# Patient Record
Sex: FEMALE | Race: WHITE | Employment: OTHER | URBAN - METROPOLITAN AREA
[De-identification: names, ages, dates, MRNs, and addresses within clinical notes are randomized per-mention and may not be internally consistent; named-entity substitution may affect disease eponyms.]

---

## 2017-01-29 ENCOUNTER — HOSPITAL ENCOUNTER (EMERGENCY)
Age: 67
Discharge: HOME | End: 2017-01-29
Attending: EMERGENCY MEDICINE
Payer: MEDICARE

## 2017-01-29 ENCOUNTER — APPOINTMENT (OUTPATIENT)
Dept: GENERAL RADIOLOGY | Age: 67
End: 2017-01-29
Attending: EMERGENCY MEDICINE
Payer: MEDICARE

## 2017-01-29 VITALS
HEIGHT: 64 IN | BODY MASS INDEX: 27.83 KG/M2 | HEART RATE: 77 BPM | OXYGEN SATURATION: 96 % | TEMPERATURE: 101.7 F | SYSTOLIC BLOOD PRESSURE: 147 MMHG | DIASTOLIC BLOOD PRESSURE: 66 MMHG | WEIGHT: 163 LBS | RESPIRATION RATE: 18 BRPM

## 2017-01-29 DIAGNOSIS — J02.9 SORE THROAT: Primary | ICD-10-CM

## 2017-01-29 DIAGNOSIS — R50.9 FEVER, UNSPECIFIED FEVER CAUSE: ICD-10-CM

## 2017-01-29 LAB
ALBUMIN SERPL BCP-MCNC: 3.8 G/DL (ref 3.2–4.6)
ALBUMIN/GLOB SERPL: 0.9 {RATIO} (ref 1.2–3.5)
ALP SERPL-CCNC: 112 U/L (ref 50–136)
ALT SERPL-CCNC: 31 U/L (ref 12–65)
ANION GAP BLD CALC-SCNC: 9 MMOL/L (ref 7–16)
AST SERPL W P-5'-P-CCNC: 23 U/L (ref 15–37)
BASOPHILS # BLD AUTO: 0 K/UL (ref 0–0.2)
BASOPHILS # BLD: 0 % (ref 0–2)
BILIRUB SERPL-MCNC: 0.4 MG/DL (ref 0.2–1.1)
BUN SERPL-MCNC: 14 MG/DL (ref 8–23)
CALCIUM SERPL-MCNC: 8.9 MG/DL (ref 8.3–10.4)
CHLORIDE SERPL-SCNC: 103 MMOL/L (ref 98–107)
CO2 SERPL-SCNC: 26 MMOL/L (ref 21–32)
CREAT SERPL-MCNC: 1.22 MG/DL (ref 0.6–1)
DEPRECATED S PYO AG THROAT QL EIA: NEGATIVE
DIFFERENTIAL METHOD BLD: ABNORMAL
EOSINOPHIL # BLD: 0.2 K/UL (ref 0–0.8)
EOSINOPHIL NFR BLD: 1 % (ref 0.5–7.8)
ERYTHROCYTE [DISTWIDTH] IN BLOOD BY AUTOMATED COUNT: 11.8 % (ref 11.9–14.6)
FLUAV AG NPH QL IA: NEGATIVE
FLUBV AG NPH QL IA: NEGATIVE
GLOBULIN SER CALC-MCNC: 4.3 G/DL (ref 2.3–3.5)
GLUCOSE SERPL-MCNC: 181 MG/DL (ref 65–100)
HCT VFR BLD AUTO: 36.1 % (ref 35.8–46.3)
HGB BLD-MCNC: 12.5 G/DL (ref 11.7–15.4)
IMM GRANULOCYTES # BLD: 0 K/UL (ref 0–0.5)
IMM GRANULOCYTES NFR BLD AUTO: 0.2 % (ref 0–5)
LACTATE BLD-SCNC: 1.8 MMOL/L (ref 0.5–1.9)
LYMPHOCYTES # BLD AUTO: 9 % (ref 13–44)
LYMPHOCYTES # BLD: 1.2 K/UL (ref 0.5–4.6)
MCH RBC QN AUTO: 31.3 PG (ref 26.1–32.9)
MCHC RBC AUTO-ENTMCNC: 34.6 G/DL (ref 31.4–35)
MCV RBC AUTO: 90.5 FL (ref 79.6–97.8)
MONOCYTES # BLD: 0.9 K/UL (ref 0.1–1.3)
MONOCYTES NFR BLD AUTO: 7 % (ref 4–12)
NEUTS SEG # BLD: 10.4 K/UL (ref 1.7–8.2)
NEUTS SEG NFR BLD AUTO: 83 % (ref 43–78)
PLATELET # BLD AUTO: 334 K/UL (ref 150–450)
PMV BLD AUTO: 9.4 FL (ref 10.8–14.1)
POTASSIUM SERPL-SCNC: 4.2 MMOL/L (ref 3.5–5.1)
PROT SERPL-MCNC: 8.1 G/DL (ref 6.3–8.2)
RBC # BLD AUTO: 3.99 M/UL (ref 4.05–5.25)
SODIUM SERPL-SCNC: 138 MMOL/L (ref 136–145)
WBC # BLD AUTO: 12.7 K/UL (ref 4.3–11.1)

## 2017-01-29 PROCEDURE — 87880 STREP A ASSAY W/OPTIC: CPT | Performed by: EMERGENCY MEDICINE

## 2017-01-29 PROCEDURE — 80053 COMPREHEN METABOLIC PANEL: CPT | Performed by: EMERGENCY MEDICINE

## 2017-01-29 PROCEDURE — 74011250637 HC RX REV CODE- 250/637: Performed by: EMERGENCY MEDICINE

## 2017-01-29 PROCEDURE — 99284 EMERGENCY DEPT VISIT MOD MDM: CPT | Performed by: EMERGENCY MEDICINE

## 2017-01-29 PROCEDURE — 96360 HYDRATION IV INFUSION INIT: CPT | Performed by: EMERGENCY MEDICINE

## 2017-01-29 PROCEDURE — 87804 INFLUENZA ASSAY W/OPTIC: CPT | Performed by: EMERGENCY MEDICINE

## 2017-01-29 PROCEDURE — 85025 COMPLETE CBC W/AUTO DIFF WBC: CPT | Performed by: EMERGENCY MEDICINE

## 2017-01-29 PROCEDURE — 74011250636 HC RX REV CODE- 250/636: Performed by: EMERGENCY MEDICINE

## 2017-01-29 PROCEDURE — 71020 XR CHEST PA LAT: CPT

## 2017-01-29 PROCEDURE — 83605 ASSAY OF LACTIC ACID: CPT

## 2017-01-29 PROCEDURE — 87081 CULTURE SCREEN ONLY: CPT | Performed by: EMERGENCY MEDICINE

## 2017-01-29 RX ORDER — CLINDAMYCIN HYDROCHLORIDE 300 MG/1
300 CAPSULE ORAL 3 TIMES DAILY
Qty: 25 CAP | Refills: 0 | Status: SHIPPED | OUTPATIENT
Start: 2017-01-29 | End: 2017-02-05

## 2017-01-29 RX ORDER — CLINDAMYCIN HYDROCHLORIDE 150 MG/1
300 CAPSULE ORAL
Status: COMPLETED | OUTPATIENT
Start: 2017-01-29 | End: 2017-01-29

## 2017-01-29 RX ORDER — ACETAMINOPHEN 325 MG/1
650 TABLET ORAL
Status: COMPLETED | OUTPATIENT
Start: 2017-01-29 | End: 2017-01-29

## 2017-01-29 RX ADMIN — ACETAMINOPHEN 650 MG: 325 TABLET, FILM COATED ORAL at 15:55

## 2017-01-29 RX ADMIN — CLINDAMYCIN HYDROCHLORIDE 300 MG: 150 CAPSULE ORAL at 15:55

## 2017-01-29 RX ADMIN — SODIUM CHLORIDE 1000 ML: 900 INJECTION, SOLUTION INTRAVENOUS at 14:22

## 2017-01-29 NOTE — ED PROVIDER NOTES
HPI Comments: Some malaise x 2 weeks. / more recent sore throat and cough with rare scant green sputum. No GI or  changes    Patient is a 74 y.o. female presenting with fever, sore throat, and shortness of breath. The history is provided by the patient. Fever    This is a new problem. The current episode started 12 to 24 hours ago. The problem occurs constantly. The maximum temperature noted was 101 - 101.9 F. Associated symptoms include sore throat and shortness of breath. Pertinent negatives include no diarrhea, no cough, no neck pain and no rash. She has tried nothing for the symptoms. Sore Throat    This is a new problem. The current episode started 12 to 24 hours ago. Associated symptoms include shortness of breath. Pertinent negatives include no diarrhea, no drooling, no swollen glands, no trouble swallowing, no stiff neck and no cough. She has had no exposure to strep or mono. Shortness of Breath   The current episode started more than 1 week ago. Associated symptoms include a fever and sore throat. Pertinent negatives include no swollen glands, no neck pain, no cough, no sputum production, no hemoptysis, no wheezing, no rash, no leg pain and no leg swelling. She has tried nothing for the symptoms. Past Medical History:   Diagnosis Date    CAD (coronary artery disease) 3/1/2012     MI, stents    Chest pain     Coronary artery disease involving native coronary artery without angina pectoris 5/21/2015    Depression 3/1/2012    Diabetes mellitus ([facility] [address] 3/1/2012     oral agents.  bs: 102-110. hypo- 80's    DM2 (diabetes mellitus, type 2) ([facility] [address] 5/21/2015    Elevated troponin 3/2/2012    Essential hypertension 5/21/2015    External hemorrhoids without mention of complication 4117    GERD (gastroesophageal reflux disease)     History of MI (myocardial infarction) 11/12     x2    Hypercholesterolemia     Hypertension 3/1/2012    Insomnia     Personal history of colonic polyps 2013 hyperplastic    Platelet inhibition due to Plavix      holding for colonoscopy per GI Dr. Janis Huddleston Rectocele 2013    Tobacco abuse 3/1/2012     quit for 1 year    Tobacco use disorder     Unstable angina ([address] 3/1/2012     ntg as needed. Past Surgical History:   Procedure Laterality Date    Hx supriya and bso      Pr left heart cath,percutaneous  last stented 11/12 x 2     stent x3 , mi x 2    Hx hemorrhoidectomy       x2    Hx orthopaedic       left elbow    Hx cervical fusion       neck w/plate    Hx colonoscopy  12/17/13     Anna Marie--single transverse hyperplastic polyp--7-10 year recall    Hx wisdom teeth extraction           Family History:   Problem Relation Age of Onset    Heart Disease Mother     Osteoporosis Mother     Heart Attack Mother 67     mi    Thyroid Disease Mother      Multinodular Goiter    Heart Disease Father     Cancer Father      pancreatic    Heart Attack Father 67     MI    Cancer Sister      Pre-Cancerous dysplasia    Thyroid Cancer Sister     Ulcerative Colitis Brother     Thyroid Cancer Brother     Breast Cancer Neg Hx        Social History     Social History    Marital status:      Spouse name: N/A    Number of children: N/A    Years of education: N/A     Occupational History    Not on file. Social History Main Topics    Smoking status: Former Smoker     Packs/day: 1.00     Years: 40.00     Quit date: 11/9/2012    Smokeless tobacco: Never Used      Comment: quit 2012 . has stopped prior also    Alcohol use Yes      Comment: socially     Drug use: No    Sexual activity: Not on file     Other Topics Concern    Not on file     Social History Narrative         ALLERGIES: Cephalosporins and Lisinopril    Review of Systems   Constitutional: Positive for fever. Negative for chills. HENT: Positive for sore throat. Negative for drooling and trouble swallowing. Respiratory: Positive for shortness of breath.  Negative for cough, hemoptysis, sputum production and wheezing. Cardiovascular: Negative for leg swelling. Gastrointestinal: Negative for diarrhea. Musculoskeletal: Negative for neck pain. Skin: Negative for rash. All other systems reviewed and are negative. Vitals:    01/29/17 1214   BP: 160/64   Pulse: 72   Resp: 16   Temp: (!) 101.9 °F (38.8 °C)   SpO2: 97%   Weight: 73.9 kg (163 lb)   Height: 5' 4\" (1.626 m)            Physical Exam   Constitutional: She appears well-developed and well-nourished. No distress. HENT:   Head: Atraumatic. Right Ear: External ear normal.   Left Ear: External ear normal.   Nose: Nose normal.   Mouth/Throat: No oropharyngeal exudate. No evidence of abscess. Some odor. Mild redness but no exudate   Eyes: No scleral icterus. Neck: Neck supple. Cardiovascular: Normal rate. Pulmonary/Chest: Effort normal. No respiratory distress. Abdominal: Soft. Musculoskeletal: She exhibits no edema or tenderness. Neurological: She is alert. Skin: Skin is warm and dry. Psychiatric: Thought content normal.   Nursing note and vitals reviewed.        MDM  Number of Diagnoses or Management Options  Fever, unspecified fever cause:   Sore throat:      Amount and/or Complexity of Data Reviewed  Clinical lab tests: ordered and reviewed  Tests in the radiology section of CPT®: ordered and reviewed  Independent visualization of images, tracings, or specimens: yes    Risk of Complications, Morbidity, and/or Mortality  Presenting problems: moderate  Diagnostic procedures: low  Management options: moderate    Patient Progress  Patient progress: stable    ED Course       Procedures

## 2017-01-29 NOTE — ED TRIAGE NOTES
Patient complains of sore throat and fever since last night. States she has been having some shortness of breath for a couple of weeks. Reports non-productive cough.

## 2017-01-29 NOTE — Clinical Note
Antibiotic : clindamycin 300 mg 3 times daily Tylenol or ibuprofen for discomfort Return with any worsening or concerns Throat culture done

## 2017-01-29 NOTE — DISCHARGE INSTRUCTIONS
Sore Throat: Care Instructions  Your Care Instructions    Infection by bacteria or a virus causes most sore throats. Cigarette smoke, dry air, air pollution, allergies, and yelling can also cause a sore throat. Sore throats can be painful and annoying. Fortunately, most sore throats go away on their own. If you have a bacterial infection, your doctor may prescribe antibiotics. Follow-up care is a key part of your treatment and safety. Be sure to make and go to all appointments, and call your doctor if you are having problems. It's also a good idea to know your test results and keep a list of the medicines you take. How can you care for yourself at home? · If your doctor prescribed antibiotics, take them as directed. Do not stop taking them just because you feel better. You need to take the full course of antibiotics. · Gargle with warm salt water once an hour to help reduce swelling and relieve discomfort. Use 1 teaspoon of salt mixed in 1 cup of warm water. · Take an over-the-counter pain medicine, such as acetaminophen (Tylenol), ibuprofen (Advil, Motrin), or naproxen (Aleve). Read and follow all instructions on the label. · Be careful when taking over-the-counter cold or flu medicines and Tylenol at the same time. Many of these medicines have acetaminophen, which is Tylenol. Read the labels to make sure that you are not taking more than the recommended dose. Too much acetaminophen (Tylenol) can be harmful. · Drink plenty of fluids. Fluids may help soothe an irritated throat. Hot fluids, such as tea or soup, may help decrease throat pain. · Use over-the-counter throat lozenges to soothe pain. Regular cough drops or hard candy may also help. These should not be given to young children because of the risk of choking. · Do not smoke or allow others to smoke around you. If you need help quitting, talk to your doctor about stop-smoking programs and medicines.  These can increase your chances of quitting for good. · Use a vaporizer or humidifier to add moisture to your bedroom. Follow the directions for cleaning the machine. When should you call for help? Call your doctor now or seek immediate medical care if:  · You have new or worse trouble swallowing. · Your sore throat gets much worse on one side. Watch closely for changes in your health, and be sure to contact your doctor if you do not get better as expected. Where can you learn more? Go to http://angeles-kehinde.info/. Enter [id number] in the search box to learn more about \"Sore Throat: Care Instructions. \"  Current as of: July 29, 2016  Content Version: 11.1  © 7149-9400 [employer]. Care instructions adapted under license by [employer] (which disclaims liability or warranty for this information). If you have questions about a medical condition or this instruction, always ask your healthcare professional. [address] any warranty or liability for your use of this information.

## 2017-01-29 NOTE — ED NOTES
I have reviewed discharge instructions with the patient. The patient verbalized understanding. Prescription and AVS given and explained to pt. Pt denies any further needs, questions, or concerns at this time. No adverse reactions to any medications, treatments, or procedures noted. Pt febrile at time of triage but medicated with tylenol upon discharge. Pt aided to POV via wheelchair.

## 2017-01-30 ENCOUNTER — PATIENT OUTREACH (OUTPATIENT)
Dept: CASE MANAGEMENT | Age: 67
End: 2017-01-30

## 2017-01-31 LAB
BACTERIA SPEC CULT: NORMAL
SERVICE CMNT-IMP: NORMAL

## 2017-01-31 NOTE — PROGRESS NOTES
Care coordinator attempted to outreach patient in regards to recent [facility] discharge. Will attempt to outreach patient again. LVM #1   This note will not be viewable in MyChart.

## 2017-02-05 ENCOUNTER — APPOINTMENT (OUTPATIENT)
Dept: GENERAL RADIOLOGY | Age: 67
End: 2017-02-05
Attending: EMERGENCY MEDICINE
Payer: MEDICARE

## 2017-02-05 ENCOUNTER — HOSPITAL ENCOUNTER (EMERGENCY)
Age: 67
Discharge: HOME | End: 2017-02-05
Attending: EMERGENCY MEDICINE
Payer: MEDICARE

## 2017-02-05 VITALS
RESPIRATION RATE: 18 BRPM | SYSTOLIC BLOOD PRESSURE: 184 MMHG | OXYGEN SATURATION: 99 % | HEIGHT: 64 IN | TEMPERATURE: 99 F | BODY MASS INDEX: 27.83 KG/M2 | DIASTOLIC BLOOD PRESSURE: 80 MMHG | HEART RATE: 70 BPM | WEIGHT: 163 LBS

## 2017-02-05 DIAGNOSIS — R07.89 ATYPICAL CHEST PAIN: Primary | ICD-10-CM

## 2017-02-05 LAB
ALBUMIN SERPL BCP-MCNC: 3.1 G/DL (ref 3.2–4.6)
ALBUMIN/GLOB SERPL: 0.8 {RATIO} (ref 1.2–3.5)
ALP SERPL-CCNC: 109 U/L (ref 50–136)
ALT SERPL-CCNC: 28 U/L (ref 12–65)
ANION GAP BLD CALC-SCNC: 12 MMOL/L (ref 7–16)
AST SERPL W P-5'-P-CCNC: 22 U/L (ref 15–37)
BASOPHILS # BLD AUTO: 0 K/UL (ref 0–0.2)
BASOPHILS # BLD: 0 % (ref 0–2)
BILIRUB SERPL-MCNC: 0.4 MG/DL (ref 0.2–1.1)
BUN SERPL-MCNC: 15 MG/DL (ref 8–23)
CALCIUM SERPL-MCNC: 7.7 MG/DL (ref 8.3–10.4)
CHLORIDE SERPL-SCNC: 110 MMOL/L (ref 98–107)
CO2 SERPL-SCNC: 21 MMOL/L (ref 21–32)
CREAT SERPL-MCNC: 1.24 MG/DL (ref 0.6–1)
DIFFERENTIAL METHOD BLD: ABNORMAL
EOSINOPHIL # BLD: 0.3 K/UL (ref 0–0.8)
EOSINOPHIL NFR BLD: 3 % (ref 0.5–7.8)
ERYTHROCYTE [DISTWIDTH] IN BLOOD BY AUTOMATED COUNT: 11.7 % (ref 11.9–14.6)
FLUAV AG NPH QL IA: NEGATIVE
FLUBV AG NPH QL IA: NEGATIVE
GLOBULIN SER CALC-MCNC: 3.9 G/DL (ref 2.3–3.5)
GLUCOSE BLD STRIP.AUTO-MCNC: 111 MG/DL (ref 65–100)
GLUCOSE SERPL-MCNC: 84 MG/DL (ref 65–100)
HCT VFR BLD AUTO: 32.8 % (ref 35.8–46.3)
HGB BLD-MCNC: 11.4 G/DL (ref 11.7–15.4)
IMM GRANULOCYTES # BLD: 0 K/UL (ref 0–0.5)
IMM GRANULOCYTES NFR BLD AUTO: 0.4 % (ref 0–5)
LYMPHOCYTES # BLD AUTO: 26 % (ref 13–44)
LYMPHOCYTES # BLD: 2.5 K/UL (ref 0.5–4.6)
MCH RBC QN AUTO: 30.9 PG (ref 26.1–32.9)
MCHC RBC AUTO-ENTMCNC: 34.8 G/DL (ref 31.4–35)
MCV RBC AUTO: 88.9 FL (ref 79.6–97.8)
MONOCYTES # BLD: 0.8 K/UL (ref 0.1–1.3)
MONOCYTES NFR BLD AUTO: 8 % (ref 4–12)
NEUTS SEG # BLD: 6 K/UL (ref 1.7–8.2)
NEUTS SEG NFR BLD AUTO: 63 % (ref 43–78)
PLATELET # BLD AUTO: 329 K/UL (ref 150–450)
PMV BLD AUTO: 9.5 FL (ref 10.8–14.1)
POTASSIUM SERPL-SCNC: 3 MMOL/L (ref 3.5–5.1)
PROT SERPL-MCNC: 7 G/DL (ref 6.3–8.2)
RBC # BLD AUTO: 3.69 M/UL (ref 4.05–5.25)
SODIUM SERPL-SCNC: 143 MMOL/L (ref 136–145)
TROPONIN I SERPL-MCNC: 0.02 NG/ML (ref 0.02–0.05)
TROPONIN I SERPL-MCNC: <0.02 NG/ML (ref 0.02–0.05)
WBC # BLD AUTO: 9.6 K/UL (ref 4.3–11.1)

## 2017-02-05 PROCEDURE — 84484 ASSAY OF TROPONIN QUANT: CPT | Performed by: EMERGENCY MEDICINE

## 2017-02-05 PROCEDURE — 87804 INFLUENZA ASSAY W/OPTIC: CPT | Performed by: EMERGENCY MEDICINE

## 2017-02-05 PROCEDURE — 71020 XR CHEST PA LAT: CPT

## 2017-02-05 PROCEDURE — 93005 ELECTROCARDIOGRAM TRACING: CPT | Performed by: EMERGENCY MEDICINE

## 2017-02-05 PROCEDURE — 82962 GLUCOSE BLOOD TEST: CPT

## 2017-02-05 PROCEDURE — 80053 COMPREHEN METABOLIC PANEL: CPT | Performed by: EMERGENCY MEDICINE

## 2017-02-05 PROCEDURE — 85025 COMPLETE CBC W/AUTO DIFF WBC: CPT | Performed by: EMERGENCY MEDICINE

## 2017-02-05 PROCEDURE — 99285 EMERGENCY DEPT VISIT HI MDM: CPT | Performed by: EMERGENCY MEDICINE

## 2017-02-05 NOTE — ED TRIAGE NOTES
Pt. Complains of a cough x 3 days and complains of chest pain and shortness of breath starting this AM.

## 2017-02-06 ENCOUNTER — PATIENT OUTREACH (OUTPATIENT)
Dept: CASE MANAGEMENT | Age: 67
End: 2017-02-06

## 2017-02-06 LAB
ATRIAL RATE: 72 BPM
CALCULATED P AXIS, ECG09: 34 DEGREES
CALCULATED R AXIS, ECG10: 71 DEGREES
CALCULATED T AXIS, ECG11: 52 DEGREES
DIAGNOSIS, 93000: NORMAL
DIASTOLIC BP, ECG02: NORMAL MMHG
P-R INTERVAL, ECG05: 130 MS
Q-T INTERVAL, ECG07: 440 MS
QRS DURATION, ECG06: 90 MS
QTC CALCULATION (BEZET), ECG08: 481 MS
SYSTOLIC BP, ECG01: NORMAL MMHG
VENTRICULAR RATE, ECG03: 72 BPM

## 2017-02-06 NOTE — DISCHARGE INSTRUCTIONS
Chest Pain: Care Instructions  Your Care Instructions  There are many things that can cause chest pain. Some are not serious and will get better on their own in a few days. But some kinds of chest pain need more testing and treatment. Your doctor may have recommended a follow-up visit in the next 8 to 12 hours. If you are not getting better, you may need more tests or treatment. Even though your doctor has released you, you still need to watch for any problems. The doctor carefully checked you, but sometimes problems can develop later. If you have new symptoms or if your symptoms do not get better, get medical care right away. If you have worse or different chest pain or pressure that lasts more than 5 minutes or you passed out (lost consciousness), call 911 or seek other emergency help right away. A medical visit is only one step in your treatment. Even if you feel better, you still need to do what your doctor recommends, such as going to all suggested follow-up appointments and taking medicines exactly as directed. This will help you recover and help prevent future problems. How can you care for yourself at home? · Rest until you feel better. · Take your medicine exactly as prescribed. Call your doctor if you think you are having a problem with your medicine. · Do not drive after taking a prescription pain medicine. When should you call for help? Call 911 if:  · You passed out (lost consciousness). · You have severe difficulty breathing. · You have symptoms of a heart attack. These may include:  ¨ Chest pain or pressure, or a strange feeling in your chest.  ¨ Sweating. ¨ Shortness of breath. ¨ Nausea or vomiting. ¨ Pain, pressure, or a strange feeling in your back, neck, jaw, or upper belly or in one or both shoulders or arms. ¨ Lightheadedness or sudden weakness. ¨ A fast or irregular heartbeat.   After you call 911, the  may tell you to chew 1 adult-strength or 2 to 4 low-dose aspirin. Wait for an ambulance. Do not try to drive yourself. Call your doctor today if:  · You have any trouble breathing. · Your chest pain gets worse. · You are dizzy or lightheaded, or you feel like you may faint. · You are not getting better as expected. · You are having new or different chest pain. Where can you learn more? Go to http://angeles-kehinde.info/. Enter A120 in the search box to learn more about \"Chest Pain: Care Instructions. \"  Current as of: May 27, 2016  Content Version: 11.1  © 4925-4184 [employer]. Care instructions adapted under license by [employer] (which disclaims liability or warranty for this information). If you have questions about a medical condition or this instruction, always ask your healthcare professional. [address] any warranty or liability for your use of this information.

## 2017-02-06 NOTE — PROGRESS NOTES
This note will not be viewable in [address] Date/Time of Call:   2/6/2017 9:59am   What was the patient seen in the ED for? Patient was seen in ED for DX of:  Atypical Chest Pain   Does the patient understand his/her diagnosis and/or treatment and what happened during the ED visit? Spoke with patient who verbalized understanding of diagnosis and treatment plan. Patient reports she is feeling better. Denies any further problems. Did the patient receive discharge instructions from the ED? Yes   Review any discharge instructions (see notes in [facility]). Ask patient if they understand these. Do they have any questions? Care Coordinator reviewed discharge instructions. Patient verbalized understanding of instructions. Were home services ordered (nursing, PT, OT, ST, etc.)? No   If so, has the first visit occurred? If not, why? (Assist with coordination of services if necessary.) N/A   Was any DME ordered? No   If so, has it been received? If not, why?  (Assist with coordination of arranging DME orders if necessary.) N/A   Complete a review of all medications (new, continued and discontinued meds per the D/C instructions and medication tab in [facility]). Reviewed medications with patient. Were all new prescriptions filled? If not, why?  (Assist with obtainment of medications if necessary.) N/A   Does the patient understand the purpose and dosing instructions for all medications? (If patient has questions, provide explanation and education.) Patient reports understanding of purpose and instructions for all medications. Does the patient have any problems in performing ADLs? (If patient is unable to perform ADLs  what is the limiting factor(s)? Do they have a support system that can assist? If no support system is present, discuss possible assistance that they may be able to obtain.) Patient is independent and does not require assistance.    Does the patient have all follow-up appointments scheduled? Has transportation been arranged? [facility] follow-up should be within 7 days of discharge; all others should have PCP follow-up within 7 days of discharge; follow-ups with other specialists as appropriate or ordered.) Patient advised to schedule follow up appointment with PCP. Patient reports no problems with transportation to visits. Any other questions or concerns expressed by the patient? Patient verbalized no further needs or concerns, and was appreciative of call.        MARQUIS Call Completed By: Genoveva Landry LPN   Care Coordinator  [facility]

## 2017-02-06 NOTE — ED PROVIDER NOTES
HPI Comments: Patient is a 33-year-old former smoker with a past medical history of coronary artery disease status post 2 stents. Today she is presenting with chest pain which woke her up at approximately 11 AM.  She describes the pain as sharp and substernal radiating to her back. States is similar to her previous myocardial infarction. Mild shortness of breath. Some pain with deep inspiration. Patient currently on aspirin as well as Plavix and had a normal nuclear stress test on 4/7/16. Patient followed by Dr. Jerel Bridges of cardiology. At the time of my evaluation she is significantly improved after nitroglycerin as well as aspirin administration by EMS. Patient is a 74 y.o. female presenting with cough and chest pain. The history is provided by the patient. No  was used. Cough   Associated symptoms include chest pain. Pertinent negatives include no headaches, no sore throat and no shortness of breath. Chest Pain (Angina)    Associated symptoms include cough. Pertinent negatives include no abdominal pain, no back pain, no fever, no headaches and no shortness of breath. Past Medical History:   Diagnosis Date    CAD (coronary artery disease) 3/1/2012     MI, stents    Chest pain     Coronary artery disease involving native coronary artery without angina pectoris 5/21/2015    Depression 3/1/2012    Diabetes mellitus ([facility] [address] 3/1/2012     oral agents.  bs: 102-110. hypo- 80's    DM2 (diabetes mellitus, type 2) ([facility] [address] 5/21/2015    Elevated troponin 3/2/2012    Essential hypertension 5/21/2015    External hemorrhoids without mention of complication 2421    GERD (gastroesophageal reflux disease)     History of MI (myocardial infarction) 11/12     x2    Hypercholesterolemia     Hypertension 3/1/2012    Insomnia     Personal history of colonic polyps 2013     hyperplastic    Platelet inhibition due to Plavix      holding for colonoscopy per GI Dr. Rosalinda Galvan 2013    Tobacco abuse 3/1/2012     quit for 1 year    Tobacco use disorder     Unstable angina ([facility] [address] 3/1/2012     ntg as needed. Past Surgical History:   Procedure Laterality Date    Hx supriya and bso      Pr left heart cath,percutaneous  last stented 11/12 x 2     stent x3 , mi x 2    Hx hemorrhoidectomy       x2    Hx orthopaedic       left elbow    Hx cervical fusion       neck w/plate    Hx colonoscopy  12/17/13     Anna Marie--single transverse hyperplastic polyp--7-10 year recall    Hx wisdom teeth extraction           Family History:   Problem Relation Age of Onset    Heart Disease Mother     Osteoporosis Mother     Heart Attack Mother 67     mi    Thyroid Disease Mother      Multinodular Goiter    Heart Disease Father     Cancer Father      pancreatic    Heart Attack Father 67     MI    Cancer Sister      Pre-Cancerous dysplasia    Thyroid Cancer Sister     Ulcerative Colitis Brother     Thyroid Cancer Brother     Breast Cancer Neg Hx        Social History     Social History    Marital status:      Spouse name: N/A    Number of children: N/A    Years of education: N/A     Occupational History    Not on file. Social History Main Topics    Smoking status: Former Smoker     Packs/day: 1.00     Years: 40.00     Quit date: 11/9/2012    Smokeless tobacco: Never Used      Comment: quit 2012 . has stopped prior also    Alcohol use Yes      Comment: socially     Drug use: No    Sexual activity: Not on file     Other Topics Concern    Not on file     Social History Narrative         ALLERGIES: Cephalosporins and Lisinopril    Review of Systems   Constitutional: Negative for fatigue and fever. HENT: Negative for sore throat. Eyes: Negative for pain. Respiratory: Positive for cough. Negative for chest tightness and shortness of breath. Cardiovascular: Positive for chest pain. Negative for leg swelling. Gastrointestinal: Negative for abdominal pain.    Genitourinary: Negative for dysuria. Musculoskeletal: Negative for back pain. Neurological: Negative for syncope and headaches. Vitals:    02/05/17 1735   BP: 146/69   Pulse: 74   Resp: 16   Temp: 99 °F (37.2 °C)   SpO2: 98%   Weight: 73.9 kg (163 lb)   Height: 5' 4\" (1.626 m)            Physical Exam   Constitutional: She is oriented to person, place, and time. She appears well-developed and well-nourished. No distress. HENT:   Head: Normocephalic and atraumatic. Eyes: Conjunctivae and EOM are normal. Pupils are equal, round, and reactive to light. Neck: Normal range of motion. Neck supple. Cardiovascular: Normal rate, regular rhythm and normal heart sounds. Pulmonary/Chest: Effort normal and breath sounds normal. No respiratory distress. She has no wheezes. She has no rales. Abdominal: Soft. She exhibits no distension. There is no tenderness. There is no rebound. Musculoskeletal: Normal range of motion. She exhibits no edema or tenderness. Neurological: She is alert and oriented to person, place, and time. Skin: Skin is warm and dry. No rash noted. She is not diaphoretic. Psychiatric: Her behavior is normal.   Patient appears quite anxious   Vitals reviewed. MDM  Number of Diagnoses or Management Options  Atypical chest pain: new and does not require workup  Diagnosis management comments: Patient has had multiple workups in the past for atypical chest pain. She is followed by cardiology. EKG reassuring. If 2 trops negative likely discharge back to home. She has no significant chest pain times my evaluation. Troponin ×2 negative. Patient without significant chest pain. Will discharge to home. I have instructed the patient to call her cardiologist Dr. Amira Weldon near future to schedule a follow-up appointment. Patient and family expressed understanding. Strict return precautions discussed.        Amount and/or Complexity of Data Reviewed  Clinical lab tests: ordered and reviewed (Results for orders placed or performed during the hospital encounter of 02/05/17  -INFLUENZA A & B AG (RAPID TEST)       Result                                            Value                         Ref Range                       Influenza A Ag                                    NEGATIVE                      NEG                             Influenza B Ag                                    NEGATIVE                      NEG                        -CBC WITH AUTOMATED DIFF       Result                                            Value                         Ref Range                       WBC                                               9.6                           4.3 - 11.1 K/uL                 RBC                                               3.69 (L)                      4.05 - 5.25 M/uL                HGB                                               11.4 (L)                      11.7 - 15.4 g/dL                HCT                                               32.8 (L)                      35.8 - 46.3 %                   MCV                                               88.9                          79.6 - 97.8 FL                  MCH                                               30.9                          26.1 - 32.9 PG                  MCHC                                              34.8                          31.4 - 35.0 g/dL                RDW                                               11.7 (L)                      11.9 - 14.6 %                   PLATELET                                          329                           150 - 450 K/uL                  MPV                                               9.5 (L)                       10.8 - 14.1 FL                  DF                                                AUTOMATED                                                     NEUTROPHILS                                       63                            43 - 78 % LYMPHOCYTES                                       26                            13 - 44 %                       MONOCYTES                                         8                             4.0 - 12.0 %                    EOSINOPHILS                                       3                             0.5 - 7.8 %                     BASOPHILS                                         0                             0.0 - 2.0 %                     IMMATURE GRANULOCYTES                             0.4                           0.0 - 5.0 %                     ABS. NEUTROPHILS                                  6.0                           1.7 - 8.2 K/UL                  ABS. LYMPHOCYTES                                  2.5                           0.5 - 4.6 K/UL                  ABS. MONOCYTES                                    0.8                           0.1 - 1.3 K/UL                  ABS. EOSINOPHILS                                  0.3                           0.0 - 0.8 K/UL                  ABS. BASOPHILS                                    0.0                           0.0 - 0.2 K/UL                  ABS. IMM.  GRANS.                                  0.0                           0.0 - 0.5 K/UL             -METABOLIC PANEL, COMPREHENSIVE       Result                                            Value                         Ref Range                       Sodium                                            143                           136 - 145 mmol/L                Potassium                                         3.0 (L)                       3.5 - 5.1 mmol/L                Chloride                                          110 (H)                       98 - 107 mmol/L                 CO2                                               21                            21 - 32 mmol/L                  Anion gap                                         12                            7 - 16 mmol/L                   Glucose 84                            65 - 100 mg/dL                  BUN                                               15                            8 - 23 MG/DL                    Creatinine                                        1.24 (H)                      0.6 - 1.0 MG/DL                 GFR est AA                                        55 (L)                        >60 ml/min/1.73m2               GFR est non-AA                                    46 (L)                        >60 ml/min/1.73m2               Calcium                                           7.7 (L)                       8.3 - 10.4 MG/DL                Bilirubin, total                                  0.4                           0.2 - 1.1 MG/DL                 ALT (SGPT)                                        28                            12 - 65 U/L                     AST (SGOT)                                        22                            15 - 37 U/L                     Alk. phosphatase                                  109                           50 - 136 U/L                    Protein, total                                    7.0                           6.3 - 8.2 g/dL                  Albumin                                           3.1 (L)                       3.2 - 4.6 g/dL                  Globulin                                          3.9 (H)                       2.3 - 3.5 g/dL                  A-G Ratio                                         0.8 (L)                       1.2 - 3.5                  -TROPONIN I       Result                                            Value                         Ref Range                       Troponin-I, Qt.                                   0.02                          0.02 - 0.05 NG/ML          -TROPONIN I       Result                                            Value                         Ref Range                       Troponin-I, Qt. <0. 02 (L)                     0.02 - 0.05 NG/ML          -GLUCOSE, POC       Result                                            Value                         Ref Range                       Glucose (POC)                                     111 (H)                       65 - 100 mg/dL             )  Tests in the radiology section of CPT®: ordered and reviewed (Xr Chest Pa Lat    Result Date: 2/5/2017  Chest X-ray  2/5/2017 6:12 PM Clinical indication:  Cough for 3 days with chest pain and shortness of breath, onset this morning. Comparison: 1/29/2017. Findings: Upright AP and lateral chest. ACDF plate and screws partially visualized. Coronary artery stent. Apical lordotic technique. Lungs are well-aerated and clear. No focal airspace opacities nor edema. No effusions. Stable cardiomegaly. Possible mitral annular calcifications. IMPRESSION: 1.  Little significant change: No acute abnormality.    )  Review and summarize past medical records: yes  Independent visualization of images, tracings, or specimens: yes    Risk of Complications, Morbidity, and/or Mortality  Presenting problems: high  Diagnostic procedures: high  Management options: high    Patient Progress  Patient progress: improved    ED Course       Procedures

## 2017-02-06 NOTE — ED NOTES
Patient reports that her \"blood sugar is getting low. \" Patients blood sugar checked POC. . Patient states that her \"normal blood sugar is 120-135. \" Dr Giovany Mcmahon aware. Patient given meal tray.

## 2017-02-16 ENCOUNTER — HOSPITAL ENCOUNTER (OUTPATIENT)
Dept: LAB | Age: 67
Discharge: HOME | End: 2017-02-16
Attending: INTERNAL MEDICINE
Payer: MEDICARE

## 2017-02-16 DIAGNOSIS — Z98.61 S/P PTCA (PERCUTANEOUS TRANSLUMINAL CORONARY ANGIOPLASTY): ICD-10-CM

## 2017-02-16 PROBLEM — I10 ESSENTIAL HYPERTENSION WITH GOAL BLOOD PRESSURE LESS THAN 130/85: Status: ACTIVE | Noted: 2017-02-16

## 2017-02-16 PROBLEM — K21.9 GASTROESOPHAGEAL REFLUX DISEASE: Status: ACTIVE | Noted: 2017-02-16

## 2017-02-16 PROBLEM — E11.9 TYPE 2 DIABETES MELLITUS WITHOUT COMPLICATION, WITHOUT LONG-TERM CURRENT USE OF INSULIN (HCC): Status: ACTIVE | Noted: 2017-02-16

## 2017-02-16 PROBLEM — E78.5 HYPERLIPIDEMIA: Status: ACTIVE | Noted: 2017-02-16

## 2017-02-16 LAB
ANION GAP BLD CALC-SCNC: 11 MMOL/L
BASOPHILS # BLD AUTO: 0.1 K/UL (ref 0–0.2)
BASOPHILS # BLD: 0 % (ref 0–2)
BUN SERPL-MCNC: 20 MG/DL (ref 8–23)
CALCIUM SERPL-MCNC: 9.3 MG/DL (ref 8.3–10.4)
CHLORIDE SERPL-SCNC: 104 MMOL/L (ref 98–107)
CO2 SERPL-SCNC: 25 MMOL/L (ref 23–32)
CREAT SERPL-MCNC: 1.3 MG/DL (ref 0.6–1)
DIFFERENTIAL METHOD BLD: ABNORMAL
EOSINOPHIL # BLD: 0.4 K/UL (ref 0–0.8)
EOSINOPHIL NFR BLD: 4 % (ref 0.5–7.8)
ERYTHROCYTE [DISTWIDTH] IN BLOOD BY AUTOMATED COUNT: 11.7 % (ref 11.9–14.6)
GLUCOSE SERPL-MCNC: 82 MG/DL (ref 65–100)
HCT VFR BLD AUTO: 35.6 % (ref 35.8–46.3)
HGB BLD-MCNC: 12.3 G/DL (ref 11.7–15.4)
LYMPHOCYTES # BLD AUTO: 31 % (ref 13–44)
LYMPHOCYTES # BLD: 3.5 K/UL (ref 0.5–4.6)
MCH RBC QN AUTO: 31.5 PG (ref 26.1–32.9)
MCHC RBC AUTO-ENTMCNC: 34.6 G/DL (ref 31.4–35)
MCV RBC AUTO: 91.3 FL (ref 79.6–97.8)
MONOCYTES # BLD: 1.1 K/UL (ref 0.1–1.3)
MONOCYTES NFR BLD AUTO: 10 % (ref 4–12)
NEUTS SEG # BLD: 6.1 K/UL (ref 1.7–8.2)
NEUTS SEG NFR BLD AUTO: 55 % (ref 43–78)
PLATELET # BLD AUTO: 440 K/UL (ref 150–450)
PMV BLD AUTO: 8.9 FL (ref 10.8–14.1)
POTASSIUM SERPL-SCNC: 3.5 MMOL/L (ref 3.5–5.1)
RBC # BLD AUTO: 3.9 M/UL (ref 4.05–5.25)
SODIUM SERPL-SCNC: 140 MMOL/L (ref 136–145)
WBC # BLD AUTO: 11.2 K/UL (ref 4.3–11.1)

## 2017-02-16 PROCEDURE — 80048 BASIC METABOLIC PNL TOTAL CA: CPT | Performed by: INTERNAL MEDICINE

## 2017-02-16 PROCEDURE — 85025 COMPLETE CBC W/AUTO DIFF WBC: CPT | Performed by: INTERNAL MEDICINE

## 2017-02-16 PROCEDURE — 36415 COLL VENOUS BLD VENIPUNCTURE: CPT | Performed by: INTERNAL MEDICINE

## 2017-02-20 ENCOUNTER — HOSPITAL ENCOUNTER (OUTPATIENT)
Dept: CARDIAC CATH/INVASIVE PROCEDURES | Age: 67
Discharge: HOME | End: 2017-02-20
Attending: INTERNAL MEDICINE | Admitting: INTERNAL MEDICINE
Payer: MEDICARE

## 2017-02-20 VITALS
TEMPERATURE: 98.2 F | SYSTOLIC BLOOD PRESSURE: 145 MMHG | WEIGHT: 163 LBS | RESPIRATION RATE: 18 BRPM | OXYGEN SATURATION: 94 % | BODY MASS INDEX: 27.83 KG/M2 | HEART RATE: 56 BPM | DIASTOLIC BLOOD PRESSURE: 65 MMHG | HEIGHT: 64 IN

## 2017-02-20 LAB
ANION GAP BLD CALC-SCNC: 11 MMOL/L (ref 7–16)
BUN SERPL-MCNC: 16 MG/DL (ref 8–23)
CALCIUM SERPL-MCNC: 9.1 MG/DL (ref 8.3–10.4)
CHLORIDE SERPL-SCNC: 104 MMOL/L (ref 98–107)
CO2 SERPL-SCNC: 25 MMOL/L (ref 21–32)
CREAT SERPL-MCNC: 1.2 MG/DL (ref 0.6–1)
GLUCOSE SERPL-MCNC: 161 MG/DL (ref 65–100)
POTASSIUM SERPL-SCNC: 3.7 MMOL/L (ref 3.5–5.1)
SODIUM SERPL-SCNC: 140 MMOL/L (ref 136–145)

## 2017-02-20 PROCEDURE — 99152 MOD SED SAME PHYS/QHP 5/>YRS: CPT

## 2017-02-20 PROCEDURE — 74011250636 HC RX REV CODE- 250/636

## 2017-02-20 PROCEDURE — C1894 INTRO/SHEATH, NON-LASER: HCPCS

## 2017-02-20 PROCEDURE — 80048 BASIC METABOLIC PNL TOTAL CA: CPT | Performed by: INTERNAL MEDICINE

## 2017-02-20 PROCEDURE — 74011000250 HC RX REV CODE- 250: Performed by: INTERNAL MEDICINE

## 2017-02-20 PROCEDURE — C1760 CLOSURE DEV, VASC: HCPCS

## 2017-02-20 PROCEDURE — 74011636320 HC RX REV CODE- 636/320: Performed by: INTERNAL MEDICINE

## 2017-02-20 PROCEDURE — 74011250636 HC RX REV CODE- 250/636: Performed by: INTERNAL MEDICINE

## 2017-02-20 PROCEDURE — 77030004534 HC CATH ANGI DX INFN CARD -A

## 2017-02-20 PROCEDURE — 74011000258 HC RX REV CODE- 258: Performed by: INTERNAL MEDICINE

## 2017-02-20 PROCEDURE — 77030004559 HC CATH ANGI DX SUPT CARD -B

## 2017-02-20 PROCEDURE — 93458 L HRT ARTERY/VENTRICLE ANGIO: CPT

## 2017-02-20 RX ORDER — SODIUM CHLORIDE 0.9 % (FLUSH) 0.9 %
5-10 SYRINGE (ML) INJECTION AS NEEDED
Status: DISCONTINUED | OUTPATIENT
Start: 2017-02-20 | End: 2017-02-20 | Stop reason: HOSPADM

## 2017-02-20 RX ORDER — FENTANYL CITRATE 50 UG/ML
25-100 INJECTION, SOLUTION INTRAMUSCULAR; INTRAVENOUS
Status: DISCONTINUED | OUTPATIENT
Start: 2017-02-20 | End: 2017-02-20 | Stop reason: HOSPADM

## 2017-02-20 RX ORDER — DIAZEPAM 5 MG/1
5 TABLET ORAL ONCE
Status: DISCONTINUED | OUTPATIENT
Start: 2017-02-20 | End: 2017-02-20 | Stop reason: HOSPADM

## 2017-02-20 RX ORDER — SODIUM CHLORIDE 0.9 % (FLUSH) 0.9 %
5-10 SYRINGE (ML) INJECTION EVERY 8 HOURS
Status: DISCONTINUED | OUTPATIENT
Start: 2017-02-20 | End: 2017-02-20 | Stop reason: HOSPADM

## 2017-02-20 RX ORDER — HEPARIN SODIUM 200 [USP'U]/100ML
3 INJECTION, SOLUTION INTRAVENOUS CONTINUOUS
Status: DISCONTINUED | OUTPATIENT
Start: 2017-02-20 | End: 2017-02-20 | Stop reason: HOSPADM

## 2017-02-20 RX ORDER — LIDOCAINE HYDROCHLORIDE 20 MG/ML
1-20 INJECTION, SOLUTION INFILTRATION; PERINEURAL
Status: DISCONTINUED | OUTPATIENT
Start: 2017-02-20 | End: 2017-02-20 | Stop reason: HOSPADM

## 2017-02-20 RX ORDER — MIDAZOLAM HYDROCHLORIDE 1 MG/ML
.5-5 INJECTION, SOLUTION INTRAMUSCULAR; INTRAVENOUS
Status: DISCONTINUED | OUTPATIENT
Start: 2017-02-20 | End: 2017-02-20 | Stop reason: HOSPADM

## 2017-02-20 RX ORDER — GUAIFENESIN 100 MG/5ML
324 LIQUID (ML) ORAL ONCE
Status: DISCONTINUED | OUTPATIENT
Start: 2017-02-20 | End: 2017-02-20 | Stop reason: HOSPADM

## 2017-02-20 RX ORDER — SODIUM CHLORIDE 9 MG/ML
1 INJECTION, SOLUTION INTRAVENOUS AS NEEDED
Status: DISCONTINUED | OUTPATIENT
Start: 2017-02-20 | End: 2017-02-20 | Stop reason: HOSPADM

## 2017-02-20 RX ORDER — SODIUM CHLORIDE 9 MG/ML
75 INJECTION, SOLUTION INTRAVENOUS CONTINUOUS
Status: DISCONTINUED | OUTPATIENT
Start: 2017-02-20 | End: 2017-02-20 | Stop reason: HOSPADM

## 2017-02-20 RX ORDER — SODIUM CHLORIDE 9 MG/ML
3 INJECTION, SOLUTION INTRAVENOUS ONCE
Status: COMPLETED | OUTPATIENT
Start: 2017-02-20 | End: 2017-02-20

## 2017-02-20 RX ADMIN — SODIUM CHLORIDE 3 ML/KG/HR: 900 INJECTION, SOLUTION INTRAVENOUS at 11:00

## 2017-02-20 RX ADMIN — IOVERSOL 70 ML: 741 INJECTION INTRA-ARTERIAL; INTRAVENOUS at 14:35

## 2017-02-20 RX ADMIN — MIDAZOLAM HYDROCHLORIDE 1 MG: 1 INJECTION, SOLUTION INTRAMUSCULAR; INTRAVENOUS at 14:13

## 2017-02-20 RX ADMIN — FENTANYL CITRATE 25 MCG: 50 INJECTION, SOLUTION INTRAMUSCULAR; INTRAVENOUS at 14:06

## 2017-02-20 RX ADMIN — LIDOCAINE HYDROCHLORIDE 70 MG: 20 INJECTION, SOLUTION INFILTRATION; PERINEURAL at 14:10

## 2017-02-20 RX ADMIN — HEPARIN SODIUM 3 ML/HR: 200 INJECTION, SOLUTION INTRAVENOUS at 14:23

## 2017-02-20 RX ADMIN — MIDAZOLAM HYDROCHLORIDE 1 MG: 1 INJECTION, SOLUTION INTRAMUSCULAR; INTRAVENOUS at 14:10

## 2017-02-20 RX ADMIN — FENTANYL CITRATE 25 MCG: 50 INJECTION, SOLUTION INTRAMUSCULAR; INTRAVENOUS at 14:09

## 2017-02-20 RX ADMIN — MIDAZOLAM HYDROCHLORIDE 1 MG: 1 INJECTION, SOLUTION INTRAMUSCULAR; INTRAVENOUS at 14:06

## 2017-02-20 RX ADMIN — LIDOCAINE HYDROCHLORIDE 150 MG: 20 INJECTION, SOLUTION INFILTRATION; PERINEURAL at 14:17

## 2017-02-20 NOTE — PROGRESS NOTES
Report received from  Cath Lab RN. Procedural findings communicated. Intra procedural  medication administration reviewed. Progression of care discussed.      Patient received into [address] [facility] 4 post sheath removal.     Access site without bleeding or swelling yes    Dressing dry and intact yes    Patient instructed to limit movement to right  lower extremity    Routine post procedural vital signs and site assessment initiated yes

## 2017-02-20 NOTE — PROGRESS NOTES
TRANSFER - OUT REPORT:    Verbal report given to rn(name) on Mamadou Null  being transferred to [facility](unit) for routine progression of care       Report consisted of patients Situation, Background, Assessment and   Recommendations(SBAR). Information from the following report(s) SBAR was reviewed with the receiving nurse. Lines:   Peripheral IV 02/20/17 Left Antecubital (Active)       Peripheral IV 02/20/17 Right Antecubital (Active)        Opportunity for questions and clarification was provided.       Patient transported with:   Registered Nurse   [facility] with dr Nagi Dsouza  No intervention  Right groin mynxed  3mg versed  50mcg fentanyl  Vitals stable, no bleeding or hematoma in right groin

## 2017-02-20 NOTE — IP AVS SNAPSHOT
Yesi Amato 
 
 
 [address] [facility] [address] [facility] 
[phone] Patient: Bella Castillo MRN: [id number] FUA:[birth year] Discharge Summary 2/20/2017 Bella Castillo MRN[de-identified]  [id number] Admission Information Provider Pager Service Admission Date Expected D/C Date Judit Arellano MD  CARDIAC CATH LAB 2/20/2017 Actual LOS Patient Class 0 days OUTPATIENT Follow-up Information None Current Discharge Medication List  
  
ASK your doctor about these medications Dose & Instructions Dispensing Information Comments Morning Noon Evening Bedtime  
 aspirin 81 mg chewable tablet Dose:  81 mg Take 81 mg by mouth every morning. Indications: continue per anesthesia guidelines Refills:  0  
     
   
   
   
  
 atorvastatin 40 mg tablet Commonly known as:  LIPITOR Dose:  40 mg Take 1 Tab by mouth nightly. Quantity:  90 Tab Refills:  3  
     
   
   
   
  
 citalopram 20 mg tablet Commonly known as:  [place] Take 1 tablet by mouth  every morning Quantity:  90 Tab Refills:  3  
     
   
   
   
  
 clopidogrel 75 mg Tab Commonly known as:  PLAVIX Take 1 tablet by mouth  every day Quantity:  90 Tab Refills:  3 GERITOL PO Take  by mouth. Refills:  0  
     
   
   
   
  
 magnesium oxide 400 mg tablet Commonly known as:  MAG-OX  
   
 TAKE 1 TABLET BY MOUTH TWICE DAILY Quantity:  180 Tab Refills:  3  
     
   
   
   
  
 metFORMIN 1,000 mg tablet Commonly known as:  GLUCOPHAGE Other:  Restart Wednesday afternoon Dose:  1000 mg Take 1 Tab by mouth two (2) times daily (with meals). Quantity:  180 Tab Refills:  3 D/C metformin 500 mg  
    
   
   
   
  
 metoprolol succinate 50 mg XL tablet Commonly known as:  TOPROL-XL Take 1 tablet by mouth  every day Quantity:  90 Tab Refills:  3 NIFEdipine ER 90 mg ER tablet Commonly known as:  PROCARDIA XL Dose:  90 mg Take 1 Tab by mouth daily. Quantity:  90 Tab Refills:  3  
     
   
   
   
  
 nitroglycerin 0.4 mg SL tablet Commonly known as:  NITROSTAT Dose:  0.4 mg  
1 Tab by SubLINGual route every five (5) minutes as needed for Chest Pain. Quantity:  3 Bottle Refills:  3 POTASSIUM GLUCONATE PO Dose:  595 mg PE Take 595 mg PE by mouth daily. Refills:  0  
     
   
   
   
  
 traZODone 50 mg tablet Commonly known as:  Deepthi Cast Dose:  50 mg Take 1 Tab by mouth nightly. Quantity:  30 Tab Refills:  0  
     
   
   
   
  
 VITAMIN B-12 1,000 mcg tablet Generic drug:  cyanocobalamin Dose:  1000 mcg Take 1,000 mcg by mouth daily. Refills:  0  
     
   
   
   
  
 VITAMIN D3 2,000 unit Tab Generic drug:  cholecalciferol (vitamin D3) Take  by mouth daily. Refills:  0 General Information Please provide this summary of care documentation to your next provider. Allergies High:  Cephalosporins Unspecified:  Lisinopril Current Immunizations  Reviewed on 12/15/2016 Name Date Influenza Vaccine 8/24/2015, 11/18/2014, 9/4/2013 Influenza Vaccine (Quad) PF 12/15/2016 Pneumococcal Conjugate (PCV-13) 8/24/2015 Pneumococcal Polysaccharide (PPSV-23) 9/4/2013 Discharge Instructions Discharge Instructions Please call tomorrow for follow-up in one month with Dr. Joellen Watson at [facility] Cardiology [phone]. DO NOT TAKE METFORMIN (GLUCOPHAGE) UNTIL Wednesday AFTERNOON. HEART CATHETERIZATION/ANGIOGRAPHY DISCHARGE INSTRUCTIONS 1. Check puncture site frequently for swelling or bleeding.  If there is any bleeding, lie down and apply pressure over the area with a clean towel or washcloth. Notify your doctor for any redness, swelling, drainage, or oozing from the puncture site. Notify your doctor for any fever or chills. 2. If the extremity becomes cold, numb, or painful call Dr. Onelia Akbar at [phone]. 
3. Activity should be limited for the next 48 hours. Climb stairs as little as possible and avoid any stooping, bending, or strenuous activity for 48 hours. No heavy lifting (anything over 10 pounds) for 3 days. 4. You may resume your usual diet. Drink more fluids than usual. 
5. Have a responsible person drive you home and stay with you for at least 24 hours after your heart catheterization/angiography. 6. You may remove bandage from your Right  Groin in 24 hours. You may shower in 24 hours. No tub baths, hot tubs, or swimming for 1 week. Do not place any lotions, creams, powders, or ointments over puncture site for 1 week. You may place a clean band-aid over the puncture site each day for 5 days. Change daily. I have read the above instructions and have had the opportunity to ask questions. Patient: ________________________   Date: 2/20/2017 Witness: _______________________   Date: 2/20/2017 Discharge Orders None  
  
` Patient Signature:  ____________________________________________________________ Date:  ____________________________________________________________  
  
 Justa Harreller Provider Signature:  ____________________________________________________________ Date:  ____________________________________________________________

## 2017-02-20 NOTE — PROCEDURES
Brief Cardiac Procedure Note    Patient: Kierra Moise MRN: [id number]  SSN: [id number]    YOB: 1950  Age: 74 y.o. Sex: female      Date of Procedure: 2/20/2017     Pre-procedure Diagnosis: Chest pain CCS Class III    Post-procedure Diagnosis: Non-cardiac Chest Pain    Procedure: Left Heart Catheterization    Brief Description of Procedure: lhc via right fem, mynx, unable to cannulate radial    Performed By: Camron Bermudez MD     Assistants: none    Anesthesia: Moderate Sedation    Estimated Blood Loss: Less than 10 mL      Specimens: None    Implants: None    Findings: mild cad- no change- nl lvef    Complications: None    Recommendations: Continue medical therapy.     Signed By: Camron Bermudez MD     February 20, 2017

## 2017-02-20 NOTE — DISCHARGE INSTRUCTIONS
Please call tomorrow for follow-up in one month with Dr. Laura Enriquez at [facility] Cardiology 26 [phone]. DO NOT TAKE METFORMIN (GLUCOPHAGE) UNTIL Wednesday AFTERNOON. HEART CATHETERIZATION/ANGIOGRAPHY DISCHARGE INSTRUCTIONS    1. Check puncture site frequently for swelling or bleeding. If there is any bleeding, lie down and apply pressure over the area with a clean towel or washcloth. Notify your doctor for any redness, swelling, drainage, or oozing from the puncture site. Notify your doctor for any fever or chills. 2. If the extremity becomes cold, numb, or painful call Dr. Odilia Fink at [phone].  3. Activity should be limited for the next 48 hours. Climb stairs as little as possible and avoid any stooping, bending, or strenuous activity for 48 hours. No heavy lifting (anything over 10 pounds) for 3 days. 4. You may resume your usual diet. Drink more fluids than usual.  5. Have a responsible person drive you home and stay with you for at least 24 hours after your heart catheterization/angiography. 6. You may remove bandage from your Right  Groin in 24 hours. You may shower in 24 hours. No tub baths, hot tubs, or swimming for 1 week. Do not place any lotions, creams, powders, or ointments over puncture site for 1 week. You may place a clean band-aid over the puncture site each day for 5 days. Change daily. I have read the above instructions and have had the opportunity to ask questions.       Patient: ________________________   Date: 2/20/2017    Witness: _______________________   Date: 2/20/2017

## 2017-02-20 NOTE — PROGRESS NOTES
Patient up to bedside, vital signs and site stable. Patient ambulated to bathroom without difficulty. Patient voided without difficulty. Vascular site stable. 1815 Discharge instructions and home medications reviewed with patient. Time allowed for questions and answers. 1830 Patient ambulated second time without difficulty. Site stable after ambulation. Peripheral IV sites dc'd without difficulty with tips intact. [address] Patient discharged to home with family.

## 2017-02-20 NOTE — PROGRESS NOTES
Patient received to [address] [place] [address] room # 2  Ambulatory from [facility]. Patient scheduled for [facility] today with Dr Alisha Pelletier. Procedure reviewed & questions answered, voiced good understanding consent obtained & placed on chart. All medications and medical history reviewed. Will prep patient per orders. Patient & family updated on plan of care. Patient states she took  mg and Plavix 75 mg at 0945 am today.

## 2017-02-20 NOTE — PROCEDURES
Crhis Carrillo 42       Name:  [facility]   MR#:  [id number]   :  1950   Account #:  [de-identified]   Date of Adm:  2017       DATE OF PROCEDURE: 2017    CLINICAL INFORMATION: Patient with prolonged chest pain   consistent with [place] class IV angina, referred for   catheterization. PROCEDURE IN DETAIL: Attempts were made at a sheath placement   with a weak right radial pulse. That was unable to be   cannulated. A 6-Chinese sheath was placed in the right femoral   artery, and 6-Chinese JL4 Aden right angle pigtail were used. Common femoral artery sheath placement was confirmed with a   sheathogram, and a Mynx closure device was placed with good   hemostasis. Fifteen minutes of conscious sedation was given and supervised   by myself with 3 mg Versed and 50 mcg of fentanyl. Vital signs   and saturations were stable throughout. The right coronary artery is a dominant vessel in normal   anatomic position. There is 20% ostial and proximal narrowing. The rest of the vessel and a small PDA and posterolateral obtuse   marginal branches are free of significant disease. The left main coronary artery is in normal anatomic position,   free of significant disease. It bifurcates into LAD and   circumflex system. The LAD has stents in the proximal segment in the proximal   diagonal. The stents are all widely patent. Just distal to the   diagonal stent, there is a 30% exit stenosis. This is mild   and does not appear to be flow limiting. The remainder of the   LAD is free of any high-grade narrowings. The circumflex has 20% mid vessel narrowing and has a distal   bifurcating obtuse marginal branch free of any high-grade   narrowing. Left ventriculogram reveals normal systolic function. Ejection   fraction is 55%. Left ventricular end-diastolic pressure was 16   mmHg.  Opening aortic pressure 163/64 with no gradient across the   aortic valve.    Mynx closure device was placed with good hemostasis. CONCLUSION   1. Mild diffuse coronary atherosclerotic heart disease with   widely patent stents in the left anterior descending and   diagonal.   2. Normal left ventricular systolic function.         Nino Young MD      [facility] / [facility]ita C[facility]denas   D:  02/20/2017   14:48   T:  02/20/2017   15:33   Job #:  [id number]

## 2017-03-21 ENCOUNTER — HOSPITAL ENCOUNTER (OUTPATIENT)
Dept: CT IMAGING | Age: 67
Discharge: HOME | End: 2017-03-21
Attending: INTERNAL MEDICINE
Payer: MEDICARE

## 2017-03-21 VITALS — WEIGHT: 162 LBS | HEIGHT: 64 IN | BODY MASS INDEX: 27.66 KG/M2

## 2017-03-21 DIAGNOSIS — R07.89 CHEST WALL PAIN: ICD-10-CM

## 2017-03-21 PROCEDURE — 71260 CT THORAX DX C+: CPT

## 2017-03-21 PROCEDURE — 74011000258 HC RX REV CODE- 258

## 2017-03-21 PROCEDURE — 74011636320 HC RX REV CODE- 636/320

## 2017-03-21 RX ORDER — SODIUM CHLORIDE 0.9 % (FLUSH) 0.9 %
10 SYRINGE (ML) INJECTION
Status: COMPLETED | OUTPATIENT
Start: 2017-03-21 | End: 2017-03-21

## 2017-03-21 RX ADMIN — SODIUM CHLORIDE 100 ML: 900 INJECTION, SOLUTION INTRAVENOUS at 15:01

## 2017-03-21 RX ADMIN — Medication 10 ML: at 15:01

## 2017-03-21 RX ADMIN — IOPAMIDOL 80 ML: 755 INJECTION, SOLUTION INTRAVENOUS at 15:01

## 2018-10-11 PROBLEM — E11.21 TYPE 2 DIABETES WITH NEPHROPATHY (HCC): Status: ACTIVE | Noted: 2018-10-11

## 2018-10-29 ENCOUNTER — HOSPITAL ENCOUNTER (OUTPATIENT)
Dept: LAB | Age: 68
Discharge: HOME | End: 2018-10-29
Payer: MEDICARE

## 2018-10-29 DIAGNOSIS — R10.11 RUQ PAIN: ICD-10-CM

## 2018-10-29 DIAGNOSIS — G43.A0 CYCLICAL VOMITING WITH NAUSEA, INTRACTABILITY OF VOMITING NOT SPECIFIED: ICD-10-CM

## 2018-10-29 PROBLEM — R11.2 N&V (NAUSEA AND VOMITING): Status: ACTIVE | Noted: 2018-10-29

## 2018-10-29 LAB
ALBUMIN SERPL-MCNC: 4 G/DL (ref 3.2–4.6)
ALBUMIN/GLOB SERPL: 0.9 {RATIO} (ref 1.2–3.5)
ALP SERPL-CCNC: 114 U/L (ref 50–136)
ALT SERPL-CCNC: 50 U/L (ref 12–65)
ANION GAP SERPL CALC-SCNC: 9 MMOL/L (ref 7–16)
AST SERPL-CCNC: 36 U/L (ref 15–37)
BILIRUB SERPL-MCNC: 0.4 MG/DL (ref 0.2–1.1)
BUN SERPL-MCNC: 21 MG/DL (ref 8–23)
CALCIUM SERPL-MCNC: 9.5 MG/DL (ref 8.3–10.4)
CHLORIDE SERPL-SCNC: 105 MMOL/L (ref 98–107)
CO2 SERPL-SCNC: 26 MMOL/L (ref 21–32)
CREAT SERPL-MCNC: 1.33 MG/DL (ref 0.6–1)
ERYTHROCYTE [DISTWIDTH] IN BLOOD BY AUTOMATED COUNT: 12.2 %
GLOBULIN SER CALC-MCNC: 4.3 G/DL (ref 2.3–3.5)
GLUCOSE SERPL-MCNC: 200 MG/DL (ref 65–100)
HCT VFR BLD AUTO: 37.6 % (ref 35.8–46.3)
HGB BLD-MCNC: 12.4 G/DL (ref 11.7–15.4)
LIPASE SERPL-CCNC: 192 U/L (ref 73–393)
MCH RBC QN AUTO: 30.6 PG (ref 26.1–32.9)
MCHC RBC AUTO-ENTMCNC: 33 G/DL (ref 31.4–35)
MCV RBC AUTO: 92.8 FL (ref 79.6–97.8)
NRBC # BLD: 0 K/UL (ref 0–0.2)
PLATELET # BLD AUTO: 399 K/UL (ref 150–450)
PMV BLD AUTO: 9.5 FL (ref 9.4–12.3)
POTASSIUM SERPL-SCNC: 4.3 MMOL/L (ref 3.5–5.1)
PROT SERPL-MCNC: 8.3 G/DL (ref 6.3–8.2)
RBC # BLD AUTO: 4.05 M/UL (ref 4.05–5.2)
SODIUM SERPL-SCNC: 140 MMOL/L (ref 136–145)
WBC # BLD AUTO: 9.3 K/UL (ref 4.3–11.1)

## 2018-10-29 PROCEDURE — 83690 ASSAY OF LIPASE: CPT

## 2018-10-29 PROCEDURE — 85027 COMPLETE CBC AUTOMATED: CPT

## 2018-10-29 PROCEDURE — 80053 COMPREHEN METABOLIC PANEL: CPT

## 2018-10-29 PROCEDURE — 36415 COLL VENOUS BLD VENIPUNCTURE: CPT

## 2018-11-05 ENCOUNTER — HOSPITAL ENCOUNTER (OUTPATIENT)
Dept: ULTRASOUND IMAGING | Age: 68
Discharge: HOME | End: 2018-11-05
Attending: SURGERY
Payer: MEDICARE

## 2018-11-05 DIAGNOSIS — R10.11 RUQ PAIN: ICD-10-CM

## 2018-11-05 DIAGNOSIS — G43.A0 CYCLICAL VOMITING WITH NAUSEA, INTRACTABILITY OF VOMITING NOT SPECIFIED: ICD-10-CM

## 2018-11-05 PROCEDURE — 76700 US EXAM ABDOM COMPLETE: CPT

## 2018-11-06 PROBLEM — E11.9 TYPE 2 DIABETES MELLITUS WITHOUT COMPLICATION (HCC): Status: ACTIVE | Noted: 2018-11-06

## 2018-11-06 PROBLEM — R09.89 RIGHT CAROTID BRUIT: Status: ACTIVE | Noted: 2018-11-06

## 2018-11-21 ENCOUNTER — HOSPITAL ENCOUNTER (OUTPATIENT)
Dept: CT IMAGING | Age: 68
Discharge: HOME | End: 2018-11-21
Attending: SURGERY
Payer: MEDICARE

## 2018-11-21 VITALS — BODY MASS INDEX: 27.82 KG/M2 | WEIGHT: 157 LBS | HEIGHT: 63 IN

## 2018-11-21 DIAGNOSIS — I65.23 BILATERAL CAROTID ARTERY STENOSIS: ICD-10-CM

## 2018-11-21 LAB — CREAT BLD-MCNC: 1.2 MG/DL (ref 0.8–1.5)

## 2018-11-21 PROCEDURE — 74011000258 HC RX REV CODE- 258: Performed by: SURGERY

## 2018-11-21 PROCEDURE — 82565 ASSAY OF CREATININE: CPT

## 2018-11-21 PROCEDURE — 74011636320 HC RX REV CODE- 636/320: Performed by: SURGERY

## 2018-11-21 PROCEDURE — 70498 CT ANGIOGRAPHY NECK: CPT

## 2018-11-21 RX ORDER — SODIUM CHLORIDE 0.9 % (FLUSH) 0.9 %
10 SYRINGE (ML) INJECTION
Status: COMPLETED | OUTPATIENT
Start: 2018-11-21 | End: 2018-11-21

## 2018-11-21 RX ADMIN — IOPAMIDOL 70 ML: 755 INJECTION, SOLUTION INTRAVENOUS at 11:26

## 2018-11-21 RX ADMIN — Medication 10 ML: at 11:27

## 2018-11-21 RX ADMIN — SODIUM CHLORIDE 100 ML: 900 INJECTION, SOLUTION INTRAVENOUS at 11:27

## 2018-11-26 PROBLEM — Z91.199 NO-SHOW FOR APPOINTMENT: Status: ACTIVE | Noted: 2018-11-26

## 2018-12-05 ENCOUNTER — HOSPITAL ENCOUNTER (OUTPATIENT)
Dept: SURGERY | Age: 68
Discharge: HOME | End: 2018-12-05
Payer: MEDICARE

## 2018-12-05 ENCOUNTER — ANESTHESIA EVENT (OUTPATIENT)
Dept: SURGERY | Age: 68
DRG: 039 | End: 2018-12-05
Payer: MEDICARE

## 2018-12-05 VITALS
RESPIRATION RATE: 16 BRPM | SYSTOLIC BLOOD PRESSURE: 141 MMHG | HEIGHT: 63 IN | HEART RATE: 65 BPM | WEIGHT: 162.5 LBS | TEMPERATURE: 98.1 F | OXYGEN SATURATION: 100 % | BODY MASS INDEX: 28.79 KG/M2 | DIASTOLIC BLOOD PRESSURE: 61 MMHG

## 2018-12-05 LAB
ANION GAP SERPL CALC-SCNC: 6 MMOL/L (ref 7–16)
BUN SERPL-MCNC: 18 MG/DL (ref 8–23)
CALCIUM SERPL-MCNC: 9.2 MG/DL (ref 8.3–10.4)
CHLORIDE SERPL-SCNC: 104 MMOL/L (ref 98–107)
CO2 SERPL-SCNC: 28 MMOL/L (ref 21–32)
CREAT SERPL-MCNC: 1.22 MG/DL (ref 0.6–1)
ERYTHROCYTE [DISTWIDTH] IN BLOOD BY AUTOMATED COUNT: 12.3 % (ref 11.9–14.6)
GLUCOSE BLD STRIP.AUTO-MCNC: 198 MG/DL (ref 65–100)
GLUCOSE SERPL-MCNC: 200 MG/DL (ref 65–100)
HCT VFR BLD AUTO: 35 % (ref 35.8–46.3)
HGB BLD-MCNC: 11.5 G/DL (ref 11.7–15.4)
MCH RBC QN AUTO: 30 PG (ref 26.1–32.9)
MCHC RBC AUTO-ENTMCNC: 32.9 G/DL (ref 31.4–35)
MCV RBC AUTO: 91.4 FL (ref 79.6–97.8)
NRBC # BLD: 0 K/UL (ref 0–0.2)
PLATELET # BLD AUTO: 353 K/UL (ref 150–450)
PMV BLD AUTO: 9.5 FL (ref 9.4–12.3)
POTASSIUM SERPL-SCNC: 3.7 MMOL/L (ref 3.5–5.1)
RBC # BLD AUTO: 3.83 M/UL (ref 4.05–5.2)
SODIUM SERPL-SCNC: 138 MMOL/L (ref 136–145)
WBC # BLD AUTO: 7.8 K/UL (ref 4.3–11.1)

## 2018-12-05 PROCEDURE — 80048 BASIC METABOLIC PNL TOTAL CA: CPT

## 2018-12-05 PROCEDURE — 85027 COMPLETE CBC AUTOMATED: CPT

## 2018-12-05 PROCEDURE — 82962 GLUCOSE BLOOD TEST: CPT

## 2018-12-05 NOTE — PERIOP NOTES
Patient verified name and . Patient provided medical/health information and PTA medications to the best of their ability. TYPE  CASE:lll Order for consent  found in EHR and matches case posting. Labs per surgeon:CBC, BMP. Labs per anesthesia protocol: Poc Glucose. Results 198 EKG  :  2018, SB, Has 3 total cardiac stents. Patient seen by Dr. Love Bryant. Poc Glucose : 198 Patient provided with and instructed on education handouts including Guide to Surgery, blood transfusions, pain management, and hand hygiene for the family and community, and [facility] brochure. Antibacterial Soap and Hibiclens instructions given per hospital policy. Instructed patient to continue previous medications as prescribed prior to surgery unless otherwise directed and to take the following medications the day of surgery according to anesthesia guidelines : ASA 81 mg, Celexa, Metoprolol . Instructed patient to hold  the following medications: Plavix x7 days, Vitamins x7 days. Original medication prescription bottles were visualized during patient appointment. Patient teach back successful and patient demonstrates knowledge of instruction.

## 2018-12-05 NOTE — PERIOP NOTES
Labs reviewed and routed to surgeon. Recent Results (from the past 12 hour(s)) CBC W/O DIFF Collection Time: 12/05/18 10:33 AM  
Result Value Ref Range WBC 7.8 4.3 - 11.1 K/uL  
 RBC 3.83 (L) 4.05 - 5.2 M/uL  
 HGB 11.5 (L) 11.7 - 15.4 g/dL HCT 35.0 (L) 35.8 - 46.3 % MCV 91.4 79.6 - 97.8 FL  
 MCH 30.0 26.1 - 32.9 PG  
 MCHC 32.9 31.4 - 35.0 g/dL  
 RDW 12.3 11.9 - 14.6 % PLATELET 309 219 - 232 K/uL MPV 9.5 9.4 - 12.3 FL ABSOLUTE NRBC 0.00 0.0 - 0.2 K/uL METABOLIC PANEL, BASIC Collection Time: 12/05/18 10:33 AM  
Result Value Ref Range Sodium 138 136 - 145 mmol/L Potassium 3.7 3.5 - 5.1 mmol/L Chloride 104 98 - 107 mmol/L  
 CO2 28 21 - 32 mmol/L Anion gap 6 (L) 7 - 16 mmol/L Glucose 200 (H) 65 - 100 mg/dL BUN 18 8 - 23 MG/DL Creatinine 1.22 (H) 0.6 - 1.0 MG/DL  
 GFR est AA 56 (L) >60 ml/min/1.73m2 GFR est non-AA 46 (L) >60 ml/min/1.73m2 Calcium 9.2 8.3 - 10.4 MG/DL  
GLUCOSE, POC Collection Time: 12/05/18 10:33 AM  
Result Value Ref Range Glucose (POC) 198 (H) 65 - 100 mg/dL

## 2018-12-05 NOTE — ANESTHESIA PREPROCEDURE EVALUATION
Anesthetic History PONV Review of Systems / Medical History Patient summary reviewed and pertinent labs reviewed Pulmonary Smoker (Quit 2012) Neuro/Psych Psychiatric history (Depression) Cardiovascular Hypertension: well controlled Angina (Stable): with exertion Past MI, CAD, PAD, cardiac stents and hyperlipidemia Exercise tolerance: >4 METS Comments: 2/2017: [facility] with patent stents No recent changes in functional status GI/Hepatic/Renal 
  
GERD: well controlled Endo/Other Diabetes (A1C 6.7): well controlled, type 2 Other Findings Physical Exam 
 
Airway Mallampati: II 
TM Distance: 4 - 6 cm Neck ROM: normal range of motion Mouth opening: Normal 
 
 Cardiovascular Rhythm: regular Rate: normal 
 
 
 
 Dental 
 
Dentition: Karina Sayer Pulmonary Breath sounds clear to auscultation Abdominal 
GI exam deferred Other Findings Anesthetic Plan ASA: 3 Anesthesia type: general 
 
Monitoring Plan: Arterial line Induction: Intravenous Anesthetic plan and risks discussed with: Patient

## 2018-12-12 ENCOUNTER — ANESTHESIA (OUTPATIENT)
Dept: SURGERY | Age: 68
DRG: 039 | End: 2018-12-12
Payer: MEDICARE

## 2018-12-12 ENCOUNTER — HOSPITAL ENCOUNTER (INPATIENT)
Age: 68
LOS: 1 days | Discharge: HOME | DRG: 039 | End: 2018-12-13
Attending: SURGERY | Admitting: SURGERY
Payer: MEDICARE

## 2018-12-12 DIAGNOSIS — I25.10 CORONARY ARTERY DISEASE INVOLVING NATIVE CORONARY ARTERY OF NATIVE HEART WITHOUT ANGINA PECTORIS: Primary | ICD-10-CM

## 2018-12-12 PROBLEM — I65.29 CAROTID ARTERY STENOSIS: Status: ACTIVE | Noted: 2018-12-12

## 2018-12-12 LAB
ABO + RH BLD: NORMAL
ACT BLD: 125 SECS (ref 70–128)
BLOOD GROUP ANTIBODIES SERPL: NORMAL
GLUCOSE BLD STRIP.AUTO-MCNC: 153 MG/DL (ref 65–100)
SPECIMEN EXP DATE BLD: NORMAL

## 2018-12-12 PROCEDURE — 77030002933 HC SUT MCRYL J&J -A: Performed by: SURGERY

## 2018-12-12 PROCEDURE — 03UN0KZ SUPPLEMENT LEFT EXTERNAL CAROTID ARTERY WITH NONAUTOLOGOUS TISSUE SUBSTITUTE, OPEN APPROACH: ICD-10-PCS | Performed by: SURGERY

## 2018-12-12 PROCEDURE — 77030019908 HC STETH ESOPH SIMS -A: Performed by: NURSE ANESTHETIST, CERTIFIED REGISTERED

## 2018-12-12 PROCEDURE — C1768 GRAFT, VASCULAR: HCPCS | Performed by: SURGERY

## 2018-12-12 PROCEDURE — 86901 BLOOD TYPING SEROLOGIC RH(D): CPT

## 2018-12-12 PROCEDURE — 77030037088 HC TUBE ENDOTRACH ORAL NSL COVD-A: Performed by: NURSE ANESTHETIST, CERTIFIED REGISTERED

## 2018-12-12 PROCEDURE — 74011250636 HC RX REV CODE- 250/636: Performed by: SURGERY

## 2018-12-12 PROCEDURE — 74011000250 HC RX REV CODE- 250: Performed by: SURGERY

## 2018-12-12 PROCEDURE — 77030037400 HC ADH TISS HI VISC EXOFIN CHMP -B: Performed by: SURGERY

## 2018-12-12 PROCEDURE — 77030002996 HC SUT SLK J&J -A: Performed by: SURGERY

## 2018-12-12 PROCEDURE — 74011250637 HC RX REV CODE- 250/637: Performed by: SURGERY

## 2018-12-12 PROCEDURE — C1755 CATHETER, INTRASPINAL: HCPCS | Performed by: SURGERY

## 2018-12-12 PROCEDURE — 77030020782 HC GWN BAIR PAWS FLX 3M -B: Performed by: NURSE ANESTHETIST, CERTIFIED REGISTERED

## 2018-12-12 PROCEDURE — 77030013794 HC KT TRNSDUC BLD EDWD -B: Performed by: NURSE ANESTHETIST, CERTIFIED REGISTERED

## 2018-12-12 PROCEDURE — 77030039425 HC BLD LARYNG TRULITE DISP TELE -A: Performed by: NURSE ANESTHETIST, CERTIFIED REGISTERED

## 2018-12-12 PROCEDURE — 77030020407 HC IV BLD WRMR ST 3M -A: Performed by: NURSE ANESTHETIST, CERTIFIED REGISTERED

## 2018-12-12 PROCEDURE — 03UJ0KZ SUPPLEMENT LEFT COMMON CAROTID ARTERY WITH NONAUTOLOGOUS TISSUE SUBSTITUTE, OPEN APPROACH: ICD-10-PCS | Performed by: SURGERY

## 2018-12-12 PROCEDURE — 74011250636 HC RX REV CODE- 250/636: Performed by: ANESTHESIOLOGY

## 2018-12-12 PROCEDURE — 77030020263 HC SOL INJ SOD CL0.9% LFCR 1000ML

## 2018-12-12 PROCEDURE — 82962 GLUCOSE BLOOD TEST: CPT

## 2018-12-12 PROCEDURE — 85347 COAGULATION TIME ACTIVATED: CPT

## 2018-12-12 PROCEDURE — 77030018673: Performed by: SURGERY

## 2018-12-12 PROCEDURE — 03CJ0ZZ EXTIRPATION OF MATTER FROM LEFT COMMON CAROTID ARTERY, OPEN APPROACH: ICD-10-PCS | Performed by: SURGERY

## 2018-12-12 PROCEDURE — 74011000250 HC RX REV CODE- 250

## 2018-12-12 PROCEDURE — 65610000006 HC RM INTENSIVE CARE

## 2018-12-12 PROCEDURE — 77030005401 HC CATH RAD ARRO -A: Performed by: NURSE ANESTHETIST, CERTIFIED REGISTERED

## 2018-12-12 PROCEDURE — 77030018824 HC SHNT CAR ARGY COVD -B: Performed by: SURGERY

## 2018-12-12 PROCEDURE — 77030002986 HC SUT PROL J&J -A: Performed by: SURGERY

## 2018-12-12 PROCEDURE — 77030014008 HC SPNG HEMSTAT J&J -C: Performed by: SURGERY

## 2018-12-12 PROCEDURE — 76010000173 HC OR TIME 3 TO 3.5 HR INTENSV-TIER 1: Performed by: SURGERY

## 2018-12-12 PROCEDURE — 76060000037 HC ANESTHESIA 3 TO 3.5 HR: Performed by: SURGERY

## 2018-12-12 PROCEDURE — 77030032490 HC SLV COMPR SCD KNE COVD -B: Performed by: SURGERY

## 2018-12-12 PROCEDURE — 77030034888 HC SUT PROL 2 J&J -B: Performed by: SURGERY

## 2018-12-12 PROCEDURE — 03CN0ZZ EXTIRPATION OF MATTER FROM LEFT EXTERNAL CAROTID ARTERY, OPEN APPROACH: ICD-10-PCS | Performed by: SURGERY

## 2018-12-12 PROCEDURE — 77030031139 HC SUT VCRL2 J&J -A: Performed by: SURGERY

## 2018-12-12 PROCEDURE — 74011250636 HC RX REV CODE- 250/636

## 2018-12-12 PROCEDURE — 74011000258 HC RX REV CODE- 258: Performed by: SURGERY

## 2018-12-12 PROCEDURE — 77030039266 HC ADH SKN EXOFIN S2SG -A: Performed by: SURGERY

## 2018-12-12 PROCEDURE — 77030018836 HC SOL IRR NACL ICUM -A: Performed by: SURGERY

## 2018-12-12 PROCEDURE — 77030013292 HC BOWL MX PRSM J&J -A: Performed by: NURSE ANESTHETIST, CERTIFIED REGISTERED

## 2018-12-12 PROCEDURE — 76210000016 HC OR PH I REC 1 TO 1.5 HR: Performed by: SURGERY

## 2018-12-12 PROCEDURE — 77030010512 HC APPL CLP LIG J&J -C: Performed by: SURGERY

## 2018-12-12 DEVICE — XENOSURE BIOLOGIC PATCH, 0.8CM X 8CM, EIFU
Type: IMPLANTABLE DEVICE | Site: CAROTID | Status: FUNCTIONAL
Brand: XENOSURE BIOLOGIC PATCH

## 2018-12-12 RX ORDER — NIFEDIPINE 90 MG/1
90 TABLET, EXTENDED RELEASE ORAL DAILY
Status: DISCONTINUED | OUTPATIENT
Start: 2018-12-13 | End: 2018-12-12

## 2018-12-12 RX ORDER — SODIUM CHLORIDE 0.9 % (FLUSH) 0.9 %
5-10 SYRINGE (ML) INJECTION EVERY 8 HOURS
Status: DISCONTINUED | OUTPATIENT
Start: 2018-12-12 | End: 2018-12-12

## 2018-12-12 RX ORDER — LIDOCAINE HYDROCHLORIDE 10 MG/ML
0.1 INJECTION INFILTRATION; PERINEURAL AS NEEDED
Status: DISCONTINUED | OUTPATIENT
Start: 2018-12-12 | End: 2018-12-12 | Stop reason: HOSPADM

## 2018-12-12 RX ORDER — ONDANSETRON 2 MG/ML
INJECTION INTRAMUSCULAR; INTRAVENOUS AS NEEDED
Status: DISCONTINUED | OUTPATIENT
Start: 2018-12-12 | End: 2018-12-12 | Stop reason: HOSPADM

## 2018-12-12 RX ORDER — SODIUM CHLORIDE 0.9 % (FLUSH) 0.9 %
5-10 SYRINGE (ML) INJECTION AS NEEDED
Status: DISCONTINUED | OUTPATIENT
Start: 2018-12-12 | End: 2018-12-13 | Stop reason: HOSPADM

## 2018-12-12 RX ORDER — HEPARIN SODIUM 5000 [USP'U]/ML
INJECTION, SOLUTION INTRAVENOUS; SUBCUTANEOUS AS NEEDED
Status: DISCONTINUED | OUTPATIENT
Start: 2018-12-12 | End: 2018-12-12 | Stop reason: HOSPADM

## 2018-12-12 RX ORDER — GUAIFENESIN 100 MG/5ML
81 LIQUID (ML) ORAL
Status: DISCONTINUED | OUTPATIENT
Start: 2018-12-13 | End: 2018-12-13 | Stop reason: HOSPADM

## 2018-12-12 RX ORDER — ONDANSETRON 2 MG/ML
4 INJECTION INTRAMUSCULAR; INTRAVENOUS
Status: DISCONTINUED | OUTPATIENT
Start: 2018-12-12 | End: 2018-12-13 | Stop reason: HOSPADM

## 2018-12-12 RX ORDER — ONDANSETRON 2 MG/ML
4 INJECTION INTRAMUSCULAR; INTRAVENOUS ONCE
Status: DISCONTINUED | OUTPATIENT
Start: 2018-12-12 | End: 2018-12-12

## 2018-12-12 RX ORDER — LIDOCAINE HYDROCHLORIDE 20 MG/ML
INJECTION, SOLUTION EPIDURAL; INFILTRATION; INTRACAUDAL; PERINEURAL AS NEEDED
Status: DISCONTINUED | OUTPATIENT
Start: 2018-12-12 | End: 2018-12-12 | Stop reason: HOSPADM

## 2018-12-12 RX ORDER — FENTANYL CITRATE 50 UG/ML
100 INJECTION, SOLUTION INTRAMUSCULAR; INTRAVENOUS AS NEEDED
Status: DISCONTINUED | OUTPATIENT
Start: 2018-12-12 | End: 2018-12-12 | Stop reason: HOSPADM

## 2018-12-12 RX ORDER — GENTAMICIN SULFATE 80 MG/100ML
80 INJECTION, SOLUTION INTRAVENOUS ONCE
Status: DISCONTINUED | OUTPATIENT
Start: 2018-12-12 | End: 2018-12-12 | Stop reason: DRUGHIGH

## 2018-12-12 RX ORDER — SODIUM CHLORIDE 0.9 % (FLUSH) 0.9 %
5-10 SYRINGE (ML) INJECTION EVERY 8 HOURS
Status: DISCONTINUED | OUTPATIENT
Start: 2018-12-12 | End: 2018-12-13 | Stop reason: HOSPADM

## 2018-12-12 RX ORDER — NIFEDIPINE 90 MG/1
90 TABLET, EXTENDED RELEASE ORAL DAILY
Status: DISCONTINUED | OUTPATIENT
Start: 2018-12-12 | End: 2018-12-13 | Stop reason: HOSPADM

## 2018-12-12 RX ORDER — PROTAMINE SULFATE 10 MG/ML
INJECTION, SOLUTION INTRAVENOUS AS NEEDED
Status: DISCONTINUED | OUTPATIENT
Start: 2018-12-12 | End: 2018-12-12 | Stop reason: HOSPADM

## 2018-12-12 RX ORDER — HYDROMORPHONE HYDROCHLORIDE 2 MG/ML
0.5 INJECTION, SOLUTION INTRAMUSCULAR; INTRAVENOUS; SUBCUTANEOUS
Status: DISCONTINUED | OUTPATIENT
Start: 2018-12-12 | End: 2018-12-12

## 2018-12-12 RX ORDER — MIDAZOLAM HYDROCHLORIDE 1 MG/ML
2 INJECTION, SOLUTION INTRAMUSCULAR; INTRAVENOUS
Status: DISCONTINUED | OUTPATIENT
Start: 2018-12-12 | End: 2018-12-12 | Stop reason: HOSPADM

## 2018-12-12 RX ORDER — DEXAMETHASONE SODIUM PHOSPHATE 4 MG/ML
INJECTION, SOLUTION INTRA-ARTICULAR; INTRALESIONAL; INTRAMUSCULAR; INTRAVENOUS; SOFT TISSUE AS NEEDED
Status: DISCONTINUED | OUTPATIENT
Start: 2018-12-12 | End: 2018-12-12 | Stop reason: HOSPADM

## 2018-12-12 RX ORDER — SODIUM CHLORIDE 0.9 % (FLUSH) 0.9 %
5-10 SYRINGE (ML) INJECTION AS NEEDED
Status: DISCONTINUED | OUTPATIENT
Start: 2018-12-12 | End: 2018-12-12 | Stop reason: HOSPADM

## 2018-12-12 RX ORDER — SODIUM CHLORIDE, SODIUM LACTATE, POTASSIUM CHLORIDE, CALCIUM CHLORIDE 600; 310; 30; 20 MG/100ML; MG/100ML; MG/100ML; MG/100ML
1000 INJECTION, SOLUTION INTRAVENOUS CONTINUOUS
Status: DISCONTINUED | OUTPATIENT
Start: 2018-12-12 | End: 2018-12-12 | Stop reason: HOSPADM

## 2018-12-12 RX ORDER — GLYCOPYRROLATE 0.2 MG/ML
INJECTION INTRAMUSCULAR; INTRAVENOUS AS NEEDED
Status: DISCONTINUED | OUTPATIENT
Start: 2018-12-12 | End: 2018-12-12 | Stop reason: HOSPADM

## 2018-12-12 RX ORDER — MORPHINE SULFATE 2 MG/ML
2 INJECTION, SOLUTION INTRAMUSCULAR; INTRAVENOUS
Status: DISCONTINUED | OUTPATIENT
Start: 2018-12-12 | End: 2018-12-13 | Stop reason: HOSPADM

## 2018-12-12 RX ORDER — ACETAMINOPHEN 500 MG
500 TABLET ORAL ONCE
Status: DISCONTINUED | OUTPATIENT
Start: 2018-12-12 | End: 2018-12-12

## 2018-12-12 RX ORDER — SODIUM CHLORIDE, SODIUM LACTATE, POTASSIUM CHLORIDE, CALCIUM CHLORIDE 600; 310; 30; 20 MG/100ML; MG/100ML; MG/100ML; MG/100ML
75 INJECTION, SOLUTION INTRAVENOUS CONTINUOUS
Status: DISCONTINUED | OUTPATIENT
Start: 2018-12-12 | End: 2018-12-12

## 2018-12-12 RX ORDER — HEPARIN SODIUM 1000 [USP'U]/ML
INJECTION, SOLUTION INTRAVENOUS; SUBCUTANEOUS AS NEEDED
Status: DISCONTINUED | OUTPATIENT
Start: 2018-12-12 | End: 2018-12-12 | Stop reason: HOSPADM

## 2018-12-12 RX ORDER — PROPOFOL 10 MG/ML
INJECTION, EMULSION INTRAVENOUS AS NEEDED
Status: DISCONTINUED | OUTPATIENT
Start: 2018-12-12 | End: 2018-12-12 | Stop reason: HOSPADM

## 2018-12-12 RX ORDER — FENTANYL CITRATE 50 UG/ML
INJECTION, SOLUTION INTRAMUSCULAR; INTRAVENOUS AS NEEDED
Status: DISCONTINUED | OUTPATIENT
Start: 2018-12-12 | End: 2018-12-12 | Stop reason: HOSPADM

## 2018-12-12 RX ORDER — CEFAZOLIN SODIUM/WATER 2 G/20 ML
2 SYRINGE (ML) INTRAVENOUS ONCE
Status: DISCONTINUED | OUTPATIENT
Start: 2018-12-12 | End: 2018-12-12 | Stop reason: DRUGHIGH

## 2018-12-12 RX ORDER — SODIUM CHLORIDE 0.9 % (FLUSH) 0.9 %
5-10 SYRINGE (ML) INJECTION AS NEEDED
Status: DISCONTINUED | OUTPATIENT
Start: 2018-12-12 | End: 2018-12-12 | Stop reason: SDUPTHER

## 2018-12-12 RX ORDER — HYDRALAZINE HYDROCHLORIDE 20 MG/ML
10 INJECTION INTRAMUSCULAR; INTRAVENOUS
Status: DISCONTINUED | OUTPATIENT
Start: 2018-12-12 | End: 2018-12-13 | Stop reason: HOSPADM

## 2018-12-12 RX ORDER — NITROGLYCERIN 0.4 MG/1
0.4 TABLET SUBLINGUAL
Status: DISCONTINUED | OUTPATIENT
Start: 2018-12-12 | End: 2018-12-13 | Stop reason: HOSPADM

## 2018-12-12 RX ORDER — NALOXONE HYDROCHLORIDE 0.4 MG/ML
0.1 INJECTION, SOLUTION INTRAMUSCULAR; INTRAVENOUS; SUBCUTANEOUS AS NEEDED
Status: DISCONTINUED | OUTPATIENT
Start: 2018-12-12 | End: 2018-12-12

## 2018-12-12 RX ORDER — OXYCODONE HYDROCHLORIDE 5 MG/1
5 TABLET ORAL
Status: DISCONTINUED | OUTPATIENT
Start: 2018-12-12 | End: 2018-12-12

## 2018-12-12 RX ORDER — HYDROCODONE BITARTRATE AND ACETAMINOPHEN 5; 325 MG/1; MG/1
1 TABLET ORAL
Status: DISCONTINUED | OUTPATIENT
Start: 2018-12-12 | End: 2018-12-13 | Stop reason: HOSPADM

## 2018-12-12 RX ORDER — OXYCODONE HYDROCHLORIDE 5 MG/1
10 TABLET ORAL
Status: DISCONTINUED | OUTPATIENT
Start: 2018-12-12 | End: 2018-12-12

## 2018-12-12 RX ORDER — DIPHENHYDRAMINE HYDROCHLORIDE 50 MG/ML
12.5 INJECTION, SOLUTION INTRAMUSCULAR; INTRAVENOUS ONCE
Status: DISCONTINUED | OUTPATIENT
Start: 2018-12-12 | End: 2018-12-12

## 2018-12-12 RX ORDER — GENTAMICIN SULFATE 80 MG/100ML
80 INJECTION, SOLUTION INTRAVENOUS EVERY 12 HOURS
Status: DISCONTINUED | OUTPATIENT
Start: 2018-12-12 | End: 2018-12-12 | Stop reason: SDUPTHER

## 2018-12-12 RX ORDER — SODIUM CHLORIDE 9 MG/ML
INJECTION, SOLUTION INTRAVENOUS
Status: DISCONTINUED | OUTPATIENT
Start: 2018-12-12 | End: 2018-12-12 | Stop reason: HOSPADM

## 2018-12-12 RX ORDER — NEOSTIGMINE METHYLSULFATE 1 MG/ML
INJECTION INTRAVENOUS AS NEEDED
Status: DISCONTINUED | OUTPATIENT
Start: 2018-12-12 | End: 2018-12-12 | Stop reason: HOSPADM

## 2018-12-12 RX ORDER — BUPIVACAINE HYDROCHLORIDE 2.5 MG/ML
INJECTION, SOLUTION EPIDURAL; INFILTRATION; INTRACAUDAL AS NEEDED
Status: DISCONTINUED | OUTPATIENT
Start: 2018-12-12 | End: 2018-12-12 | Stop reason: HOSPADM

## 2018-12-12 RX ORDER — SODIUM CHLORIDE 9 MG/ML
75 INJECTION, SOLUTION INTRAVENOUS CONTINUOUS
Status: DISCONTINUED | OUTPATIENT
Start: 2018-12-12 | End: 2018-12-13 | Stop reason: HOSPADM

## 2018-12-12 RX ORDER — EPHEDRINE SULFATE 50 MG/ML
INJECTION, SOLUTION INTRAVENOUS AS NEEDED
Status: DISCONTINUED | OUTPATIENT
Start: 2018-12-12 | End: 2018-12-12 | Stop reason: HOSPADM

## 2018-12-12 RX ORDER — METOPROLOL SUCCINATE 50 MG/1
50 TABLET, EXTENDED RELEASE ORAL DAILY
Status: DISCONTINUED | OUTPATIENT
Start: 2018-12-13 | End: 2018-12-13 | Stop reason: HOSPADM

## 2018-12-12 RX ORDER — ROCURONIUM BROMIDE 10 MG/ML
INJECTION, SOLUTION INTRAVENOUS AS NEEDED
Status: DISCONTINUED | OUTPATIENT
Start: 2018-12-12 | End: 2018-12-12 | Stop reason: HOSPADM

## 2018-12-12 RX ADMIN — SODIUM CHLORIDE 900 MG: 9 INJECTION, SOLUTION INTRAVENOUS at 08:10

## 2018-12-12 RX ADMIN — PROTAMINE SULFATE 10 MG: 10 INJECTION, SOLUTION INTRAVENOUS at 09:50

## 2018-12-12 RX ADMIN — GLYCOPYRROLATE 0.1 MG: 0.2 INJECTION INTRAMUSCULAR; INTRAVENOUS at 07:58

## 2018-12-12 RX ADMIN — ROCURONIUM BROMIDE 40 MG: 10 INJECTION, SOLUTION INTRAVENOUS at 07:17

## 2018-12-12 RX ADMIN — FENTANYL CITRATE 100 MCG: 50 INJECTION, SOLUTION INTRAMUSCULAR; INTRAVENOUS at 07:16

## 2018-12-12 RX ADMIN — HYDROCODONE BITARTRATE AND ACETAMINOPHEN 1 TABLET: 5; 325 TABLET ORAL at 22:41

## 2018-12-12 RX ADMIN — LIDOCAINE HYDROCHLORIDE 60 MG: 20 INJECTION, SOLUTION EPIDURAL; INFILTRATION; INTRACAUDAL; PERINEURAL at 10:09

## 2018-12-12 RX ADMIN — HYDROMORPHONE HYDROCHLORIDE 0.5 MG: 2 INJECTION, SOLUTION INTRAMUSCULAR; INTRAVENOUS; SUBCUTANEOUS at 12:54

## 2018-12-12 RX ADMIN — LIDOCAINE HYDROCHLORIDE 40 MG: 20 INJECTION, SOLUTION EPIDURAL; INFILTRATION; INTRACAUDAL; PERINEURAL at 07:16

## 2018-12-12 RX ADMIN — HYDROCODONE BITARTRATE AND ACETAMINOPHEN 1 TABLET: 5; 325 TABLET ORAL at 17:52

## 2018-12-12 RX ADMIN — EPHEDRINE SULFATE 2.5 MG: 50 INJECTION, SOLUTION INTRAVENOUS at 09:48

## 2018-12-12 RX ADMIN — GLYCOPYRROLATE 0.4 MG: 0.2 INJECTION INTRAMUSCULAR; INTRAVENOUS at 10:00

## 2018-12-12 RX ADMIN — DEXAMETHASONE SODIUM PHOSPHATE 4 MG: 4 INJECTION, SOLUTION INTRA-ARTICULAR; INTRALESIONAL; INTRAMUSCULAR; INTRAVENOUS; SOFT TISSUE at 07:50

## 2018-12-12 RX ADMIN — FENTANYL CITRATE 100 MCG: 50 INJECTION, SOLUTION INTRAMUSCULAR; INTRAVENOUS at 07:53

## 2018-12-12 RX ADMIN — HYDROMORPHONE HYDROCHLORIDE 0.5 MG: 2 INJECTION, SOLUTION INTRAMUSCULAR; INTRAVENOUS; SUBCUTANEOUS at 15:20

## 2018-12-12 RX ADMIN — SODIUM CHLORIDE: 9 INJECTION, SOLUTION INTRAVENOUS at 07:25

## 2018-12-12 RX ADMIN — HEPARIN SODIUM 7000 UNITS: 1000 INJECTION, SOLUTION INTRAVENOUS; SUBCUTANEOUS at 08:23

## 2018-12-12 RX ADMIN — PROPOFOL 150 MG: 10 INJECTION, EMULSION INTRAVENOUS at 07:16

## 2018-12-12 RX ADMIN — EPHEDRINE SULFATE 5 MG: 50 INJECTION, SOLUTION INTRAVENOUS at 07:43

## 2018-12-12 RX ADMIN — NEOSTIGMINE METHYLSULFATE 3 MG: 1 INJECTION INTRAVENOUS at 10:00

## 2018-12-12 RX ADMIN — ONDANSETRON 4 MG: 2 INJECTION INTRAMUSCULAR; INTRAVENOUS at 09:43

## 2018-12-12 RX ADMIN — PROTAMINE SULFATE 5 MG: 10 INJECTION, SOLUTION INTRAVENOUS at 09:46

## 2018-12-12 RX ADMIN — SODIUM CHLORIDE 75 ML/HR: 900 INJECTION, SOLUTION INTRAVENOUS at 16:45

## 2018-12-12 RX ADMIN — GENTAMICIN SULFATE 300 MG: 40 INJECTION, SOLUTION INTRAMUSCULAR; INTRAVENOUS at 07:53

## 2018-12-12 RX ADMIN — PROTAMINE SULFATE 10 MG: 10 INJECTION, SOLUTION INTRAVENOUS at 09:47

## 2018-12-12 RX ADMIN — HEPARIN SODIUM 3500 UNITS: 1000 INJECTION, SOLUTION INTRAVENOUS; SUBCUTANEOUS at 09:10

## 2018-12-12 RX ADMIN — NIFEDIPINE 90 MG: 90 TABLET, FILM COATED, EXTENDED RELEASE ORAL at 20:28

## 2018-12-12 RX ADMIN — PROTAMINE SULFATE 15 MG: 10 INJECTION, SOLUTION INTRAVENOUS at 09:49

## 2018-12-12 RX ADMIN — ROCURONIUM BROMIDE 10 MG: 10 INJECTION, SOLUTION INTRAVENOUS at 08:00

## 2018-12-12 RX ADMIN — PROTAMINE SULFATE 10 MG: 10 INJECTION, SOLUTION INTRAVENOUS at 09:48

## 2018-12-12 RX ADMIN — HYDROMORPHONE HYDROCHLORIDE 0.5 MG: 2 INJECTION, SOLUTION INTRAMUSCULAR; INTRAVENOUS; SUBCUTANEOUS at 11:30

## 2018-12-12 RX ADMIN — SODIUM CHLORIDE, SODIUM LACTATE, POTASSIUM CHLORIDE, AND CALCIUM CHLORIDE 1000 ML: 600; 310; 30; 20 INJECTION, SOLUTION INTRAVENOUS at 06:03

## 2018-12-12 NOTE — PROGRESS NOTES
TRANSFER - IN REPORT:    Verbal report received from Moreno  RN(name) on [employer]  being received from [employer]) for routine post - op      Report consisted of patients Situation, Background, Assessment and   Recommendations(SBAR). Information from the following report(s) SBAR, Kardex, OR Summary, Intake/Output, MAR, Recent Results and Cardiac Rhythm NSR was reviewed with the receiving nurse. Opportunity for questions and clarification was provided. Assessment completed upon patients arrival to unit and care assumed.

## 2018-12-12 NOTE — BRIEF OP NOTE
[address] [facility]. [address]  [phone] FAX: [phone]    Brief Op Note Template Note    Pre-Op Diagnosis: Carotid stenosis, left [I65.22]    Post-Op Diagnosis:  Carotid stenosis, left [I65.22]    Procedures: Procedure(s):  LEFT CAROTID ENDARTERECTOMY WITH PATCH GRAFT ANGIOPLASTY    Surgeon: Lydia Mcnulty MD    Assistants: Surgeon(s): Ramin De Los Santos MD      Anesthesia:  General     Findings: Heavily diseased left ICA plaque    Tourniquet Time:  * No tourniquets in log *    Estimated Blood Loss:               Specimens: Left ICA plaque           Implants:    Implant Name Type Inv. Item Serial No.  Lot No. LRB No. Used Action   PATCH VASC PERIPATCH 0.8X8CM -- Betha Riser -   PATCH VASC [address] 0.8X8CM -- XENOSURE 0000 [facility] [employer] [id number] Left 1 Implanted       Complications: None               Signed: Lydia Mcnulty MD      Elements of this note have been dictated using speech recognition software. As a result, errors of speech recognition may have occurred.

## 2018-12-12 NOTE — OP NOTES
[facility] REPORT    Name:Moises CAZARES  MR#: [id number]  : 1950  ACCOUNT #: [de-identified]   DATE OF SERVICE: 2018    CLINICAL SERVICE:  Vascular surgery. PREOPERATIVE DIAGNOSIS:  Left carotid disease greater than 80%. POSTOPERATIVE DIAGNOSIS:  Left carotid disease greater than 80%. PROCEDURE PERFORMED:  Left carotid endarterectomy with bovine pericardial patch closure. SURGEON:  Klever Sy MD    ANESTHESIA:  General.    COMPLICATIONS:  None. SPECIMENS REMOVED:  Left carotid plaque. ESTIMATED BLOOD LOSS:  15 mL. COMPLICATIONS:  None. ASSISTANT:  None. INDICATION FOR PROCEDURE:  This is a 40-year-old female with history of carotid disease who on ultrasound saw she had a greater than 80% stenosis. This was confirmed with a CTA. I had a long discussion with the patient in detail about left carotid endarterectomy. We elected to proceed. PROCEDURE IN DETAIL:  After giving informed consent, the patient was brought to the operating room. General anesthesia was then induced. Preop antibiotics given before skin incision. The patient's left neck was then prepped and draped in normal sterile fashion. Incision was made over the anterior border of the sternocleidomastoid. The patient's lesion was a little bit low, so incision was made a little bit lower than normal.  We dissected down with electrocautery through the subcutaneous tissue where the sternocleidomastoid was then retracted laterally exposing the jugular vein. The facial vein was then ligated with 3-0 silk ties. Dissection was made a little bit more proximal until we get control of the common carotid artery, which was controlled with Rumel tourniquet. The external carotid artery was controlled with Rumel tourniquet, disappeared with vessel loops. We did identify the vagus and it was kept out of harm's way.   We then identified the hypoglossal doing our dissection. We dissected up into what appeared normal appearing internal carotid artery which was soft. We got control with the vessel loops. We then heparinized the patient with 7000 of heparin. I then redosed every 45 minutes. We got proximal control with vessel loops in a row tourniquet. With the 11 blade the arteriotomy on the distal common carotid artery. We extended with Champion scissors through heavily diseased ICA, which was approximately 3 cm from the bulb up to a normal appearing intima. We then brought to the field a #10 Burdine shunt was placed into the internal carotid artery and the common carotid artery. This was checked for Doppler signals. It was patent. We then started our endarterectomy plane in the distal common where we transected it proximally. We did eversion technique on the external.  We continued our endarterectomy plane up into the normal appearing intima on the internal carotid artery. All remaining fine debris was then removed with fine forceps. We did require 2 tacking stitches on the distal endpoint posteriorly. We then brought to fit a bovine pericardial patch. We did a patch angioplasty using the 6-0 Prolene proximally running on both sides. Then, we cut it to appropriate size and ran it distally as well. Prior to tying down, we removed the shunt. We backbled the internal and forward bled the common and external.  We then finished our anastomosis. We restored flow first into the external carotid artery. I then removed the common and turned to the internal.  We then checked with Doppler signals with good Doppler signal in the internal, external and common. We then reversed the patient with 50 mg of protamine. The ACT was 124. We did reapproximate the sternocleidomastoid with 2-0 Vicryls, 3-0 for the platysma, 4-0 for the skin. The patient was extubated and taken to the PACU in stable condition.       MD ANN Hartman / JUWAN  D: 12/12/2018 10:36     T: 12/12/2018 11:44  JOB #: [id number]

## 2018-12-12 NOTE — ROUTINE PROCESS
TRANSFER - OUT REPORT:    Verbal report given to [facility] RN on [employer]  being transferred to [facility] for routine post - op       Report consisted of patients Situation, Background, Assessment and   Recommendations(SBAR). Information from the following report(s) Procedure Summary and Cardiac Rhythm NSR was reviewed with the receiving nurse. Lines:   Peripheral IV 12/12/18 Right Wrist (Active)   Site Assessment Clean, dry, & intact 12/12/2018 11:40 AM   Phlebitis Assessment 0 12/12/2018 11:40 AM   Infiltration Assessment 0 12/12/2018 11:40 AM   Dressing Status Clean, dry, & intact 12/12/2018 11:40 AM   Dressing Type Transparent;Tape 12/12/2018 11:40 AM   Hub Color/Line Status Pink;Patent 12/12/2018 11:40 AM   Action Taken Dressing reinforced 12/12/2018  5:46 AM   Alcohol Cap Used No 12/12/2018  5:46 AM       Peripheral IV 12/12/18 Right Hand (Active)   Site Assessment Clean, dry, & intact 12/12/2018 11:40 AM   Phlebitis Assessment 0 12/12/2018 11:40 AM   Infiltration Assessment 0 12/12/2018 11:40 AM   Dressing Status Clean, dry, & intact 12/12/2018 11:40 AM   Dressing Type Transparent;Tape 12/12/2018 11:40 AM   Hub Color/Line Status Green; Infusing 12/12/2018 11:40 AM       Arterial Line 12/12/18 Left Radial artery (Active)   Site Assessment Clean, dry, & intact 12/12/2018 11:40 AM   Dressing Status Clean, dry, & intact 12/12/2018 11:40 AM   Dressing Type Transparent;Tape 12/12/2018 11:40 AM   Line Status Intact and in place 12/12/2018 11:40 AM   Treatment Zeroed or re-zeroed 12/12/2018 10:28 AM   Affected Extremity/Extremities Color distal to insertion site pink (or appropriate for race); Pulses palpable;Range of motion performed 12/12/2018 11:40 AM        Opportunity for questions and clarification was provided. Patient transported with:   O2 @ 2 liters  Registered Nurse    VTE prophylaxis orders have been written for [facility].     Patient and family given floor number and nurses name.  Family updated re: pt status after security code verified.

## 2018-12-12 NOTE — ANESTHESIA PROCEDURE NOTES
Arterial Line Placement    Start time: 12/12/2018 7:12 AM  End time: 12/12/2018 7:15 AM  Performed by: Maranda Hernandez CRNA  Authorized by: Homar Garcia MD     Pre-Procedure  Indications:  Arterial pressure monitoring, blood sampling and other (comment)  Preanesthetic Checklist: patient identified, risks and benefits discussed, anesthesia consent, site marked, patient being monitored, timeout performed and patient being monitored    Timeout Time: 07:12        Procedure:   Prep:  ChloraPrep and alcohol  Seldinger Technique?: No    Orientation:  Left  Location:  Radial artery  Catheter size:  20 G  Number of attempts:  1  Cont Cardiac Output Sensor: No      Assessment:   Post-procedure:  Line secured and sterile dressing applied  Patient Tolerance:  Patient tolerated the procedure well with no immediate complications

## 2018-12-12 NOTE — ANESTHESIA POSTPROCEDURE EVALUATION
Procedure(s):  LEFT CAROTID ENDARTERECTOMY WITH PATCH GRAFT ANGIOPLASTY.     Anesthesia Post Evaluation      Multimodal analgesia: multimodal analgesia used between 6 hours prior to anesthesia start to PACU discharge  Patient location during evaluation: bedside  Patient participation: complete - patient participated  Level of consciousness: responsive to verbal stimuli  Pain management: adequate  Airway patency: patent  Anesthetic complications: no  Cardiovascular status: hemodynamically stable  Respiratory status: spontaneous ventilation  Hydration status: stable        Visit Vitals  /57   Pulse (!) 55   Temp 36.8 °C (98.2 °F)   Resp 17   Wt 73.2 kg (161 lb 4.8 oz)   SpO2 95%   BMI 28.57 kg/m²

## 2018-12-12 NOTE — PROGRESS NOTES
Pharmacy Note: Gentamicin dosing    Gentamicin 300 mg (5 mg/kg IBW) IV x 1 given pre-op today at 0753. Based on patient's renal function, another dose will not be due for 48 hours. Gentamicin post-op orders for 80 mg IV q12h x 4 doses have been discontinued. Please consult pharmacy if coverage beyond 48 hours is needed.      Thank you,  Kevin Marin, PharmD  Clinical Pharmacist  [phone]

## 2018-12-13 VITALS
HEART RATE: 66 BPM | SYSTOLIC BLOOD PRESSURE: 157 MMHG | WEIGHT: 161.3 LBS | RESPIRATION RATE: 34 BRPM | BODY MASS INDEX: 28.57 KG/M2 | TEMPERATURE: 97.8 F | OXYGEN SATURATION: 98 % | DIASTOLIC BLOOD PRESSURE: 70 MMHG

## 2018-12-13 PROCEDURE — 74011250637 HC RX REV CODE- 250/637: Performed by: SURGERY

## 2018-12-13 RX ORDER — HYDROCODONE BITARTRATE AND ACETAMINOPHEN 5; 325 MG/1; MG/1
1 TABLET ORAL
Qty: 25 TAB | Refills: 0 | Status: SHIPPED | OUTPATIENT
Start: 2018-12-13 | End: 2019-01-15

## 2018-12-13 RX ADMIN — ASPIRIN 81 MG 81 MG: 81 TABLET ORAL at 08:36

## 2018-12-13 RX ADMIN — HYDROCODONE BITARTRATE AND ACETAMINOPHEN 1 TABLET: 5; 325 TABLET ORAL at 04:31

## 2018-12-13 RX ADMIN — METOPROLOL SUCCINATE 50 MG: 50 TABLET, EXTENDED RELEASE ORAL at 08:36

## 2018-12-13 NOTE — PROGRESS NOTES
Pt discharge home this day. No need voiced by pt, staff, or MD this day. Care Management Interventions  PCP Verified by CM:  Yes  Transition of Care Consult (CM Consult): Discharge Planning  Confirm Follow Up Transport: Family  Freedom of Choice Offered: Yes  Discharge Location  Discharge Placement: Home

## 2018-12-13 NOTE — DISCHARGE INSTRUCTIONS
MD Instructions:  Wound care:  - Starting Sunday, wash neck wound daily with liquid Dial soap (or other liquid antibacterial soap); use fingers or soft washcloth gently then pat area dry. - Keep incision clean and dry, may cover with gauze. - Do not apply lotions, creams or ointments to incisions. - Tissue adhesive (\"skin glue\") used over incision will flake or peel off on its own. Diet:  - As tolerated before surgery. Activity:  - Don't overdo your activity throughout the day for the next 10-14 days - no prolonged standing, no lifting more than 10lb. - No driving while taking narcotics. - Do not drink alcohol while taking narcotics. - May shower - no tub baths, soaking or swimming. Medications:  - Use prescription pain medications if needed; if you do not wish to use narcotic pain medication or require less pain control, you may take acetaminophen (Tylenol, for example) or naproxen (Aleve, for example) following instructions on the label.  - Resume other home medications.     Follow up in the office with Dr. Noa Martinez on 12/28/2018 at 8:15 AM.    If problems or questions arise, please call our office at [phone]. Do not lift anything heavier than 5 pounds (a gallon of milk); no bending, straining, strenuous activity or driving until seen by Dr Best Wu at follow-up. You may ride in a car. It is important that you have the ability to turn your head quickly without discomfort while operating a vehicle. Walk as often as you wish. Walk short distances at first and increase slowly. Avoid exercising in extreme temperatures. You may have some slight dizziness, a mild headache or tiredness for a few days. You may go up and down stairs. Hold onto the rail for the first few days after surgery because you may experience dizziness. If you smoke, please quit. Smoking increases you chances of developing heart disease, carotid artery disease, lung cancer, and peripheral vascular disease.  It can also delay wound healing. You may shower Sunday. Use soap and water to gently clean the incision. Do not scrub the incision. Pat dry with a clean towel. Do not soak in a tub, whirlpool, or hot tub, or go swimming until the incision is completely healed. This is generally 3-4 weeks for most people. There may be an area of numbness along the incision in the neck and toward the chin. The earlobe may also be affected. This generally goes away and may last for 6-12 months. Your neck incision is about is about 3-4 inches in length. The sutures under the skin will dissolve on their own. Take a close look at the incision in the mirror so that you will know what it looks like before leaving the hospital and will notice changes if they occur at home. You may apply an ice pack for the first 48 hours after surgery to the neck incision. It should be applied for 15 minutes, then off for 15 minutes. This may help with swelling and discomfort. Men may find it more difficult to shave with a blade and may switch to an electric razor. Do not shave over the incision; go around it until the incision is healed. Your incision will take several months to heal completely. It may feel raised and thickened for a while and will decrease over time. It takes 2-3 weeks for the swelling to go away. No ointments, lotions, creams or bandages should be applied to the incision. Call Your Surgeon for Any of These Symptoms (#[phone]):  - Increasing pain or swelling in the neck causing difficulty breathing or swallowing.  - Sudden or severe headache. A headache that will not go away. - Changes in your eyesight, problem speaking, or problem swallowing.  - Increasing pain, not relieved by pain medication.  - Fever above 101 degrees. - Redness, an increase in swelling or bruising around your incision. There may be some slight drainage the first couple of days from the incision, but this should stop and the incision should be  dry.  If there is continued drainage or pus the surgeon should be called. If you cannot reach your doctor quickly, go to the nearest emergency room for immediate assessment. [facility]ATTY:[facility] DISCHARGE SUMMARY NOTE    DISCHARGE SUMMARY:     Patient ID:  Name: Marky Beal  Discharge Date: 12/13/2018  Time: 8:26 AM    The following personal items collected during your admission are returned to you:   Dental Appliance: Dental Appliances: None  Vision: Visual Aid: None  Hearing Aid:    Jewelry: Jewelry: Ring(wedding band)  Clothing: Clothing: Footwear, Jacket/Coat, Pants, Shirt, Socks, Undergarments, With patient  Other Valuables: Other Valuables: None  Valuables sent to safe: Personal Items Sent to Safe: none      PATIENT INSTRUCTIONS:    What to do at Home:    Stroke risk factors: overweight, smoking, sedentary lifestyle    Call 911 immediately if sudden numbness or weakness of an extremity is noted. Warning signs of a stroke include:    · Sudden numbness or weakness of the face (is smile equal on both sides?)  · Sudden numbness or weakness of the arm or leg, especially on one side of the body  · Sudden confusion, trouble speaking or understanding. I have reviewed discharge instructions with the patient. The patient verbalized understanding.

## 2018-12-13 NOTE — PROGRESS NOTES
[address]   [address] FAX: [phone]         VASCULAR SURGERY FLOOR PROGRESS NOTE    Admit Date: 2018  POD: 1 Day Post-Op    Procedure:  Procedure(s):  LEFT CAROTID ENDARTERECTOMY WITH PATCH GRAFT ANGIOPLASTY    Subjective:     Patient has no new complaints. Objective:     Vitals:  Blood pressure 153/69, pulse 65, temperature 98.1 °F (36.7 °C), resp. rate 20, weight 161 lb 4.8 oz (73.2 kg), SpO2 98 %, not currently breastfeeding. Temp (24hrs), Av.9 °F (36.6 °C), Min:97.4 °F (36.3 °C), Max:98.3 °F (36.8 °C)      Intake / Output:    Intake/Output Summary (Last 24 hours) at 2018 0707  Last data filed at 2018 2230  Gross per 24 hour   Intake 900 ml   Output 1150 ml   Net -250 ml       Physical Exam:    Constitutional: she appears well-developed. No distress. HENT:   Head: Atraumatic. Eyes: Pupils are equal, round, and reactive to light. Neck: Normal range of motion. Cardiovascular: Regular rhythm. Pulmonary/Chest: Effort normal and breath sounds normal. No respiratory distress. Abdominal: Soft. Bowel sounds are normal. she exhibits no distension. There is no tenderness. There is no guarding. No hernia. Musculoskeletal: Normal range of motion. Neurological: She is alert. CN II- XII grossly intact  Vascular: neuro intact    Labs: No results for input(s): HGB, WBC, K, GLU, HGBEXT in the last 72 hours.     No lab exists for component:  CREA    Data Review     Assessment:     Patient Active Problem List    Diagnosis Date Noted    Carotid artery stenosis 2018    No-show for appointment 2018    Right carotid bruit 2018    Type 2 diabetes mellitus without complication ([address] 2945    RUQ pain 10/29/2018    N&V (nausea and vomiting) 10/29/2018    Type 2 diabetes with nephropathy ([address] 10/11/2018    Gastroesophageal reflux disease 2017    Type 2 diabetes mellitus without complication, without long-term current use of insulin ([facility] [address] 02/16/2017    Hyperlipidemia 02/16/2017    Essential hypertension with goal blood pressure less than 130/85 02/16/2017    Coronary artery disease involving native coronary artery of native heart without angina pectoris 09/15/2016    Essential hypertension 09/15/2016    Mixed hyperlipidemia 09/15/2016    Chronic kidney disease, stage III (moderate) (HCC) 09/15/2016    Osteopenia 09/15/2016    Vitamin D deficiency 09/15/2016    Hypomagnesemia 09/15/2016    Depression 09/15/2016    Primary insomnia 09/15/2016    S/P PTCA (percutaneous transluminal coronary angioplasty) 09/15/2016       Plan/Recommendations/Medical Decision Making:     neruo stable discharge home    Elements of this note have been dictated using speech recognition software. As a result, errors of speech recognition may have occurred.

## 2018-12-13 NOTE — PROGRESS NOTES
Discharge instructions given and reviewed. Pt verbalized understanding. Pt discharged to home via wheelchair, accompanied by spouse.

## 2018-12-14 ENCOUNTER — PATIENT OUTREACH (OUTPATIENT)
Dept: CASE MANAGEMENT | Age: 68
End: 2018-12-14

## 2018-12-14 NOTE — PROGRESS NOTES
This note will not be viewable in [address] 1st Attempt to contact patient for MARQUIS call, no answer, left  for returned call.  Will attempt to contact patient again within 24 hours

## 2018-12-17 ENCOUNTER — PATIENT OUTREACH (OUTPATIENT)
Dept: CASE MANAGEMENT | Age: 68
End: 2018-12-17

## 2018-12-17 NOTE — PROGRESS NOTES
This note will not be viewable in [address] Date/Time of Call:   12/17/18 302pmam   What was the patient hospitalized for? CAD   Consent for TEOFILO ROMERO Call       Does the patient understand his/her diagnosis and/or treatment and what happened during the hospitalization? Called and spoke with patient who agrees to call. Yes   Did the patient receive discharge instructions? Yes   CM Assessed Risk for Readmission:         Patient stated Risk for Readmission:    Patient is a moderate risk for readmission per Care Coordinator assessment due to uncontrolled blood sugars at this time    Patient is very concerned about her blood sugar levels at this time. Review any discharge instructions (see discharge instructions/AVS in [facility]). Ask patient if they understand these. Do they have any questions? DC instructions reviewed     Were home services ordered (nursing, PT, OT, ST, etc.)? No   If so, has the first visit occurred? If not, why? (Assist with coordination of services if necessary. )   NA   Was any DME ordered? No   If so, has it been received? If not, why?  (Assist patient in obtaining DME orders &/or equipment if necessary. ) NA   Complete a review of all medications (new, continued and discontinued meds per the D/C instructions and medication tab in [facility]). Medications reviewed   Were all new prescriptions filled? If not, why?  (Assist patient in obtaining medications if necessary  escalate for CCM &/or SW if ongoing issues are verbalized by pt or anticipated)   Yes   Does the patient understand the purpose and dosing instructions for all medications? (If patient has questions, provide explanation and education.)   Patient states that she understands all of her medications   Does the patient have any problems in performing ADLs? (If patient is unable to perform ADLs  what is the limiting factor(s)?   Do they have a support system that can assist? If no support system is present, discuss possible assistance that they may be able to obtain. Escalate for CCM/SW if ongoing issues are verbalized by pt or anticipated)   Patient state that she is independent with her ADLs and has family to assist if needed. Does the patient have all follow-up appointments scheduled? 7 day f/up with PCP?   (f/up with PCP may be w/in 14 days if patient has a f/up with their specialist w/in 7 days)    7-14 day f/up with specialist?   (or per discharge instructions)    If f/up has not been made  what actions has the care coordinator made to accomplish this? Has transportation been arranged? Yes       Patient is scheduled to see PCP on 01/15/19. Vascular 12/28/18       Reviewed appointment information with patient      Patients family will transport her to appointments    Any other questions or concerns expressed by the patient? Patient is very concerned about her high blood sugar readings. She states that she started back on her Metformin 1000mg on Saturday night (12/15/18). She states that she has called Dr. Juvenal Ragsdale office but has not heard back. Care Coordinator contacted Dr. Juvenal Ragsdale office after seeing that he suggested that the patient start back on the Metformin. Spoke with Ash Ramirez and she states that Dr. Prosper Masterson is aware that the patient has already been back on the Metformin and that it is going to take some time. Informed her that the patient states that her blood sugar is 389. She verbalizes understanding and states that she will be calling the patient back to inform her of Dr. Juvenal Ragsdale response. Care Coordinator provided contact information to the patient should any further questions or concerns arise. Schedule next appointment with TEOFILO Molina or refer to RN Case Manager/ per the workflow guidelines. When is care coordinators next follow-up call scheduled? If referred for CCM  what RN care manager was the referral assigned?    Within 30 days            Within 30 days         NA   [place]QU[place] Call Completed By: Yuridia Gold, [address] [facility] Coordinator

## 2018-12-21 ENCOUNTER — PATIENT OUTREACH (OUTPATIENT)
Dept: CASE MANAGEMENT | Age: 68
End: 2018-12-21

## 2018-12-21 NOTE — PROGRESS NOTES
This note will not be viewable in [address] Care Coordinator attempted to call patient to FU on Blood sugar levels and to verify a response from Dr. Clem Kennedy office.  No answer, left  for returned call

## 2018-12-31 ENCOUNTER — PATIENT OUTREACH (OUTPATIENT)
Dept: CASE MANAGEMENT | Age: 68
End: 2018-12-31

## 2018-12-31 NOTE — PROGRESS NOTES
This note will not be viewable in [address] Called and spoke with patient for 30 day FU. She states that she is doing great. She states that her BS has come back down to normal, and that she has all of her medications. She denies any further questions or concerns at this time. Episode closed as no further needs are identified at this time.

## 2019-05-03 ENCOUNTER — HOSPITAL ENCOUNTER (OUTPATIENT)
Dept: MAMMOGRAPHY | Age: 69
Discharge: HOME | End: 2019-05-03
Attending: INTERNAL MEDICINE
Payer: MEDICARE

## 2019-05-03 DIAGNOSIS — Z12.39 BREAST CANCER SCREENING: ICD-10-CM

## 2019-05-03 DIAGNOSIS — M85.89 OSTEOPENIA OF MULTIPLE SITES: ICD-10-CM

## 2019-05-03 PROCEDURE — 77067 SCR MAMMO BI INCL CAD: CPT

## 2019-05-03 PROCEDURE — 77080 DXA BONE DENSITY AXIAL: CPT

## 2019-05-08 ENCOUNTER — HOSPITAL ENCOUNTER (OUTPATIENT)
Dept: MAMMOGRAPHY | Age: 69
Discharge: HOME | End: 2019-05-08
Attending: INTERNAL MEDICINE
Payer: MEDICARE

## 2019-05-08 DIAGNOSIS — R92.8 ABNORMAL SCREENING MAMMOGRAM: ICD-10-CM

## 2019-05-08 PROCEDURE — 76642 ULTRASOUND BREAST LIMITED: CPT

## 2019-06-03 PROBLEM — R11.2 N&V (NAUSEA AND VOMITING): Status: RESOLVED | Noted: 2018-10-29 | Resolved: 2019-06-03

## 2019-06-03 PROBLEM — I10 ESSENTIAL HYPERTENSION WITH GOAL BLOOD PRESSURE LESS THAN 130/85: Status: RESOLVED | Noted: 2017-02-16 | Resolved: 2019-06-03

## 2019-06-03 PROBLEM — E78.5 HYPERLIPIDEMIA: Status: RESOLVED | Noted: 2017-02-16 | Resolved: 2019-06-03

## 2019-06-04 ENCOUNTER — TELEPHONE (OUTPATIENT)
Dept: INFUSION THERAPY | Age: 69
End: 2019-06-04

## 2019-06-04 NOTE — TELEPHONE ENCOUNTER
Pt. Was contacted on 6/03/19 to schedule Prolia, and she said she would call back to set up when her  is discharged from hospital.

## 2019-09-03 PROBLEM — Z91.199 NO-SHOW FOR APPOINTMENT: Status: RESOLVED | Noted: 2018-11-26 | Resolved: 2019-09-03

## 2019-09-03 PROBLEM — R10.11 RUQ PAIN: Status: RESOLVED | Noted: 2018-10-29 | Resolved: 2019-09-03

## 2019-10-17 ENCOUNTER — PATIENT OUTREACH (OUTPATIENT)
Dept: CASE MANAGEMENT | Age: 69
End: 2019-10-17

## 2019-10-17 NOTE — PROGRESS NOTES
Medication Adherence Outreach    Date/Time of Call:  10/17/2019     Name of medication and dosage:   Atorvastatin 40mg    Metformin 1000mg   Does the patient know the purpose and dosage of medication? Yes   Are you getting a #30 day or #90 day supply of your medication? #90   Are you still taking this medication? If not, why? Yes (pt.claims that she is taking as prescribed)   Transportation issues or any problems paying for the medication or other reason? No   What pharmacy are you using to fill your medication and do you know the last time that you got your medication filled? [facility]    Last fill 5/6/2019-Metformin               8/1/2019-Atorvastatin   Are you having any side effects from taking your medication? No      Any other questions or concerns expressed by the patient. No   Comments:        Discussed Medication Adherence with patient-she stated that there are no barriers preventing her from obtaining or taking her medication. She is getting a 90 day refill from her PCP-No further outreach at this time.      Call Completed By:     David Guerrero, [address] [facility] [address]  [phone]  [email or web address]

## 2020-02-05 ENCOUNTER — HOSPITAL ENCOUNTER (OUTPATIENT)
Dept: INFUSION THERAPY | Age: 70
Discharge: HOME | End: 2020-02-05
Payer: MEDICARE

## 2020-02-05 VITALS
RESPIRATION RATE: 18 BRPM | HEART RATE: 52 BPM | SYSTOLIC BLOOD PRESSURE: 117 MMHG | DIASTOLIC BLOOD PRESSURE: 58 MMHG | OXYGEN SATURATION: 100 % | TEMPERATURE: 98.4 F

## 2020-02-05 LAB
ALBUMIN SERPL-MCNC: 3.8 G/DL (ref 3.2–4.6)
CALCIUM SERPL-MCNC: 9.4 MG/DL (ref 8.3–10.4)

## 2020-02-05 PROCEDURE — 96372 THER/PROPH/DIAG INJ SC/IM: CPT

## 2020-02-05 PROCEDURE — 82310 ASSAY OF CALCIUM: CPT

## 2020-02-05 PROCEDURE — 36415 COLL VENOUS BLD VENIPUNCTURE: CPT

## 2020-02-05 PROCEDURE — 82040 ASSAY OF SERUM ALBUMIN: CPT

## 2020-02-05 PROCEDURE — 74011250636 HC RX REV CODE- 250/636: Performed by: INTERNAL MEDICINE

## 2020-02-05 RX ADMIN — DENOSUMAB 60 MG: 60 INJECTION SUBCUTANEOUS at 15:30

## 2020-02-05 NOTE — PROGRESS NOTES
Arrived to the [facility]. Labs reviewed and Prolia completed. Patient tolerated well. 1st dose - observed patient x 30 minutes, no reactions. Any issues or concerns during appointment: None. Patient aware of next infusion appointment on 8/5 (date) at 1500 (time). Discharged ambulatory in stable condition.

## 2020-03-12 PROBLEM — Z98.890 HISTORY OF LEFT-SIDED CAROTID ENDARTERECTOMY: Status: ACTIVE | Noted: 2020-03-12

## 2020-03-12 PROBLEM — E78.5 HYPERLIPIDEMIA: Status: ACTIVE | Noted: 2020-03-12

## 2020-03-12 PROBLEM — I65.21 CAROTID STENOSIS, ASYMPTOMATIC, RIGHT: Status: ACTIVE | Noted: 2020-03-12

## 2020-07-25 ENCOUNTER — HOSPITAL ENCOUNTER (OUTPATIENT)
Dept: ULTRASOUND IMAGING | Age: 70
Discharge: HOME | End: 2020-07-25
Attending: INTERNAL MEDICINE
Payer: MEDICARE

## 2020-07-25 DIAGNOSIS — R79.89 ELEVATED SERUM CREATININE: ICD-10-CM

## 2020-07-25 DIAGNOSIS — R79.89 ELEVATED LFTS: ICD-10-CM

## 2020-07-25 PROCEDURE — 76700 US EXAM ABDOM COMPLETE: CPT

## 2020-07-27 ENCOUNTER — APPOINTMENT (OUTPATIENT)
Dept: GENERAL RADIOLOGY | Age: 70
End: 2020-07-27
Attending: EMERGENCY MEDICINE
Payer: MEDICARE

## 2020-07-27 ENCOUNTER — HOSPITAL ENCOUNTER (EMERGENCY)
Age: 70
Discharge: HOME | End: 2020-07-27
Attending: EMERGENCY MEDICINE
Payer: MEDICARE

## 2020-07-27 VITALS
SYSTOLIC BLOOD PRESSURE: 190 MMHG | HEART RATE: 74 BPM | OXYGEN SATURATION: 100 % | DIASTOLIC BLOOD PRESSURE: 82 MMHG | HEIGHT: 64 IN | TEMPERATURE: 98.8 F | RESPIRATION RATE: 16 BRPM | BODY MASS INDEX: 24.75 KG/M2 | WEIGHT: 145 LBS

## 2020-07-27 DIAGNOSIS — R94.5 LIVER FUNCTION ABNORMALITY: ICD-10-CM

## 2020-07-27 DIAGNOSIS — D64.9 ANEMIA, UNSPECIFIED TYPE: ICD-10-CM

## 2020-07-27 DIAGNOSIS — B34.9 VIRAL SYNDROME: Primary | ICD-10-CM

## 2020-07-27 LAB
ALBUMIN SERPL-MCNC: 3.2 G/DL (ref 3.2–4.6)
ALBUMIN/GLOB SERPL: 0.7 {RATIO} (ref 1.2–3.5)
ALP SERPL-CCNC: 162 U/L (ref 50–136)
ALT SERPL-CCNC: 91 U/L (ref 12–65)
ANION GAP SERPL CALC-SCNC: 9 MMOL/L (ref 7–16)
AST SERPL-CCNC: 61 U/L (ref 15–37)
BASOPHILS # BLD: 0 K/UL (ref 0–0.2)
BASOPHILS NFR BLD: 1 % (ref 0–2)
BILIRUB SERPL-MCNC: 0.7 MG/DL (ref 0.2–1.1)
BUN SERPL-MCNC: 16 MG/DL (ref 8–23)
CALCIUM SERPL-MCNC: 8.8 MG/DL (ref 8.3–10.4)
CHLORIDE SERPL-SCNC: 105 MMOL/L (ref 98–107)
CO2 SERPL-SCNC: 24 MMOL/L (ref 21–32)
CREAT SERPL-MCNC: 1.54 MG/DL (ref 0.6–1)
DIFFERENTIAL METHOD BLD: ABNORMAL
EOSINOPHIL # BLD: 0.3 K/UL (ref 0–0.8)
EOSINOPHIL NFR BLD: 4 % (ref 0.5–7.8)
ERYTHROCYTE [DISTWIDTH] IN BLOOD BY AUTOMATED COUNT: 13.5 % (ref 11.9–14.6)
GLOBULIN SER CALC-MCNC: 4.4 G/DL (ref 2.3–3.5)
GLUCOSE SERPL-MCNC: 168 MG/DL (ref 65–100)
HCT VFR BLD AUTO: 27.6 % (ref 35.8–46.3)
HGB BLD-MCNC: 9 G/DL (ref 11.7–15.4)
IMM GRANULOCYTES # BLD AUTO: 0 K/UL (ref 0–0.5)
IMM GRANULOCYTES NFR BLD AUTO: 0 % (ref 0–5)
INR PPP: 1.2
LYMPHOCYTES # BLD: 1.2 K/UL (ref 0.5–4.6)
LYMPHOCYTES NFR BLD: 15 % (ref 13–44)
MCH RBC QN AUTO: 31.1 PG (ref 26.1–32.9)
MCHC RBC AUTO-ENTMCNC: 32.6 G/DL (ref 31.4–35)
MCV RBC AUTO: 95.5 FL (ref 79.6–97.8)
MONOCYTES # BLD: 0.6 K/UL (ref 0.1–1.3)
MONOCYTES NFR BLD: 8 % (ref 4–12)
NEUTS SEG # BLD: 5.6 K/UL (ref 1.7–8.2)
NEUTS SEG NFR BLD: 72 % (ref 43–78)
NRBC # BLD: 0 K/UL (ref 0–0.2)
PLATELET # BLD AUTO: 294 K/UL (ref 150–450)
PMV BLD AUTO: 9.9 FL (ref 9.4–12.3)
POTASSIUM SERPL-SCNC: 4.2 MMOL/L (ref 3.5–5.1)
PROT SERPL-MCNC: 7.6 G/DL (ref 6.3–8.2)
PROTHROMBIN TIME: 15.2 SEC (ref 12–14.7)
RBC # BLD AUTO: 2.89 M/UL (ref 4.05–5.2)
SODIUM SERPL-SCNC: 138 MMOL/L (ref 136–145)
TROPONIN-HIGH SENSITIVITY: 39.8 PG/ML (ref 0–14)
WBC # BLD AUTO: 7.8 K/UL (ref 4.3–11.1)

## 2020-07-27 PROCEDURE — 85025 COMPLETE CBC W/AUTO DIFF WBC: CPT

## 2020-07-27 PROCEDURE — 71045 X-RAY EXAM CHEST 1 VIEW: CPT

## 2020-07-27 PROCEDURE — 74011000250 HC RX REV CODE- 250: Performed by: EMERGENCY MEDICINE

## 2020-07-27 PROCEDURE — 85610 PROTHROMBIN TIME: CPT

## 2020-07-27 PROCEDURE — 87635 SARS-COV-2 COVID-19 AMP PRB: CPT

## 2020-07-27 PROCEDURE — 96374 THER/PROPH/DIAG INJ IV PUSH: CPT

## 2020-07-27 PROCEDURE — 93005 ELECTROCARDIOGRAM TRACING: CPT | Performed by: EMERGENCY MEDICINE

## 2020-07-27 PROCEDURE — 74011250636 HC RX REV CODE- 250/636: Performed by: EMERGENCY MEDICINE

## 2020-07-27 PROCEDURE — 80074 ACUTE HEPATITIS PANEL: CPT

## 2020-07-27 PROCEDURE — 84484 ASSAY OF TROPONIN QUANT: CPT

## 2020-07-27 PROCEDURE — 96375 TX/PRO/DX INJ NEW DRUG ADDON: CPT

## 2020-07-27 PROCEDURE — 80053 COMPREHEN METABOLIC PANEL: CPT

## 2020-07-27 PROCEDURE — 99284 EMERGENCY DEPT VISIT MOD MDM: CPT

## 2020-07-27 RX ORDER — LABETALOL HYDROCHLORIDE 5 MG/ML
20 INJECTION, SOLUTION INTRAVENOUS
Status: COMPLETED | OUTPATIENT
Start: 2020-07-27 | End: 2020-07-27

## 2020-07-27 RX ORDER — ONDANSETRON 2 MG/ML
4 INJECTION INTRAMUSCULAR; INTRAVENOUS
Status: COMPLETED | OUTPATIENT
Start: 2020-07-27 | End: 2020-07-27

## 2020-07-27 RX ORDER — ONDANSETRON 8 MG/1
8 TABLET, ORALLY DISINTEGRATING ORAL
Qty: 12 TAB | Refills: 1 | Status: SHIPPED | OUTPATIENT
Start: 2020-07-27 | End: 2020-07-27

## 2020-07-27 RX ORDER — KETOROLAC TROMETHAMINE 30 MG/ML
30 INJECTION, SOLUTION INTRAMUSCULAR; INTRAVENOUS
Status: COMPLETED | OUTPATIENT
Start: 2020-07-27 | End: 2020-07-27

## 2020-07-27 RX ORDER — METOPROLOL TARTRATE 5 MG/5ML
5 INJECTION INTRAVENOUS ONCE
Status: COMPLETED | OUTPATIENT
Start: 2020-07-27 | End: 2020-07-27

## 2020-07-27 RX ORDER — DIPHENOXYLATE HYDROCHLORIDE AND ATROPINE SULFATE 2.5; .025 MG/1; MG/1
2 TABLET ORAL
Qty: 20 TAB | Refills: 0 | Status: SHIPPED | OUTPATIENT
Start: 2020-07-27 | End: 2020-07-27

## 2020-07-27 RX ORDER — ONDANSETRON 8 MG/1
8 TABLET, ORALLY DISINTEGRATING ORAL
Qty: 12 TAB | Refills: 1 | Status: SHIPPED | OUTPATIENT
Start: 2020-07-27 | End: 2020-10-21 | Stop reason: ALTCHOICE

## 2020-07-27 RX ORDER — DIPHENOXYLATE HYDROCHLORIDE AND ATROPINE SULFATE 2.5; .025 MG/1; MG/1
2 TABLET ORAL
Qty: 20 TAB | Refills: 0 | Status: SHIPPED | OUTPATIENT
Start: 2020-07-27 | End: 2020-09-01

## 2020-07-27 RX ADMIN — METOPROLOL TARTRATE 5 MG: 5 INJECTION INTRAVENOUS at 17:42

## 2020-07-27 RX ADMIN — KETOROLAC TROMETHAMINE 30 MG: 30 INJECTION, SOLUTION INTRAMUSCULAR at 17:42

## 2020-07-27 RX ADMIN — ONDANSETRON 4 MG: 2 INJECTION INTRAMUSCULAR; INTRAVENOUS at 17:42

## 2020-07-27 RX ADMIN — LABETALOL HYDROCHLORIDE 20 MG: 5 INJECTION INTRAVENOUS at 19:10

## 2020-07-27 RX ADMIN — SODIUM CHLORIDE 1000 ML: 9 INJECTION, SOLUTION INTRAVENOUS at 17:42

## 2020-07-27 NOTE — ED TRIAGE NOTES
Patient to triage via wheelchair with mask in place. Reports chest pain, SOB, cough, fever, nausea, and diarrhea since last week. States she had COVID swab done on Thursday but has not gotten results back yet.

## 2020-07-27 NOTE — DISCHARGE INSTRUCTIONS
Alternate 4 ibuprofen every 8 hours with 2 extra-strength tylenol every 6 hours  Encourage fluids  Look up your COVID test results using the \"my chart\" feature which can be found in the back part of your discharge instructions  Practice social distancing, mask use, until your results return     return if worse, particularly for significant shortness of breath,  If feeling more short of breath, consider trying to get a portable finger pulse ox probe from a local pharmacy, or from the Internet. Resume your Procardia. Follow-up with Dr. Ilda Amador, or first available at [facility], regarding the very mild liver function abnormalities. Should you begin to appear jaundiced, or your urine appeared dark like tea or Coke, return to the ER immediately      Patient Education        Anemia: Care Instructions  Your Care Instructions     Anemia is a low level of red blood cells, which carry oxygen throughout your body. Many things can cause anemia. Lack of iron is one of the most common causes. Your body needs iron to make hemoglobin, a substance in red blood cells that carries oxygen from the lungs to your body's cells. Without enough iron, the body produces fewer and smaller red blood cells. As a result, your body's cells do not get enough oxygen, and you feel tired and weak. And you may have trouble concentrating. Bleeding is the most common cause of a lack of iron. You may have heavy menstrual bleeding or bleeding caused by conditions such as ulcers, hemorrhoids, or cancer. Regular use of aspirin or other anti-inflammatory medicines (such as ibuprofen) also can cause bleeding in some people. A lack of iron in your diet also can cause anemia, especially at times when the body needs more iron, such as during pregnancy, infancy, and the teen years. Your doctor may have prescribed iron pills.  It may take several months of treatment for your iron levels to return to normal. Your doctor also may suggest that you eat foods that are rich in iron, such as meat and beans. There are many other causes of anemia. It is not always due to a lack of iron. Finding the specific cause of your anemia will help your doctor find the right treatment for you. Follow-up care is a key part of your treatment and safety. Be sure to make and go to all appointments, and call your doctor if you are having problems. It's also a good idea to know your test results and keep a list of the medicines you take. How can you care for yourself at home? · Take your medicines exactly as prescribed. Call your doctor if you think you are having a problem with your medicine. · If your doctor recommends iron pills, take them as directed:  ? Try to take the pills on an empty stomach about 1 hour before or 2 hours after meals. But you may need to take iron with food to avoid an upset stomach. ? Do not take antacids or drink milk or caffeine drinks (such as coffee, tea, or cola) at the same time or within 2 hours of the time that you take your iron. They can make it hard for your body to absorb the iron. ? Vitamin C (from food or supplements) helps your body absorb iron. Try taking iron pills with a glass of orange juice or some other food that is high in vitamin C, such as citrus fruits. ? Iron pills may cause stomach problems, such as heartburn, nausea, diarrhea, constipation, and cramps. Be sure to drink plenty of fluids, and include fruits, vegetables, and fiber in your diet each day. Iron pills often make your bowel movements dark or green. ? If you forget to take an iron pill, do not take a double dose of iron the next time you take a pill. ? Keep iron pills out of the reach of small children. An overdose of iron can be very dangerous. · Follow your doctor's advice about eating iron-rich foods. These include red meat, shellfish, poultry, eggs, beans, raisins, whole-grain bread, and leafy green vegetables.   · Steam vegetables to help them keep their iron content. When should you call for help? [id number] anytime you think you may need emergency care. For example, call if:  · You have symptoms of a heart attack. These may include:  ? Chest pain or pressure, or a strange feeling in the chest.  ? Sweating. ? Shortness of breath. ? Nausea or vomiting. ? Pain, pressure, or a strange feeling in the back, neck, jaw, or upper belly or in one or both shoulders or arms. ? Lightheadedness or sudden weakness. ? A fast or irregular heartbeat. After you call 911, the  may tell you to chew 1 adult-strength or 2 to 4 low-dose aspirin. Wait for an ambulance. Do not try to drive yourself. · You passed out (lost consciousness). Call your doctor now or seek immediate medical care if:  · You have new or increased shortness of breath. · You are dizzy or lightheaded, or you feel like you may faint. · Your fatigue and weakness continue or get worse. · You have any abnormal bleeding, such as:  ? Nosebleeds. ? Vaginal bleeding that is different (heavier, more frequent, at a different time of the month) than what you are used to.  ? Bloody or black stools, or rectal bleeding. ? Bloody or pink urine. Watch closely for changes in your health, and be sure to contact your doctor if:  · You do not get better as expected. Where can you learn more? Go to http://angeles-kehinde.info/  Enter R301 in the search box to learn more about \"Anemia: Care Instructions. \"  Current as of: November 8, 2019               Content Version: 12.5  © 6302-7100 Healthwise[employer]. Care instructions adapted under license by [employer] (which disclaims liability or warranty for this information). If you have questions about a medical condition or this instruction, always ask your healthcare professional. [place] [address] any warranty or liability for your use of this information.          Patient Education        Liver Function Tests: About These Tests  What is it? Liver function tests check to see how well your liver is working. Some tests measure the amount of certain enzymes in your blood. This can show if the liver is damaged or inflamed. Other tests measure how well the liver can make certain proteins. Or they show how well the liver removes waste products from the body. Your doctor will use the test results to help look for conditions such as hepatitis, cirrhosis, or gallbladder problems. Test results that are not normal don't always mean there is a problem with your liver. Your doctor can find out if there is a problem based on your results. The tests may include:  Alkaline phosphatase (ALP). This test measures the amount of the enzyme ALP in your blood. Total protein and albumin. A total serum protein test measures the amounts of two major groups of proteins in your blood (albumin and globulin). It also shows the total amount of protein. An albumin test measures albumin only. Bilirubin. This test measures the amount of bilirubin in your blood. When levels are high, the skin and whites of the eyes may look yellow (jaundice). This may be caused by liver disease. Aspartate aminotransferase (AST) and alanine aminotransferase (ALT). These tests measure the amount of the enzymes AST and ALT in your blood. Why is this test done? Liver function tests check how well your liver is working. Some tests help show if your liver is damaged or inflamed. Your doctor may order liver function tests if you have symptoms of liver disease. These tests also may be done to see how well a treatment for liver disease is working. How do you prepare for the test?  In general, there's nothing you have to do before this test, unless your doctor tells you to. How is the test done? A health professional uses a needle to take a blood sample, usually from the arm.   What happens after the test?  · You will probably be able to go home right away.  · You can go back to your usual activities right away. Follow-up care is a key part of your treatment and safety. Be sure to make and go to all appointments, and call your doctor if you are having problems. It's also a good idea to keep a list of the medicines you take. Ask your doctor when you can expect to have your test results. Where can you learn more? Go to http://www.gray.com/  Enter M077 in the search box to learn more about \"Liver Function Tests: About These Tests. \"  Current as of: December 9, 2019               Content Version: 12.5  © 5463-3184 [employer]. Care instructions adapted under license by [employer] (which disclaims liability or warranty for this information). If you have questions about a medical condition or this instruction, always ask your healthcare professional. [address] any warranty or liability for your use of this information. Patient Education        Viral Infections: Care Instructions  Your Care Instructions     You don't feel well, but it's not clear what's causing it. You may have a viral infection. Viruses cause many illnesses, such as the common cold, influenza, fever, rashes, and the diarrhea, nausea, and vomiting that are often called \"stomach flu. \" You may wonder if antibiotic medicines could make you feel better. But antibiotics only treat infections caused by bacteria. They don't work on viruses. The good news is that viral infections usually aren't serious. Most will go away in a few days without medical treatment. In the meantime, there are a few things you can do to make yourself more comfortable. Follow-up care is a key part of your treatment and safety. Be sure to make and go to all appointments, and call your doctor if you are having problems. It's also a good idea to know your test results and keep a list of the medicines you take.   How can you care for yourself at home?  · Get plenty of rest if you feel tired. · Take an over-the-counter pain medicine if needed, such as acetaminophen (Tylenol), ibuprofen (Advil, Motrin), or naproxen (Aleve). Read and follow all instructions on the label. · Be careful when taking over-the-counter cold or flu medicines and Tylenol at the same time. Many of these medicines have acetaminophen, which is Tylenol. Read the labels to make sure that you are not taking more than the recommended dose. Too much acetaminophen (Tylenol) can be harmful. · Drink plenty of fluids, enough so that your urine is light yellow or clear like water. If you have kidney, heart, or liver disease and have to limit fluids, talk with your doctor before you increase the amount of fluids you drink. · Stay home from work, school, and other public places while you have a fever. When should you call for help? [id number] anytime you think you may need emergency care. For example, call if:  · You have severe trouble breathing. · You passed out (lost consciousness). Call your doctor now or seek immediate medical care if:  · You seem to be getting much sicker. · You have a new or higher fever. · You have blood in your stools. · You have new belly pain, or your pain gets worse. · You have a new rash. Watch closely for changes in your health, and be sure to contact your doctor if:  · You start to get better and then get worse. · You do not get better as expected. Where can you learn more? Go to http://angeles-kehinde.info/  Enter L906 in the search box to learn more about \"Viral Infections: Care Instructions. \"  Current as of: February 11, 2020               Content Version: 12.5  © 3915-8890 Healthwise, [employer]. Care instructions adapted under license by [employer] (which disclaims liability or warranty for this information).  If you have questions about a medical condition or this instruction, always ask your healthcare professional. [employer], [facility] disclaims any warranty or liability for your use of this information. Advance Care Planning  People with COVID-19 may have no symptoms, mild symptoms, such as fever, cough, and shortness of breath or they may have more severe illness, developing severe and fatal pneumonia. As a result, Advance Care Planning with attention to naming a health care decision maker (someone you trust to make healthcare decisions for you if you could not speak for yourself) and sharing other health care preferences is important BEFORE a possible health crisis. Please contact your Primary Care Provider to discuss Advance Care Planning. Preventing the Spread of Coronavirus Disease 2019 in Homes and Residential Communities  For the most recent information go to [employer].    Prevention steps for People with confirmed or suspected COVID-19 (including persons under investigation) who do not need to be hospitalized  and   People with confirmed COVID-19 who were hospitalized and determined to be medically stable to go home    Your healthcare provider and public health staff will evaluate whether you can be cared for at home. If it is determined that you do not need to be hospitalized and can be isolated at home, you will be monitored by staff from your local or state health department. You should follow the prevention steps below until a healthcare provider or local or state health department says you can return to your normal activities. Stay home except to get medical care  People who are mildly ill with COVID-19 are able to isolate at home during their illness. You should restrict activities outside your home, except for getting medical care. Do not go to work, school, or public areas. Avoid using public transportation, ride-sharing, or taxis.   Separate yourself from other people and animals in your home  People: As much as possible, you should stay in a specific room and away from other people in your home. Also, you should use a separate bathroom, if available. Animals: You should restrict contact with pets and other animals while you are sick with COVID-19, just like you would around other people. Although there have not been reports of pets or other animals becoming sick with COVID-19, it is still recommended that people sick with COVID-19 limit contact with animals until more information is known about the virus. When possible, have another member of your household care for your animals while you are sick. If you are sick with COVID-19, avoid contact with your pet, including petting, snuggling, being kissed or licked, and sharing food. If you must care for your pet or be around animals while you are sick, wash your hands before and after you interact with pets and wear a facemask. Call ahead before visiting your doctor  If you have a medical appointment, call the healthcare provider and tell them that you have or may have COVID-19. This will help the healthcare providers office take steps to keep other people from getting infected or exposed. Wear a facemask  You should wear a facemask when you are around other people (e.g., sharing a room or vehicle) or pets and before you enter a healthcare providers office. If you are not able to wear a facemask (for example, because it causes trouble breathing), then people who live with you should not stay in the same room with you, or they should wear a facemask if they enter your room. Cover your coughs and sneezes  Cover your mouth and nose with a tissue when you cough or sneeze. Throw used tissues in a lined trash can. Immediately wash your hands with soap and water for at least 20 seconds or, if soap and water are not available, clean your hands with an alcohol-based hand  that contains at least 60% alcohol.   Clean your hands often  Wash your hands often with soap and water for at least 20 seconds, especially after blowing your nose, coughing, or sneezing; going to the bathroom; and before eating or preparing food. If soap and water are not readily available, use an alcohol-based hand  with at least 60% alcohol, covering all surfaces of your hands and rubbing them together until they feel dry. Soap and water are the best option if hands are visibly dirty. Avoid touching your eyes, nose, and mouth with unwashed hands. Avoid sharing personal household items  You should not share dishes, drinking glasses, cups, eating utensils, towels, or bedding with other people or pets in your home. After using these items, they should be washed thoroughly with soap and water. Clean all high-touch surfaces everyday  High touch surfaces include counters, tabletops, doorknobs, bathroom fixtures, toilets, phones, keyboards, tablets, and bedside tables. Also, clean any surfaces that may have blood, stool, or body fluids on them. Use a household cleaning spray or wipe, according to the label instructions. Labels contain instructions for safe and effective use of the cleaning product including precautions you should take when applying the product, such as wearing gloves and making sure you have good ventilation during use of the product. Monitor your symptoms  Seek prompt medical attention if your illness is worsening (e.g., difficulty breathing). Before seeking care, call your healthcare provider and tell them that you have, or are being evaluated for, COVID-19. Put on a facemask before you enter the facility. These steps will help the healthcare providers office to keep other people in the office or waiting room from getting infected or exposed. Ask your healthcare provider to call the local or [facility] health department.  Persons who are placed under active monitoring or facilitated self-monitoring should follow instructions provided by their local health department or occupational health professionals, as appropriate. When working with your local health department check their available hours. If you have a medical emergency and need to call 911, notify the dispatch personnel that you have, or are being evaluated for COVID-19. If possible, put on a facemask before emergency medical services arrive. Discontinuing home isolation  Patients with confirmed COVID-19 should remain under home isolation precautions until the risk of secondary transmission to others is thought to be low. The decision to discontinue home isolation precautions should be made on a case-by-case basis, in consultation with healthcare providers and state and local health departments.

## 2020-07-27 NOTE — ED PROVIDER NOTES
53-year-old female presents to the ER regarding laboratory abnormalities and flulike symptoms. Patient started feeling sick around July 14. She saw her family doctor on the 16th and has some lab work drawn,   Usual symptoms were mainly fatigue with some aches. She has had intermittent fevers to 99, night sweats, chills. She has had a nonproductive cough, and feels short of breath at times. Hemoglobin was 10.1 with a creatinine of 1.68 and GFR of 30. Patient continued feeling poorly throughout the week, no over-the-counter remedies were attempted. She is followed back up with her doctor on the 23rd hemoglobin had dropped to 8.3, creatinine had actually improved a little to 1.66, with a GFR of 31. Patient went to an [facility] urgent care Thursday, July 23 and had a code swab done, but she has not heard any results yet. She was told it usually takes about 5 days. She just started with some diarrhea yesterday having had 3-4 loose bowel movements. But again no vomiting. She denies any UTI symptoms. Fatigue and malaise continue to be the mainstay of her symptoms with ongoing low-grade temps to 99, chills, and body aches. Denies any alteration in sense of taste or smell. She was called by her doctor today and told that her hemoglobin was dangerously low and that she needed to go to the ER to get a pint of blood. She was also told that her liver functions were \"all over the place\". Traditionally throughout most of 2017 thru 2019, hemoglobins have gradually trended down from 12-11. Past Medical History:   Diagnosis Date    CAD (coronary artery disease) 3/1/2012    MI, 3 stents    Chest pain     Coronary artery disease involving native coronary artery without angina pectoris 5/21/2015    Depression 3/1/2012    Diabetes mellitus ([address] 3/1/2012    oral agents. . hypo- 80's, Last A1c 6.9 in 6/2018    DM2 (diabetes mellitus, type 2) ([facility] [address] 5/21/2015    Elevated troponin 3/2/2012    Essential hypertension 5/21/2015    External hemorrhoids without mention of complication 2858    GERD (gastroesophageal reflux disease)     History of MI (myocardial infarction) 11/12    x2    Hypercholesterolemia     Hypertension 3/1/2012    Insomnia     Personal history of colonic polyps 2013    hyperplastic    Platelet inhibition due to Plavix     holding for colonoscopy per GI Dr. Sherwood Rectocele 2013    Tobacco abuse 3/1/2012    quit for 1 year    Tobacco use disorder     Unstable angina ([address] 3/1/2012    ntg as needed.         Past Surgical History:   Procedure Laterality Date    HX CAROTID ENDARTERECTOMY Left 12/12/2018    HX CATARACT REMOVAL Left 09/19/2016    Dr Norris Szymanski, [address] [place] [address] Right 11/07/2016    Dr Norris Szymanski, [facility]    HX CERVICAL FUSION      neck w/plate    HX COLONOSCOPY  12/17/13    Anna Marie--single transverse hyperplastic polyp--7-10 year recall    HX HEMORRHOIDECTOMY      x2    HX ORTHOPAEDIC      left elbow    HX CAL AND BSO      HX WISDOM TEETH EXTRACTION      MS LEFT HEART CATH,PERCUTANEOUS  last stented 11/12 x 2    stent x3 , mi x 2         Family History:   Problem Relation Age of Onset    Heart Disease Mother     Osteoporosis Mother     Heart Attack Mother 67        mi    Thyroid Disease Mother         Multinodular Goiter    Heart Disease Father     Cancer Father         pancreatic    Heart Attack Father 67        MI    Cancer Sister         Pre-Cancerous dysplasia    Thyroid Cancer Sister     Ulcerative Colitis Brother     Thyroid Cancer Brother     Breast Cancer Neg Hx        Social History     Socioeconomic History    Marital status:      Spouse name: Not on file    Number of children: Not on file    Years of education: Not on file    Highest education level: Not on file   Occupational History    Not on file   Social Needs    Financial resource strain: Not on file    Food insecurity     Worry: Not on file Inability: Not on file    Transportation needs     Medical: Not on file     Non-medical: Not on file   Tobacco Use    Smoking status: Former Smoker     Packs/day: 1.00     Years: 40.00     Pack years: 40.00     Last attempt to quit: 2012     Years since quittin.7    Smokeless tobacco: Never Used    Tobacco comment: quit  . has stopped prior also   Substance and Sexual Activity    Alcohol use: No     Frequency: Never    Drug use: No    Sexual activity: Not on file   Lifestyle    Physical activity     Days per week: Not on file     Minutes per session: Not on file    Stress: Not on file   Relationships    Social connections     Talks on phone: Not on file     Gets together: Not on file     Attends Synagogue service: Not on file     Active member of club or organization: Not on file     Attends meetings of clubs or organizations: Not on file     Relationship status: Not on file    Intimate partner violence     Fear of current or ex partner: Not on file     Emotionally abused: Not on file     Physically abused: Not on file     Forced sexual activity: Not on file   Other Topics Concern    Not on file   Social History Narrative    Not on file         ALLERGIES: Cephalosporins; Sulfamethoxazole-trimethoprim; Codeine; and Lisinopril    Review of Systems   Constitutional: Positive for activity change, chills, diaphoresis, fatigue and fever. HENT: Negative for rhinorrhea and sore throat. Eyes: Negative for discharge and redness. Respiratory: Positive for cough and shortness of breath. Cardiovascular: Negative for chest pain and palpitations. Gastrointestinal: Positive for diarrhea and nausea. Negative for abdominal pain and vomiting. Genitourinary: Positive for decreased urine volume. Negative for dysuria and flank pain. Musculoskeletal: Positive for arthralgias, back pain and myalgias. Skin: Negative for rash. Neurological: Negative for dizziness and headaches.    All other systems reviewed and are negative. Vitals:    07/27/20 1457 07/27/20 1651 07/27/20 1701   BP: 152/62 198/83 196/79   Pulse: 60 63 61   Resp: 20     Temp: 99.5 °F (37.5 °C)     SpO2: 99% 97% 99%   Weight: 65.8 kg (145 lb)     Height: 5' 4\" (1.626 m)              Physical Exam  Vitals signs and nursing note reviewed. Constitutional:       General: She is not in acute distress. Appearance: Normal appearance. She is well-developed. She is not ill-appearing, toxic-appearing or diaphoretic. HENT:      Head: Normocephalic and atraumatic. Mouth/Throat:      Mouth: Mucous membranes are moist.      Pharynx: Oropharynx is clear. No oropharyngeal exudate or posterior oropharyngeal erythema. Eyes:      General: No scleral icterus. Right eye: No discharge. Left eye: No discharge. Conjunctiva/sclera: Conjunctivae normal.      Pupils: Pupils are equal, round, and reactive to light. Neck:      Musculoskeletal: Normal range of motion and neck supple. Cardiovascular:      Rate and Rhythm: Normal rate and regular rhythm. Heart sounds: Normal heart sounds. No murmur. No gallop. Pulmonary:      Effort: Pulmonary effort is normal. No respiratory distress. Breath sounds: Normal breath sounds. No wheezing or rales. Abdominal:      General: Bowel sounds are normal.      Palpations: Abdomen is soft. Tenderness: There is no abdominal tenderness. There is no guarding. Musculoskeletal: Normal range of motion. Skin:     General: Skin is warm and dry. Neurological:      General: No focal deficit present. Mental Status: She is alert and oriented to person, place, and time. Mental status is at baseline. Motor: No abnormal muscle tone.       Comments: cni 2-12 grossly   Psychiatric:         Mood and Affect: Mood normal.         Behavior: Behavior normal.          MDM  Number of Diagnoses or Management Options  Diagnosis management comments: Medical decision making note:  Flulike illness, possibly COVID. Hemoglobin spontaneously up from 8.3-9.0, since the 23rd. We will perform a guaiac check to rule out occult GI bleed. Hemoglobin is up to 9, transfusion not indicated. Creatinine and GFR continue to improve also today compared to July 23. COVID test seems not to be back yet. Will offer her retested further 36 to 48-hour test if she likes. We will give some fluids Toradol and reevaluate following her cxr    this concludes the \"medical decision making note\" part of this emergency department visit note.            Procedures

## 2020-07-27 NOTE — ED NOTES
I have reviewed discharge instructions with the patient. The patient verbalized understanding. Patient left ED via Discharge Method: ambulatory to Home with family. Opportunity for questions and clarification provided. Patient given 2 scripts. To continue your aftercare when you leave the hospital, you may receive an automated call from our care team to check in on how you are doing. This is a free service and part of our promise to provide the best care and service to meet your aftercare needs.  If you have questions, or wish to unsubscribe from this service please call [phone]. Thank you for Choosing our [facility] [employer] [facility] Emergency Department.

## 2020-07-28 ENCOUNTER — PATIENT OUTREACH (OUTPATIENT)
Dept: CASE MANAGEMENT | Age: 70
End: 2020-07-28

## 2020-07-28 LAB
ATRIAL RATE: 57 BPM
CALCULATED P AXIS, ECG09: 55 DEGREES
CALCULATED R AXIS, ECG10: 78 DEGREES
CALCULATED T AXIS, ECG11: 63 DEGREES
DIAGNOSIS, 93000: NORMAL
P-R INTERVAL, ECG05: 130 MS
Q-T INTERVAL, ECG07: 486 MS
QRS DURATION, ECG06: 94 MS
QTC CALCULATION (BEZET), ECG08: 473 MS
SARS COV-2, XPGCVT: NEGATIVE
SOURCE, COVRS: NORMAL
VENTRICULAR RATE, ECG03: 57 BPM

## 2020-07-28 NOTE — PROGRESS NOTES
Date/Time:  7/28/2020 11:24 AM  Attempted to reach patient by telephone. Left HIPPA compliant message requesting a return call. Will attempt to reach patient again.

## 2020-07-29 ENCOUNTER — PATIENT OUTREACH (OUTPATIENT)
Dept: CASE MANAGEMENT | Age: 70
End: 2020-07-29

## 2020-07-29 LAB
HAV IGM SERPL QL IA: NEGATIVE
HBV CORE IGM SERPL QL IA: NEGATIVE
HBV SURFACE AG SERPL QL IA: NEGATIVE
HCV AB S/CO SERPL IA: <0.1 S/CO RATIO (ref 0–0.9)

## 2020-07-29 NOTE — PROGRESS NOTES
Date/Time:  7/29/2020 8:17 AM  Attempted to reach patient by telephone. Left HIPPA compliant message requesting a return call. CCM will close case d/t unable to reach x2.

## 2020-07-29 NOTE — PROGRESS NOTES
Patient contacted regarding COVID-19  risk. Discussed COVID-19 related testing which was available at this time. Test results were negative. Patient informed of results, if available? yes     Care Transition Nurse/ Ambulatory Care Manager contacted the patient by telephone to perform post discharge assessment. Verified name and  with patient as identifiers. Provided introduction to self, and explanation of the CTN/ACM role, and reason for call due to risk factors for infection and/or exposure to COVID-19. Symptoms reviewed with patient who verbalized the following symptoms: no new symptoms. Due to no new or worsening symptoms encounter was not routed to provider for escalation. Discussed follow-up appointments. If no appointment was previously scheduled, appointment scheduling offered: pt already spoke with pcp  [facility] follow up appointment(s):   Future Appointments   Date Time Provider Breanna Carbajal   2020  2:30 PM LAB CK [address]   2020  3:00 PM SS [address]   10/21/2020  1:40 PM Shelly Pryor MD [facility]     Non-[facility] follow up appointment(s): n/a      Advance Care Planning:   Does patient have an Advance Directive: patient declined education     Patient has following risk factors of: diabetes and cad. CTN/ACM reviewed discharge instructions, medical action plan and red flags such as increased shortness of breath, increasing fever and signs of decompensation with patient who verbalized understanding. Discussed exposure protocols and quarantine with CDC Guidelines What to do if you are sick with coronavirus disease .  Patient was given an opportunity for questions and concerns. The patient agrees to contact the [facility] exposure line [phone], [facility] department [address] [place] [address]: ([phone]) and PCP office for questions related to their healthcare. CTN/ACM provided contact information for future needs.     Reviewed and educated patient on any new and changed medications related to discharge diagnosis. Patient/family/caregiver given information for [employer] and agrees to enroll no  Patient's preferred e-mail:  n/a  Patient's preferred phone number: n/a  Based on Loop alert triggers, patient will be contacted by nurse care manager for worsening symptoms. Followup in 7-14 days based on severity of symptoms and risk factors.

## 2020-08-05 ENCOUNTER — HOSPITAL ENCOUNTER (OUTPATIENT)
Dept: LAB | Age: 70
Discharge: HOME | End: 2020-08-05

## 2020-08-05 ENCOUNTER — HOSPITAL ENCOUNTER (OUTPATIENT)
Dept: INFUSION THERAPY | Age: 70
Discharge: HOME | End: 2020-08-05

## 2020-08-05 PROCEDURE — 88312 SPECIAL STAINS GROUP 1: CPT

## 2020-08-05 PROCEDURE — 88305 TISSUE EXAM BY PATHOLOGIST: CPT

## 2020-08-10 ENCOUNTER — APPOINTMENT (OUTPATIENT)
Dept: GENERAL RADIOLOGY | Age: 70
DRG: 189 | End: 2020-08-10
Attending: EMERGENCY MEDICINE
Payer: MEDICARE

## 2020-08-10 ENCOUNTER — APPOINTMENT (OUTPATIENT)
Dept: CT IMAGING | Age: 70
DRG: 189 | End: 2020-08-10
Attending: STUDENT IN AN ORGANIZED HEALTH CARE EDUCATION/TRAINING PROGRAM
Payer: MEDICARE

## 2020-08-10 ENCOUNTER — HOSPITAL ENCOUNTER (INPATIENT)
Age: 70
LOS: 3 days | Discharge: HOME | DRG: 189 | End: 2020-08-14
Attending: STUDENT IN AN ORGANIZED HEALTH CARE EDUCATION/TRAINING PROGRAM | Admitting: INTERNAL MEDICINE
Payer: MEDICARE

## 2020-08-10 DIAGNOSIS — K74.69 OTHER CIRRHOSIS OF LIVER (HCC): ICD-10-CM

## 2020-08-10 DIAGNOSIS — J96.01 ACUTE RESPIRATORY FAILURE WITH HYPOXIA (HCC): ICD-10-CM

## 2020-08-10 DIAGNOSIS — R07.9 CHEST PAIN, UNSPECIFIED TYPE: ICD-10-CM

## 2020-08-10 DIAGNOSIS — I50.31 ACUTE DIASTOLIC CONGESTIVE HEART FAILURE (HCC): ICD-10-CM

## 2020-08-10 DIAGNOSIS — I25.10 CORONARY ARTERY DISEASE INVOLVING NATIVE CORONARY ARTERY OF NATIVE HEART WITHOUT ANGINA PECTORIS: Chronic | ICD-10-CM

## 2020-08-10 DIAGNOSIS — R09.02 HYPOXIA: ICD-10-CM

## 2020-08-10 DIAGNOSIS — J90 PLEURAL EFFUSION, BILATERAL: ICD-10-CM

## 2020-08-10 DIAGNOSIS — I50.9 ACUTE ON CHRONIC CONGESTIVE HEART FAILURE, UNSPECIFIED HEART FAILURE TYPE (HCC): Primary | ICD-10-CM

## 2020-08-10 DIAGNOSIS — N18.30 CHRONIC KIDNEY DISEASE, STAGE III (MODERATE) (HCC): ICD-10-CM

## 2020-08-10 LAB
ALBUMIN SERPL-MCNC: 3.6 G/DL (ref 3.2–4.6)
ALBUMIN/GLOB SERPL: 0.8 {RATIO} (ref 1.2–3.5)
ALP SERPL-CCNC: 136 U/L (ref 50–136)
ALT SERPL-CCNC: 36 U/L (ref 12–65)
ANION GAP SERPL CALC-SCNC: 11 MMOL/L (ref 7–16)
AST SERPL-CCNC: 38 U/L (ref 15–37)
BASOPHILS # BLD: 0 K/UL (ref 0–0.2)
BASOPHILS NFR BLD: 0 % (ref 0–2)
BILIRUB SERPL-MCNC: 0.7 MG/DL (ref 0.2–1.1)
BNP SERPL-MCNC: 5768 PG/ML (ref 5–125)
BUN SERPL-MCNC: 19 MG/DL (ref 8–23)
CALCIUM SERPL-MCNC: 9 MG/DL (ref 8.3–10.4)
CHLORIDE SERPL-SCNC: 107 MMOL/L (ref 98–107)
CO2 SERPL-SCNC: 20 MMOL/L (ref 21–32)
CREAT SERPL-MCNC: 1.71 MG/DL (ref 0.6–1)
DIFFERENTIAL METHOD BLD: ABNORMAL
EOSINOPHIL # BLD: 0.3 K/UL (ref 0–0.8)
EOSINOPHIL NFR BLD: 4 % (ref 0.5–7.8)
ERYTHROCYTE [DISTWIDTH] IN BLOOD BY AUTOMATED COUNT: 13.8 % (ref 11.9–14.6)
GLOBULIN SER CALC-MCNC: 4.4 G/DL (ref 2.3–3.5)
GLUCOSE SERPL-MCNC: 133 MG/DL (ref 65–100)
HCT VFR BLD AUTO: 29.3 % (ref 35.8–46.3)
HGB BLD-MCNC: 9.2 G/DL (ref 11.7–15.4)
IMM GRANULOCYTES # BLD AUTO: 0 K/UL (ref 0–0.5)
IMM GRANULOCYTES NFR BLD AUTO: 0 % (ref 0–5)
LIPASE SERPL-CCNC: 131 U/L (ref 73–393)
LYMPHOCYTES # BLD: 1.3 K/UL (ref 0.5–4.6)
LYMPHOCYTES NFR BLD: 14 % (ref 13–44)
MAGNESIUM SERPL-MCNC: 2.2 MG/DL (ref 1.8–2.4)
MCH RBC QN AUTO: 30.3 PG (ref 26.1–32.9)
MCHC RBC AUTO-ENTMCNC: 31.4 G/DL (ref 31.4–35)
MCV RBC AUTO: 96.4 FL (ref 79.6–97.8)
MONOCYTES # BLD: 0.8 K/UL (ref 0.1–1.3)
MONOCYTES NFR BLD: 9 % (ref 4–12)
NEUTS SEG # BLD: 6.6 K/UL (ref 1.7–8.2)
NEUTS SEG NFR BLD: 73 % (ref 43–78)
NRBC # BLD: 0 K/UL (ref 0–0.2)
PLATELET # BLD AUTO: 334 K/UL (ref 150–450)
PMV BLD AUTO: 9.3 FL (ref 9.4–12.3)
POTASSIUM SERPL-SCNC: 4.7 MMOL/L (ref 3.5–5.1)
PROT SERPL-MCNC: 8 G/DL (ref 6.3–8.2)
RBC # BLD AUTO: 3.04 M/UL (ref 4.05–5.2)
SODIUM SERPL-SCNC: 138 MMOL/L (ref 136–145)
TROPONIN-HIGH SENSITIVITY: 25 PG/ML (ref 0–14)
WBC # BLD AUTO: 9.1 K/UL (ref 4.3–11.1)

## 2020-08-10 PROCEDURE — 83690 ASSAY OF LIPASE: CPT

## 2020-08-10 PROCEDURE — 74011250636 HC RX REV CODE- 250/636: Performed by: STUDENT IN AN ORGANIZED HEALTH CARE EDUCATION/TRAINING PROGRAM

## 2020-08-10 PROCEDURE — 74011000258 HC RX REV CODE- 258: Performed by: STUDENT IN AN ORGANIZED HEALTH CARE EDUCATION/TRAINING PROGRAM

## 2020-08-10 PROCEDURE — 74011250637 HC RX REV CODE- 250/637: Performed by: STUDENT IN AN ORGANIZED HEALTH CARE EDUCATION/TRAINING PROGRAM

## 2020-08-10 PROCEDURE — 84484 ASSAY OF TROPONIN QUANT: CPT

## 2020-08-10 PROCEDURE — 80053 COMPREHEN METABOLIC PANEL: CPT

## 2020-08-10 PROCEDURE — 96375 TX/PRO/DX INJ NEW DRUG ADDON: CPT

## 2020-08-10 PROCEDURE — 71046 X-RAY EXAM CHEST 2 VIEWS: CPT

## 2020-08-10 PROCEDURE — 85025 COMPLETE CBC W/AUTO DIFF WBC: CPT

## 2020-08-10 PROCEDURE — 93005 ELECTROCARDIOGRAM TRACING: CPT | Performed by: STUDENT IN AN ORGANIZED HEALTH CARE EDUCATION/TRAINING PROGRAM

## 2020-08-10 PROCEDURE — 74011636320 HC RX REV CODE- 636/320: Performed by: STUDENT IN AN ORGANIZED HEALTH CARE EDUCATION/TRAINING PROGRAM

## 2020-08-10 PROCEDURE — 74011000250 HC RX REV CODE- 250

## 2020-08-10 PROCEDURE — 74011250637 HC RX REV CODE- 250/637

## 2020-08-10 PROCEDURE — 83880 ASSAY OF NATRIURETIC PEPTIDE: CPT

## 2020-08-10 PROCEDURE — 83735 ASSAY OF MAGNESIUM: CPT

## 2020-08-10 PROCEDURE — 96374 THER/PROPH/DIAG INJ IV PUSH: CPT

## 2020-08-10 PROCEDURE — 71260 CT THORAX DX C+: CPT

## 2020-08-10 PROCEDURE — 99285 EMERGENCY DEPT VISIT HI MDM: CPT

## 2020-08-10 RX ORDER — SODIUM CHLORIDE 0.9 % (FLUSH) 0.9 %
10 SYRINGE (ML) INJECTION
Status: COMPLETED | OUTPATIENT
Start: 2020-08-10 | End: 2020-08-10

## 2020-08-10 RX ORDER — NITROGLYCERIN 20 MG/100ML
5 INJECTION INTRAVENOUS
Status: COMPLETED | OUTPATIENT
Start: 2020-08-10 | End: 2020-08-10

## 2020-08-10 RX ORDER — NITROGLYCERIN 20 MG/100ML
INJECTION INTRAVENOUS
Status: COMPLETED
Start: 2020-08-10 | End: 2020-08-10

## 2020-08-10 RX ORDER — MORPHINE SULFATE 4 MG/ML
4 INJECTION INTRAVENOUS
Status: DISPENSED | OUTPATIENT
Start: 2020-08-10 | End: 2020-08-11

## 2020-08-10 RX ORDER — ONDANSETRON 2 MG/ML
4 INJECTION INTRAMUSCULAR; INTRAVENOUS
Status: DISPENSED | OUTPATIENT
Start: 2020-08-10 | End: 2020-08-11

## 2020-08-10 RX ORDER — FUROSEMIDE 10 MG/ML
40 INJECTION INTRAMUSCULAR; INTRAVENOUS
Status: COMPLETED | OUTPATIENT
Start: 2020-08-10 | End: 2020-08-10

## 2020-08-10 RX ORDER — NITROGLYCERIN 0.4 MG/1
TABLET SUBLINGUAL
Status: COMPLETED
Start: 2020-08-10 | End: 2020-08-10

## 2020-08-10 RX ADMIN — SODIUM CHLORIDE 500 ML: 900 INJECTION, SOLUTION INTRAVENOUS at 23:30

## 2020-08-10 RX ADMIN — SODIUM CHLORIDE 100 ML: 900 INJECTION, SOLUTION INTRAVENOUS at 23:22

## 2020-08-10 RX ADMIN — NITROGLYCERIN 5 MCG/MIN: 20 INJECTION INTRAVENOUS at 23:53

## 2020-08-10 RX ADMIN — Medication 10 ML: at 23:22

## 2020-08-10 RX ADMIN — IOPAMIDOL 75 ML: 755 INJECTION, SOLUTION INTRAVENOUS at 23:22

## 2020-08-10 RX ADMIN — NITROGLYCERIN: 0.4 TABLET SUBLINGUAL at 23:50

## 2020-08-10 RX ADMIN — NITROGLYCERIN 0.4 MG: 0.4 TABLET SUBLINGUAL at 23:50

## 2020-08-10 RX ADMIN — FUROSEMIDE 40 MG: 10 INJECTION, SOLUTION INTRAMUSCULAR; INTRAVENOUS at 23:59

## 2020-08-11 PROBLEM — J90 PLEURAL EFFUSION, BILATERAL: Status: ACTIVE | Noted: 2020-08-11

## 2020-08-11 PROBLEM — E78.5 HYPERLIPIDEMIA: Chronic | Status: ACTIVE | Noted: 2020-03-12

## 2020-08-11 PROBLEM — J96.01 ACUTE RESPIRATORY FAILURE WITH HYPOXIA (HCC): Status: ACTIVE | Noted: 2020-08-11

## 2020-08-11 PROBLEM — K74.69 OTHER CIRRHOSIS OF LIVER (HCC): Status: ACTIVE | Noted: 2020-08-11

## 2020-08-11 LAB
ANION GAP SERPL CALC-SCNC: 9 MMOL/L (ref 7–16)
APPEARANCE UR: CLEAR
ARTERIAL PATENCY WRIST A: YES
ATRIAL RATE: 66 BPM
ATRIAL RATE: 79 BPM
BASE DEFICIT BLD-SCNC: 5 MMOL/L
BDY SITE: ABNORMAL
BILIRUB UR QL: NEGATIVE
BUN SERPL-MCNC: 19 MG/DL (ref 8–23)
CALCIUM SERPL-MCNC: 8.3 MG/DL (ref 8.3–10.4)
CALCULATED P AXIS, ECG09: 53 DEGREES
CALCULATED P AXIS, ECG09: 57 DEGREES
CALCULATED R AXIS, ECG10: 80 DEGREES
CALCULATED R AXIS, ECG10: 80 DEGREES
CALCULATED T AXIS, ECG11: 67 DEGREES
CALCULATED T AXIS, ECG11: 68 DEGREES
CHLORIDE SERPL-SCNC: 109 MMOL/L (ref 98–107)
CO2 BLD-SCNC: 19 MMOL/L
CO2 SERPL-SCNC: 22 MMOL/L (ref 21–32)
COLLECT TIME,HTIME: 2358
COLOR UR: YELLOW
CREAT SERPL-MCNC: 1.65 MG/DL (ref 0.6–1)
DIAGNOSIS, 93000: NORMAL
DIAGNOSIS, 93000: NORMAL
ERYTHROCYTE [DISTWIDTH] IN BLOOD BY AUTOMATED COUNT: 13.8 % (ref 11.9–14.6)
FLOW RATE ISTAT,IFRATE: 15 L/MIN
GAS FLOW.O2 O2 DELIVERY SYS: ABNORMAL L/MIN
GLUCOSE BLD STRIP.AUTO-MCNC: 134 MG/DL (ref 65–100)
GLUCOSE BLD STRIP.AUTO-MCNC: 144 MG/DL (ref 65–100)
GLUCOSE BLD STRIP.AUTO-MCNC: 158 MG/DL (ref 65–100)
GLUCOSE BLD STRIP.AUTO-MCNC: 182 MG/DL (ref 65–100)
GLUCOSE SERPL-MCNC: 139 MG/DL (ref 65–100)
GLUCOSE UR STRIP.AUTO-MCNC: NEGATIVE MG/DL
HCO3 BLD-SCNC: 18.6 MMOL/L (ref 22–26)
HCT VFR BLD AUTO: 24.4 % (ref 35.8–46.3)
HGB BLD-MCNC: 8.1 G/DL (ref 11.7–15.4)
HGB UR QL STRIP: NEGATIVE
KETONES UR QL STRIP.AUTO: NEGATIVE MG/DL
LEUKOCYTE ESTERASE UR QL STRIP.AUTO: NEGATIVE
MCH RBC QN AUTO: 31.2 PG (ref 26.1–32.9)
MCHC RBC AUTO-ENTMCNC: 33.2 G/DL (ref 31.4–35)
MCV RBC AUTO: 93.8 FL (ref 79.6–97.8)
NITRITE UR QL STRIP.AUTO: NEGATIVE
NRBC # BLD: 0 K/UL (ref 0–0.2)
P-R INTERVAL, ECG05: 124 MS
P-R INTERVAL, ECG05: 130 MS
PCO2 BLD: 27.8 MMHG (ref 35–45)
PH BLD: 7.44 [PH] (ref 7.35–7.45)
PH UR STRIP: 7.5 [PH] (ref 5–9)
PLATELET # BLD AUTO: 291 K/UL (ref 150–450)
PMV BLD AUTO: 9.7 FL (ref 9.4–12.3)
PO2 BLD: 142 MMHG (ref 75–100)
POTASSIUM SERPL-SCNC: 4.1 MMOL/L (ref 3.5–5.1)
PROT UR STRIP-MCNC: NEGATIVE MG/DL
Q-T INTERVAL, ECG07: 422 MS
Q-T INTERVAL, ECG07: 448 MS
QRS DURATION, ECG06: 94 MS
QRS DURATION, ECG06: 96 MS
QTC CALCULATION (BEZET), ECG08: 469 MS
QTC CALCULATION (BEZET), ECG08: 483 MS
RBC # BLD AUTO: 2.6 M/UL (ref 4.05–5.2)
SAO2 % BLD: 99 % (ref 95–98)
SERVICE CMNT-IMP: ABNORMAL
SERVICE CMNT-IMP: ABNORMAL
SODIUM SERPL-SCNC: 140 MMOL/L (ref 136–145)
SP GR UR REFRACTOMETRY: 1.01 (ref 1–1.02)
SPECIMEN TYPE: ABNORMAL
TROPONIN-HIGH SENSITIVITY: 24.2 PG/ML (ref 0–14)
TROPONIN-HIGH SENSITIVITY: 28.5 PG/ML (ref 0–14)
TROPONIN-HIGH SENSITIVITY: 32.8 PG/ML (ref 0–14)
UROBILINOGEN UR QL STRIP.AUTO: 0.2 EU/DL (ref 0.2–1)
VENTRICULAR RATE, ECG03: 66 BPM
VENTRICULAR RATE, ECG03: 79 BPM
WBC # BLD AUTO: 8.3 K/UL (ref 4.3–11.1)

## 2020-08-11 PROCEDURE — 93306 TTE W/DOPPLER COMPLETE: CPT

## 2020-08-11 PROCEDURE — 94660 CPAP INITIATION&MGMT: CPT

## 2020-08-11 PROCEDURE — 81003 URINALYSIS AUTO W/O SCOPE: CPT

## 2020-08-11 PROCEDURE — 94760 N-INVAS EAR/PLS OXIMETRY 1: CPT

## 2020-08-11 PROCEDURE — 77010033711 HC HIGH FLOW OXYGEN

## 2020-08-11 PROCEDURE — 99223 1ST HOSP IP/OBS HIGH 75: CPT | Performed by: INTERNAL MEDICINE

## 2020-08-11 PROCEDURE — 82962 GLUCOSE BLOOD TEST: CPT

## 2020-08-11 PROCEDURE — 65660000000 HC RM CCU STEPDOWN

## 2020-08-11 PROCEDURE — 84484 ASSAY OF TROPONIN QUANT: CPT

## 2020-08-11 PROCEDURE — 5A09357 ASSISTANCE WITH RESPIRATORY VENTILATION, LESS THAN 24 CONSECUTIVE HOURS, CONTINUOUS POSITIVE AIRWAY PRESSURE: ICD-10-PCS | Performed by: STUDENT IN AN ORGANIZED HEALTH CARE EDUCATION/TRAINING PROGRAM

## 2020-08-11 PROCEDURE — 80048 BASIC METABOLIC PNL TOTAL CA: CPT

## 2020-08-11 PROCEDURE — 85027 COMPLETE CBC AUTOMATED: CPT

## 2020-08-11 PROCEDURE — 74011250636 HC RX REV CODE- 250/636: Performed by: NURSE PRACTITIONER

## 2020-08-11 PROCEDURE — 74011636637 HC RX REV CODE- 636/637: Performed by: NURSE PRACTITIONER

## 2020-08-11 PROCEDURE — 77030038269 HC DRN EXT URIN PURWCK BARD -A

## 2020-08-11 PROCEDURE — 82803 BLOOD GASES ANY COMBINATION: CPT

## 2020-08-11 PROCEDURE — 74011250637 HC RX REV CODE- 250/637: Performed by: NURSE PRACTITIONER

## 2020-08-11 PROCEDURE — 36600 WITHDRAWAL OF ARTERIAL BLOOD: CPT

## 2020-08-11 PROCEDURE — 93005 ELECTROCARDIOGRAM TRACING: CPT | Performed by: STUDENT IN AN ORGANIZED HEALTH CARE EDUCATION/TRAINING PROGRAM

## 2020-08-11 RX ORDER — METOPROLOL SUCCINATE 25 MG/1
25 TABLET, EXTENDED RELEASE ORAL DAILY
Status: DISCONTINUED | OUTPATIENT
Start: 2020-08-11 | End: 2020-08-13

## 2020-08-11 RX ORDER — NIFEDIPINE 30 MG/1
60 TABLET, EXTENDED RELEASE ORAL DAILY
Status: DISCONTINUED | OUTPATIENT
Start: 2020-08-11 | End: 2020-08-14 | Stop reason: HOSPADM

## 2020-08-11 RX ORDER — FUROSEMIDE 10 MG/ML
40 INJECTION INTRAMUSCULAR; INTRAVENOUS EVERY 12 HOURS
Status: DISCONTINUED | OUTPATIENT
Start: 2020-08-11 | End: 2020-08-11

## 2020-08-11 RX ORDER — NITROGLYCERIN 0.4 MG/1
0.4 TABLET SUBLINGUAL
Status: DISCONTINUED | OUTPATIENT
Start: 2020-08-11 | End: 2020-08-11

## 2020-08-11 RX ORDER — INSULIN LISPRO 100 [IU]/ML
INJECTION, SOLUTION INTRAVENOUS; SUBCUTANEOUS
Status: DISCONTINUED | OUTPATIENT
Start: 2020-08-11 | End: 2020-08-14 | Stop reason: HOSPADM

## 2020-08-11 RX ORDER — ACETAMINOPHEN 325 MG/1
650 TABLET ORAL
Status: DISCONTINUED | OUTPATIENT
Start: 2020-08-11 | End: 2020-08-14 | Stop reason: HOSPADM

## 2020-08-11 RX ORDER — CLOPIDOGREL BISULFATE 75 MG/1
75 TABLET ORAL DAILY
Status: DISCONTINUED | OUTPATIENT
Start: 2020-08-11 | End: 2020-08-14 | Stop reason: HOSPADM

## 2020-08-11 RX ORDER — NITROGLYCERIN 0.4 MG/1
0.4 TABLET SUBLINGUAL
Status: DISCONTINUED | OUTPATIENT
Start: 2020-08-11 | End: 2020-08-14 | Stop reason: HOSPADM

## 2020-08-11 RX ORDER — HEPARIN SODIUM 5000 [USP'U]/ML
5000 INJECTION, SOLUTION INTRAVENOUS; SUBCUTANEOUS EVERY 8 HOURS
Status: DISCONTINUED | OUTPATIENT
Start: 2020-08-11 | End: 2020-08-14 | Stop reason: HOSPADM

## 2020-08-11 RX ORDER — FUROSEMIDE 10 MG/ML
60 INJECTION INTRAMUSCULAR; INTRAVENOUS EVERY 12 HOURS
Status: COMPLETED | OUTPATIENT
Start: 2020-08-11 | End: 2020-08-13

## 2020-08-11 RX ORDER — SODIUM CHLORIDE 0.9 % (FLUSH) 0.9 %
5-40 SYRINGE (ML) INJECTION AS NEEDED
Status: DISCONTINUED | OUTPATIENT
Start: 2020-08-11 | End: 2020-08-14 | Stop reason: HOSPADM

## 2020-08-11 RX ORDER — ONDANSETRON 2 MG/ML
4 INJECTION INTRAMUSCULAR; INTRAVENOUS
Status: DISCONTINUED | OUTPATIENT
Start: 2020-08-11 | End: 2020-08-14 | Stop reason: HOSPADM

## 2020-08-11 RX ORDER — GUAIFENESIN 100 MG/5ML
81 LIQUID (ML) ORAL
Status: DISCONTINUED | OUTPATIENT
Start: 2020-08-11 | End: 2020-08-14 | Stop reason: HOSPADM

## 2020-08-11 RX ORDER — CITALOPRAM 10 MG/1
20 TABLET ORAL
Status: DISCONTINUED | OUTPATIENT
Start: 2020-08-11 | End: 2020-08-14 | Stop reason: HOSPADM

## 2020-08-11 RX ORDER — TRAZODONE HYDROCHLORIDE 50 MG/1
50 TABLET ORAL
Status: DISCONTINUED | OUTPATIENT
Start: 2020-08-11 | End: 2020-08-14 | Stop reason: HOSPADM

## 2020-08-11 RX ORDER — NITROGLYCERIN 20 MG/100ML
0-100 INJECTION INTRAVENOUS
Status: DISCONTINUED | OUTPATIENT
Start: 2020-08-11 | End: 2020-08-14 | Stop reason: HOSPADM

## 2020-08-11 RX ORDER — LANOLIN ALCOHOL/MO/W.PET/CERES
400 CREAM (GRAM) TOPICAL 2 TIMES DAILY
Status: DISCONTINUED | OUTPATIENT
Start: 2020-08-11 | End: 2020-08-14 | Stop reason: HOSPADM

## 2020-08-11 RX ORDER — NITROGLYCERIN 0.4 MG/1
0.4 TABLET SUBLINGUAL ONCE
Status: COMPLETED | OUTPATIENT
Start: 2020-08-11 | End: 2020-08-10

## 2020-08-11 RX ORDER — ROSUVASTATIN CALCIUM 20 MG/1
40 TABLET, COATED ORAL
Status: DISCONTINUED | OUTPATIENT
Start: 2020-08-11 | End: 2020-08-14 | Stop reason: HOSPADM

## 2020-08-11 RX ORDER — SODIUM CHLORIDE 0.9 % (FLUSH) 0.9 %
5-40 SYRINGE (ML) INJECTION EVERY 8 HOURS
Status: DISCONTINUED | OUTPATIENT
Start: 2020-08-11 | End: 2020-08-14 | Stop reason: HOSPADM

## 2020-08-11 RX ADMIN — TRAZODONE HYDROCHLORIDE 50 MG: 50 TABLET ORAL at 22:45

## 2020-08-11 RX ADMIN — NIFEDIPINE 60 MG: 30 TABLET, FILM COATED, EXTENDED RELEASE ORAL at 08:08

## 2020-08-11 RX ADMIN — MAGNESIUM GLUCONATE 500 MG ORAL TABLET 400 MG: 500 TABLET ORAL at 17:46

## 2020-08-11 RX ADMIN — HEPARIN SODIUM 5000 UNITS: 5000 INJECTION INTRAVENOUS; SUBCUTANEOUS at 05:50

## 2020-08-11 RX ADMIN — CITALOPRAM HYDROBROMIDE 20 MG: 20 TABLET ORAL at 07:53

## 2020-08-11 RX ADMIN — MAGNESIUM GLUCONATE 500 MG ORAL TABLET 400 MG: 500 TABLET ORAL at 08:07

## 2020-08-11 RX ADMIN — Medication 10 ML: at 05:48

## 2020-08-11 RX ADMIN — ACETAMINOPHEN 650 MG: 325 TABLET, FILM COATED ORAL at 02:38

## 2020-08-11 RX ADMIN — ASPIRIN 81 MG: 81 TABLET, CHEWABLE ORAL at 07:53

## 2020-08-11 RX ADMIN — ROSUVASTATIN 40 MG: 20 TABLET, FILM COATED ORAL at 22:45

## 2020-08-11 RX ADMIN — HEPARIN SODIUM 5000 UNITS: 5000 INJECTION INTRAVENOUS; SUBCUTANEOUS at 22:46

## 2020-08-11 RX ADMIN — INSULIN LISPRO 2 UNITS: 100 INJECTION, SOLUTION INTRAVENOUS; SUBCUTANEOUS at 15:30

## 2020-08-11 RX ADMIN — Medication 10 ML: at 22:47

## 2020-08-11 RX ADMIN — FUROSEMIDE 60 MG: 10 INJECTION INTRAMUSCULAR; INTRAVENOUS at 22:50

## 2020-08-11 RX ADMIN — METOPROLOL SUCCINATE 25 MG: 25 TABLET, EXTENDED RELEASE ORAL at 08:06

## 2020-08-11 RX ADMIN — HEPARIN SODIUM 5000 UNITS: 5000 INJECTION INTRAVENOUS; SUBCUTANEOUS at 13:56

## 2020-08-11 RX ADMIN — Medication 10 ML: at 13:56

## 2020-08-11 RX ADMIN — INSULIN LISPRO 2 UNITS: 100 INJECTION, SOLUTION INTRAVENOUS; SUBCUTANEOUS at 21:47

## 2020-08-11 RX ADMIN — CLOPIDOGREL BISULFATE 75 MG: 75 TABLET ORAL at 08:07

## 2020-08-11 RX ADMIN — FUROSEMIDE 60 MG: 10 INJECTION INTRAMUSCULAR; INTRAVENOUS at 08:07

## 2020-08-11 NOTE — PROGRESS NOTES
TRANSFER - IN REPORT:    Verbal report received from [id number] (name) on Alondra Adjutant  being received from ED (unit) for routine progression of care      Report consisted of patients Situation, Background, Assessment and   Recommendations(SBAR). Information from the following report(s) SBAR, Kardex, ED Summary, Intake/Output, MAR, Recent Results and Alarm Parameters  was reviewed with the receiving nurse. Opportunity for questions and clarification was provided. Assessment completed upon patients arrival to unit and care assumed.

## 2020-08-11 NOTE — H&P
[facility] History & Physical      Date of  Admission: 8/10/2020 10:21 PM     Primary Care Physician: Dr. Gregorio Moise  Primary Cardiologist: Dr. Vi Briceño Physician: Dr. Lasha Allan    CC: chest pain, shortness of breath    HPI:  Nicolette Fernandes is a 65 y.o. female with past medical history of CAD with previous stents in 2012, depression, DM2, HTN, anemia, and HLD presented to the ER via EMS with complaints of chest pain and worsening dyspnea with exertion. Patient reports that her symptoms started several weeks ago and have continued to progress. She has been tested for COVID-19 twice with negative results x2. Associated symptoms include lower extremity swelling, subjective low grade fevers/night sweats and difficulty with urination. She describers her chest pain as substernal chest pain that radiates to her right shoulder into her right neck. Deneis ever having this pain until now. Upon arrival to the ER, patient was afebrile with normal O2 sats on RA. While in the ER, she decompensated with O2 sats in the 70s and is now on BiPAP. Chest CT was done that showed no evidence of PE, however, did show bilateral pleural effusions R>L. She was started on IV nitro after SL nitro x2 and given 40mg IV lasix. She is currently chest pain free and reports that breathing has improved with BiPAP. She denies voiding since receiving IV lasix. Past Medical History:   Diagnosis Date    CAD (coronary artery disease) 3/1/2012    MI, 3 stents    Chest pain     Coronary artery disease involving native coronary artery without angina pectoris 5/21/2015    Depression 3/1/2012    Diabetes mellitus ([address] 3/1/2012    oral agents. . hypo- 80's, Last A1c 6.9 in 6/2018    DM2 (diabetes mellitus, type 2) ([address] 5/21/2015    Elevated troponin 3/2/2012    Essential hypertension 5/21/2015    External hemorrhoids without mention of complication 7031    GERD (gastroesophageal reflux disease)     History of MI (myocardial infarction) 11/12    x2    Hypercholesterolemia     Hypertension 3/1/2012    Insomnia     Personal history of colonic polyps     hyperplastic    Platelet inhibition due to Plavix     holding for colonoscopy per GI Dr.    [facility] Rectocele 2013    Tobacco abuse 3/1/2012    quit for 1 year    Tobacco use disorder     Unstable angina ([address] 3/1/2012    ntg as needed. Past Surgical History:   Procedure Laterality Date    HX CAROTID ENDARTERECTOMY Left 2018    HX CATARACT REMOVAL Left 2016    Dr Cecilia Ferguson, [address] [place] [address] Right 2016    Dr Cecilia Ferguson, [facility]    HX CERVICAL FUSION      neck w/plate    HX COLONOSCOPY  13    Anna Marie--single transverse hyperplastic polyp--7-10 year recall    HX HEMORRHOIDECTOMY      x2    HX ORTHOPAEDIC      left elbow    HX CAL AND BSO      HX WISDOM TEETH EXTRACTION      MS LEFT HEART CATH,PERCUTANEOUS  last stented 11/12 x 2    stent x3 , mi x 2       Allergies   Allergen Reactions    Cephalosporins Hives and Swelling    Sulfamethoxazole-Trimethoprim Other (comments)     Diarrhea     Codeine Other (comments)    Lisinopril Other (comments)     Passing out spells.  // pt sts not allergic to med       Social History     Socioeconomic History    Marital status:      Spouse name: Not on file    Number of children: Not on file    Years of education: Not on file    Highest education level: Not on file   Occupational History    Not on file   Social Needs    Financial resource strain: Not on file    Food insecurity     Worry: Not on file     Inability: Not on file    Transportation needs     Medical: Not on file     Non-medical: Not on file   Tobacco Use    Smoking status: Former Smoker     Packs/day: 1.00     Years: 40.00     Pack years: 40.00     Last attempt to quit: 2012     Years since quittin.7    Smokeless tobacco: Never Used    Tobacco comment: quit  . has stopped prior also Substance and Sexual Activity    Alcohol use: No     Frequency: Never    Drug use: No    Sexual activity: Not on file   Lifestyle    Physical activity     Days per week: Not on file     Minutes per session: Not on file    Stress: Not on file   Relationships    Social connections     Talks on phone: Not on file     Gets together: Not on file     Attends [employer] service: Not on file     Active member of club or organization: Not on file     Attends meetings of clubs or organizations: Not on file     Relationship status: Not on file    Intimate partner violence     Fear of current or ex partner: Not on file     Emotionally abused: Not on file     Physically abused: Not on file     Forced sexual activity: Not on file   Other Topics Concern    Not on file   Social History Narrative    Not on file     Family History   Problem Relation Age of Onset    Heart Disease Mother     Osteoporosis Mother     Heart Attack Mother 67        mi    Thyroid Disease Mother         Multinodular Goiter    Heart Disease Father     Cancer Father         pancreatic    Heart Attack Father 67        MI    Cancer Sister         Pre-Cancerous dysplasia    Thyroid Cancer Sister     Ulcerative Colitis Brother     Thyroid Cancer Brother     Breast Cancer Neg Hx         Current Facility-Administered Medications   Medication Dose Route Frequency    morphine injection 4 mg  4 mg IntraVENous NOW    ondansetron (ZOFRAN) injection 4 mg  4 mg IntraVENous NOW     Current Outpatient Medications   Medication Sig    ondansetron (ZOFRAN ODT) 8 mg disintegrating tablet Take 1 Tab by mouth every eight (8) hours as needed for Nausea.  diphenoxylate-atropine (LomotiL) 2.5-0.025 mg per tablet Take 2 Tabs by mouth four (4) times daily as needed for Diarrhea (1 tab after each stool for max 8 per day). Max Daily Amount: 8 Tabs.  Take after each stool for a maximum of 8 tablets daily    NIFEdipine ER (PROCARDIA XL) 60 mg ER tablet TAKE 1 TABLET BY MOUTH  DAILY    citalopram (CELEXA) 20 mg tablet TAKE 1 TABLET BY MOUTH  EVERY MORNING    traZODone (DESYREL) 50 mg tablet TAKE 1 TABLET BY MOUTH  NIGHTLY    rosuvastatin (CRESTOR) 40 mg tablet Take 1 Tab by mouth nightly.  clopidogreL (PLAVIX) 75 mg tab TAKE 1 TABLET BY MOUTH  DAILY    metoprolol succinate (TOPROL-XL) 25 mg XL tablet Take 1 Tab by mouth daily.  co-enzyme Q-10 (CO Q-10) 100 mg capsule Take 100 mg by mouth daily.  nitroglycerin (NITROSTAT) 0.4 mg SL tablet 1 Tab by SubLINGual route every five (5) minutes as needed for Chest Pain.  magnesium oxide (MAG-OX) 400 mg tablet TAKE 1 TABLET BY MOUTH TWICE DAILY (Patient taking differently: 400 mg. TAKE 1 TABLET BY MOUTH TWICE DAILY)    cholecalciferol, vitamin D3, (VITAMIN D3) 2,000 unit tab Take 2,000 Units by mouth daily.  POTASSIUM GLUCONATE PO Take 99 mg PE by mouth daily.  cyanocobalamin (VITAMIN B-12) 1,000 mcg tablet Take 2,500 mcg by mouth daily.  aspirin 81 mg chewable tablet Take 81 mg by mouth every morning. Indications: continue per anesthesia guidelines       Review of Systems    Review of Systems   Constitutional: Positive for chills and fever. HENT: Negative. Eyes: Negative. Respiratory: Positive for cough and shortness of breath. Cardiovascular: Positive for chest pain and leg swelling. Gastrointestinal: Negative. Genitourinary:        + difficulty with urination   Musculoskeletal: Positive for neck pain.        + right shoulder pain   Skin: Negative. Neurological: Negative. Endo/Heme/Allergies: Negative. Psychiatric/Behavioral: Positive for depression. Subjective:     Visit Vitals  /67   Pulse 73   Temp 98.8 °F (37.1 °C)   Resp 28   Ht 5' 4\" (1.626 m)   Wt 65.8 kg (145 lb)   SpO2 100%   BMI 24.89 kg/m²     Physical Exam  HENT:      Mouth/Throat:      Mouth: Mucous membranes are moist.   Eyes:      Pupils: Pupils are equal, round, and reactive to light. Cardiovascular:      Rate and Rhythm: Normal rate and regular rhythm. Heart sounds: Normal heart sounds. Pulmonary:      Comments: Diminished breath sounds  Abdominal:      General: Bowel sounds are normal.   Musculoskeletal:         General: Swelling present. Comments: 1+ edema in BLE   Skin:     General: Skin is warm. Neurological:      Mental Status: She is alert and oriented to person, place, and time. Psychiatric:         Mood and Affect: Mood normal.         Cardiographics  Telemetry: normal sinus rhythm  ECG: normal sinus rhythm  Echocardiogram: ordered/pending    Labs:   Recent Results (from the past 24 hour(s))   NT-PRO BNP    Collection Time: 08/10/20 10:00 PM   Result Value Ref Range    NT pro-BNP 5,768 (H) 5 - 125 PG/ML   LIPASE    Collection Time: 08/10/20 10:00 PM   Result Value Ref Range    Lipase 131 73 - 393 U/L   CBC WITH AUTOMATED DIFF    Collection Time: 08/10/20 10:01 PM   Result Value Ref Range    WBC 9.1 4.3 - 11.1 K/uL    RBC 3.04 (L) 4.05 - 5.2 M/uL    HGB 9.2 (L) 11.7 - 15.4 g/dL    HCT 29.3 (L) 35.8 - 46.3 %    MCV 96.4 79.6 - 97.8 FL    MCH 30.3 26.1 - 32.9 PG    MCHC 31.4 31.4 - 35.0 g/dL    RDW 13.8 11.9 - 14.6 %    PLATELET 171 840 - 280 K/uL    MPV 9.3 (L) 9.4 - 12.3 FL    ABSOLUTE NRBC 0.00 0.0 - 0.2 K/uL    DF AUTOMATED      NEUTROPHILS 73 43 - 78 %    LYMPHOCYTES 14 13 - 44 %    MONOCYTES 9 4.0 - 12.0 %    EOSINOPHILS 4 0.5 - 7.8 %    BASOPHILS 0 0.0 - 2.0 %    IMMATURE GRANULOCYTES 0 0.0 - 5.0 %    ABS. NEUTROPHILS 6.6 1.7 - 8.2 K/UL    ABS. LYMPHOCYTES 1.3 0.5 - 4.6 K/UL    ABS. MONOCYTES 0.8 0.1 - 1.3 K/UL    ABS. EOSINOPHILS 0.3 0.0 - 0.8 K/UL    ABS. BASOPHILS 0.0 0.0 - 0.2 K/UL    ABS. IMM.  GRANS. 0.0 0.0 - 0.5 K/UL   METABOLIC PANEL, COMPREHENSIVE    Collection Time: 08/10/20 10:01 PM   Result Value Ref Range    Sodium 138 136 - 145 mmol/L    Potassium 4.7 3.5 - 5.1 mmol/L    Chloride 107 98 - 107 mmol/L    CO2 20 (L) 21 - 32 mmol/L    Anion gap 11 7 - 16 mmol/L    Glucose 133 (H) 65 - 100 mg/dL    BUN 19 8 - 23 MG/DL    Creatinine 1.71 (H) 0.6 - 1.0 MG/DL    GFR est AA 38 (L) >60 ml/min/1.73m2    GFR est non-AA 31 (L) >60 ml/min/1.73m2    Calcium 9.0 8.3 - 10.4 MG/DL    Bilirubin, total 0.7 0.2 - 1.1 MG/DL    ALT (SGPT) 36 12 - 65 U/L    AST (SGOT) 38 (H) 15 - 37 U/L    Alk. phosphatase 136 50 - 136 U/L    Protein, total 8.0 6.3 - 8.2 g/dL    Albumin 3.6 3.2 - 4.6 g/dL    Globulin 4.4 (H) 2.3 - 3.5 g/dL    A-G Ratio 0.8 (L) 1.2 - 3.5     TROPONIN-HIGH SENSITIVITY    Collection Time: 08/10/20 10:01 PM   Result Value Ref Range    Troponin-High Sensitivity 25.0 (H) 0 - 14 pg/mL   MAGNESIUM    Collection Time: 08/10/20 10:01 PM   Result Value Ref Range    Magnesium 2.2 1.8 - 2.4 mg/dL   POC G3    Collection Time: 08/11/20 12:03 AM   Result Value Ref Range    Device: Non rebreather      pH (POC) 7.44 7.35 - 7.45      pCO2 (POC) 27.8 (L) 35 - 45 MMHG    pO2 (POC) 142 (H) 75 - 100 MMHG    HCO3 (POC) 18.6 (L) 22 - 26 MMOL/L    sO2 (POC) 99 (H) 95 - 98 %    Base deficit (POC) 5 mmol/L    Allens test (POC) YES      Site LEFT RADIAL      Specimen type (POC) ARTERIAL      Performed by [employer]     CO2, POC 19 MMOL/L    Flow rate (POC) 15.000 L/min    Respiratory comment: PhysicianNotified     COLLECT TIME 2,358         Patient has been seen and examined by Dr. Coleen Weinstein and he agrees with the following assessment and plan:     Assessment/Plan:        Acute respiratory failure with hypoxia -- currently on BiPAP. Pulmonary consulted for management. CAD (coronary artery disease) -- Last [facility] was 2/20/17 that showed mild diffuse CAD with widely patent stents in LAD and diagonal. Continue ASA, plavix, BB and statin. May need repeat LHC is WMA and/or LV function has changed on echocardiogram.       Chest pain -- initial HS-troponin is slightly elevated. Follow serial enzymes. See above. Continue IV nitro, titrate as needed.        Essential hypertension -- became  Somewhat hypertensive in the ER. Continue home medications and IV nitro for control. Monitor closely. Titrate medications as needed. Chronic kidney disease, stage III (moderate) -- stable. Monitor closely with IV Lasix. Check BMP daily. Type 2 diabetes with nephropathy -- oral medications not listed on PTA list. Add SSI ACHS for glucose monitoring and control. Currenlty NPO, but may be changed when BiPAP is removed. Hyperlipidemia -- continue statin therapy. Check lipid panel in the AM.       Pleural effusion, bilateral -- continue IV Lasix Q12. Consult pulmonary for possible thoracentesis.          Rosanne Barboza NP  8/11/2020 1:17 AM

## 2020-08-11 NOTE — PROGRESS NOTES
Chart reviewed after admission to [facility] ICU for acute on chronic respiratory failure with hypoxia and hypercapnia for BIPAP. Currently on Airvo per MD notes. Attempted to call spouse this am, but no answer. Pt has PCP and insurance. CM will follow for any d/c needs per MD. None identified at present. Care Management Interventions  PCP Verified by CM: Laquita Burkett)  Mode of Transport at Discharge:  Other (see comment)  Transition of Care Consult (CM Consult): Discharge Planning  Current Support Network: Lives with Camila Yghn -[phone])  Confirm Follow Up Transport: Self  Freedom of Choice List was Provided with Basic Dialogue that Supports the Patient's Individualized Plan of Care/Goals, Treatment Preferences and Shares the Quality Data Associated with the Providers?: Yes  [place] Resource Information Provided?: [facility])  Discharge Location  Discharge Placement: Unable to determine at this time

## 2020-08-11 NOTE — ED PROVIDER NOTES
68-year-old female patient presents to the emergency department with reports of chest pain, exertional dyspnea and arm/neck discomfort. Patient states she has been short of breath when she ambulates for several weeks. She is in the midst of a work-up for anemia. She states she was supposed to receive a transfusion approximate 1 week ago however did not undergo this transfusion as her hemoglobin did not meet criteria for infusion. She has since been evaluated by GI via upper endoscopy and colonoscopy. There is no evidence of active bleeding. Patient does take Plavix but denies other blood thinner therapy. She states her shortness of breath is constant but much worse when she ambulates. It limits her ability to walk more than a few steps at which time she becomes very dyspneic and lightheaded reporting episodes of near syncope. Past Medical History:   Diagnosis Date    CAD (coronary artery disease) 3/1/2012    MI, 3 stents    Chest pain     Coronary artery disease involving native coronary artery without angina pectoris 5/21/2015    Depression 3/1/2012    Diabetes mellitus ([address] 3/1/2012    oral agents. . hypo- 80's, Last A1c 6.9 in 6/2018    DM2 (diabetes mellitus, type 2) ([address] 5/21/2015    Elevated troponin 3/2/2012    Essential hypertension 5/21/2015    External hemorrhoids without mention of complication 4418    GERD (gastroesophageal reflux disease)     History of MI (myocardial infarction) 11/12    x2    Hypercholesterolemia     Hypertension 3/1/2012    Insomnia     Personal history of colonic polyps 2013    hyperplastic    Platelet inhibition due to Plavix     holding for colonoscopy per GI Dr. Catalina Freeman Rectocele 2013    Tobacco abuse 3/1/2012    quit for 1 year    Tobacco use disorder     Unstable angina ([address] 3/1/2012    ntg as needed.         Past Surgical History:   Procedure Laterality Date    HX CAROTID ENDARTERECTOMY Left 12/12/2018    HX CATARACT REMOVAL Left 09/19/2016 Dr Fidel Gaines, [facility]    HX CATARACT REMOVAL Right 2016    Dr Fidel Gaines, [facility]    HX CERVICAL FUSION      neck w/plate    HX COLONOSCOPY  13    Anna Marie--single transverse hyperplastic polyp--7-10 year recall    HX HEMORRHOIDECTOMY      x2    HX ORTHOPAEDIC      left elbow    HX CAL AND BSO      HX WISDOM TEETH EXTRACTION      [place] LEFT HEART CATH,PERCUTANEOUS  last stented 11/12 x 2    stent x3 , mi x 2         Family History:   Problem Relation Age of Onset    Heart Disease Mother     Osteoporosis Mother     Heart Attack Mother 67        mi    Thyroid Disease Mother         Multinodular Goiter    Heart Disease Father     Cancer Father         pancreatic    Heart Attack Father 67        MI    Cancer Sister         Pre-Cancerous dysplasia    Thyroid Cancer Sister     Ulcerative Colitis Brother     Thyroid Cancer Brother     Breast Cancer Neg Hx        Social History     Socioeconomic History    Marital status:      Spouse name: Not on file    Number of children: Not on file    Years of education: Not on file    Highest education level: Not on file   Occupational History    Not on file   Social Needs    Financial resource strain: Not on file    Food insecurity     Worry: Not on file     Inability: Not on file    Transportation needs     Medical: Not on file     Non-medical: Not on file   Tobacco Use    Smoking status: Former Smoker     Packs/day: 1.00     Years: 40.00     Pack years: 40.00     Last attempt to quit: 2012     Years since quittin.7    Smokeless tobacco: Never Used    Tobacco comment: quit  . has stopped prior also   Substance and Sexual Activity    Alcohol use: No     Frequency: Never    Drug use: No    Sexual activity: Not on file   Lifestyle    Physical activity     Days per week: Not on file     Minutes per session: Not on file    Stress: Not on file   Relationships    Social connections     Talks on phone: Not on file Gets together: Not on file     Attends Taoist service: Not on file     Active member of club or organization: Not on file     Attends meetings of clubs or organizations: Not on file     Relationship status: Not on file    Intimate partner violence     Fear of current or ex partner: Not on file     Emotionally abused: Not on file     Physically abused: Not on file     Forced sexual activity: Not on file   Other Topics Concern    Not on file   Social History Narrative    Not on file         ALLERGIES: Cephalosporins; Sulfamethoxazole-trimethoprim; Codeine; and Lisinopril    Review of Systems   Constitutional: Negative for chills, diaphoresis and fever. HENT: Negative for congestion, sneezing and sore throat. Eyes: Negative for visual disturbance. Respiratory: Positive for shortness of breath. Negative for cough, chest tightness and wheezing. Cardiovascular: Positive for chest pain. Negative for leg swelling. Gastrointestinal: Negative for abdominal pain, blood in stool, diarrhea, nausea and vomiting. Endocrine: Negative for polyuria. Genitourinary: Negative for difficulty urinating, dysuria, flank pain, hematuria and urgency. Musculoskeletal: Negative for back pain, myalgias, neck pain and neck stiffness. Skin: Negative for color change and rash. Neurological: Negative for dizziness, syncope, speech difficulty, weakness, light-headedness, numbness and headaches. Psychiatric/Behavioral: Negative for behavioral problems. All other systems reviewed and are negative. Vitals:    08/10/20 2229 08/10/20 2231 08/10/20 2234 08/10/20 2241   BP: 156/65 135/62     Pulse: 65 65 64 66   Resp: 23 20 20 20   Temp:       SpO2: 93% 96% 97% 98%   Weight:       Height:                Physical Exam  Vitals signs and nursing note reviewed. Constitutional:       General: She is not in acute distress. Appearance: She is well-developed. She is not diaphoretic.       Comments: Alert and oriented to person place and time. No acute distress, speaks in clear, fluid sentences. HENT:      Head: Normocephalic and atraumatic. Right Ear: External ear normal.      Left Ear: External ear normal.      Nose: Nose normal.   Eyes:      Pupils: Pupils are equal, round, and reactive to light. Neck:      Musculoskeletal: Normal range of motion. Cardiovascular:      Rate and Rhythm: Normal rate and regular rhythm. Heart sounds: Normal heart sounds. No murmur. No friction rub. No gallop. Pulmonary:      Effort: Pulmonary effort is normal. No respiratory distress. Breath sounds: Normal breath sounds. No stridor. No decreased breath sounds, wheezing, rhonchi or rales. Chest:      Chest wall: No tenderness. Abdominal:      General: There is no distension. Palpations: Abdomen is soft. There is no mass. Tenderness: There is no abdominal tenderness. There is no guarding or rebound. Hernia: No hernia is present. Musculoskeletal: Normal range of motion. General: No tenderness or deformity. Skin:     General: Skin is warm and dry. Neurological:      Mental Status: She is alert and oriented to person, place, and time. Cranial Nerves: No cranial nerve deficit. MDM  Number of Diagnoses or Management Options  Acute on chronic congestive heart failure, unspecified heart failure type [facility]): established and worsening  Hypoxia: new and requires workup  Diagnosis management comments: Most recent Cath    Chris Carrillo 42       Name:  Hiral Bee   MR#:  [id number]   :  1950   Account #:  [de-identified]   Date of Adm:  2017         DATE OF PROCEDURE: 2017     CLINICAL INFORMATION: Patient with prolonged chest pain   consistent with [place] class IV angina, referred for   catheterization.     PROCEDURE IN DETAIL: Attempts were made at a sheath placement   with a weak right radial pulse.  That was unable to be cannulated. A 6-Sinhala sheath was placed in the right femoral   artery, and 6-Sinhala JL4 Aden right angle pigtail were used. Common femoral artery sheath placement was confirmed with a   sheathogram, and a Mynx closure device was placed with good   hemostasis.     Fifteen minutes of conscious sedation was given and supervised   by myself with 3 mg Versed and 50 mcg of fentanyl. Vital signs   and saturations were stable throughout.     The right coronary artery is a dominant vessel in normal   anatomic position. There is 20% ostial and proximal narrowing. The rest of the vessel and a small PDA and posterolateral obtuse   marginal branches are free of significant disease.     The left main coronary artery is in normal anatomic position,   free of significant disease. It bifurcates into LAD and   circumflex system.     The LAD has stents in the proximal segment in the proximal   diagonal. The stents are all widely patent. Just distal to the   diagonal stent, there is a 30% exit stenosis. This is mild   and does not appear to be flow limiting. The remainder of the   LAD is free of any high-grade narrowings.     The circumflex has 20% mid vessel narrowing and has a distal   bifurcating obtuse marginal branch free of any high-grade   narrowing.     Left ventriculogram reveals normal systolic function. Ejection   fraction is 55%. Left ventricular end-diastolic pressure was 16   mmHg. Opening aortic pressure 163/64 with no gradient across the   aortic valve.     Mynx closure device was placed with good hemostasis.     CONCLUSION   1. Mild diffuse coronary atherosclerotic heart disease with   widely patent stents in the left anterior descending and   diagonal.   2. Normal left ventricular systolic function.           Tram Hyatt MD     Patient's initial troponin is slightly elevated out of the normal range. We will trend this value.   EKG obtained on arrival shows normal sinus rhythm with a rate of 66 beats a minute. There is no evidence of acute ischemia appreciated on this tracing. Sudden change in patient's status, noted hypoxia, ongoing right arm and neck pain. This was initially improved with nitroglycerin given by EMS. Orders placed for sublingual nitro and nitro drip given patient CT results showing large effusions, cardiomegaly and suspected heart failure exacerbation. Amount and/or Complexity of Data Reviewed  Clinical lab tests: ordered and reviewed  Tests in the radiology section of CPT®: ordered and reviewed  Tests in the medicine section of CPT®: ordered and reviewed  Decide to obtain previous medical records or to obtain history from someone other than the patient: yes  Independent visualization of images, tracings, or specimens: yes    Risk of Complications, Morbidity, and/or Mortality  Presenting problems: high  Diagnostic procedures: high  Management options: high    Patient Progress  Patient progress: stable    ED Course as of Aug 11 0013   Mon Aug 10, 2020   2350 Called to patient's room secondary to a drop in her oxygen saturation. RN noted sudden drop in patient's oxygen level down to 69% while on 2 L nasal cannula. This nasal cannula oxygen was titrated upwards with some change in patient's sat however she remained between 75 and 80%. She is reporting significant pain in her left shoulder and neck. She is undergone CT imaging which shows large pleural effusions, small pericardial effusion, concerning for CHF exacerbation. She denies history of CHF. Orders placed for sublingual nitro, BiPAP and nitro drip. [BR]   Tue Aug 11, 2020   0003 Repeat EKG shows no significant change from initial tracing.   Remains in sinus rhythm with a rate of 79 beats a minute.    [BR]      ED Course User Index  [BR] Margaret Aceves, DO       Procedures

## 2020-08-11 NOTE — CONSULTS
CONSULT NOTE    Duke Smith    8/11/2020    Date of Admission:  8/10/2020    The patient's chart is reviewed and the patient is discussed with the staff. Subjective:     Patient is a 65 y.o.  female seen and evaluated at the request of cardiology for respiratory management as she is currently on BIPAP and with pleural effusions. Patient arrived via EMS last evening with substernal chest pain radiating to right shoulder for 3 weeks, LE edema, CONTRERAS, productive cough with green sputum, fever/chills, nausea, diarrhea x1, light headedness, dizziness and palpitations. Has been having recent work up for anemia and HGB on arrival was 9.2. Was seen by GI with upper endoscopy and colonoscopy--no evidence of active bleeding. Outpatient COVID testing negative x2. Was admitted by cardiology with known hx CAD and PCI/stents in 2012, DM2, HTN, anemia, and HLD. In the ER she decompensated with hypoxia, O2 sats in the 70s and placed on BIPAP. Chest CT negative for PE but with pleural effusions and was given Lasix. Was seen in the overflow ICU and has had brisk diuresis with over 4L urine output. Has been weaned off BIPAP to Air-Vo 40L, 40% and states is much less short of breath now.       Review of Systems  A comprehensive review of systems was negative except for: Constitutional: positive for fevers, fatigue and malaise  Respiratory: positive for cough, sputum or dyspnea on exertion  Cardiovascular: positive for dyspnea, fatigue, orthopnea, exertional chest pressure/discomfort, lower extremity edema, dyspnea on exertion, dizziness  Gastrointestinal: positive for diarrhea  Hematologic/lymphatic: positive for recent work up for anemia  Behvioral/Psych: positive for depression    Patient Active Problem List   Diagnosis Code    CAD (coronary artery disease) I25.10    Chest pain R07.9    Essential hypertension I10    Chronic kidney disease, stage III (moderate) ([facility] N18.3    Age-related osteoporosis without current pathological fracture M81.0    Major depressive disorder with single episode, in full remission [facility] F32.5    Primary insomnia F51.01    Gastroesophageal reflux disease K21.9    Type 2 diabetes with nephropathy (HCC) E11.21    Right carotid bruit R09.89    Carotid artery stenosis I65.29    Hyperlipidemia E78.5    History of left-sided carotid endarterectomy Z98.890    Carotid stenosis, asymptomatic, right I65.21    Acute respiratory failure with hypoxia (HCC) J96.01    Pleural effusion, bilateral [address] [employer] none. Prior to Admission Medications   Prescriptions Last Dose Informant Patient Reported? Taking? NIFEdipine ER (PROCARDIA XL) 60 mg ER tablet   No No   Sig: TAKE 1 TABLET BY MOUTH  DAILY   POTASSIUM GLUCONATE PO   Yes No   Sig: Take 99 mg PE by mouth daily. aspirin 81 mg chewable tablet   Yes No   Sig: Take 81 mg by mouth every morning. Indications: continue per anesthesia guidelines   cholecalciferol, vitamin D3, (VITAMIN D3) 2,000 unit tab   Yes No   Sig: Take 2,000 Units by mouth daily. citalopram (CELEXA) 20 mg tablet   No No   Sig: TAKE 1 TABLET BY MOUTH  EVERY MORNING   clopidogreL (PLAVIX) 75 mg tab   No No   Sig: TAKE 1 TABLET BY MOUTH  DAILY   co-enzyme Q-10 (CO Q-10) 100 mg capsule   Yes No   Sig: Take 100 mg by mouth daily. cyanocobalamin (VITAMIN B-12) 1,000 mcg tablet   Yes No   Sig: Take 2,500 mcg by mouth daily. diphenoxylate-atropine (LomotiL) 2.5-0.025 mg per tablet   No No   Sig: Take 2 Tabs by mouth four (4) times daily as needed for Diarrhea (1 tab after each stool for max 8 per day). Max Daily Amount: 8 Tabs. Take after each stool for a maximum of 8 tablets daily   magnesium oxide (MAG-OX) 400 mg tablet   No No   Sig: TAKE 1 TABLET BY MOUTH TWICE DAILY   Patient taking differently: 400 mg.  TAKE 1 TABLET BY MOUTH TWICE DAILY   metoprolol succinate (TOPROL-XL) 25 mg XL tablet   No No   Sig: Take 1 Tab by mouth daily.   nitroglycerin (NITROSTAT) 0.4 mg SL tablet   No No   Si Tab by SubLINGual route every five (5) minutes as needed for Chest Pain. ondansetron (ZOFRAN ODT) 8 mg disintegrating tablet   No No   Sig: Take 1 Tab by mouth every eight (8) hours as needed for Nausea. rosuvastatin (CRESTOR) 40 mg tablet   No No   Sig: Take 1 Tab by mouth nightly. traZODone (DESYREL) 50 mg tablet   No No   Sig: TAKE 1 TABLET BY MOUTH  NIGHTLY      Facility-Administered Medications: None       Past Medical History:   Diagnosis Date    CAD (coronary artery disease) 3/1/2012    MI, 3 stents    Chest pain     Coronary artery disease involving native coronary artery without angina pectoris 2015    Depression 3/1/2012    Diabetes mellitus ([facility] 3/1/2012    oral agents. . hypo- 80's, Last A1c 6.9 in 2018    DM2 (diabetes mellitus, type 2) ([facility] 2015    Elevated troponin 3/2/2012    Essential hypertension 2015    External hemorrhoids without mention of complication 6187    GERD (gastroesophageal reflux disease)     History of MI (myocardial infarction) 11/12    x2    Hypercholesterolemia     Hypertension 3/1/2012    Insomnia     Personal history of colonic polyps 2013    hyperplastic    Platelet inhibition due to Plavix     holding for colonoscopy per GI Dr. Bettye Cunningham Rectocele 2013    Tobacco abuse 3/1/2012    quit for 1 year    Tobacco use disorder     Unstable angina ([facility] 3/1/2012    ntg as needed.       Past Surgical History:   Procedure Laterality Date    HX CAROTID ENDARTERECTOMY Left 2018    HX CATARACT REMOVAL Left 2016    Dr Jd Quispe, [address] [place] [address] Right 2016    Dr Jd Quispe, [facility]      neck w/plate    HX COLONOSCOPY  13    Anna Marie--single transverse hyperplastic polyp--7-10 year recall    HX HEMORRHOIDECTOMY      x2    HX ORTHOPAEDIC      left elbow    HX CAL AND BSO      HX WISDOM TEETH EXTRACTION      ID LEFT HEART CATH,PERCUTANEOUS  last stented 11/12 x 2    stent x3 , mi x 2     Social History     Socioeconomic History    Marital status:      Spouse name: Not on file    Number of children: Not on file    Years of education: Not on file    Highest education level: Not on file   Occupational History    Not on file   Social Needs    Financial resource strain: Not on file    Food insecurity     Worry: Not on file     Inability: Not on file    Transportation needs     Medical: Not on file     Non-medical: Not on file   Tobacco Use    Smoking status: Former Smoker     Packs/day: 1.00     Years: 40.00     Pack years: 40.00     Last attempt to quit: 2012     Years since quittin.7    Smokeless tobacco: Never Used    Tobacco comment: quit  . has stopped prior also   Substance and Sexual Activity    Alcohol use: No     Frequency: Never    Drug use: No    Sexual activity: Not on file   Lifestyle    Physical activity     Days per week: Not on file     Minutes per session: Not on file    Stress: Not on file   Relationships    Social connections     Talks on phone: Not on file     Gets together: Not on file     Attends Evangelical service: Not on file     Active member of club or organization: Not on file     Attends meetings of clubs or organizations: Not on file     Relationship status: Not on file    Intimate partner violence     Fear of current or ex partner: Not on file     Emotionally abused: Not on file     Physically abused: Not on file     Forced sexual activity: Not on file   Other Topics Concern    Not on file   Social History Narrative    Not on file     Family History   Problem Relation Age of Onset    Heart Disease Mother     Osteoporosis Mother     Heart Attack Mother 67        mi    Thyroid Disease Mother         Multinodular Goiter    Heart Disease Father     Cancer Father         pancreatic    Heart Attack Father 67        MI    Cancer Sister         Pre-Cancerous dysplasia    Thyroid Cancer Sister     Ulcerative Colitis Brother     Thyroid Cancer Brother     Breast Cancer Neg Hx      Allergies   Allergen Reactions    Cephalosporins Hives and Swelling    Sulfamethoxazole-Trimethoprim Other (comments)     Diarrhea     Codeine Other (comments)    Lisinopril Other (comments)     Passing out spells.  // pt sts not allergic to med        Current Facility-Administered Medications   Medication Dose Route Frequency    aspirin chewable tablet 81 mg  81 mg Oral 7am    citalopram (CELEXA) tablet 20 mg  20 mg Oral 7am    clopidogreL (PLAVIX) tablet 75 mg  75 mg Oral DAILY    magnesium oxide (MAG-OX) tablet 400 mg  400 mg Oral BID    metoprolol succinate (TOPROL-XL) XL tablet 25 mg  25 mg Oral DAILY    NIFEdipine ER (PROCARDIA XL) tablet 60 mg  60 mg Oral DAILY    rosuvastatin (CRESTOR) tablet 40 mg  40 mg Oral QHS    traZODone (DESYREL) tablet 50 mg  50 mg Oral QHS    sodium chloride (NS) flush 5-40 mL  5-40 mL IntraVENous Q8H    sodium chloride (NS) flush 5-40 mL  5-40 mL IntraVENous PRN    nitroglycerin (NITROSTAT) tablet 0.4 mg  0.4 mg SubLINGual Q5MIN PRN    nitroglycerin (Tridil) 200 mcg/ml infusion  0-100 mcg/min IntraVENous TITRATE    acetaminophen (TYLENOL) tablet 650 mg  650 mg Oral Q4H PRN    ondansetron (ZOFRAN) injection 4 mg  4 mg IntraVENous Q4H PRN    heparin (porcine) injection 5,000 Units  5,000 Units SubCUTAneous Q8H    insulin lispro (HUMALOG) injection   SubCUTAneous AC&HS    furosemide (LASIX) injection 60 mg  60 mg IntraVENous Q12H    morphine injection 4 mg  4 mg IntraVENous NOW    ondansetron (ZOFRAN) injection 4 mg  4 mg IntraVENous NOW         Objective:     Vitals:    08/11/20 0821 08/11/20 0850 08/11/20 0921 08/11/20 0951   BP: 151/67 162/71 131/63 179/79   Pulse: 69 70 64 70   Resp:       Temp:       SpO2: 95% 98% 98% 95%   Weight:       Height:           PHYSICAL EXAM     Constitutional:  the patient is well developed and in no acute distress, Air-Vo 40l, 40%, sat 98%  EENMT:  Sclera clear, pupils equal, oral mucosa moist  Respiratory: few scattered crackles, diminished in both bases, no wheezing, occasional dry cough  Cardiovascular:  RRR without M,G,R  Gastrointestinal: soft and non-tender; with positive bowel sounds. Musculoskeletal: warm without cyanosis. There is trace bilateral lower extremity edema. Skin:  no jaundice or rashes, few scabbed lesions on both arms-\"from my new puppy\"  Neurologic: no gross neuro deficits     Psychiatric:  alert and oriented x 3    Chest CT 8/10/20:    1. Negative for pulmonary embolism. 2. Moderate right and small left pleural effusions, pulmonary edema and cardiomegaly. Findings suggest CHF. 3. Small pericardial effusion, without mass effect. Prominent coronary artery calcification. 4. Cirrhotic liver. CXR:    8/10/20:  Pleural effusions, pulmonary edema and cardiomegaly. Findings suggest CHF. Recent Labs     08/11/20  0320 08/10/20  2201   WBC 8.3 9.1   HGB 8.1* 9.2*   HCT 24.4* 29.3*    334     Recent Labs     08/11/20  0320 08/10/20  2201    138   K 4.1 4.7   * 107   * 133*   CO2 22 20*   BUN 19 19   CREA 1.65* 1.71*   MG  --  2.2   CA 8.3 9.0   ALB  --  3.6   TBILI  --  0.7   ALT  --  36     Recent Labs     08/11/20  0003   PHI 7.44   PCO2I 27.8*   PO2I 142*   HCO3I 18.6*     No results for input(s): LCAD, LAC in the last 72 hours.     Assessment:  (Medical Decision Making)     Hospital Problems  Date Reviewed: 8/11/2020          Codes Class Noted POA    * (Principal) Acute respiratory failure with hypoxia (HCC) ICD-10-CM: J96.01  ICD-9-CM: 518.81  8/11/2020 Yes        Pleural effusion, bilateral ICD-10-CM: J90  ICD-9-CM: 511.9  8/11/2020 Yes        Hyperlipidemia (Chronic) ICD-10-CM: E78.5  ICD-9-CM: 272.4  3/12/2020 Yes        Type 2 diabetes with nephropathy ([address] ICD-10-CM: E11.21  ICD-9-CM: 250.40, 583.81  10/11/2018 Yes        Essential hypertension (Chronic) ICD-10-CM: I10  ICD-9-CM: 401.9  9/15/2016 Yes        Chronic kidney disease, stage III (moderate) (HCC) ICD-10-CM: N18.3  ICD-9-CM: 585.3  9/15/2016 Yes        Chest pain ICD-10-CM: R07.9  ICD-9-CM: 786.50  3/21/2016 Yes        CAD (coronary artery disease) (Chronic) ICD-10-CM: I25.10  ICD-9-CM: 414.00  9/4/2013 Yes              Plan:  (Medical Decision Making)     --Lasix 60 mg q12h--urine output since admission ~ 4L, creat 1.65  --Hgb down to 8.1  --High sens troponin up to 28.5--per cardiology  --ECHO pending    More than 50% of the time documented was spent in face-to-face contact with the patient and in the care of the patient on the floor/unit where the patient is located. Thank you very much for this referral.  We appreciate the opportunity to participate in this patient's care. Will follow along with above stated plan. Canda Eisenmenger, NP   Lungs:  Clear with decreased breath sounds R base  Heart:  RRR with no Murmur/Rubs/Gallops    Additional Comments:  Ct scan moderate R and small left effusion,cirrhotic liver   -agree with diuresis for now,  On plavix so hold off on thoracentesis unless effusions do not improve-cirrhosis noted on ct- a new problem  cxr tomorrow          I have spoken with and examined the patient. I agree with the above assessment and plan as documented.     Sherren Shone, MD

## 2020-08-11 NOTE — PROGRESS NOTES
Pt transferred to ICU bed. Placed on monitor. CHG bath given. No skin breakdown noted. Allevyn placed.

## 2020-08-11 NOTE — PROGRESS NOTES
Initial visit was made, prayer, emotional support and a spiritual presence were provided. She was alert and oriented and said that she would appreciate a prayer. [facility].       Collin Meza MDIV

## 2020-08-11 NOTE — ED TRIAGE NOTES
Arrives without face mask in place. Arrives from home via [facility] with reports substernal chest pain radiating in right shoulder and right side neck. States \"my chest has been hurting for weeks\". Reports CONTRERAS, productive cough with green sputum, fever/chills, nausea, diarrhea. Denies abdominal pain, loss of smell/taste. Reports 2 negative COVID swabs. Has been seen in past for same, reports anemia. States PMD wanted to transfuse blood however once seen here in ED states ED did not r/t hgb 9. Pale on arrival. Reports lightheadedness/dizziness and palpitations with position changes.

## 2020-08-12 ENCOUNTER — PATIENT OUTREACH (OUTPATIENT)
Dept: CASE MANAGEMENT | Age: 70
End: 2020-08-12

## 2020-08-12 ENCOUNTER — APPOINTMENT (OUTPATIENT)
Dept: GENERAL RADIOLOGY | Age: 70
DRG: 189 | End: 2020-08-12
Attending: INTERNAL MEDICINE
Payer: MEDICARE

## 2020-08-12 PROBLEM — R09.02 HYPOXEMIA: Status: ACTIVE | Noted: 2020-08-12

## 2020-08-12 LAB
ANION GAP SERPL CALC-SCNC: 8 MMOL/L (ref 7–16)
BUN SERPL-MCNC: 19 MG/DL (ref 8–23)
CALCIUM SERPL-MCNC: 8.8 MG/DL (ref 8.3–10.4)
CHLORIDE SERPL-SCNC: 104 MMOL/L (ref 98–107)
CHOLEST SERPL-MCNC: 95 MG/DL
CO2 SERPL-SCNC: 29 MMOL/L (ref 21–32)
CREAT SERPL-MCNC: 1.71 MG/DL (ref 0.6–1)
ERYTHROCYTE [DISTWIDTH] IN BLOOD BY AUTOMATED COUNT: 13.5 % (ref 11.9–14.6)
GLUCOSE BLD STRIP.AUTO-MCNC: 169 MG/DL (ref 65–100)
GLUCOSE BLD STRIP.AUTO-MCNC: 184 MG/DL (ref 65–100)
GLUCOSE BLD STRIP.AUTO-MCNC: 232 MG/DL (ref 65–100)
GLUCOSE BLD STRIP.AUTO-MCNC: 274 MG/DL (ref 65–100)
GLUCOSE SERPL-MCNC: 129 MG/DL (ref 65–100)
HCT VFR BLD AUTO: 30.1 % (ref 35.8–46.3)
HDLC SERPL-MCNC: 34 MG/DL (ref 40–60)
HDLC SERPL: 2.8 {RATIO}
HGB BLD-MCNC: 9.5 G/DL (ref 11.7–15.4)
LDLC SERPL CALC-MCNC: 45.4 MG/DL
LIPID PROFILE,FLP: ABNORMAL
MCH RBC QN AUTO: 30.1 PG (ref 26.1–32.9)
MCHC RBC AUTO-ENTMCNC: 31.6 G/DL (ref 31.4–35)
MCV RBC AUTO: 95.3 FL (ref 79.6–97.8)
NRBC # BLD: 0 K/UL (ref 0–0.2)
PLATELET # BLD AUTO: 335 K/UL (ref 150–450)
PMV BLD AUTO: 9.6 FL (ref 9.4–12.3)
POTASSIUM SERPL-SCNC: 3.4 MMOL/L (ref 3.5–5.1)
RBC # BLD AUTO: 3.16 M/UL (ref 4.05–5.2)
SODIUM SERPL-SCNC: 141 MMOL/L (ref 136–145)
TRIGL SERPL-MCNC: 78 MG/DL (ref 35–150)
VLDLC SERPL CALC-MCNC: 15.6 MG/DL (ref 6–23)
WBC # BLD AUTO: 6.9 K/UL (ref 4.3–11.1)

## 2020-08-12 PROCEDURE — 74011250636 HC RX REV CODE- 250/636: Performed by: NURSE PRACTITIONER

## 2020-08-12 PROCEDURE — 71045 X-RAY EXAM CHEST 1 VIEW: CPT

## 2020-08-12 PROCEDURE — 94760 N-INVAS EAR/PLS OXIMETRY 1: CPT

## 2020-08-12 PROCEDURE — 65660000000 HC RM CCU STEPDOWN

## 2020-08-12 PROCEDURE — 74011250637 HC RX REV CODE- 250/637: Performed by: NURSE PRACTITIONER

## 2020-08-12 PROCEDURE — 99232 SBSQ HOSP IP/OBS MODERATE 35: CPT | Performed by: INTERNAL MEDICINE

## 2020-08-12 PROCEDURE — 85027 COMPLETE CBC AUTOMATED: CPT

## 2020-08-12 PROCEDURE — 80061 LIPID PANEL: CPT

## 2020-08-12 PROCEDURE — 82962 GLUCOSE BLOOD TEST: CPT

## 2020-08-12 PROCEDURE — 80048 BASIC METABOLIC PNL TOTAL CA: CPT

## 2020-08-12 PROCEDURE — 77010033711 HC HIGH FLOW OXYGEN

## 2020-08-12 PROCEDURE — 36415 COLL VENOUS BLD VENIPUNCTURE: CPT

## 2020-08-12 PROCEDURE — 74011636637 HC RX REV CODE- 636/637: Performed by: NURSE PRACTITIONER

## 2020-08-12 RX ADMIN — Medication 5 ML: at 06:06

## 2020-08-12 RX ADMIN — ASPIRIN 81 MG: 81 TABLET, CHEWABLE ORAL at 06:04

## 2020-08-12 RX ADMIN — MAGNESIUM GLUCONATE 500 MG ORAL TABLET 400 MG: 500 TABLET ORAL at 17:39

## 2020-08-12 RX ADMIN — HEPARIN SODIUM 5000 UNITS: 5000 INJECTION INTRAVENOUS; SUBCUTANEOUS at 14:00

## 2020-08-12 RX ADMIN — FUROSEMIDE 60 MG: 10 INJECTION INTRAMUSCULAR; INTRAVENOUS at 08:01

## 2020-08-12 RX ADMIN — METOPROLOL SUCCINATE 25 MG: 25 TABLET, EXTENDED RELEASE ORAL at 08:01

## 2020-08-12 RX ADMIN — NIFEDIPINE 60 MG: 30 TABLET, FILM COATED, EXTENDED RELEASE ORAL at 08:01

## 2020-08-12 RX ADMIN — Medication 5 ML: at 12:38

## 2020-08-12 RX ADMIN — INSULIN LISPRO 2 UNITS: 100 INJECTION, SOLUTION INTRAVENOUS; SUBCUTANEOUS at 07:55

## 2020-08-12 RX ADMIN — CLOPIDOGREL BISULFATE 75 MG: 75 TABLET ORAL at 08:01

## 2020-08-12 RX ADMIN — Medication 5 ML: at 21:43

## 2020-08-12 RX ADMIN — INSULIN LISPRO 6 UNITS: 100 INJECTION, SOLUTION INTRAVENOUS; SUBCUTANEOUS at 21:43

## 2020-08-12 RX ADMIN — TRAZODONE HYDROCHLORIDE 50 MG: 50 TABLET ORAL at 21:29

## 2020-08-12 RX ADMIN — ROSUVASTATIN 40 MG: 20 TABLET, FILM COATED ORAL at 21:29

## 2020-08-12 RX ADMIN — INSULIN LISPRO 4 UNITS: 100 INJECTION, SOLUTION INTRAVENOUS; SUBCUTANEOUS at 17:39

## 2020-08-12 RX ADMIN — HEPARIN SODIUM 5000 UNITS: 5000 INJECTION INTRAVENOUS; SUBCUTANEOUS at 06:05

## 2020-08-12 RX ADMIN — HEPARIN SODIUM 5000 UNITS: 5000 INJECTION INTRAVENOUS; SUBCUTANEOUS at 21:29

## 2020-08-12 RX ADMIN — FUROSEMIDE 60 MG: 10 INJECTION INTRAMUSCULAR; INTRAVENOUS at 21:29

## 2020-08-12 RX ADMIN — CITALOPRAM HYDROBROMIDE 20 MG: 20 TABLET ORAL at 06:04

## 2020-08-12 RX ADMIN — INSULIN LISPRO 2 UNITS: 100 INJECTION, SOLUTION INTRAVENOUS; SUBCUTANEOUS at 11:55

## 2020-08-12 RX ADMIN — MAGNESIUM GLUCONATE 500 MG ORAL TABLET 400 MG: 500 TABLET ORAL at 08:01

## 2020-08-12 NOTE — PROGRESS NOTES
[facility] CARDIOLOGY PROGRESS NOTE           8/12/2020 12:50 PM    Admit Date: 8/10/2020         Subjective: Diuresed well with IV lasix. Off bipap on HFNC. Breathing better. CT chest was unremarkable except for possible cirrotic liver and pleural effusions. ROS:  Cardiovascular:  As noted above    Objective:      Vitals:    08/12/20 0800 08/12/20 0929 08/12/20 1124 08/12/20 1200   BP:   124/52    Pulse: (!) 57  62 61   Resp:   18    Temp:   98.3 °F (36.8 °C)    SpO2:  100% 100%    Weight:       Height:           On telemetry:      Physical Exam:  General: Well Developed, Well Nourished, No Acute Distress, Alert & Oriented x 3, Appropriate mood  Neck: supple, no JVD  Heart: S1S2 with RRR without murmurs or gallops  Lungs: Clear throughout auscultation bilaterally without adventitious sounds  Abd: soft, nontender, nondistended, with good bowel sounds  Ext: no edema bilaterally  Skin: warm and dry      Data Review:   Recent Labs     08/12/20  0524 08/11/20  0320 08/10/20  2201    140 138   K 3.4* 4.1 4.7   MG  --   --  2.2   BUN 19 19 19   CREA 1.71* 1.65* 1.71*   * 139* 133*   WBC 6.9 8.3 9.1   HGB 9.5* 8.1* 9.2*   HCT 30.1* 24.4* 29.3*    291 334   CHOL 95  --   --    LDLC 45.4  --   --    HDL 34*  --   --        No results for input(s): TNIPOC, TROIQ in the last 72 hours.       Assessment/Plan:     Principal Problem:    Acute respiratory failure with hypoxia ([facility] (8/11/2020)    Active Problems:    CAD (coronary artery disease) (9/4/2013)      Chest pain (3/21/2016)      Essential hypertension (9/15/2016)      Chronic kidney disease, stage III (moderate) ([facility] (9/15/2016)      Type 2 diabetes with nephropathy ([facility] (10/11/2018)      Hyperlipidemia (3/12/2020)      Pleural effusion, bilateral (8/11/2020)      Other cirrhosis of liver ([facility] (8/11/2020)      Hypoxemia (8/12/2020)    A/P  1) Hypoxic resp failure - contiue diuresis daily BMP  2) Pleural effusions - okay to stop plavix now for possible thora  3) CKD - stable  4) Elevated HS troponin - not indicative of acs  5) ?  Cirrhosis - CMP is largely WNL, platelets are normal, albumin normal.      Rosa Martínez MD  8/12/2020 12:50 PM

## 2020-08-12 NOTE — PROGRESS NOTES
SBAR received from Iain Zuniga RN. Patient stable, lying in bed, in no apparent distress. Receiving oxygen via Airvo 40 L at 42%. Breathing even and unlabored. Call light within reach.

## 2020-08-12 NOTE — PROGRESS NOTES
Problem: Diabetes Self-Management  Goal: *Disease process and treatment process  Description: Define diabetes and identify own type of diabetes; list 3 options for treating diabetes. Outcome: Progressing Towards Goal  Goal: *Developing strategies to promote health/change behavior  Description: Define the ABC's of diabetes; identify appropriate screenings, schedule and personal plan for screenings. Outcome: Progressing Towards Goal  Goal: *Using medications safely  Description: State effect of diabetes medications on diabetes; name diabetes medication taking, action and side effects. Outcome: Progressing Towards Goal  Goal: *Monitoring blood glucose, interpreting and using results  Description: Identify recommended blood glucose targets  and personal targets. Outcome: Progressing Towards Goal  Goal: *Prevention, detection, treatment of acute complications  Description: List symptoms of hyper- and hypoglycemia; describe how to treat low blood sugar and actions for lowering  high blood glucose level. Outcome: Progressing Towards Goal     Problem: Patient Education: Go to Patient Education Activity  Goal: Patient/Family Education  Outcome: Progressing Towards Goal     Problem: Falls - Risk of  Goal: *Absence of Falls  Description: Document Mary Camara Fall Risk and appropriate interventions in the flowsheet.   Outcome: Progressing Towards Goal  Note: Fall Risk Interventions:  Mobility Interventions: Patient to call before getting OOB         Medication Interventions: Patient to call before getting OOB, Evaluate medications/consider consulting pharmacy    Elimination Interventions: Bed/chair exit alarm              Problem: Patient Education: Go to Patient Education Activity  Goal: Patient/Family Education  Outcome: Progressing Towards Goal

## 2020-08-12 NOTE — PROGRESS NOTES
SBAR report given to oncoming nurse. Respirations are even and unlabored. Patient is stable. Bed is low, locked and call light within reach.

## 2020-08-12 NOTE — PROGRESS NOTES
TRANSFER - OUT REPORT:    Verbal report given to Kyra RN(name) on Smartsheet  being transferred to 8th floor (unit) for routine progression of care       Report consisted of patients Situation, Background, Assessment and   Recommendations(SBAR). Information from the following report(s) SBAR, ED Summary, Procedure Summary, Intake/Output, MAR and Cardiac Rhythm NSR/PACED was reviewed with the receiving nurse. Lines:   Peripheral IV 08/10/20 Left Antecubital (Active)   Site Assessment Clean, dry, & intact 08/11/20 1900   Phlebitis Assessment 0 08/11/20 1900   Infiltration Assessment 0 08/11/20 1900   Dressing Status Clean, dry, & intact 08/11/20 1900   Dressing Type Transparent;Tape 08/11/20 1900   Hub Color/Line Status Patent 08/11/20 1900   Alcohol Cap Used No 08/11/20 1900        Opportunity for questions and clarification was provided.       Patient transported with:   Monitor  Registered Nurse

## 2020-08-12 NOTE — PROGRESS NOTES
TRANSFER - IN REPORT:    Verbal report received from Sunil(name) on [employer]  being received from ED(unit) for routine progression of care      Report consisted of patients Situation, Background, Assessment and   Recommendations(SBAR). Information from the following report(s) SBAR, Kardex and MAR was reviewed with the receiving nurse. Opportunity for questions and clarification was provided. Assessment completed upon patients arrival to unit and care assumed.

## 2020-08-12 NOTE — PROGRESS NOTES
Pt is resting in bed. Respirations present on Airvo 40L at 40%. No signs of distress. No needs expressed. Bed low and locked. Call light within reach.  Report received from [address] [place] [address]

## 2020-08-12 NOTE — PROGRESS NOTES
Giovanny Smith  Admission Date: 8/10/2020             Daily Progress Note: 8/12/2020    The patient's chart is reviewed and the patient is discussed with the staff. 65 y.o.  female seen and evaluated at the request of cardiology for respiratory management as she is currently on BIPAP and with pleural effusions.     Patient arrived via EMS last evening with substernal chest pain radiating to right shoulder for 3 weeks, LE edema, CONTRERAS, productive cough with green sputum, fever/chills, nausea, diarrhea x1, light headedness, dizziness and palpitations. Has been having recent work up for anemia and HGB on arrival was 9.2. Was seen by GI with upper endoscopy and colonoscopy--no evidence of active bleeding. Outpatient COVID testing negative x2. Was admitted by cardiology with known hx CAD and PCI/stents in 2012, DM2, HTN, anemia, and HLD. In the ER she decompensated with hypoxia, O2 sats in the 70s and placed on BIPAP. Chest CT negative for PE but with pleural effusions and was given Lasix. Was seen in the overflow ICU and has had brisk diuresis with over 4L urine output.  Has been weaned off BIPAP to Air-Vo 40L, 40% and states is much less short of breath now  Subjective:   Moved to floor and now on 40% airvo 4250 ml out    Current Facility-Administered Medications   Medication Dose Route Frequency    aspirin chewable tablet 81 mg  81 mg Oral 7am    citalopram (CELEXA) tablet 20 mg  20 mg Oral 7am    clopidogreL (PLAVIX) tablet 75 mg  75 mg Oral DAILY    magnesium oxide (MAG-OX) tablet 400 mg  400 mg Oral BID    metoprolol succinate (TOPROL-XL) XL tablet 25 mg  25 mg Oral DAILY    NIFEdipine ER (PROCARDIA XL) tablet 60 mg  60 mg Oral DAILY    rosuvastatin (CRESTOR) tablet 40 mg  40 mg Oral QHS    traZODone (DESYREL) tablet 50 mg  50 mg Oral QHS    sodium chloride (NS) flush 5-40 mL  5-40 mL IntraVENous Q8H    sodium chloride (NS) flush 5-40 mL  5-40 mL IntraVENous PRN    nitroglycerin (NITROSTAT) tablet 0.4 mg  0.4 mg SubLINGual Q5MIN PRN    nitroglycerin (Tridil) 200 mcg/ml infusion  0-100 mcg/min IntraVENous TITRATE    acetaminophen (TYLENOL) tablet 650 mg  650 mg Oral Q4H PRN    ondansetron (ZOFRAN) injection 4 mg  4 mg IntraVENous Q4H PRN    heparin (porcine) injection 5,000 Units  5,000 Units SubCUTAneous Q8H    insulin lispro (HUMALOG) injection   SubCUTAneous AC&HS    furosemide (LASIX) injection 60 mg  60 mg IntraVENous Q12H       Review of Systems    Constitutional: negative for fever, chills, sweats  Cardiovascular: negative for chest pain, palpitations, syncope, edema  Gastrointestinal:  negative for dysphagia, reflux, vomiting, diarrhea, abdominal pain, or melena  Neurologic:  negative for focal weakness, numbness, headache    Objective:     Vitals:    08/12/20 0347 08/12/20 0734 08/12/20 0800 08/12/20 0929   BP: 132/55 145/66     Pulse: 65 68 (!) 57    Resp: 20 16     Temp: 98.2 °F (36.8 °C) 98.2 °F (36.8 °C)     SpO2: 100% 99%  100%   Weight:       Height:             Intake/Output Summary (Last 24 hours) at 8/12/2020 1116  Last data filed at 8/12/2020 1013  Gross per 24 hour   Intake    Output 3025 ml   Net -3025 ml       Physical Exam:   Constitution:  the patient is well developed and in no acute distress  EENMT:  Sclera clear, pupils equal, oral mucosa moist  Respiratory:  clear  Cardiovascular:  RRR without M,G,R  Gastrointestinal: soft and non-tender; with positive bowel sounds. Musculoskeletal: warm without cyanosis. There is no lower extremity edema.   Skin:  no jaundice or rashes, no wounds   Neurologic: no gross neuro deficits     Psychiatric:  alert and oriented x 3    CXR:       LAB  Recent Labs     08/12/20  0738 08/11/20  2145 08/11/20  1530 08/11/20  1106 08/11/20  0724   GLUCPOC 169* 158* 182* 144* 134*      Recent Labs     08/12/20  0524 08/11/20  0320 08/10/20  2201   WBC 6.9 8.3 9.1   HGB 9.5* 8.1* 9.2*   HCT 30.1* 24.4* 29.3*   PLT 335 291 334     Recent Labs     08/12/20  0524 08/11/20  0320 08/10/20  2201    140 138   K 3.4* 4.1 4.7    109* 107   CO2 29 22 20*   * 139* 133*   BUN 19 19 19   CREA 1.71* 1.65* 1.71*   MG  --   --  2.2   CA 8.8 8.3 9.0   ALB  --   --  3.6   TBILI  --   --  0.7   ALT  --   --  36     Recent Labs     08/11/20  0003   PHI 7.44   PCO2I 27.8*   PO2I 142*   HCO3I 18.6*     No results for input(s): LCAD, LAC in the last 72 hours. Assessment:  (Medical Decision Making)     Hospital Problems  Date Reviewed: 8/11/2020          Codes Class Noted POA    * (Principal) Acute respiratory failure with hypoxia (HCC) ICD-10-CM: J96.01  ICD-9-CM: 518.81  8/11/2020 Yes    40% airvo    Pleural effusion, bilateral ICD-10-CM: J90  ICD-9-CM: 511.9  8/11/2020 Yes    R.>L    Other cirrhosis of liver ([facility] ICD-10-CM: K74.69  ICD-9-CM: 571.5  8/11/2020 Unknown    new    Hyperlipidemia (Chronic) ICD-10-CM: E78.5  ICD-9-CM: 272.4  3/12/2020 Yes        Type 2 diabetes with nephropathy ([facility] ICD-10-CM: E11.21  ICD-9-CM: 250.40, 583.81  10/11/2018 Yes        Essential hypertension (Chronic) ICD-10-CM: I10  ICD-9-CM: 401.9  9/15/2016 Yes        Chronic kidney disease, stage III (moderate) (HCC) ICD-10-CM: N18.3  ICD-9-CM: 585.3  9/15/2016 Yes        Chest pain ICD-10-CM: R07.9  ICD-9-CM: 786.50  3/21/2016 Yes        CAD (coronary artery disease) (Chronic) ICD-10-CM: I25.10  ICD-9-CM: 414.00  9/4/2013 Yes              Plan:  (Medical Decision Making)   1    Lasix 60 q 12  2    Hold  plavix if ok with cardiology for possible thora  --    More than 50% of the time documented was spent in face-to-face contact with the patient and in the care of the patient on the floor/unit where the patient is located.     Edgar Hanley MD

## 2020-08-12 NOTE — PROGRESS NOTES
Patient AxO x4. Patient refuses for this RN to call patient's family with an update. Patient states her  has a very hard time on the phone because he is [facility]. This RN told patient to let me know if she changes her mind. Will respect wishes at this time.

## 2020-08-12 NOTE — PROGRESS NOTES
Patient resolved from Transition of Care episode on 8/12/20  Discussed COVID-19 related testing which was available at this time. Test results were negative. Patient informed of results, if available? yes     Patient/family has been provided the following resources and education related to COVID-19:                         Signs, symptoms and red flags related to COVID-19            CDC exposure and quarantine guidelines            [employer] exposure contact - [phone]            Contact for their local Department of Health                 Patient currently reports that the following symptoms have improved:  no new symptoms and no worsening symptoms. No further outreach scheduled with this CTN/ACM/LPN/HC/ MA. Episode of Care resolved. Patient has this CTN/ACM/LPN/HC/MA contact information if future needs arise. Pt states she is currently hospitalized for heart failure.

## 2020-08-12 NOTE — PROGRESS NOTES
Patient arrived room 834 via stretcher accompanied by transport. Patient is oriented to room. Respirations are even and unlabored. Patient on airvo 40L/52%. Dual skin assessment completed with jaydon RN. Abrasion noted bilateral arms. No skin break down. Bed is low, locked and call light within reach.

## 2020-08-12 NOTE — PROGRESS NOTES
End of Shift    Patient resting in bed, watching television. Remains on Airvo 40 L/min at 40%. Respirations even and unlabored. Bed in locked and low position. Call light within reach. Preparing to give report to oncoming nurse.

## 2020-08-13 PROBLEM — I50.31 ACUTE DIASTOLIC CONGESTIVE HEART FAILURE (HCC): Status: ACTIVE | Noted: 2020-08-13

## 2020-08-13 PROBLEM — I50.9 ACUTE CONGESTIVE HEART FAILURE (HCC): Status: ACTIVE | Noted: 2020-08-13

## 2020-08-13 LAB
ANION GAP SERPL CALC-SCNC: 10 MMOL/L (ref 7–16)
BUN SERPL-MCNC: 23 MG/DL (ref 8–23)
CALCIUM SERPL-MCNC: 9.8 MG/DL (ref 8.3–10.4)
CHLORIDE SERPL-SCNC: 101 MMOL/L (ref 98–107)
CO2 SERPL-SCNC: 28 MMOL/L (ref 21–32)
CREAT SERPL-MCNC: 1.94 MG/DL (ref 0.6–1)
ERYTHROCYTE [DISTWIDTH] IN BLOOD BY AUTOMATED COUNT: 13.2 % (ref 11.9–14.6)
GLUCOSE BLD STRIP.AUTO-MCNC: 149 MG/DL (ref 65–100)
GLUCOSE BLD STRIP.AUTO-MCNC: 228 MG/DL (ref 65–100)
GLUCOSE BLD STRIP.AUTO-MCNC: 231 MG/DL (ref 65–100)
GLUCOSE BLD STRIP.AUTO-MCNC: 357 MG/DL (ref 65–100)
GLUCOSE SERPL-MCNC: 138 MG/DL (ref 65–100)
HCT VFR BLD AUTO: 34.3 % (ref 35.8–46.3)
HGB BLD-MCNC: 11.2 G/DL (ref 11.7–15.4)
MCH RBC QN AUTO: 30.4 PG (ref 26.1–32.9)
MCHC RBC AUTO-ENTMCNC: 32.7 G/DL (ref 31.4–35)
MCV RBC AUTO: 93 FL (ref 79.6–97.8)
NRBC # BLD: 0 K/UL (ref 0–0.2)
PLATELET # BLD AUTO: 422 K/UL (ref 150–450)
PMV BLD AUTO: 9.8 FL (ref 9.4–12.3)
POTASSIUM SERPL-SCNC: 2.7 MMOL/L (ref 3.5–5.1)
RBC # BLD AUTO: 3.69 M/UL (ref 4.05–5.2)
SODIUM SERPL-SCNC: 139 MMOL/L (ref 136–145)
WBC # BLD AUTO: 8.8 K/UL (ref 4.3–11.1)

## 2020-08-13 PROCEDURE — 36415 COLL VENOUS BLD VENIPUNCTURE: CPT

## 2020-08-13 PROCEDURE — 80048 BASIC METABOLIC PNL TOTAL CA: CPT

## 2020-08-13 PROCEDURE — 74011250636 HC RX REV CODE- 250/636: Performed by: INTERNAL MEDICINE

## 2020-08-13 PROCEDURE — 99232 SBSQ HOSP IP/OBS MODERATE 35: CPT | Performed by: INTERNAL MEDICINE

## 2020-08-13 PROCEDURE — 74011250636 HC RX REV CODE- 250/636: Performed by: NURSE PRACTITIONER

## 2020-08-13 PROCEDURE — 74011250637 HC RX REV CODE- 250/637: Performed by: NURSE PRACTITIONER

## 2020-08-13 PROCEDURE — 82962 GLUCOSE BLOOD TEST: CPT

## 2020-08-13 PROCEDURE — 65660000000 HC RM CCU STEPDOWN

## 2020-08-13 PROCEDURE — 74011250637 HC RX REV CODE- 250/637: Performed by: INTERNAL MEDICINE

## 2020-08-13 PROCEDURE — 85027 COMPLETE CBC AUTOMATED: CPT

## 2020-08-13 PROCEDURE — 74011636637 HC RX REV CODE- 636/637: Performed by: NURSE PRACTITIONER

## 2020-08-13 RX ORDER — POTASSIUM CHLORIDE 14.9 MG/ML
20 INJECTION INTRAVENOUS ONCE
Status: COMPLETED | OUTPATIENT
Start: 2020-08-13 | End: 2020-08-13

## 2020-08-13 RX ORDER — METOPROLOL SUCCINATE 50 MG/1
50 TABLET, EXTENDED RELEASE ORAL DAILY
Status: DISCONTINUED | OUTPATIENT
Start: 2020-08-13 | End: 2020-08-14 | Stop reason: HOSPADM

## 2020-08-13 RX ORDER — POTASSIUM CHLORIDE 20 MEQ/1
40 TABLET, EXTENDED RELEASE ORAL 2 TIMES DAILY
Status: COMPLETED | OUTPATIENT
Start: 2020-08-13 | End: 2020-08-13

## 2020-08-13 RX ORDER — FUROSEMIDE 40 MG/1
40 TABLET ORAL DAILY
Status: DISCONTINUED | OUTPATIENT
Start: 2020-08-14 | End: 2020-08-14 | Stop reason: HOSPADM

## 2020-08-13 RX ADMIN — HEPARIN SODIUM 5000 UNITS: 5000 INJECTION INTRAVENOUS; SUBCUTANEOUS at 22:11

## 2020-08-13 RX ADMIN — POTASSIUM CHLORIDE 20 MEQ: 200 INJECTION, SOLUTION INTRAVENOUS at 09:24

## 2020-08-13 RX ADMIN — MAGNESIUM GLUCONATE 500 MG ORAL TABLET 400 MG: 500 TABLET ORAL at 08:22

## 2020-08-13 RX ADMIN — ROSUVASTATIN 40 MG: 20 TABLET, FILM COATED ORAL at 22:11

## 2020-08-13 RX ADMIN — NIFEDIPINE 60 MG: 30 TABLET, FILM COATED, EXTENDED RELEASE ORAL at 08:22

## 2020-08-13 RX ADMIN — FUROSEMIDE 60 MG: 10 INJECTION INTRAMUSCULAR; INTRAVENOUS at 08:21

## 2020-08-13 RX ADMIN — METOPROLOL SUCCINATE 50 MG: 50 TABLET, EXTENDED RELEASE ORAL at 09:24

## 2020-08-13 RX ADMIN — HEPARIN SODIUM 5000 UNITS: 5000 INJECTION INTRAVENOUS; SUBCUTANEOUS at 06:07

## 2020-08-13 RX ADMIN — ASPIRIN 81 MG: 81 TABLET, CHEWABLE ORAL at 06:06

## 2020-08-13 RX ADMIN — MAGNESIUM GLUCONATE 500 MG ORAL TABLET 400 MG: 500 TABLET ORAL at 17:31

## 2020-08-13 RX ADMIN — Medication 10 ML: at 14:31

## 2020-08-13 RX ADMIN — HEPARIN SODIUM 5000 UNITS: 5000 INJECTION INTRAVENOUS; SUBCUTANEOUS at 14:29

## 2020-08-13 RX ADMIN — INSULIN LISPRO 4 UNITS: 100 INJECTION, SOLUTION INTRAVENOUS; SUBCUTANEOUS at 17:30

## 2020-08-13 RX ADMIN — POTASSIUM CHLORIDE 40 MEQ: 20 TABLET, EXTENDED RELEASE ORAL at 17:31

## 2020-08-13 RX ADMIN — POTASSIUM CHLORIDE 40 MEQ: 20 TABLET, EXTENDED RELEASE ORAL at 09:24

## 2020-08-13 RX ADMIN — Medication 5 ML: at 06:07

## 2020-08-13 RX ADMIN — INSULIN LISPRO 4 UNITS: 100 INJECTION, SOLUTION INTRAVENOUS; SUBCUTANEOUS at 22:12

## 2020-08-13 RX ADMIN — Medication 10 ML: at 22:12

## 2020-08-13 RX ADMIN — TRAZODONE HYDROCHLORIDE 50 MG: 50 TABLET ORAL at 22:11

## 2020-08-13 RX ADMIN — CITALOPRAM HYDROBROMIDE 20 MG: 20 TABLET ORAL at 06:06

## 2020-08-13 NOTE — PROGRESS NOTES
[facility] CARDIOLOGY PROGRESS NOTE           8/13/2020 8:02 AM    Admit Date: 8/10/2020      Subjective:   Patient notes dyspnea much improved. Echo with normal LV function. BP controlled. On 2 liters via nasal cannula. ROS:  Cardiovascular:  As noted above    Objective:      Vitals:    08/13/20 0000 08/13/20 0240 08/13/20 0336 08/13/20 0400   BP:   131/68    Pulse: 66  74 80   Resp:   18    Temp:   98.6 °F (37 °C)    SpO2:  100% 100%    Weight:       Height:           Physical Exam:  General-No Acute Distress  Neck- supple, no JVD  CV- regular rate and rhythm no MRG  Lung- clear bilaterally  Abd- soft, nontender, nondistended  Ext- no edema bilaterally. Skin- warm and dry      Data Review:   Recent Labs     08/13/20  0626 08/12/20  0524  08/10/20  2201    141   < > 138   K 2.7* 3.4*   < > 4.7   MG  --   --   --  2.2   BUN 23 19   < > 19   CREA 1.94* 1.71*   < > 1.71*   * 129*   < > 133*   WBC 8.8 6.9   < > 9.1   HGB 11.2* 9.5*   < > 9.2*   HCT 34.3* 30.1*   < > 29.3*    335   < > 334   TRIGL  --  78  --   --    HDL  --  34*  --   --     < > = values in this interval not displayed. No results found for: MICHELLE Montiel     Echo (8/11/20): -  Left ventricle: Systolic function was normal. Ejection fraction was estimated in the range of 60 % to 65 %. There were no regional wall motion  abnormalities.   -  Left atrium: The atrium was moderately to markedly dilated.   -  Pericardium: A trace to small pericardial effusion was identified  circumferential to the heart. There was no evidence of hemodynamic   Compromise. There was a left pleural effusion. Assessment/Plan:     Principal Problem:    Acute respiratory failure with hypoxia ([address] (8/11/2020)    Improved with diuresis. Active Problems:    CAD (coronary artery disease) (9/4/2013)    HAd chest pain with acute CHF. Will need stress testing as outpatient.        Essential hypertension (9/15/2016) BP controlled. Increase Toprol. Chronic kidney disease, stage III (moderate) (HCC) (9/15/2016)    DAily BMP. Type 2 diabetes with nephropathy ([address] (10/11/2018)    Continue medications. Hyperlipidemia (3/12/2020)    Continue Crestor. Pleural effusion, bilateral (8/11/2020)    Pulmonary following. Hopefully improved with diuresis. Other cirrhosis of liver ([address] (8/11/2020)    Will need outpatient workup. Hypoxemia (8/12/2020)    Wean oxygen. Acute diastolic congestive heart failure ([address] (8/13/2020)    Give Lasix IV today and then change to PO. Hypokalemia  Replete. Daily BMP.                    Janett Voss MD  8/13/2020 8:02 AM

## 2020-08-13 NOTE — PROGRESS NOTES
Astrid Smith  Admission Date: 8/10/2020             Daily Progress Note: 8/13/2020    The patient's chart is reviewed and the patient is discussed with the staff. 65 y.o.  female seen and evaluated at the request of cardiology for respiratory management as she is currently on BIPAP and with pleural effusions.     Patient arrived via EMS last evening with substernal chest pain radiating to right shoulder for 3 weeks, LE edema, CONTRERAS, productive cough with green sputum, fever/chills, nausea, diarrhea x1, light headedness, dizziness and palpitations.  Has been having recent work up for anemia and HGB on arrival was 9.2. Was seen by GI with upper endoscopy and colonoscopy--no evidence of active bleeding.  Outpatient COVID testing negative x2. Was admitted by cardiology with known hx CAD and PCI/stents in 2012, DM2, HTN, anemia, and HLD. In the ER she decompensated with hypoxia, O2 sats in the 70s and placed on BIPAP.  Chest CT negative for PE but with pleural effusions and was given Lasix. Was seen in the overflow ICU and has had brisk diuresis with over 4L urine output.  Has been weaned off BIPAP to Air-Vo 40L, 40% and states is much less short of breath now  Subjective:   Doing better, down to 2 L nc  _[id number] since admit    Current Facility-Administered Medications   Medication Dose Route Frequency    [START ON 8/14/2020] furosemide (LASIX) tablet 40 mg  40 mg Oral DAILY    potassium chloride (K-DUR, KLOR-CON) SR tablet 40 mEq  40 mEq Oral BID    potassium chloride 20 mEq in 100 ml IVPB  20 mEq IntraVENous ONCE    metoprolol succinate (TOPROL-XL) XL tablet 50 mg  50 mg Oral DAILY    aspirin chewable tablet 81 mg  81 mg Oral 7am    citalopram (CELEXA) tablet 20 mg  20 mg Oral 7am    [Held by provider] clopidogreL (PLAVIX) tablet 75 mg  75 mg Oral DAILY    magnesium oxide (MAG-OX) tablet 400 mg  400 mg Oral BID    NIFEdipine ER (PROCARDIA XL) tablet 60 mg  60 mg Oral DAILY    rosuvastatin (CRESTOR) tablet 40 mg  40 mg Oral QHS    traZODone (DESYREL) tablet 50 mg  50 mg Oral QHS    sodium chloride (NS) flush 5-40 mL  5-40 mL IntraVENous Q8H    sodium chloride (NS) flush 5-40 mL  5-40 mL IntraVENous PRN    nitroglycerin (NITROSTAT) tablet 0.4 mg  0.4 mg SubLINGual Q5MIN PRN    nitroglycerin (Tridil) 200 mcg/ml infusion  0-100 mcg/min IntraVENous TITRATE    acetaminophen (TYLENOL) tablet 650 mg  650 mg Oral Q4H PRN    ondansetron (ZOFRAN) injection 4 mg  4 mg IntraVENous Q4H PRN    heparin (porcine) injection 5,000 Units  5,000 Units SubCUTAneous Q8H    insulin lispro (HUMALOG) injection   SubCUTAneous AC&HS       Review of Systems    Constitutional: negative for fever, chills, sweats  Cardiovascular: negative for chest pain, palpitations, syncope, edema  Gastrointestinal:  negative for dysphagia, reflux, vomiting, diarrhea, abdominal pain, or melena  Neurologic:  negative for focal weakness, numbness, headache    Objective:     Vitals:    08/13/20 0000 08/13/20 0240 08/13/20 0336 08/13/20 0400   BP:   131/68    Pulse: 66  74 80   Resp:   18    Temp:   98.6 °F (37 °C)    SpO2:  100% 100%    Weight:       Height:             Intake/Output Summary (Last 24 hours) at 8/13/2020 1049  Last data filed at 8/13/2020 4211  Gross per 24 hour   Intake    Output 1200 ml   Net -1200 ml       Physical Exam:   Constitution:  the patient is well developed and in no acute distress  EENMT:  Sclera clear, pupils equal, oral mucosa moist  Respiratory: clear  Cardiovascular:  RRR without M,G,R  Gastrointestinal: soft and non-tender; with positive bowel sounds. Musculoskeletal: warm without cyanosis. There is no lower extremity edema.   Skin:  no jaundice or rashes, no wounds   Neurologic: no gross neuro deficits     Psychiatric:  alert and oriented x 3    CXR:       LAB  Recent Labs     08/13/20  0701 08/12/20  2108 08/12/20  1631 08/12/20  1125 08/12/20  0738   GLUCPOC 149* 274* 232* 184* 169*      Recent Labs     08/13/20  0626 08/12/20  0524 08/11/20  0320 08/10/20  2201   WBC 8.8 6.9 8.3 9.1   HGB 11.2* 9.5* 8.1* 9.2*   HCT 34.3* 30.1* 24.4* 29.3*    335 291 334     Recent Labs     08/13/20  0626 08/12/20  0524 08/11/20  0320 08/10/20  2201    141 140 138   K 2.7* 3.4* 4.1 4.7    104 109* 107   CO2 28 29 22 20*   * 129* 139* 133*   BUN 23 19 19 19   CREA 1.94* 1.71* 1.65* 1.71*   MG  --   --   --  2.2   CA 9.8 8.8 8.3 9.0   ALB  --   --   --  3.6   TBILI  --   --   --  0.7   ALT  --   --   --  36     Recent Labs     08/11/20  0003   PHI 7.44   PCO2I 27.8*   PO2I 142*   HCO3I 18.6*     No results for input(s): LCAD, LAC in the last 72 hours.       Assessment:  (Medical Decision Making)     Hospital Problems  Date Reviewed: 8/11/2020          Codes Class Noted POA    Acute diastolic congestive heart failure (HCC) ICD-10-CM: I50.31  ICD-9-CM: 428.31, 428.0  8/13/2020         Hypoxemia ICD-10-CM: R09.02  ICD-9-CM: 799.02  8/12/2020 Unknown        * (Principal) Acute respiratory failure with hypoxia (HCC) ICD-10-CM: J96.01  ICD-9-CM: 518.81  8/11/2020 Yes        Pleural effusion, bilateral ICD-10-CM: J90  ICD-9-CM: 511.9  8/11/2020 Yes    Probably      Other cirrhosis of liver ([facility] ICD-10-CM: K74.69  ICD-9-CM: 571.5  8/11/2020 Unknown        Hyperlipidemia (Chronic) ICD-10-CM: E78.5  ICD-9-CM: 272.4  3/12/2020 Yes        Type 2 diabetes with nephropathy ([address] ICD-10-CM: E11.21  ICD-9-CM: 250.40, 583.81  10/11/2018 Yes        Essential hypertension (Chronic) ICD-10-CM: I10  ICD-9-CM: 401.9  9/15/2016 Yes        Chronic kidney disease, stage III (moderate) (HCC) ICD-10-CM: N18.3  ICD-9-CM: 585.3  9/15/2016 Yes        CAD (coronary artery disease) (Chronic) ICD-10-CM: I25.10  ICD-9-CM: 414.00  9/4/2013 Yes              Plan:  (Medical Decision Making)   1     Wean o2  2     Ultrasound chest  tomorrow- possible thoracentesis- plavix stopped  8/12  3   Lasix -good response  --    More than 50% of the time documented was spent in face-to-face contact with the patient and in the care of the patient on the floor/unit where the patient is located.     Faisal Boucher MD

## 2020-08-13 NOTE — PROGRESS NOTES
Pt is resting in bed. Respirations present on 2L high flow NC. No signs of distress. No needs expressed. Bed low and locked. Call light within reach.  Report given to Yane Horton, [address] [facility]

## 2020-08-13 NOTE — PROGRESS NOTES
Shift assessment completed. Pt alert and oriented x4. Lung sounds diminished with even and unlabored respirations, on 2L hi-flow nasal cannula. Heart sounds regular; on remote telemetry. Active bowel sounds with soft and non-tender abdomen. Skin warm and dry. Purewick catheter site c/d and emptied. IV c/d, flushed and capped. Pt reports no pain, nausea, and no signs of acute distress noted. Pt resting quietly in bed locked in lowest position with call light in reach and bed alarm set.

## 2020-08-13 NOTE — PROGRESS NOTES
Problem: Diabetes Self-Management  Goal: *Disease process and treatment process  Description: Define diabetes and identify own type of diabetes; list 3 options for treating diabetes. Outcome: Progressing Towards Goal  Goal: *Incorporating nutritional management into lifestyle  Description: Describe effect of type, amount and timing of food on blood glucose; list 3 methods for planning meals. Outcome: Progressing Towards Goal  Goal: *Incorporating physical activity into lifestyle  Description: State effect of exercise on blood glucose levels. Outcome: Progressing Towards Goal  Goal: *Developing strategies to promote health/change behavior  Description: Define the ABC's of diabetes; identify appropriate screenings, schedule and personal plan for screenings. Outcome: Progressing Towards Goal  Goal: *Using medications safely  Description: State effect of diabetes medications on diabetes; name diabetes medication taking, action and side effects. Outcome: Progressing Towards Goal  Goal: *Monitoring blood glucose, interpreting and using results  Description: Identify recommended blood glucose targets  and personal targets. Outcome: Progressing Towards Goal  Goal: *Prevention, detection, treatment of acute complications  Description: List symptoms of hyper- and hypoglycemia; describe how to treat low blood sugar and actions for lowering  high blood glucose level. Outcome: Progressing Towards Goal  Goal: *Prevention, detection and treatment of chronic complications  Description: Define the natural course of diabetes and describe the relationship of blood glucose levels to long term complications of diabetes.   Outcome: Progressing Towards Goal  Goal: *Developing strategies to address psychosocial issues  Description: Describe feelings about living with diabetes; identify support needed and support network  Outcome: Progressing Towards Goal  Goal: *Insulin pump training  Outcome: Progressing Towards Goal  Goal: *Sick day guidelines  Outcome: Progressing Towards Goal  Goal: *Patient Specific Goal (EDIT GOAL, INSERT TEXT)  Outcome: Progressing Towards Goal     Problem: Falls - Risk of  Goal: *Absence of Falls  Description: Document [place] Fall Risk and appropriate interventions in the flowsheet.   Outcome: Progressing Towards Goal  Note: Fall Risk Interventions:  Mobility Interventions: Bed/chair exit alarm, Patient to call before getting OOB, OT consult for ADLs, PT Consult for mobility concerns, PT Consult for assist device competence, Strengthening exercises (ROM-active/passive), Utilize walker, cane, or other assistive device         Medication Interventions: Bed/chair exit alarm, Patient to call before getting OOB, Teach patient to arise slowly    Elimination Interventions: Bed/chair exit alarm, Call light in reach, Patient to call for help with toileting needs, Stay With Me (per policy)

## 2020-08-13 NOTE — PROGRESS NOTES
Notified MD Mayer of patient's BS of 357 per MAR orders. RN to administer 10U of Humalog per [facility] and no further orders received.

## 2020-08-14 VITALS
DIASTOLIC BLOOD PRESSURE: 53 MMHG | TEMPERATURE: 98.5 F | HEIGHT: 64 IN | HEART RATE: 64 BPM | SYSTOLIC BLOOD PRESSURE: 119 MMHG | RESPIRATION RATE: 16 BRPM | BODY MASS INDEX: 23.71 KG/M2 | OXYGEN SATURATION: 91 % | WEIGHT: 138.89 LBS

## 2020-08-14 LAB
ANION GAP SERPL CALC-SCNC: 8 MMOL/L (ref 7–16)
BASOPHILS # BLD: 0 K/UL (ref 0–0.2)
BASOPHILS NFR BLD: 0 % (ref 0–2)
BUN SERPL-MCNC: 20 MG/DL (ref 8–23)
CALCIUM SERPL-MCNC: 9.1 MG/DL (ref 8.3–10.4)
CHLORIDE SERPL-SCNC: 102 MMOL/L (ref 98–107)
CO2 SERPL-SCNC: 27 MMOL/L (ref 21–32)
CREAT SERPL-MCNC: 1.71 MG/DL (ref 0.6–1)
DIFFERENTIAL METHOD BLD: ABNORMAL
EOSINOPHIL # BLD: 0.6 K/UL (ref 0–0.8)
EOSINOPHIL NFR BLD: 6 % (ref 0.5–7.8)
ERYTHROCYTE [DISTWIDTH] IN BLOOD BY AUTOMATED COUNT: 13.4 % (ref 11.9–14.6)
GLUCOSE BLD STRIP.AUTO-MCNC: 209 MG/DL (ref 65–100)
GLUCOSE BLD STRIP.AUTO-MCNC: 312 MG/DL (ref 65–100)
GLUCOSE SERPL-MCNC: 187 MG/DL (ref 65–100)
HCT VFR BLD AUTO: 31.5 % (ref 35.8–46.3)
HGB BLD-MCNC: 9.8 G/DL (ref 11.7–15.4)
IMM GRANULOCYTES # BLD AUTO: 0 K/UL (ref 0–0.5)
IMM GRANULOCYTES NFR BLD AUTO: 0 % (ref 0–5)
LYMPHOCYTES # BLD: 1.6 K/UL (ref 0.5–4.6)
LYMPHOCYTES NFR BLD: 17 % (ref 13–44)
MCH RBC QN AUTO: 29.9 PG (ref 26.1–32.9)
MCHC RBC AUTO-ENTMCNC: 31.1 G/DL (ref 31.4–35)
MCV RBC AUTO: 96 FL (ref 79.6–97.8)
MONOCYTES # BLD: 1.3 K/UL (ref 0.1–1.3)
MONOCYTES NFR BLD: 14 % (ref 4–12)
NEUTS SEG # BLD: 6.1 K/UL (ref 1.7–8.2)
NEUTS SEG NFR BLD: 63 % (ref 43–78)
NRBC # BLD: 0 K/UL (ref 0–0.2)
PLATELET # BLD AUTO: 374 K/UL (ref 150–450)
PMV BLD AUTO: 9.9 FL (ref 9.4–12.3)
POTASSIUM SERPL-SCNC: 3.9 MMOL/L (ref 3.5–5.1)
RBC # BLD AUTO: 3.28 M/UL (ref 4.05–5.2)
SODIUM SERPL-SCNC: 137 MMOL/L (ref 136–145)
WBC # BLD AUTO: 9.7 K/UL (ref 4.3–11.1)

## 2020-08-14 PROCEDURE — 74011250636 HC RX REV CODE- 250/636: Performed by: NURSE PRACTITIONER

## 2020-08-14 PROCEDURE — 82962 GLUCOSE BLOOD TEST: CPT

## 2020-08-14 PROCEDURE — 99238 HOSP IP/OBS DSCHRG MGMT 30/<: CPT | Performed by: INTERNAL MEDICINE

## 2020-08-14 PROCEDURE — 74011250637 HC RX REV CODE- 250/637: Performed by: NURSE PRACTITIONER

## 2020-08-14 PROCEDURE — 85025 COMPLETE CBC W/AUTO DIFF WBC: CPT

## 2020-08-14 PROCEDURE — 36415 COLL VENOUS BLD VENIPUNCTURE: CPT

## 2020-08-14 PROCEDURE — 80048 BASIC METABOLIC PNL TOTAL CA: CPT

## 2020-08-14 PROCEDURE — 99232 SBSQ HOSP IP/OBS MODERATE 35: CPT | Performed by: INTERNAL MEDICINE

## 2020-08-14 PROCEDURE — 74011250637 HC RX REV CODE- 250/637: Performed by: INTERNAL MEDICINE

## 2020-08-14 PROCEDURE — 74011636637 HC RX REV CODE- 636/637: Performed by: NURSE PRACTITIONER

## 2020-08-14 RX ORDER — METOPROLOL SUCCINATE 50 MG/1
50 TABLET, EXTENDED RELEASE ORAL DAILY
Qty: 30 TAB | Refills: 5 | Status: SHIPPED | OUTPATIENT
Start: 2020-08-14 | End: 2021-02-24

## 2020-08-14 RX ORDER — POTASSIUM CHLORIDE 20 MEQ/1
20 TABLET, EXTENDED RELEASE ORAL DAILY
Qty: 30 TAB | Refills: 11 | Status: SHIPPED | OUTPATIENT
Start: 2020-08-14 | End: 2021-01-19

## 2020-08-14 RX ORDER — FUROSEMIDE 40 MG/1
40 TABLET ORAL DAILY
Qty: 30 TAB | Refills: 11 | Status: SHIPPED | OUTPATIENT
Start: 2020-08-14 | End: 2020-08-24

## 2020-08-14 RX ADMIN — CITALOPRAM HYDROBROMIDE 20 MG: 20 TABLET ORAL at 08:21

## 2020-08-14 RX ADMIN — FUROSEMIDE 40 MG: 40 TABLET ORAL at 08:21

## 2020-08-14 RX ADMIN — ASPIRIN 81 MG: 81 TABLET, CHEWABLE ORAL at 08:21

## 2020-08-14 RX ADMIN — INSULIN LISPRO 4 UNITS: 100 INJECTION, SOLUTION INTRAVENOUS; SUBCUTANEOUS at 08:21

## 2020-08-14 RX ADMIN — HEPARIN SODIUM 5000 UNITS: 5000 INJECTION INTRAVENOUS; SUBCUTANEOUS at 05:20

## 2020-08-14 RX ADMIN — NIFEDIPINE 60 MG: 30 TABLET, FILM COATED, EXTENDED RELEASE ORAL at 08:21

## 2020-08-14 RX ADMIN — Medication 10 ML: at 05:20

## 2020-08-14 RX ADMIN — Medication 5 ML: at 12:01

## 2020-08-14 RX ADMIN — MAGNESIUM GLUCONATE 500 MG ORAL TABLET 400 MG: 500 TABLET ORAL at 08:21

## 2020-08-14 RX ADMIN — INSULIN LISPRO 8 UNITS: 100 INJECTION, SOLUTION INTRAVENOUS; SUBCUTANEOUS at 12:01

## 2020-08-14 RX ADMIN — METOPROLOL SUCCINATE 50 MG: 50 TABLET, EXTENDED RELEASE ORAL at 08:21

## 2020-08-14 NOTE — DISCHARGE INSTRUCTIONS
DISCHARGE SUMMARY from Nurse    PATIENT INSTRUCTIONS:    After general anesthesia or intravenous sedation, for 24 hours or while taking prescription Narcotics:  · Limit your activities  · Do not drive and operate hazardous machinery  · Do not make important personal or business decisions  · Do  not drink alcoholic beverages  · If you have not urinated within 8 hours after discharge, please contact your surgeon on call. Report the following to your surgeon:  · Excessive pain, swelling, redness or odor of or around the surgical area  · Temperature over 100.5  · Nausea and vomiting lasting longer than 4 hours or if unable to take medications  · Any signs of decreased circulation or nerve impairment to extremity: change in color, persistent  numbness, tingling, coldness or increase pain  · Any questions    What to do at Home:  Recommended activity: Activity as tolerated    If you experience any of the following symptoms shortness of breath not relieved by rest, pain not relieved by medications, chest pain or pressure increase in weakness fatigue or swelling, temperature greater than 101,chest pain or pressure or nausea and vomiting please follow up with MD.    *  Please give a list of your current medications to your Primary Care Provider. *  Please update this list whenever your medications are discontinued, doses are      changed, or new medications (including over-the-counter products) are added. *  Please carry medication information at all times in case of emergency situations. These are general instructions for a healthy lifestyle:    No smoking/ No tobacco products/ Avoid exposure to second hand smoke  Surgeon General's Warning:  Quitting smoking now greatly reduces serious risk to your health.     Obesity, smoking, and sedentary lifestyle greatly increases your risk for illness    A healthy diet, regular physical exercise & weight monitoring are important for maintaining a healthy lifestyle    You may be retaining fluid if you have a history of heart failure or if you experience any of the following symptoms:  Weight gain of 3 pounds or more overnight or 5 pounds in a week, increased swelling in our hands or feet or shortness of breath while lying flat in bed. Please call your doctor as soon as you notice any of these symptoms; do not wait until your next office visit. The discharge information has been reviewed with the patient. The patient verbalized understanding. Discharge medications reviewed with the patient and appropriate educational materials and side effects teaching were provided.   ___________________________________________________________________________________________________________________________________

## 2020-08-14 NOTE — PROGRESS NOTES
Fidelina Smith  Admission Date: 8/10/2020             Daily Progress Note: 8/14/2020    The patient's chart is reviewed and the patient is discussed with the staff. 65 y.o.  female seen and evaluated at the request of cardiology for respiratory management as she is currently on BIPAP and with pleural effusions.     Patient arrived via EMS last evening with substernal chest pain radiating to right shoulder for 3 weeks, LE edema, CONTRERAS, productive cough with green sputum, fever/chills, nausea, diarrhea x1, light headedness, dizziness and palpitations.  Has been having recent work up for anemia and HGB on arrival was 9.2. Was seen by GI with upper endoscopy and colonoscopy--no evidence of active bleeding.  Outpatient COVID testing negative x2. Was admitted by cardiology with known hx CAD and PCI/stents in 2012, DM2, HTN, anemia, and HLD. In the ER she decompensated with hypoxia, O2 sats in the 70s and placed on BIPAP.  Chest CT negative for PE but with pleural effusions and was given Lasix. Was seen in the overflow ICU and has had brisk diuresis with over 4L urine output. Has been weaned off BIPAP to Air-Vo 40L, 40% and states is much less short of breath now. Subjective: Weaned to RA with sat 93-95%. Net output 8.1L since admit. Plavix has been on hold for possible tap.      Current Facility-Administered Medications   Medication Dose Route Frequency    furosemide (LASIX) tablet 40 mg  40 mg Oral DAILY    metoprolol succinate (TOPROL-XL) XL tablet 50 mg  50 mg Oral DAILY    aspirin chewable tablet 81 mg  81 mg Oral 7am    citalopram (CELEXA) tablet 20 mg  20 mg Oral 7am    [Held by provider] clopidogreL (PLAVIX) tablet 75 mg  75 mg Oral DAILY    magnesium oxide (MAG-OX) tablet 400 mg  400 mg Oral BID    NIFEdipine ER (PROCARDIA XL) tablet 60 mg  60 mg Oral DAILY    rosuvastatin (CRESTOR) tablet 40 mg  40 mg Oral QHS    traZODone (DESYREL) tablet 50 mg  50 mg Oral QHS    sodium chloride (NS) flush 5-40 mL  5-40 mL IntraVENous Q8H    sodium chloride (NS) flush 5-40 mL  5-40 mL IntraVENous PRN    nitroglycerin (NITROSTAT) tablet 0.4 mg  0.4 mg SubLINGual Q5MIN PRN    nitroglycerin (Tridil) 200 mcg/ml infusion  0-100 mcg/min IntraVENous TITRATE    acetaminophen (TYLENOL) tablet 650 mg  650 mg Oral Q4H PRN    ondansetron (ZOFRAN) injection 4 mg  4 mg IntraVENous Q4H PRN    heparin (porcine) injection 5,000 Units  5,000 Units SubCUTAneous Q8H    insulin lispro (HUMALOG) injection   SubCUTAneous AC&HS       Review of Systems    Constitutional: negative for fever, chills, sweats  Cardiovascular: negative for chest pain, palpitations, syncope, edema  Gastrointestinal:  negative for dysphagia, reflux, vomiting, diarrhea, abdominal pain, or melena  Neurologic:  negative for focal weakness, numbness, headache    Objective:     Vitals:    08/13/20 2040 08/13/20 2307 08/14/20 0347 08/14/20 0802   BP:  118/46 125/55 138/65   Pulse:  62 75 78   Resp:  20 18 14   Temp:  99.2 °F (37.3 °C) 99.5 °F (37.5 °C) 98.4 °F (36.9 °C)   SpO2: 100% 98% 95% 93%   Weight:       Height:             Intake/Output Summary (Last 24 hours) at 8/14/2020 1058  Last data filed at 8/13/2020 1844  Gross per 24 hour   Intake 240 ml   Output 850 ml   Net -610 ml       Physical Exam:   Constitution:  the patient is well developed and in no acute distress  EENMT:  Sclera clear, pupils equal, oral mucosa moist  Respiratory: clear bilaterally. No evidence for any R effusion today. Cardiovascular:  RRR without M,G,R  Gastrointestinal: soft and non-tender; with positive bowel sounds. Musculoskeletal: warm without cyanosis. There is no lower extremity edema.   Skin:  no jaundice or rashes, no wounds   Neurologic: no gross neuro deficits     Psychiatric:  alert and oriented x 3    CXR:     8/12:          LAB  Recent Labs     08/14/20  0805 08/13/20  2141 08/13/20  1552 08/13/20  1110 08/13/20  0701   GLUCPOC 209* 231* 228* 357* 149*      Recent Labs     08/14/20  0549 08/13/20  0626 08/12/20  0524   WBC 9.7 8.8 6.9   HGB 9.8* 11.2* 9.5*   HCT 31.5* 34.3* 30.1*    422 335     Recent Labs     08/14/20  0549 08/13/20  0626 08/12/20  0524    139 141   K 3.9 2.7* 3.4*    101 104   CO2 27 28 29   * 138* 129*   BUN 20 23 19   CREA 1.71* 1.94* 1.71*   CA 9.1 9.8 8.8     No results for input(s): PH, PCO2, PO2, HCO3, PHI, PCO2I, PO2I, HCO3I in the last 72 hours. No results for input(s): LCAD, LAC in the last 72 hours. Assessment:  (Medical Decision Making)     Hospital Problems  Date Reviewed: 8/11/2020          Codes Class Noted POA    Acute diastolic congestive heart failure ([facility] ICD-10-CM: I50.31  ICD-9-CM: 428.31, 428.0  8/13/2020     Better. Echo normal per Dr. Ant Wong. Hypoxemia ICD-10-CM: R09.02  ICD-9-CM: 799.02  8/12/2020 Unknown    Resolved    * (Principal) Acute respiratory failure with hypoxia (HCC) ICD-10-CM: J96.01  ICD-9-CM: 518.81  8/11/2020 Yes        Pleural effusion, bilateral ICD-10-CM: J90  ICD-9-CM: 511.9  8/11/2020 Yes    Diuresed 8L since admit. Exam today suggest resolution of effusions. Other cirrhosis of liver ([facility] ICD-10-CM: K74.69  ICD-9-CM: 571.5  8/11/2020 Unknown        Hyperlipidemia (Chronic) ICD-10-CM: E78.5  ICD-9-CM: 272.4  3/12/2020 Yes        Type 2 diabetes with nephropathy ([facility] ICD-10-CM: E11.21  ICD-9-CM: 250.40, 583.81  10/11/2018 Yes        Essential hypertension (Chronic) ICD-10-CM: I10  ICD-9-CM: 401.9  9/15/2016 Yes          Chronic kidney disease, stage III (moderate) (HCC) ICD-10-CM: N18.3  ICD-9-CM: 585.3  9/15/2016 Yes    Cr. 1.71     CAD (coronary artery disease) (Chronic) ICD-10-CM: I25.10  ICD-9-CM: 414.00  9/4/2013 Yes              Plan:  (Medical Decision Making)     No need for US or tap. Resume plavix. Will sign off. Call if needed.      --    More than 50% of the time documented was spent in face-to-face contact with the patient and in the care of the patient on the floor/unit where the patient is located.     Yara Hidalgo MD

## 2020-08-14 NOTE — ROUTINE PROCESS
CHF teaching held , sleeping soundly. Planners @ BS and will follow. Cardiac diet/ FR Palliaitve care: ACP on file

## 2020-08-14 NOTE — PROGRESS NOTES
DC instructions given to pt. Pt being DC home to self care with followup appts. Pt medications, appt, and instructions given and understood. Prescriptions sent home with pt. Pt taken to front of hospital via [facility] for DC, mask in place.

## 2020-08-14 NOTE — DISCHARGE SUMMARY
[address] Cardiology Discharge Summary     Patient ID:  Adithya Vaca  [id number]  64 y.o.  1950    Admit date: 8/10/2020    Discharge date:  08/14/2020    Admitting Physician: Jean Carlos Alba MD     Discharge Physician: Jeffrey Dominguez NP/Dr. Mayer    Admission Diagnoses: Acute respiratory failure with hypoxia [facility]) [J96.01]    Discharge Diagnoses:    Diagnosis    Acute diastolic congestive heart failure ([facility]    Hypoxemia    Acute respiratory failure with hypoxia ([facility]    Pleural effusion, bilateral    Other cirrhosis of liver ([facility]    Hyperlipidemia    History of left-sided carotid endarterectomy    Carotid stenosis, asymptomatic, right    Type 2 diabetes with nephropathy (HCC)    Gastroesophageal reflux disease    Essential hypertension    Chronic kidney disease, stage III (moderate) ([facility]    CAD (coronary artery disease)       Cardiology Procedures this admission:  EchoCardiogram  Consults: Pulmonary/Intensive care    Hospital Course: Patient presented to the emergency department of [facility] with complaints of chest pain and worsening CONTRERAS. She also noted increased LE edema, fever, and difficulty with urination. In the ED she decompensated with sat in 70s and was placed on BIPAP. CT showed bilateral pleural effusions with R>L. She was treated with IVP lasix and SL nitro in the ER. Patient was evaluated and subsequently admitted for further cardiac evaluation and treatment. Pulmonary was consulted for management of BIPAP and pleural effusions. Patient was treated with IVP lasix. She had good UOP and was -8.1L. No thoracentesis was needed. Patient is being discharged on lasix 40 mg daily and K 20 meq daily. Patient will have BMP and Mag prior to follow up. Echocardiogram showed:     Left ventricle: Systolic function was normal. Ejection fraction was  estimated in the range of 60 % to 65 %.  There were no regional wall motion abnormalities.     -  Left atrium: The atrium was moderately to markedly dilated.     -  Pericardium: A trace to small pericardial effusion was identified  circumferential to the heart. There was no evidence of hemodynamic compromise. At time of discharge, patient was up feeling well without any complaints shortness of breath. LE edema was much improved. Patient's labs were stable with creatinine of 1.71. Patient was seen and examined by Dr. Archana Martínez and determined stable and ready for discharge. Patient was instructed on the importance of medication compliance, low sodium diet, 2 liter per day fluid restriction and daily weights. For maximized medical therapy of congestive heart failure, patient will continue use of  BB. The patient has been scheduled for  follow up with [facility] -- Dr. Evonne Trammell. DISPOSITION: The patient is being discharged home in stable condition on a low saturated fat, low cholesterol and low salt diet. The patient is instructed to advance activities as tolerated to the limit of fatigue or shortness of breath. The patient is informed to monitor daily weights and maintain a 2 liter per day fluid restriction. The patient is instructed to call the office for any shortness of breath, weight gain, or increased peripheral edema.         Discharge Exam:   Visit Vitals  /53 (BP 1 Location: Right arm, BP Patient Position: At rest)   Pulse 64   Temp 98.5 °F (36.9 °C)   Resp 16   Ht 5' 4\" (1.626 m)   Wt 63 kg (138 lb 14.2 oz)   SpO2 91%   BMI 23.84 kg/m²       Recent Results (from the past 24 hour(s))   GLUCOSE, POC    Collection Time: 08/13/20  3:52 PM   Result Value Ref Range    Glucose (POC) 228 (H) 65 - 100 mg/dL   GLUCOSE, POC    Collection Time: 08/13/20  9:41 PM   Result Value Ref Range    Glucose (POC) 231 (H) 65 - 188 mg/dL   METABOLIC PANEL, BASIC    Collection Time: 08/14/20  5:49 AM   Result Value Ref Range    Sodium 137 136 - 145 mmol/L    Potassium 3.9 3.5 - 5.1 mmol/L    Chloride 102 98 - 107 mmol/L    CO2 27 21 - 32 mmol/L    Anion gap 8 7 - 16 mmol/L    Glucose 187 (H) 65 - 100 mg/dL    BUN 20 8 - 23 MG/DL    Creatinine 1.71 (H) 0.6 - 1.0 MG/DL    GFR est AA 38 (L) >60 ml/min/1.73m2    GFR est non-AA 31 (L) >60 ml/min/1.73m2    Calcium 9.1 8.3 - 10.4 MG/DL   CBC WITH AUTOMATED DIFF    Collection Time: 08/14/20  5:49 AM   Result Value Ref Range    WBC 9.7 4.3 - 11.1 K/uL    RBC 3.28 (L) 4.05 - 5.2 M/uL    HGB 9.8 (L) 11.7 - 15.4 g/dL    HCT 31.5 (L) 35.8 - 46.3 %    MCV 96.0 79.6 - 97.8 FL    MCH 29.9 26.1 - 32.9 PG    MCHC 31.1 (L) 31.4 - 35.0 g/dL    RDW 13.4 11.9 - 14.6 %    PLATELET 728 474 - 955 K/uL    MPV 9.9 9.4 - 12.3 FL    ABSOLUTE NRBC 0.00 0.0 - 0.2 K/uL    DF AUTOMATED      NEUTROPHILS 63 43 - 78 %    LYMPHOCYTES 17 13 - 44 %    MONOCYTES 14 (H) 4.0 - 12.0 %    EOSINOPHILS 6 0.5 - 7.8 %    BASOPHILS 0 0.0 - 2.0 %    IMMATURE GRANULOCYTES 0 0.0 - 5.0 %    ABS. NEUTROPHILS 6.1 1.7 - 8.2 K/UL    ABS. LYMPHOCYTES 1.6 0.5 - 4.6 K/UL    ABS. MONOCYTES 1.3 0.1 - 1.3 K/UL    ABS. EOSINOPHILS 0.6 0.0 - 0.8 K/UL    ABS. BASOPHILS 0.0 0.0 - 0.2 K/UL    ABS. IMM. GRANS. 0.0 0.0 - 0.5 K/UL   GLUCOSE, POC    Collection Time: 08/14/20  8:05 AM   Result Value Ref Range    Glucose (POC) 209 (H) 65 - 100 mg/dL   GLUCOSE, POC    Collection Time: 08/14/20 11:15 AM   Result Value Ref Range    Glucose (POC) 312 (H) 65 - 100 mg/dL         Patient Instructions:        Current Discharge Medication List      START taking these medications    Details   furosemide (LASIX) 40 mg tablet Take 1 Tab by mouth daily. Qty: 30 Tab, Refills: 11      potassium chloride SA (K-DUR, KLOR-CON) 20 mEq tablet Take 1 Tab by mouth daily. Qty: 30 Tab, Refills: 11         CONTINUE these medications which have CHANGED    Details   metoprolol succinate (TOPROL-XL) 50 mg XL tablet Take 1 Tab by mouth daily.   Qty: 30 Tab, Refills: 5         CONTINUE these medications which have NOT CHANGED    Details   ondansetron (ZOFRAN ODT) 8 mg disintegrating tablet Take 1 Tab by mouth every eight (8) hours as needed for Nausea. Qty: 12 Tab, Refills: 1      diphenoxylate-atropine (LomotiL) 2.5-0.025 mg per tablet Take 2 Tabs by mouth four (4) times daily as needed for Diarrhea (1 tab after each stool for max 8 per day). Max Daily Amount: 8 Tabs. Take after each stool for a maximum of 8 tablets daily  Qty: 20 Tab, Refills: 0    Associated Diagnoses: Viral syndrome      NIFEdipine ER (PROCARDIA XL) 60 mg ER tablet TAKE 1 TABLET BY MOUTH  DAILY  Qty: 90 Tab, Refills: 1    Associated Diagnoses: Essential hypertension      citalopram (CELEXA) 20 mg tablet TAKE 1 TABLET BY MOUTH  EVERY MORNING  Qty: 90 Tab, Refills: 1    Associated Diagnoses: Major depressive disorder with single episode, in full remission (HCC)      traZODone (DESYREL) 50 mg tablet TAKE 1 TABLET BY MOUTH  NIGHTLY  Qty: 90 Tab, Refills: 1    Associated Diagnoses: Primary insomnia      rosuvastatin (CRESTOR) 40 mg tablet Take 1 Tab by mouth nightly. Qty: 80 Tab, Refills: 1    Associated Diagnoses: Coronary artery disease involving native coronary artery of native heart without angina pectoris; Mixed hyperlipidemia      clopidogreL (PLAVIX) 75 mg tab TAKE 1 TABLET BY MOUTH  DAILY  Qty: 90 Tab, Refills: 2      co-enzyme Q-10 (CO Q-10) 100 mg capsule Take 100 mg by mouth daily. nitroglycerin (NITROSTAT) 0.4 mg SL tablet 1 Tab by SubLINGual route every five (5) minutes as needed for Chest Pain. Qty: 3 Bottle, Refills: 3      magnesium oxide (MAG-OX) 400 mg tablet TAKE 1 TABLET BY MOUTH TWICE DAILY  Qty: 180 Tab, Refills: 3    Associated Diagnoses: Hypomagnesemia      cholecalciferol, vitamin D3, (VITAMIN D3) 2,000 unit tab Take 2,000 Units by mouth daily. cyanocobalamin (VITAMIN B-12) 1,000 mcg tablet Take 2,500 mcg by mouth daily. aspirin 81 mg chewable tablet Take 81 mg by mouth every morning.  Indications: continue per anesthesia guidelines         STOP taking these medications POTASSIUM GLUCONATE PO Comments:   Reason for Stopping:                 Signed:  Rakan Cook NP  8/14/2020 12:30 PM

## 2020-08-14 NOTE — PROGRESS NOTES
Pt resting in bed. Pt alert oriented times 3 at this time. Pt complaints of back pain 3/10 from laying in bed. Pt encouraged to get up out of bed. Pt on RA. No distress noted at this time. Pt encouraged to call for assistance if needed call light in reach, door open will monitor.

## 2020-08-17 NOTE — ROUTINE PROCESS
CHF teaching started post introduction to pt/family; aware of diagnosis. Planner/scale @ BS and will follow. Smoking/ ETOH/Illicit drug use cessation covered. Pt/family aware that I cannot prescribe nor adjust medications: 15mins Palliative Care score:  ACP on file Start 2 Liter Fluid Restriction/ Cardiac diet CHF teaching continues to pt/family. Emphasis on taking prescription meds as ordered, to keep F/U appointments and  maintain healthy weight , to call MD STAT : 
 
CHF teaching completed via , verbalize emphasis on monitoring self and report to MD: 
? If you gain 2 lbs in one day or 5 lbs in a week, and short of breath. ? If you cannot lay flat without developing short of breath or rapid breathing at night; or if it wakes you up. Develop a cough or wheezing. ? If you notice swollen hands/feet/ankles or stomach with a bloated/ full feeling. ? If you become confused or mentally fuzzy or dizzy. ? If you notice a rapid or change in your heart rate. ? If you become more exhausted all the time and unable to do the same level of activity without stopping to catch your breath. Drink no more than 8 cups a day in 8 oz. cups. Limit Cola Drinks. Your Heart can not handle any more. Stay away from salt (limit anything with salt or sodium in it). Limit to 250mg per serving. Exercise needs to be started with your Doctors approval. 
Reduce stress, call your Doctor or myself if concerned Pass posttest via teach back; will make self-available post DC, if an questions arise. Diabetic teaching completed.  Planner/scale @ BS:  60 mins total 
 
[facility]

## 2020-09-01 PROBLEM — E11.9 TYPE 2 DIABETES MELLITUS WITHOUT COMPLICATION (HCC): Status: ACTIVE | Noted: 2020-09-01

## 2020-09-01 PROBLEM — Z98.61 S/P PTCA (PERCUTANEOUS TRANSLUMINAL CORONARY ANGIOPLASTY): Status: ACTIVE | Noted: 2020-09-01

## 2020-10-21 PROBLEM — I50.31 ACUTE DIASTOLIC CONGESTIVE HEART FAILURE (HCC): Status: RESOLVED | Noted: 2020-08-13 | Resolved: 2020-10-21

## 2020-10-21 PROBLEM — J96.01 ACUTE RESPIRATORY FAILURE WITH HYPOXIA (HCC): Status: RESOLVED | Noted: 2020-08-11 | Resolved: 2020-10-21

## 2020-10-21 PROBLEM — E11.9 TYPE 2 DIABETES MELLITUS WITHOUT COMPLICATION (HCC): Status: RESOLVED | Noted: 2020-09-01 | Resolved: 2020-10-21

## 2020-10-21 PROBLEM — E78.2 MIXED HYPERLIPIDEMIA: Status: ACTIVE | Noted: 2020-10-21

## 2021-01-12 ENCOUNTER — HOSPITAL ENCOUNTER (INPATIENT)
Age: 71
LOS: 7 days | Discharge: HOME HEALTH | DRG: 871 | End: 2021-01-19
Attending: EMERGENCY MEDICINE | Admitting: FAMILY MEDICINE
Payer: MEDICARE

## 2021-01-12 ENCOUNTER — APPOINTMENT (OUTPATIENT)
Dept: GENERAL RADIOLOGY | Age: 71
DRG: 871 | End: 2021-01-12
Attending: EMERGENCY MEDICINE
Payer: MEDICARE

## 2021-01-12 DIAGNOSIS — R73.9 HYPERGLYCEMIA: ICD-10-CM

## 2021-01-12 DIAGNOSIS — U07.1 COVID-19 VIRUS INFECTION: Primary | ICD-10-CM

## 2021-01-12 DIAGNOSIS — N17.9 ACUTE KIDNEY INJURY (HCC): ICD-10-CM

## 2021-01-12 DIAGNOSIS — R94.31 T WAVE INVERSION IN EKG: ICD-10-CM

## 2021-01-12 DIAGNOSIS — R52 PAIN: ICD-10-CM

## 2021-01-12 DIAGNOSIS — E87.20 LACTIC ACIDOSIS: ICD-10-CM

## 2021-01-12 PROBLEM — R79.89 ELEVATED LFTS: Status: ACTIVE | Noted: 2021-01-12

## 2021-01-12 PROBLEM — A41.9 SEPSIS (HCC): Status: ACTIVE | Noted: 2021-01-12

## 2021-01-12 PROBLEM — N18.32 ACUTE RENAL FAILURE SUPERIMPOSED ON STAGE 3B CHRONIC KIDNEY DISEASE (HCC): Status: ACTIVE | Noted: 2021-01-12

## 2021-01-12 PROBLEM — D64.9 NORMOCYTIC ANEMIA: Status: ACTIVE | Noted: 2021-01-12

## 2021-01-12 PROBLEM — E11.65 TYPE 2 DIABETES MELLITUS WITH HYPERGLYCEMIA, WITHOUT LONG-TERM CURRENT USE OF INSULIN (HCC): Status: ACTIVE | Noted: 2018-10-11

## 2021-01-12 PROBLEM — E87.1 HYPONATREMIA: Status: ACTIVE | Noted: 2021-01-12

## 2021-01-12 LAB
ALBUMIN SERPL-MCNC: 3.3 G/DL (ref 3.2–4.6)
ALBUMIN/GLOB SERPL: 0.8 {RATIO} (ref 1.2–3.5)
ALP SERPL-CCNC: 128 U/L (ref 50–136)
ALT SERPL-CCNC: 68 U/L (ref 12–65)
ANION GAP SERPL CALC-SCNC: 7 MMOL/L (ref 7–16)
APPEARANCE UR: ABNORMAL
AST SERPL-CCNC: 87 U/L (ref 15–37)
ATRIAL RATE: 70 BPM
BACTERIA URNS QL MICRO: 0 /HPF
BASOPHILS # BLD: 0 K/UL (ref 0–0.2)
BASOPHILS NFR BLD: 0 % (ref 0–2)
BILIRUB SERPL-MCNC: 0.5 MG/DL (ref 0.2–1.1)
BILIRUB UR QL: NEGATIVE
BNP SERPL-MCNC: ABNORMAL PG/ML (ref 5–125)
BUN SERPL-MCNC: 27 MG/DL (ref 8–23)
CALCIUM SERPL-MCNC: 8.4 MG/DL (ref 8.3–10.4)
CALCULATED P AXIS, ECG09: 76 DEGREES
CALCULATED R AXIS, ECG10: 102 DEGREES
CALCULATED T AXIS, ECG11: -82 DEGREES
CASTS URNS QL MICRO: ABNORMAL /LPF
CHLORIDE SERPL-SCNC: 97 MMOL/L (ref 98–107)
CO2 SERPL-SCNC: 23 MMOL/L (ref 21–32)
COLOR UR: YELLOW
CREAT SERPL-MCNC: 2.47 MG/DL (ref 0.6–1)
DIAGNOSIS, 93000: NORMAL
DIFFERENTIAL METHOD BLD: ABNORMAL
EOSINOPHIL # BLD: 0 K/UL (ref 0–0.8)
EOSINOPHIL NFR BLD: 0 % (ref 0.5–7.8)
EPI CELLS #/AREA URNS HPF: ABNORMAL /HPF
ERYTHROCYTE [DISTWIDTH] IN BLOOD BY AUTOMATED COUNT: 15 % (ref 11.9–14.6)
GLOBULIN SER CALC-MCNC: 4.4 G/DL (ref 2.3–3.5)
GLUCOSE BLD STRIP.AUTO-MCNC: 363 MG/DL (ref 65–100)
GLUCOSE SERPL-MCNC: 590 MG/DL (ref 65–100)
GLUCOSE UR STRIP.AUTO-MCNC: >1000 MG/DL
HCT VFR BLD AUTO: 35.1 % (ref 35.8–46.3)
HGB BLD-MCNC: 10.8 G/DL (ref 11.7–15.4)
HGB UR QL STRIP: ABNORMAL
IMM GRANULOCYTES # BLD AUTO: 0 K/UL (ref 0–0.5)
IMM GRANULOCYTES NFR BLD AUTO: 0 % (ref 0–5)
KETONES UR QL STRIP.AUTO: NEGATIVE MG/DL
LACTATE SERPL-SCNC: 2.5 MMOL/L (ref 0.4–2)
LEUKOCYTE ESTERASE UR QL STRIP.AUTO: NEGATIVE
LYMPHOCYTES # BLD: 0.7 K/UL (ref 0.5–4.6)
LYMPHOCYTES NFR BLD: 11 % (ref 13–44)
MCH RBC QN AUTO: 28.6 PG (ref 26.1–32.9)
MCHC RBC AUTO-ENTMCNC: 30.8 G/DL (ref 31.4–35)
MCV RBC AUTO: 92.9 FL (ref 79.6–97.8)
MONOCYTES # BLD: 0.4 K/UL (ref 0.1–1.3)
MONOCYTES NFR BLD: 7 % (ref 4–12)
NEUTS SEG # BLD: 5.2 K/UL (ref 1.7–8.2)
NEUTS SEG NFR BLD: 81 % (ref 43–78)
NITRITE UR QL STRIP.AUTO: NEGATIVE
NRBC # BLD: 0 K/UL (ref 0–0.2)
OTHER OBSERVATIONS,UCOM: ABNORMAL
P-R INTERVAL, ECG05: 132 MS
PH UR STRIP: 5.5 [PH] (ref 5–9)
PLATELET # BLD AUTO: 239 K/UL (ref 150–450)
PMV BLD AUTO: 10.9 FL (ref 9.4–12.3)
POTASSIUM SERPL-SCNC: 4.1 MMOL/L (ref 3.5–5.1)
PROT SERPL-MCNC: 7.7 G/DL (ref 6.3–8.2)
PROT UR STRIP-MCNC: 100 MG/DL
Q-T INTERVAL, ECG07: 572 MS
QRS DURATION, ECG06: 102 MS
QTC CALCULATION (BEZET), ECG08: 617 MS
RBC # BLD AUTO: 3.78 M/UL (ref 4.05–5.2)
RBC #/AREA URNS HPF: ABNORMAL /HPF
SARS-COV-2, NAA: DETECTED
SODIUM SERPL-SCNC: 127 MMOL/L (ref 136–145)
SP GR UR REFRACTOMETRY: 1.03 (ref 1–1.02)
TROPONIN-HIGH SENSITIVITY: 78 PG/ML (ref 0–14)
UROBILINOGEN UR QL STRIP.AUTO: 0.2 EU/DL (ref 0.2–1)
VENTRICULAR RATE, ECG03: 70 BPM
WBC # BLD AUTO: 6.3 K/UL (ref 4.3–11.1)
WBC URNS QL MICRO: ABNORMAL /HPF

## 2021-01-12 PROCEDURE — 81001 URINALYSIS AUTO W/SCOPE: CPT

## 2021-01-12 PROCEDURE — 82962 GLUCOSE BLOOD TEST: CPT

## 2021-01-12 PROCEDURE — 74011250636 HC RX REV CODE- 250/636: Performed by: EMERGENCY MEDICINE

## 2021-01-12 PROCEDURE — 83880 ASSAY OF NATRIURETIC PEPTIDE: CPT

## 2021-01-12 PROCEDURE — 99285 EMERGENCY DEPT VISIT HI MDM: CPT

## 2021-01-12 PROCEDURE — 71045 X-RAY EXAM CHEST 1 VIEW: CPT

## 2021-01-12 PROCEDURE — 96360 HYDRATION IV INFUSION INIT: CPT

## 2021-01-12 PROCEDURE — 96375 TX/PRO/DX INJ NEW DRUG ADDON: CPT

## 2021-01-12 PROCEDURE — 74011250637 HC RX REV CODE- 250/637: Performed by: EMERGENCY MEDICINE

## 2021-01-12 PROCEDURE — 74011250637 HC RX REV CODE- 250/637: Performed by: FAMILY MEDICINE

## 2021-01-12 PROCEDURE — 87040 BLOOD CULTURE FOR BACTERIA: CPT

## 2021-01-12 PROCEDURE — 84484 ASSAY OF TROPONIN QUANT: CPT

## 2021-01-12 PROCEDURE — 65660000000 HC RM CCU STEPDOWN

## 2021-01-12 PROCEDURE — 85025 COMPLETE CBC W/AUTO DIFF WBC: CPT

## 2021-01-12 PROCEDURE — 74011636637 HC RX REV CODE- 636/637: Performed by: FAMILY MEDICINE

## 2021-01-12 PROCEDURE — 74011636637 HC RX REV CODE- 636/637: Performed by: EMERGENCY MEDICINE

## 2021-01-12 PROCEDURE — 93005 ELECTROCARDIOGRAM TRACING: CPT | Performed by: EMERGENCY MEDICINE

## 2021-01-12 PROCEDURE — 83605 ASSAY OF LACTIC ACID: CPT

## 2021-01-12 PROCEDURE — 80053 COMPREHEN METABOLIC PANEL: CPT

## 2021-01-12 PROCEDURE — 74011250636 HC RX REV CODE- 250/636: Performed by: FAMILY MEDICINE

## 2021-01-12 PROCEDURE — 96374 THER/PROPH/DIAG INJ IV PUSH: CPT

## 2021-01-12 RX ORDER — ACETAMINOPHEN 325 MG/1
650 TABLET ORAL
Status: DISCONTINUED | OUTPATIENT
Start: 2021-01-12 | End: 2021-01-19 | Stop reason: HOSPADM

## 2021-01-12 RX ORDER — MELATONIN
2000 DAILY
Status: DISCONTINUED | OUTPATIENT
Start: 2021-01-13 | End: 2021-01-19 | Stop reason: HOSPADM

## 2021-01-12 RX ORDER — SODIUM CHLORIDE 0.9 % (FLUSH) 0.9 %
5-40 SYRINGE (ML) INJECTION EVERY 8 HOURS
Status: DISCONTINUED | OUTPATIENT
Start: 2021-01-12 | End: 2021-01-19 | Stop reason: HOSPADM

## 2021-01-12 RX ORDER — UREA 10 %
220 LOTION (ML) TOPICAL DAILY
Status: DISCONTINUED | OUTPATIENT
Start: 2021-01-13 | End: 2021-01-17

## 2021-01-12 RX ORDER — INSULIN LISPRO 100 [IU]/ML
INJECTION, SOLUTION INTRAVENOUS; SUBCUTANEOUS
Status: DISCONTINUED | OUTPATIENT
Start: 2021-01-12 | End: 2021-01-19 | Stop reason: HOSPADM

## 2021-01-12 RX ORDER — METOPROLOL SUCCINATE 50 MG/1
50 TABLET, EXTENDED RELEASE ORAL DAILY
Status: DISCONTINUED | OUTPATIENT
Start: 2021-01-13 | End: 2021-01-16

## 2021-01-12 RX ORDER — HEPARIN SODIUM 5000 [USP'U]/ML
5000 INJECTION, SOLUTION INTRAVENOUS; SUBCUTANEOUS EVERY 8 HOURS
Status: DISCONTINUED | OUTPATIENT
Start: 2021-01-12 | End: 2021-01-19 | Stop reason: HOSPADM

## 2021-01-12 RX ORDER — DEXTROSE 40 %
15 GEL (GRAM) ORAL AS NEEDED
Status: DISCONTINUED | OUTPATIENT
Start: 2021-01-12 | End: 2021-01-19 | Stop reason: HOSPADM

## 2021-01-12 RX ORDER — PROMETHAZINE HYDROCHLORIDE 25 MG/1
12.5 TABLET ORAL
Status: DISCONTINUED | OUTPATIENT
Start: 2021-01-12 | End: 2021-01-13

## 2021-01-12 RX ORDER — ACETAMINOPHEN 650 MG/1
650 SUPPOSITORY RECTAL
Status: DISCONTINUED | OUTPATIENT
Start: 2021-01-12 | End: 2021-01-19 | Stop reason: HOSPADM

## 2021-01-12 RX ORDER — GUAIFENESIN 100 MG/5ML
81 LIQUID (ML) ORAL
Status: DISCONTINUED | OUTPATIENT
Start: 2021-01-13 | End: 2021-01-19 | Stop reason: HOSPADM

## 2021-01-12 RX ORDER — ONDANSETRON 2 MG/ML
4 INJECTION INTRAMUSCULAR; INTRAVENOUS
Status: COMPLETED | OUTPATIENT
Start: 2021-01-12 | End: 2021-01-12

## 2021-01-12 RX ORDER — ACETAMINOPHEN 500 MG
1000 TABLET ORAL
Status: COMPLETED | OUTPATIENT
Start: 2021-01-12 | End: 2021-01-12

## 2021-01-12 RX ORDER — SODIUM CHLORIDE 0.9 % (FLUSH) 0.9 %
5-40 SYRINGE (ML) INJECTION AS NEEDED
Status: DISCONTINUED | OUTPATIENT
Start: 2021-01-12 | End: 2021-01-19 | Stop reason: HOSPADM

## 2021-01-12 RX ORDER — POLYETHYLENE GLYCOL 3350 17 G/17G
17 POWDER, FOR SOLUTION ORAL DAILY PRN
Status: DISCONTINUED | OUTPATIENT
Start: 2021-01-12 | End: 2021-01-19 | Stop reason: HOSPADM

## 2021-01-12 RX ORDER — GUAIFENESIN/DEXTROMETHORPHAN 100-10MG/5
5 SYRUP ORAL
Status: DISCONTINUED | OUTPATIENT
Start: 2021-01-12 | End: 2021-01-19 | Stop reason: HOSPADM

## 2021-01-12 RX ORDER — ROSUVASTATIN CALCIUM 20 MG/1
40 TABLET, COATED ORAL
Status: DISCONTINUED | OUTPATIENT
Start: 2021-01-12 | End: 2021-01-19 | Stop reason: HOSPADM

## 2021-01-12 RX ORDER — NITROGLYCERIN 0.4 MG/1
0.4 TABLET SUBLINGUAL
Status: DISCONTINUED | OUTPATIENT
Start: 2021-01-12 | End: 2021-01-19 | Stop reason: HOSPADM

## 2021-01-12 RX ORDER — CLOPIDOGREL BISULFATE 75 MG/1
75 TABLET ORAL DAILY
Status: DISCONTINUED | OUTPATIENT
Start: 2021-01-13 | End: 2021-01-19 | Stop reason: HOSPADM

## 2021-01-12 RX ORDER — LANOLIN ALCOHOL/MO/W.PET/CERES
2500 CREAM (GRAM) TOPICAL DAILY
Status: DISCONTINUED | OUTPATIENT
Start: 2021-01-13 | End: 2021-01-19 | Stop reason: HOSPADM

## 2021-01-12 RX ORDER — ASCORBIC ACID 500 MG
1000 TABLET ORAL DAILY
Status: DISCONTINUED | OUTPATIENT
Start: 2021-01-12 | End: 2021-01-17

## 2021-01-12 RX ORDER — DEXTROSE 50 % IN WATER (D50W) INTRAVENOUS SYRINGE
25-50 AS NEEDED
Status: DISCONTINUED | OUTPATIENT
Start: 2021-01-12 | End: 2021-01-19 | Stop reason: HOSPADM

## 2021-01-12 RX ORDER — ONDANSETRON 2 MG/ML
4 INJECTION INTRAMUSCULAR; INTRAVENOUS
Status: DISCONTINUED | OUTPATIENT
Start: 2021-01-12 | End: 2021-01-19 | Stop reason: HOSPADM

## 2021-01-12 RX ADMIN — Medication 10 ML: at 21:31

## 2021-01-12 RX ADMIN — OXYCODONE HYDROCHLORIDE AND ACETAMINOPHEN 1000 MG: 500 TABLET ORAL at 21:31

## 2021-01-12 RX ADMIN — ROSUVASTATIN CALCIUM 40 MG: 20 TABLET, COATED ORAL at 21:31

## 2021-01-12 RX ADMIN — HEPARIN SODIUM 5000 UNITS: 5000 INJECTION INTRAVENOUS; SUBCUTANEOUS at 21:28

## 2021-01-12 RX ADMIN — INSULIN HUMAN 7 UNITS: 100 INJECTION, SOLUTION PARENTERAL at 19:01

## 2021-01-12 RX ADMIN — ONDANSETRON 4 MG: 2 INJECTION INTRAMUSCULAR; INTRAVENOUS at 17:14

## 2021-01-12 RX ADMIN — ACETAMINOPHEN 1000 MG: 500 TABLET ORAL at 16:39

## 2021-01-12 RX ADMIN — INSULIN LISPRO 10 UNITS: 100 INJECTION, SOLUTION INTRAVENOUS; SUBCUTANEOUS at 21:30

## 2021-01-12 RX ADMIN — SODIUM CHLORIDE 1000 ML: 900 INJECTION, SOLUTION INTRAVENOUS at 16:39

## 2021-01-12 NOTE — ED TRIAGE NOTES
Pt arrives to triage in wheelchair with mask in place. Reports she has cough with green sputum, headache, nausea. Reports she tested positive for covid today. Pt was seen at the [facility] urgent care.

## 2021-01-12 NOTE — ED PROVIDER NOTES
Patient presents to the ER via personal vehicle for fevers, cough and weakness. Patient states she tested positive for COVID-19 at urgent care recently. Has had worsening cough and fatigue. Reports symptoms have been present over a week. Denies any diaphoresis. Does report shortness of breath. Reports some occasional chest pain as well    The history is provided by the patient. Fever   This is a new problem. The current episode started yesterday. The problem occurs constantly. The maximum temperature noted was 101 - 101.9 F. Associated symptoms include chest pain, headaches, muscle aches, cough and shortness of breath. Pertinent negatives include no congestion, no mental status change, no rash and no urinary symptoms. She has tried nothing for the symptoms. Past Medical History:   Diagnosis Date    CAD (coronary artery disease) 3/1/2012    MI, 3 stents    Chest pain     Coronary artery disease involving native coronary artery without angina pectoris 5/21/2015    Depression 3/1/2012    Diabetes mellitus ([facility] [address] 3/1/2012    oral agents. . hypo- 80's, Last A1c 6.9 in 6/2018    DM2 (diabetes mellitus, type 2) ([address] 5/21/2015    Elevated troponin 3/2/2012    Essential hypertension 5/21/2015    External hemorrhoids without mention of complication 5707    GERD (gastroesophageal reflux disease)     History of MI (myocardial infarction) 11/12    x2    Hypercholesterolemia     Hypertension 3/1/2012    Insomnia     Personal history of colonic polyps 2013    hyperplastic    Platelet inhibition due to Plavix     holding for colonoscopy per GI Dr.    [facility] Rectocele 2013    Tobacco abuse 3/1/2012    quit for 1 year    Tobacco use disorder     Unstable angina ([address] 3/1/2012    ntg as needed.         Past Surgical History:   Procedure Laterality Date    HX CAROTID ENDARTERECTOMY Left 12/12/2018    HX CATARACT REMOVAL Left 09/19/2016    Dr Meenakshi Gerber, [id number] [address] Right 11/07/2016 Dr Dana Kelly, [facility]    HX CERVICAL FUSION      neck w/plate    HX COLONOSCOPY  13    Anna Marie--single transverse hyperplastic polyp--7-10 year recall    HX HEMORRHOIDECTOMY      x2    HX ORTHOPAEDIC      left elbow    HX CAL AND BSO      HX WISDOM TEETH EXTRACTION      IN LEFT HEART CATH,PERCUTANEOUS  last stented 11/12 x 2    stent x3 , mi x 2         Family History:   Problem Relation Age of Onset    Heart Disease Mother     Osteoporosis Mother     Heart Attack Mother 67        mi    Thyroid Disease Mother         Multinodular Goiter    Heart Disease Father     Cancer Father         pancreatic    Heart Attack Father 67        MI    Cancer Sister         Pre-Cancerous dysplasia    Thyroid Cancer Sister     Ulcerative Colitis Brother     Thyroid Cancer Brother     Breast Cancer Neg Hx        Social History     Socioeconomic History    Marital status:      Spouse name: Not on file    Number of children: Not on file    Years of education: Not on file    Highest education level: Not on file   Occupational History    Not on file   Social Needs    Financial resource strain: Not on file    Food insecurity     Worry: Not on file     Inability: Not on file    Transportation needs     Medical: Not on file     Non-medical: Not on file   Tobacco Use    Smoking status: Former Smoker     Packs/day: 1.00     Years: 40.00     Pack years: 40.00     Quit date: 2012     Years since quittin.1    Smokeless tobacco: Never Used    Tobacco comment: quit  . has stopped prior also   Substance and Sexual Activity    Alcohol use: No     Frequency: Never    Drug use: No    Sexual activity: Not on file   Lifestyle    Physical activity     Days per week: Not on file     Minutes per session: Not on file    Stress: Not on file   Relationships    Social connections     Talks on phone: Not on file     Gets together: Not on file     Attends Holiness service: Not on file     Active member of club or organization: Not on file     Attends meetings of clubs or organizations: Not on file     Relationship status: Not on file    Intimate partner violence     Fear of current or ex partner: Not on file     Emotionally abused: Not on file     Physically abused: Not on file     Forced sexual activity: Not on file   Other Topics Concern    Not on file   Social History Narrative    Not on file         ALLERGIES: Cephalosporins, Sulfamethoxazole-trimethoprim, Codeine, and Lisinopril    Review of Systems   Constitutional: Positive for fatigue and fever. HENT: Negative for congestion, dental problem, trouble swallowing and voice change. Eyes: Negative for photophobia and redness. Respiratory: Positive for cough and shortness of breath. Negative for chest tightness. Cardiovascular: Positive for chest pain. Negative for leg swelling. Gastrointestinal: Negative for abdominal pain and anal bleeding. Endocrine: Negative for polydipsia, polyphagia and polyuria. Genitourinary: Negative for dysuria, vaginal discharge and vaginal pain. Musculoskeletal: Negative for back pain and joint swelling. Skin: Negative for rash. Neurological: Positive for headaches. Negative for tremors, speech difficulty and weakness. Hematological: Negative for adenopathy. Psychiatric/Behavioral: Negative for behavioral problems and confusion. All other systems reviewed and are negative. Vitals:    01/12/21 1624   BP: (!) 109/59   Pulse: 71   Resp: 18   Temp: (!) 101 °F (38.3 °C)   SpO2: 98%   Weight: 65.8 kg (145 lb)   Height: 5' 4\" (1.626 m)            Physical Exam  Vitals signs and nursing note reviewed. Constitutional:       General: She is not in acute distress. Appearance: Normal appearance. She is not ill-appearing. HENT:      Head: Normocephalic and atraumatic. Right Ear: Tympanic membrane normal.      Left Ear: Tympanic membrane normal.      Nose: No congestion or rhinorrhea. Mouth/Throat:      Mouth: Mucous membranes are moist.      Pharynx: No oropharyngeal exudate or posterior oropharyngeal erythema. Eyes:      General:         Left eye: No discharge. Extraocular Movements: Extraocular movements intact. Conjunctiva/sclera: Conjunctivae normal.      Pupils: Pupils are equal, round, and reactive to light. Neck:      Musculoskeletal: Normal range of motion and neck supple. No neck rigidity or muscular tenderness. Cardiovascular:      Rate and Rhythm: Normal rate. Pulses: Normal pulses. Heart sounds: No murmur. No friction rub. No gallop. Pulmonary:      Effort: Pulmonary effort is normal. No respiratory distress. Breath sounds: Rhonchi present. Abdominal:      General: Abdomen is flat. Bowel sounds are normal. There is no distension. Palpations: Abdomen is soft. There is no mass. Tenderness: There is no abdominal tenderness. Hernia: No hernia is present. Musculoskeletal: Normal range of motion. General: No swelling, tenderness, deformity or signs of injury. Skin:     General: Skin is warm. Capillary Refill: Capillary refill takes less than 2 seconds. Coloration: Skin is not jaundiced or pale. Findings: No bruising. Neurological:      General: No focal deficit present. Mental Status: She is alert and oriented to person, place, and time. Mental status is at baseline. Cranial Nerves: No cranial nerve deficit. Sensory: No sensory deficit. Motor: No weakness. Psychiatric:         Mood and Affect: Mood normal.          MDM  Number of Diagnoses or Management Options  Acute kidney injury ([facility] [address]  COVID-19 virus infection  Hyperglycemia  Lactic acidosis  Diagnosis management comments: Will obtain chest x-ray basic labs, cardiac enzymes as well given her some reported chest pain    6:43 PM  Chest x-ray shows no gross infiltrates. Troponin is 78, elevated proBNP.   Did discuss case with cardiology, Dr. Kwadwo Mcnulty who at this time feels no acute interventions needed. May trend cardiac enzymes. Patient's creatinine is mildly increased from 2.1-2.4. Mildly elevated lactic acid at 2.5. Given her overall generalized weakness plan to admit to the hospitalist for further medical management. Amount and/or Complexity of Data Reviewed  Clinical lab tests: ordered and reviewed  Tests in the radiology section of CPT®: ordered and reviewed  Review and summarize past medical records: yes  Discuss the patient with other providers: yes (Discussed case with cardiology as well as hospitalist)  Independent visualization of images, tracings, or specimens: yes (EKG interpretation: Sinus rhythm, rate of 70, right axis deviation, T wave inversion noted in inferior lateral leads. This is change in comparison to EKG performed and September 2020)    Risk of Complications, Morbidity, and/or Mortality  Presenting problems: high  Diagnostic procedures: moderate  Management options: moderate  General comments: Critical Care Time 60 Minutes: Excluding all billable procedures, time spent at the bedside directing patients resucsitation, updating family, and coordinating care with consultants             Procedures               Results Include:    Recent Results (from the past 24 hour(s))   CBC WITH AUTOMATED DIFF    Collection Time: 01/12/21  4:29 PM   Result Value Ref Range    WBC 6.3 4.3 - 11.1 K/uL    RBC 3.78 (L) 4.05 - 5.2 M/uL    HGB 10.8 (L) 11.7 - 15.4 g/dL    HCT 35.1 (L) 35.8 - 46.3 %    MCV 92.9 79.6 - 97.8 FL    MCH 28.6 26.1 - 32.9 PG    MCHC 30.8 (L) 31.4 - 35.0 g/dL    RDW 15.0 (H) 11.9 - 14.6 %    PLATELET 602 283 - 841 K/uL    MPV 10.9 9.4 - 12.3 FL    ABSOLUTE NRBC 0.00 0.0 - 0.2 K/uL    DF AUTOMATED      NEUTROPHILS 81 (H) 43 - 78 %    LYMPHOCYTES 11 (L) 13 - 44 %    MONOCYTES 7 4.0 - 12.0 %    EOSINOPHILS 0 (L) 0.5 - 7.8 %    BASOPHILS 0 0.0 - 2.0 %    IMMATURE GRANULOCYTES 0 0.0 - 5.0 %    ABS.  NEUTROPHILS 5.2 1.7 - 8.2 K/UL    ABS. LYMPHOCYTES 0.7 0.5 - 4.6 K/UL    ABS. MONOCYTES 0.4 0.1 - 1.3 K/UL    ABS. EOSINOPHILS 0.0 0.0 - 0.8 K/UL    ABS. BASOPHILS 0.0 0.0 - 0.2 K/UL    ABS. IMM. GRANS. 0.0 0.0 - 0.5 K/UL   METABOLIC PANEL, COMPREHENSIVE    Collection Time: 01/12/21  4:29 PM   Result Value Ref Range    Sodium 127 (L) 136 - 145 mmol/L    Potassium 4.1 3.5 - 5.1 mmol/L    Chloride 97 (L) 98 - 107 mmol/L    CO2 23 21 - 32 mmol/L    Anion gap 7 7 - 16 mmol/L    Glucose 590 (HH) 65 - 100 mg/dL    BUN 27 (H) 8 - 23 MG/DL    Creatinine 2.47 (H) 0.6 - 1.0 MG/DL    GFR est AA 25 (L) >60 ml/min/1.73m2    GFR est non-AA 20 (L) >60 ml/min/1.73m2    Calcium 8.4 8.3 - 10.4 MG/DL    Bilirubin, total 0.5 0.2 - 1.1 MG/DL    ALT (SGPT) 68 (H) 12 - 65 U/L    AST (SGOT) 87 (H) 15 - 37 U/L    Alk.  phosphatase 128 50 - 136 U/L    Protein, total 7.7 6.3 - 8.2 g/dL    Albumin 3.3 3.2 - 4.6 g/dL    Globulin 4.4 (H) 2.3 - 3.5 g/dL    A-G Ratio 0.8 (L) 1.2 - 3.5     LACTIC ACID    Collection Time: 01/12/21  4:29 PM   Result Value Ref Range    Lactic acid 2.5 (H) 0.4 - 2.0 MMOL/L   NT-PRO BNP    Collection Time: 01/12/21  4:29 PM   Result Value Ref Range    NT pro-BNP 18,662 (H) 5 - 125 PG/ML   TROPONIN-HIGH SENSITIVITY    Collection Time: 01/12/21  4:29 PM   Result Value Ref Range    Troponin-High Sensitivity 78.0 (H) 0 - 14 pg/mL   EKG, 12 LEAD, INITIAL    Collection Time: 01/12/21  4:32 PM   Result Value Ref Range    Ventricular Rate 70 BPM    Atrial Rate 70 BPM    P-R Interval 132 ms    QRS Duration 102 ms    Q-T Interval 572 ms    QTC Calculation (Bezet) 617 ms    Calculated P Axis 76 degrees    Calculated R Axis 102 degrees    Calculated T Axis -82 degrees    Diagnosis       Normal sinus rhythm  Rightward axis  Septal infarct (cited on or before 27-JUL-2020)  T wave abnormality, consider inferior ischemia  T wave abnormality, consider anterolateral ischemia  Prolonged QT  Abnormal ECG  When compared with ECG of 10-AUG-2020 23:53,  T wave inversion now evident in Inferior leads  T wave inversion now evident in Anterolateral leads  QT has lengthened       Voice dictation software was used during the making of this note. This software is not perfect and grammatical and other typographical errors may be present. This note has been proofread, but may still contain errors. Vijay Watts MD; 1/12/2021 @6:44 PM   ===================================================================    I wore appropriate PPE throughout this patient's ED visit.  Vijay Watts MD, 6:44 PM

## 2021-01-13 LAB
ALBUMIN SERPL-MCNC: 2.9 G/DL (ref 3.2–4.6)
ALBUMIN/GLOB SERPL: 0.7 {RATIO} (ref 1.2–3.5)
ALP SERPL-CCNC: 128 U/L (ref 50–136)
ALT SERPL-CCNC: 73 U/L (ref 12–65)
ANION GAP SERPL CALC-SCNC: 10 MMOL/L (ref 7–16)
ANION GAP SERPL CALC-SCNC: 9 MMOL/L (ref 7–16)
ARTERIAL PATENCY WRIST A: ABNORMAL
AST SERPL-CCNC: 119 U/L (ref 15–37)
BASE DEFICIT BLD-SCNC: 16 MMOL/L
BDY SITE: ABNORMAL
BILIRUB SERPL-MCNC: 0.5 MG/DL (ref 0.2–1.1)
BUN SERPL-MCNC: 24 MG/DL (ref 8–23)
BUN SERPL-MCNC: 32 MG/DL (ref 8–23)
CALCIUM SERPL-MCNC: 8.3 MG/DL (ref 8.3–10.4)
CALCIUM SERPL-MCNC: 8.4 MG/DL (ref 8.3–10.4)
CHLORIDE SERPL-SCNC: 101 MMOL/L (ref 98–107)
CHLORIDE SERPL-SCNC: 107 MMOL/L (ref 98–107)
CO2 BLD-SCNC: 11 MMOL/L
CO2 SERPL-SCNC: 19 MMOL/L (ref 21–32)
CO2 SERPL-SCNC: 20 MMOL/L (ref 21–32)
COLLECT TIME,HTIME: 618
CREAT SERPL-MCNC: 2.09 MG/DL (ref 0.6–1)
CREAT SERPL-MCNC: 2.53 MG/DL (ref 0.6–1)
D DIMER PPP FEU-MCNC: 0.77 UG/ML(FEU)
D DIMER PPP FEU-MCNC: 0.89 UG/ML(FEU)
ERYTHROCYTE [DISTWIDTH] IN BLOOD BY AUTOMATED COUNT: 14.9 % (ref 11.9–14.6)
EST. AVERAGE GLUCOSE BLD GHB EST-MCNC: 246 MG/DL
FLOW RATE ISTAT,IFRATE: 15 L/MIN
GAS FLOW.O2 O2 DELIVERY SYS: ABNORMAL L/MIN
GLOBULIN SER CALC-MCNC: 4 G/DL (ref 2.3–3.5)
GLUCOSE BLD STRIP.AUTO-MCNC: 341 MG/DL (ref 65–100)
GLUCOSE BLD STRIP.AUTO-MCNC: 410 MG/DL (ref 65–100)
GLUCOSE BLD STRIP.AUTO-MCNC: 453 MG/DL (ref 65–100)
GLUCOSE BLD STRIP.AUTO-MCNC: 471 MG/DL (ref 65–100)
GLUCOSE BLD STRIP.AUTO-MCNC: 533 MG/DL (ref 65–100)
GLUCOSE SERPL-MCNC: 194 MG/DL (ref 65–100)
GLUCOSE SERPL-MCNC: 492 MG/DL (ref 65–100)
HBA1C MFR BLD: 10.2 % (ref 4.2–6.3)
HCO3 BLD-SCNC: 10.6 MMOL/L (ref 22–26)
HCT VFR BLD AUTO: 34.9 % (ref 35.8–46.3)
HGB BLD-MCNC: 10.3 G/DL (ref 11.7–15.4)
INR PPP: 1.1
LACTATE SERPL-SCNC: 1.9 MMOL/L (ref 0.4–2)
MAGNESIUM SERPL-MCNC: 2 MG/DL (ref 1.8–2.4)
MCH RBC QN AUTO: 28.5 PG (ref 26.1–32.9)
MCHC RBC AUTO-ENTMCNC: 29.5 G/DL (ref 31.4–35)
MCV RBC AUTO: 96.7 FL (ref 79.6–97.8)
NRBC # BLD: 0.1 K/UL (ref 0–0.2)
O2/TOTAL GAS SETTING VFR VENT: 100 %
PCO2 BLD: 28.5 MMHG (ref 35–45)
PH BLD: 7.18 [PH] (ref 7.35–7.45)
PLATELET # BLD AUTO: 130 K/UL (ref 150–450)
PMV BLD AUTO: 10.6 FL (ref 9.4–12.3)
PO2 BLD: 157 MMHG (ref 75–100)
POTASSIUM SERPL-SCNC: 3.6 MMOL/L (ref 3.5–5.1)
POTASSIUM SERPL-SCNC: 4.2 MMOL/L (ref 3.5–5.1)
PROCALCITONIN SERPL-MCNC: 0.09 NG/ML
PROT SERPL-MCNC: 6.9 G/DL (ref 6.3–8.2)
PROTHROMBIN TIME: 14 SEC (ref 12.5–14.7)
RBC # BLD AUTO: 3.61 M/UL (ref 4.05–5.2)
SAO2 % BLD: 99 % (ref 95–98)
SERVICE CMNT-IMP: ABNORMAL
SERVICE CMNT-IMP: ABNORMAL
SODIUM SERPL-SCNC: 130 MMOL/L (ref 136–145)
SODIUM SERPL-SCNC: 136 MMOL/L (ref 136–145)
SPECIMEN TYPE: ABNORMAL
TROPONIN-HIGH SENSITIVITY: 70.6 PG/ML (ref 0–14)
TROPONIN-HIGH SENSITIVITY: 75.4 PG/ML (ref 0–14)
WBC # BLD AUTO: 9.4 K/UL (ref 4.3–11.1)

## 2021-01-13 PROCEDURE — 85610 PROTHROMBIN TIME: CPT

## 2021-01-13 PROCEDURE — 65660000000 HC RM CCU STEPDOWN

## 2021-01-13 PROCEDURE — 74011250637 HC RX REV CODE- 250/637: Performed by: FAMILY MEDICINE

## 2021-01-13 PROCEDURE — 83036 HEMOGLOBIN GLYCOSYLATED A1C: CPT

## 2021-01-13 PROCEDURE — 82962 GLUCOSE BLOOD TEST: CPT

## 2021-01-13 PROCEDURE — 94760 N-INVAS EAR/PLS OXIMETRY 1: CPT

## 2021-01-13 PROCEDURE — 87040 BLOOD CULTURE FOR BACTERIA: CPT

## 2021-01-13 PROCEDURE — 82803 BLOOD GASES ANY COMBINATION: CPT

## 2021-01-13 PROCEDURE — 84145 PROCALCITONIN (PCT): CPT

## 2021-01-13 PROCEDURE — 85379 FIBRIN DEGRADATION QUANT: CPT

## 2021-01-13 PROCEDURE — 36415 COLL VENOUS BLD VENIPUNCTURE: CPT

## 2021-01-13 PROCEDURE — 74011636637 HC RX REV CODE- 636/637: Performed by: HOSPITALIST

## 2021-01-13 PROCEDURE — 85027 COMPLETE CBC AUTOMATED: CPT

## 2021-01-13 PROCEDURE — 99223 1ST HOSP IP/OBS HIGH 75: CPT | Performed by: INTERNAL MEDICINE

## 2021-01-13 PROCEDURE — 83735 ASSAY OF MAGNESIUM: CPT

## 2021-01-13 PROCEDURE — 77010033711 HC HIGH FLOW OXYGEN

## 2021-01-13 PROCEDURE — 74011636637 HC RX REV CODE- 636/637: Performed by: FAMILY MEDICINE

## 2021-01-13 PROCEDURE — 84484 ASSAY OF TROPONIN QUANT: CPT

## 2021-01-13 PROCEDURE — 74011250636 HC RX REV CODE- 250/636: Performed by: FAMILY MEDICINE

## 2021-01-13 PROCEDURE — 74011250636 HC RX REV CODE- 250/636: Performed by: INTERNAL MEDICINE

## 2021-01-13 PROCEDURE — 80053 COMPREHEN METABOLIC PANEL: CPT

## 2021-01-13 PROCEDURE — 83605 ASSAY OF LACTIC ACID: CPT

## 2021-01-13 PROCEDURE — 36600 WITHDRAWAL OF ARTERIAL BLOOD: CPT

## 2021-01-13 RX ORDER — ATROPINE SULFATE 0.1 MG/ML
INJECTION INTRAVENOUS
Status: COMPLETED | OUTPATIENT
Start: 2021-01-13 | End: 2021-01-13

## 2021-01-13 RX ORDER — MORPHINE SULFATE 2 MG/ML
1 INJECTION, SOLUTION INTRAMUSCULAR; INTRAVENOUS
Status: DISCONTINUED | OUTPATIENT
Start: 2021-01-13 | End: 2021-01-19 | Stop reason: HOSPADM

## 2021-01-13 RX ORDER — INSULIN LISPRO 100 [IU]/ML
20 INJECTION, SOLUTION INTRAVENOUS; SUBCUTANEOUS ONCE
Status: COMPLETED | OUTPATIENT
Start: 2021-01-13 | End: 2021-01-13

## 2021-01-13 RX ORDER — INSULIN GLARGINE 100 [IU]/ML
13 INJECTION, SOLUTION SUBCUTANEOUS DAILY
Status: DISCONTINUED | OUTPATIENT
Start: 2021-01-13 | End: 2021-01-14

## 2021-01-13 RX ORDER — EPINEPHRINE 0.1 MG/ML
INJECTION INTRACARDIAC; INTRAVENOUS
Status: COMPLETED | OUTPATIENT
Start: 2021-01-13 | End: 2021-01-13

## 2021-01-13 RX ORDER — INSULIN LISPRO 100 [IU]/ML
15 INJECTION, SOLUTION INTRAVENOUS; SUBCUTANEOUS ONCE
Status: COMPLETED | OUTPATIENT
Start: 2021-01-14 | End: 2021-01-13

## 2021-01-13 RX ORDER — MAGNESIUM SULFATE HEPTAHYDRATE 40 MG/ML
4 INJECTION, SOLUTION INTRAVENOUS ONCE
Status: COMPLETED | OUTPATIENT
Start: 2021-01-13 | End: 2021-01-13

## 2021-01-13 RX ORDER — DEXAMETHASONE SODIUM PHOSPHATE 100 MG/10ML
6 INJECTION INTRAMUSCULAR; INTRAVENOUS EVERY 24 HOURS
Status: DISCONTINUED | OUTPATIENT
Start: 2021-01-13 | End: 2021-01-19 | Stop reason: HOSPADM

## 2021-01-13 RX ADMIN — ACETAMINOPHEN 650 MG: 325 TABLET, FILM COATED ORAL at 12:19

## 2021-01-13 RX ADMIN — GUAIFENESIN AND DEXTROMETHORPHAN 5 ML: 100; 10 SYRUP ORAL at 04:24

## 2021-01-13 RX ADMIN — CLOPIDOGREL BISULFATE 75 MG: 75 TABLET ORAL at 09:54

## 2021-01-13 RX ADMIN — INSULIN GLARGINE 13 UNITS: 100 INJECTION, SOLUTION SUBCUTANEOUS at 23:57

## 2021-01-13 RX ADMIN — INSULIN LISPRO 20 UNITS: 100 INJECTION, SOLUTION INTRAVENOUS; SUBCUTANEOUS at 22:25

## 2021-01-13 RX ADMIN — VITAMIN D, TAB 1000IU (100/BT) 2 TABLET: 25 TAB at 09:54

## 2021-01-13 RX ADMIN — HEPARIN SODIUM 5000 UNITS: 5000 INJECTION INTRAVENOUS; SUBCUTANEOUS at 21:30

## 2021-01-13 RX ADMIN — HEPARIN SODIUM 5000 UNITS: 5000 INJECTION INTRAVENOUS; SUBCUTANEOUS at 14:04

## 2021-01-13 RX ADMIN — OXYCODONE HYDROCHLORIDE AND ACETAMINOPHEN 1000 MG: 500 TABLET ORAL at 09:55

## 2021-01-13 RX ADMIN — EPINEPHRINE 0.1 MG: 0.1 INJECTION, SOLUTION ENDOTRACHEAL; INTRACARDIAC; INTRAVENOUS at 06:09

## 2021-01-13 RX ADMIN — ATROPINE SULFATE 0.1 MG: 0.1 INJECTION, SOLUTION INTRAVENOUS at 06:08

## 2021-01-13 RX ADMIN — Medication 10 ML: at 21:31

## 2021-01-13 RX ADMIN — Medication 10 ML: at 04:22

## 2021-01-13 RX ADMIN — ROSUVASTATIN CALCIUM 40 MG: 20 TABLET, COATED ORAL at 21:31

## 2021-01-13 RX ADMIN — MAGNESIUM SULFATE HEPTAHYDRATE 4 G: 40 INJECTION, SOLUTION INTRAVENOUS at 09:56

## 2021-01-13 RX ADMIN — CYANOCOBALAMIN TAB 1000 MCG 2500 MCG: 1000 TAB at 09:54

## 2021-01-13 RX ADMIN — INSULIN GLARGINE 13 UNITS: 100 INJECTION, SOLUTION SUBCUTANEOUS at 10:35

## 2021-01-13 RX ADMIN — MORPHINE SULFATE 1 MG: 2 INJECTION, SOLUTION INTRAMUSCULAR; INTRAVENOUS at 22:35

## 2021-01-13 RX ADMIN — INSULIN LISPRO 15 UNITS: 100 INJECTION, SOLUTION INTRAVENOUS; SUBCUTANEOUS at 23:56

## 2021-01-13 RX ADMIN — ASPIRIN 81 MG: 81 TABLET, CHEWABLE ORAL at 09:55

## 2021-01-13 RX ADMIN — ACETAMINOPHEN 650 MG: 325 TABLET, FILM COATED ORAL at 04:22

## 2021-01-13 RX ADMIN — DEXAMETHASONE SODIUM PHOSPHATE 6 MG: 10 INJECTION INTRAMUSCULAR; INTRAVENOUS at 12:19

## 2021-01-13 RX ADMIN — INSULIN LISPRO 10 UNITS: 100 INJECTION, SOLUTION INTRAVENOUS; SUBCUTANEOUS at 10:36

## 2021-01-13 RX ADMIN — Medication 220 MG: at 09:54

## 2021-01-13 RX ADMIN — HEPARIN SODIUM 5000 UNITS: 5000 INJECTION INTRAVENOUS; SUBCUTANEOUS at 04:22

## 2021-01-13 RX ADMIN — INSULIN LISPRO 16 UNITS: 100 INJECTION, SOLUTION INTRAVENOUS; SUBCUTANEOUS at 16:07

## 2021-01-13 RX ADMIN — EPINEPHRINE 1 MG: 0.1 INJECTION, SOLUTION ENDOTRACHEAL; INTRACARDIAC; INTRAVENOUS at 06:11

## 2021-01-13 RX ADMIN — Medication 10 ML: at 14:04

## 2021-01-13 NOTE — PROGRESS NOTES
OT orders received and chart reviewed. Per interdisciplinary collaboration with PT and RN, pt had Code Blue called this AM. Will hold initial OT evaluation today and follow up and attempt to see tomorrow and as pt is medically appropriate.      Thank you,     Kassandra Mcmahon OTR/L

## 2021-01-13 NOTE — DIABETES MGMT
Patient admitted with sepsis, positive for COVID-19. Admitting blood glucose 590. Paitnet received Regular 7 units and Humalog 10 units. Lab blood glucose this morning was 194. Most recent FSBS 341. Noted code blue was called on patient this AM. Diabetes management consult acknowledged for hospital blood glucose management. Reviewed patient current regimen: Lantus 13 units daily, Humalog SSI, and Decadron 6mg IV q24h at 1200. Patient may benefit from prandial insulin when steroid therapy is started to help offset hyperglycemia during the day related to caloric intake and steroid therapy. Creatinine 2.09. GFR 25.

## 2021-01-13 NOTE — PROGRESS NOTES
A code blue was called on Ms. Smith this morning around 600. She is still on the floor. Will hold today to make sure she remains stable and check on her for evaluation tomorrow.   Mohinder Espinoza, PT

## 2021-01-13 NOTE — PROGRESS NOTES
Monitor room called and stated pt was running 6 beats of V-tach. Assessed pt at the time and pt was A&O and stated she was feeling cold. Monitor room called 15 minutes later and stated pt was running 10 beats of V-tach. This nurse and a 2nd nurse went to assess pt and pt was stating she wanted more blankets placed and was A&O. Stepped out of the room to notify Doctor regarding Monitor room statements regarding pt and Code Blue was called from [facility] [place].

## 2021-01-13 NOTE — H&P
HOSPITALIST H&P/CONSULT  NAME:  Donavon Miner   Age:  65 y.o.  :   1950   MRN:   [id number]  PCP: Ken Haro MD  Consulting MD:  Treatment Team: Attending Provider: Alton Silveira DO; Primary Nurse: Joslyn Roman, RN; Primary Nurse: Pete Holman RN  HPI:   Patient is a 65year old w/ a PMhx of CAD s/p stents x3, DMII, CKD stage 3 presented w/ cough, SOB and weakness in the past week. She also has been having fever at home and has been trying to cope with the symptoms. She has been having trouble laying flat to sleep since onset of symptoms. Also reported chest pain with cough. She went to get tested for COVID today  and was positive. Denies any alleviating or exacerbating factors. In ED, Tmax 101 but VSS otherwise. CBC at baseline. CMP with Na 127, . Cr 2.47, ALT 68, AST 87. pBNP [id number], HS-trop 78. CXR w/o acute process. EKG shows prolonged QT and also diffuse T-wave inversions. ED attending discussed case w/ Cardiology Dr. Keysha Lobo and was recommended no intervention. Pt was given Tylenol, regular insulin 7U, Zofran 1L NS bolus. Complete ROS done and is as stated in HPI or otherwise negative  Past Medical History:   Diagnosis Date    CAD (coronary artery disease) 3/1/2012    MI, 3 stents    Chest pain     Coronary artery disease involving native coronary artery without angina pectoris 2015    Depression 3/1/2012    Diabetes mellitus ([facility] [address] 3/1/2012    oral agents. . hypo- 80's, Last A1c 6.9 in 2018    DM2 (diabetes mellitus, type 2) ([facility] [address] 2015    Elevated troponin 3/2/2012    Essential hypertension 2015    External hemorrhoids without mention of complication 4424    GERD (gastroesophageal reflux disease)     History of MI (myocardial infarction) 11/12    x2    Hypercholesterolemia     Hypertension 3/1/2012    Insomnia     Personal history of colonic polyps     hyperplastic    Platelet inhibition due to Plavix     holding for colonoscopy per GI Dr. Venancio Tenorio Rectocele 2013    Tobacco abuse 3/1/2012    quit for 1 year    Tobacco use disorder     Unstable angina ([address] 3/1/2012    ntg as needed. Past Surgical History:   Procedure Laterality Date    HX CAROTID ENDARTERECTOMY Left 12/12/2018    HX CATARACT REMOVAL Left 09/19/2016    Dr Elsa Bauman, [id number] [address] Right 11/07/2016    Dr Elsa Bauman, [address]      neck w/plate    HX COLONOSCOPY  12/17/13    Anna Marie--single transverse hyperplastic polyp--7-10 year recall    HX HEMORRHOIDECTOMY      x2    HX ORTHOPAEDIC      left elbow    HX CAL AND BSO      HX WISDOM TEETH EXTRACTION      ME LEFT HEART CATH,PERCUTANEOUS  last stented 11/12 x 2    stent x3 , mi x 2      Prior to Admission Medications   Prescriptions Last Dose Informant Patient Reported? Taking? NIFEdipine ER (PROCARDIA XL) 30 mg ER tablet   No No   Sig: TAKE 1 TABLET BY MOUTH DAILY   NIFEdipine ER (PROCARDIA XL) 60 mg ER tablet   No No   Sig: TAKE 1 TABLET BY MOUTH  DAILY   aspirin 81 mg chewable tablet   Yes No   Sig: Take 81 mg by mouth every morning. Indications: continue per anesthesia guidelines   cholecalciferol, vitamin D3, (VITAMIN D3) 2,000 unit tab   Yes No   Sig: Take 2,000 Units by mouth daily. citalopram (CELEXA) 20 mg tablet   No No   Sig: TAKE 1 TABLET BY MOUTH  EVERY MORNING   clopidogreL (PLAVIX) 75 mg tab   No No   Sig: TAKE 1 TABLET BY MOUTH  DAILY   cyanocobalamin (VITAMIN B-12) 1,000 mcg tablet   Yes No   Sig: Take 2,500 mcg by mouth daily. furosemide (LASIX) 40 mg tablet   No No   Sig: Take 1 Tab by mouth every other day. Patient taking differently: Take 40 mg by mouth daily. isosorbide mononitrate ER (IMDUR) 30 mg tablet   No No   Sig: Take 1 Tab by mouth every morning.   magnesium oxide (MAG-OX) 400 mg tablet   No No   Sig: TAKE 1 TABLET BY MOUTH TWICE DAILY   Patient taking differently: Take 400 mg by mouth daily.    metFORMIN (GLUCOPHAGE) 1,000 mg tablet   No No   Sig: TAKE 1 TABLET BY MOUTH  TWICE DAILY WITH MEALS   metoprolol succinate (TOPROL-XL) 50 mg XL tablet   No No   Sig: Take 1 Tab by mouth daily. nitroglycerin (NITROSTAT) 0.4 mg SL tablet   No No   Si Tab by SubLINGual route every five (5) minutes as needed for Chest Pain.   potassium chloride SA (K-DUR, KLOR-CON) 20 mEq tablet   No No   Sig: Take 1 Tab by mouth daily. rosuvastatin (CRESTOR) 40 mg tablet   No No   Sig: Take 1 Tab by mouth nightly. traZODone (DESYREL) 50 mg tablet   No No   Sig: TAKE 1 TABLET BY MOUTH  NIGHTLY      Facility-Administered Medications: None     Allergies   Allergen Reactions    Cephalosporins Hives and Swelling    Sulfamethoxazole-Trimethoprim Other (comments)     Diarrhea     Codeine Other (comments)    Lisinopril Other (comments)     Passing out spells.  // pt sts not allergic to med       Social History     Tobacco Use    Smoking status: Former Smoker     Packs/day: 1.00     Years: 40.00     Pack years: 40.00     Quit date: 2012     Years since quittin.1    Smokeless tobacco: Never Used    Tobacco comment: quit  . has stopped prior also   Substance Use Topics    Alcohol use: No     Frequency: Never      Family History   Problem Relation Age of Onset    Heart Disease Mother     Osteoporosis Mother     Heart Attack Mother 67        mi    Thyroid Disease Mother         Multinodular Goiter    Heart Disease Father     Cancer Father         pancreatic    Heart Attack Father 67        MI    Cancer Sister         Pre-Cancerous dysplasia    Thyroid Cancer Sister     Ulcerative Colitis Brother     Thyroid Cancer Brother     Breast Cancer Neg Hx       Objective:     Visit Vitals  /64   Pulse 66   Temp 99 °F (37.2 °C)   Resp 20   Ht 5' 4\" (1.626 m)   Wt 65.8 kg (145 lb)   SpO2 98%   BMI 24.89 kg/m²      Temp (24hrs), Av °F (37.8 °C), Min:99 °F (37.2 °C), Max:101 °F (38.3 °C)    Oxygen Therapy  O2 Sat (%): 98 % (01/12/21 1810)  O2 Device: Room air (01/12/21 2011)  Physical Exam:  General:    Alert, cooperative, no distress, appears stated age. Head:   Normocephalic, without obvious abnormality, atraumatic. Nose:  Nares normal. No drainage or sinus tenderness. Lungs:   Clear to auscultation bilaterally. No Wheezing or Rhonchi. No rales. Heart:   Regular rate and rhythm,  no murmur, rub or gallop. Abdomen:   Soft, non-tender. Not distended. Bowel sounds normal.   Extremities: No cyanosis. No edema. No clubbing  Skin:     Texture, turgor normal. No rashes or lesions. Not Jaundiced  Neurologic: Alert and oriented x 3, no focal deficits   Data Review:   Recent Results (from the past 24 hour(s))   CBC WITH AUTOMATED DIFF    Collection Time: 01/12/21  4:29 PM   Result Value Ref Range    WBC 6.3 4.3 - 11.1 K/uL    RBC 3.78 (L) 4.05 - 5.2 M/uL    HGB 10.8 (L) 11.7 - 15.4 g/dL    HCT 35.1 (L) 35.8 - 46.3 %    MCV 92.9 79.6 - 97.8 FL    MCH 28.6 26.1 - 32.9 PG    MCHC 30.8 (L) 31.4 - 35.0 g/dL    RDW 15.0 (H) 11.9 - 14.6 %    PLATELET 759 393 - 462 K/uL    MPV 10.9 9.4 - 12.3 FL    ABSOLUTE NRBC 0.00 0.0 - 0.2 K/uL    DF AUTOMATED      NEUTROPHILS 81 (H) 43 - 78 %    LYMPHOCYTES 11 (L) 13 - 44 %    MONOCYTES 7 4.0 - 12.0 %    EOSINOPHILS 0 (L) 0.5 - 7.8 %    BASOPHILS 0 0.0 - 2.0 %    IMMATURE GRANULOCYTES 0 0.0 - 5.0 %    ABS. NEUTROPHILS 5.2 1.7 - 8.2 K/UL    ABS. LYMPHOCYTES 0.7 0.5 - 4.6 K/UL    ABS. MONOCYTES 0.4 0.1 - 1.3 K/UL    ABS. EOSINOPHILS 0.0 0.0 - 0.8 K/UL    ABS. BASOPHILS 0.0 0.0 - 0.2 K/UL    ABS. IMM.  GRANS. 0.0 0.0 - 0.5 K/UL   METABOLIC PANEL, COMPREHENSIVE    Collection Time: 01/12/21  4:29 PM   Result Value Ref Range    Sodium 127 (L) 136 - 145 mmol/L    Potassium 4.1 3.5 - 5.1 mmol/L    Chloride 97 (L) 98 - 107 mmol/L    CO2 23 21 - 32 mmol/L    Anion gap 7 7 - 16 mmol/L    Glucose 590 (HH) 65 - 100 mg/dL    BUN 27 (H) 8 - 23 MG/DL    Creatinine 2.47 (H) 0.6 - 1.0 MG/DL    GFR est AA 25 (L) >60 ml/min/1.73m2    GFR est non-AA 20 (L) >60 ml/min/1.73m2    Calcium 8.4 8.3 - 10.4 MG/DL    Bilirubin, total 0.5 0.2 - 1.1 MG/DL    ALT (SGPT) 68 (H) 12 - 65 U/L    AST (SGOT) 87 (H) 15 - 37 U/L    Alk. phosphatase 128 50 - 136 U/L    Protein, total 7.7 6.3 - 8.2 g/dL    Albumin 3.3 3.2 - 4.6 g/dL    Globulin 4.4 (H) 2.3 - 3.5 g/dL    A-G Ratio 0.8 (L) 1.2 - 3.5     LACTIC ACID    Collection Time: 01/12/21  4:29 PM   Result Value Ref Range    Lactic acid 2.5 (H) 0.4 - 2.0 MMOL/L   NT-PRO BNP    Collection Time: 01/12/21  4:29 PM   Result Value Ref Range    NT pro-BNP 18,662 (H) 5 - 125 PG/ML   TROPONIN-HIGH SENSITIVITY    Collection Time: 01/12/21  4:29 PM   Result Value Ref Range    Troponin-High Sensitivity 78.0 (H) 0 - 14 pg/mL   EKG, 12 LEAD, INITIAL    Collection Time: 01/12/21  4:32 PM   Result Value Ref Range    Ventricular Rate 70 BPM    Atrial Rate 70 BPM    P-R Interval 132 ms    QRS Duration 102 ms    Q-T Interval 572 ms    QTC Calculation (Bezet) 617 ms    Calculated P Axis 76 degrees    Calculated R Axis 102 degrees    Calculated T Axis -82 degrees    Diagnosis       Normal sinus rhythm  Rightward axis  Septal infarct (cited on or before 27-JUL-2020)  T wave abnormality, consider inferior ischemia  T wave abnormality, consider anterolateral ischemia  Prolonged QT  Abnormal ECG  When compared with ECG of 10-AUG-2020 23:53,  T wave inversion now evident in Inferior leads  T wave inversion now evident in Anterolateral leads  QT has lengthened       Imaging /Procedures /Studies   XR CHEST PORT   Final Result   Impression:        1. No acute cardiopulmonary process. CPT code(s) 16010                      Assessment and Plan:      Active Hospital Problems    Diagnosis Date Noted    Sepsis ([facility] [address] 01/12/2021    Hyponatremia 01/12/2021    Acute renal failure superimposed on stage 3b chronic kidney disease ([facility] [address] 01/12/2021    COVID-19 virus infection 01/12/2021    Mixed hyperlipidemia 10/21/2020    Type 2 diabetes mellitus with hyperglycemia, without long-term current use of insulin ([facility] [address] 10/11/2018    Gastroesophageal reflux disease 02/16/2017    Essential hypertension 09/15/2016    Chronic kidney disease, stage III (moderate) ([facility] [address] 09/15/2016    Major depressive disorder with single episode, in full remission ([facility] 09/15/2016    Primary insomnia 09/15/2016    Coronary artery disease involving native coronary artery of native heart without angina pectoris 09/04/2013       PLAN:    Sepsis most likely 2/2 COVID-19 infection  Meets criteria with Tmax 101 and RR20. COVID positive 1/12/21 (in media). Onset 1/5/21. CXR w/o acute pulmonary process. No hypoxia. Will also obtain UA to r/o urosepsis. Procal pending. Hold off on abx for now  Tylenol prn. Vitamin C&Zinc  Antitussives and antiemetics  Hold off on Remesivir, convalescent plasma and Decadron as no hypoxia  Will obtain BCx, Ucx, respiratory Cx. F/u procal and D-Dimer    Lactic acidosis  Most likely 2/2 infection per above. Received 1LNS bolus in ED  F/u LA    Elevated pro-BNP  Elevated HS-trop  TTE in 8/2020 with EF60-65%. EKG with T wave inversion. No peripheral edema or si/sx of fluid overload on exam. Intermittent chest pain but associated with cough. Trend troponin  Will obtain repeat TTE  Cardiology consulted. No intervention recommended when discussed with ED attending. Hyponatremia  Corrected to normal with hyperglycemia  CTM    Long QT interval  QTc 617. Avoid meds that could provoke this  Holding home Celexa  Check Mag    MIRANDA on CKD stage 3  Cr 2.47 on admission. However Cr as high as 3.09 in Oct 2020. In Aug 2020 and prior was <2. Received 1LNS bolus in ED. Avoid nephrotoxic meds  Accurate I&O's  Holding off on mIVF for now d/t elevated pBNP and covid infection  Holding home Lasix for now  CTM for now    Elevated LFT's  Mild. Could be reactive 2/2 covid infection  CTM    Normocytic anemia: most likely anemia of chronic disease.  Stable H&H at baseline. DMII with hyperglycemia: Holding home oral meds. Start SSI here  CAD s/p stents: Cont home ASA and Plavix, BB, and statin  HTN: Holding home antihypertensives (procardia, Imdur, lasix) with soft BP on admission. Cont home metoprolol  HLD: Cont home statin  Depression/insomnia: holding off on Celexa and Trazodone d/t prolonged QTc    DVT PPx: Heparin SQ  Code Status: DNR    Anticipated discharge: >2 midnights. PT/OT ordered.      Signed By: Kianna Douglas DO     January 12, 2021

## 2021-01-13 NOTE — PROGRESS NOTES
01/12/21 2040   Dual Skin Pressure Injury Assessment   Dual Skin Pressure Injury Assessment WDL   Second Care Provider (Based on Facility Policy) KEYANNA Peña   Skin Integumentary   Skin Integumentary (WDL) WDL    Pressure  Injury Documentation No Pressure Injury Noted-Pressure Ulcer Prevention Initiated   Wound Prevention and Protection Methods   Orientation of Wound Prevention Posterior   Location of Wound Prevention Sacrum/Coccyx   Dressing Present  No   Wound Offloading (Prevention Methods) Bed, pressure reduction mattress

## 2021-01-13 NOTE — PROGRESS NOTES
Progress Note    Patient: Hattie York MRN: [id number]  SSN: [id number]    YOB: 1950  Age: 65 y.o. Sex: female      Admit Date: 1/12/2021    LOS: 1 day     Subjective:   F/U COVID    \"69 year old w/ a PMhx of CAD s/p stents x3, DMII, CKD stage 3 presented w/ cough, SOB and weakness in the past week. She also has been having fever at home and has been trying to cope with the symptoms. She has been having trouble laying flat to sleep since onset of symptoms. Also reported chest pain with cough. She went to get tested for COVID today 1/12 and was positive. Denies any alleviating or exacerbating factors. In ED, Tmax 101 but VSS otherwise. CBC at baseline. CMP with Na 127, . Cr 2.47, ALT 68, AST 87. pBNP [id number], HS-trop 78. CXR w/o acute process. EKG shows prolonged QT and also diffuse T-wave inversions. \" On 1/13, had episode of Vtach and then asystolic for about 30 seconds. Code BLUE called. Chest compressions were initiated and epinephrine was given x 1 dose. Pt went into pulseless VTach and chest compressions were continued. Synchronized cardioversion was performed and patient converted into pulseless sinus bradycardia. Chest compressions were continued, and patient was given IV atropine, IV Calcium, IV Magnesium, along with a second dose of IV epinephrine. Pt converted to sinus tachycardia and ROSC was achieved at around 06:12. Cardiology following who did not think had STEMI. Recommended ECHO since may have underlying COVID myocarditis. Chest pain when she breathes in. No SOB but is hard to take a deep breathe since CODE blue. No other concerns.    Current Facility-Administered Medications   Medication Dose Route Frequency    magnesium sulfate 4 g/100 mL IVPB  4 g IntraVENous ONCE    promethazine (PHENERGAN) with saline injection 12.5 mg  12.5 mg IntraVENous ONCE    insulin glargine (LANTUS) injection 13 Units  13 Units SubCUTAneous DAILY    aspirin chewable tablet 81 mg 81 mg Oral 7am    cholecalciferol (VITAMIN D3) (1000 Units /25 mcg) tablet 2 Tab  2,000 Units Oral DAILY    clopidogreL (PLAVIX) tablet 75 mg  75 mg Oral DAILY    cyanocobalamin tablet 2,500 mcg  2,500 mcg Oral DAILY    [Held by provider] metoprolol succinate (TOPROL-XL) XL tablet 50 mg  50 mg Oral DAILY    nitroglycerin (NITROSTAT) tablet 0.4 mg  0.4 mg SubLINGual Q5MIN PRN    rosuvastatin (CRESTOR) tablet 40 mg  40 mg Oral QHS    sodium chloride (NS) flush 5-40 mL  5-40 mL IntraVENous Q8H    sodium chloride (NS) flush 5-40 mL  5-40 mL IntraVENous PRN    acetaminophen (TYLENOL) tablet 650 mg  650 mg Oral Q6H PRN    Or    acetaminophen (TYLENOL) suppository 650 mg  650 mg Rectal Q6H PRN    polyethylene glycol (MIRALAX) packet 17 g  17 g Oral DAILY PRN    promethazine (PHENERGAN) tablet 12.5 mg  12.5 mg Oral Q6H PRN    Or    ondansetron (ZOFRAN) injection 4 mg  4 mg IntraVENous Q6H PRN    heparin (porcine) injection 5,000 Units  5,000 Units SubCUTAneous Q8H    dextrose 40% (GLUTOSE) oral gel 1 Tube  15 g Oral PRN    dextrose (D50W) injection syrg 12.5-25 g  25-50 mL IntraVENous PRN    insulin lispro (HUMALOG) injection   SubCUTAneous AC&HS    ascorbic acid (vitamin C) (VITAMIN C) tablet 1,000 mg  1,000 mg Oral DAILY    zinc sulfate tablet 220 mg  220 mg Oral DAILY    guaiFENesin-dextromethorphan (ROBITUSSIN DM) 100-10 mg/5 mL syrup 5 mL  5 mL Oral Q6H PRN    glucagon (GLUCAGEN) injection 1 mg  1 mg IntraMUSCular PRN       Objective:     Vitals:    01/13/21 0634 01/13/21 0719 01/13/21 0800 01/13/21 1119   BP:  (!) 120/99  (!) 90/42   Pulse:  (!) 104 (!) 104 86   Resp:  (!) 32  28   Temp:  98.3 °F (36.8 °C)  98.4 °F (36.9 °C)   SpO2: 94% 99%  99%   Weight:       Height:             Intake and Output:  Current Shift: No intake/output data recorded. Last three shifts: No intake/output data recorded. Physical Exam:   General:  Alert, cooperative   Eyes:  Conjunctivae/corneas clear.    Ears:  Normal TMs and external ear canals both ears. Nose: Nares normal. Septum midline. Mouth/Throat: Lips, mucosa, and tongue normal.   Neck:  no JVD. Back:   deferred   Lungs:   Clear to auscultation bilaterally but limited breathe sounds. Heart:  Regular rate and rhythm, S1, S2 normal   Abdomen:   Soft, non-tender. Bowel sounds normal. No masses,  No organomegaly. Extremities: Extremities normal, atraumatic, no cyanosis or edema. Pulses: 2+ and symmetric all extremities. Skin: Skin color, texture, turgor normal. No rashes or lesions   Lymph nodes: Cervical, supraclavicular, and axillary nodes normal.   Neurologic: CNII-XII intact. Mild confusion on exam       Lab/Data Review:    Recent Results (from the past 24 hour(s))   CBC WITH AUTOMATED DIFF    Collection Time: 01/12/21  4:29 PM   Result Value Ref Range    WBC 6.3 4.3 - 11.1 K/uL    RBC 3.78 (L) 4.05 - 5.2 M/uL    HGB 10.8 (L) 11.7 - 15.4 g/dL    HCT 35.1 (L) 35.8 - 46.3 %    MCV 92.9 79.6 - 97.8 FL    MCH 28.6 26.1 - 32.9 PG    MCHC 30.8 (L) 31.4 - 35.0 g/dL    RDW 15.0 (H) 11.9 - 14.6 %    PLATELET 359 004 - 191 K/uL    MPV 10.9 9.4 - 12.3 FL    ABSOLUTE NRBC 0.00 0.0 - 0.2 K/uL    DF AUTOMATED      NEUTROPHILS 81 (H) 43 - 78 %    LYMPHOCYTES 11 (L) 13 - 44 %    MONOCYTES 7 4.0 - 12.0 %    EOSINOPHILS 0 (L) 0.5 - 7.8 %    BASOPHILS 0 0.0 - 2.0 %    IMMATURE GRANULOCYTES 0 0.0 - 5.0 %    ABS. NEUTROPHILS 5.2 1.7 - 8.2 K/UL    ABS. LYMPHOCYTES 0.7 0.5 - 4.6 K/UL    ABS. MONOCYTES 0.4 0.1 - 1.3 K/UL    ABS. EOSINOPHILS 0.0 0.0 - 0.8 K/UL    ABS. BASOPHILS 0.0 0.0 - 0.2 K/UL    ABS. IMM.  GRANS. 0.0 0.0 - 0.5 K/UL   METABOLIC PANEL, COMPREHENSIVE    Collection Time: 01/12/21  4:29 PM   Result Value Ref Range    Sodium 127 (L) 136 - 145 mmol/L    Potassium 4.1 3.5 - 5.1 mmol/L    Chloride 97 (L) 98 - 107 mmol/L    CO2 23 21 - 32 mmol/L    Anion gap 7 7 - 16 mmol/L    Glucose 590 (HH) 65 - 100 mg/dL    BUN 27 (H) 8 - 23 MG/DL    Creatinine 2.47 (H) 0.6 - 1.0 MG/DL    GFR est AA 25 (L) >60 ml/min/1.73m2    GFR est non-AA 20 (L) >60 ml/min/1.73m2    Calcium 8.4 8.3 - 10.4 MG/DL    Bilirubin, total 0.5 0.2 - 1.1 MG/DL    ALT (SGPT) 68 (H) 12 - 65 U/L    AST (SGOT) 87 (H) 15 - 37 U/L    Alk. phosphatase 128 50 - 136 U/L    Protein, total 7.7 6.3 - 8.2 g/dL    Albumin 3.3 3.2 - 4.6 g/dL    Globulin 4.4 (H) 2.3 - 3.5 g/dL    A-G Ratio 0.8 (L) 1.2 - 3.5     LACTIC ACID    Collection Time: 01/12/21  4:29 PM   Result Value Ref Range    Lactic acid 2.5 (H) 0.4 - 2.0 MMOL/L   NT-PRO BNP    Collection Time: 01/12/21  4:29 PM   Result Value Ref Range    NT pro-BNP 18,662 (H) 5 - 125 PG/ML   TROPONIN-HIGH SENSITIVITY    Collection Time: 01/12/21  4:29 PM   Result Value Ref Range    Troponin-High Sensitivity 78.0 (H) 0 - 14 pg/mL   CULTURE, BLOOD    Collection Time: 01/12/21  4:29 PM    Specimen: Blood   Result Value Ref Range    Special Requests: LEFT  Antecubital        Culture result: NO GROWTH AFTER 13 HOURS     EKG, 12 LEAD, INITIAL    Collection Time: 01/12/21  4:32 PM   Result Value Ref Range    Ventricular Rate 70 BPM    Atrial Rate 70 BPM    P-R Interval 132 ms    QRS Duration 102 ms    Q-T Interval 572 ms    QTC Calculation (Bezet) 617 ms    Calculated P Axis 76 degrees    Calculated R Axis 102 degrees    Calculated T Axis -82 degrees    Diagnosis       Normal sinus rhythm  Rightward axis  Septal infarct (cited on or before 27-JUL-2020)  T wave abnormality, consider inferior ischemia  T wave abnormality, consider anterolateral ischemia  Prolonged QT  Abnormal ECG  When compared with ECG of 10-AUG-2020 23:53,  Significant changes have occurred  Confirmed by LARRY SHEPARD (), JOSE REYNA ([id number]) on 1/12/2021 8:12:18 PM     URINALYSIS W/ RFLX MICROSCOPIC    Collection Time: 01/12/21  8:00 PM   Result Value Ref Range    Color YELLOW      Appearance TURBID      Specific gravity 1.026 (H) 1.001 - 1.023      pH (UA) 5.5 5.0 - 9.0      Protein 100 (A) NEG mg/dL    Glucose >1,000  mg/dL    Ketone Negative NEG mg/dL    Bilirubin Negative NEG      Blood MODERATE (A) NEG      Urobilinogen 0.2 0.2 - 1.0 EU/dL    Nitrites Negative NEG      Leukocyte Esterase Negative NEG      WBC 0-3 0 /hpf    RBC 0-3 0 /hpf    Epithelial cells 0-3 0 /hpf    Bacteria 0 0 /hpf    Casts 0-3 0 /lpf    Other observations RESULTS VERIFIED MANUALLY     GLUCOSE, POC    Collection Time: 01/12/21  8:54 PM   Result Value Ref Range    Glucose (POC) 363 (H) 65 - 100 mg/dL   LACTIC ACID    Collection Time: 01/13/21 12:43 AM   Result Value Ref Range    Lactic acid 1.9 0.4 - 2.0 MMOL/L   HEMOGLOBIN A1C WITH EAG    Collection Time: 01/13/21 12:43 AM   Result Value Ref Range    Hemoglobin A1c 10.2 (H) 4.20 - 6.30 %    Est. average glucose 246 mg/dL   PROCALCITONIN    Collection Time: 01/13/21 12:43 AM   Result Value Ref Range    Procalcitonin 0.09 ng/mL   TROPONIN-HIGH SENSITIVITY    Collection Time: 01/13/21 12:43 AM   Result Value Ref Range    Troponin-High Sensitivity 75.4 (H) 0 - 14 pg/mL   D DIMER    Collection Time: 01/13/21 12:43 AM   Result Value Ref Range    D DIMER 0.77 (H) <0.56 ug/ml(FEU)   MAGNESIUM    Collection Time: 01/13/21 12:43 AM   Result Value Ref Range    Magnesium 2.0 1.8 - 2.4 mg/dL   TROPONIN-HIGH SENSITIVITY    Collection Time: 01/13/21 12:43 AM   Result Value Ref Range    Troponin-High Sensitivity 70.6 (H) 0 - 14 pg/mL   D DIMER    Collection Time: 01/13/21  6:21 AM   Result Value Ref Range    D DIMER 0.89 (H) <0.56 ug/ml(FEU)   CBC W/O DIFF    Collection Time: 01/13/21  6:21 AM   Result Value Ref Range    WBC 9.4 4.3 - 11.1 K/uL    RBC 3.61 (L) 4.05 - 5.2 M/uL    HGB 10.3 (L) 11.7 - 15.4 g/dL    HCT 34.9 (L) 35.8 - 46.3 %    MCV 96.7 79.6 - 97.8 FL    MCH 28.5 26.1 - 32.9 PG    MCHC 29.5 (L) 31.4 - 35.0 g/dL    RDW 14.9 (H) 11.9 - 14.6 %    PLATELET 464 (L) 366 - 450 K/uL    MPV 10.6 9.4 - 12.3 FL    ABSOLUTE NRBC 0.10 0.0 - 0.2 K/uL   METABOLIC PANEL, COMPREHENSIVE    Collection Time: 01/13/21  6:21 AM Result Value Ref Range    Sodium 136 136 - 145 mmol/L    Potassium 3.6 3.5 - 5.1 mmol/L    Chloride 107 98 - 107 mmol/L    CO2 19 (L) 21 - 32 mmol/L    Anion gap 10 7 - 16 mmol/L    Glucose 194 (H) 65 - 100 mg/dL    BUN 24 (H) 8 - 23 MG/DL    Creatinine 2.09 (H) 0.6 - 1.0 MG/DL    GFR est AA 30 (L) >60 ml/min/1.73m2    GFR est non-AA 25 (L) >60 ml/min/1.73m2    Calcium 8.4 8.3 - 10.4 MG/DL    Bilirubin, total 0.5 0.2 - 1.1 MG/DL    ALT (SGPT) 73 (H) 12 - 65 U/L    AST (SGOT) 119 (H) 15 - 37 U/L    Alk.  phosphatase 128 50 - 136 U/L    Protein, total 6.9 6.3 - 8.2 g/dL    Albumin 2.9 (L) 3.2 - 4.6 g/dL    Globulin 4.0 (H) 2.3 - 3.5 g/dL    A-G Ratio 0.7 (L) 1.2 - 3.5     PROTHROMBIN TIME + INR    Collection Time: 01/13/21  6:21 AM   Result Value Ref Range    Prothrombin time 14.0 12.5 - 14.7 sec    INR 1.1     POC G3    Collection Time: 01/13/21  6:22 AM   Result Value Ref Range    Device: Non rebreather      FIO2 (POC) 100 %    pH (POC) 7.18 (LL) 7.35 - 7.45      pCO2 (POC) 28.5 (L) 35 - 45 MMHG    pO2 (POC) 157 (H) 75 - 100 MMHG    HCO3 (POC) 10.6 (L) 22 - 26 MMOL/L    sO2 (POC) 99 (H) 95 - 98 %    Base deficit (POC) 16 mmol/L    Allens test (POC) NOT APPLICABLE      Site RIGHT BRACHIAL      Specimen type (POC) ARTERIAL      Performed by [email or web address]     CO2, POC 11 MMOL/L    Flow rate (POC) 15.000 L/min    Respiratory comment: NurseNotified     COLLECT TIME 618     GLUCOSE, POC    Collection Time: 01/13/21 10:08 AM   Result Value Ref Range    Glucose (POC) 410 (H) 65 - 100 mg/dL   GLUCOSE, POC    Collection Time: 01/13/21 11:19 AM   Result Value Ref Range    Glucose (POC) 341 (H) 65 - 100 mg/dL       Assessment/ Plan:     Principal Problem:    Sepsis ([facility] (1/12/2021)    Active Problems:    Coronary artery disease involving native coronary artery of native heart without angina pectoris (9/4/2013)      Essential hypertension (9/15/2016)      Chronic kidney disease, stage III (moderate) (HCC) (9/15/2016)      Major depressive disorder with single episode, in full remission ([facility] (9/15/2016)      Primary insomnia (9/15/2016)      Gastroesophageal reflux disease (2/16/2017)      Type 2 diabetes mellitus with hyperglycemia, without long-term current use of insulin ([facility] (10/11/2018)      Mixed hyperlipidemia (10/21/2020)      Hyponatremia (1/12/2021)      Acute renal failure superimposed on stage 3b chronic kidney disease ([facility] (1/12/2021)      COVID-19 virus infection (1/12/2021)      Long QT interval (1/12/2021)      Lactic acidosis (1/12/2021)      Elevated LFTs (1/12/2021)      Normocytic anemia (1/12/2021)    Cardiac arrest with V tach - s/p ROSC on 1/13/ Vitamin C and Zinc. Holding BB since bradycardia this AM. Start decadron. Do not think a candidate for convalescent plasma or remdesivir due to time frame of about 14 days of symptoms. ECHO pending. Cardiology following    COVID - as above. Decadron. dCHF EF 60% - compensated. DM type II - uncontrolled with A1C >10. Start lantus 13, SS    ASA/plavix    I have updated the    I have updated the daughter     Possible dc in two days.      DVT prophylaxis - heparin  Signed By: Landon Arriola DO     January 13, 2021

## 2021-01-13 NOTE — PROGRESS NOTES
Care Management Interventions  Mode of Transport at Discharge: Self  Transition of Care Consult (CM Consult): Discharge Planning  Discharge Durable Medical Equipment: No  Physical Therapy Consult: Yes  Occupational Therapy Consult: Yes  Speech Therapy Consult: No   Resource Information Provided?: No  Discharge Location  Discharge Placement: Unable to determine at this time    CM called patient  and left message requesting a return call. Code blue was called for patient this morning at approximately 0600. Patient in room at this time resting. PT/OT has been consulted for patient and will follow up with her later making sure patient remains stable at this time. CM will continue to follow patient closely for discharge planning and needs. Please consult or notify CM of new needs.

## 2021-01-13 NOTE — ED NOTES
TRANSFER - OUT REPORT:    Verbal report given to Iftikhar Marin on [facility]  being transferred to [facility] for routine progression of care       Report consisted of patients Situation, Background, Assessment and   Recommendations(SBAR). Information from the following report(s) SBAR, ED Summary and MAR was reviewed with the receiving nurse. Lines:   Peripheral IV 01/12/21 Left Forearm (Active)   Site Assessment Clean, dry, & intact 01/12/21 1708   Phlebitis Assessment 0 01/12/21 1708   Infiltration Assessment 0 01/12/21 1708   Dressing Status Clean, dry, & intact 01/12/21 1708        Opportunity for questions and clarification was provided.       Patient transported with:   [employer]

## 2021-01-13 NOTE — CONSULTS
am  1/13/2021 6:26 AM    Admit Date: 1/12/2021    Ad[facility]: [facility] [A41.9]      Subjective:    Patient Patient is a 65year old w/ a PMhx of CAD s/p stents x3, DMII, CKD stage 3 presented w/ cough, SOB and weakness in the past week. She also has been having fever at home and has been trying to cope with the symptoms. She has been having trouble laying flat to sleep since onset of symptoms. Also reported chest pain with cough. She went to get tested for COVID today 1/12 and was positive. Denies any alleviating or exacerbating factors. In ED, Tmax 101 but VSS otherwise. CBC at baseline. CMP with Na 127, . Cr 2.47, ALT 68, AST 87. pBNP [id number], HS-trop 78. CXR w/o acute process. EKG shows prolonged QT and also diffuse T-wave inversions. ED attending discussed case w/ Cardiology Dr. Miley Blake and was recommended no intervention. Pt was given Tylenol, regular insulin 7U, Zofran 1L NS bolus. Prior cardiac history of stents. In 2017 cath mild disease.  Ef normal. 9/20 echo and nuke that were normal. Now nsr PVCs bigeminy and then VT pea and successful ROSC        Objective:      Visit Vitals  /79 (BP 1 Location: Right arm, BP Patient Position: At rest)   Pulse 80   Temp (!) 102.9 °F (39.4 °C)   Resp 20   Ht 5' 4\" (1.626 m)   Wt 144 lb 1.6 oz (65.4 kg)   SpO2 97%   BMI 24.73 kg/m²       ROS:  intubated    Physical Exam:    Physical Examination:   General appearance -responsive  Mental status - sedated  Chest/CV - clear to auscultation, no wheezes, rales or rhonchi, symmetric air entry  Heart - normal rate, regular rhythm, normal S1, S2, no murmurs, rubs, clicks or gallops  Abdomen/GI - soft, nontender, nondistended, no masses or organomegaly  Musculoskeletal - no joint tenderness, deformity or swelling  Extremities - peripheral pulses normal, no pedal edema, no clubbing or cyanosis  Skin - normal coloration and turgor, no rashes, no suspicious skin lesions noted    Current Facility-Administered Medications   Medication Dose Route Frequency    aspirin chewable tablet 81 mg  81 mg Oral 7am    cholecalciferol (VITAMIN D3) (1000 Units /25 mcg) tablet 2 Tab  2,000 Units Oral DAILY    clopidogreL (PLAVIX) tablet 75 mg  75 mg Oral DAILY    cyanocobalamin tablet 2,500 mcg  2,500 mcg Oral DAILY    metoprolol succinate (TOPROL-XL) XL tablet 50 mg  50 mg Oral DAILY    nitroglycerin (NITROSTAT) tablet 0.4 mg  0.4 mg SubLINGual Q5MIN PRN    rosuvastatin (CRESTOR) tablet 40 mg  40 mg Oral QHS    sodium chloride (NS) flush 5-40 mL  5-40 mL IntraVENous Q8H    sodium chloride (NS) flush 5-40 mL  5-40 mL IntraVENous PRN    acetaminophen (TYLENOL) tablet 650 mg  650 mg Oral Q6H PRN    Or    acetaminophen (TYLENOL) suppository 650 mg  650 mg Rectal Q6H PRN    polyethylene glycol (MIRALAX) packet 17 g  17 g Oral DAILY PRN    promethazine (PHENERGAN) tablet 12.5 mg  12.5 mg Oral Q6H PRN    Or    ondansetron (ZOFRAN) injection 4 mg  4 mg IntraVENous Q6H PRN    heparin (porcine) injection 5,000 Units  5,000 Units SubCUTAneous Q8H    dextrose 40% (GLUTOSE) oral gel 1 Tube  15 g Oral PRN    dextrose (D50W) injection syrg 12.5-25 g  25-50 mL IntraVENous PRN    insulin lispro (HUMALOG) injection   SubCUTAneous AC&HS    ascorbic acid (vitamin C) (VITAMIN C) tablet 1,000 mg  1,000 mg Oral DAILY    zinc sulfate tablet 220 mg  220 mg Oral DAILY    guaiFENesin-dextromethorphan (ROBITUSSIN DM) 100-10 mg/5 mL syrup 5 mL  5 mL Oral Q6H PRN    glucagon (GLUCAGEN) injection 1 mg  1 mg IntraMUSCular PRN       Data Review:   @LABRCNT(Na,K,BUN,CREA,WBC,HGB,HCT,PLT,INR,TRP,TCHOL*,Triglyceride*,LDL*,LDLCPOC HDL*,HDL])@    TELEMETRY: ST    Assessment/Plan:     Principal Problem:    Sepsis ([address] (1/12/2021)    Active Problems:    Coronary artery disease involving native coronary artery of native heart without angina pectoris (9/4/2013)      Essential hypertension (9/15/2016)      Chronic kidney disease, stage III (moderate) ([facility] (9/15/2016)      Major depressive disorder with single episode, in full remission ([address] (9/15/2016)      Primary insomnia (9/15/2016)      Gastroesophageal reflux disease (2/16/2017)      Type 2 diabetes mellitus with hyperglycemia, without long-term current use of insulin ([address] (10/11/2018)      Mixed hyperlipidemia (10/21/2020)      Hyponatremia (1/12/2021)      Acute renal failure superimposed on stage 3b chronic kidney disease ([address] (1/12/2021)      COVID-19 virus infection (1/12/2021)      Long QT interval (1/12/2021)      Lactic acidosis (1/12/2021)      Elevated LFTs (1/12/2021)      Normocytic anemia (1/12/2021)      I think she had PVC induced R on T VT and will give Mag at this time. As long as she remains nsr or sinus tach at a rate sufficient to suppress PVCs unlikely to have recurrance. ekg does not appear STEMI.     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

## 2021-01-13 NOTE — PROGRESS NOTES
Hourly rounds performed. All needs met. Bed is in low position and call light is within reach. Pt complaining of headache and spiked a fever of 102.9 this AM. Administered PRN Tylenol and awaiting to reassess in an hour. Pt states she was restless during shift and keeps coughing. Pt also received PRN Robitussin. Will continue to monitor pt.

## 2021-01-13 NOTE — PROGRESS NOTES
TRANSFER - IN REPORT:    Verbal report received from [employer], RN(name) on [place]  being received from ED(unit) for routine progression of care      Report consisted of patients Situation, Background, Assessment and   Recommendations(SBAR). Information from the following report(s) SBAR was reviewed with the receiving nurse. Opportunity for questions and clarification was provided. Assessment completed upon patients arrival to unit and care assumed.

## 2021-01-13 NOTE — PROGRESS NOTES
HOSPITALIST CODE BLUE NOTE    NAME:  oDmi Kitchen   Age:  65 y.o.  :   1950   MRN:   [id number]  PCP: Uvaldo Majano MD  Consulting MD:  Treatment Team: Attending Provider: Pancho Taylor DO; Consulting Provider: Kim Kamara NP    REASON FOR CODE BLUE: pulseless    INTERVAL HISTORY:   Domi Kitchen is a 65y.o. year-old female admitted for COVID and sepsis. Nursing staff reports that telemetry notified them of a 10-beat run of Vtach at 05:56. Nursing checked on her at that time and she was alert and awake. At 06:05, the patient went into sustained VTach and then went asystolic after about 30 seconds and a CODE BLUE was called. Chest compressions were initiated and epinephrine was given x 1 dose. Pt went into pulseless VTach and chest compressions were continued. Synchronized cardioversion was performed and patient converted into pulseless sinus bradycardia. Chest compressions were continued, and patient was given IV atropine, IV Calcium, IV Magnesium, along with a second dose of IV epinephrine. Pt converted to sinus tachycardia and ROSC was achieved at around 06:12. Patient is currently awake and talking, complaining of chest pain and nausea. REVIEW OF SYSTEMS: Unable to obtain given pt's obtunded status. Prior to Admission Medications   Prescriptions Last Dose Informant Patient Reported? Taking? NIFEdipine ER (PROCARDIA XL) 30 mg ER tablet   No No   Sig: TAKE 1 TABLET BY MOUTH DAILY   NIFEdipine ER (PROCARDIA XL) 60 mg ER tablet   No No   Sig: TAKE 1 TABLET BY MOUTH  DAILY   aspirin 81 mg chewable tablet   Yes No   Sig: Take 81 mg by mouth every morning. Indications: continue per anesthesia guidelines   cholecalciferol, vitamin D3, (VITAMIN D3) 2,000 unit tab   Yes No   Sig: Take 2,000 Units by mouth daily.    citalopram (CELEXA) 20 mg tablet   No No   Sig: TAKE 1 TABLET BY MOUTH  EVERY MORNING   clopidogreL (PLAVIX) 75 mg tab   No No   Sig: TAKE 1 TABLET BY MOUTH DAILY   cyanocobalamin (VITAMIN B-12) 1,000 mcg tablet   Yes No   Sig: Take 2,500 mcg by mouth daily. furosemide (LASIX) 40 mg tablet   No No   Sig: Take 1 Tab by mouth every other day. Patient taking differently: Take 40 mg by mouth daily. isosorbide mononitrate ER (IMDUR) 30 mg tablet   No No   Sig: Take 1 Tab by mouth every morning.   magnesium oxide (MAG-OX) 400 mg tablet   No No   Sig: TAKE 1 TABLET BY MOUTH TWICE DAILY   Patient taking differently: Take 400 mg by mouth daily. metFORMIN (GLUCOPHAGE) 1,000 mg tablet   No No   Sig: TAKE 1 TABLET BY MOUTH  TWICE DAILY WITH MEALS   metoprolol succinate (TOPROL-XL) 50 mg XL tablet   No No   Sig: Take 1 Tab by mouth daily. nitroglycerin (NITROSTAT) 0.4 mg SL tablet   No No   Si Tab by SubLINGual route every five (5) minutes as needed for Chest Pain.   potassium chloride SA (K-DUR, KLOR-CON) 20 mEq tablet   No No   Sig: Take 1 Tab by mouth daily. rosuvastatin (CRESTOR) 40 mg tablet   No No   Sig: Take 1 Tab by mouth nightly. traZODone (DESYREL) 50 mg tablet   No No   Sig: TAKE 1 TABLET BY MOUTH  NIGHTLY      Facility-Administered Medications: None       Objective:     Visit Vitals  /79 (BP 1 Location: Right arm, BP Patient Position: At rest)   Pulse 80   Temp (!) 102.9 °F (39.4 °C)   Resp 20   Ht 5' 4\" (1.626 m)   Wt 65.4 kg (144 lb 1.6 oz)   SpO2 97%   BMI 24.73 kg/m²      Temp (24hrs), Av.2 °F (37.9 °C), Min:98.9 °F (37.2 °C), Max:102.9 °F (39.4 °C)    Oxygen Therapy  O2 Sat (%): 97 % (21 0422)  O2 Device: Room air (21 1624)  Physical Exam: AFTER CODE BLUE  General:    The patient is moaning, awake, alert  Head:   Normocephalic/atraumatic. Eyes:  No palpebral pallor or scleral icterus. ENT:  External auricular and nasal exam within normal limits. Mucous membranes are moist.  Neck:  Supple, non-tender, no JVD. Lungs:   Clear to auscultation bilaterally without wheezes or crackles.     No respiratory distress or accessory muscle use. Heart:   Tachycardic, brisk pulse. Abdomen:   Soft, non-distended with normoactive bowel sounds. Extremities: Without clubbing, cyanosis, or edema. Skin:     Normal color, texture, and turgor. No rashes, lesions, or jaundice. Pulses:          Radial and dorsalis pedis pulses present 2+ bilaterally. Capillary refill < 2 seconds. Neurologic: GCS 15  Psychiatric: Awake, alert     Data Review:   Recent Results (from the past 24 hour(s))   CBC WITH AUTOMATED DIFF    Collection Time: 01/12/21  4:29 PM   Result Value Ref Range    WBC 6.3 4.3 - 11.1 K/uL    RBC 3.78 (L) 4.05 - 5.2 M/uL    HGB 10.8 (L) 11.7 - 15.4 g/dL    HCT 35.1 (L) 35.8 - 46.3 %    MCV 92.9 79.6 - 97.8 FL    MCH 28.6 26.1 - 32.9 PG    MCHC 30.8 (L) 31.4 - 35.0 g/dL    RDW 15.0 (H) 11.9 - 14.6 %    PLATELET 975 394 - 706 K/uL    MPV 10.9 9.4 - 12.3 FL    ABSOLUTE NRBC 0.00 0.0 - 0.2 K/uL    DF AUTOMATED      NEUTROPHILS 81 (H) 43 - 78 %    LYMPHOCYTES 11 (L) 13 - 44 %    MONOCYTES 7 4.0 - 12.0 %    EOSINOPHILS 0 (L) 0.5 - 7.8 %    BASOPHILS 0 0.0 - 2.0 %    IMMATURE GRANULOCYTES 0 0.0 - 5.0 %    ABS. NEUTROPHILS 5.2 1.7 - 8.2 K/UL    ABS. LYMPHOCYTES 0.7 0.5 - 4.6 K/UL    ABS. MONOCYTES 0.4 0.1 - 1.3 K/UL    ABS. EOSINOPHILS 0.0 0.0 - 0.8 K/UL    ABS. BASOPHILS 0.0 0.0 - 0.2 K/UL    ABS. IMM. GRANS. 0.0 0.0 - 0.5 K/UL   METABOLIC PANEL, COMPREHENSIVE    Collection Time: 01/12/21  4:29 PM   Result Value Ref Range    Sodium 127 (L) 136 - 145 mmol/L    Potassium 4.1 3.5 - 5.1 mmol/L    Chloride 97 (L) 98 - 107 mmol/L    CO2 23 21 - 32 mmol/L    Anion gap 7 7 - 16 mmol/L    Glucose 590 (HH) 65 - 100 mg/dL    BUN 27 (H) 8 - 23 MG/DL    Creatinine 2.47 (H) 0.6 - 1.0 MG/DL    GFR est AA 25 (L) >60 ml/min/1.73m2    GFR est non-AA 20 (L) >60 ml/min/1.73m2    Calcium 8.4 8.3 - 10.4 MG/DL    Bilirubin, total 0.5 0.2 - 1.1 MG/DL    ALT (SGPT) 68 (H) 12 - 65 U/L    AST (SGOT) 87 (H) 15 - 37 U/L    Alk.  phosphatase 128 50 - 136 U/L Protein, total 7.7 6.3 - 8.2 g/dL    Albumin 3.3 3.2 - 4.6 g/dL    Globulin 4.4 (H) 2.3 - 3.5 g/dL    A-G Ratio 0.8 (L) 1.2 - 3.5     LACTIC ACID    Collection Time: 01/12/21  4:29 PM   Result Value Ref Range    Lactic acid 2.5 (H) 0.4 - 2.0 MMOL/L   NT-PRO BNP    Collection Time: 01/12/21  4:29 PM   Result Value Ref Range    NT pro-BNP 18,662 (H) 5 - 125 PG/ML   TROPONIN-HIGH SENSITIVITY    Collection Time: 01/12/21  4:29 PM   Result Value Ref Range    Troponin-High Sensitivity 78.0 (H) 0 - 14 pg/mL   EKG, 12 LEAD, INITIAL    Collection Time: 01/12/21  4:32 PM   Result Value Ref Range    Ventricular Rate 70 BPM    Atrial Rate 70 BPM    P-R Interval 132 ms    QRS Duration 102 ms    Q-T Interval 572 ms    QTC Calculation (Bezet) 617 ms    Calculated P Axis 76 degrees    Calculated R Axis 102 degrees    Calculated T Axis -82 degrees    Diagnosis       Normal sinus rhythm  Rightward axis  Septal infarct (cited on or before 27-JUL-2020)  T wave abnormality, consider inferior ischemia  T wave abnormality, consider anterolateral ischemia  Prolonged QT  Abnormal ECG  When compared with ECG of 10-AUG-2020 23:53,  Significant changes have occurred  Confirmed by ST LEROY BARNES MD (), JOSE REYNA ([id number]) on 1/12/2021 8:12:18 PM     URINALYSIS W/ RFLX MICROSCOPIC    Collection Time: 01/12/21  8:00 PM   Result Value Ref Range    Color YELLOW      Appearance TURBID      Specific gravity 1.026 (H) 1.001 - 1.023      pH (UA) 5.5 5.0 - 9.0      Protein 100 (A) NEG mg/dL    Glucose >1,000 mg/dL    Ketone Negative NEG mg/dL    Bilirubin Negative NEG      Blood MODERATE (A) NEG      Urobilinogen 0.2 0.2 - 1.0 EU/dL    Nitrites Negative NEG      Leukocyte Esterase Negative NEG      WBC 0-3 0 /hpf    RBC 0-3 0 /hpf    Epithelial cells 0-3 0 /hpf    Bacteria 0 0 /hpf    Casts 0-3 0 /lpf    Other observations RESULTS VERIFIED MANUALLY     GLUCOSE, POC    Collection Time: 01/12/21  8:54 PM   Result Value Ref Range    Glucose (POC) 363 (H) 65 - 100 mg/dL   LACTIC ACID    Collection Time: 01/13/21 12:43 AM   Result Value Ref Range    Lactic acid 1.9 0.4 - 2.0 MMOL/L   HEMOGLOBIN A1C WITH EAG    Collection Time: 01/13/21 12:43 AM   Result Value Ref Range    Hemoglobin A1c 10.2 (H) 4.20 - 6.30 %    Est. average glucose 246 mg/dL   PROCALCITONIN    Collection Time: 01/13/21 12:43 AM   Result Value Ref Range    Procalcitonin 0.09 ng/mL   TROPONIN-HIGH SENSITIVITY    Collection Time: 01/13/21 12:43 AM   Result Value Ref Range    Troponin-High Sensitivity 75.4 (H) 0 - 14 pg/mL   D DIMER    Collection Time: 01/13/21 12:43 AM   Result Value Ref Range    D DIMER 0.77 (H) <0.56 ug/ml(FEU)   MAGNESIUM    Collection Time: 01/13/21 12:43 AM   Result Value Ref Range    Magnesium 2.0 1.8 - 2.4 mg/dL   TROPONIN-HIGH SENSITIVITY    Collection Time: 01/13/21 12:43 AM   Result Value Ref Range    Troponin-High Sensitivity 70.6 (H) 0 - 14 pg/mL       Imaging Aida Weber /Studies:  Xr Chest Port    Result Date: 1/12/2021  Impression:  1. No acute cardiopulmonary process. CPT code(s) 58888          Assessment and Plan:     - Ventricular arrhythmia. ROSC achieved with ACLS, chest compressions, 2 doses of epinephrine, 1 dose of Mag sulfate, 1 dose of calcium chloride, and 1 dose atropine. Appears to be related to ventricular arrhythmia. Pt currently in sinus tachycardia, awaiting 12 lead EKG. Labs pending. Dr. Alonso Santana of Cardiology consulted and already arrived at bedside. Will follow cardiology's recommendations. Greatly appreciate Dr. Lexii Dodd prompt consultation.    - Disposition: Transfer to telemetry/ICU per cardiology recommendations. - Critical care time: 40 minutes  More than 50% of the time documented was spent in face-to-face contact with the patient and in the care of the patient on the floor/unit where the patient is located.     Signed By: Pedro Gagnon MD     January 13, 2021

## 2021-01-14 ENCOUNTER — APPOINTMENT (OUTPATIENT)
Dept: GENERAL RADIOLOGY | Age: 71
DRG: 871 | End: 2021-01-14
Attending: FAMILY MEDICINE
Payer: MEDICARE

## 2021-01-14 LAB
ALBUMIN SERPL-MCNC: 3.1 G/DL (ref 3.2–4.6)
ALBUMIN/GLOB SERPL: 0.8 {RATIO} (ref 1.2–3.5)
ALP SERPL-CCNC: 109 U/L (ref 50–136)
ALT SERPL-CCNC: 73 U/L (ref 12–65)
ANION GAP SERPL CALC-SCNC: 6 MMOL/L (ref 7–16)
AST SERPL-CCNC: 90 U/L (ref 15–37)
BILIRUB SERPL-MCNC: 0.5 MG/DL (ref 0.2–1.1)
BUN SERPL-MCNC: 33 MG/DL (ref 8–23)
CALCIUM SERPL-MCNC: 8.6 MG/DL (ref 8.3–10.4)
CHLORIDE SERPL-SCNC: 105 MMOL/L (ref 98–107)
CO2 SERPL-SCNC: 24 MMOL/L (ref 21–32)
CREAT SERPL-MCNC: 2.33 MG/DL (ref 0.6–1)
GLOBULIN SER CALC-MCNC: 3.7 G/DL (ref 2.3–3.5)
GLUCOSE BLD STRIP.AUTO-MCNC: 220 MG/DL (ref 65–100)
GLUCOSE BLD STRIP.AUTO-MCNC: 232 MG/DL (ref 65–100)
GLUCOSE BLD STRIP.AUTO-MCNC: 258 MG/DL (ref 65–100)
GLUCOSE BLD STRIP.AUTO-MCNC: 424 MG/DL (ref 65–100)
GLUCOSE BLD STRIP.AUTO-MCNC: 498 MG/DL (ref 65–100)
GLUCOSE BLD STRIP.AUTO-MCNC: 529 MG/DL (ref 65–100)
GLUCOSE SERPL-MCNC: 224 MG/DL (ref 65–100)
MAGNESIUM SERPL-MCNC: 3.2 MG/DL (ref 1.8–2.4)
PHOSPHATE SERPL-MCNC: 2.4 MG/DL (ref 2.3–3.7)
POTASSIUM SERPL-SCNC: 3.5 MMOL/L (ref 3.5–5.1)
PROT SERPL-MCNC: 6.8 G/DL (ref 6.3–8.2)
SODIUM SERPL-SCNC: 135 MMOL/L (ref 136–145)

## 2021-01-14 PROCEDURE — 84100 ASSAY OF PHOSPHORUS: CPT

## 2021-01-14 PROCEDURE — 74011636637 HC RX REV CODE- 636/637: Performed by: FAMILY MEDICINE

## 2021-01-14 PROCEDURE — 83735 ASSAY OF MAGNESIUM: CPT

## 2021-01-14 PROCEDURE — 97165 OT EVAL LOW COMPLEX 30 MIN: CPT

## 2021-01-14 PROCEDURE — 74011250637 HC RX REV CODE- 250/637: Performed by: FAMILY MEDICINE

## 2021-01-14 PROCEDURE — 97161 PT EVAL LOW COMPLEX 20 MIN: CPT | Performed by: PHYSICAL THERAPIST

## 2021-01-14 PROCEDURE — 74011250636 HC RX REV CODE- 250/636: Performed by: FAMILY MEDICINE

## 2021-01-14 PROCEDURE — 80053 COMPREHEN METABOLIC PANEL: CPT

## 2021-01-14 PROCEDURE — 71045 X-RAY EXAM CHEST 1 VIEW: CPT

## 2021-01-14 PROCEDURE — 2709999900 HC NON-CHARGEABLE SUPPLY

## 2021-01-14 PROCEDURE — 97116 GAIT TRAINING THERAPY: CPT | Performed by: PHYSICAL THERAPIST

## 2021-01-14 PROCEDURE — 97535 SELF CARE MNGMENT TRAINING: CPT

## 2021-01-14 PROCEDURE — 82962 GLUCOSE BLOOD TEST: CPT

## 2021-01-14 PROCEDURE — 65660000000 HC RM CCU STEPDOWN

## 2021-01-14 RX ORDER — INSULIN GLARGINE 100 [IU]/ML
18 INJECTION, SOLUTION SUBCUTANEOUS EVERY 12 HOURS
Status: DISCONTINUED | OUTPATIENT
Start: 2021-01-14 | End: 2021-01-15

## 2021-01-14 RX ORDER — HYDRALAZINE HYDROCHLORIDE 10 MG/1
10 TABLET, FILM COATED ORAL
Status: DISCONTINUED | OUTPATIENT
Start: 2021-01-14 | End: 2021-01-17

## 2021-01-14 RX ORDER — INSULIN LISPRO 100 [IU]/ML
10 INJECTION, SOLUTION INTRAVENOUS; SUBCUTANEOUS
Status: DISCONTINUED | OUTPATIENT
Start: 2021-01-14 | End: 2021-01-19 | Stop reason: HOSPADM

## 2021-01-14 RX ORDER — OXYCODONE HYDROCHLORIDE 5 MG/1
5 TABLET ORAL
Status: DISCONTINUED | OUTPATIENT
Start: 2021-01-14 | End: 2021-01-19 | Stop reason: HOSPADM

## 2021-01-14 RX ORDER — GLIPIZIDE 5 MG/1
TABLET ORAL DAILY
COMMUNITY
End: 2021-01-19

## 2021-01-14 RX ADMIN — VITAMIN D, TAB 1000IU (100/BT) 2 TABLET: 25 TAB at 08:53

## 2021-01-14 RX ADMIN — HEPARIN SODIUM 5000 UNITS: 5000 INJECTION INTRAVENOUS; SUBCUTANEOUS at 06:10

## 2021-01-14 RX ADMIN — INSULIN GLARGINE 13 UNITS: 100 INJECTION, SOLUTION SUBCUTANEOUS at 09:11

## 2021-01-14 RX ADMIN — OXYCODONE HYDROCHLORIDE 5 MG: 5 TABLET ORAL at 20:28

## 2021-01-14 RX ADMIN — MORPHINE SULFATE 1 MG: 2 INJECTION, SOLUTION INTRAMUSCULAR; INTRAVENOUS at 21:06

## 2021-01-14 RX ADMIN — INSULIN LISPRO 6 UNITS: 100 INJECTION, SOLUTION INTRAVENOUS; SUBCUTANEOUS at 08:54

## 2021-01-14 RX ADMIN — CYANOCOBALAMIN TAB 1000 MCG 2500 MCG: 1000 TAB at 08:53

## 2021-01-14 RX ADMIN — Medication 10 ML: at 16:43

## 2021-01-14 RX ADMIN — INSULIN LISPRO 10 UNITS: 100 INJECTION, SOLUTION INTRAVENOUS; SUBCUTANEOUS at 12:22

## 2021-01-14 RX ADMIN — INSULIN GLARGINE 18 UNITS: 100 INJECTION, SOLUTION SUBCUTANEOUS at 22:41

## 2021-01-14 RX ADMIN — INSULIN LISPRO 15 UNITS: 100 INJECTION, SOLUTION INTRAVENOUS; SUBCUTANEOUS at 12:21

## 2021-01-14 RX ADMIN — ACETAMINOPHEN 650 MG: 325 TABLET, FILM COATED ORAL at 13:53

## 2021-01-14 RX ADMIN — DEXAMETHASONE SODIUM PHOSPHATE 6 MG: 10 INJECTION INTRAMUSCULAR; INTRAVENOUS at 12:00

## 2021-01-14 RX ADMIN — HEPARIN SODIUM 5000 UNITS: 5000 INJECTION INTRAVENOUS; SUBCUTANEOUS at 21:07

## 2021-01-14 RX ADMIN — HEPARIN SODIUM 5000 UNITS: 5000 INJECTION INTRAVENOUS; SUBCUTANEOUS at 13:53

## 2021-01-14 RX ADMIN — Medication 10 ML: at 21:07

## 2021-01-14 RX ADMIN — ASPIRIN 81 MG: 81 TABLET, CHEWABLE ORAL at 06:10

## 2021-01-14 RX ADMIN — INSULIN LISPRO 15 UNITS: 100 INJECTION, SOLUTION INTRAVENOUS; SUBCUTANEOUS at 22:48

## 2021-01-14 RX ADMIN — ROSUVASTATIN CALCIUM 40 MG: 20 TABLET, COATED ORAL at 21:08

## 2021-01-14 RX ADMIN — OXYCODONE HYDROCHLORIDE AND ACETAMINOPHEN 1000 MG: 500 TABLET ORAL at 08:53

## 2021-01-14 RX ADMIN — CLOPIDOGREL BISULFATE 75 MG: 75 TABLET ORAL at 08:53

## 2021-01-14 RX ADMIN — Medication 220 MG: at 08:53

## 2021-01-14 RX ADMIN — INSULIN LISPRO 9 UNITS: 100 INJECTION, SOLUTION INTRAVENOUS; SUBCUTANEOUS at 16:55

## 2021-01-14 RX ADMIN — Medication 10 ML: at 06:10

## 2021-01-14 RX ADMIN — INSULIN LISPRO 10 UNITS: 100 INJECTION, SOLUTION INTRAVENOUS; SUBCUTANEOUS at 16:55

## 2021-01-14 NOTE — PROGRESS NOTES
Progress Note    Patient: Lloyd Knight MRN: [id number]  SSN: [id number]    YOB: 1950  Age: 65 y.o. Sex: female      Admit Date: 1/12/2021    LOS: 2 days     Subjective:   F/U COVID    \"69 year old w/ a PMhx of CAD s/p stents x3, DMII, CKD stage 3 presented w/ cough, SOB and weakness in the past week. She also has been having fever at home and has been trying to cope with the symptoms. She has been having trouble laying flat to sleep since onset of symptoms. Also reported chest pain with cough. She went to get tested for COVID 1/12 and was positive. Denies any alleviating or exacerbating factors. In ED, Tmax 101 but VSS otherwise. CBC at baseline. CMP with Na 127, . Cr 2.47, ALT 68, AST 87. pBNP [id number], HS-trop 78. CXR w/o acute process. EKG shows prolonged QT and also diffuse T-wave inversions. \" On 1/13, had episode of Vtach and then asystolic for about 30 seconds. Code BLUE called. Chest compressions were initiated and epinephrine was given x 1 dose. Pt went into pulseless VTach and chest compressions were continued. Synchronized cardioversion was performed and patient converted into pulseless sinus bradycardia. Chest compressions were continued, and patient was given IV atropine, IV Calcium, IV Magnesium, along with a second dose of IV epinephrine. Pt converted to sinus tachycardia and ROSC was achieved at around 06:12. Cardiology following who did not think had STEMI. Recommended ECHO since may have underlying COVID myocarditis. Chest pain when she breathes in. Small amount of hemoptysis. No SOB but is hard to take a deep breathe since CODE blue. No other concerns. Satting well on RA.  Glucose uncontrolled  Current Facility-Administered Medications   Medication Dose Route Frequency    insulin glargine (LANTUS) injection 18 Units  18 Units SubCUTAneous Q12H    insulin lispro (HUMALOG) injection 10 Units  10 Units SubCUTAneous TIDAC    dexamethasone (DECADRON) 10 mg/mL injection 6 mg  6 mg IntraVENous Q24H    morphine injection 1 mg  1 mg IntraVENous Q4H PRN    aspirin chewable tablet 81 mg  81 mg Oral 7am    cholecalciferol (VITAMIN D3) (1000 Units /25 mcg) tablet 2 Tab  2,000 Units Oral DAILY    clopidogreL (PLAVIX) tablet 75 mg  75 mg Oral DAILY    cyanocobalamin tablet 2,500 mcg  2,500 mcg Oral DAILY    [Held by provider] metoprolol succinate (TOPROL-XL) XL tablet 50 mg  50 mg Oral DAILY    nitroglycerin (NITROSTAT) tablet 0.4 mg  0.4 mg SubLINGual Q5MIN PRN    rosuvastatin (CRESTOR) tablet 40 mg  40 mg Oral QHS    sodium chloride (NS) flush 5-40 mL  5-40 mL IntraVENous Q8H    sodium chloride (NS) flush 5-40 mL  5-40 mL IntraVENous PRN    acetaminophen (TYLENOL) tablet 650 mg  650 mg Oral Q6H PRN    Or    acetaminophen (TYLENOL) suppository 650 mg  650 mg Rectal Q6H PRN    polyethylene glycol (MIRALAX) packet 17 g  17 g Oral DAILY PRN    ondansetron (ZOFRAN) injection 4 mg  4 mg IntraVENous Q6H PRN    heparin (porcine) injection 5,000 Units  5,000 Units SubCUTAneous Q8H    dextrose 40% (GLUTOSE) oral gel 1 Tube  15 g Oral PRN    dextrose (D50W) injection syrg 12.5-25 g  25-50 mL IntraVENous PRN    insulin lispro (HUMALOG) injection   SubCUTAneous AC&HS    ascorbic acid (vitamin C) (VITAMIN C) tablet 1,000 mg  1,000 mg Oral DAILY    zinc sulfate tablet 220 mg  220 mg Oral DAILY    guaiFENesin-dextromethorphan (ROBITUSSIN DM) 100-10 mg/5 mL syrup 5 mL  5 mL Oral Q6H PRN    glucagon (GLUCAGEN) injection 1 mg  1 mg IntraMUSCular PRN       Objective:     Vitals:    01/14/21 0031 01/14/21 0509 01/14/21 0854 01/14/21 1105   BP: (!) 140/88 (!) 166/72 (!) 142/69 127/76   Pulse: 76 69 82 91   Resp: 22 20 20 20   Temp: 98.8 °F (37.1 °C) 97.9 °F (36.6 °C) 98.8 °F (37.1 °C) 99.2 °F (37.3 °C)   SpO2: 100% 100% 94% 96%   Weight:       Height:             Intake and Output:  Current Shift: No intake/output data recorded.   Last three shifts: 01/12 1901 - 01/14 0700  In: 480 [P.O.:480]  Out: -     Physical Exam:   General:  Alert, cooperative, talkative with no respiratory distress   Eyes:  Conjunctivae/corneas clear. Ears:  Normal TMs and external ear canals both ears. Nose: Nares normal. Septum midline. Mouth/Throat: Lips, mucosa, and tongue normal.   Neck:  no JVD. Back:   deferred   Lungs:   Clear to auscultation bilaterally but limited breathe sounds. Coughing during deep inspiration   Heart:  Regular rate and rhythm, S1, S2 normal   Abdomen:   Soft, non-tender. Bowel sounds normal. No masses,  No organomegaly. Extremities: Extremities normal, atraumatic, no cyanosis or edema. Pulses: 2+ and symmetric all extremities. Skin: Skin color, texture, turgor normal. No rashes or lesions   Lymph nodes: Cervical, supraclavicular, and axillary nodes normal.   Neurologic: CNII-XII intact.  Mild confusion on exam       Lab/Data Review:    Recent Results (from the past 24 hour(s))   GLUCOSE, POC    Collection Time: 01/13/21  3:31 PM   Result Value Ref Range    Glucose (POC) 453 (HH) 65 - 100 mg/dL   GLUCOSE, POC    Collection Time: 01/13/21  9:41 PM   Result Value Ref Range    Glucose (POC) 533 (HH) 65 - 172 mg/dL   METABOLIC PANEL, BASIC    Collection Time: 01/13/21 10:16 PM   Result Value Ref Range    Sodium 130 (L) 136 - 145 mmol/L    Potassium 4.2 3.5 - 5.1 mmol/L    Chloride 101 98 - 107 mmol/L    CO2 20 (L) 21 - 32 mmol/L    Anion gap 9 7 - 16 mmol/L    Glucose 492 (HH) 65 - 100 mg/dL    BUN 32 (H) 8 - 23 MG/DL    Creatinine 2.53 (H) 0.6 - 1.0 MG/DL    GFR est AA 24 (L) >60 ml/min/1.73m2    GFR est non-AA 20 (L) >60 ml/min/1.73m2    Calcium 8.3 8.3 - 10.4 MG/DL   GLUCOSE, POC    Collection Time: 01/13/21 11:28 PM   Result Value Ref Range    Glucose (POC) 471 (HH) 65 - 100 mg/dL   GLUCOSE, POC    Collection Time: 01/14/21  2:39 AM   Result Value Ref Range    Glucose (POC) 220 (H) 65 - 554 mg/dL   METABOLIC PANEL, COMPREHENSIVE    Collection Time: 01/14/21  5:17 AM   Result Value Ref Range    Sodium 135 (L) 136 - 145 mmol/L    Potassium 3.5 3.5 - 5.1 mmol/L    Chloride 105 98 - 107 mmol/L    CO2 24 21 - 32 mmol/L    Anion gap 6 (L) 7 - 16 mmol/L    Glucose 224 (H) 65 - 100 mg/dL    BUN 33 (H) 8 - 23 MG/DL    Creatinine 2.33 (H) 0.6 - 1.0 MG/DL    GFR est AA 26 (L) >60 ml/min/1.73m2    GFR est non-AA 22 (L) >60 ml/min/1.73m2    Calcium 8.6 8.3 - 10.4 MG/DL    Bilirubin, total 0.5 0.2 - 1.1 MG/DL    ALT (SGPT) 73 (H) 12 - 65 U/L    AST (SGOT) 90 (H) 15 - 37 U/L    Alk. phosphatase 109 50 - 136 U/L    Protein, total 6.8 6.3 - 8.2 g/dL    Albumin 3.1 (L) 3.2 - 4.6 g/dL    Globulin 3.7 (H) 2.3 - 3.5 g/dL    A-G Ratio 0.8 (L) 1.2 - 3.5     MAGNESIUM    Collection Time: 01/14/21  5:17 AM   Result Value Ref Range    Magnesium 3.2 (H) 1.8 - 2.4 mg/dL   PHOSPHORUS    Collection Time: 01/14/21  5:17 AM   Result Value Ref Range    Phosphorus 2.4 2.3 - 3.7 MG/DL   GLUCOSE, POC    Collection Time: 01/14/21  8:53 AM   Result Value Ref Range    Glucose (POC) 232 (H) 65 - 100 mg/dL   GLUCOSE, POC    Collection Time: 01/14/21 11:10 AM   Result Value Ref Range    Glucose (POC) 424 (H) 65 - 100 mg/dL       Assessment/ Plan:     Principal Problem:    Sepsis ([facility]) (1/12/2021)    Active Problems:    Coronary artery disease involving native coronary artery of native heart without angina pectoris (9/4/2013)      Essential hypertension (9/15/2016)      Chronic kidney disease, stage III (moderate) ([facility]) (9/15/2016)      Major depressive disorder with single episode, in full remission ([facility]) (9/15/2016)      Primary insomnia (9/15/2016)      Gastroesophageal reflux disease (2/16/2017)      Type 2 diabetes mellitus with hyperglycemia, without long-term current use of insulin (HCC) (10/11/2018)      Mixed hyperlipidemia (10/21/2020)      Hyponatremia (1/12/2021)      Acute renal failure superimposed on stage 3b chronic kidney disease (HCC) (1/12/2021)      COVID-19 virus infection (1/12/2021)      Long QT  interval (1/12/2021)      Lactic acidosis (1/12/2021)      Elevated LFTs (1/12/2021)      Normocytic anemia (1/12/2021)    Cardiac arrest with V tach - s/p ROSC on 1/13/ Vitamin C and Zinc. Holding BB since bradycardia this AM. Hold decadron since on room air. Do not think a candidate for convalescent plasma or remdesivir due to time frame of about 14 days of symptoms. ECHO pending. Cardiology following    Hemoptysis - Small amount on exam in [facility]. Observe. COVID - as above. Dc decadron. dCHF EF 60% - compensated. DM type II - uncontrolled with A1C >10. Start lantus 18 units BID, humalog 10 units before meals. SS. Stopped decadron. ASA/plavix    Due to significant chest wall pain will order for chest x ray to see if any rib fractures. Add prn oxycodone. I have updated the daughter     Possible dc tomorrow if glucose controlled.  Plan to dc with     DVT prophylaxis - heparin  Signed By: Brea Hancock DO     January 14, 2021

## 2021-01-14 NOTE — DIABETES MGMT
Patient admitted with sepsis positive for COVID-19 virus. HbA1c 10.2 (). Blood glucose ranged 194-492 yesterday with patient receiving Lantus 26 units, Humalog 61 units, and Decadron 6mg. Blood glucose this morning was 232. Most recent FSBS 424. Reviewed patient current regimen: Lantus 13 units daily, Humalog SSI, and Decadron 6mg q24h at 1200. Updated provider via [employer] regarding patient glycemic control. Patient would likely benefit from a increase in basal insulin as fasting blood glucose is not at goal. Patient would also likely benefit from prandial insulin with normal SSI to help offset hyperglycemia related to caloric intake and steroid therapy. Provider plans to adjust regimen. Attempted to contact patient from remotely within hospital system. No answer received. Floor staff made aware will re-attempt to speak with patient later today as time allows. Update at 1257:  Patient spoke to remotely from within hospital system for assessment regarding diabetes management. Patient alert and correctly verbalized patient identifiers. Patient has a past medical history of CAD, HTN, stage 3 CKD, GERD, type 2 diabetes, mixed hyperlipidemia, cirrhosis of liver. Patient states they were diagnosed at age 25 with diabetes and voices no known family history of diabetes. Patient states they do have a working glucometer with supplies at home. Per patient they rarely check their blood glucose levels and it has been awhile since she checked her glucose. Patient states they are currently taking Glipizide at home for management of diabetes. Patient has not attended formal diabetes education in the past. Discussed significance of HbA1c and possible need for insulin at discharge. Patient states she is willing to take insulin if needed but will need education as she has never used insulin before. Plan to set up telehealth education for later today. Patient receiving steroid therapy.  Explained relationship between steroids and hyperglycemia to patient and educated patient that as steroids are tapered their glycemic control should improve and insulin needs should decrease as well. Educated regarding relationship between infection and hyperglycemia. Educated regarding difference between insulin pens and vial/syringe method. Encouraged patient to practice insulin self injection under RN supervision at due times to gain confidence prior to discharge. Patient verbalized understanding and voices no further questions at this time.

## 2021-01-14 NOTE — PROGRESS NOTES
ACUTE PHYSICAL THERAPY GOALS:  (Developed with and agreed upon by patient and/or caregiver. )    LTG:  (1.)Ms. Helio Harrison will move from supine to sit and sit to supine , scoot up and down and roll side to side in bed with INDEPENDENT within 7 treatment day(s). (2.)Ms. Helio Harrison will transfer from bed to chair and chair to bed with INDEPENDENT using the least restrictive device within 7 treatment day(s). (3.)Ms. Helio Harrison will ambulate with INDEPENDENT for 150 feet with the least restrictive device within 7 treatment day(s). (4.)Ms. Helio Harrison will tolerate at least 23 min of dynamic standing activity to assist standing ADLs with the least restrictive device within 7 treatment days.       ________________________________________________________________________________________________        PHYSICAL THERAPY ASSESSMENT: Initial Assessment, Daily Note and AM PT Treatment Day # 1      Susan Jose is a 65 y.o. female   PRIMARY DIAGNOSIS: Sepsis ([facility] [address]  Sepsis ([facility] [address] [A41.9]       Reason for Referral:    ICD-10: Treatment Diagnosis: Generalized Muscle Weakness (M62.81)  Other lack of cordination (R27.8)  Difficulty in walking, Not elsewhere classified (R26.2)  Other abnormalities of gait and mobility (R26.89)  INPATIENT: [place] [employer] [place] / [place] [employer] [place] / Product Type: Managed Care Medicare /     ASSESSMENT:     REHAB RECOMMENDATIONS:   Recommendation to date pending progress:  [address] [place] [address]  Equipment:    To Be Determined     PRIOR LEVEL OF FUNCTION:  (Prior to Hospitalization) INITIAL/CURRENT LEVEL OF FUNCTION:  (Most Recently Demonstrated)   Bed Mobility:   Independent  Sit to Stand:   Independent  Transfers:   Independent  Gait/Mobility:   Independent Bed Mobility:   Standby Assistance  Sit to Stand:  [facility] [employer] Assistance  Transfers:   Minimal Assistance x 2  Gait/Mobility:   Minimal Assistance x 2     ASSESSMENT:  Mrs. Cadet Covert present in supine and moves well initially. She moved to EOB and standing w/ SBA, but balance rapidly declines in standing (sitting BP is 127/76 while standing BP is 122/56). She requires HHAx2 from both therapists to ambulate to sink and then to chair. At end of session, she is up in bedside chair with all needs within reach, alarm on, RN notified.          SOCIAL HISTORY/LIVING ENVIRONMENT: Independent at baseline, no DME or REJI     OBJECTIVE:     PAIN: VITAL SIGNS: LINES/DRAINS:   Pre Treatment: Pain Screen  Pain Scale 1: Numeric (0 - 10)  Pain Intensity 1: 0  Post Treatment: 0   IV  O2 Device: Room air(pt removed and rn asked to keep close if sob noted.)     GROSS EVALUATION:   Within Functional Limits Abnormal/ Functional Abnormal/ Non-Functional (see comments) Not Tested Comments:   AROM [x] [] [] []    PROM [x] [] [] []    Strength [] [x] [] []    Balance [] [] [x] [] Unsteady on feet   Posture [] [x] [] []    Sensation [x] [] [] []    Coordination [] [] [x] [] Unsteady on feet   Tone [] [x] [] []    Edema [] [x] [] []    Activity Tolerance [] [] [x] [] Fatigued with standing    [] [] [] []      COGNITION/  PERCEPTION: Intact Impaired   (see comments) Comments:   Orientation [x] []    Vision [x] []    Hearing [x] []    Command Following [x] []    Safety Awareness [] [x] Overestimates gait ability    [] []      MOBILITY: I Mod I S SBA CGA Min Mod Max Total  NT x2 Comments:   Bed Mobility    Rolling [] [] [] [x] [] [] [] [] [] [] []    Supine to Sit [] [] [] [x] [] [] [] [] [] [] []    Scooting [] [] [] [x] [] [] [] [] [] [] []    Sit to Supine [] [] [] [] [] [] [] [] [] [x] []    Transfers    Sit to Stand [] [] [] [x] [] [] [] [] [] [] []    Bed to Chair [] [] [] [] [] [x] [] [] [] [x] []    Stand to Sit [] [] [] [] [] [x] [] [] [] [x] []    I=Independent, Mod I=Modified Independent, S=Supervision, SBA=Standby Assistance, CGA=Contact Guard Assistance,   Min=Minimal Assistance, Mod=Moderate Assistance, Max=Maximal Assistance, Total=Total Assistance, NT=Not Tested  GAIT: I Mod I S SBA CGA Min Mod Max Total  NT x2 Comments:   Level of Assistance [] [] [] [] [] [x] [] [] [] [] [x]    Distance 2 x 8 ft    DME HHA x2    Gait Quality Uneven step length, trunk sway increased    Weightbearing Status N/A     I=Independent, Mod I=Modified Independent, S=Supervision, SBA=Standby Assistance, CGA=Contact Guard Assistance,   Min=Minimal Assistance, Mod=Moderate Assistance, Max=Maximal Assistance, Total=Total Assistance, NT=Not Tested    [place] Form       How much difficulty does the patient currently have. .. Unable A Lot A Little None   1. Turning over in bed (including adjusting bedclothes, sheets and blankets)? [] 1   [] 2   [] 3   [x] 4   2. Sitting down on and standing up from a chair with arms ( e.g., wheelchair, bedside commode, etc.)   [] 1   [] 2   [x] 3   [] 4   3. Moving from lying on back to sitting on the side of the bed? [] 1   [] 2   [x] 3   [] 4   How much help from another person does the patient currently need. .. Total A Lot A Little None   4. Moving to and from a bed to a chair (including a wheelchair)? [] 1   [] 2   [x] 3   [] 4   5. Need to walk in hospital room? [] 1   [x] 2   [] 3   [] 4   6. Climbing 3-5 steps with a railing? [] 1   [x] 2   [] 3   [] 4   © 2007, Trustees of [address] [place] [address] [address] [address], under license to [employer]. All rights reserved     Score:  Initial: 14 Most Recent: X (Date: -- )    Interpretation of Tool:  Represents activities that are increasingly more difficult (i.e. Bed mobility, Transfers, Gait). PLAN:   FREQUENCY/DURATION: PT Plan of Care: 3 times/week for duration of hospital stay or until stated goals are met, whichever comes first.    PROBLEM LIST:   (Skilled intervention is medically necessary to address:)  1. Decreased ADL/Functional Activities  2. Decreased Activity Tolerance  3.  Decreased AROM/PROM  4. Decreased Balance  5. Decreased Coordination  6. Decreased Gait Ability  7. Decreased Strength  8. Decreased Transfer Abilities   INTERVENTIONS PLANNED:   (Benefits and precautions of physical therapy have been discussed with the patient.)  1. Therapeutic Activity  2. Therapeutic Exercise/HEP  3. Neuromuscular Re-education  4. Gait Training  5. Manual Therapy  6. Education     TREATMENT:     EVALUATION: Low Complexity : (Untimed Charge)    TREATMENT:   ($$ Gait Trainin-22 mins    )  Co-Treatment PT/OT necessary due to patient's decreased overall endurance/tolerance levels, as well as need for high level skilled assistance to complete functional transfers/mobility and functional tasks  Gait Training (18 Minutes): Gait training for 2 x 8 feet utilizing HHAx2. Patient required Manual, Tactile, Verbal and Visual cueing to improve Activity Pacing, Dynamic Standing Balance and Gait Mechanics.      AFTER TREATMENT POSITION/PRECAUTIONS:  Alarm Activated, Chair, Needs within reach and RN notified    INTERDISCIPLINARY COLLABORATION:  RN/PCT, PT/PTA and OT/PISANO    TOTAL TREATMENT DURATION:  PT Patient Time In/Time Out  Time In: 1034  Time Out: Keith

## 2021-01-14 NOTE — PROGRESS NOTES
Received a call from RN that the pt's Blood glucose is 533. She just had cardiac arrest last night. Suspect she is in DKA. Ordered BMP stat. Humalog 20Units sc ordered while awaiting result. If in DKA, pt will need to be transferred to ICU and started on insulin drip. Dr Azucena Allen notified as well.

## 2021-01-14 NOTE — DIABETES MGMT
Patient seen via telehealth for diabetes education. Patient was given educational material, \"Diabetes Self-Management: A Patient Teaching Guide\", which was reviewed with pt. Explained basic physiology of diabetes, as well as causes, s/s, and treatments for hypoglycemia and hyperglycemia. Described the effects of poor glycemic control on the development of long-term complications such as renal, eye, nerve, and cardiovascular disease. Described proper diabetic foot care and the importance of checking feet qd. Patient voices numbness and tingling in feet. Per patient they typically drink water and unsweetened tea with stevia. Reviewed the effects of sweetened beverages on glycemic control and alternative beverages to help improve glycemic control. Per patient they typically \"grazes\" throughout the day stating she likes fruit. Educated re: effects of carbohydrates on blood glucose, the \"plate method\" of healthy meal planning, basics of healthy meal plan, Consistent Carbohydrate Diet, discussed the basics of carb counting and how to read a nutrition label. Educated patient regarding the benefits of physical activity (as directed by provider) on glycemic control. Also explained the relationship between hyperglycemia and infection and delayed healing. Discussed target goals for blood glucose and A1C. Educated patient regarding diabetic medications including mechanism of action, timing, and possible side effects. Patient verbalizes understanding of teaching. Explained the importance of blood glucose monitoring. Recommended frequency of TID before meals blood glucose checks and to record in log book to take to PCP appointment. Discussed how these numbers will help PCP titrate patient regimen. Patient instructed regarding preparation and injection of insulin dose via vial and syringe method. Patient returned demonstrated proper insulin injection technique using injection model via vial and syringe method.  Nursing will continue to have patient practice insulin self-injection when insulin dose is due and document progress or refusals in progress notes. Patient verbalizes understanding of site rotation, storage of insulin, and proper sharps disposal. Patient was also instructed in proper use of insulin pens. Patient was able to return demonstrate proper use of insulin pen using injection model. Patient prefers insulin pens. Patient will need prescription for insulin pens and pen neeldes at discharge. Patient voices that the insulin pen does \"not seem that bad. \" Patient states her doctor had discussed insulin in the past with her but she was always nervous about starting it, patient now states she feels comfortable starting insulin and hopes it will decrease the risk of further kidney damage. Educated patient regarding current basal/bolus regimen of Lantus 18 units BID and Humalog 10 units with meals and SSI including type of insulin, timing with meals, onset, duration of effect, and peak of insulin dose. Educated patient on the differences between prandial insulin and sliding scale insulin. Patient successful with sliding scale insulin practice calculations. Instructed patient to always seek guidance from their primary care provider about adjusting their insulin doses and not to adjust them on their own as this could negatively impact their glycemic control or result in hypoglycemia. Patient verbalizes understanding. Encouraged compliance with discharge regimen. Updated patient that referral was placed for outpatient education as requested. Educated patient regarding CGM therapy as she states this is something she would be interested in. Patient states she has a follow up with her PCP next week and plans to ask him about getting a CGM. Encouraged patient to continue to work on lifestyle modifications and to follow up with primary care provider for further titration of regimen.  Patient verbalized understanding and voices no further questions regarding diabetes management at this time.

## 2021-01-14 NOTE — PROGRESS NOTES
ACUTE OT GOALS:  (Developed with and agreed upon by patient and/or caregiver.)  1. Patient will complete lower body bathing and dressing in standing with Mod I and adaptive equipment as needed.   2. Patient will complete toileting with Mod I and adaptive equipment as needed.   3. Patient will tolerate 23 minutes of OT treatment with 1-2 rest breaks to increase activity tolerance for ADLs.   4. Patient will complete functional transfers with Mod I and adaptive equipment as needed.   5. Patient will complete standing grooming ADL with Mod I and adaptive equipment as needed.    Timeframe: 7 visits     OCCUPATIONAL THERAPY ASSESSMENT: Initial Assessment, Daily Note and AM OT Treatment Day # 1    Bibi Smith is a 70 y.o. female   PRIMARY DIAGNOSIS: Sepsis ([facility])  Sepsis ([facility]) [A41.9]    Reason for Referral:  Generalized Weakness  ICD-10: Treatment Diagnosis: Generalized Muscle Weakness (M62.81)  Other lack of cordination (R27.8)  Difficulty in walking, Not elsewhere classified (R26.2)  INPATIENT: [place] [employer] [place] / [place] [employer] [place] / Product Type: Managed Care Medicare /   ASSESSMENT:     REHAB RECOMMENDATIONS:   Recommendation to date pending progress:  Setting:  • Home Health Therapy pending improvement in standing balance.  Equipment:   • Rolling Walker if pt continues to present unsteady on feet.      PRIOR LEVEL OF FUNCTION:  (Prior to Hospitalization)  INITIAL/CURRENT LEVEL OF FUNCTION:  (Based on today's evaluation)   Bathing:  • Independent  Dressing:  • Independent  Feeding/Grooming:  • Independent  Toileting:  • Independent  Functional Mobility:  • Independent Bathing:  • Minimal Assistance for balance in standing.  Dressing:  • Minimal Assistance for balance in standing.  Feeding/Grooming:  • Minimal Assistance for balance in standing.  Toileting:  • Minimal Assistance for balance in standing.  Functional Mobility:  • Minimal Assistance x 2 with use of HHA.  ASSESSMENT:  Ms. Jessica Mtz was admitted for Sepsis and is Covid 19+. Pt had Code Blue called yesterday (2021) secondary to cardiac arrest; presents much better today. Pt presents with decreased strength, balance, and activity tolerance for performance of ADLs and functional mobility, resulting in pt requiring Min A x 2 to complete functional mobility and Min A x 1 to maintain standing balance when performing grooming ADLs at sink. Pt has 12L O2 running but nasal cannula doffed upon arrival. Pt able to maintain O2 sats at 93-96% on room air throughout entire evaluation/treatment session. Pt would benefit from skilled OT to increase independence and safety for performance of ADLs and functional mobility. SUBJECTIVE:   Ms. Jessica Mtz states, \"I don't remember what happened yesterday. \"    SOCIAL HISTORY/LIVING ENVIRONMENT: Pt lives with spouse who pt is primary caregiver for in one story home with ramps to access. Pt is typically independent for ADLs, IADLs, and driving. Pt is independent for functional mobility. Pt is not on home O2. OBJECTIVE:     PAIN: VITAL SIGNS: LINES/DRAINS:   Pre Treatment: Pain Screen  Pain Scale 1: Numeric (0 - 10)  Pain Intensity 1: 0  Post Treatment: Unchanged. Pt does reports some chest pain with mobility. Vital Signs  O2 Sat (%): 96 %  O2 Device: Room air   Bp in seated: 127/53  Bp in standin/76 O2 Device: Room air     GROSS EVALUATION:  BUE Within Functional Limits Abnormal/ Functional Abnormal/ Non-Functional (see comments) Not Tested Comments:   AROM [x] [] [] []    PROM [x] [] [] []    Strength [] [x] [] []    Balance [] [x] [] []    Posture [] [x] [] []    Sensation [x] [] [] []    Coordination [x] [] [] []    Tone [] [] [] [x]    Edema [] [] [] [x]    Activity Tolerance [] [x] [] []     [] [] [] []      COGNITION/  PERCEPTION: Intact Impaired   (see comments) Comments:   Orientation [x] []    Vision [] [x] Reading glasses.    Hearing [x] []    Judgment/ Insight [x] []    Attention [x] []    Memory [] [x] Short term recall impaired. Command Following [x] []    Emotional Regulation [x] []     [] []      ACTIVITIES OF DAILY LIVING: I Mod I S SBA CGA Min Mod Max Total NT Comments   BASIC ADLs:              Bathing/ Showering [] [] [] [] [] [] [] [] [] [x]    Toileting [] [] [] [] [] [] [] [] [] [x]    Dressing [] [] [] [x] [] [] [] [] [] [] Set up to don shoes in seated. Feeding [] [] [] [] [] [] [] [] [] [x]    Grooming [] [] [] [] [] [x] [] [] [] [] For balance to brush teeth in standing. Personal Device Care [] [] [] [] [] [] [] [] [] [x]    Functional Mobility [] [] [] [] [] [x] [] [] [] [] Assist x 2 with use of HHA. I=Independent, Mod I=Modified Independent, S=Supervision, SBA=Standby Assistance, CGA=Contact Guard Assistance,   Min=Minimal Assistance, Mod=Moderate Assistance, Max=Maximal Assistance, Total=Total Assistance, NT=Not Tested    MOBILITY: I Mod I S SBA CGA Min Mod Max Total  NT x2 Comments:   Supine to sit [] [x] [] [] [] [] [] [] [] [] []    Sit to supine [] [] [] [] [] [] [] [] [] [x] []    Sit to stand [] [] [] [] [] [x] [] [] [] [] [x]    Bed to chair [] [] [] [] [] [x] [] [] [] [] [x] With use of HHA. I=Independent, Mod I=Modified Independent, S=Supervision, SBA=Standby Assistance, CGA=Contact Guard Assistance,   Min=Minimal Assistance, Mod=Moderate Assistance, Max=Maximal Assistance, Total=Total Assistance, NT=Not [place] [address]   Daily Activity Inpatient Short Form        How much help from another person does the patient currently need. .. Total A Lot A Little None   1. Putting on and taking off regular lower body clothing? [] 1   [] 2   [x] 3   [] 4   2. Bathing (including washing, rinsing, drying)? [] 1   [] 2   [x] 3   [] 4   3. Toileting, which includes using toilet, bedpan or urinal?   [] 1   [] 2   [x] 3   [] 4   4. Putting on and taking off regular upper body clothing?    [] 1   [] 2   [x] 3   [] 4 5. Taking care of personal grooming such as brushing teeth? [] 1   [] 2   [x] 3   [] 4   6. Eating meals? [] 1   [] 2   [] 3   [x] 4   © 2007, Trustees of [address] [place] [address] [address] [address], under license to [employer]. All rights reserved     Score:  Initial: 19, completed, 1/14/2021 Most Recent: X (Date: -- )   Interpretation of Tool:  Represents activities that are increasingly more difficult (i.e. Bed mobility, Transfers, Gait). PLAN:   FREQUENCY/DURATION: OT Plan of Care: 3 times/week for duration of hospital stay or until stated goals are met, whichever comes first.    PROBLEM LIST:   (Skilled intervention is medically necessary to address:)  1. Decreased ADL/Functional Activities  2. Decreased Activity Tolerance  3. Decreased Balance  4. Decreased Cognition  5. Decreased Gait Ability  6. Decreased Strength  7. Decreased Transfer Abilities  8. Increased Pain with mobility   INTERVENTIONS PLANNED:   (Benefits and precautions of occupational therapy have been discussed with the patient.)  1. Self Care Training  2. Therapeutic Activity  3. Therapeutic Exercise/HEP  4. Neuromuscular Re-education  5. Education     TREATMENT:     EVALUATION: Low Complexity : (Untimed Charge)    TREATMENT:   Self Care (8 Minutes): Self care including Lower Body Dressing and Grooming to increase independence and decrease level of assistance required. Pt requires SBA to don shoes seated edge of bed. Pt requires Min A for standing balance teeth in standing. Pt requires no physical assist to don toothpaste or manipulate toothbrush. Today's treatment session addressed Decreased Strength, Decreased ADL/Functional Activities, Decreased Balance, Decreased Activity Tolerance and Increased Fatigue to progress towards achieving goal(s) listed above. During this session, Physical Therapy addressed  Gait to progress towards their discipline specific goal(s).   Co-treatment was necessary to improve patient's ability to increase activity demands and ability to return to normal functional activity. AFTER TREATMENT POSITION/PRECAUTIONS:  Alarm Activated, Chair, Needs within reach, RN notified and table tray anterior to pt.     INTERDISCIPLINARY COLLABORATION:  RN/PCT, PT/PTA and OT/PISANO    TOTAL TREATMENT DURATION:  OT Patient Time In/Time Out  Time In: 1034  Time Out: 1102    BREANNE Pearson/TERRI

## 2021-01-14 NOTE — PROGRESS NOTES
Patient had CBG of 533 around 2140. MD called orders received. 1 Hour post Insulin, CBG of 471. MD notified and new orders received. Will continue to monitor.

## 2021-01-15 LAB
GLUCOSE BLD STRIP.AUTO-MCNC: 112 MG/DL (ref 65–100)
GLUCOSE BLD STRIP.AUTO-MCNC: 265 MG/DL (ref 65–100)
GLUCOSE BLD STRIP.AUTO-MCNC: 285 MG/DL (ref 65–100)
GLUCOSE BLD STRIP.AUTO-MCNC: 324 MG/DL (ref 65–100)

## 2021-01-15 PROCEDURE — 74011250636 HC RX REV CODE- 250/636: Performed by: FAMILY MEDICINE

## 2021-01-15 PROCEDURE — 74011250637 HC RX REV CODE- 250/637: Performed by: FAMILY MEDICINE

## 2021-01-15 PROCEDURE — 94761 N-INVAS EAR/PLS OXIMETRY MLT: CPT

## 2021-01-15 PROCEDURE — 74011636637 HC RX REV CODE- 636/637: Performed by: FAMILY MEDICINE

## 2021-01-15 PROCEDURE — 97116 GAIT TRAINING THERAPY: CPT | Performed by: PHYSICAL THERAPIST

## 2021-01-15 PROCEDURE — 65270000029 HC RM PRIVATE

## 2021-01-15 PROCEDURE — 82962 GLUCOSE BLOOD TEST: CPT

## 2021-01-15 RX ORDER — INSULIN GLARGINE 100 [IU]/ML
25 INJECTION, SOLUTION SUBCUTANEOUS EVERY 12 HOURS
Status: DISCONTINUED | OUTPATIENT
Start: 2021-01-15 | End: 2021-01-15

## 2021-01-15 RX ORDER — INSULIN GLARGINE 100 [IU]/ML
20 INJECTION, SOLUTION SUBCUTANEOUS EVERY 12 HOURS
Status: DISCONTINUED | OUTPATIENT
Start: 2021-01-15 | End: 2021-01-16

## 2021-01-15 RX ORDER — ISOSORBIDE MONONITRATE 30 MG/1
30 TABLET, EXTENDED RELEASE ORAL DAILY
Status: DISCONTINUED | OUTPATIENT
Start: 2021-01-15 | End: 2021-01-19 | Stop reason: HOSPADM

## 2021-01-15 RX ORDER — INSULIN GLARGINE 100 [IU]/ML
7 INJECTION, SOLUTION SUBCUTANEOUS
Status: COMPLETED | OUTPATIENT
Start: 2021-01-15 | End: 2021-01-15

## 2021-01-15 RX ADMIN — HEPARIN SODIUM 5000 UNITS: 5000 INJECTION INTRAVENOUS; SUBCUTANEOUS at 13:47

## 2021-01-15 RX ADMIN — ISOSORBIDE MONONITRATE 30 MG: 30 TABLET, EXTENDED RELEASE ORAL at 11:03

## 2021-01-15 RX ADMIN — INSULIN LISPRO 9 UNITS: 100 INJECTION, SOLUTION INTRAVENOUS; SUBCUTANEOUS at 21:49

## 2021-01-15 RX ADMIN — OXYCODONE HYDROCHLORIDE AND ACETAMINOPHEN 1000 MG: 500 TABLET ORAL at 08:11

## 2021-01-15 RX ADMIN — VITAMIN D, TAB 1000IU (100/BT) 2 TABLET: 25 TAB at 08:11

## 2021-01-15 RX ADMIN — INSULIN LISPRO 10 UNITS: 100 INJECTION, SOLUTION INTRAVENOUS; SUBCUTANEOUS at 12:15

## 2021-01-15 RX ADMIN — OXYCODONE HYDROCHLORIDE 5 MG: 5 TABLET ORAL at 08:11

## 2021-01-15 RX ADMIN — ACETAMINOPHEN 650 MG: 325 TABLET, FILM COATED ORAL at 17:13

## 2021-01-15 RX ADMIN — INSULIN LISPRO 10 UNITS: 100 INJECTION, SOLUTION INTRAVENOUS; SUBCUTANEOUS at 08:13

## 2021-01-15 RX ADMIN — ACETAMINOPHEN 650 MG: 325 TABLET, FILM COATED ORAL at 22:14

## 2021-01-15 RX ADMIN — Medication 10 ML: at 05:39

## 2021-01-15 RX ADMIN — INSULIN GLARGINE 18 UNITS: 100 INJECTION, SOLUTION SUBCUTANEOUS at 08:12

## 2021-01-15 RX ADMIN — ASPIRIN 81 MG: 81 TABLET, CHEWABLE ORAL at 05:39

## 2021-01-15 RX ADMIN — INSULIN LISPRO 12 UNITS: 100 INJECTION, SOLUTION INTRAVENOUS; SUBCUTANEOUS at 08:13

## 2021-01-15 RX ADMIN — ROSUVASTATIN CALCIUM 40 MG: 20 TABLET, COATED ORAL at 21:50

## 2021-01-15 RX ADMIN — INSULIN GLARGINE 7 UNITS: 100 INJECTION, SOLUTION SUBCUTANEOUS at 11:03

## 2021-01-15 RX ADMIN — CYANOCOBALAMIN TAB 1000 MCG 2500 MCG: 1000 TAB at 08:11

## 2021-01-15 RX ADMIN — HEPARIN SODIUM 5000 UNITS: 5000 INJECTION INTRAVENOUS; SUBCUTANEOUS at 21:49

## 2021-01-15 RX ADMIN — Medication 10 ML: at 15:12

## 2021-01-15 RX ADMIN — CLOPIDOGREL BISULFATE 75 MG: 75 TABLET ORAL at 08:11

## 2021-01-15 RX ADMIN — INSULIN GLARGINE 20 UNITS: 100 INJECTION, SOLUTION SUBCUTANEOUS at 21:48

## 2021-01-15 RX ADMIN — INSULIN LISPRO 9 UNITS: 100 INJECTION, SOLUTION INTRAVENOUS; SUBCUTANEOUS at 12:15

## 2021-01-15 RX ADMIN — HEPARIN SODIUM 5000 UNITS: 5000 INJECTION INTRAVENOUS; SUBCUTANEOUS at 05:39

## 2021-01-15 RX ADMIN — Medication 10 ML: at 21:49

## 2021-01-15 RX ADMIN — Medication 220 MG: at 08:11

## 2021-01-15 NOTE — PROGRESS NOTES
ACUTE PHYSICAL THERAPY GOALS:  (Developed with and agreed upon by patient and/or caregiver.)  (1.)Ms. Heriberto Wasserman will move from supine to sit and sit to supine , scoot up and down and roll side to side in bed with INDEPENDENT within 7 treatment day(s). (2.)Ms. Heriberto Wasserman will transfer from bed to chair and chair to bed with INDEPENDENT using the least restrictive device within 7 treatment day(s). (3.)Ms. Heriberto Wasserman will ambulate with INDEPENDENT for 150 feet with the least restrictive device within 7 treatment day(s). (4.)Ms. Heriberto Wasserman will tolerate at least 23 min of dynamic standing activity to assist standing ADLs with the least restrictive device within 7 treatment days    PHYSICAL THERAPY: Daily Note and PM Treatment Day # 2    Eden Vaughn is a 65 y.o. female   PRIMARY DIAGNOSIS: Sepsis ([facility] [address]  Sepsis ([facility] [address] [A41.9]         ASSESSMENT:     REHAB RECOMMENDATIONS: CURRENT LEVEL OF FUNCTION:  (Most Recently Demonstrated)   Recommendation to date pending progress:  [address] [place] [address] Therapy  Equipment:    Rolling Walker Bed Mobility:   Standby Assistance  Sit to Stand:   Contact Guard Assistance  Transfers:   Contact Guard Assistance  Gait/Mobility:   Contact Guard Assistance     ASSESSMENT:  Ms. Heriberto Wasserman presents in supine and reports anxiety as well as rib/ sternal pain with coughing. She ambulates 2 x 45 ft w/ RW to toilet and back w/ CGA (with verbal cues for even step length), but becomes very anxious in standing. In between bouts of gait, BP is taken at 121/80, and ambulating O2% remains >95%. At end of session, she is supine in bed with all needs within reach, RN notified. SUBJECTIVE:   Ms. Heriberto Wasserman states, \"It hurts to cough. \"    SOCIAL HISTORY/ LIVING ENVIRONMENT:      OBJECTIVE:     PAIN: VITAL SIGNS: LINES/DRAINS:   Pre Treatment: Pain Screen  Pain Scale 1: Numeric (0 - 10)  Pain Intensity 1: 5  Pain Location 1: Rib cage  Post Treatment: 5   none  O2 Device: Room air MOBILITY: I Mod I S SBA CGA Min Mod Max Total  NT x2 Comments:   Bed Mobility    Rolling [] [] [] [] [] [] [] [] [] [] []    Supine to Sit [] [] [] [] [] [] [] [] [] [] []    Scooting [] [] [] [] [] [] [] [] [] [] []    Sit to Supine [] [] [] [] [] [] [] [] [] [] []    Transfers    Sit to Stand [] [] [] [] [] [] [] [] [] [] []    Bed to Chair [] [] [] [] [] [] [] [] [] [] []    Stand to Sit [] [] [] [] [] [] [] [] [] [] []    I=Independent, Mod I=Modified Independent, S=Supervision, SBA=Standby Assistance, CGA=Contact Guard Assistance,   Min=Minimal Assistance, Mod=Moderate Assistance, Max=Maximal Assistance, Total=Total Assistance, NT=Not Tested    GAIT: I Mod I S SBA CGA Min Mod Max Total  NT x2 Comments:   Level of Assistance [] [] [] [] [x] [] [] [] [] [] []    Distance 2 x 45    DME Rolling Walker and None    Gait Quality Shuffled, uneven step length    Weightbearing  Status N/A     I=Independent, Mod I=Modified Independent, S=Supervision, SBA=Standby Assistance, CGA=Contact Guard Assistance,   Min=Minimal Assistance, Mod=Moderate Assistance, Max=Maximal Assistance, Total=Total Assistance, NT=Not Tested    PLAN:   FREQUENCY/DURATION: PT Plan of Care: 3 times/week for duration of hospital stay or until stated goals are met, whichever comes first.  TREATMENT:     TREATMENT:   ($$ Gait Trainin-37 mins    )  Gait Training (32 Minutes): Gait training for 2 x 45 feet utilizing Rolling Walker and [employer]. Patient required Verbal and Visual cueing to improve Activity Pacing, Assistive Device Utilization, Dynamic Standing Balance and Gait Mechanics.      AFTER TREATMENT POSITION/PRECAUTIONS:  Bed, Needs within reach and RN notified    INTERDISCIPLINARY COLLABORATION:  RN/PCT, PT/PTA and RT    TOTAL TREATMENT DURATION:  PT Patient Time In/Time Out  Time In: 5251  Time Out: [address]

## 2021-01-15 NOTE — PROGRESS NOTES
Problem: Falls - Risk of  Goal: *Absence of Falls  Description: Document Enrico Rodriguez Fall Risk and appropriate interventions in the flowsheet. Outcome: Progressing Towards Goal  Note: Fall Risk Interventions:  Mobility Interventions: Communicate number of staff needed for ambulation/transfer         Medication Interventions: Evaluate medications/consider consulting pharmacy, Patient to call before getting OOB, Teach patient to arise slowly    Elimination Interventions: Call light in reach    History of Falls Interventions: Consult care management for discharge planning         Problem: Patient Education: Go to Patient Education Activity  Goal: Patient/Family Education  Outcome: Progressing Towards Goal     Problem: Diabetes Self-Management  Goal: *Disease process and treatment process  Description: Define diabetes and identify own type of diabetes; list 3 options for treating diabetes. Outcome: Progressing Towards Goal  Goal: *Incorporating nutritional management into lifestyle  Description: Describe effect of type, amount and timing of food on blood glucose; list 3 methods for planning meals. Outcome: Progressing Towards Goal  Goal: *Incorporating physical activity into lifestyle  Description: State effect of exercise on blood glucose levels. Outcome: Progressing Towards Goal  Goal: *Developing strategies to promote health/change behavior  Description: Define the ABC's of diabetes; identify appropriate screenings, schedule and personal plan for screenings. Outcome: Progressing Towards Goal  Goal: *Using medications safely  Description: State effect of diabetes medications on diabetes; name diabetes medication taking, action and side effects. Outcome: Progressing Towards Goal  Goal: *Monitoring blood glucose, interpreting and using results  Description: Identify recommended blood glucose targets  and personal targets.   Outcome: Progressing Towards Goal  Goal: *Prevention, detection, treatment of acute complications  Description: List symptoms of hyper- and hypoglycemia; describe how to treat low blood sugar and actions for lowering  high blood glucose level. Outcome: Progressing Towards Goal  Goal: *Prevention, detection and treatment of chronic complications  Description: Define the natural course of diabetes and describe the relationship of blood glucose levels to long term complications of diabetes.   Outcome: Progressing Towards Goal  Goal: *Developing strategies to address psychosocial issues  Description: Describe feelings about living with diabetes; identify support needed and support network  Outcome: Progressing Towards Goal  Goal: *Insulin pump training  Outcome: Progressing Towards Goal  Goal: *Sick day guidelines  Outcome: Progressing Towards Goal  Goal: *Patient Specific Goal (EDIT GOAL, INSERT TEXT)  Outcome: Progressing Towards Goal     Problem: Patient Education: Go to Patient Education Activity  Goal: Patient/Family Education  Outcome: Progressing Towards Goal     Problem: Patient Education: Go to Patient Education Activity  Goal: Patient/Family Education  Outcome: Progressing Towards Goal     Problem: Patient Education: Go to Patient Education Activity  Goal: Patient/Family Education  Outcome: Progressing Towards Goal

## 2021-01-15 NOTE — PROGRESS NOTES
Oxygen Qualifier       Room air: SpO2 with O2 and liter flow   Resting SpO2  94%    Ambulating SpO2  96%      Patient ambulated to bathroom with physical therapy. Patient's O2 saturation remained 95-96%. However patient appeared short of breath following ambulation.    Completed by:    Sydney Neil RT

## 2021-01-15 NOTE — PROGRESS NOTES
Pt current blood sugar 498 is asymptomatic. No signs of distress. MD notified. No new orders received. Will give insulin as scheduled per [facility]. Will continue to monitor.

## 2021-01-15 NOTE — PROGRESS NOTES
Progress Note    Patient: Alma Delia Oseguera MRN: [id number]  SSN: [id number]    YOB: 1950  Age: 65 y.o. Sex: female      Admit Date: 1/12/2021    LOS: 3 days     Subjective:   F/U COVID    \"69 year old w/ a PMhx of CAD s/p stents x3, DMII, CKD stage 3 presented w/ cough, SOB and weakness in the past week. She also has been having fever at home and has been trying to cope with the symptoms. She has been having trouble laying flat to sleep since onset of symptoms. Also reported chest pain with cough. She went to get tested for COVID 1/12 and was positive. Denies any alleviating or exacerbating factors. In ED, Tmax 101 but VSS otherwise. CBC at baseline. CMP with Na 127, . Cr 2.47, ALT 68, AST 87. pBNP [id number], HS-trop 78. CXR w/o acute process. EKG shows prolonged QT and also diffuse T-wave inversions. \" On 1/13, had episode of Vtach and then asystolic for about 30 seconds. Code BLUE called. Chest compressions were initiated and epinephrine was given x 1 dose. Pt went into pulseless VTach and chest compressions were continued. Synchronized cardioversion was performed and patient converted into pulseless sinus bradycardia. Chest compressions were continued, and patient was given IV atropine, IV Calcium, IV Magnesium, along with a second dose of IV epinephrine. Pt converted to sinus tachycardia and ROSC was achieved at around 06:12. Cardiology following who did not think had STEMI. Recommended ECHO since may have underlying COVID myocarditis but will have to be done as outpatient with cardiology since currently with COVID. Do not think a candidate for convalescent plasma or remdesivir due to time frame of about 14 days of symptoms. Chest pain when she breathes in. Chest x ray with no obvious fractures. No hemoptysis. No SOB but hard to take a deep breathe since CODE blue. No other concerns. Satting well on RA. Glucose uncontrolled but this is also her first day without steroids. Current Facility-Administered Medications   Medication Dose Route Frequency    insulin glargine (LANTUS) injection 25 Units  25 Units SubCUTAneous Q12H    isosorbide mononitrate ER (IMDUR) tablet 30 mg  30 mg Oral DAILY    insulin lispro (HUMALOG) injection 10 Units  10 Units SubCUTAneous TIDAC    oxyCODONE IR (ROXICODONE) tablet 5 mg  5 mg Oral Q4H PRN    hydrALAZINE (APRESOLINE) tablet 10 mg  10 mg Oral TID PRN    [Held by provider] dexamethasone (DECADRON) 10 mg/mL injection 6 mg  6 mg IntraVENous Q24H    morphine injection 1 mg  1 mg IntraVENous Q4H PRN    aspirin chewable tablet 81 mg  81 mg Oral 7am    cholecalciferol (VITAMIN D3) (1000 Units /25 mcg) tablet 2 Tab  2,000 Units Oral DAILY    clopidogreL (PLAVIX) tablet 75 mg  75 mg Oral DAILY    cyanocobalamin tablet 2,500 mcg  2,500 mcg Oral DAILY    [Held by provider] metoprolol succinate (TOPROL-XL) XL tablet 50 mg  50 mg Oral DAILY    nitroglycerin (NITROSTAT) tablet 0.4 mg  0.4 mg SubLINGual Q5MIN PRN    rosuvastatin (CRESTOR) tablet 40 mg  40 mg Oral QHS    sodium chloride (NS) flush 5-40 mL  5-40 mL IntraVENous Q8H    sodium chloride (NS) flush 5-40 mL  5-40 mL IntraVENous PRN    acetaminophen (TYLENOL) tablet 650 mg  650 mg Oral Q6H PRN    Or    acetaminophen (TYLENOL) suppository 650 mg  650 mg Rectal Q6H PRN    polyethylene glycol (MIRALAX) packet 17 g  17 g Oral DAILY PRN    ondansetron (ZOFRAN) injection 4 mg  4 mg IntraVENous Q6H PRN    heparin (porcine) injection 5,000 Units  5,000 Units SubCUTAneous Q8H    dextrose 40% (GLUTOSE) oral gel 1 Tube  15 g Oral PRN    dextrose (D50W) injection syrg 12.5-25 g  25-50 mL IntraVENous PRN    insulin lispro (HUMALOG) injection   SubCUTAneous AC&HS    ascorbic acid (vitamin C) (VITAMIN C) tablet 1,000 mg  1,000 mg Oral DAILY    zinc sulfate tablet 220 mg  220 mg Oral DAILY    guaiFENesin-dextromethorphan (ROBITUSSIN DM) 100-10 mg/5 mL syrup 5 mL  5 mL Oral Q6H PRN    glucagon [facility]) injection 1 mg  1 mg IntraMUSCular PRN       Objective:     Vitals:    01/14/21 2106 01/14/21 2245 01/15/21 0353 01/15/21 0734   BP: (!) 163/91 (!) 173/88 (!) 155/77 (!) 175/87   Pulse: 90 91 88 91   Resp:  22 20 8   Temp:  98.6 °F (37 °C) 98.5 °F (36.9 °C) 98.5 °F (36.9 °C)   SpO2: 96% 96% 98% 91%   Weight:       Height:             Intake and Output:  Current Shift: No intake/output data recorded. Last three shifts: 01/13 1901 - 01/15 0700  In: 720 [P.O.:720]  Out: -     Physical Exam:   General:  Alert, cooperative, talkative with no respiratory distress   Eyes:  Conjunctivae/corneas clear. Ears:  Normal TMs and external ear canals both ears. Nose: Nares normal. Septum midline. Mouth/Throat: Lips, mucosa, and tongue normal.   Neck:  no JVD. Back:   deferred   Lungs:   Clear to auscultation bilaterally but limited breathe sounds. Coughing during deep inspiration   Heart:  Regular rate and rhythm, S1, S2 normal   Abdomen:   Soft, non-tender. Bowel sounds normal. No masses,  No organomegaly. Extremities: Extremities normal, atraumatic, no cyanosis or edema. Pulses: 2+ and symmetric all extremities. Skin: Skin color, texture, turgor normal. No rashes or lesions   Lymph nodes: Cervical, supraclavicular, and axillary nodes normal.   Neurologic: CNII-XII intact.        Lab/Data Review:    Recent Results (from the past 24 hour(s))   GLUCOSE, POC    Collection Time: 01/14/21  3:40 PM   Result Value Ref Range    Glucose (POC) 258 (H) 65 - 100 mg/dL   GLUCOSE, POC    Collection Time: 01/14/21  8:42 PM   Result Value Ref Range    Glucose (POC) 529 (HH) 65 - 100 mg/dL   GLUCOSE, POC    Collection Time: 01/14/21  9:51 PM   Result Value Ref Range    Glucose (POC) 498 (HH) 65 - 100 mg/dL   GLUCOSE, POC    Collection Time: 01/15/21  7:39 AM   Result Value Ref Range    Glucose (POC) 324 (H) 65 - 100 mg/dL       Assessment/ Plan:     Principal Problem:    Sepsis ([address] (1/12/2021)    Active Problems: Coronary artery disease involving native coronary artery of native heart without angina pectoris (9/4/2013)      Essential hypertension (9/15/2016)      Chronic kidney disease, stage III (moderate) (HCC) (9/15/2016)      Major depressive disorder with single episode, in full remission ([facility] [address] (9/15/2016)      Primary insomnia (9/15/2016)      Gastroesophageal reflux disease (2/16/2017)      Type 2 diabetes mellitus with hyperglycemia, without long-term current use of insulin ([facility] [address] (10/11/2018)      Mixed hyperlipidemia (10/21/2020)      Hyponatremia (1/12/2021)      Acute renal failure superimposed on stage 3b chronic kidney disease ([facility] [address] (1/12/2021)      COVID-19 virus infection (1/12/2021)      Long QT interval (1/12/2021)      Lactic acidosis (1/12/2021)      Elevated LFTs (1/12/2021)      Normocytic anemia (1/12/2021)    Cardiac arrest with V tach - s/p ROSC on 1/13. Vitamin C and Zinc. Holding BB since bradycardia this AM. Do not think a candidate for convalescent plasma or remdesivir due to time frame of about 14 days of symptoms. ECHO will need to be done as outpatient and cardiology would like to see as outpatient. Order 6 minute walk. HTN- Add back imdur. Holding BB. Hemoptysis - no further episodes. COVID - as above. Dc decadron. dCHF EF 60% - compensated. DM type II - uncontrolled with A1C >10. Start lantus 18 units BID that I will increase to 25 units BID, humalog 10 units before meals. SS. Stopped decadron. ASA/plavix    prn oxycodone. DC cardiac monitor since has been sinus rhythm for 48 hours    Possible dc tomorrow if glucose controlled.  Plan to dc with     DVT prophylaxis - heparin  Signed By: Michelle Carrasco DO     January 15, 2021

## 2021-01-15 NOTE — PROGRESS NOTES
01/15/21 0606   Oxygen Therapy   O2 Device Room air   Hourly rounds completed this shift. PRN medication given for pain. PT sating 98%. All needs met at this time. Bed low/locked. Call light within reach. Will continue to monitor and give bedside report to oncoming nurse.

## 2021-01-15 NOTE — PROGRESS NOTES
CM spoke with patient regarding discharge planning. PT has recommended [facility] for patient. CM contacted patient who is in agreement with [facility] and would like to use [facility]. CM placed order and will send referral. CM is unsure of discharge date at this time.

## 2021-01-15 NOTE — PROGRESS NOTES
Pt /88 HR 91, no c/o from pt at this time, no PRN medication on MAR. MD notified via "[employer]. MD put in an order for PRN hydralazine. Will continue to monitor.

## 2021-01-15 NOTE — PROGRESS NOTES
Pt a/o x 4, anxious when talking about d/c planning and need for self injection bg. Pt given po medication for chest wall pain unrelated to chest pain (compressions only), contact guard assist to toilet with good tolerance of activity. pt returned to bed. Pt did perform self insulin injection with verbal cues to site selection and correct administration and disposal. bg trending down from previous shift. Blood pressure noted. rn to monitor perameters and give prn if needed. Call bell in reach, eating am meal,dietary notified of pt food preferences.

## 2021-01-15 NOTE — DIABETES MGMT
Patient admitted with sepsis positive for COVID-19. Blood glucose ranged 220-529 yesterday with patient receiving Lantus 31 units, Humalog 65 units, and Decadron 6mg. Blood glucose this morning was 324. Reviewed patient current regimen: Lantus 18 units BID, Humalog 10 units with meals, Humalog SSI, and Decadron 6mg daily. Updated provider via [employer] regarding patient glycemic control. Patient would likely benefit from an increase in prandial and basal insulin. Per provider patient steroid now on hold. Re-updated provider that patient glucose levels may begin to decrease and patient may need a decrease in insulin to reduce risk of hypoglycemia. Provider plans to adjust regimen as she deems fit.

## 2021-01-16 LAB
GLUCOSE BLD STRIP.AUTO-MCNC: 103 MG/DL (ref 65–100)
GLUCOSE BLD STRIP.AUTO-MCNC: 109 MG/DL (ref 65–100)
GLUCOSE BLD STRIP.AUTO-MCNC: 202 MG/DL (ref 65–100)
GLUCOSE BLD STRIP.AUTO-MCNC: 64 MG/DL (ref 65–100)
GLUCOSE BLD STRIP.AUTO-MCNC: 81 MG/DL (ref 65–100)

## 2021-01-16 PROCEDURE — 74011250636 HC RX REV CODE- 250/636: Performed by: FAMILY MEDICINE

## 2021-01-16 PROCEDURE — 74011636637 HC RX REV CODE- 636/637: Performed by: FAMILY MEDICINE

## 2021-01-16 PROCEDURE — 74011250637 HC RX REV CODE- 250/637: Performed by: FAMILY MEDICINE

## 2021-01-16 PROCEDURE — 82962 GLUCOSE BLOOD TEST: CPT

## 2021-01-16 PROCEDURE — 65270000029 HC RM PRIVATE

## 2021-01-16 RX ORDER — INSULIN GLARGINE 100 [IU]/ML
13 INJECTION, SOLUTION SUBCUTANEOUS
Status: DISCONTINUED | OUTPATIENT
Start: 2021-01-16 | End: 2021-01-19 | Stop reason: HOSPADM

## 2021-01-16 RX ORDER — HYDROCODONE BITARTRATE AND HOMATROPINE METHYLBROMIDE 1.5; 5 MG/5ML; MG/5ML
5 SYRUP ORAL
Status: DISCONTINUED | OUTPATIENT
Start: 2021-01-16 | End: 2021-01-19 | Stop reason: HOSPADM

## 2021-01-16 RX ORDER — METOPROLOL SUCCINATE 25 MG/1
25 TABLET, EXTENDED RELEASE ORAL DAILY
Status: DISCONTINUED | OUTPATIENT
Start: 2021-01-16 | End: 2021-01-17

## 2021-01-16 RX ORDER — TRAMADOL HYDROCHLORIDE 50 MG/1
100 TABLET ORAL
Status: DISCONTINUED | OUTPATIENT
Start: 2021-01-16 | End: 2021-01-19 | Stop reason: HOSPADM

## 2021-01-16 RX ORDER — INSULIN GLARGINE 100 [IU]/ML
13 INJECTION, SOLUTION SUBCUTANEOUS EVERY 12 HOURS
Status: DISCONTINUED | OUTPATIENT
Start: 2021-01-16 | End: 2021-01-16

## 2021-01-16 RX ADMIN — HEPARIN SODIUM 5000 UNITS: 5000 INJECTION INTRAVENOUS; SUBCUTANEOUS at 05:10

## 2021-01-16 RX ADMIN — INSULIN LISPRO 6 UNITS: 100 INJECTION, SOLUTION INTRAVENOUS; SUBCUTANEOUS at 21:42

## 2021-01-16 RX ADMIN — HEPARIN SODIUM 5000 UNITS: 5000 INJECTION INTRAVENOUS; SUBCUTANEOUS at 21:42

## 2021-01-16 RX ADMIN — Medication 10 ML: at 21:43

## 2021-01-16 RX ADMIN — CYANOCOBALAMIN TAB 1000 MCG 2500 MCG: 1000 TAB at 08:42

## 2021-01-16 RX ADMIN — ACETAMINOPHEN 650 MG: 325 TABLET, FILM COATED ORAL at 04:23

## 2021-01-16 RX ADMIN — TRAMADOL HYDROCHLORIDE 100 MG: 50 TABLET, COATED ORAL at 11:45

## 2021-01-16 RX ADMIN — Medication 10 ML: at 05:10

## 2021-01-16 RX ADMIN — ACETAMINOPHEN 650 MG: 325 TABLET, FILM COATED ORAL at 17:55

## 2021-01-16 RX ADMIN — ROSUVASTATIN CALCIUM 40 MG: 20 TABLET, COATED ORAL at 21:42

## 2021-01-16 RX ADMIN — ASPIRIN 81 MG: 81 TABLET, CHEWABLE ORAL at 05:11

## 2021-01-16 RX ADMIN — VITAMIN D, TAB 1000IU (100/BT) 2 TABLET: 25 TAB at 08:43

## 2021-01-16 RX ADMIN — Medication 10 ML: at 14:58

## 2021-01-16 RX ADMIN — HEPARIN SODIUM 5000 UNITS: 5000 INJECTION INTRAVENOUS; SUBCUTANEOUS at 14:58

## 2021-01-16 RX ADMIN — CLOPIDOGREL BISULFATE 75 MG: 75 TABLET ORAL at 08:43

## 2021-01-16 RX ADMIN — ISOSORBIDE MONONITRATE 30 MG: 30 TABLET, EXTENDED RELEASE ORAL at 08:43

## 2021-01-16 RX ADMIN — Medication 220 MG: at 08:43

## 2021-01-16 RX ADMIN — INSULIN GLARGINE 13 UNITS: 100 INJECTION, SOLUTION SUBCUTANEOUS at 21:41

## 2021-01-16 RX ADMIN — METOPROLOL SUCCINATE 25 MG: 25 TABLET, EXTENDED RELEASE ORAL at 16:54

## 2021-01-16 RX ADMIN — OXYCODONE HYDROCHLORIDE AND ACETAMINOPHEN 1000 MG: 500 TABLET ORAL at 08:42

## 2021-01-16 NOTE — PROGRESS NOTES
Progress Note    Patient: Thu Harmon MRN: [id number]  SSN: [id number]    YOB: 1950  Age: 65 y.o. Sex: female      Admit Date: 1/12/2021    LOS: 4 days     Subjective:   F/U COVID    \"69 year old w/ a PMhx of CAD s/p stents x3, DMII, CKD stage 3 presented w/ cough, SOB and weakness in the past week. She also has been having fever at home and has been trying to cope with the symptoms. She has been having trouble laying flat to sleep since onset of symptoms. Also reported chest pain with cough. She went to get tested for COVID 1/12 and was positive. Denies any alleviating or exacerbating factors. In ED, Tmax 101 but VSS otherwise. CBC at baseline. CMP with Na 127, . Cr 2.47, ALT 68, AST 87. pBNP [id number], HS-trop 78. CXR w/o acute process. EKG shows prolonged QT and also diffuse T-wave inversions. \" On 1/13, had episode of Vtach and then asystolic for about 30 seconds. Code BLUE called. Chest compressions were initiated and epinephrine was given x 1 dose. Pt went into pulseless VTach and chest compressions were continued. Synchronized cardioversion was performed and patient converted into pulseless sinus bradycardia. Chest compressions were continued, and patient was given IV atropine, IV Calcium, IV Magnesium, along with a second dose of IV epinephrine. Pt converted to sinus tachycardia and ROSC was achieved at around 06:12. Cardiology following who did not think had STEMI. Recommended ECHO since may have underlying COVID myocarditis but will have to be done as outpatient with cardiology since currently with COVID. Do not think a candidate for convalescent plasma or remdesivir due to time frame of about 14 days of symptoms. Chest pain when she breathes in. Chest x ray with no obvious fractures. No hemoptysis. No SOB but hard to take a deep breathe since CODE blue. No other concerns. Satting well on RA. Glucose 64 this AM. States she is feeling bad all over.  Does not like taking Oxycodone stating it makes her \"feel funny\"  Current Facility-Administered Medications   Medication Dose Route Frequency    insulin glargine (LANTUS) injection 13 Units  13 Units SubCUTAneous QHS    isosorbide mononitrate ER (IMDUR) tablet 30 mg  30 mg Oral DAILY    [Held by provider] insulin lispro (HUMALOG) injection 10 Units  10 Units SubCUTAneous TIDAC    oxyCODONE IR (ROXICODONE) tablet 5 mg  5 mg Oral Q4H PRN    hydrALAZINE (APRESOLINE) tablet 10 mg  10 mg Oral TID PRN    [Held by provider] dexamethasone (DECADRON) 10 mg/mL injection 6 mg  6 mg IntraVENous Q24H    morphine injection 1 mg  1 mg IntraVENous Q4H PRN    aspirin chewable tablet 81 mg  81 mg Oral 7am    cholecalciferol (VITAMIN D3) (1000 Units /25 mcg) tablet 2 Tab  2,000 Units Oral DAILY    clopidogreL (PLAVIX) tablet 75 mg  75 mg Oral DAILY    cyanocobalamin tablet 2,500 mcg  2,500 mcg Oral DAILY    [Held by provider] metoprolol succinate (TOPROL-XL) XL tablet 50 mg  50 mg Oral DAILY    nitroglycerin (NITROSTAT) tablet 0.4 mg  0.4 mg SubLINGual Q5MIN PRN    rosuvastatin (CRESTOR) tablet 40 mg  40 mg Oral QHS    sodium chloride (NS) flush 5-40 mL  5-40 mL IntraVENous Q8H    sodium chloride (NS) flush 5-40 mL  5-40 mL IntraVENous PRN    acetaminophen (TYLENOL) tablet 650 mg  650 mg Oral Q6H PRN    Or    acetaminophen (TYLENOL) suppository 650 mg  650 mg Rectal Q6H PRN    polyethylene glycol (MIRALAX) packet 17 g  17 g Oral DAILY PRN    ondansetron (ZOFRAN) injection 4 mg  4 mg IntraVENous Q6H PRN    heparin (porcine) injection 5,000 Units  5,000 Units SubCUTAneous Q8H    dextrose 40% (GLUTOSE) oral gel 1 Tube  15 g Oral PRN    dextrose (D50W) injection syrg 12.5-25 g  25-50 mL IntraVENous PRN    insulin lispro (HUMALOG) injection   SubCUTAneous AC&HS    ascorbic acid (vitamin C) (VITAMIN C) tablet 1,000 mg  1,000 mg Oral DAILY    zinc sulfate tablet 220 mg  220 mg Oral DAILY    guaiFENesin-dextromethorphan (ROBITUSSIN DM) 100-10 mg/5 mL syrup 5 mL  5 mL Oral Q6H PRN    glucagon (GLUCAGEN) injection 1 mg  1 mg IntraMUSCular PRN       Objective:     Vitals:    01/15/21 2306 01/16/21 0408 01/16/21 0516 01/16/21 0839   BP: (!) 161/69 (!) 166/81  (!) 155/78   Pulse: 84 (!) 115  94   Resp: 24 24  20   Temp: 98.2 °F (36.8 °C) (!) 100.6 °F (38.1 °C) 99.8 °F (37.7 °C) 98.6 °F (37 °C)   SpO2: 97% 97%  97%   Weight:       Height:             Intake and Output:  Current Shift: No intake/output data recorded. Last three shifts: 01/14 1901 - 01/16 0700  In: 360 [P.O.:360]  Out: -     Physical Exam:   General:  Alert, cooperative, tired appearing. Eyes:  Conjunctivae/corneas clear. Ears:  Normal TMs and external ear canals both ears. Nose: Nares normal. Septum midline. Mouth/Throat: Lips, mucosa, and tongue normal.   Neck:  no JVD. Back:   deferred   Lungs:   Clear to auscultation bilaterally but limited breathe sounds. Coughing during deep inspiration   Heart:  Regular rate and rhythm, S1, S2 normal   Abdomen:   Soft, non-tender. Bowel sounds normal.    Extremities: Extremities normal, atraumatic, no cyanosis or edema. Pulses: 2+ and symmetric all extremities. Skin: Skin color, texture, turgor normal. No rashes or lesions   Lymph nodes: Cervical, supraclavicular, and axillary nodes normal.   Neurologic: CNII-XII intact.        Lab/Data Review:    Recent Results (from the past 24 hour(s))   GLUCOSE, POC    Collection Time: 01/15/21 11:30 AM   Result Value Ref Range    Glucose (POC) 265 (H) 65 - 100 mg/dL   GLUCOSE, POC    Collection Time: 01/15/21  4:12 PM   Result Value Ref Range    Glucose (POC) 112 (H) 65 - 100 mg/dL   GLUCOSE, POC    Collection Time: 01/15/21  8:25 PM   Result Value Ref Range    Glucose (POC) 285 (H) 65 - 100 mg/dL   GLUCOSE, POC    Collection Time: 01/16/21  7:26 AM   Result Value Ref Range    Glucose (POC) 64 (L) 65 - 100 mg/dL   GLUCOSE, POC    Collection Time: 01/16/21  8:47 AM   Result Value Ref Range    Glucose (POC) 81 65 - 100 mg/dL       Assessment/ Plan:     Principal Problem:    Sepsis ([facility] [address] (1/12/2021)    Active Problems:    Coronary artery disease involving native coronary artery of native heart without angina pectoris (9/4/2013)      Essential hypertension (9/15/2016)      Chronic kidney disease, stage III (moderate) ([facility]) (9/15/2016)      Major depressive disorder with single episode, in full remission ([facility] [address] (9/15/2016)      Primary insomnia (9/15/2016)      Gastroesophageal reflux disease (2/16/2017)      Type 2 diabetes mellitus with hyperglycemia, without long-term current use of insulin ([facility] [address] (10/11/2018)      Mixed hyperlipidemia (10/21/2020)      Hyponatremia (1/12/2021)      Acute renal failure superimposed on stage 3b chronic kidney disease ([facility] [address] (1/12/2021)      COVID-19 virus infection (1/12/2021)      Long QT interval (1/12/2021)      Lactic acidosis (1/12/2021)      Elevated LFTs (1/12/2021)      Normocytic anemia (1/12/2021)    Cardiac arrest with V tach - s/p ROSC on 1/13. Vitamin C and Zinc. Do not think a candidate for convalescent plasma or remdesivir due to time frame of about 14 days of symptoms. ECHO will need to be done as outpatient and cardiology would like to see as outpatient. HTN- Add back imdur. Add back BB but reduce dose to 25mg daily. Hemoptysis - no further episodes. COVID - as above. dCHF EF 60% - compensated. DM type II - uncontrolled with A1C >10. Lantus I will decrease to 13 units at night, SS. Stopped decadron and now glucose levels have significantly dropped. ASA/plavix    prn oxycodone. Add PRN Tramadol. Possible dc tomorrow if glucose controlled. Plan to dc with HH. I have spoken to the daughter  who states patient wants rehab. Will reach out to case management to further investigate.      DVT prophylaxis - heparin  Signed By: Ardyce Kussmaul,      January 16, 2021

## 2021-01-16 NOTE — PROGRESS NOTES
01/16/21 0616   Oxygen Therapy   O2 Device Room air   Hourly rounds completed this shift. All needs met at this time. Bed low/locked. Call light within reach. Will continue to monitor and give bedside report to oncoming nurse. Pt sating 97% throughout the night. Febrile x1 which resolved with tylenol. Tylenol also given for chest wall pain.

## 2021-01-17 LAB
ANION GAP SERPL CALC-SCNC: 9 MMOL/L (ref 7–16)
BACTERIA SPEC CULT: NORMAL
BASOPHILS # BLD: 0 K/UL (ref 0–0.2)
BASOPHILS NFR BLD: 0 % (ref 0–2)
BUN SERPL-MCNC: 26 MG/DL (ref 8–23)
CALCIUM SERPL-MCNC: 8.8 MG/DL (ref 8.3–10.4)
CHLORIDE SERPL-SCNC: 103 MMOL/L (ref 98–107)
CO2 SERPL-SCNC: 24 MMOL/L (ref 21–32)
CREAT SERPL-MCNC: 2.05 MG/DL (ref 0.6–1)
DIFFERENTIAL METHOD BLD: ABNORMAL
EOSINOPHIL # BLD: 0 K/UL (ref 0–0.8)
EOSINOPHIL NFR BLD: 0 % (ref 0.5–7.8)
ERYTHROCYTE [DISTWIDTH] IN BLOOD BY AUTOMATED COUNT: 15.7 % (ref 11.9–14.6)
GLUCOSE BLD STRIP.AUTO-MCNC: 136 MG/DL (ref 65–100)
GLUCOSE BLD STRIP.AUTO-MCNC: 174 MG/DL (ref 65–100)
GLUCOSE BLD STRIP.AUTO-MCNC: 191 MG/DL (ref 65–100)
GLUCOSE BLD STRIP.AUTO-MCNC: 213 MG/DL (ref 65–100)
GLUCOSE SERPL-MCNC: 137 MG/DL (ref 65–100)
HCT VFR BLD AUTO: 35.1 % (ref 35.8–46.3)
HGB BLD-MCNC: 11.5 G/DL (ref 11.7–15.4)
IMM GRANULOCYTES # BLD AUTO: 0.1 K/UL (ref 0–0.5)
IMM GRANULOCYTES NFR BLD AUTO: 1 % (ref 0–5)
LYMPHOCYTES # BLD: 0.6 K/UL (ref 0.5–4.6)
LYMPHOCYTES NFR BLD: 9 % (ref 13–44)
MCH RBC QN AUTO: 28.9 PG (ref 26.1–32.9)
MCHC RBC AUTO-ENTMCNC: 32.8 G/DL (ref 31.4–35)
MCV RBC AUTO: 88.2 FL (ref 79.6–97.8)
MONOCYTES # BLD: 0.7 K/UL (ref 0.1–1.3)
MONOCYTES NFR BLD: 9 % (ref 4–12)
NEUTS SEG # BLD: 5.7 K/UL (ref 1.7–8.2)
NEUTS SEG NFR BLD: 81 % (ref 43–78)
NRBC # BLD: 0 K/UL (ref 0–0.2)
PLATELET # BLD AUTO: 293 K/UL (ref 150–450)
PMV BLD AUTO: 10.6 FL (ref 9.4–12.3)
POTASSIUM SERPL-SCNC: 3.8 MMOL/L (ref 3.5–5.1)
RBC # BLD AUTO: 3.98 M/UL (ref 4.05–5.2)
SERVICE CMNT-IMP: NORMAL
SODIUM SERPL-SCNC: 136 MMOL/L (ref 136–145)
WBC # BLD AUTO: 7.1 K/UL (ref 4.3–11.1)

## 2021-01-17 PROCEDURE — 74011636637 HC RX REV CODE- 636/637: Performed by: FAMILY MEDICINE

## 2021-01-17 PROCEDURE — 65270000029 HC RM PRIVATE

## 2021-01-17 PROCEDURE — 2709999900 HC NON-CHARGEABLE SUPPLY

## 2021-01-17 PROCEDURE — 74011250637 HC RX REV CODE- 250/637: Performed by: FAMILY MEDICINE

## 2021-01-17 PROCEDURE — 74011250637 HC RX REV CODE- 250/637: Performed by: INTERNAL MEDICINE

## 2021-01-17 PROCEDURE — 74011250637 HC RX REV CODE- 250/637: Performed by: HOSPITALIST

## 2021-01-17 PROCEDURE — 85025 COMPLETE CBC W/AUTO DIFF WBC: CPT

## 2021-01-17 PROCEDURE — 80048 BASIC METABOLIC PNL TOTAL CA: CPT

## 2021-01-17 PROCEDURE — 82962 GLUCOSE BLOOD TEST: CPT

## 2021-01-17 PROCEDURE — 74011250636 HC RX REV CODE- 250/636: Performed by: FAMILY MEDICINE

## 2021-01-17 PROCEDURE — 74011000250 HC RX REV CODE- 250: Performed by: HOSPITALIST

## 2021-01-17 RX ORDER — HYDRALAZINE HYDROCHLORIDE 25 MG/1
25 TABLET, FILM COATED ORAL 4 TIMES DAILY
Status: DISCONTINUED | OUTPATIENT
Start: 2021-01-17 | End: 2021-01-19 | Stop reason: HOSPADM

## 2021-01-17 RX ORDER — LABETALOL HYDROCHLORIDE 5 MG/ML
5 INJECTION, SOLUTION INTRAVENOUS
Status: DISCONTINUED | OUTPATIENT
Start: 2021-01-17 | End: 2021-01-19 | Stop reason: HOSPADM

## 2021-01-17 RX ORDER — METOPROLOL SUCCINATE 50 MG/1
50 TABLET, EXTENDED RELEASE ORAL DAILY
Status: DISCONTINUED | OUTPATIENT
Start: 2021-01-18 | End: 2021-01-19 | Stop reason: HOSPADM

## 2021-01-17 RX ORDER — METOPROLOL SUCCINATE 25 MG/1
25 TABLET, EXTENDED RELEASE ORAL
Status: COMPLETED | OUTPATIENT
Start: 2021-01-17 | End: 2021-01-17

## 2021-01-17 RX ADMIN — CLOPIDOGREL BISULFATE 75 MG: 75 TABLET ORAL at 08:01

## 2021-01-17 RX ADMIN — HYDRALAZINE HYDROCHLORIDE 25 MG: 25 TABLET, FILM COATED ORAL at 18:07

## 2021-01-17 RX ADMIN — ONDANSETRON 4 MG: 2 INJECTION INTRAMUSCULAR; INTRAVENOUS at 13:48

## 2021-01-17 RX ADMIN — INSULIN LISPRO 6 UNITS: 100 INJECTION, SOLUTION INTRAVENOUS; SUBCUTANEOUS at 16:30

## 2021-01-17 RX ADMIN — HYDRALAZINE HYDROCHLORIDE 10 MG: 10 TABLET, FILM COATED ORAL at 04:40

## 2021-01-17 RX ADMIN — LEVOFLOXACIN 750 MG: 500 TABLET, FILM COATED ORAL at 11:37

## 2021-01-17 RX ADMIN — HEPARIN SODIUM 5000 UNITS: 5000 INJECTION INTRAVENOUS; SUBCUTANEOUS at 21:48

## 2021-01-17 RX ADMIN — VITAMIN D, TAB 1000IU (100/BT) 2 TABLET: 25 TAB at 08:01

## 2021-01-17 RX ADMIN — CYANOCOBALAMIN TAB 1000 MCG 2500 MCG: 1000 TAB at 08:00

## 2021-01-17 RX ADMIN — METOPROLOL SUCCINATE 25 MG: 25 TABLET, EXTENDED RELEASE ORAL at 11:38

## 2021-01-17 RX ADMIN — LABETALOL HYDROCHLORIDE 5 MG: 5 INJECTION INTRAVENOUS at 20:09

## 2021-01-17 RX ADMIN — HEPARIN SODIUM 5000 UNITS: 5000 INJECTION INTRAVENOUS; SUBCUTANEOUS at 13:42

## 2021-01-17 RX ADMIN — HYDRALAZINE HYDROCHLORIDE 10 MG: 10 TABLET, FILM COATED ORAL at 15:12

## 2021-01-17 RX ADMIN — INSULIN LISPRO 3 UNITS: 100 INJECTION, SOLUTION INTRAVENOUS; SUBCUTANEOUS at 11:38

## 2021-01-17 RX ADMIN — TRAMADOL HYDROCHLORIDE 100 MG: 50 TABLET, COATED ORAL at 05:07

## 2021-01-17 RX ADMIN — HYDRALAZINE HYDROCHLORIDE 10 MG: 10 TABLET, FILM COATED ORAL at 16:34

## 2021-01-17 RX ADMIN — OXYCODONE HYDROCHLORIDE AND ACETAMINOPHEN 1000 MG: 500 TABLET ORAL at 08:00

## 2021-01-17 RX ADMIN — INSULIN LISPRO 3 UNITS: 100 INJECTION, SOLUTION INTRAVENOUS; SUBCUTANEOUS at 07:46

## 2021-01-17 RX ADMIN — INSULIN GLARGINE 13 UNITS: 100 INJECTION, SOLUTION SUBCUTANEOUS at 21:47

## 2021-01-17 RX ADMIN — Medication 10 ML: at 13:01

## 2021-01-17 RX ADMIN — ASPIRIN 81 MG: 81 TABLET, CHEWABLE ORAL at 05:07

## 2021-01-17 RX ADMIN — ISOSORBIDE MONONITRATE 30 MG: 30 TABLET, EXTENDED RELEASE ORAL at 08:01

## 2021-01-17 RX ADMIN — HEPARIN SODIUM 5000 UNITS: 5000 INJECTION INTRAVENOUS; SUBCUTANEOUS at 05:07

## 2021-01-17 RX ADMIN — Medication 10 ML: at 21:47

## 2021-01-17 RX ADMIN — HYDRALAZINE HYDROCHLORIDE 25 MG: 25 TABLET, FILM COATED ORAL at 21:48

## 2021-01-17 RX ADMIN — Medication 10 ML: at 05:07

## 2021-01-17 RX ADMIN — METOPROLOL SUCCINATE 25 MG: 25 TABLET, EXTENDED RELEASE ORAL at 08:00

## 2021-01-17 RX ADMIN — ROSUVASTATIN CALCIUM 40 MG: 20 TABLET, COATED ORAL at 21:48

## 2021-01-17 RX ADMIN — Medication 220 MG: at 08:00

## 2021-01-17 NOTE — PROGRESS NOTES
01/17/21 0550   Oxygen Therapy   O2 Device Room air   Hourly rounds completed this shift. All needs met at this time. Bed low/locked. Call light within reach. Will continue to monitor and give bedside report to oncoming nurse. Pt sating 97%. Tramadol given for chest wall pain. PRN BP given.

## 2021-01-17 NOTE — PROGRESS NOTES
Md on call was notified of patient blood pressure continuing to be high thru out the day despite giving hydralazine 10mg x2 doses. He ordered her back on cardiac monitor and changed hydralazine po dose and added prn iv meds also. She is currently in nsr with pacs with  Rate of 68. Will continue to monitor closely.

## 2021-01-17 NOTE — PROGRESS NOTES
Problem: Falls - Risk of  Goal: *Absence of Falls  Description: Document Julia Constantino Fall Risk and appropriate interventions in the flowsheet. Outcome: Progressing Towards Goal  Note: Fall Risk Interventions:  Mobility Interventions: Communicate number of staff needed for ambulation/transfer, Bed/chair exit alarm, OT consult for ADLs, Patient to call before getting OOB, PT Consult for mobility concerns, PT Consult for assist device competence         Medication Interventions: Bed/chair exit alarm, Evaluate medications/consider consulting pharmacy, Patient to call before getting OOB, Teach patient to arise slowly    Elimination Interventions: Bed/chair exit alarm, Call light in reach, Patient to call for help with toileting needs, Stay With Me (per policy), Toilet paper/wipes in reach, Toileting schedule/hourly rounds    History of Falls Interventions: Bed/chair exit alarm, Evaluate medications/consider consulting pharmacy         Problem: Patient Education: Go to Patient Education Activity  Goal: Patient/Family Education  Outcome: Progressing Towards Goal     Problem: Diabetes Self-Management  Goal: *Disease process and treatment process  Description: Define diabetes and identify own type of diabetes; list 3 options for treating diabetes. Outcome: Progressing Towards Goal  Goal: *Incorporating nutritional management into lifestyle  Description: Describe effect of type, amount and timing of food on blood glucose; list 3 methods for planning meals. Outcome: Progressing Towards Goal  Goal: *Incorporating physical activity into lifestyle  Description: State effect of exercise on blood glucose levels. Outcome: Progressing Towards Goal  Goal: *Developing strategies to promote health/change behavior  Description: Define the ABC's of diabetes; identify appropriate screenings, schedule and personal plan for screenings.   Outcome: Progressing Towards Goal  Goal: *Using medications safely  Description: State effect of diabetes medications on diabetes; name diabetes medication taking, action and side effects. Outcome: Progressing Towards Goal  Goal: *Monitoring blood glucose, interpreting and using results  Description: Identify recommended blood glucose targets  and personal targets. Outcome: Progressing Towards Goal  Goal: *Prevention, detection, treatment of acute complications  Description: List symptoms of hyper- and hypoglycemia; describe how to treat low blood sugar and actions for lowering  high blood glucose level. Outcome: Progressing Towards Goal  Goal: *Prevention, detection and treatment of chronic complications  Description: Define the natural course of diabetes and describe the relationship of blood glucose levels to long term complications of diabetes.   Outcome: Progressing Towards Goal  Goal: *Developing strategies to address psychosocial issues  Description: Describe feelings about living with diabetes; identify support needed and support network  Outcome: Progressing Towards Goal  Goal: *Insulin pump training  Outcome: Progressing Towards Goal  Goal: *Sick day guidelines  Outcome: Progressing Towards Goal  Goal: *Patient Specific Goal (EDIT GOAL, INSERT TEXT)  Outcome: Progressing Towards Goal     Problem: Patient Education: Go to Patient Education Activity  Goal: Patient/Family Education  Outcome: Progressing Towards Goal     Problem: Patient Education: Go to Patient Education Activity  Goal: Patient/Family Education  Outcome: Progressing Towards Goal     Problem: Patient Education: Go to Patient Education Activity  Goal: Patient/Family Education  Outcome: Progressing Towards Goal

## 2021-01-17 NOTE — PROGRESS NOTES
Progress Note    Patient: Indra Rodriguez MRN: [id number]  SSN: [id number]    YOB: 1950  Age: 65 y.o. Sex: female      Admit Date: 1/12/2021    LOS: 5 days     Subjective:   F/U COVID    \"69 year old w/ a PMhx of CAD s/p stents x3, DMII, CKD stage 3 presented w/ cough, SOB and weakness in the past week. She also has been having fever at home and has been trying to cope with the symptoms. She has been having trouble laying flat to sleep since onset of symptoms. Also reported chest pain with cough. She went to get tested for COVID 1/12 and was positive. Denies any alleviating or exacerbating factors. In ED, Tmax 101 but VSS otherwise. CBC at baseline. CMP with Na 127, . Cr 2.47, ALT 68, AST 87. pBNP [id number], HS-trop 78. CXR w/o acute process. EKG shows prolonged QT and also diffuse T-wave inversions. \" On 1/13, had episode of Vtach and then asystolic for about 30 seconds. Code BLUE called. Chest compressions were initiated and epinephrine was given x 1 dose. Pt went into pulseless VTach and chest compressions were continued. Synchronized cardioversion was performed and patient converted into pulseless sinus bradycardia. Chest compressions were continued, and patient was given IV atropine, IV Calcium, IV Magnesium, along with a second dose of IV epinephrine. Pt converted to sinus tachycardia and ROSC was achieved at around 06:12. Cardiology following who did not think had STEMI. Recommended ECHO since may have underlying COVID myocarditis but will have to be done as outpatient with cardiology since currently with COVID. Do not think a candidate for convalescent plasma or remdesivir due to time frame of about 14 days of symptoms. Chest pain when she breathes in. Chest x ray with no obvious fractures. No hemoptysis. No SOB but hard to take a deep breathe since CODE blue. No other concerns. Satting well on RA. Tramadol helping with pain.  24 hr tmax 101.3  Current Facility-Administered Medications   Medication Dose Route Frequency    insulin glargine (LANTUS) injection 13 Units  13 Units SubCUTAneous QHS    traMADoL (ULTRAM) tablet 100 mg  100 mg Oral Q6H PRN    metoprolol succinate (TOPROL-XL) XL tablet 25 mg  25 mg Oral DAILY    HYDROcodone-homatropine (HYCODAN) 5-1.5 mg/5 mL (5 mL) oral solution 5 mL  5 mL Oral Q4H PRN    isosorbide mononitrate ER (IMDUR) tablet 30 mg  30 mg Oral DAILY    [Held by provider] insulin lispro (HUMALOG) injection 10 Units  10 Units SubCUTAneous TIDAC    oxyCODONE IR (ROXICODONE) tablet 5 mg  5 mg Oral Q4H PRN    hydrALAZINE (APRESOLINE) tablet 10 mg  10 mg Oral TID PRN    [Held by provider] dexamethasone (DECADRON) 10 mg/mL injection 6 mg  6 mg IntraVENous Q24H    morphine injection 1 mg  1 mg IntraVENous Q4H PRN    aspirin chewable tablet 81 mg  81 mg Oral 7am    cholecalciferol (VITAMIN D3) (1000 Units /25 mcg) tablet 2 Tab  2,000 Units Oral DAILY    clopidogreL (PLAVIX) tablet 75 mg  75 mg Oral DAILY    cyanocobalamin tablet 2,500 mcg  2,500 mcg Oral DAILY    nitroglycerin (NITROSTAT) tablet 0.4 mg  0.4 mg SubLINGual Q5MIN PRN    rosuvastatin (CRESTOR) tablet 40 mg  40 mg Oral QHS    sodium chloride (NS) flush 5-40 mL  5-40 mL IntraVENous Q8H    sodium chloride (NS) flush 5-40 mL  5-40 mL IntraVENous PRN    acetaminophen (TYLENOL) tablet 650 mg  650 mg Oral Q6H PRN    Or    acetaminophen (TYLENOL) suppository 650 mg  650 mg Rectal Q6H PRN    polyethylene glycol (MIRALAX) packet 17 g  17 g Oral DAILY PRN    ondansetron (ZOFRAN) injection 4 mg  4 mg IntraVENous Q6H PRN    heparin (porcine) injection 5,000 Units  5,000 Units SubCUTAneous Q8H    dextrose 40% (GLUTOSE) oral gel 1 Tube  15 g Oral PRN    dextrose (D50W) injection syrg 12.5-25 g  25-50 mL IntraVENous PRN    insulin lispro (HUMALOG) injection   SubCUTAneous AC&HS    ascorbic acid (vitamin C) (VITAMIN C) tablet 1,000 mg  1,000 mg Oral DAILY    zinc sulfate tablet 220 mg  220 mg Oral DAILY    guaiFENesin-dextromethorphan (ROBITUSSIN DM) 100-10 mg/5 mL syrup 5 mL  5 mL Oral Q6H PRN    glucagon (GLUCAGEN) injection 1 mg  1 mg IntraMUSCular PRN       Objective:     Vitals:    01/17/21 0015 01/17/21 0439 01/17/21 0550 01/17/21 0753   BP: (!) 172/87 (!) 198/87 (!) 182/78 (!) 170/88   Pulse: 89 80 75 84   Resp: 20 20  20   Temp: 99.1 °F (37.3 °C) 99.3 °F (37.4 °C)  98 °F (36.7 °C)   SpO2: 95% 96% 97% 96%   Weight:       Height:             Intake and Output:  Current Shift: 01/17 0701 - 01/17 1900  In: 240 [P.O.:240]  Out: -   Last three shifts: No intake/output data recorded. Physical Exam:   General:  Alert, cooperative, tired appearing. Moving more in bed. Eyes:  Conjunctivae/corneas clear. Ears:  Normal TMs and external ear canals both ears. Nose: Nares normal. Septum midline. Mouth/Throat: Lips, mucosa, and tongue normal.   Neck:  no JVD. Back:   deferred   Lungs: Inspiratory crackles at RLL. Coughing during deep inspiration   Heart:  Regular rate and rhythm, S1, S2 normal   Abdomen:   Soft, non-tender. Bowel sounds normal.    Extremities: Extremities normal, atraumatic, no cyanosis or edema. Pulses: 2+ and symmetric all extremities. Skin: Skin color, texture, turgor normal. No rashes or lesions   Lymph nodes: Cervical, supraclavicular, and axillary nodes normal.   Neurologic: CNII-XII intact.        Lab/Data Review:    Recent Results (from the past 24 hour(s))   GLUCOSE, POC    Collection Time: 01/16/21 11:18 AM   Result Value Ref Range    Glucose (POC) 103 (H) 65 - 100 mg/dL   GLUCOSE, POC    Collection Time: 01/16/21  4:52 PM   Result Value Ref Range    Glucose (POC) 109 (H) 65 - 100 mg/dL   GLUCOSE, POC    Collection Time: 01/16/21  9:06 PM   Result Value Ref Range    Glucose (POC) 202 (H) 65 - 767 mg/dL   METABOLIC PANEL, BASIC    Collection Time: 01/17/21  5:19 AM   Result Value Ref Range    Sodium 136 136 - 145 mmol/L    Potassium 3.8 3.5 - 5.1 mmol/L    Chloride 103 98 - 107 mmol/L    CO2 24 21 - 32 mmol/L    Anion gap 9 7 - 16 mmol/L    Glucose 137 (H) 65 - 100 mg/dL    BUN 26 (H) 8 - 23 MG/DL    Creatinine 2.05 (H) 0.6 - 1.0 MG/DL    GFR est AA 31 (L) >60 ml/min/1.73m2    GFR est non-AA 25 (L) >60 ml/min/1.73m2    Calcium 8.8 8.3 - 10.4 MG/DL   CBC WITH AUTOMATED DIFF    Collection Time: 01/17/21  5:19 AM   Result Value Ref Range    WBC 7.1 4.3 - 11.1 K/uL    RBC 3.98 (L) 4.05 - 5.2 M/uL    HGB 11.5 (L) 11.7 - 15.4 g/dL    HCT 35.1 (L) 35.8 - 46.3 %    MCV 88.2 79.6 - 97.8 FL    MCH 28.9 26.1 - 32.9 PG    MCHC 32.8 31.4 - 35.0 g/dL    RDW 15.7 (H) 11.9 - 14.6 %    PLATELET 886 379 - 779 K/uL    MPV 10.6 9.4 - 12.3 FL    ABSOLUTE NRBC 0.00 0.0 - 0.2 K/uL    DF AUTOMATED      NEUTROPHILS 81 (H) 43 - 78 %    LYMPHOCYTES 9 (L) 13 - 44 %    MONOCYTES 9 4.0 - 12.0 %    EOSINOPHILS 0 (L) 0.5 - 7.8 %    BASOPHILS 0 0.0 - 2.0 %    IMMATURE GRANULOCYTES 1 0.0 - 5.0 %    ABS. NEUTROPHILS 5.7 1.7 - 8.2 K/UL    ABS. LYMPHOCYTES 0.6 0.5 - 4.6 K/UL    ABS. MONOCYTES 0.7 0.1 - 1.3 K/UL    ABS. EOSINOPHILS 0.0 0.0 - 0.8 K/UL    ABS. BASOPHILS 0.0 0.0 - 0.2 K/UL    ABS. IMM.  GRANS. 0.1 0.0 - 0.5 K/UL   GLUCOSE, POC    Collection Time: 01/17/21  7:03 AM   Result Value Ref Range    Glucose (POC) 174 (H) 65 - 100 mg/dL       Assessment/ Plan:     Principal Problem:    Sepsis ([address] (1/12/2021)    Active Problems:    Coronary artery disease involving native coronary artery of native heart without angina pectoris (9/4/2013)      Essential hypertension (9/15/2016)      Chronic kidney disease, stage III (moderate) ([facility]) (9/15/2016)      Major depressive disorder with single episode, in full remission ([facility] (9/15/2016)      Primary insomnia (9/15/2016)      Gastroesophageal reflux disease (2/16/2017)      Type 2 diabetes mellitus with hyperglycemia, without long-term current use of insulin ([facility] (10/11/2018)      Mixed hyperlipidemia (10/21/2020)      Hyponatremia (1/12/2021)      Acute renal failure superimposed on stage 3b chronic kidney disease ([facility] (1/12/2021)      COVID-19 virus infection (1/12/2021)      Long QT interval (1/12/2021)      Lactic acidosis (1/12/2021)      Elevated LFTs (1/12/2021)      Normocytic anemia (1/12/2021)    Cardiac arrest with V tach - s/p ROSC on 1/13. Vitamin C and Zinc. Do not think a candidate for convalescent plasma or remdesivir due to time frame of about 14 days of symptoms. ECHO will need to be done as outpatient and cardiology would like to see as outpatient. Add Levofloxacin to cover for possible bacterial pneumonia since fever yesterday     HTN- Added back imdur. Increase BB back to 50mg daily     Hemoptysis - no further episodes. COVID - as above. dCHF EF 60% - compensated. DM type II -  A1C >10. Lantus 13 units at night, SS. Stopped decadron and now glucose levels have significantly dropped. ASA/plavix    prn oxycodone. Add PRN Tramadol. I have spoken to the daughter  on 1/16 who states patient wants rehab. Will reach out to case management to further investigate.      DVT prophylaxis - heparin  Signed By: Ardyce Kussmaul, DO     January 17, 2021

## 2021-01-18 PROBLEM — J18.9 HCAP (HEALTHCARE-ASSOCIATED PNEUMONIA): Status: ACTIVE | Noted: 2021-01-18

## 2021-01-18 LAB
APPEARANCE UR: ABNORMAL
BACTERIA SPEC CULT: NORMAL
BACTERIA URNS QL MICRO: ABNORMAL /HPF
BILIRUB UR QL: NEGATIVE
CASTS URNS QL MICRO: ABNORMAL /LPF
COLOR UR: YELLOW
EPI CELLS #/AREA URNS HPF: ABNORMAL /HPF
GLUCOSE BLD STRIP.AUTO-MCNC: 134 MG/DL (ref 65–100)
GLUCOSE BLD STRIP.AUTO-MCNC: 136 MG/DL (ref 65–100)
GLUCOSE BLD STRIP.AUTO-MCNC: 162 MG/DL (ref 65–100)
GLUCOSE BLD STRIP.AUTO-MCNC: 224 MG/DL (ref 65–100)
GLUCOSE UR STRIP.AUTO-MCNC: NEGATIVE MG/DL
HGB UR QL STRIP: ABNORMAL
KETONES UR QL STRIP.AUTO: NEGATIVE MG/DL
LEUKOCYTE ESTERASE UR QL STRIP.AUTO: NEGATIVE
NITRITE UR QL STRIP.AUTO: NEGATIVE
OTHER OBSERVATIONS,UCOM: ABNORMAL
PH UR STRIP: 5.5 [PH] (ref 5–9)
PROT UR STRIP-MCNC: 300 MG/DL
RBC #/AREA URNS HPF: ABNORMAL /HPF
SERVICE CMNT-IMP: NORMAL
SP GR UR REFRACTOMETRY: 1.02 (ref 1–1.02)
UROBILINOGEN UR QL STRIP.AUTO: 1 EU/DL (ref 0.2–1)
WBC URNS QL MICRO: ABNORMAL /HPF

## 2021-01-18 PROCEDURE — 74011636637 HC RX REV CODE- 636/637: Performed by: FAMILY MEDICINE

## 2021-01-18 PROCEDURE — 74011250637 HC RX REV CODE- 250/637: Performed by: FAMILY MEDICINE

## 2021-01-18 PROCEDURE — 82962 GLUCOSE BLOOD TEST: CPT

## 2021-01-18 PROCEDURE — 97530 THERAPEUTIC ACTIVITIES: CPT

## 2021-01-18 PROCEDURE — 74011250637 HC RX REV CODE- 250/637: Performed by: HOSPITALIST

## 2021-01-18 PROCEDURE — 81001 URINALYSIS AUTO W/SCOPE: CPT

## 2021-01-18 PROCEDURE — 65270000029 HC RM PRIVATE

## 2021-01-18 PROCEDURE — 2709999900 HC NON-CHARGEABLE SUPPLY

## 2021-01-18 PROCEDURE — 74011250636 HC RX REV CODE- 250/636: Performed by: FAMILY MEDICINE

## 2021-01-18 RX ADMIN — TRAMADOL HYDROCHLORIDE 100 MG: 50 TABLET, COATED ORAL at 02:00

## 2021-01-18 RX ADMIN — HYDRALAZINE HYDROCHLORIDE 25 MG: 25 TABLET, FILM COATED ORAL at 17:17

## 2021-01-18 RX ADMIN — METOPROLOL SUCCINATE 50 MG: 50 TABLET, EXTENDED RELEASE ORAL at 08:29

## 2021-01-18 RX ADMIN — TRAMADOL HYDROCHLORIDE 100 MG: 50 TABLET, COATED ORAL at 21:25

## 2021-01-18 RX ADMIN — ASPIRIN 81 MG: 81 TABLET, CHEWABLE ORAL at 04:52

## 2021-01-18 RX ADMIN — Medication 10 ML: at 05:05

## 2021-01-18 RX ADMIN — VITAMIN D, TAB 1000IU (100/BT) 2 TABLET: 25 TAB at 08:29

## 2021-01-18 RX ADMIN — HYDRALAZINE HYDROCHLORIDE 25 MG: 25 TABLET, FILM COATED ORAL at 13:18

## 2021-01-18 RX ADMIN — ONDANSETRON 4 MG: 2 INJECTION INTRAMUSCULAR; INTRAVENOUS at 05:45

## 2021-01-18 RX ADMIN — ROSUVASTATIN CALCIUM 40 MG: 20 TABLET, COATED ORAL at 21:19

## 2021-01-18 RX ADMIN — CLOPIDOGREL BISULFATE 75 MG: 75 TABLET ORAL at 08:29

## 2021-01-18 RX ADMIN — INSULIN GLARGINE 13 UNITS: 100 INJECTION, SOLUTION SUBCUTANEOUS at 21:24

## 2021-01-18 RX ADMIN — TRAMADOL HYDROCHLORIDE 100 MG: 50 TABLET, COATED ORAL at 08:29

## 2021-01-18 RX ADMIN — ONDANSETRON 4 MG: 2 INJECTION INTRAMUSCULAR; INTRAVENOUS at 21:25

## 2021-01-18 RX ADMIN — ISOSORBIDE MONONITRATE 30 MG: 30 TABLET, EXTENDED RELEASE ORAL at 08:29

## 2021-01-18 RX ADMIN — HYDRALAZINE HYDROCHLORIDE 25 MG: 25 TABLET, FILM COATED ORAL at 21:20

## 2021-01-18 RX ADMIN — Medication 10 ML: at 13:36

## 2021-01-18 RX ADMIN — INSULIN LISPRO 6 UNITS: 100 INJECTION, SOLUTION INTRAVENOUS; SUBCUTANEOUS at 11:30

## 2021-01-18 RX ADMIN — ONDANSETRON 4 MG: 2 INJECTION INTRAMUSCULAR; INTRAVENOUS at 11:14

## 2021-01-18 RX ADMIN — HEPARIN SODIUM 5000 UNITS: 5000 INJECTION INTRAVENOUS; SUBCUTANEOUS at 21:21

## 2021-01-18 RX ADMIN — HEPARIN SODIUM 5000 UNITS: 5000 INJECTION INTRAVENOUS; SUBCUTANEOUS at 04:52

## 2021-01-18 RX ADMIN — CYANOCOBALAMIN TAB 1000 MCG 2500 MCG: 1000 TAB at 08:29

## 2021-01-18 RX ADMIN — INSULIN LISPRO 2 UNITS: 100 INJECTION, SOLUTION INTRAVENOUS; SUBCUTANEOUS at 21:23

## 2021-01-18 RX ADMIN — HEPARIN SODIUM 5000 UNITS: 5000 INJECTION INTRAVENOUS; SUBCUTANEOUS at 13:24

## 2021-01-18 RX ADMIN — HYDRALAZINE HYDROCHLORIDE 25 MG: 25 TABLET, FILM COATED ORAL at 08:29

## 2021-01-18 RX ADMIN — Medication 10 ML: at 21:25

## 2021-01-18 NOTE — PROGRESS NOTES
ACUTE PHYSICAL THERAPY GOALS:  (Developed with and agreed upon by patient and/or caregiver.)  (1.)Ms. Butch Oseguera will move from supine to sit and sit to supine , scoot up and down and roll side to side in bed with INDEPENDENT within 7 treatment day(s). (2.)Ms. Butch Oseguera will transfer from bed to chair and chair to bed with INDEPENDENT using the least restrictive device within 7 treatment day(s). (3.)Ms. Butch Oseguera will ambulate with INDEPENDENT for 150 feet with the least restrictive device within 7 treatment day(s). (4.)Ms. Butch Oseguera will tolerate at least 23 min of dynamic standing activity to assist standing ADLs with the least restrictive device within 7 treatment days    PHYSICAL THERAPY: Daily Note and PM Treatment Day # 3    Willette Barthel is a 65 y.o. female   PRIMARY DIAGNOSIS: Sepsis ([facility]  Sepsis ([facility] [facility] [A41.9]       ASSESSMENT:     REHAB RECOMMENDATIONS: CURRENT LEVEL OF FUNCTION:  (Most Recently Demonstrated)   Recommendation to date pending progress:  [address] [place] [address] Therapy  Equipment:    Rolling Walker Bed Mobility:   Standby Assistance  Sit to Stand:   Contact Guard Assistance  Transfers:   Contact Guard Assistance  Gait/Mobility:   Contact Guard Assistance     ASSESSMENT:  Ms. Butch Oseguera presents supine, on RA. Pt states nauseous, RN states has had medication for nausea. Pt 95% at rest in supine, SBA for bed mobility and sit to stand, SBA/CGA ambulating in room without assistive device. Pt with short shuffled steps, appears anxious regarding mobility. Pt ~30' ambulation, states needs to sit down, O2 94% after mobility. Pt demonstrated good static sitting balance, independent in returning to supine. Pt refused further activity states nauseous. Per chart pt to go home possibly tomorrow with HHPT. PT to cont to follow for acute care needs. SUBJECTIVE:   Ms. Butch Oseguera states, \"I am so nauseous. \"    SOCIAL HISTORY/ LIVING ENVIRONMENT:      OBJECTIVE:     PAIN: VITAL SIGNS: LINES/DRAINS:   Pre Treatment: Pain Screen  Pain Scale 1: Numeric (0 - 10)  Pain Intensity 1: 0  Post Treatment: 0 Vital Signs  O2 Sat (%): 96 %  O2 Device: Room air none  O2 Device: Room air     MOBILITY: I Mod I S SBA CGA Min Mod Max Total  NT x2 Comments:   Bed Mobility    Rolling [x] [] [] [] [] [] [] [] [] [] []    Supine to Sit [x] [] [] [] [] [] [] [] [] [] []    Scooting [x] [] [] [] [] [] [] [] [] [] []    Sit to Supine [x] [] [] [] [] [] [] [] [] [] []    Transfers    Sit to Stand [] [] [] [x] [] [] [] [] [] [] []    Bed to Chair [] [] [] [] [] [] [] [] [] [x] []    Stand to Sit [] [] [] [x] [] [] [] [] [] [] []    I=Independent, Mod I=Modified Independent, S=Supervision, SBA=Standby Assistance, CGA=Contact Guard Assistance,   Min=Minimal Assistance, Mod=Moderate Assistance, Max=Maximal Assistance, Total=Total Assistance, NT=Not Tested    GAIT: I Mod I S SBA CGA Min Mod Max Total  NT x2 Comments:   Level of Assistance [] [] [] [] [x] [] [] [] [] [] [] RA, O2 stats 94%   Distance 30    DME None    Gait Quality Shuffled, uneven step length    Weightbearing  Status N/A     I=Independent, Mod I=Modified Independent, S=Supervision, SBA=Standby Assistance, CGA=Contact Guard Assistance,   Min=Minimal Assistance, Mod=Moderate Assistance, Max=Maximal Assistance, Total=Total Assistance, NT=Not Tested    PLAN:   FREQUENCY/DURATION: PT Plan of Care: 3 times/week for duration of hospital stay or until stated goals are met, whichever comes first.  TREATMENT:     TREATMENT:   ($$ Therapeutic Activity: 23-37 mins    )  Therapeutic Activity (23 Minutes): Therapeutic activity included Supine to Sit, Sit to Supine, Transfer Training, Ambulation on level ground, Sitting balance  and Standing balance to improve functional Mobility, Strength and Activity tolerance.     AFTER TREATMENT POSITION/PRECAUTIONS:  Bed, Needs within reach and RN notified    INTERDISCIPLINARY COLLABORATION:  RN/PCT and PT/PTA    TOTAL TREATMENT DURATION:  PT Patient Time In/Time Out  Time In: 1513  Time Out: [address], PT

## 2021-01-18 NOTE — PROGRESS NOTES
Progress Note    Patient: Elder Record MRN: [id number]  SSN: [id number]    YOB: 1950  Age: 65 y.o. Sex: female      Admit Date: 1/12/2021    LOS: 6 days     Subjective:   F/U COVID    \"69 year old w/ a PMhx of CAD s/p stents x3, DMII, CKD stage 3 presented w/ cough, SOB and weakness in the past week. She also has been having fever at home and has been trying to cope with the symptoms. She has been having trouble laying flat to sleep since onset of symptoms. Also reported chest pain with cough. She went to get tested for COVID 1/12 and was positive. Denies any alleviating or exacerbating factors. In ED, Tmax 101 but VSS otherwise. CBC at baseline. CMP with Na 127, . Cr 2.47, ALT 68, AST 87. pBNP [id number], HS-trop 78. CXR w/o acute process. EKG shows prolonged QT and also diffuse T-wave inversions. \" On 1/13, had episode of Vtach and then asystolic for about 30 seconds. Code BLUE called. Chest compressions were initiated and epinephrine was given x 1 dose. Pt went into pulseless VTach and chest compressions were continued. Synchronized cardioversion was performed and patient converted into pulseless sinus bradycardia. Chest compressions were continued, and patient was given IV atropine, IV Calcium, IV Magnesium, along with a second dose of IV epinephrine. Pt converted to sinus tachycardia and ROSC was achieved at around 06:12. Cardiology following who did not think had STEMI. Recommended ECHO since may have underlying COVID myocarditis but will have to be done as outpatient with cardiology since currently with COVID. Do not think a candidate for convalescent plasma or remdesivir due to time frame of about 14 days of symptoms. Chest pain when she breathes in. Chest x ray with no obvious fractures. No hemoptysis. No SOB but hard to take a deep breathe since CODE blue. No other concerns. Satting well on RA. Tramadol helping with pain. 24 hr afebrile.     BP elevated likely from pain. Hydralazine ordered. No SOB.  Willing to go home with home health  Current Facility-Administered Medications   Medication Dose Route Frequency    levoFLOXacin (LEVAQUIN) tablet 750 mg  750 mg Oral Q48H    metoprolol succinate (TOPROL-XL) XL tablet 50 mg  50 mg Oral DAILY    hydrALAZINE (APRESOLINE) tablet 25 mg  25 mg Oral QID    labetaloL (NORMODYNE;TRANDATE) injection 5 mg  5 mg IntraVENous Q6H PRN    insulin glargine (LANTUS) injection 13 Units  13 Units SubCUTAneous QHS    traMADoL (ULTRAM) tablet 100 mg  100 mg Oral Q6H PRN    HYDROcodone-homatropine (HYCODAN) 5-1.5 mg/5 mL (5 mL) oral solution 5 mL  5 mL Oral Q4H PRN    isosorbide mononitrate ER (IMDUR) tablet 30 mg  30 mg Oral DAILY    [Held by provider] insulin lispro (HUMALOG) injection 10 Units  10 Units SubCUTAneous TIDAC    oxyCODONE IR (ROXICODONE) tablet 5 mg  5 mg Oral Q4H PRN    [Held by provider] dexamethasone (DECADRON) 10 mg/mL injection 6 mg  6 mg IntraVENous Q24H    morphine injection 1 mg  1 mg IntraVENous Q4H PRN    aspirin chewable tablet 81 mg  81 mg Oral 7am    cholecalciferol (VITAMIN D3) (1000 Units /25 mcg) tablet 2 Tab  2,000 Units Oral DAILY    clopidogreL (PLAVIX) tablet 75 mg  75 mg Oral DAILY    cyanocobalamin tablet 2,500 mcg  2,500 mcg Oral DAILY    nitroglycerin (NITROSTAT) tablet 0.4 mg  0.4 mg SubLINGual Q5MIN PRN    rosuvastatin (CRESTOR) tablet 40 mg  40 mg Oral QHS    sodium chloride (NS) flush 5-40 mL  5-40 mL IntraVENous Q8H    sodium chloride (NS) flush 5-40 mL  5-40 mL IntraVENous PRN    acetaminophen (TYLENOL) tablet 650 mg  650 mg Oral Q6H PRN    Or    acetaminophen (TYLENOL) suppository 650 mg  650 mg Rectal Q6H PRN    polyethylene glycol (MIRALAX) packet 17 g  17 g Oral DAILY PRN    ondansetron (ZOFRAN) injection 4 mg  4 mg IntraVENous Q6H PRN    heparin (porcine) injection 5,000 Units  5,000 Units SubCUTAneous Q8H    dextrose 40% (GLUTOSE) oral gel 1 Tube  15 g Oral PRN    dextrose (D50W) injection syrg 12.5-25 g  25-50 mL IntraVENous PRN    insulin lispro (HUMALOG) injection   SubCUTAneous AC&HS    guaiFENesin-dextromethorphan (ROBITUSSIN DM) 100-10 mg/5 mL syrup 5 mL  5 mL Oral Q6H PRN    glucagon (GLUCAGEN) injection 1 mg  1 mg IntraMUSCular PRN       Objective:     Vitals:    01/18/21 0410 01/18/21 0747 01/18/21 0753 01/18/21 1154   BP: (!) 176/88 (!) 184/81 (!) 185/83 (!) 176/79   Pulse: 75 73  75   Resp: 18 18 18   Temp: 98.5 °F (36.9 °C) 97.5 °F (36.4 °C)  97.4 °F (36.3 °C)   SpO2: 99% 98%  98%   Weight:       Height:             Intake and Output:  Current Shift: No intake/output data recorded. Last three shifts: 01/16 1901 - 01/18 0700  In: 240 [P.O.:240]  Out: -     Physical Exam:   General:  Alert, cooperative, tired appearing. Moving more in bed. Eyes:  Conjunctivae/corneas clear. Ears:  Normal TMs and external ear canals both ears. Nose: Nares normal. Septum midline. Mouth/Throat: Lips, mucosa, and tongue normal.   Neck:  no JVD. Back:   deferred   Lungs:   CTA bilaterally but limited breathe sounds. Coughing during deep inspiration   Heart:  Regular rate and rhythm, S1, S2 normal   Abdomen:   Soft, non-tender. Bowel sounds normal.    Extremities: Extremities normal, atraumatic, no cyanosis or edema. Pulses: 2+ and symmetric all extremities. Skin: Skin color, texture, turgor normal. No rashes or lesions   Lymph nodes: Cervical, supraclavicular, and axillary nodes normal.   Neurologic: CNII-XII intact.        Lab/Data Review:    Recent Results (from the past 24 hour(s))   GLUCOSE, POC    Collection Time: 01/17/21  3:38 PM   Result Value Ref Range    Glucose (POC) 213 (H) 65 - 100 mg/dL   GLUCOSE, POC    Collection Time: 01/17/21  8:55 PM   Result Value Ref Range    Glucose (POC) 136 (H) 65 - 100 mg/dL   GLUCOSE, POC    Collection Time: 01/18/21  7:31 AM   Result Value Ref Range    Glucose (POC) 136 (H) 65 - 100 mg/dL   GLUCOSE, POC    Collection Time: 01/18/21 11:16 AM   Result Value Ref Range    Glucose (POC) 224 (H) 65 - 100 mg/dL       Assessment/ Plan:     Principal Problem:    Sepsis ([address] (1/12/2021)    Active Problems:    Coronary artery disease involving native coronary artery of native heart without angina pectoris (9/4/2013)      Essential hypertension (9/15/2016)      Chronic kidney disease, stage III (moderate) ([facility]) (9/15/2016)      Major depressive disorder with single episode, in full remission ([address] (9/15/2016)      Primary insomnia (9/15/2016)      Gastroesophageal reflux disease (2/16/2017)      Type 2 diabetes mellitus with hyperglycemia, without long-term current use of insulin ([address] (10/11/2018)      Mixed hyperlipidemia (10/21/2020)      Hyponatremia (1/12/2021)      Acute renal failure superimposed on stage 3b chronic kidney disease ([address] (1/12/2021)      COVID-19 virus infection (1/12/2021)      Long QT interval (1/12/2021)      Lactic acidosis (1/12/2021)      Elevated LFTs (1/12/2021)      Normocytic anemia (1/12/2021)    Cardiac arrest with V tach - s/p ROSC on 1/13. Vitamin C and Zinc. Do not think a candidate for convalescent plasma or remdesivir due to time frame of about 14 days of symptoms. ECHO will need to be done as outpatient and cardiology would like to see as outpatient. Added Levofloxacin 1/17 to cover for possible bacterial pneumonia     HTN- Added back imdur. BB. Hydralazine. Hemoptysis - no further episodes. COVID - as above. dCHF EF 60% - compensated. DM type II -  A1C >10. Lantus 13 units at night, SS. Stopped decadron and now glucose levels have significantly dropped. ASA/plavix    prn oxycodone. Added PRN Tramadol. I have spoken to the daughter  on 1/18. Hopeful dc tomorrow. Will need 6 minute walk.     DVT prophylaxis - heparin  Signed By: Cassie Piper DO     January 18, 2021

## 2021-01-18 NOTE — PROGRESS NOTES
Problem: Falls - Risk of  Goal: *Absence of Falls  Description: Document Clydene Bone Fall Risk and appropriate interventions in the flowsheet. Outcome: Progressing Towards Goal  Note: Fall Risk Interventions:  Mobility Interventions: Patient to call before getting OOB, OT consult for ADLs, PT Consult for mobility concerns, PT Consult for assist device competence         Medication Interventions: Evaluate medications/consider consulting pharmacy, Patient to call before getting OOB, Teach patient to arise slowly    Elimination Interventions: Call light in reach, Patient to call for help with toileting needs, Stay With Me (per policy), Toilet paper/wipes in reach, Toileting schedule/hourly rounds    History of Falls Interventions: Evaluate medications/consider consulting pharmacy, Consult care management for discharge planning         Problem: Patient Education: Go to Patient Education Activity  Goal: Patient/Family Education  Outcome: Progressing Towards Goal     Problem: Diabetes Self-Management  Goal: *Disease process and treatment process  Description: Define diabetes and identify own type of diabetes; list 3 options for treating diabetes. Outcome: Progressing Towards Goal  Goal: *Incorporating nutritional management into lifestyle  Description: Describe effect of type, amount and timing of food on blood glucose; list 3 methods for planning meals. Outcome: Progressing Towards Goal  Goal: *Incorporating physical activity into lifestyle  Description: State effect of exercise on blood glucose levels. Outcome: Progressing Towards Goal  Goal: *Developing strategies to promote health/change behavior  Description: Define the ABC's of diabetes; identify appropriate screenings, schedule and personal plan for screenings. Outcome: Progressing Towards Goal  Goal: *Using medications safely  Description: State effect of diabetes medications on diabetes; name diabetes medication taking, action and side effects.   Outcome: Progressing Towards Goal  Goal: *Monitoring blood glucose, interpreting and using results  Description: Identify recommended blood glucose targets  and personal targets. Outcome: Progressing Towards Goal  Goal: *Prevention, detection, treatment of acute complications  Description: List symptoms of hyper- and hypoglycemia; describe how to treat low blood sugar and actions for lowering  high blood glucose level. Outcome: Progressing Towards Goal  Goal: *Prevention, detection and treatment of chronic complications  Description: Define the natural course of diabetes and describe the relationship of blood glucose levels to long term complications of diabetes.   Outcome: Progressing Towards Goal  Goal: *Developing strategies to address psychosocial issues  Description: Describe feelings about living with diabetes; identify support needed and support network  Outcome: Progressing Towards Goal  Goal: *Insulin pump training  Outcome: Progressing Towards Goal  Goal: *Sick day guidelines  Outcome: Progressing Towards Goal  Goal: *Patient Specific Goal (EDIT GOAL, INSERT TEXT)  Outcome: Progressing Towards Goal     Problem: Patient Education: Go to Patient Education Activity  Goal: Patient/Family Education  Outcome: Progressing Towards Goal     Problem: Patient Education: Go to Patient Education Activity  Goal: Patient/Family Education  Outcome: Progressing Towards Goal     Problem: Patient Education: Go to Patient Education Activity  Goal: Patient/Family Education  Outcome: Progressing Towards Goal

## 2021-01-18 NOTE — PROGRESS NOTES
Oxygen Qualifier       Room air: SpO2 with O2 and liter flow   Resting SpO2  95%     Ambulating SpO2  94%        Completed by:Patient unable to ambulate for required 6 minutes    Davion Munson, RT

## 2021-01-18 NOTE — PROGRESS NOTES
Attempted to see pt for OT treatment this PM. Per interdisciplinary collaboration with PT, pt felt nauseous and participated minimally during PT treatment session this PM. Will hold OT treatment at this time and follow up and attempt to see as schedule permits and as pt is willing to work with therapy.      Thank you,    BREANNE Pearson/TERRI

## 2021-01-18 NOTE — DIABETES MGMT
FBS: 136. Blood glucose range yesterday 137-213 with pt receiving a total 25 units (Lantus 13 units and Humalog 12 units). Steroids are being held by provider. Attempted to speak with pt regarding diabetes management. Pt states that they are not feeling well and would prefer to talk tomorrow.

## 2021-01-18 NOTE — PROGRESS NOTES
01/17/21 1957   Vitals   Temp 98.5 °F (36.9 °C)   Temp Source Oral   Pulse (Heart Rate) 73   Heart Rate Source Monitor   Resp Rate 18   O2 Sat (%) 94 %   Level of Consciousness Alert   BP (!) 190/79   MAP (Calculated) 116   BP 1 Location Left arm   BP 1 Method Automatic   BP Patient Position At rest   PRN labetalol given. Will continue to monitor.

## 2021-01-18 NOTE — PROGRESS NOTES
Chart screened by  for discharge planning. Patient to discharge home with [facility]. Patient is not requiring supplemental oxygen at this time. CM will continue to follow patient during hospitalization for discharge planning and needs. Please consult or notify  if any new issues arise.

## 2021-01-18 NOTE — PROGRESS NOTES
01/18/21 0604   Oxygen Therapy   O2 Device Room air   Hourly rounds completed this shift. All needs met at this time. Bed low/locked. Call light within reach. Will continue to monitor and give bedside report to oncoming nurse. Pt sating at 99%. PRN medication given for nausea x1 and pain. Labetalol given x1 for HTN.

## 2021-01-19 VITALS
HEART RATE: 86 BPM | WEIGHT: 144.1 LBS | HEIGHT: 64 IN | DIASTOLIC BLOOD PRESSURE: 91 MMHG | RESPIRATION RATE: 18 BRPM | SYSTOLIC BLOOD PRESSURE: 172 MMHG | BODY MASS INDEX: 24.6 KG/M2 | OXYGEN SATURATION: 98 % | TEMPERATURE: 98.3 F

## 2021-01-19 LAB — GLUCOSE BLD STRIP.AUTO-MCNC: 80 MG/DL (ref 65–100)

## 2021-01-19 PROCEDURE — 74011250637 HC RX REV CODE- 250/637: Performed by: FAMILY MEDICINE

## 2021-01-19 PROCEDURE — 82962 GLUCOSE BLOOD TEST: CPT

## 2021-01-19 PROCEDURE — 74011250637 HC RX REV CODE- 250/637: Performed by: HOSPITALIST

## 2021-01-19 PROCEDURE — 74011250636 HC RX REV CODE- 250/636: Performed by: FAMILY MEDICINE

## 2021-01-19 RX ORDER — INSULIN LISPRO 100 [IU]/ML
INJECTION, SOLUTION INTRAVENOUS; SUBCUTANEOUS
Qty: 2 PEN | Refills: 0 | Status: SHIPPED | OUTPATIENT
Start: 2021-01-19 | End: 2021-04-12 | Stop reason: SDUPTHER

## 2021-01-19 RX ORDER — TRAMADOL HYDROCHLORIDE 50 MG/1
100 TABLET ORAL
Qty: 20 TAB | Refills: 0 | Status: SHIPPED | OUTPATIENT
Start: 2021-01-19 | End: 2021-01-22

## 2021-01-19 RX ORDER — INSULIN GLARGINE 100 [IU]/ML
INJECTION, SOLUTION SUBCUTANEOUS
Qty: 1 PEN | Refills: 0 | Status: SHIPPED | OUTPATIENT
Start: 2021-01-19 | End: 2021-04-19

## 2021-01-19 RX ORDER — ALBUTEROL SULFATE 90 UG/1
1 AEROSOL, METERED RESPIRATORY (INHALATION)
Qty: 1 INHALER | Refills: 0 | Status: SHIPPED | OUTPATIENT
Start: 2021-01-19 | End: 2021-03-01

## 2021-01-19 RX ORDER — DOXYCYCLINE HYCLATE 100 MG
100 TABLET ORAL 2 TIMES DAILY
Qty: 10 TAB | Refills: 0 | Status: SHIPPED | OUTPATIENT
Start: 2021-01-19 | End: 2021-02-03 | Stop reason: ALTCHOICE

## 2021-01-19 RX ADMIN — HYDRALAZINE HYDROCHLORIDE 25 MG: 25 TABLET, FILM COATED ORAL at 08:23

## 2021-01-19 RX ADMIN — CYANOCOBALAMIN TAB 1000 MCG 2500 MCG: 1000 TAB at 08:23

## 2021-01-19 RX ADMIN — HEPARIN SODIUM 5000 UNITS: 5000 INJECTION INTRAVENOUS; SUBCUTANEOUS at 05:34

## 2021-01-19 RX ADMIN — METOPROLOL SUCCINATE 50 MG: 50 TABLET, EXTENDED RELEASE ORAL at 08:23

## 2021-01-19 RX ADMIN — TRAMADOL HYDROCHLORIDE 100 MG: 50 TABLET, COATED ORAL at 05:29

## 2021-01-19 RX ADMIN — VITAMIN D, TAB 1000IU (100/BT) 2 TABLET: 25 TAB at 08:23

## 2021-01-19 RX ADMIN — ASPIRIN 81 MG: 81 TABLET, CHEWABLE ORAL at 05:29

## 2021-01-19 RX ADMIN — Medication 10 ML: at 05:31

## 2021-01-19 RX ADMIN — ISOSORBIDE MONONITRATE 30 MG: 30 TABLET, EXTENDED RELEASE ORAL at 08:23

## 2021-01-19 RX ADMIN — CLOPIDOGREL BISULFATE 75 MG: 75 TABLET ORAL at 08:23

## 2021-01-19 NOTE — PROGRESS NOTES
Patient resting in bed and vaginal pain partially relived with pain medication. This nurse did not receive any orders from MD for patient vaginal pain after sending message. Telemetry on and working with NSR. Will prepare report for oncoming nurse.

## 2021-01-19 NOTE — PROGRESS NOTES
Discharge instructions reviewed with patient. All questions answered. Wheeled down to lobby by self.

## 2021-01-19 NOTE — DIABETES MGMT
FBS 80. Blood glucose range yesterday 134-224 with pt receiving a total 21 units of insulin (Lantus 13 units and Humalog 8 units). After confirming name and date of birth, reviewed current discharge insulin regimen: lLantus 8 units and Humalog sliding scale coverage 4x/day ac and hs, including type of insulin, timing with meals, onset, peak of action and duration of effect. Explained the importance of blood glucose monitoring and recommended qid and to record in log book to bring to PCP appointment for assistance with medication titration. Reviewed signs, symptoms and treatments for hyper/hypoglycemia. Stressed the importance of follow up care for diabetes management with PCP. Pt states that she has an appointment on January 21. Pt also has home health ordered. Pt has no further questions regarding diabetes management at this time.

## 2021-01-19 NOTE — PROGRESS NOTES
Assessment completed and patient having some chest pain from the CPR with pain level of 7 and this nurse will provide medication for pain management. Telemetry on and in working order with NSR on box [id number]. Patient on room air without any breathing difficulty noted. Safety measures in place and call light in reach.  Will continue to assess

## 2021-01-19 NOTE — PROGRESS NOTES
Patient complaining of having burning when urinating and this nurse messaged hospitalist (Dr. Iraj Hammer) to get something for patient burning sensation. UA resulted that patient had some bacteria in urine. This nurse awaiting for MD to respond with resolution.   Call light in reach and will continue to assess patient

## 2021-01-19 NOTE — PROGRESS NOTES
Care Management Interventions  Mode of Transport at Discharge: Self  Transition of Care Consult (CM Consult): Discharge Planning  Discharge Durable Medical Equipment: No  Physical Therapy Consult: Yes  Occupational Therapy Consult: Yes  Speech Therapy Consult: No  The Patient and/or Patient Representative was Provided with a Choice of Provider and Agrees with the Discharge Plan?: Yes  Name of the Patient Representative Who was Provided with a Choice of Provider and Agrees with the Discharge Plan: self  Freedom of Choice List was Provided with Basic Dialogue that Supports the Patient's Individualized Plan of Care/Goals, Treatment Preferences and Shares the Quality Data Associated with the Providers?: Yes  [place] Resource Information Provided?: No  Discharge Location  Discharge Placement: Home with home health    Patient to discharge home today with [facility]. Patient to transport home with family. All milestones for discharge have been met.

## 2021-01-19 NOTE — PROGRESS NOTES
Tramadol 100 mg po given for chest pain and zofran 4 mg IV given for nausea. Pain level is 7. Will assess medication effectiveness.

## 2021-01-19 NOTE — PROGRESS NOTES
Tramadol 100 mg po given for burning pain in vaginal area with a pain level of 7 and will assess effectiveness. Call light in reach.

## 2021-01-19 NOTE — DISCHARGE INSTRUCTIONS
Prevention steps for People with confirmed or suspected COVID-19 (including persons under investigation) who do not need to be hospitalized  and   People with confirmed COVID-19 who were hospitalized and determined to be medically stable to go home     Your healthcare provider and public health staff will evaluate whether you can be cared for at home. If it is determined that you do not need to be hospitalized and can be isolated at home, you will be monitored by staff from your local or state health department. You should follow the prevention steps below until a healthcare provider or local or state health department says you can return to your normal activities. Stay home except to get medical care  People who are mildly ill with COVID-19 are able to isolate at home during their illness. You should restrict activities outside your home, except for getting medical care. Do not go to work, school, or public areas. Avoid using public transportation, ride-sharing, or taxis. Separate yourself from other people and animals in your home  People: As much as possible, you should stay in a specific room and away from other people in your home. Also, you should use a separate bathroom, if available. Animals: You should restrict contact with pets and other animals while you are sick with COVID-19, just like you would around other people. Although there have not been reports of pets or other animals becoming sick with COVID-19, it is still recommended that people sick with COVID-19 limit contact with animals until more information is known about the virus. When possible, have another member of your household care for your animals while you are sick. If you are sick with COVID-19, avoid contact with your pet, including petting, snuggling, being kissed or licked, and sharing food.  If you must care for your pet or be around animals while you are sick, wash your hands before and after you interact with pets and wear a facemask. Call ahead before visiting your doctor  If you have a medical appointment, call the healthcare provider and tell them that you have or may have COVID-19. This will help the healthcare providers office take steps to keep other people from getting infected or exposed. Wear a facemask  You should wear a facemask when you are around other people (e.g., sharing a room or vehicle) or pets and before you enter a healthcare providers office. If you are not able to wear a facemask (for example, because it causes trouble breathing), then people who live with you should not stay in the same room with you, or they should wear a facemask if they enter your room. Cover your coughs and sneezes  Cover your mouth and nose with a tissue when you cough or sneeze. Throw used tissues in a lined trash can. Immediately wash your hands with soap and water for at least 20 seconds or, if soap and water are not available, clean your hands with an alcohol-based hand  that contains at least 60% alcohol. Clean your hands often  Wash your hands often with soap and water for at least 20 seconds, especially after blowing your nose, coughing, or sneezing; going to the bathroom; and before eating or preparing food. If soap and water are not readily available, use an alcohol-based hand  with at least 60% alcohol, covering all surfaces of your hands and rubbing them together until they feel dry. Soap and water are the best option if hands are visibly dirty. Avoid touching your eyes, nose, and mouth with unwashed hands. Avoid sharing personal household items  You should not share dishes, drinking glasses, cups, eating utensils, towels, or bedding with other people or pets in your home. After using these items, they should be washed thoroughly with soap and water.   Clean all high-touch surfaces everyday  High touch surfaces include counters, tabletops, doorknobs, bathroom fixtures, toilets, phones, keyboards, tablets, and bedside tables. Also, clean any surfaces that may have blood, stool, or body fluids on them. Use a household cleaning spray or wipe, according to the label instructions. Labels contain instructions for safe and effective use of the cleaning product including precautions you should take when applying the product, such as wearing gloves and making sure you have good ventilation during use of the product.  Monitor your symptoms  Seek prompt medical attention if your illness is worsening (e.g., difficulty breathing). Before seeking care, call your healthcare provider and tell them that you have, or are being evaluated for, COVID-19. Put on a facemask before you enter the facility. These steps will help the healthcare provider’s office to keep other people in the office or waiting room from getting infected or exposed. Ask your healthcare provider to call the local or state health department. Persons who are placed under active monitoring or facilitated self-monitoring should follow instructions provided by their local health department or occupational health professionals, as appropriate. When working with your local health department check their available hours.  If you have a medical emergency and need to call 911, notify the dispatch personnel that you have, or are being evaluated for COVID-19. If possible, put on a facemask before emergency medical services arrive.  Discontinuing home isolation  Patients with confirmed COVID-19 should remain under home isolation precautions until the risk of secondary transmission to others is thought to be low. The decision to discontinue home isolation precautions should be made on a case-by-case basis, in consultation with healthcare providers and state and local health departments.     If fevers, AMS, chest pain, please come back to the ED

## 2021-01-19 NOTE — DISCHARGE SUMMARY
Hospitalist Discharge Summary     Patient ID:  Luis Savage  [id number]  71 y.o.  1950  Admit date: 1/12/2021  4:31 PM  Discharge date and time: 1/19/2021  Attending: Bal Edwards DO  PCP:  Ronan Inman MD  Treatment Team: Attending Provider: Bal Edwards DO; Consulting Provider: Jackson Morales NP; Utilization Review: Hans Lara; Care Manager: Vangie Hernandez RN; Primary Nurse: Grant Woods RN; Occupational Therapist: Mel Horn    Principal Diagnosis Sepsis [facility])   Principal Problem:    Sepsis ([facility] [address] (1/12/2021)    Active Problems:    Coronary artery disease involving native coronary artery of native heart without angina pectoris (9/4/2013)      Essential hypertension (9/15/2016)      Chronic kidney disease, stage III (moderate) ([address] (9/15/2016)      Major depressive disorder with single episode, in full remission ([address] (9/15/2016)      Primary insomnia (9/15/2016)      Gastroesophageal reflux disease (2/16/2017)      Type 2 diabetes mellitus with hyperglycemia, without long-term current use of insulin ([address] (10/11/2018)      Mixed hyperlipidemia (10/21/2020)      Hyponatremia (1/12/2021)      Acute renal failure superimposed on stage 3b chronic kidney disease ([address] (1/12/2021)      COVID-19 virus infection (1/12/2021)      Long QT interval (1/12/2021)      Lactic acidosis (1/12/2021)      Elevated LFTs (1/12/2021)      Normocytic anemia (1/12/2021)      HCAP (healthcare-associated pneumonia) (1/18/2021)     Hospital Course: \"69 year old w/ a PMhx of CAD s/p stents x3, DMII, CKD stage 3 presented w/ cough, SOB and weakness in the past week. She also has been having fever at home and has been trying to cope with the symptoms. She has been having trouble laying flat to sleep since onset of symptoms. Also reported chest pain with cough. She went to get tested for COVID 1/12 and was positive. Denies any alleviating or exacerbating factors.  In ED, Tmax 101 but VSS otherwise. CBC at baseline. CMP with Na 127, . Cr 2.47, ALT 68, AST 87. pBNP [id number], HS-trop 78. CXR w/o acute process. EKG shows prolonged QT and also diffuse T-wave inversions. \" On 1/13, had episode of Vtach and then asystolic for about 30 seconds. Code BLUE called. Chest compressions were initiated and epinephrine was given x 1 dose. Pt went into pulseless VTach and chest compressions were continued. Synchronized cardioversion was performed and patient converted into pulseless sinus bradycardia. Chest compressions were continued, and patient was given IV atropine, IV Calcium, IV Magnesium, along with a second dose of IV epinephrine. Pt converted to sinus tachycardia and ROSC was achieved at around 06:12. Cardiology following who did not think had STEMI. Recommended ECHO since may have underlying COVID myocarditis but will have to be done as outpatient with cardiology since currently with COVID. Follow up with cardiology in two weeks. No other episodes of arrhthymia during stay.      Do not think a candidate for convalescent plasma or remdesivir due to time frame of about 14 days of symptoms. Respiratory therapy has ambulated and no need for supplemental oxygen.      Chest pain when she breathes in. Chest x ray with no obvious fractures. No hemoptysis. No SOB but hard to take a deep breathe since CODE blue. No other concerns. Satting well on RA. Tramadol helping with pain. 24 hr afebrile. Have written for Levofloxacin but could not tolerate due to nausea so changed to doxycycline BID for the next 5 days in case of bacterial pneumonia from poor inspiratory effort after CPR. Stated to perform breathing exercises at home. Glucose also uncontrolled. A1C 10.2. Stop glipizide and start Lantus 8 units with SS. Check glucose before meals and at night. Keep a log for PCP. Also concerned of pain during urination. UA benign.  When evaluating genital area, she has a right sided fusion of her labia minora to her labia majora. I am thinking her pain during urination is due to this fusion and will need to discuss with PCP if would benefit from lubrication creams. Denies increased urinary frequency, no fevers, and no odor to urine. Holding lasix and K supplement until sees PCP. Has not been eating/drinking like she normally does so want to avoid too much volume depletion. Self quarantine until 1/23/21    Advance Care Planning  People with COVID-19 may have no symptoms, mild symptoms, such as fever, cough, and shortness of breath or they may have more severe illness, developing severe and fatal pneumonia. As a result, Advance Care Planning with attention to naming a health care decision maker (someone you trust to make healthcare decisions for you if you could not speak for yourself) and sharing other health care preferences is important BEFORE a possible health crisis. Please contact your Primary Care Provider to discuss Advance Care Planning. Preventing the Spread of Coronavirus Disease 2019 in Homes and Residential Communities  For the most recent information go to [email or web address]    Prevention steps for People with confirmed or suspected COVID-19 (including persons under investigation) who do not need to be hospitalized  and   People with confirmed COVID-19 who were hospitalized and determined to be medically stable to go home    Your healthcare provider and public health staff will evaluate whether you can be cared for at home. If it is determined that you do not need to be hospitalized and can be isolated at home, you will be monitored by staff from your local or state health department. You should follow the prevention steps below until a healthcare provider or local or state health department says you can return to your normal activities.   Stay home except to get medical care  People who are mildly ill with COVID-19 are able to isolate at home during their illness. You should restrict activities outside your home, except for getting medical care. Do not go to work, school, or public areas. Avoid using public transportation, ride-sharing, or taxis. Separate yourself from other people and animals in your home  People: As much as possible, you should stay in a specific room and away from other people in your home. Also, you should use a separate bathroom, if available. Animals: You should restrict contact with pets and other animals while you are sick with COVID-19, just like you would around other people. Although there have not been reports of pets or other animals becoming sick with COVID-19, it is still recommended that people sick with COVID-19 limit contact with animals until more information is known about the virus. When possible, have another member of your household care for your animals while you are sick. If you are sick with COVID-19, avoid contact with your pet, including petting, snuggling, being kissed or licked, and sharing food. If you must care for your pet or be around animals while you are sick, wash your hands before and after you interact with pets and wear a facemask. Call ahead before visiting your doctor  If you have a medical appointment, call the healthcare provider and tell them that you have or may have COVID-19. This will help the healthcare providers office take steps to keep other people from getting infected or exposed. Wear a facemask  You should wear a facemask when you are around other people (e.g., sharing a room or vehicle) or pets and before you enter a healthcare providers office. If you are not able to wear a facemask (for example, because it causes trouble breathing), then people who live with you should not stay in the same room with you, or they should wear a facemask if they enter your room. Cover your coughs and sneezes  Cover your mouth and nose with a tissue when you cough or sneeze.  Throw used tissues in a lined trash can. Immediately wash your hands with soap and water for at least 20 seconds or, if soap and water are not available, clean your hands with an alcohol-based hand  that contains at least 60% alcohol. Clean your hands often  Wash your hands often with soap and water for at least 20 seconds, especially after blowing your nose, coughing, or sneezing; going to the bathroom; and before eating or preparing food. If soap and water are not readily available, use an alcohol-based hand  with at least 60% alcohol, covering all surfaces of your hands and rubbing them together until they feel dry. Soap and water are the best option if hands are visibly dirty. Avoid touching your eyes, nose, and mouth with unwashed hands. Avoid sharing personal household items  You should not share dishes, drinking glasses, cups, eating utensils, towels, or bedding with other people or pets in your home. After using these items, they should be washed thoroughly with soap and water. Clean all high-touch surfaces everyday  High touch surfaces include counters, tabletops, doorknobs, bathroom fixtures, toilets, phones, keyboards, tablets, and bedside tables. Also, clean any surfaces that may have blood, stool, or body fluids on them. Use a household cleaning spray or wipe, according to the label instructions. Labels contain instructions for safe and effective use of the cleaning product including precautions you should take when applying the product, such as wearing gloves and making sure you have good ventilation during use of the product. Monitor your symptoms  Seek prompt medical attention if your illness is worsening (e.g., difficulty breathing). Before seeking care, call your healthcare provider and tell them that you have, or are being evaluated for, COVID-19. Put on a facemask before you enter the facility.  These steps will help the healthcare providers office to keep other people in the office or waiting room from getting infected or exposed. Ask your healthcare provider to call the local or state health department. Persons who are placed under active monitoring or facilitated self-monitoring should follow instructions provided by their local health department or occupational health professionals, as appropriate. When working with your local health department check their available hours. If you have a medical emergency and need to call 911, notify the dispatch personnel that you have, or are being evaluated for COVID-19. If possible, put on a facemask before emergency medical services arrive. Discontinuing home isolation  Patients with confirmed COVID-19 should remain under home isolation precautions until the risk of secondary transmission to others is thought to be low. The decision to discontinue home isolation precautions should be made on a case-by-case basis, in consultation with healthcare providers and state and local health departments. If fevers, AMS, chest pain, please come back to the ED    Please refer to the admission H&P for details of presentation. In summary, the patient is stable for discharge. Significant Diagnostic Studies:       Labs: Results:       Chemistry Recent Labs     01/17/21 0519   *      K 3.8      CO2 24   BUN 26*   CREA 2.05*   CA 8.8   AGAP 9      CBC w/Diff Recent Labs     01/17/21 0519   WBC 7.1   RBC 3.98*   HGB 11.5*   HCT 35.1*      GRANS 81*   LYMPH 9*   EOS 0*      Cardiac Enzymes No results for input(s): CPK, CKND1, BALDOMERO in the last 72 hours. No lab exists for component: CKRMB, TROIP   Coagulation No results for input(s): PTP, INR, APTT, INREXT, INREXT in the last 72 hours.     Lipid Panel Lab Results   Component Value Date/Time    Cholesterol, total 95 08/12/2020 05:24 AM    HDL Cholesterol 34 (L) 08/12/2020 05:24 AM    LDL, calculated 45.4 08/12/2020 05:24 AM    VLDL, calculated 15.6 08/12/2020 05:24 AM    Triglyceride 78 08/12/2020 05:24 AM    CHOL/HDL Ratio 2.8 08/12/2020 05:24 AM      BNP No results for input(s): BNPP in the last 72 hours. Liver Enzymes No results for input(s): TP, ALB, TBIL, AP in the last 72 hours. No lab exists for component: SGOT, GPT, DBIL   Thyroid Studies Lab Results   Component Value Date/Time    TSH 2.060 07/16/2020 01:50 PM            Discharge Exam:  Visit Vitals  BP (!) 172/91   Pulse 86   Temp 98.3 °F (36.8 °C)   Resp 18   Ht 5' 4\" (1.626 m)   Wt 65.4 kg (144 lb 1.6 oz)   SpO2 98%   BMI 24.73 kg/m²     General appearance: alert, cooperative, no distress  Lungs: clear to auscultation bilaterally, no cough today   Heart: regular rate and rhythm, S1, S2 normal, no murmur  Abdomen: soft, non-tender. Bowel sounds normal.   Extremities: no cyanosis or edema  Neurologic: Grossly normal   - fusion of right labia minora to right labia majora. No erythema or bleeding noted. Disposition:Home health   Discharge Condition: stable  Patient Instructions: As above   Current Discharge Medication List      START taking these medications    Details   insulin glargine (LANTUS,BASAGLAR) 100 unit/mL (3 mL) inpn 8 units at night  Qty: 1 Pen, Refills: 0      insulin lispro (HUMALOG) 100 unit/mL kwikpen Less than 150 =   0 units           150 -199 =   3 units  200 -249 =   6 units  250 -299 =   9 units  300 -349 =   12 units  Before meals and at night  Qty: 2 Pen, Refills: 0      albuterol (PROVENTIL HFA, VENTOLIN HFA, PROAIR HFA) 90 mcg/actuation inhaler Take 1 Puff by inhalation every six (6) hours as needed for Wheezing, Shortness of Breath or Cough. Qty: 1 Inhaler, Refills: 0      doxycycline (VIBRA-TABS) 100 mg tablet Take 1 Tab by mouth two (2) times a day. Qty: 10 Tab, Refills: 0      traMADoL (ULTRAM) 50 mg tablet Take 2 Tabs by mouth every six (6) hours as needed for Pain for up to 3 days. Max Daily Amount: 400 mg.   Qty: 20 Tab, Refills: 0    Associated Diagnoses: Pain         CONTINUE these medications which have NOT CHANGED    Details   citalopram (CELEXA) 20 mg tablet TAKE 1 TABLET BY MOUTH IN  THE MORNING  Qty: 90 Tab, Refills: 0    Comments: Requesting 1 year supply  Associated Diagnoses: Major depressive disorder with single episode, in full remission ([facility] [address]      traZODone (DESYREL) 50 mg tablet TAKE 1 TABLET BY MOUTH AT  NIGHT  Qty: 90 Tab, Refills: 0    Comments: Requesting 1 year supply  Associated Diagnoses: Primary insomnia      !! NIFEdipine ER (PROCARDIA XL) 30 mg ER tablet TAKE 1 TABLET BY MOUTH DAILY  Qty: 90 Tab, Refills: 0    Comments: **Patient requests 90 days supply**      isosorbide mononitrate ER (IMDUR) 30 mg tablet Take 1 Tab by mouth every morning. Qty: 30 Tab, Refills: 5      metoprolol succinate (TOPROL-XL) 50 mg XL tablet Take 1 Tab by mouth daily. Qty: 30 Tab, Refills: 5      !! NIFEdipine ER (PROCARDIA XL) 60 mg ER tablet TAKE 1 TABLET BY MOUTH  DAILY  Qty: 90 Tab, Refills: 1    Associated Diagnoses: Essential hypertension      rosuvastatin (CRESTOR) 40 mg tablet Take 1 Tab by mouth nightly. Qty: 80 Tab, Refills: 1    Associated Diagnoses: Coronary artery disease involving native coronary artery of native heart without angina pectoris; Mixed hyperlipidemia      clopidogreL (PLAVIX) 75 mg tab TAKE 1 TABLET BY MOUTH  DAILY  Qty: 90 Tab, Refills: 2      nitroglycerin (NITROSTAT) 0.4 mg SL tablet 1 Tab by SubLINGual route every five (5) minutes as needed for Chest Pain. Qty: 3 Bottle, Refills: 3      magnesium oxide (MAG-OX) 400 mg tablet TAKE 1 TABLET BY MOUTH TWICE DAILY  Qty: 180 Tab, Refills: 3    Associated Diagnoses: Hypomagnesemia      cholecalciferol, vitamin D3, (VITAMIN D3) 2,000 unit tab Take 2,000 Units by mouth daily. cyanocobalamin (VITAMIN B-12) 1,000 mcg tablet Take 2,500 mcg by mouth daily. aspirin 81 mg chewable tablet Take 81 mg by mouth every morning. Indications: continue per anesthesia guidelines       !! - Potential duplicate medications found.  Please discuss with provider. STOP taking these medications       glipiZIDE (GLUCOTROL) 5 mg tablet Comments:   Reason for Stopping:         furosemide (LASIX) 40 mg tablet Comments:   Reason for Stopping:         potassium chloride SA (K-DUR, KLOR-CON) 20 mEq tablet Comments:   Reason for Stopping:               Activity:Up and izabela   Diet:ADA  Wound Care:None     Follow-up PCP in ten days. Cardiology in two weeks.    ·     Time spent to discharge patient 35 minutes  Signed:  Shaggy Verma DO  1/19/2021  8:34 AM

## 2021-02-09 ENCOUNTER — HOSPITAL ENCOUNTER (EMERGENCY)
Age: 71
Discharge: HOME | End: 2021-02-09
Attending: EMERGENCY MEDICINE
Payer: MEDICARE

## 2021-02-09 ENCOUNTER — APPOINTMENT (OUTPATIENT)
Dept: GENERAL RADIOLOGY | Age: 71
End: 2021-02-09
Attending: EMERGENCY MEDICINE
Payer: MEDICARE

## 2021-02-09 VITALS
BODY MASS INDEX: 26.29 KG/M2 | HEART RATE: 87 BPM | DIASTOLIC BLOOD PRESSURE: 65 MMHG | HEIGHT: 64 IN | WEIGHT: 154 LBS | SYSTOLIC BLOOD PRESSURE: 147 MMHG | OXYGEN SATURATION: 92 % | TEMPERATURE: 98.1 F | RESPIRATION RATE: 16 BRPM

## 2021-02-09 DIAGNOSIS — I50.9 ACUTE ON CHRONIC CONGESTIVE HEART FAILURE, UNSPECIFIED HEART FAILURE TYPE (HCC): Primary | ICD-10-CM

## 2021-02-09 LAB
ALBUMIN SERPL-MCNC: 2.9 G/DL (ref 3.2–4.6)
ALBUMIN/GLOB SERPL: 0.5 {RATIO} (ref 1.2–3.5)
ALP SERPL-CCNC: 389 U/L (ref 50–136)
ALT SERPL-CCNC: 75 U/L (ref 12–65)
ANION GAP SERPL CALC-SCNC: 8 MMOL/L (ref 7–16)
AST SERPL-CCNC: 77 U/L (ref 15–37)
BASOPHILS # BLD: 0 K/UL (ref 0–0.2)
BASOPHILS NFR BLD: 1 % (ref 0–2)
BILIRUB SERPL-MCNC: 0.5 MG/DL (ref 0.2–1.1)
BNP SERPL-MCNC: 4709 PG/ML (ref 5–125)
BUN SERPL-MCNC: 17 MG/DL (ref 8–23)
CALCIUM SERPL-MCNC: 9.1 MG/DL (ref 8.3–10.4)
CHLORIDE SERPL-SCNC: 107 MMOL/L (ref 98–107)
CO2 SERPL-SCNC: 26 MMOL/L (ref 21–32)
CREAT SERPL-MCNC: 1.84 MG/DL (ref 0.6–1)
DIFFERENTIAL METHOD BLD: ABNORMAL
EOSINOPHIL # BLD: 0.2 K/UL (ref 0–0.8)
EOSINOPHIL NFR BLD: 3 % (ref 0.5–7.8)
ERYTHROCYTE [DISTWIDTH] IN BLOOD BY AUTOMATED COUNT: 16.8 % (ref 11.9–14.6)
GLOBULIN SER CALC-MCNC: 5.5 G/DL (ref 2.3–3.5)
GLUCOSE SERPL-MCNC: 91 MG/DL (ref 65–100)
HCT VFR BLD AUTO: 32 % (ref 35.8–46.3)
HGB BLD-MCNC: 9.9 G/DL (ref 11.7–15.4)
IMM GRANULOCYTES # BLD AUTO: 0 K/UL (ref 0–0.5)
IMM GRANULOCYTES NFR BLD AUTO: 0 % (ref 0–5)
LYMPHOCYTES # BLD: 1.1 K/UL (ref 0.5–4.6)
LYMPHOCYTES NFR BLD: 18 % (ref 13–44)
MCH RBC QN AUTO: 28.5 PG (ref 26.1–32.9)
MCHC RBC AUTO-ENTMCNC: 30.9 G/DL (ref 31.4–35)
MCV RBC AUTO: 92.2 FL (ref 79.6–97.8)
MONOCYTES # BLD: 0.8 K/UL (ref 0.1–1.3)
MONOCYTES NFR BLD: 13 % (ref 4–12)
NEUTS SEG # BLD: 4.2 K/UL (ref 1.7–8.2)
NEUTS SEG NFR BLD: 65 % (ref 43–78)
NRBC # BLD: 0 K/UL (ref 0–0.2)
PLATELET # BLD AUTO: 411 K/UL (ref 150–450)
PMV BLD AUTO: 9.3 FL (ref 9.4–12.3)
POTASSIUM SERPL-SCNC: 4 MMOL/L (ref 3.5–5.1)
PROT SERPL-MCNC: 8.4 G/DL (ref 6.3–8.2)
RBC # BLD AUTO: 3.47 M/UL (ref 4.05–5.2)
SODIUM SERPL-SCNC: 141 MMOL/L (ref 136–145)
TROPONIN-HIGH SENSITIVITY: 43.7 PG/ML (ref 0–14)
TROPONIN-HIGH SENSITIVITY: 46.6 PG/ML (ref 0–14)
WBC # BLD AUTO: 6.4 K/UL (ref 4.3–11.1)

## 2021-02-09 PROCEDURE — 93005 ELECTROCARDIOGRAM TRACING: CPT | Performed by: EMERGENCY MEDICINE

## 2021-02-09 PROCEDURE — 85025 COMPLETE CBC W/AUTO DIFF WBC: CPT

## 2021-02-09 PROCEDURE — 71046 X-RAY EXAM CHEST 2 VIEWS: CPT

## 2021-02-09 PROCEDURE — 84484 ASSAY OF TROPONIN QUANT: CPT

## 2021-02-09 PROCEDURE — 96374 THER/PROPH/DIAG INJ IV PUSH: CPT

## 2021-02-09 PROCEDURE — 80053 COMPREHEN METABOLIC PANEL: CPT

## 2021-02-09 PROCEDURE — 99283 EMERGENCY DEPT VISIT LOW MDM: CPT

## 2021-02-09 PROCEDURE — 74011250636 HC RX REV CODE- 250/636: Performed by: EMERGENCY MEDICINE

## 2021-02-09 PROCEDURE — 83880 ASSAY OF NATRIURETIC PEPTIDE: CPT

## 2021-02-09 RX ORDER — FUROSEMIDE 10 MG/ML
40 INJECTION INTRAMUSCULAR; INTRAVENOUS
Status: COMPLETED | OUTPATIENT
Start: 2021-02-09 | End: 2021-02-09

## 2021-02-09 RX ORDER — POTASSIUM CHLORIDE 1500 MG/1
20 TABLET, FILM COATED, EXTENDED RELEASE ORAL DAILY
Qty: 5 TAB | Refills: 0 | Status: ON HOLD | OUTPATIENT
Start: 2021-02-09 | End: 2021-02-24

## 2021-02-09 RX ADMIN — FUROSEMIDE 40 MG: 10 INJECTION, SOLUTION INTRAMUSCULAR; INTRAVENOUS at 22:38

## 2021-02-10 LAB
ATRIAL RATE: 81 BPM
CALCULATED P AXIS, ECG09: 74 DEGREES
CALCULATED R AXIS, ECG10: 97 DEGREES
CALCULATED T AXIS, ECG11: 48 DEGREES
DIAGNOSIS, 93000: NORMAL
P-R INTERVAL, ECG05: 124 MS
Q-T INTERVAL, ECG07: 416 MS
QRS DURATION, ECG06: 92 MS
QTC CALCULATION (BEZET), ECG08: 483 MS
VENTRICULAR RATE, ECG03: 81 BPM

## 2021-02-10 NOTE — ED NOTES
I have reviewed discharge instructions with the patient. The patient verbalized understanding. Patient left ED via Discharge Method: ambulatory to Home with (family). Opportunity for questions and clarification provided. Patient given 1 scripts. To continue your aftercare when you leave the hospital, you may receive an automated call from our care team to check in on how you are doing. This is a free service and part of our promise to provide the best care and service to meet your aftercare needs.  If you have questions, or wish to unsubscribe from this service please call [phone]. Thank you for Choosing our [facility] Emergency Department.

## 2021-02-10 NOTE — ED TRIAGE NOTES
Patient to triage in wheelchair with mask in place. Patient states that she has had shortness of breath x1 week. States that she has gained 10lbs in one week.  States that cardiologist prescribed lasix again which had been discontinued by hosptial. Patient also states that she has midsternal chest pain worsened with inspiration

## 2021-02-10 NOTE — ED PROVIDER NOTES
Patient states that she has had shortness of breath x1 week. States that she has gained 10lbs in one week. States that cardiologist prescribed lasix again which had been discontinued by hosptial. Patient also states that she has midsternal chest pain worsened with inspiration. Patient recently hospitalized for Covid infection. The history is provided by the patient and medical records. Shortness of Breath  This is a new problem. The average episode lasts 1 week. The problem occurs continuously. The current episode started more than 2 days ago. The problem has been gradually worsening. Associated symptoms include orthopnea. Pertinent negatives include no fever, no headaches, no coryza, no rhinorrhea, no sore throat, no cough, no sputum production, no hemoptysis, no wheezing, no PND, no chest pain, no syncope, no leg pain, no leg swelling and no claudication. It is unknown what precipitated the problem. Treatments tried: lasix 20mg qd. The treatment provided no relief. She has had prior hospitalizations. She has had no prior ED visits. She has had no prior ICU admissions. Associated medical issues include heart failure. Past Medical History:   Diagnosis Date    CAD (coronary artery disease) 3/1/2012    MI, 3 stents    Chest pain     Coronary artery disease involving native coronary artery without angina pectoris 5/21/2015    Depression 3/1/2012    Diabetes mellitus ([facility] [address] 3/1/2012    oral agents. . hypo- 80's, Last A1c 6.9 in 6/2018    DM2 (diabetes mellitus, type 2) ([facility] [address] 5/21/2015    Elevated troponin 3/2/2012    Essential hypertension 5/21/2015    External hemorrhoids without mention of complication 9641    GERD (gastroesophageal reflux disease)     History of MI (myocardial infarction) 11/12    x2    Hypercholesterolemia     Hypertension 3/1/2012    Insomnia     Personal history of colonic polyps 2013    hyperplastic    Platelet inhibition due to Plavix     holding for colonoscopy per GI Dr. Crissy Cardenas 2013    Tobacco abuse 3/1/2012    quit for 1 year    Tobacco use disorder     Unstable angina ([facility] [address] 3/1/2012    ntg as needed.         Past Surgical History:   Procedure Laterality Date    HX CAROTID ENDARTERECTOMY Left 2018    HX CATARACT REMOVAL Left 2016    Dr Mary Dalal, [address] [address] Right 2016    Dr Mary Dalal, [facility]    HX CERVICAL FUSION      neck w/plate    HX COLONOSCOPY  13    Anna Marie--single transverse hyperplastic polyp--7-10 year recall    HX HEMORRHOIDECTOMY      x2    HX ORTHOPAEDIC      left elbow    HX CAL AND BSO      HX WISDOM TEETH EXTRACTION      [place] LEFT HEART CATH,PERCUTANEOUS  last stented 11/12 x 2    stent x3 , mi x 2         Family History:   Problem Relation Age of Onset    Heart Disease Mother     Osteoporosis Mother     Heart Attack Mother 67        mi    Thyroid Disease Mother         Multinodular Goiter    Heart Disease Father     Cancer Father         pancreatic    Heart Attack Father 67        MI    Cancer Sister         Pre-Cancerous dysplasia    Thyroid Cancer Sister     Ulcerative Colitis Brother     Thyroid Cancer Brother     Breast Cancer Neg Hx        Social History     Socioeconomic History    Marital status:      Spouse name: Not on file    Number of children: Not on file    Years of education: Not on file    Highest education level: Not on file   Occupational History    Not on file   Social Needs    Financial resource strain: Not on file    Food insecurity     Worry: Not on file     Inability: Not on file    Transportation needs     Medical: Not on file     Non-medical: Not on file   Tobacco Use    Smoking status: Former Smoker     Packs/day: 1.00     Years: 40.00     Pack years: 40.00     Quit date: 2012     Years since quittin.2    Smokeless tobacco: Never Used    Tobacco comment: quit  . has stopped prior also   Substance and Sexual Activity    Alcohol use: No     Frequency: Never    Drug use: No    Sexual activity: Not on file   Lifestyle    Physical activity     Days per week: Not on file     Minutes per session: Not on file    Stress: Not on file   Relationships    Social connections     Talks on phone: Not on file     Gets together: Not on file     Attends Jewish service: Not on file     Active member of club or organization: Not on file     Attends meetings of clubs or organizations: Not on file     Relationship status: Not on file    Intimate partner violence     Fear of current or ex partner: Not on file     Emotionally abused: Not on file     Physically abused: Not on file     Forced sexual activity: Not on file   Other Topics Concern    Not on file   Social History Narrative    Not on file         ALLERGIES: Cephalosporins, Sulfamethoxazole-trimethoprim, Codeine, and Lisinopril    Review of Systems   Constitutional: Negative for fever. HENT: Negative for rhinorrhea and sore throat. Respiratory: Positive for shortness of breath. Negative for cough, hemoptysis, sputum production and wheezing. Cardiovascular: Positive for orthopnea. Negative for chest pain, claudication, leg swelling, syncope and PND. Neurological: Negative for headaches. All other systems reviewed and are negative. Vitals:    02/09/21 1930 02/09/21 2238 02/09/21 2349 02/09/21 2350   BP:   (!) 147/65    Pulse:  96  87   Resp:    16   Temp:       SpO2:   92%    Weight: 69.9 kg (154 lb)      Height: 5' 4\" (1.626 m)               Physical Exam  Vitals signs and nursing note reviewed. Constitutional:       General: She is not in acute distress. Appearance: Normal appearance. She is not ill-appearing, toxic-appearing or diaphoretic. HENT:      Head: Normocephalic and atraumatic. Mouth/Throat:      Mouth: Mucous membranes are moist.      Pharynx: No oropharyngeal exudate or posterior oropharyngeal erythema.    Eyes:      Extraocular Movements: Extraocular movements intact. Conjunctiva/sclera: Conjunctivae normal.      Pupils: Pupils are equal, round, and reactive to light. Neck:      Musculoskeletal: Normal range of motion and neck supple. Vascular: No carotid bruit. Comments: No JVD noted  Cardiovascular:      Rate and Rhythm: Normal rate and regular rhythm. Pulses: Normal pulses. Pulmonary:      Effort: Pulmonary effort is normal.      Breath sounds: Normal breath sounds. No wheezing or rhonchi. Chest:      Chest wall: No tenderness. Abdominal:      General: There is no distension. Tenderness: There is no abdominal tenderness. There is no guarding or rebound. Musculoskeletal:      Right lower leg: Edema present. Left lower leg: Edema present. Skin:     Capillary Refill: Capillary refill takes less than 2 seconds. Neurological:      General: No focal deficit present. Mental Status: She is alert and oriented to person, place, and time. Mental status is at baseline. Psychiatric:         Mood and Affect: Mood normal.         Behavior: Behavior normal.         Thought Content: Thought content normal.          MDM  Number of Diagnoses or Management Options  Acute on chronic congestive heart failure, unspecified heart failure type [facility])  Diagnosis management comments: Patient symptomatology consistent with exacerbation of CHF. Chest x-ray and blood work reinforced this diagnosis. Patient was maintaining adequate oxygen saturations on room air at rest and was ambulated at the bedside with no drop in oxygen saturation.        Amount and/or Complexity of Data Reviewed  Clinical lab tests: ordered and reviewed  Tests in the radiology section of CPT®: ordered and reviewed  Review and summarize past medical records: yes  Independent visualization of images, tracings, or specimens: yes    Risk of Complications, Morbidity, and/or Mortality  Presenting problems: moderate  Diagnostic procedures: moderate  Management options: moderate  General comments: Will administer a dose of Lasix in the ER and increase Lasix dosage at home.     Patient Progress  Patient progress: stable         Procedures

## 2021-02-15 ENCOUNTER — APPOINTMENT (OUTPATIENT)
Dept: GENERAL RADIOLOGY | Age: 71
DRG: 291 | End: 2021-02-15
Payer: MEDICARE

## 2021-02-15 ENCOUNTER — HOSPITAL ENCOUNTER (INPATIENT)
Age: 71
LOS: 9 days | Discharge: HOME | DRG: 291 | End: 2021-02-24
Admitting: INTERNAL MEDICINE
Payer: MEDICARE

## 2021-02-15 ENCOUNTER — APPOINTMENT (OUTPATIENT)
Dept: CT IMAGING | Age: 71
DRG: 291 | End: 2021-02-15
Payer: MEDICARE

## 2021-02-15 DIAGNOSIS — R09.02 HYPOXIA: ICD-10-CM

## 2021-02-15 DIAGNOSIS — E78.5 HYPERLIPIDEMIA, UNSPECIFIED HYPERLIPIDEMIA TYPE: ICD-10-CM

## 2021-02-15 DIAGNOSIS — J96.01 ACUTE RESPIRATORY FAILURE WITH HYPOXEMIA (HCC): ICD-10-CM

## 2021-02-15 DIAGNOSIS — I10 ESSENTIAL HYPERTENSION: Chronic | ICD-10-CM

## 2021-02-15 DIAGNOSIS — I50.33 ACUTE ON CHRONIC DIASTOLIC CONGESTIVE HEART FAILURE (HCC): ICD-10-CM

## 2021-02-15 DIAGNOSIS — K52.9 COLITIS: ICD-10-CM

## 2021-02-15 DIAGNOSIS — I25.10 CAD IN NATIVE ARTERY: ICD-10-CM

## 2021-02-15 DIAGNOSIS — I50.9 CONGESTIVE HEART FAILURE, UNSPECIFIED HF CHRONICITY, UNSPECIFIED HEART FAILURE TYPE (HCC): Primary | ICD-10-CM

## 2021-02-15 DIAGNOSIS — I25.10 CORONARY ARTERY DISEASE INVOLVING NATIVE CORONARY ARTERY OF NATIVE HEART WITHOUT ANGINA PECTORIS: ICD-10-CM

## 2021-02-15 LAB
ALBUMIN SERPL-MCNC: 3.3 G/DL (ref 3.2–4.6)
ALBUMIN/GLOB SERPL: 0.6 {RATIO} (ref 1.2–3.5)
ALP SERPL-CCNC: 320 U/L (ref 50–136)
ALT SERPL-CCNC: 50 U/L (ref 12–65)
ANION GAP SERPL CALC-SCNC: 8 MMOL/L (ref 7–16)
AST SERPL-CCNC: 46 U/L (ref 15–37)
ATRIAL RATE: 83 BPM
BASOPHILS # BLD: 0.1 K/UL (ref 0–0.2)
BASOPHILS NFR BLD: 1 % (ref 0–2)
BILIRUB SERPL-MCNC: 0.6 MG/DL (ref 0.2–1.1)
BUN SERPL-MCNC: 16 MG/DL (ref 8–23)
CALCIUM SERPL-MCNC: 9.4 MG/DL (ref 8.3–10.4)
CALCULATED P AXIS, ECG09: 100 DEGREES
CALCULATED R AXIS, ECG10: 93 DEGREES
CALCULATED T AXIS, ECG11: 42 DEGREES
CHLORIDE SERPL-SCNC: 103 MMOL/L (ref 98–107)
CO2 SERPL-SCNC: 25 MMOL/L (ref 21–32)
CREAT SERPL-MCNC: 1.57 MG/DL (ref 0.6–1)
D DIMER PPP FEU-MCNC: 2.96 UG/ML(FEU)
DIAGNOSIS, 93000: NORMAL
DIFFERENTIAL METHOD BLD: ABNORMAL
EOSINOPHIL # BLD: 0.5 K/UL (ref 0–0.8)
EOSINOPHIL NFR BLD: 5 % (ref 0.5–7.8)
ERYTHROCYTE [DISTWIDTH] IN BLOOD BY AUTOMATED COUNT: 16.4 % (ref 11.9–14.6)
GLOBULIN SER CALC-MCNC: 5.6 G/DL (ref 2.3–3.5)
GLUCOSE BLD STRIP.AUTO-MCNC: 270 MG/DL (ref 65–100)
GLUCOSE SERPL-MCNC: 205 MG/DL (ref 65–100)
HCT VFR BLD AUTO: 30.6 % (ref 35.8–46.3)
HGB BLD-MCNC: 9.2 G/DL (ref 11.7–15.4)
IMM GRANULOCYTES # BLD AUTO: 0.1 K/UL (ref 0–0.5)
IMM GRANULOCYTES NFR BLD AUTO: 1 % (ref 0–5)
LYMPHOCYTES # BLD: 1.3 K/UL (ref 0.5–4.6)
LYMPHOCYTES NFR BLD: 13 % (ref 13–44)
MCH RBC QN AUTO: 27.2 PG (ref 26.1–32.9)
MCHC RBC AUTO-ENTMCNC: 30.1 G/DL (ref 31.4–35)
MCV RBC AUTO: 90.5 FL (ref 79.6–97.8)
MONOCYTES # BLD: 1.2 K/UL (ref 0.1–1.3)
MONOCYTES NFR BLD: 13 % (ref 4–12)
NEUTS SEG # BLD: 6.5 K/UL (ref 1.7–8.2)
NEUTS SEG NFR BLD: 68 % (ref 43–78)
NRBC # BLD: 0 K/UL (ref 0–0.2)
P-R INTERVAL, ECG05: 126 MS
PLATELET # BLD AUTO: 551 K/UL (ref 150–450)
PMV BLD AUTO: 9.4 FL (ref 9.4–12.3)
POTASSIUM SERPL-SCNC: 3.4 MMOL/L (ref 3.5–5.1)
PROT SERPL-MCNC: 8.9 G/DL (ref 6.3–8.2)
Q-T INTERVAL, ECG07: 390 MS
QRS DURATION, ECG06: 90 MS
QTC CALCULATION (BEZET), ECG08: 458 MS
RBC # BLD AUTO: 3.38 M/UL (ref 4.05–5.2)
SODIUM SERPL-SCNC: 136 MMOL/L (ref 136–145)
TROPONIN-HIGH SENSITIVITY: 31.5 PG/ML (ref 0–14)
TROPONIN-HIGH SENSITIVITY: 34.2 PG/ML (ref 0–14)
VENTRICULAR RATE, ECG03: 83 BPM
WBC # BLD AUTO: 9.6 K/UL (ref 4.3–11.1)

## 2021-02-15 PROCEDURE — 96374 THER/PROPH/DIAG INJ IV PUSH: CPT

## 2021-02-15 PROCEDURE — 84484 ASSAY OF TROPONIN QUANT: CPT

## 2021-02-15 PROCEDURE — 99285 EMERGENCY DEPT VISIT HI MDM: CPT

## 2021-02-15 PROCEDURE — 74011000636 HC RX REV CODE- 636

## 2021-02-15 PROCEDURE — 80053 COMPREHEN METABOLIC PANEL: CPT

## 2021-02-15 PROCEDURE — 74011250636 HC RX REV CODE- 250/636

## 2021-02-15 PROCEDURE — 82962 GLUCOSE BLOOD TEST: CPT

## 2021-02-15 PROCEDURE — 65660000000 HC RM CCU STEPDOWN

## 2021-02-15 PROCEDURE — 99223 1ST HOSP IP/OBS HIGH 75: CPT | Performed by: INTERNAL MEDICINE

## 2021-02-15 PROCEDURE — 71045 X-RAY EXAM CHEST 1 VIEW: CPT

## 2021-02-15 PROCEDURE — 71260 CT THORAX DX C+: CPT

## 2021-02-15 PROCEDURE — 74011250637 HC RX REV CODE- 250/637: Performed by: INTERNAL MEDICINE

## 2021-02-15 PROCEDURE — 94761 N-INVAS EAR/PLS OXIMETRY MLT: CPT

## 2021-02-15 PROCEDURE — 74011636637 HC RX REV CODE- 636/637: Performed by: INTERNAL MEDICINE

## 2021-02-15 PROCEDURE — 93005 ELECTROCARDIOGRAM TRACING: CPT

## 2021-02-15 PROCEDURE — 74011250636 HC RX REV CODE- 250/636: Performed by: INTERNAL MEDICINE

## 2021-02-15 PROCEDURE — 85379 FIBRIN DEGRADATION QUANT: CPT

## 2021-02-15 PROCEDURE — 85025 COMPLETE CBC W/AUTO DIFF WBC: CPT

## 2021-02-15 PROCEDURE — 74011000258 HC RX REV CODE- 258

## 2021-02-15 RX ORDER — POLYETHYLENE GLYCOL 3350 17 G/17G
17 POWDER, FOR SOLUTION ORAL DAILY PRN
Status: DISCONTINUED | OUTPATIENT
Start: 2021-02-15 | End: 2021-02-24 | Stop reason: HOSPADM

## 2021-02-15 RX ORDER — FUROSEMIDE 10 MG/ML
40 INJECTION INTRAMUSCULAR; INTRAVENOUS EVERY 12 HOURS
Status: DISCONTINUED | OUTPATIENT
Start: 2021-02-15 | End: 2021-02-16

## 2021-02-15 RX ORDER — INSULIN GLARGINE 100 [IU]/ML
8 INJECTION, SOLUTION SUBCUTANEOUS
Status: DISCONTINUED | OUTPATIENT
Start: 2021-02-15 | End: 2021-02-24 | Stop reason: HOSPADM

## 2021-02-15 RX ORDER — PROMETHAZINE HYDROCHLORIDE 25 MG/1
12.5 TABLET ORAL
Status: DISCONTINUED | OUTPATIENT
Start: 2021-02-15 | End: 2021-02-24 | Stop reason: HOSPADM

## 2021-02-15 RX ORDER — GUAIFENESIN 100 MG/5ML
81 LIQUID (ML) ORAL
Status: DISCONTINUED | OUTPATIENT
Start: 2021-02-16 | End: 2021-02-24 | Stop reason: HOSPADM

## 2021-02-15 RX ORDER — METOPROLOL SUCCINATE 50 MG/1
50 TABLET, EXTENDED RELEASE ORAL 2 TIMES DAILY
Status: DISCONTINUED | OUTPATIENT
Start: 2021-02-16 | End: 2021-02-17

## 2021-02-15 RX ORDER — FUROSEMIDE 10 MG/ML
60 INJECTION INTRAMUSCULAR; INTRAVENOUS
Status: COMPLETED | OUTPATIENT
Start: 2021-02-15 | End: 2021-02-15

## 2021-02-15 RX ORDER — POTASSIUM CHLORIDE 20 MEQ/1
40 TABLET, EXTENDED RELEASE ORAL
Status: COMPLETED | OUTPATIENT
Start: 2021-02-15 | End: 2021-02-15

## 2021-02-15 RX ORDER — CLOPIDOGREL BISULFATE 75 MG/1
75 TABLET ORAL DAILY
Status: DISCONTINUED | OUTPATIENT
Start: 2021-02-16 | End: 2021-02-24 | Stop reason: HOSPADM

## 2021-02-15 RX ORDER — ONDANSETRON 2 MG/ML
4 INJECTION INTRAMUSCULAR; INTRAVENOUS
Status: DISCONTINUED | OUTPATIENT
Start: 2021-02-15 | End: 2021-02-24 | Stop reason: HOSPADM

## 2021-02-15 RX ORDER — NIFEDIPINE 90 MG/1
90 TABLET, EXTENDED RELEASE ORAL DAILY
Status: DISCONTINUED | OUTPATIENT
Start: 2021-02-16 | End: 2021-02-24 | Stop reason: HOSPADM

## 2021-02-15 RX ORDER — SODIUM CHLORIDE 9 MG/ML
10 INJECTION, SOLUTION INTRAVENOUS CONTINUOUS
Status: DISCONTINUED | OUTPATIENT
Start: 2021-02-15 | End: 2021-02-16

## 2021-02-15 RX ORDER — ACETAMINOPHEN 325 MG/1
650 TABLET ORAL
Status: DISCONTINUED | OUTPATIENT
Start: 2021-02-15 | End: 2021-02-24 | Stop reason: HOSPADM

## 2021-02-15 RX ORDER — SODIUM CHLORIDE 0.9 % (FLUSH) 0.9 %
10 SYRINGE (ML) INJECTION
Status: COMPLETED | OUTPATIENT
Start: 2021-02-15 | End: 2021-02-15

## 2021-02-15 RX ORDER — METOPROLOL SUCCINATE 25 MG/1
25 TABLET, EXTENDED RELEASE ORAL 2 TIMES DAILY
Status: DISCONTINUED | OUTPATIENT
Start: 2021-02-15 | End: 2021-02-15

## 2021-02-15 RX ORDER — ISOSORBIDE MONONITRATE 30 MG/1
30 TABLET, EXTENDED RELEASE ORAL
Status: DISCONTINUED | OUTPATIENT
Start: 2021-02-16 | End: 2021-02-16

## 2021-02-15 RX ORDER — SODIUM CHLORIDE 0.9 % (FLUSH) 0.9 %
5-40 SYRINGE (ML) INJECTION AS NEEDED
Status: DISCONTINUED | OUTPATIENT
Start: 2021-02-15 | End: 2021-02-24 | Stop reason: HOSPADM

## 2021-02-15 RX ORDER — ACETAMINOPHEN 650 MG/1
650 SUPPOSITORY RECTAL
Status: DISCONTINUED | OUTPATIENT
Start: 2021-02-15 | End: 2021-02-24 | Stop reason: HOSPADM

## 2021-02-15 RX ORDER — SODIUM CHLORIDE 0.9 % (FLUSH) 0.9 %
5-40 SYRINGE (ML) INJECTION EVERY 8 HOURS
Status: DISCONTINUED | OUTPATIENT
Start: 2021-02-15 | End: 2021-02-24 | Stop reason: HOSPADM

## 2021-02-15 RX ORDER — ROSUVASTATIN CALCIUM 20 MG/1
40 TABLET, COATED ORAL
Status: DISCONTINUED | OUTPATIENT
Start: 2021-02-15 | End: 2021-02-24 | Stop reason: HOSPADM

## 2021-02-15 RX ORDER — ENOXAPARIN SODIUM 100 MG/ML
40 INJECTION SUBCUTANEOUS DAILY
Status: DISCONTINUED | OUTPATIENT
Start: 2021-02-16 | End: 2021-02-24 | Stop reason: HOSPADM

## 2021-02-15 RX ADMIN — FUROSEMIDE 60 MG: 10 INJECTION, SOLUTION INTRAMUSCULAR; INTRAVENOUS at 14:55

## 2021-02-15 RX ADMIN — ROSUVASTATIN CALCIUM 40 MG: 20 TABLET, COATED ORAL at 21:38

## 2021-02-15 RX ADMIN — SODIUM CHLORIDE 10 ML: 9 INJECTION, SOLUTION INTRAMUSCULAR; INTRAVENOUS; SUBCUTANEOUS at 21:39

## 2021-02-15 RX ADMIN — Medication 10 ML: at 13:49

## 2021-02-15 RX ADMIN — POTASSIUM CHLORIDE 40 MEQ: 20 TABLET, EXTENDED RELEASE ORAL at 22:05

## 2021-02-15 RX ADMIN — IOPAMIDOL 75 ML: 755 INJECTION, SOLUTION INTRAVENOUS at 13:49

## 2021-02-15 RX ADMIN — FUROSEMIDE 40 MG: 10 INJECTION, SOLUTION INTRAMUSCULAR; INTRAVENOUS at 21:38

## 2021-02-15 RX ADMIN — INSULIN GLARGINE 8 UNITS: 100 INJECTION, SOLUTION SUBCUTANEOUS at 22:05

## 2021-02-15 RX ADMIN — ONDANSETRON 4 MG: 2 INJECTION INTRAMUSCULAR; INTRAVENOUS at 23:20

## 2021-02-15 RX ADMIN — SODIUM CHLORIDE 10 ML: 9 INJECTION, SOLUTION INTRAMUSCULAR; INTRAVENOUS; SUBCUTANEOUS at 18:14

## 2021-02-15 RX ADMIN — METOPROLOL SUCCINATE 25 MG: 25 TABLET, EXTENDED RELEASE ORAL at 18:42

## 2021-02-15 RX ADMIN — SODIUM CHLORIDE 100 ML: 900 INJECTION, SOLUTION INTRAVENOUS at 13:50

## 2021-02-15 RX ADMIN — SODIUM CHLORIDE 500 ML: 900 INJECTION, SOLUTION INTRAVENOUS at 12:06

## 2021-02-15 RX ADMIN — SODIUM CHLORIDE 100 ML/HR: 9 INJECTION, SOLUTION INTRAVENOUS at 13:16

## 2021-02-15 NOTE — ED PROVIDER NOTES
64-year-old female complaint of shortness of breath. She also has chest pain that radiates from her mid chest to the left chest and into her back. Patient has significant medical history most recently had COVID-19 was admitted to the hospital had cardiac arrest during that time. But recovered and was discharged. Patient has history of MI coronary artery disease diabetes hypercholesterolemia hypertension and tobacco abuse in the past.      Shortness of Breath  This is a recurrent problem. The problem occurs continuously. The current episode started 6 to 12 hours ago. The problem has not changed since onset. Associated symptoms include cough, orthopnea, chest pain and leg swelling. Pertinent negatives include no fever, no headaches, no rhinorrhea, no sore throat, no sputum production, no hemoptysis and no wheezing. It is unknown what precipitated the problem. She has tried nothing for the symptoms. Associated medical issues include COPD, pneumonia, CAD, heart failure and past MI. Past Medical History:   Diagnosis Date    CAD (coronary artery disease) 3/1/2012    MI, 3 stents    Chest pain     Coronary artery disease involving native coronary artery without angina pectoris 5/21/2015    Depression 3/1/2012    Diabetes mellitus ([address] 3/1/2012    oral agents. . hypo- 80's, Last A1c 6.9 in 6/2018    DM2 (diabetes mellitus, type 2) ([address] 5/21/2015    Elevated troponin 3/2/2012    Essential hypertension 5/21/2015    External hemorrhoids without mention of complication 5408    GERD (gastroesophageal reflux disease)     History of MI (myocardial infarction) 11/12    x2    Hypercholesterolemia     Hypertension 3/1/2012    Insomnia     Personal history of colonic polyps 2013    hyperplastic    Platelet inhibition due to Plavix     holding for colonoscopy per GI Dr. [facility] Rectocele 2013    Tobacco abuse 3/1/2012    quit for 1 year    Tobacco use disorder     Unstable angina ([address] 3/1/2012    ntg as needed.         Past Surgical History:   Procedure Laterality Date    HX CAROTID ENDARTERECTOMY Left 2018    HX CATARACT REMOVAL Left 2016    Dr Juana Balderas, [address] [place] [address] Right 2016    Dr Juana Balderas, [facility]    HX CERVICAL FUSION      neck w/plate    HX COLONOSCOPY  13    Anna Marie--single transverse hyperplastic polyp--7-10 year recall    HX HEMORRHOIDECTOMY      x2    HX ORTHOPAEDIC      left elbow    HX CAL AND BSO      HX WISDOM TEETH EXTRACTION      [place] LEFT HEART CATH,PERCUTANEOUS  last stented 11/12 x 2    stent x3 , mi x 2         Family History:   Problem Relation Age of Onset    Heart Disease Mother     Osteoporosis Mother     Heart Attack Mother 67        mi    Thyroid Disease Mother         Multinodular Goiter    Heart Disease Father     Cancer Father         pancreatic    Heart Attack Father 67        MI    Cancer Sister         Pre-Cancerous dysplasia    Thyroid Cancer Sister     Ulcerative Colitis Brother     Thyroid Cancer Brother     Breast Cancer Neg Hx        Social History     Socioeconomic History    Marital status:      Spouse name: Not on file    Number of children: Not on file    Years of education: Not on file    Highest education level: Not on file   Occupational History    Not on file   Social Needs    Financial resource strain: Not on file    Food insecurity     Worry: Not on file     Inability: Not on file    Transportation needs     Medical: Not on file     Non-medical: Not on file   Tobacco Use    Smoking status: Former Smoker     Packs/day: 1.00     Years: 40.00     Pack years: 40.00     Quit date: 2012     Years since quittin.2    Smokeless tobacco: Never Used    Tobacco comment: quit  . has stopped prior also   Substance and Sexual Activity    Alcohol use: No     Frequency: Never    Drug use: No    Sexual activity: Not on file   Lifestyle    Physical activity     Days per week: Not on file     Minutes per session: Not on file    Stress: Not on file   Relationships    Social connections     Talks on phone: Not on file     Gets together: Not on file     Attends Spiritism service: Not on file     Active member of club or organization: Not on file     Attends meetings of clubs or organizations: Not on file     Relationship status: Not on file    Intimate partner violence     Fear of current or ex partner: Not on file     Emotionally abused: Not on file     Physically abused: Not on file     Forced sexual activity: Not on file   Other Topics Concern    Not on file   Social History Narrative    Not on file         ALLERGIES: Cephalosporins, Sulfamethoxazole-trimethoprim, Codeine, and Lisinopril    Review of Systems   Constitutional: Negative. Negative for activity change and fever. HENT: Negative. Negative for rhinorrhea and sore throat. Eyes: Negative. Respiratory: Positive for cough and shortness of breath. Negative for hemoptysis, sputum production and wheezing. Cardiovascular: Positive for chest pain, orthopnea and leg swelling. Gastrointestinal: Negative. Genitourinary: Negative. Musculoskeletal: Negative. Skin: Negative. Neurological: Negative. Negative for headaches. Psychiatric/Behavioral: Negative. All other systems reviewed and are negative. Vitals:    02/15/21 0953 02/15/21 0957   BP: 128/68    Pulse: 84    Resp: 24    Temp:  98.4 °F (36.9 °C)   SpO2: 96%    Weight: 68 kg (150 lb)    Height: 5' 4\" (1.626 m)             Physical Exam  Vitals signs and nursing note reviewed. Constitutional:       General: She is not in acute distress. Appearance: Normal appearance. She is well-developed. She is not ill-appearing, toxic-appearing or diaphoretic. HENT:      Head: Normocephalic and atraumatic. No right periorbital erythema or left periorbital erythema. Jaw: There is normal jaw occlusion.       Salivary Glands: Right salivary gland is not diffusely enlarged. Left salivary gland is not diffusely enlarged. Right Ear: External ear normal.      Left Ear: External ear normal.      Nose: Nose normal. No congestion or rhinorrhea. Mouth/Throat:      Mouth: Mucous membranes are moist.      Pharynx: No oropharyngeal exudate or posterior oropharyngeal erythema. Eyes:      General: Lids are normal. No scleral icterus. Right eye: No discharge. Left eye: No discharge. Extraocular Movements: Extraocular movements intact. Conjunctiva/sclera: Conjunctivae normal.      Right eye: Right conjunctiva is not injected. Left eye: Left conjunctiva is not injected. Pupils: Pupils are equal, round, and reactive to light. Neck:      Musculoskeletal: Full passive range of motion without pain, normal range of motion and neck supple. Normal range of motion. No erythema, neck rigidity, injury, pain with movement or muscular tenderness. Thyroid: No thyroid mass. Vascular: No JVD. Trachea: Trachea and phonation normal.   Cardiovascular:      Rate and Rhythm: Normal rate and regular rhythm. Pulses: Normal pulses. Heart sounds: Normal heart sounds. Heart sounds not distant. No murmur. No friction rub. No gallop. Pulmonary:      Effort: Pulmonary effort is normal. Tachypnea present. No accessory muscle usage, respiratory distress or retractions. Breath sounds: No stridor. Examination of the left-middle field reveals rales. Examination of the right-lower field reveals decreased breath sounds. Examination of the left-lower field reveals decreased breath sounds. Decreased breath sounds and rales present. No wheezing or rhonchi. Chest:      Chest wall: No tenderness. Abdominal:      General: Abdomen is flat. Bowel sounds are normal. There is no distension. There are no signs of injury. Palpations: Abdomen is soft. There is no fluid wave, mass or pulsatile mass. Tenderness:  There is no abdominal tenderness. There is no guarding or rebound. Musculoskeletal: Normal range of motion. General: No swelling, tenderness, deformity or signs of injury. Right lower leg: No edema. Left lower leg: No edema. Lymphadenopathy:      Cervical: No cervical adenopathy. Skin:     General: Skin is warm and dry. Capillary Refill: Capillary refill takes less than 2 seconds. Coloration: Skin is not jaundiced or pale. Findings: No bruising, erythema or rash. Neurological:      General: No focal deficit present. Mental Status: She is alert and oriented to person, place, and time. Mental status is at baseline. GCS: GCS eye subscore is 4. GCS verbal subscore is 5. GCS motor subscore is 6. Cranial Nerves: No dysarthria or facial asymmetry. Sensory: No sensory deficit. Motor: No weakness or tremor. Coordination: Coordination normal.   Psychiatric:         Mood and Affect: Mood normal.         Behavior: Behavior normal. Behavior is cooperative. Thought Content: Thought content normal.         Judgment: Judgment normal.          MDM  Number of Diagnoses or Management Options  Congestive heart failure, unspecified HF chronicity, unspecified heart failure type ([facility] [address]): established and worsening  Hypoxia: established and worsening  Diagnosis management comments: Patient looks mildly in distress does not use oxygen at home requiring supplemental oxygen in the ER. Given Lasix 60 mg at time of admission no significant diuresis. Will ask hospitalist to admit due to her comorbidities and her james course in her hospital stay last time.        Amount and/or Complexity of Data Reviewed  Clinical lab tests: ordered and reviewed  Tests in the radiology section of CPT®: ordered and reviewed  Tests in the medicine section of CPT®: ordered and reviewed  Decide to obtain previous medical records or to obtain history from someone other than the patient: yes  Review and summarize past medical records: yes  Discuss the patient with other providers: yes  Independent visualization of images, tracings, or specimens: yes    Risk of Complications, Morbidity, and/or Mortality  Presenting problems: high  Diagnostic procedures: high  Management options: high           Procedures

## 2021-02-15 NOTE — PROGRESS NOTES
Pt needs to be IV hydrated before ct can be completed. Please call CT when iv fluids are started to coordinate time for CT.

## 2021-02-15 NOTE — H&P
[address] Cardiology Initial Cardiac Evaluation                Date of  Admission: 2/15/2021  9:46 AM     PCP: Ludy Cabral MD  Requested by: Dr Judd Magallon  Primary Cardiologist: Former Dr Conrado Burciaga now Dr Baljinder Daniel Attending: Dr Ger Doss    Reason for Evaluation: Recent Cardiac Arrest now suspected CHF      Jules Francisco is a 65 y.o. female with hx of CAD status post stents to the LAD in 2008 2012, heart failure preserved ejection fraction, hypertension, diabetes type 2, GERD, depression, tobacco use, hyperlipidemia, and hemorrhoids. Patient came to the ER for shortness of breath over the past few days after recently be admitted for COVID-19 infection. During that time she developed pulseless VT and CODE BLUE was called which had return to [facility] tried to discharge. Sinus thought to be respiratory related hypoxia cardiac arrest.  Patient was initially feeling better at home and was still very weak requiring the use of a Rollator but shortness of breath as he been increasing over the past few days. This x-ray showed pulmonary edema and cardiomegaly but the patient also having a elevated D-dimer. T scan of the chest showed no PE but noted pleural effusions, basilar atelectasis, and interstitial pulmonary edema. Associated findings also include cirrhosis and atherosclerotic coronary artery disease. Patient was admitted by hospitalist and placed on IV Lasix, trend of hypertension, and monitoring of kidney function with CKD stage III. Review of labs show hemoglobin 9.2, platelets 591, D-dimer 2.96, potassium 3.4, creatinine 1.57 down from 1.84 from last discharge, alkaline phosphate of 320, high sensitive troponin low level with a flat decline, and NT proBNP last admission on 2/9/2021 was 4709. Per the patient she has had lower limb edema and swelling in her arms with reduction since she was started on Lasix over the past 7 days.   Her shortness of breath is still increased and is not fevers, diarrhea, vomiting, increasing chest pain, syncope, or other recent changes. Patient has had continuous chest pain ever since CPR was completed in January. Recent Cardiac Synopsis  SPECT 9/23/20: normal perfusion, low risk scan, LVEF >70%  Echo: 8/11/2020 -  Left ventricle: Systolic function was normal. Ejection fraction was  estimated in the range of 60 % to 65 %. There were no regional wall motion  abnormalities.   -  Left atrium: The atrium was moderately to markedly dilated.   -  Pericardium: A trace to small pericardial effusion was identified  circumferential to the heart. There was no evidence of hemodynamic   compromise. There was a left pleural effusion. EKG: NST with none specific T wave Changes      Past Medical History:   Diagnosis Date    CAD (coronary artery disease) 3/1/2012    MI, 3 stents    Chest pain     Coronary artery disease involving native coronary artery without angina pectoris 5/21/2015    Depression 3/1/2012    Diabetes mellitus ([facility] [address] 3/1/2012    oral agents. . hypo- 80's, Last A1c 6.9 in 6/2018    DM2 (diabetes mellitus, type 2) ([facility] [address] 5/21/2015    Elevated troponin 3/2/2012    Essential hypertension 5/21/2015    External hemorrhoids without mention of complication 0944    GERD (gastroesophageal reflux disease)     History of MI (myocardial infarction) 11/12    x2    Hypercholesterolemia     Hypertension 3/1/2012    Insomnia     Personal history of colonic polyps 2013    hyperplastic    Platelet inhibition due to Plavix     holding for colonoscopy per GI     [address] [place] [address] Ramiro Rectocele 2013    Tobacco abuse 3/1/2012    quit for 1 year    Tobacco use disorder     Unstable angina ([facility] [address] 3/1/2012    ntg as needed.        Past Surgical History:   Procedure Laterality Date    HX CAROTID ENDARTERECTOMY Left 12/12/2018    HX CATARACT REMOVAL Left 09/19/2016    Dr Juana Balderas, [address] [address] Right 11/07/2016    Dr Juana Balderas, [address]      neck w/plate    HX COLONOSCOPY  13    Anna Marie--single transverse hyperplastic polyp--7-10 year recall    HX HEMORRHOIDECTOMY      x2    HX ORTHOPAEDIC      left elbow    HX CAL AND BSO      HX WISDOM TEETH EXTRACTION      [place] LEFT HEART CATH,PERCUTANEOUS  last stented 11/12 x 2    stent x3 , mi x 2     Allergies   Allergen Reactions    Cephalosporins Hives and Swelling    Sulfamethoxazole-Trimethoprim Other (comments)     Diarrhea     Codeine Other (comments)    Lisinopril Other (comments)     Passing out spells.  // pt sts not allergic to med       Family History   Problem Relation Age of Onset    Heart Disease Mother     Osteoporosis Mother     Heart Attack Mother 67        mi    Thyroid Disease Mother         Multinodular Goiter    Heart Disease Father     Cancer Father         pancreatic    Heart Attack Father 67        MI    Cancer Sister         Pre-Cancerous dysplasia    Thyroid Cancer Sister     Ulcerative Colitis Brother     Thyroid Cancer Brother     Breast Cancer Neg Hx       Social History     Socioeconomic History    Marital status:      Spouse name: Not on file    Number of children: Not on file    Years of education: Not on file    Highest education level: Not on file   Occupational History    Not on file   Social Needs    Financial resource strain: Not on file    Food insecurity     Worry: Not on file     Inability: Not on file    Transportation needs     Medical: Not on file     Non-medical: Not on file   Tobacco Use    Smoking status: Former Smoker     Packs/day: 1.00     Years: 40.00     Pack years: 40.00     Quit date: 2012     Years since quittin.2    Smokeless tobacco: Never Used    Tobacco comment: quit  . has stopped prior also   Substance and Sexual Activity    Alcohol use: No     Frequency: Never    Drug use: No    Sexual activity: Not on file   Lifestyle    Physical activity     Days per week: Not on file     Minutes per session: Not on file    Stress: Not on file   Relationships    Social connections     Talks on phone: Not on file     Gets together: Not on file     Attends [employer] service: Not on file     Active member of club or organization: Not on file     Attends meetings of clubs or organizations: Not on file     Relationship status: Not on file    Intimate partner violence     Fear of current or ex partner: Not on file     Emotionally abused: Not on file     Physically abused: Not on file     Forced sexual activity: Not on file   Other Topics Concern    Not on file   Social History Narrative    Not on file       Prior to Admission Medications   Prescriptions Last Dose Informant Patient Reported? Taking? NIFEdipine ER (PROCARDIA XL) 60 mg ER tablet   No No   Sig: TAKE 1 TABLET BY MOUTH  DAILY   Patient taking differently: Take 90 mg by mouth daily. TAKE 1 TABLET BY MOUTH  DAILY   albuterol (PROVENTIL HFA, VENTOLIN HFA, PROAIR HFA) 90 mcg/actuation inhaler   No No   Sig: Take 1 Puff by inhalation every six (6) hours as needed for Wheezing, Shortness of Breath or Cough. aspirin 81 mg chewable tablet   Yes No   Sig: Take 81 mg by mouth every morning. Indications: continue per anesthesia guidelines   cholecalciferol, vitamin D3, (VITAMIN D3) 2,000 unit tab   Yes No   Sig: Take 2,000 Units by mouth daily. citalopram (CELEXA) 20 mg tablet   No No   Sig: TAKE 1 TABLET BY MOUTH IN  THE MORNING   clopidogreL (PLAVIX) 75 mg tab   No No   Sig: TAKE 1 TABLET BY MOUTH  DAILY   cyanocobalamin (VITAMIN B-12) 1,000 mcg tablet   Yes No   Sig: Take 2,500 mcg by mouth daily.    insulin glargine (LANTUS,BASAGLAR) 100 unit/mL (3 mL) inpn   No No   Si units at night   insulin lispro (HUMALOG) 100 unit/mL kwikpen   No No   Sig: Less than 150 =   0 units           150 -199 =   3 units  200 -249 =   6 units  250 -299 =   9 units  300 -349 =   12 units  Before meals and at night   isosorbide mononitrate ER (IMDUR) 30 mg tablet   No No   Sig: Take 1 Tab by mouth every morning.   magnesium oxide (MAG-OX) 400 mg tablet   No No   Sig: TAKE 1 TABLET BY MOUTH TWICE DAILY   Patient taking differently: Take 400 mg by mouth daily. metoprolol succinate (TOPROL-XL) 50 mg XL tablet   No No   Sig: Take 1 Tab by mouth daily. Patient taking differently: Take 25 mg by mouth two (2) times a day. nitroglycerin (NITROSTAT) 0.4 mg SL tablet   No No   Si Tab by SubLINGual route every five (5) minutes as needed for Chest Pain. ondansetron (ZOFRAN ODT) 4 mg disintegrating tablet   No No   Sig: Take 1 Tab by mouth every eight (8) hours as needed for Nausea or Vomiting. potassium chloride SR (K-TAB) 20 mEq tablet   No No   Sig: Take 1 Tab by mouth daily for 5 days. rosuvastatin (CRESTOR) 40 mg tablet   No No   Sig: Take 1 Tab by mouth nightly. traMADoL (ULTRAM) 50 mg tablet   Yes No   Sig: Take 50 mg by mouth every six (6) hours as needed for Pain.    traZODone (DESYREL) 50 mg tablet   No No   Sig: TAKE 1 TABLET BY MOUTH AT  NIGHT      Facility-Administered Medications: None       Current Facility-Administered Medications   Medication Dose Route Frequency    0.9% sodium chloride infusion  10 mL/hr IntraVENous CONTINUOUS    sodium chloride (NS) flush 5-40 mL  5-40 mL IntraVENous Q8H    sodium chloride (NS) flush 5-40 mL  5-40 mL IntraVENous PRN    acetaminophen (TYLENOL) tablet 650 mg  650 mg Oral Q6H PRN    Or    acetaminophen (TYLENOL) suppository 650 mg  650 mg Rectal Q6H PRN    polyethylene glycol (MIRALAX) packet 17 g  17 g Oral DAILY PRN    promethazine (PHENERGAN) tablet 12.5 mg  12.5 mg Oral Q6H PRN    Or    ondansetron (ZOFRAN) injection 4 mg  4 mg IntraVENous Q6H PRN    [START ON 2021] enoxaparin (LOVENOX) injection 40 mg  40 mg SubCUTAneous DAILY    furosemide (LASIX) injection 40 mg  40 mg IntraVENous Q12H    [START ON 2021] aspirin chewable tablet 81 mg  81 mg Oral 7am    [START ON 2021] clopidogreL (PLAVIX) tablet 75 mg  75 mg Oral DAILY    insulin glargine (LANTUS) injection 8 Units  8 Units SubCUTAneous QHS    [START ON 2/16/2021] isosorbide mononitrate ER (IMDUR) tablet 30 mg  30 mg Oral 7am    metoprolol succinate (TOPROL-XL) XL tablet 25 mg  25 mg Oral BID    [START ON 2/16/2021] NIFEdipine ER (PROCARDIA XL) tablet 90 mg  90 mg Oral DAILY    rosuvastatin (CRESTOR) tablet 40 mg  40 mg Oral QHS       Review of Systems   Constitution: Negative for weight gain and weight loss. HENT: Negative. Eyes: Negative. Cardiovascular: Positive for dyspnea on exertion and leg swelling. Negative for chest pain (currently pain free), claudication, cyanosis, irregular heartbeat, near-syncope, orthopnea, palpitations, paroxysmal nocturnal dyspnea and syncope. Respiratory: Positive for shortness of breath. Negative for cough and wheezing. Endocrine: Negative. Skin: Negative. Musculoskeletal: Negative. Gastrointestinal: Negative for nausea and vomiting. Genitourinary: Negative. Neurological: Negative for dizziness. Psychiatric/Behavioral: Negative. Allergic/Immunologic: Negative.          Physical Exam  Vitals:    02/15/21 1455 02/15/21 1559 02/15/21 1629 02/15/21 1659   BP: (!) 149/66 (!) 147/65 (!) 148/70 (!) 167/69   Pulse: 81 84 90 87   Resp:  21 15    Temp:       SpO2:  95% 97% 94%   Weight:       Height:           Last 3 Recorded Weights in this Encounter    02/15/21 0953   Weight: 68 kg (150 lb)         Intake/Output Summary (Last 24 hours) at 2/15/2021 1848  Last data filed at 2/15/2021 1712  Gross per 24 hour   Intake    Output 800 ml   Net -800 ml       Net IO Since Admission: -800 mL [02/15/21 1848]      Physical Exam:  General: Well Developed, Well Nourished, No Acute Distress  HEENT: pupils equal and round, no abnormalities noted  Neck: supple, no JVD, no carotid bruits  Heart: S1S2 with irregular regular without murmurs or gallops  Lungs: Clear throughout auscultation bilaterally without adventitious sounds  Abd: soft, nontender, nondistended, with good bowel sounds  Ext: warm, no edema, calves supple/nontender, pulses 2+ bilaterally  Skin: warm and dry  Psychiatric: Normal mood and affect  Neurologic: Alert and oriented X 3      Recent Results (from the past 24 hour(s))   EKG, 12 LEAD, INITIAL    Collection Time: 02/15/21  9:54 AM   Result Value Ref Range    Ventricular Rate 83 BPM    Atrial Rate 83 BPM    P-R Interval 126 ms    QRS Duration 90 ms    Q-T Interval 390 ms    QTC Calculation (Bezet) 458 ms    Calculated P Axis 100 degrees    Calculated R Axis 93 degrees    Calculated T Axis 42 degrees    Diagnosis       !! AGE AND GENDER SPECIFIC ECG ANALYSIS !! Sinus rhythm with Premature supraventricular complexes  Rightward axis  Possible Anterior infarct (cited on or before 27-JUL-2020)  Nonspecific ST abnormality  When compared with ECG of 09-FEB-2021 19:30,  Premature supraventricular complexes are now Present  Confirmed by [facility]  MD (CRISTIAN)WANG ([id number]) on 2/15/2021 4:52:13 PM     CBC WITH AUTOMATED DIFF    Collection Time: 02/15/21 10:10 AM   Result Value Ref Range    WBC 9.6 4.3 - 11.1 K/uL    RBC 3.38 (L) 4.05 - 5.2 M/uL    HGB 9.2 (L) 11.7 - 15.4 g/dL    HCT 30.6 (L) 35.8 - 46.3 %    MCV 90.5 79.6 - 97.8 FL    MCH 27.2 26.1 - 32.9 PG    MCHC 30.1 (L) 31.4 - 35.0 g/dL    RDW 16.4 (H) 11.9 - 14.6 %    PLATELET 003 (H) 569 - 450 K/uL    MPV 9.4 9.4 - 12.3 FL    ABSOLUTE NRBC 0.00 0.0 - 0.2 K/uL    DF AUTOMATED      NEUTROPHILS 68 43 - 78 %    LYMPHOCYTES 13 13 - 44 %    MONOCYTES 13 (H) 4.0 - 12.0 %    EOSINOPHILS 5 0.5 - 7.8 %    BASOPHILS 1 0.0 - 2.0 %    IMMATURE GRANULOCYTES 1 0.0 - 5.0 %    ABS. NEUTROPHILS 6.5 1.7 - 8.2 K/UL    ABS. LYMPHOCYTES 1.3 0.5 - 4.6 K/UL    ABS. MONOCYTES 1.2 0.1 - 1.3 K/UL    ABS. EOSINOPHILS 0.5 0.0 - 0.8 K/UL    ABS. BASOPHILS 0.1 0.0 - 0.2 K/UL    ABS. IMM.  GRANS. 0.1 0.0 - 0.5 K/UL   METABOLIC PANEL, COMPREHENSIVE    Collection Time: 02/15/21 10:10 AM   Result Value Ref Range    Sodium 136 136 - 145 mmol/L    Potassium 3.4 (L) 3.5 - 5.1 mmol/L    Chloride 103 98 - 107 mmol/L    CO2 25 21 - 32 mmol/L    Anion gap 8 7 - 16 mmol/L    Glucose 205 (H) 65 - 100 mg/dL    BUN 16 8 - 23 MG/DL    Creatinine 1.57 (H) 0.6 - 1.0 MG/DL    GFR est AA 42 (L) >60 ml/min/1.73m2    GFR est non-AA 34 (L) >60 ml/min/1.73m2    Calcium 9.4 8.3 - 10.4 MG/DL    Bilirubin, total 0.6 0.2 - 1.1 MG/DL    ALT (SGPT) 50 12 - 65 U/L    AST (SGOT) 46 (H) 15 - 37 U/L    Alk. phosphatase 320 (H) 50 - 136 U/L    Protein, total 8.9 (H) 6.3 - 8.2 g/dL    Albumin 3.3 3.2 - 4.6 g/dL    Globulin 5.6 (H) 2.3 - 3.5 g/dL    A-G Ratio 0.6 (L) 1.2 - 3.5     TROPONIN-HIGH SENSITIVITY    Collection Time: 02/15/21 10:10 AM   Result Value Ref Range    Troponin-High Sensitivity 34.2 (H) 0 - 14 pg/mL   D DIMER    Collection Time: 02/15/21 10:10 AM   Result Value Ref Range    D DIMER 2.96 (H) <0.56 ug/ml(FEU)   TROPONIN-HIGH SENSITIVITY    Collection Time: 02/15/21 11:32 AM   Result Value Ref Range    Troponin-High Sensitivity 31.5 (H) 0 - 14 pg/mL          Ct Chest W Cont    Result Date: 2/15/2021  1. No evidence of a pulmonary embolism. 2. Findings support heart failure with cardiomegaly, pleural effusions, basilar atelectasis, and pulmonary interstitial edema. 3. Cirrhosis. 4. Atherosclerosis including coronary atherosclerosis. Xr Chest Port    Result Date: 2/15/2021  1. Persistent pulmonary edema 2. Atherosclerosis       Echo Results  (Last 48 hours)    None            Initial Recommendations:      Cardiac Problems:      Hx of Heart failure preserved ejection fraction: Patient by physical exam appears to be euvolemic except for coarse lung sounds with no edema in lower extremities, no edema in upper extremities, no JVD. Pulmonary edema with cardiomegaly and interstitial edema seen by chest CT. Would get echocardiogram tomorrow in the a.m. for further investigation. On BB and ACE.   Further recommendation pending clinical course attending recommendations. Continue IV Lasix for now and trend BMP closely    Shortness of breath: Coarse lung sounds with diminished bases, Worsning chest X Ray, Agree with IV lasix at this time, continue to monitor Cr. Echo planned in the AM.  Strict ERNESTINE's, sodium restriction, 2 L/day fluid restriction, with further recommendations pending clinical course and attending recommendations. Elevated BGL: Per primary team, continue diabetic diet but change with cardiac volume restricted parameters. Cirrhosis: Primary team seen on CT to the chest as associated finding. Patient has elevated alkaline phosphatase and AST. Patient's globulin is also 5.6. Irregular rate: We will add remote monitoring    HTN:  Will continue to monitor and up titrate medications this admission. Pt on Toprol XL 25 BID will increase to 50. Continue to monitor. Further recommendations pending clinical course and attending recommendations. Thank you very much for requesting a Cardiology Evaluation. We appreciate the opportunity to participate in this patient's care. We will follow along with above stated plan. This is initial management plan but please see attendings consult note for full plan of care.     Darcey Collet, NP AGACNP-BC

## 2021-02-15 NOTE — ED NOTES
TRANSFER - OUT REPORT:    Verbal report given to RN (name) on Miko Chester  being transferred to 6th floor (unit) for routine progression of care       Report consisted of patients Situation, Background, Assessment and   Recommendations(SBAR). Information from the following report(s) ED Summary was reviewed with the receiving nurse. Lines:   Peripheral IV 02/15/21 Right Antecubital (Active)        Opportunity for questions and clarification was provided.       Patient transported with:   O2 @ 3 liters

## 2021-02-15 NOTE — ED TRIAGE NOTES
Pt to room via w/c. Pt recently admitted for covid mid January. Post admission pt was placed on lasix for chf. Pt reports that last night she had increased shortness of breath and mid chest pain that radiates to left breast and back. Pt also reports cough with yellow mucous.

## 2021-02-15 NOTE — H&P
History and Physical    Patient: Manuela Linares MRN: [id number]  SSN: [id number]    YOB: 1950  Age: 65 y.o. Sex: female      Subjective:      Manuela Linares is a 65 y.o. female who came to ER due to shortness of breath for the past few days. I have reviewed patient's record and communicated with ER physician prior to seeing patient. Patient has had multiple medical problems as listed below. She was recently admitted here for shortness of breath. She was positive for COVID infection. She developed pulseless VT at that time. Code blue was called. She was resuscitated and survived. Cardiology was consulted to her. Patient is supposed to have echo done later in follow up. Patient states that she is gradually feeling better a bit, but still very weak. She can walk with a roller walker. She has been feeling more shortness of breath for the past few days. She denies chest pain. No fever. No shaking. No chills. She is found to have pulmonary edema. She is diagnosed with CHF and Hospitalist service is requested to admit the patient. Past Medical History:   Diagnosis Date    CAD (coronary artery disease) 3/1/2012    MI, 3 stents    Chest pain     Coronary artery disease involving native coronary artery without angina pectoris 5/21/2015    Depression 3/1/2012    Diabetes mellitus ([facility] 3/1/2012    oral agents. . hypo- 80's, Last A1c 6.9 in 6/2018    DM2 (diabetes mellitus, type 2) ([facility] 5/21/2015    Elevated troponin 3/2/2012    Essential hypertension 5/21/2015    External hemorrhoids without mention of complication 7854    GERD (gastroesophageal reflux disease)     History of MI (myocardial infarction) 11/12    x2    Hypercholesterolemia     Hypertension 3/1/2012    Insomnia     Personal history of colonic polyps 2013    hyperplastic    Platelet inhibition due to Plavix     holding for colonoscopy per GI Dr.    [facility] Rectocele 2013    Tobacco abuse 3/1/2012    quit for 1 year    Tobacco use disorder     Unstable angina ([facility] 3/1/2012    ntg as needed. Past Surgical History:   Procedure Laterality Date    HX CAROTID ENDARTERECTOMY Left 2018    HX CATARACT REMOVAL Left 2016    Dr Adia Brown, [address] [address] Right 2016    Dr Adia Brown, [facility]    HX CERVICAL FUSION      neck w/plate    HX COLONOSCOPY  13    Anna Marie--single transverse hyperplastic polyp--7-10 year recall    HX HEMORRHOIDECTOMY      x2    HX ORTHOPAEDIC      left elbow    HX CAL AND BSO      HX WISDOM TEETH EXTRACTION      LA LEFT HEART CATH,PERCUTANEOUS  last stented 11/12 x 2    stent x3 , mi x 2      Family History   Problem Relation Age of Onset    Heart Disease Mother     Osteoporosis Mother     Heart Attack Mother 67        mi    Thyroid Disease Mother         Multinodular Goiter    Heart Disease Father     Cancer Father         pancreatic    Heart Attack Father 67        MI    Cancer Sister         Pre-Cancerous dysplasia    Thyroid Cancer Sister     Ulcerative Colitis Brother     Thyroid Cancer Brother     Breast Cancer Neg Hx      Social History     Tobacco Use    Smoking status: Former Smoker     Packs/day: 1.00     Years: 40.00     Pack years: 40.00     Quit date: 2012     Years since quittin.2    Smokeless tobacco: Never Used    Tobacco comment: quit  . has stopped prior also   Substance Use Topics    Alcohol use: No     Frequency: Never      Prior to Admission medications    Medication Sig Start Date End Date Taking? Authorizing Provider   potassium chloride SR (K-TAB) 20 mEq tablet Take 1 Tab by mouth daily for 5 days. 21  Berenice Skiff,    traMADoL (ULTRAM) 50 mg tablet Take 50 mg by mouth every six (6) hours as needed for Pain.     Provider, Historical   ondansetron (ZOFRAN ODT) 4 mg disintegrating tablet Take 1 Tab by mouth every eight (8) hours as needed for Nausea or Vomiting. 1/20/21   Mlee Tyson MD   insulin glargine (LANTUS,BASAGLAR) 100 unit/mL (3 mL) inpn 8 units at night 1/19/21   Neha Akbar DO   insulin lispro (HUMALOG) 100 unit/mL kwikpen Less than 150 =   0 units           150 -199 =   3 units  200 -249 =   6 units  250 -299 =   9 units  300 -349 =   12 units  Before meals and at night 1/19/21   Neha Akbar DO   albuterol (PROVENTIL HFA, VENTOLIN HFA, PROAIR HFA) 90 mcg/actuation inhaler Take 1 Puff by inhalation every six (6) hours as needed for Wheezing, Shortness of Breath or Cough. 1/19/21   Neha Akbar DO   citalopram (CELEXA) 20 mg tablet TAKE 1 TABLET BY MOUTH IN  THE MORNING 1/18/21   Mele Tyson MD   traZODone (DESYREL) 50 mg tablet TAKE 1 TABLET BY MOUTH AT  NIGHT 1/18/21   Mele Tyson MD   isosorbide mononitrate ER (IMDUR) 30 mg tablet Take 1 Tab by mouth every morning. 9/1/20   Kellie Robles MD   metoprolol succinate (TOPROL-XL) 50 mg XL tablet Take 1 Tab by mouth daily. Patient taking differently: Take 25 mg by mouth two (2) times a day. 8/14/20   Author Looc KING NP   NIFEdipine ER (PROCARDIA XL) 60 mg ER tablet TAKE 1 TABLET BY MOUTH  DAILY  Patient taking differently: Take 90 mg by mouth daily. TAKE 1 TABLET BY MOUTH  DAILY 7/17/20   Mele Tyson MD   rosuvastatin (CRESTOR) 40 mg tablet Take 1 Tab by mouth nightly. 7/16/20   Mele Tyson MD   clopidogreL (PLAVIX) 75 mg tab TAKE 1 TABLET BY MOUTH  DAILY 6/23/20   Kellie Robles MD   nitroglycerin (NITROSTAT) 0.4 mg SL tablet 1 Tab by SubLINGual route every five (5) minutes as needed for Chest Pain. 11/6/18   Kellie Robles MD   magnesium oxide (MAG-OX) 400 mg tablet TAKE 1 TABLET BY MOUTH TWICE DAILY  Patient taking differently: Take 400 mg by mouth daily. 11/30/16   Mele Tyson MD   cholecalciferol, vitamin D3, (VITAMIN D3) 2,000 unit tab Take 2,000 Units by mouth daily.     Provider, Historical   cyanocobalamin (VITAMIN B-12) 1,000 mcg tablet Take 2,500 mcg by mouth daily. Provider, Historical   aspirin 81 mg chewable tablet Take 81 mg by mouth every morning. Indications: continue per anesthesia guidelines    Provider, Historical        Allergies   Allergen Reactions    Cephalosporins Hives and Swelling    Sulfamethoxazole-Trimethoprim Other (comments)     Diarrhea     Codeine Other (comments)    Lisinopril Other (comments)     Passing out spells. // pt sts not allergic to med        Review of Systems:    10-point review of systems is negative except what is mentioned in the present illness section. Objective:     Vitals:    02/15/21 1206 02/15/21 1233 02/15/21 1243 02/15/21 1455   BP: (!) 147/67 136/65  (!) 149/66   Pulse: 86 81 78 81   Resp:  19 18    Temp:       SpO2: 98% 93% 95%    Weight:       Height:            Physical Exam:    General:                    The patient is a pleasant elderly female in acute respiratory distress. On oxygen cannula. Head:                                   Normocephalic/atraumatic. Eyes:                                   palpebral pallor, no scleral icterus. ENT:                                    External auricular and nasal exam within normal limits. Mucous membranes are moist.  Neck:                                   Supple, non-tender, no JVD. Lungs:                       decreased to auscultation bilaterally without wheezes or crackles. No respiratory accessory muscle use. Heart:                                  Regular rate and rhythm with ectopic beats, without murmurs, rubs, or gallops. Abdomen:                  Soft, non-tender, non-distended with normoactive bowel sounds. Genitourinary:           No tenderness over the bladder or bilateral CVAs. Extremities:               Without clubbing, cyanosis, 1+  edema. Skin:                                    Normal color, texture, and turgor.  No rashes, lesions, or jaundice. Pulses:                      Radial and dorsalis pedis pulses present 2+ bilaterally. Capillary refill <2s. Neurologic:                CN II-XII grossly intact and symmetrical.                                               Moving all four extremities well with no focal deficits. Psychiatric:                Pleasant demeanor, appropriate affect. Alert and oriented x 3    Lab and data     Recent Results (from the past 24 hour(s))   EKG, 12 LEAD, INITIAL    Collection Time: 02/15/21  9:54 AM   Result Value Ref Range    Ventricular Rate 83 BPM    Atrial Rate 83 BPM    P-R Interval 126 ms    QRS Duration 90 ms    Q-T Interval 390 ms    QTC Calculation (Bezet) 458 ms    Calculated P Axis 100 degrees    Calculated R Axis 93 degrees    Calculated T Axis 42 degrees    Diagnosis       !! AGE AND GENDER SPECIFIC ECG ANALYSIS !! Sinus rhythm with Premature supraventricular complexes  Rightward axis  Possible Anterior infarct (cited on or before 27-JUL-2020)  Abnormal ECG  When compared with ECG of 09-FEB-2021 19:30,  Premature supraventricular complexes are now Present     CBC WITH AUTOMATED DIFF    Collection Time: 02/15/21 10:10 AM   Result Value Ref Range    WBC 9.6 4.3 - 11.1 K/uL    RBC 3.38 (L) 4.05 - 5.2 M/uL    HGB 9.2 (L) 11.7 - 15.4 g/dL    HCT 30.6 (L) 35.8 - 46.3 %    MCV 90.5 79.6 - 97.8 FL    MCH 27.2 26.1 - 32.9 PG    MCHC 30.1 (L) 31.4 - 35.0 g/dL    RDW 16.4 (H) 11.9 - 14.6 %    PLATELET 778 (H) 858 - 450 K/uL    MPV 9.4 9.4 - 12.3 FL    ABSOLUTE NRBC 0.00 0.0 - 0.2 K/uL    DF AUTOMATED      NEUTROPHILS 68 43 - 78 %    LYMPHOCYTES 13 13 - 44 %    MONOCYTES 13 (H) 4.0 - 12.0 %    EOSINOPHILS 5 0.5 - 7.8 %    BASOPHILS 1 0.0 - 2.0 %    IMMATURE GRANULOCYTES 1 0.0 - 5.0 %    ABS. NEUTROPHILS 6.5 1.7 - 8.2 K/UL    ABS. LYMPHOCYTES 1.3 0.5 - 4.6 K/UL    ABS. MONOCYTES 1.2 0.1 - 1.3 K/UL    ABS. EOSINOPHILS 0.5 0.0 - 0.8 K/UL    ABS.  BASOPHILS 0.1 0.0 - 0.2 K/UL    ABS. IMM. GRANS. 0.1 0.0 - 0.5 K/UL   METABOLIC PANEL, COMPREHENSIVE    Collection Time: 02/15/21 10:10 AM   Result Value Ref Range    Sodium 136 136 - 145 mmol/L    Potassium 3.4 (L) 3.5 - 5.1 mmol/L    Chloride 103 98 - 107 mmol/L    CO2 25 21 - 32 mmol/L    Anion gap 8 7 - 16 mmol/L    Glucose 205 (H) 65 - 100 mg/dL    BUN 16 8 - 23 MG/DL    Creatinine 1.57 (H) 0.6 - 1.0 MG/DL    GFR est AA 42 (L) >60 ml/min/1.73m2    GFR est non-AA 34 (L) >60 ml/min/1.73m2    Calcium 9.4 8.3 - 10.4 MG/DL    Bilirubin, total 0.6 0.2 - 1.1 MG/DL    ALT (SGPT) 50 12 - 65 U/L    AST (SGOT) 46 (H) 15 - 37 U/L    Alk. phosphatase 320 (H) 50 - 136 U/L    Protein, total 8.9 (H) 6.3 - 8.2 g/dL    Albumin 3.3 3.2 - 4.6 g/dL    Globulin 5.6 (H) 2.3 - 3.5 g/dL    A-G Ratio 0.6 (L) 1.2 - 3.5     TROPONIN-HIGH SENSITIVITY    Collection Time: 02/15/21 10:10 AM   Result Value Ref Range    Troponin-High Sensitivity 34.2 (H) 0 - 14 pg/mL   D DIMER    Collection Time: 02/15/21 10:10 AM   Result Value Ref Range    D DIMER 2.96 (H) <0.56 ug/ml(FEU)   TROPONIN-HIGH SENSITIVITY    Collection Time: 02/15/21 11:32 AM   Result Value Ref Range    Troponin-High Sensitivity 31.5 (H) 0 - 14 pg/mL     CT chest   2-  IMPRESSION  1. No evidence of a pulmonary embolism.     2. Findings support heart failure with cardiomegaly, pleural effusions, basilar  atelectasis, and pulmonary interstitial edema.     3. Cirrhosis.     4. Atherosclerosis including coronary atherosclerosis. XR chest   2-  IMPRESSION  1. Persistent pulmonary edema     2.  Atherosclerosis        Assessment:     Hospital Problems  Date Reviewed: 10/21/2020          Codes Class Noted POA    * (Principal) CHF (congestive heart failure) ([facility]) ICD-10-CM: I50.9  ICD-9-CM: 428.0  2/15/2021 Unknown        Congest[facility] heart failure (CHF) [facility] ICD-10-CM: I50.9  ICD-9-CM: 428.0  2/15/2021 Unknown        Acute respiratory failure with hypoxemia (HCC) ICD-10-CM: J96.01  ICD-9-CM: 518.81  2/15/2021 Unknown        Mixed hyperlipidemia ICD-10-CM: E78.2  ICD-9-CM: 272.2  10/21/2020 Yes        S/P PTCA (percutaneous transluminal coronary angioplasty) ICD-10-CM: Z98.61  ICD-9-CM: V45.82  9/1/2020 Yes        Type 2 diabetes mellitus with hyperglycemia, without long-term current use of insulin (HCC) ICD-10-CM: E11.65  ICD-9-CM: 250.00, 790.29  10/11/2018 Yes        Gastroesophageal reflux disease ICD-10-CM: K21.9  ICD-9-CM: 530.81  2/16/2017 Yes        Essential hypertension (Chronic) ICD-10-CM: I10  ICD-9-CM: 401.9  9/15/2016 Yes        Chronic kidney disease, stage III (moderate) (HCC) ICD-10-CM: N18.30  ICD-9-CM: 585.3  9/15/2016 Yes        Coronary artery disease involving native coronary artery of native heart without angina pectoris ICD-10-CM: I25.10  ICD-9-CM: 414.01  9/4/2013 Yes              Plan:     Acute respiratory failure with hypoxia   CHF   Admit to medical floor with remote telemetry   IV access   IV Lasix   Likely will need Echo   Ask cardiology to see in this high risk patient for deterioration     Diabetes mellitus type 2  Monitor blood sugar. Cover with insulin sliding scale accordingly. Hypertension  Monitor blood pressure and manage accordingly. Continue home medications. Dyslipidemia  Continue home medications. CKD stage 3  Monitor closely   Monitor renal function and intake and output. Avoid nephrotoxic agents. Patient requires hospital stay as an in-patient and anticipated stay is more than 2 midnights due to the serious nature of the illness. Healthcare power of  is Tabitha Ariana, . I have discussed with patient regarding advance directive. Patient would like to have a DNR status. I have discussed the plan of care with patient. Patient has no pain now. Will monitor. Further treatments will depend on initial responses and findings.       DVT prophylaxis : Lovenox SC         Signed By: Joshua Souza MD February 15, 2021

## 2021-02-15 NOTE — PROGRESS NOTES
Problem: Falls - Risk of  Goal: *Absence of Falls  Description: Document Rachell Amaya Fall Risk and appropriate interventions in the flowsheet.   Note: Fall Risk Interventions:  Mobility Interventions: Patient to call before getting OOB         Medication Interventions: Patient to call before getting OOB, Teach patient to arise slowly    Elimination Interventions: Call light in reach

## 2021-02-16 LAB
ANION GAP SERPL CALC-SCNC: 7 MMOL/L (ref 7–16)
BUN SERPL-MCNC: 15 MG/DL (ref 8–23)
CALCIUM SERPL-MCNC: 8.7 MG/DL (ref 8.3–10.4)
CHLORIDE SERPL-SCNC: 103 MMOL/L (ref 98–107)
CO2 SERPL-SCNC: 29 MMOL/L (ref 21–32)
CREAT SERPL-MCNC: 1.5 MG/DL (ref 0.6–1)
GLUCOSE BLD STRIP.AUTO-MCNC: 163 MG/DL (ref 65–100)
GLUCOSE BLD STRIP.AUTO-MCNC: 214 MG/DL (ref 65–100)
GLUCOSE BLD STRIP.AUTO-MCNC: 223 MG/DL (ref 65–100)
GLUCOSE SERPL-MCNC: 142 MG/DL (ref 65–100)
POTASSIUM SERPL-SCNC: 3.6 MMOL/L (ref 3.5–5.1)
SODIUM SERPL-SCNC: 139 MMOL/L (ref 136–145)

## 2021-02-16 PROCEDURE — 74011250637 HC RX REV CODE- 250/637: Performed by: NURSE PRACTITIONER

## 2021-02-16 PROCEDURE — 77030038269 HC DRN EXT URIN PURWCK BARD -A

## 2021-02-16 PROCEDURE — 99218 HC RM OBSERVATION: CPT

## 2021-02-16 PROCEDURE — 65660000000 HC RM CCU STEPDOWN

## 2021-02-16 PROCEDURE — 36415 COLL VENOUS BLD VENIPUNCTURE: CPT

## 2021-02-16 PROCEDURE — 82962 GLUCOSE BLOOD TEST: CPT

## 2021-02-16 PROCEDURE — 77030040832 HC IRR TRAY MDII -A

## 2021-02-16 PROCEDURE — 99232 SBSQ HOSP IP/OBS MODERATE 35: CPT | Performed by: INTERNAL MEDICINE

## 2021-02-16 PROCEDURE — 83883 ASSAY NEPHELOMETRY NOT SPEC: CPT

## 2021-02-16 PROCEDURE — 74011250636 HC RX REV CODE- 250/636: Performed by: INTERNAL MEDICINE

## 2021-02-16 PROCEDURE — 80048 BASIC METABOLIC PNL TOTAL CA: CPT

## 2021-02-16 PROCEDURE — 74011636637 HC RX REV CODE- 636/637: Performed by: FAMILY MEDICINE

## 2021-02-16 PROCEDURE — 93306 TTE W/DOPPLER COMPLETE: CPT

## 2021-02-16 PROCEDURE — 74011636637 HC RX REV CODE- 636/637: Performed by: INTERNAL MEDICINE

## 2021-02-16 PROCEDURE — 2709999900 HC NON-CHARGEABLE SUPPLY

## 2021-02-16 PROCEDURE — 84165 PROTEIN E-PHORESIS SERUM: CPT

## 2021-02-16 PROCEDURE — 74011250637 HC RX REV CODE- 250/637: Performed by: INTERNAL MEDICINE

## 2021-02-16 RX ORDER — FUROSEMIDE 10 MG/ML
40 INJECTION INTRAMUSCULAR; INTRAVENOUS DAILY
Status: DISCONTINUED | OUTPATIENT
Start: 2021-02-17 | End: 2021-02-17

## 2021-02-16 RX ORDER — ISOSORBIDE MONONITRATE 30 MG/1
60 TABLET, EXTENDED RELEASE ORAL DAILY
Status: DISCONTINUED | OUTPATIENT
Start: 2021-02-17 | End: 2021-02-24 | Stop reason: HOSPADM

## 2021-02-16 RX ORDER — INSULIN LISPRO 100 [IU]/ML
0-10 INJECTION, SOLUTION INTRAVENOUS; SUBCUTANEOUS
Status: DISCONTINUED | OUTPATIENT
Start: 2021-02-16 | End: 2021-02-24 | Stop reason: HOSPADM

## 2021-02-16 RX ADMIN — INSULIN GLARGINE 8 UNITS: 100 INJECTION, SOLUTION SUBCUTANEOUS at 22:36

## 2021-02-16 RX ADMIN — FUROSEMIDE 40 MG: 10 INJECTION, SOLUTION INTRAMUSCULAR; INTRAVENOUS at 08:38

## 2021-02-16 RX ADMIN — ASPIRIN 81 MG: 81 TABLET, CHEWABLE ORAL at 06:24

## 2021-02-16 RX ADMIN — METOPROLOL SUCCINATE 50 MG: 50 TABLET, EXTENDED RELEASE ORAL at 08:38

## 2021-02-16 RX ADMIN — ACETAMINOPHEN 650 MG: 325 TABLET ORAL at 06:23

## 2021-02-16 RX ADMIN — SODIUM CHLORIDE 10 ML: 9 INJECTION, SOLUTION INTRAMUSCULAR; INTRAVENOUS; SUBCUTANEOUS at 06:21

## 2021-02-16 RX ADMIN — ROSUVASTATIN CALCIUM 40 MG: 20 TABLET, COATED ORAL at 22:36

## 2021-02-16 RX ADMIN — SODIUM CHLORIDE 10 ML: 9 INJECTION, SOLUTION INTRAMUSCULAR; INTRAVENOUS; SUBCUTANEOUS at 22:37

## 2021-02-16 RX ADMIN — ENOXAPARIN SODIUM 40 MG: 100 INJECTION SUBCUTANEOUS at 08:38

## 2021-02-16 RX ADMIN — METOPROLOL SUCCINATE 50 MG: 50 TABLET, EXTENDED RELEASE ORAL at 18:00

## 2021-02-16 RX ADMIN — ACETAMINOPHEN 650 MG: 325 TABLET ORAL at 23:30

## 2021-02-16 RX ADMIN — NIFEDIPINE 90 MG: 90 TABLET, FILM COATED, EXTENDED RELEASE ORAL at 08:38

## 2021-02-16 RX ADMIN — INSULIN LISPRO 4 UNITS: 100 INJECTION, SOLUTION INTRAVENOUS; SUBCUTANEOUS at 16:56

## 2021-02-16 RX ADMIN — INSULIN LISPRO 4 UNITS: 100 INJECTION, SOLUTION INTRAVENOUS; SUBCUTANEOUS at 22:37

## 2021-02-16 RX ADMIN — SODIUM CHLORIDE 10 ML: 9 INJECTION, SOLUTION INTRAMUSCULAR; INTRAVENOUS; SUBCUTANEOUS at 13:29

## 2021-02-16 RX ADMIN — ISOSORBIDE MONONITRATE 30 MG: 30 TABLET, EXTENDED RELEASE ORAL at 06:24

## 2021-02-16 RX ADMIN — CLOPIDOGREL BISULFATE 75 MG: 75 TABLET ORAL at 08:38

## 2021-02-16 NOTE — PROGRESS NOTES
02/15/21 1915   Dual Skin Pressure Injury Assessment   Dual Skin Pressure Injury Assessment WDL   Second Care Provider (Based on [address] [place] [address]) Charissa Alegria RN   Skin Integumentary   Skin Integumentary (WDL) WDL    Pressure  Injury Documentation No Pressure Injury Noted-Pressure Ulcer Prevention Initiated

## 2021-02-16 NOTE — DIABETES MGMT
Accessed chart related to glycemic control to see if patient would benefit from diabetes management services. Patient is known to DM management team as she was seen during a previous hospitalization last month. Note no SSI ordered, only Lantus 8 units QHS. Blood glucose ranged 205-270 yesterday and 163 this morning. Recommend provider order SSI Provider updated via [email or web address] regarding patient glycemic control and recommendations.

## 2021-02-16 NOTE — PROGRESS NOTES
[facility] CARDIOLOGY PROGRESS NOTE           2/16/2021 11:32 AM    Admit Date: 2/15/2021    Subjective:   States that her breathing better today, able to tolerate supine positioning. Some rib pain continues.      ROS:  Cardiovascular:  As noted above    Objective:      Vitals:    02/16/21 0503 02/16/21 0743 02/16/21 0800 02/16/21 1059   BP: (!) 161/69 (!) 151/71  (!) 140/68   Pulse: 90 86 70 73   Resp: 18 18  18   Temp: 99.1 °F (37.3 °C) 98.9 °F (37.2 °C)  98.3 °F (36.8 °C)   SpO2: 94% 90%  96%   Weight: 157 lb 1.6 oz (71.3 kg)      Height:         Physical Exam:  General-No Acute Distress, awake, lying supine comfortably   Neck- supple, no JVD  CV- regular rate and rhythm no MRG  Lung- decreased at the bases bilaterally  Abd- soft, nontender, nondistended  Ext- minimal edema bilaterally in legs   Skin- warm and dry    Data Review:   Recent Labs     02/16/21  0557 02/15/21  1010    136   K 3.6 3.4*   BUN 15 16   CREA 1.50* 1.57*   * 205*   WBC  --  9.6   HGB  --  9.2*   HCT  --  30.6*   PLT  --  551*       Assessment/Plan:     Principal Problem:    Congestive heart failure (CHF) ([facility]) (2/15/2021)    - HFpEF    - unclear etiology possibly HTN related    - continue IV lasix today, strict I/O    Active Problems:    Coronary artery disease involving native coronary artery of native heart without angina pectoris    S/P PTCA (percutaneous transluminal coronary angioplasty) (9/1/2020) (9/4/2013)    - on DAPT given multiple PCI in the past, rosuvastatin, metoprolol    - no evidence of acute MI      Essential hypertension (9/15/2016)    - increase Imdur to 60 , continue nifedipine       Chronic kidney disease, stage III (moderate) ([facility]) (9/15/2016)    - Cr stable at this time , monitor       Gastroesophageal reflux disease (2/16/2017)    - PPI       Type 2 diabetes mellitus with hyperglycemia, without long-term current use of insulin ([facility]) (10/11/2018)    - pe primary       Mixed hyperlipidemia (10/21/2020)    - continue rosuvastatin 40       Acute respiratory failure with hypoxemia ([facility] (2/15/2021)    - secondary to above, improving     Ginger Plascencia DO  2/16/2021 11:32 AM

## 2021-02-16 NOTE — DISCHARGE INSTRUCTIONS
Patient Education        Heart Failure: Care Instructions  Your Care Instructions     Heart failure occurs when your heart does not pump as much blood as the body needs. Failure does not mean that the heart has stopped pumping but rather that it is not pumping as well as it should. Over time, this causes fluid buildup in your lungs and other parts of your body. Fluid buildup can cause shortness of breath, fatigue, swollen ankles, and other problems. By taking medicines regularly, reducing sodium (salt) in your diet, checking your weight every day, and making lifestyle changes, you can feel better and live longer. Follow-up care is a key part of your treatment and safety. Be sure to make and go to all appointments, and call your doctor if you are having problems. It's also a good idea to know your test results and keep a list of the medicines you take. How can you care for yourself at home? Medicines    · Be safe with medicines. Take your medicines exactly as prescribed. Call your doctor if you think you are having a problem with your medicine.     · Do not take any vitamins, over-the-counter medicine, or herbal products without talking to your doctor first. Danny Wiseman not take ibuprofen (Advil or Motrin) and naproxen (Aleve) without talking to your doctor first. They could make your heart failure worse.     · You may take some of the following medicine. ? Angiotensin-converting enzyme inhibitors (ACEIs) or angiotensin II receptor blockers (ARBs) reduce the heart's workload, lower blood pressure, and reduce swelling. Taking an ACEI or ARB may lower your chance of needing to be hospitalized. ? Beta-blockers can slow heart rate, decrease blood pressure, and improve your condition. Taking a beta-blocker may lower your chance of needing to be hospitalized. ? Diuretics, also called water pills, reduce swelling. You will get more details on the specific medicines your doctor prescribes.   Diet    · Your doctor may suggest that you limit sodium. Your doctor can tell you how much sodium is right for you. An example is less than 3,000 mg a day. This includes all the salt you eat in cooking or in packaged foods. People get most of their sodium from processed foods. Fast food and restaurant meals also tend to be very high in sodium.     · Ask your doctor how much liquid you can drink each day. You may have to limit liquids. Weight    · Weigh yourself without clothing at the same time each day. Record your weight. Call your doctor if you have a sudden weight gain, such as more than 2 to 3 pounds in a day or 5 pounds in a week. (Your doctor may suggest a different range of weight gain.) A sudden weight gain may mean that your heart failure is getting worse. Activity level    · Start light exercise (if your doctor says it is okay). Even if you can only do a small amount, exercise will help you get stronger, have more energy, and manage your weight and your stress. Walking is an easy way to get exercise. Start out by walking a little more than you did before. Bit by bit, increase the amount you walk.     · When you exercise, watch for signs that your heart is working too hard. You are pushing yourself too hard if you cannot talk while you are exercising. If you become short of breath or dizzy or have chest pain, stop, sit down, and rest.     · If you feel \"wiped out\" the day after you exercise, walk slower or for a shorter distance until you can work up to a better pace.     · Get enough rest at night. Sleeping with 1 or 2 pillows under your upper body and head may help you breathe easier. Lifestyle changes    · Do not smoke. Smoking can make a heart condition worse. If you need help quitting, talk to your doctor about stop-smoking programs and medicines. These can increase your chances of quitting for good.  Quitting smoking may be the most important step you can take to protect your heart.     · Limit alcohol to 2 drinks a day for men and 1 drink a day for women. Too much alcohol can cause health problems.     · Avoid getting sick from colds and the flu. Get a pneumococcal vaccine shot. If you have had one before, ask your doctor whether you need another dose. Get a flu shot each year. If you must be around people with colds or the flu, wash your hands often. When should you call for help? Call 911 if you have symptoms of sudden heart failure such as:    · You have severe trouble breathing.     · You cough up pink, foamy mucus.     · You have a new irregular or rapid heartbeat. Call your doctor now or seek immediate medical care if:    · You have new or increased shortness of breath.     · You are dizzy or lightheaded, or you feel like you may faint.     · You have sudden weight gain, such as more than 2 to 3 pounds in a day or 5 pounds in a week. (Your doctor may suggest a different range of weight gain.)     · You have increased swelling in your legs, ankles, or feet.     · You are suddenly so tired or weak that you cannot do your usual activities. Watch closely for changes in your health, and be sure to contact your doctor if you develop new symptoms. Where can you learn more? Go to http://www.gray.com/  Enter I936 in the search box to learn more about \"Heart Failure: Care Instructions. \"  Current as of: December 16, 2019               Content Version: 12.6  © 8852-6695 [employer]. Care instructions adapted under license by [employer] (which disclaims liability or warranty for this information). If you have questions about a medical condition or this instruction, always ask your healthcare professional. [place] [address] any warranty or liability for your use of this information.

## 2021-02-16 NOTE — PROGRESS NOTES
Hourly rounds performed;all pt needs met. PRN pain medication administered x1 this am for complaints of headache. Lasix has been effective. Bed in low position and call light/ personal items within reach. Will continue to monitor and give bedside shift report to [facility] day shift nurse.      Date 02/15/21 0700 - 02/16/21 0659 02/16/21 0700 - 02/17/21 0659   Shift 0947-0476 1510-8452 24 Hour Total 7438-4978 6967-7608 24 Hour Total   INTAKE   Shift Total(mL/kg)         OUTPUT   Urine(mL/kg/hr) 800(1) 2450(2.9) 3250(1.9)        Urine Voided 800  800        Urine Output (mL) (External Female Catheter 02/15/21)  2450 2450      Shift Total(mL/kg) 800(11.8) 3067(75.9) 3250(45.6)      NET -800 -2450 -3250      Weight (kg) 68 71.3 71.3 71.3 71.3 71.3

## 2021-02-16 NOTE — PROGRESS NOTES
Ms. Jessica Mtz asked PT to return tomorrow. She has been up to the bathroom and has no needs just tired. She was seen by PT when she was here in January. She reports she used a RW for a while but had graduated from that at home. Will attempt tomorrow.   Ninfa Espinoza, PT

## 2021-02-16 NOTE — PROGRESS NOTES
Progress Note    Patient: Elke Ho MRN: [id number]  SSN: [id number]    YOB: 1950  Age: 65 y.o. Sex: female      Admit Date: 2/15/2021    LOS: 1 day     Subjective:   F/U CHF exacerbation    65year old w/ a PMhx of CAD s/p stents x3, DMII, CKD stage 3, COVID diagnosed last month not a candidate for remdesivir or plasma who presented shortness of breathe and edema to LE bilaterally. CT chest with contrast showed No evidence of a pulmonary embolism. Findings support heart failure with cardiomegaly, pleural effusions, basilar atelectasis, and pulmonary interstitial edema. Lasix 40mg BID started. Is/Os (-)2.6L. NO chest pain or SOB. Feeling better.    Current Facility-Administered Medications   Medication Dose Route Frequency    [START ON 2/17/2021] isosorbide mononitrate ER (IMDUR) tablet 60 mg  60 mg Oral DAILY    insulin lispro (HUMALOG) injection 0-10 Units  0-10 Units SubCUTAneous AC&HS    sodium chloride (NS) flush 5-40 mL  5-40 mL IntraVENous Q8H    sodium chloride (NS) flush 5-40 mL  5-40 mL IntraVENous PRN    acetaminophen (TYLENOL) tablet 650 mg  650 mg Oral Q6H PRN    Or    acetaminophen (TYLENOL) suppository 650 mg  650 mg Rectal Q6H PRN    polyethylene glycol (MIRALAX) packet 17 g  17 g Oral DAILY PRN    promethazine (PHENERGAN) tablet 12.5 mg  12.5 mg Oral Q6H PRN    Or    ondansetron (ZOFRAN) injection 4 mg  4 mg IntraVENous Q6H PRN    enoxaparin (LOVENOX) injection 40 mg  40 mg SubCUTAneous DAILY    furosemide (LASIX) injection 40 mg  40 mg IntraVENous Q12H    aspirin chewable tablet 81 mg  81 mg Oral 7am    clopidogreL (PLAVIX) tablet 75 mg  75 mg Oral DAILY    insulin glargine (LANTUS) injection 8 Units  8 Units SubCUTAneous QHS    NIFEdipine ER (PROCARDIA XL) tablet 90 mg  90 mg Oral DAILY    rosuvastatin (CRESTOR) tablet 40 mg  40 mg Oral QHS    metoprolol succinate (TOPROL-XL) XL tablet 50 mg  50 mg Oral BID       Objective:     Vitals: 02/16/21 0503 02/16/21 0743 02/16/21 0800 02/16/21 1059   BP: (!) 161/69 (!) 151/71  (!) 140/68   Pulse: 90 86 70 73   Resp: 18 18  18   Temp: 99.1 °F (37.3 °C) 98.9 °F (37.2 °C)  98.3 °F (36.8 °C)   SpO2: 94% 90%  96%   Weight: 71.3 kg (157 lb 1.6 oz)      Height:             Intake and Output:  Current Shift: 02/16 0701 - 02/16 1900  In: 600 [P.O.:600]  Out: -   Last three shifts: 02/14 1901 - 02/16 0700  In: -   Out: 1846 [Urine:3250]    Physical Exam:   General:  Alert, cooperative, no distress, appears stated age. Talkative and in good spirits. Eyes:  Conjunctivae/corneas clear. Ears:  Normal TMs and external ear canals both ears. Nose: Nares normal. Septum midline. Mouth/Throat: Lips, mucosa, and tongue normal.    Neck:  no JVD. Back:   deferred   Lungs:   Clear to auscultation bilaterally. Heart:  Regular rate and rhythm, S1, S2 normal   Abdomen:   Soft, non-tender. Bowel sounds normal.    Extremities: Extremities normal, atraumatic, no cyanosis or edema. Pulses: 2+ and symmetric all extremities. Skin: Skin color, texture, turgor normal. No rashes or lesions   Lymph nodes: Cervical, supraclavicular, and axillary nodes normal.   Neurologic: CNII-XII intact.        Lab/Data Review:    Recent Results (from the past 24 hour(s))   GLUCOSE, POC    Collection Time: 02/15/21  9:11 PM   Result Value Ref Range    Glucose (POC) 270 (H) 65 - 272 mg/dL   METABOLIC PANEL, BASIC    Collection Time: 02/16/21  5:57 AM   Result Value Ref Range    Sodium 139 136 - 145 mmol/L    Potassium 3.6 3.5 - 5.1 mmol/L    Chloride 103 98 - 107 mmol/L    CO2 29 21 - 32 mmol/L    Anion gap 7 7 - 16 mmol/L    Glucose 142 (H) 65 - 100 mg/dL    BUN 15 8 - 23 MG/DL    Creatinine 1.50 (H) 0.6 - 1.0 MG/DL    GFR est AA 44 (L) >60 ml/min/1.73m2    GFR est non-AA 36 (L) >60 ml/min/1.73m2    Calcium 8.7 8.3 - 10.4 MG/DL   GLUCOSE, POC    Collection Time: 02/16/21  7:42 AM   Result Value Ref Range    Glucose (POC) 163 (H) 65 - 100 mg/dL       Assessment/ Plan:     Principal Problem:    CHF (congestive heart failure) ([facility] (2/15/2021)    Active Problems:    Coronary artery disease involving native coronary artery of native heart without angina pectoris (9/4/2013)      Essential hypertension (9/15/2016)      Chronic kidney disease, stage III (moderate) ([facility]) (9/15/2016)      Gastroesophageal reflux disease (2/16/2017)      Type 2 diabetes mellitus with hyperglycemia, without long-term current use of insulin ([facility]) (10/11/2018)      S/P PTCA (percutaneous transluminal coronary angioplasty) (9/1/2020)      Mixed hyperlipidemia (10/21/2020)      Congestive heart failure (CHF) ([facility] [address] (2/15/2021)      Acute respiratory failure with hypoxemia ([address] (2/15/2021)    Reduce lasix to once daily since I do not see edema today. BB. Also on imdur and procardia. Order ECHO. 2gm Na diet and 2L FR.      DM type II - Lantus 8 units , SS     ASA/plavix/statin    Likely dc tomorrow after ECHO done    DVT prophylaxis - Lovenox   Signed By: Ryder Cid DO     February 16, 2021

## 2021-02-16 NOTE — PROGRESS NOTES
Pt with complaints of nausea. Medicated per MAR. Patient states that lasix always causes her to have nausea. Will continue to monitor.

## 2021-02-16 NOTE — PROGRESS NOTES
Care Management Interventions  PCP Verified by CM: Yes(Dr. Yulissa Godwin)  Mode of Transport at Discharge: Self  Transition of Care Consult (CM Consult): Discharge Planning  Discharge Durable Medical Equipment: No  Physical Therapy Consult: No  Occupational Therapy Consult: No  Speech Therapy Consult: No  Current Support Network: Own Home, Lives with Spouse  Confirm Follow Up Transport: Self  [place] Resource Information Provided?: No  Discharge Location  Discharge Placement: Home    CM met with patient in room. Patient alert and orient and verified all demographic information to be correct. Patient stated she and her  live in a one story home that has no steps to enter. Patient stated she does not use any DME at home at this time. Patient stated she is independent with all ADL's and continues to drive. Patient stated she is able to afford her needed medications and obtains them from [employer] and [employer]. Patient stated she was discharged with [facility] at last discharge and completed their services. Patient stated she has never been to [facility] and wants to return home at discharge. PT/OT have not been consulted at this time. CM will continue to follow patient during hospitalization for discharge planning and needs. Please consult or notify CM of new needs.      Readmission Assessment  Number of days since last admission?: 8-30 days  Previous disposition: Home with Home Health  Who is being interviewed?: Patient  What was the patient's/caregiver's perception as to why they think they needed to return back to the hospital?: Other (Comment)(filled up with fluid)  Did you visit your Primary Care Physician after you left the hospital, before you returned this time?: No  Why weren't you able to visit your PCP?: Other (Comment)(Appointment was scheduled for 2/16/2021)  Did you see a specialist, such as Cardiac, Pulmonary, Orthopedic Physician, etc. after you left the hospital?: Yes  Who advised the patient to return to the hospital?: Self-referral  Does the patient report anything that got in the way of taking their medications?: No  In our efforts to provide the best possible care to you and others like you, can you think of anything that we could have done to help you after you left the hospital the first time, so that you might not have needed to return so soon?: Other (Comment)(none)

## 2021-02-17 ENCOUNTER — APPOINTMENT (OUTPATIENT)
Dept: CT IMAGING | Age: 71
DRG: 291 | End: 2021-02-17
Attending: INTERNAL MEDICINE
Payer: MEDICARE

## 2021-02-17 PROBLEM — I50.43 ACUTE ON CHRONIC COMBINED SYSTOLIC AND DIASTOLIC CONGESTIVE HEART FAILURE (HCC): Status: ACTIVE | Noted: 2021-02-17

## 2021-02-17 PROBLEM — J96.01 ACUTE RESPIRATORY FAILURE WITH HYPOXIA (HCC): Status: ACTIVE | Noted: 2021-02-17

## 2021-02-17 LAB
ALBUMIN SERPL-MCNC: 2.4 G/DL (ref 3.2–4.6)
ANION GAP SERPL CALC-SCNC: 5 MMOL/L (ref 7–16)
BUN SERPL-MCNC: 17 MG/DL (ref 8–23)
CALCIUM SERPL-MCNC: 8.5 MG/DL (ref 8.3–10.4)
CHLORIDE SERPL-SCNC: 104 MMOL/L (ref 98–107)
CO2 SERPL-SCNC: 30 MMOL/L (ref 21–32)
CREAT SERPL-MCNC: 1.52 MG/DL (ref 0.6–1)
GLUCOSE BLD STRIP.AUTO-MCNC: 126 MG/DL (ref 65–100)
GLUCOSE BLD STRIP.AUTO-MCNC: 210 MG/DL (ref 65–100)
GLUCOSE BLD STRIP.AUTO-MCNC: 212 MG/DL (ref 65–100)
GLUCOSE SERPL-MCNC: 232 MG/DL (ref 65–100)
KAPPA LC FREE SER-MCNC: 127.74 MG/L (ref 3.3–19.4)
KAPPA LC FREE/LAMBDA FREE SER: 1.82 {RATIO} (ref 0.26–1.65)
LAMBDA LC FREE SERPL-MCNC: 70.32 MG/L (ref 5.71–26.3)
PHOSPHATE SERPL-MCNC: 4.2 MG/DL (ref 2.3–3.7)
POTASSIUM SERPL-SCNC: 3.4 MMOL/L (ref 3.5–5.1)
SODIUM SERPL-SCNC: 139 MMOL/L (ref 136–145)

## 2021-02-17 PROCEDURE — 99232 SBSQ HOSP IP/OBS MODERATE 35: CPT | Performed by: INTERNAL MEDICINE

## 2021-02-17 PROCEDURE — 80069 RENAL FUNCTION PANEL: CPT

## 2021-02-17 PROCEDURE — 74177 CT ABD & PELVIS W/CONTRAST: CPT

## 2021-02-17 PROCEDURE — 74011250637 HC RX REV CODE- 250/637: Performed by: INTERNAL MEDICINE

## 2021-02-17 PROCEDURE — 36415 COLL VENOUS BLD VENIPUNCTURE: CPT

## 2021-02-17 PROCEDURE — 74011000636 HC RX REV CODE- 636: Performed by: INTERNAL MEDICINE

## 2021-02-17 PROCEDURE — 97530 THERAPEUTIC ACTIVITIES: CPT

## 2021-02-17 PROCEDURE — 82962 GLUCOSE BLOOD TEST: CPT

## 2021-02-17 PROCEDURE — 65660000000 HC RM CCU STEPDOWN

## 2021-02-17 PROCEDURE — 74011636637 HC RX REV CODE- 636/637: Performed by: FAMILY MEDICINE

## 2021-02-17 PROCEDURE — 97161 PT EVAL LOW COMPLEX 20 MIN: CPT

## 2021-02-17 PROCEDURE — 74011000258 HC RX REV CODE- 258: Performed by: INTERNAL MEDICINE

## 2021-02-17 PROCEDURE — 74011636637 HC RX REV CODE- 636/637: Performed by: INTERNAL MEDICINE

## 2021-02-17 PROCEDURE — 74011250637 HC RX REV CODE- 250/637: Performed by: NURSE PRACTITIONER

## 2021-02-17 PROCEDURE — 77030040832 HC IRR TRAY MDII -A

## 2021-02-17 PROCEDURE — 74011250636 HC RX REV CODE- 250/636: Performed by: INTERNAL MEDICINE

## 2021-02-17 PROCEDURE — 74011000250 HC RX REV CODE- 250: Performed by: INTERNAL MEDICINE

## 2021-02-17 PROCEDURE — 74011250636 HC RX REV CODE- 250/636: Performed by: FAMILY MEDICINE

## 2021-02-17 RX ORDER — SODIUM CHLORIDE 0.9 % (FLUSH) 0.9 %
10 SYRINGE (ML) INJECTION
Status: COMPLETED | OUTPATIENT
Start: 2021-02-17 | End: 2021-02-17

## 2021-02-17 RX ORDER — SIMETHICONE 80 MG
80 TABLET,CHEWABLE ORAL
Status: DISCONTINUED | OUTPATIENT
Start: 2021-02-17 | End: 2021-02-24 | Stop reason: HOSPADM

## 2021-02-17 RX ORDER — FUROSEMIDE 10 MG/ML
40 INJECTION INTRAMUSCULAR; INTRAVENOUS 2 TIMES DAILY
Status: DISCONTINUED | OUTPATIENT
Start: 2021-02-17 | End: 2021-02-18

## 2021-02-17 RX ORDER — SIMETHICONE 80 MG
80 TABLET,CHEWABLE ORAL
Status: COMPLETED | OUTPATIENT
Start: 2021-02-17 | End: 2021-02-17

## 2021-02-17 RX ORDER — CARVEDILOL 6.25 MG/1
6.25 TABLET ORAL 2 TIMES DAILY WITH MEALS
Status: DISCONTINUED | OUTPATIENT
Start: 2021-02-17 | End: 2021-02-21

## 2021-02-17 RX ADMIN — CARVEDILOL 6.25 MG: 6.25 TABLET, FILM COATED ORAL at 17:16

## 2021-02-17 RX ADMIN — CLOPIDOGREL BISULFATE 75 MG: 75 TABLET ORAL at 09:17

## 2021-02-17 RX ADMIN — SODIUM CHLORIDE 10 ML: 9 INJECTION, SOLUTION INTRAMUSCULAR; INTRAVENOUS; SUBCUTANEOUS at 13:34

## 2021-02-17 RX ADMIN — METOPROLOL SUCCINATE 50 MG: 50 TABLET, EXTENDED RELEASE ORAL at 09:17

## 2021-02-17 RX ADMIN — DIATRIZOATE MEGLUMINE AND DIATRIZOATE SODIUM 15 ML: 660; 100 LIQUID ORAL; RECTAL at 21:01

## 2021-02-17 RX ADMIN — ENOXAPARIN SODIUM 40 MG: 100 INJECTION SUBCUTANEOUS at 09:16

## 2021-02-17 RX ADMIN — FUROSEMIDE 40 MG: 10 INJECTION, SOLUTION INTRAMUSCULAR; INTRAVENOUS at 09:16

## 2021-02-17 RX ADMIN — NIFEDIPINE 90 MG: 90 TABLET, FILM COATED, EXTENDED RELEASE ORAL at 09:17

## 2021-02-17 RX ADMIN — PROMETHAZINE HYDROCHLORIDE 12.5 MG: 25 INJECTION INTRAMUSCULAR; INTRAVENOUS at 13:44

## 2021-02-17 RX ADMIN — FUROSEMIDE 40 MG: 10 INJECTION, SOLUTION INTRAMUSCULAR; INTRAVENOUS at 17:16

## 2021-02-17 RX ADMIN — ASPIRIN 81 MG: 81 TABLET, CHEWABLE ORAL at 05:37

## 2021-02-17 RX ADMIN — SODIUM CHLORIDE 10 ML: 9 INJECTION, SOLUTION INTRAMUSCULAR; INTRAVENOUS; SUBCUTANEOUS at 21:44

## 2021-02-17 RX ADMIN — SODIUM CHLORIDE 100 ML: 900 INJECTION, SOLUTION INTRAVENOUS at 22:14

## 2021-02-17 RX ADMIN — SIMETHICONE 80 MG: 80 TABLET, CHEWABLE ORAL at 09:23

## 2021-02-17 RX ADMIN — INSULIN LISPRO 4 UNITS: 100 INJECTION, SOLUTION INTRAVENOUS; SUBCUTANEOUS at 17:15

## 2021-02-17 RX ADMIN — ONDANSETRON 4 MG: 2 INJECTION INTRAMUSCULAR; INTRAVENOUS at 12:02

## 2021-02-17 RX ADMIN — ISOSORBIDE MONONITRATE 60 MG: 30 TABLET, EXTENDED RELEASE ORAL at 09:17

## 2021-02-17 RX ADMIN — IOPAMIDOL 100 ML: 755 INJECTION, SOLUTION INTRAVENOUS at 22:14

## 2021-02-17 RX ADMIN — SODIUM CHLORIDE 10 ML: 9 INJECTION, SOLUTION INTRAMUSCULAR; INTRAVENOUS; SUBCUTANEOUS at 05:38

## 2021-02-17 RX ADMIN — INSULIN LISPRO 4 UNITS: 100 INJECTION, SOLUTION INTRAVENOUS; SUBCUTANEOUS at 21:45

## 2021-02-17 RX ADMIN — Medication 10 ML: at 22:14

## 2021-02-17 RX ADMIN — INSULIN GLARGINE 8 UNITS: 100 INJECTION, SOLUTION SUBCUTANEOUS at 21:45

## 2021-02-17 RX ADMIN — ROSUVASTATIN CALCIUM 40 MG: 20 TABLET, COATED ORAL at 21:43

## 2021-02-17 NOTE — PROGRESS NOTES
[facility] CARDIOLOGY PROGRESS NOTE           2/17/2021 10:52 AM    Admit Date: 2/15/2021    Subjective:     Improved dyspnea; on 2L NC. Some rib pain continues from prior CPR which appears musculoskeletal related. ~3.6L net neg    ROS:  Cardiovascular:  As noted above    Objective:      Vitals:    02/17/21 0000 02/17/21 0400 02/17/21 0457 02/17/21 0730   BP:   (!) 131/59 (!) 160/68   Pulse: 75 80 78 84   Resp:   17 18   Temp:   98 °F (36.7 °C) 98.6 °F (37 °C)   SpO2:   96% 95%   Weight:   153 lb 6.4 oz (69.6 kg)    Height:         Physical Exam:  General-No Acute Distress, awake, lying supine comfortably   Neck- supple, no JVD  CV- regular rate and rhythm no MRG  Lung- basilar rales  Abd- soft, nontender, nondistended  Ext- no edema bilaterally in legs   Skin- warm and dry    Data Review:   Recent Labs     02/16/21  0557 02/15/21  1010    136   K 3.6 3.4*   BUN 15 16   CREA 1.50* 1.57*   * 205*   WBC  --  9.6   HGB  --  9.2*   HCT  --  30.6*   PLT  --  551*       Assessment/Plan:     Principal Problem:    Congestive heart failure (CHF) ([facility]) (2/15/2021)    - HFpEF    - unclear etiology and possibly HTN related    - continue IV lasix today and assess transition to po in am    Active Problems:    Coronary artery disease involving native coronary artery of native heart without angina pectoris    S/P PTCA (percutaneous transluminal coronary angioplasty) (9/1/2020) (9/4/2013)     -Last coronary angiogram from 2/2017 with patent stents in the LAD/diagonal.    - on DAPT and defer scaling back to single agent per outpatient cardiologist, on a high intensity statin. Essential hypertension (9/15/2016)    - increased Imdur to 60mg , continue nifedipine.  Change toprol to coreg for better BP control       Chronic kidney disease, stage III (moderate) ([facility]) (9/15/2016)    - Cr stable at this time , monitor       Gastroesophageal reflux disease (2/16/2017)    - PPI       Type 2 diabetes mellitus with hyperglycemia, without long-term current use of insulin ([facility] (10/11/2018)    - pe primary       Mixed hyperlipidemia (10/21/2020)    - on a high intensity statin      Acute respiratory failure with hypoxemia ([facility] (2/15/2021)    - secondary to above, improving     Janis Alberto MD  2/17/2021 10:52 AM

## 2021-02-17 NOTE — PROGRESS NOTES
Pt stated that she had a bloody BM but flushed it prior to staff visualizing it. Also having increased lower abd pain. MD notified and orders placed.

## 2021-02-17 NOTE — PROGRESS NOTES
Hospitalist Progress Note       Name: Elke Ho MRN: [id number]  : 1950  Age:70 y.o.  female  Admit Date:  2/15/2021 LOS: 1    Reason for Admission:  Acute respiratory failure with hypoxemia ([facility] [address] [J96.01]  Congest[facility] ([facility]F) ([facility] [I50.9]    Hospital Course:  Please refer to the admission H&P for details of presentation. In summary, Elke Ho is a 65 y.o. female with medical history significant for  CAD s/p stents x3, DMII, CKD stage 3, COVID diagnosed last month not a candidate for remdesivir or plasma who presented shortness of breathe and edema to  bilaterally. CT chest with contrast showed No evidence of a pulmonary embolism. Findings support heart failure with cardiomegaly, pleural effusions, basilar atelectasis, and pulmonary interstitial edema. Subjective/24 hr Events (21) : Patient is seen and examined at bedside. No acute events reported overnight by nursing staff. Patient laying in bed, comfortable. On 2 L nasal cannula with saturations above 96%. Patient reported her breathing has improved since admission. Patient reports that she normally takes 40 mg of Lasix at home but ends up getting dehydrated. Only she is reporting abdominal pain and lower quadrants. States that it is crampy, nonradiating, 6 out of 10. Patient denies fever, chills, chest pains, shortness of breath, n/v. Tolerating diet and having BM.       ROS: 10 point review of systems is otherwise negative with the exception of the elements mentioned above.     Objective:    Patient Vitals for the past 24 hrs:   Temp Pulse Resp BP SpO2   21 0910     94 %   21 0730 98.6 °F (37 °C) 84 18 (!) 160/68 95 %   21 0457 98 °F (36.7 °C) 78 17 (!) 131/59 96 %   21 0400  80      21 0000  75      21 2320 99.1 °F (37.3 °C) 80 18 (!) 150/73 95 %   21 2142  79      21 2000  79      21 1955 98.9 °F (37.2 °C) 81  (!) 133/58 (!) 17 %   02/16/21 1646 97.7 °F (36.5 °C) 80 18 124/60 94 %   02/16/21 1600  80        Oxygen Therapy  O2 Sat (%): 94 % (02/17/21 0910)  Pulse via Oximetry: 88 beats per minute (02/15/21 1659)  O2 Device: Nasal cannula (02/17/21 1100)  O2 Flow Rate (L/min): 2 l/min (02/17/21 1100)  ETCO2 (mmHg): 95 mmHg (02/16/21 1955)    Body mass index is 26.33 kg/m². Physical Exam:   General:     alert, awake, no acute distress. Well nourished   Head:   normocephalic, atraumatic  Eyes, Ears, nose: PERRL, EOMI. Normal conjunctiva  Neck:    supple, non-tender. Trachea midline. Lungs:   Scattered crackles, no wheezing  Cardiac:   RRR, Normal S1 and S2. Abdomen:   Soft, non distended, nontender, +BS  Extremities:   Warm, dry. No edema   Skin:   No rashes, no jaundice  Neuro:  AAOx3.  No gross focal neurological deficit  Psychiatric:  No anxiety, calm, cooperative    Data Review:  I have reviewed all labs, meds, and studies from the last 24 hours:      Labs:    Recent Results (from the past 24 hour(s))   GLUCOSE, POC    Collection Time: 02/16/21  4:49 PM   Result Value Ref Range    Glucose (POC) 214 (H) 65 - 100 mg/dL   GLUCOSE, POC    Collection Time: 02/16/21  9:08 PM   Result Value Ref Range    Glucose (POC) 223 (H) 65 - 100 mg/dL   GLUCOSE, POC    Collection Time: 02/17/21  7:06 AM   Result Value Ref Range    Glucose (POC) 126 (H) 65 - 100 mg/dL         All Micro Results     None          Current Meds:  Current Facility-Administered Medications   Medication Dose Route Frequency    simethicone (MYLICON) tablet 80 mg  80 mg Oral QID PRN    furosemide (LASIX) injection 40 mg  40 mg IntraVENous BID    isosorbide mononitrate ER (IMDUR) tablet 60 mg  60 mg Oral DAILY    insulin lispro (HUMALOG) injection 0-10 Units  0-10 Units SubCUTAneous AC&HS    sodium chloride (NS) flush 5-40 mL  5-40 mL IntraVENous Q8H    sodium chloride (NS) flush 5-40 mL  5-40 mL IntraVENous PRN    acetaminophen (TYLENOL) tablet 650 mg 650 mg Oral Q6H PRN    Or    acetaminophen (TYLENOL) suppository 650 mg  650 mg Rectal Q6H PRN    polyethylene glycol (MIRALAX) packet 17 g  17 g Oral DAILY PRN    promethazine (PHENERGAN) tablet 12.5 mg  12.5 mg Oral Q6H PRN    Or    ondansetron (ZOFRAN) injection 4 mg  4 mg IntraVENous Q6H PRN    enoxaparin (LOVENOX) injection 40 mg  40 mg SubCUTAneous DAILY    aspirin chewable tablet 81 mg  81 mg Oral 7am    clopidogreL (PLAVIX) tablet 75 mg  75 mg Oral DAILY    insulin glargine (LANTUS) injection 8 Units  8 Units SubCUTAneous QHS    NIFEdipine ER (PROCARDIA XL) tablet 90 mg  90 mg Oral DAILY    rosuvastatin (CRESTOR) tablet 40 mg  40 mg Oral QHS    metoprolol succinate (TOPROL-XL) XL tablet 50 mg  50 mg Oral BID         Other Studies:  Ct Chest W Cont    Result Date: 2/15/2021  EXAMINATION: CT CHEST W CONT 2/15/2021 1:55 PM ACCESSION NUMBER: [id number] INDICATION: Shortness of breath COMPARISON: 08/10/2020 TECHNIQUE: Multiple contiguous axial CT images of the chest were obtained from the lung apices to the lung bases after the intravenous administration of 75 mL Isovue-370 contrast material . Radiation dose reduction techniques were used for this study:  Our CT scanners use one or all of the following: Automated exposure control, adjustment of the mA and/or kVp according to patient's size, iterative reconstruction. FINDINGS: Lungs: There is a small left pleural effusion with a small to moderate right pleural effusion. Atelectasis is seen in both lung bases. There is mild diffuse increased attenuation throughout the lungs. There is no suspicious nodule. Airways are patent. Opacification of the pulmonary arteries is excellent. There is no evidence of a pulmonary embolism. Mediastinum: Heart size is enlarged. Atherosclerosis is seen in the aorta and its major branches including the coronary arteries. There shotty paratracheal adenopathy.  This is unchanged from the comparison exam. Visualized upper abdomen: The liver has some nodularity to its serosal surface. There is no focal suspicious lesion. The adrenal glands are normal. Osseous structures: Old fractures are seen anteriorly in the right mid ribs. There is no suspicious lytic or blastic lesion. 1. No evidence of a pulmonary embolism. 2. Findings support heart failure with cardiomegaly, pleural effusions, basilar atelectasis, and pulmonary interstitial edema. 3. Cirrhosis. 4. Atherosclerosis including coronary atherosclerosis. Xr Chest Port    Result Date: 2/15/2021  EXAMINATION: CHEST RADIOGRAPH 2/15/2021 10:22 AM ACCESSION NUMBER: [id number] COMPARISON: 02/09/2021 INDICATION: Chest pain with shortness of breath TECHNIQUE: A single view of the chest was obtained. FINDINGS: Support Devices: None Lungs: There is persistent pulmonary vascular indistinctness with small pleural effusions. This is similar to the prior exam. Cardiac Silhouette: Within normal limits in size. Mediastinum: Calcifications are seen in the aorta. Upper Abdomen: Normal Miscellaneous: There is previous fusion in the lower cervical spine. This is partially visualized. 1. Persistent pulmonary edema 2.  Atherosclerosis          Assessment:    Hospital Problems as of 2/17/2021 Date Reviewed: 10/21/2020          Codes Class Noted - Resolved POA    * (Principal) CHF (congestive heart failure) ([facility] [address] ICD-10-CM: I50.9  ICD-9-CM: 428.0  2/15/2021 - Present Unknown        Congestive heart failure (CHF) ([facility]) ICD-10-CM: I50.9  ICD-9-CM: 428.0  2/15/2021 - Present Unknown        Acute respiratory failure with hypoxemia ([facility]) ICD-10-CM: J96.01  ICD-9-CM: 518.81  2/15/2021 - Present Unknown        Mixed hyperlipidemia ICD-10-CM: E78.2  ICD-9-CM: 272.2  10/21/2020 - Present Yes        S/P PTCA (percutaneous transluminal coronary angioplasty) ICD-10-CM: Z98.61  ICD-9-CM: V45.82  9/1/2020 - Present Yes        Type 2 diabetes mellitus with hyperglycemia, without long-term current use of insulin [facility]) ICD-10-CM: E11.65  ICD-9-CM: 250.00, 790.29  10/11/2018 - Present Yes        Gastroesophageal reflux disease ICD-10-CM: K21.9  ICD-9-CM: 530.81  2/16/2017 - Present Yes        Essential hypertension (Chronic) ICD-10-CM: I10  ICD-9-CM: 401.9  9/15/2016 - Present Yes        Chronic kidney disease, stage III (moderate) (H[facility]) ICD-10-CM: N18.30  ICD-9-CM: 585.3  9/15/2016 - Present Yes        Coronary artery disease involving native coronary artery of native heart without angina pectoris ICD-10-CM: I25.10  ICD-9-CM: 414.01  9/4/2013 - Present Yes               Plan:    Acute respiratory failure with hypoxia likely due to acute on chronic systolic CHF  Echo on 3/59 shows EF 55 to 31% with diastolic dysfunction. Continue with Lasix BID and monitor renal function  Continue with O2 supplement and wean as tolerated    CAD s/p 3 Stents // HTN // HLD : Stable. Continue with home medications. Slightly hypertensive this morning, will add hydralazine as needed    T2DM: sliding scale and lantus 8u qHS    CKD stage 3 : Creatinine appears stable. Monitor renal function given patient is on Lasix. Diet:  DIET DIABETIC CONSISTENT CARB  DVT PPx: lovenox  Code: DNR    Dispo: home  Estimated Discharge: TBD based on clinical course    Labs/Imaging Reviewed. Plan discussed with staff, patient/family and are in agreement. Part of this note was written by using a voice dictation software and the note has been proof read but may still contain some grammatical/other typographical errors.     Signed By: Dino De Leon MD     February 17, 2021

## 2021-02-17 NOTE — PROGRESS NOTES
ACUTE PHYSICAL THERAPY GOALS:  (Developed with and agreed upon by patient and/or caregiver. )  LTG:  (1.)Ms. Julio Nash will move from supine to sit and sit to supine , scoot up and down and roll side to side in bed with INDEPENDENT within 7 treatment day(s). (2.)Ms. Julio Nash will transfer from bed to chair and chair to bed with MODIFIED INDEPENDENCE using the least restrictive device within 7 treatment day(s). (3.)Ms. Julio Nash will ambulate with MODIFIED INDEPENDENCE for 250+ feet with the least restrictive device within 7 treatment day(s) while maintaining normal vital signs. .   ________________________________________________________________________________________________       PHYSICAL THERAPY ASSESSMENT: Initial Assessment and AM PT Treatment Day # 1      Roly Barcenas is a 65 y.o. female   PRIMARY DIAGNOSIS: CHF (congestive heart failure) ([facility] [address]  Acute respiratory failure with hypoxemia (HCC) [J96.01]  Congestive heart failure (CHF) ([facility] [address] [I50.9]       Reason for Referral:    ICD-10: Treatment Diagnosis: Difficulty in walking, Not elsewhere classified (R26.2)  OBSERVATION: [place] [employer] [place] / [place] [employer] [place] / Product Type: Managed Care Medicare /     ASSESSMENT:     REHAB RECOMMENDATIONS:   Recommendation to date pending progress:  Setting:   No further skilled therapy    Equipment:    None     PRIOR LEVEL OF FUNCTION:  (Prior to Hospitalization) INITIAL/CURRENT LEVEL OF FUNCTION:  (Most Recently Demonstrated)   Bed Mobility:   Independent  Sit to Stand:   Independent  Transfers:   Independent  Gait/Mobility:   Modified Independent Bed Mobility:   Standby Assistance  Sit to Stand:  [facility] [employer] Assistance  Transfers:   Contact Guard Assistance  Gait/Mobility:   Contact Guard Assistance     ASSESSMENT:  Ms. Julio Nash with decreased transfers, ambulation, activity tolerance, and overall general functional mobility s/p hospital admission on 2/15/21 with SOB and CP. Pt history significant for COVID+ with hospital stay in January, MI and code blue called, CPR. Pt A & O x 4 this date, on 3 L O2 via n.c, stats 99%. Placed on RA for 5 min, O2 88% at rest, placed back to 2 L, stats 92%. Pt reports lives with spouse, since discharge has been independent ADLs, no falls, on RA, MOD I ambulation with rollator as needed. Pt reports just finished with HHPT prior to coming back to hospital this admission. Pt today states chest pain but declines medication. Pt SBA to CGA for mobility, refused to ambulate further than in room. Pt without need for assistive device in room for mobility. Pt most likely close to baseline for mobility. PT to cont to follow for acute care needs to address deficits noted above. Anticipate no needs at discharge for therapy. SUBJECTIVE:   Ms. Butch Oseguera states, \"I just don't feel good today. \"    SOCIAL HISTORY/LIVING ENVIRONMENT:   Home Environment: Private residence  # Steps to Enter: 0  One/Two Story Residence: One story  Living Alone: No  Support Systems: Spouse/Significant Other/Partner  OBJECTIVE:     PAIN: VITAL SIGNS: LINES/DRAINS:   Pre Treatment: Pain Screen  Pain Scale 1: Numeric (0 - 10)  Pain Intensity 1: 0(states chest sore since January)  Post Treatment: 0 Vital Signs  O2 Sat (%): 94 %  O2 Device: Nasal cannula  O2 Flow Rate (L/min): 2 l/min Purewick  O2 Device: Nasal cannula     GROSS EVALUATION:  B LE Within Functional Limits Abnormal/ Functional Abnormal/ Non-Functional (see comments) Not Tested Comments:   AROM [x] [] [] []    PROM [x] [] [] []    Strength [x] [] [] []    Balance [x] [] [] []    Posture [x] [] [] []    Sensation [x] [] [] []    Coordination [x] [] [] []    Tone [x] [] [] []    Edema [x] [] [] []    Activity Tolerance [] [x] [] []     [] [] [] []      COGNITION/  PERCEPTION: Intact Impaired   (see comments) Comments:   Orientation [x] []    Vision [x] []    Hearing [x] []    Command Following [x] []    Safety Awareness [x] []     [] []      MOBILITY: I Mod I S SBA CGA Min Mod Max Total  NT x2 Comments:   Bed Mobility    Rolling [] [] [] [x] [] [] [] [] [] [] []    Supine to Sit [] [] [] [x] [] [] [] [] [] [] []    Scooting [x] [] [] [] [] [] [] [] [] [] []    Sit to Supine [] [] [] [] [] [] [] [] [] [x] [] In chair   Transfers    Sit to Stand [] [] [] [x] [] [] [] [] [] [] []    Bed to Chair [] [] [] [] [x] [] [] [] [] [] []    Stand to Sit [] [] [] [x] [] [] [] [] [] [] []    I=Independent, Mod I=Modified Independent, S=Supervision, SBA=Standby Assistance, CGA=Contact Guard Assistance,   Min=Minimal Assistance, Mod=Moderate Assistance, Max=Maximal Assistance, Total=Total Assistance, NT=Not Tested  GAIT: I Mod I S SBA CGA Min Mod Max Total  NT x2 Comments:   Level of Assistance [] [] [] [] [x] [] [] [] [] [] []    Distance 5    DME None    Gait Quality Shuffled steps    Weightbearing Status N/A     I=Independent, Mod I=Modified Independent, S=Supervision, SBA=Standby Assistance, CGA=Contact Guard Assistance,   Min=Minimal Assistance, Mod=Moderate Assistance, Max=Maximal Assistance, Total=Total Assistance, NT=Not Tested    [address] [place] [id number] AM-PAC 6 Clicks   Basic Mobility Inpatient Short Form       How much difficulty does the patient currently have. .. Unable A Lot A Little None   1. Turning over in bed (including adjusting bedclothes, sheets and blankets)? [] 1   [] 2   [] 3   [x] 4   2. Sitting down on and standing up from a chair with arms ( e.g., wheelchair, bedside commode, etc.)   [] 1   [] 2   [] 3   [x] 4   3. Moving from lying on back to sitting on the side of the bed? [] 1   [] 2   [] 3   [x] 4   How much help from another person does the patient currently need. .. Total A Lot A Little None   4. Moving to and from a bed to a chair (including a wheelchair)? [] 1   [] 2   [] 3   [x] 4   5. Need to walk in hospital room? [] 1   [] 2   [] 3   [x] 4   6. Climbing 3-5 steps with a railing?    [] 1   [] 2   [x] 3   [] 4   © 2007, Trustees of [address] [place] [address] [address] [id number], under license to [employer]. All rights reserved     Score:  Initial: 23 Most Recent: X (Date: -- )    Interpretation of Tool:  Represents activities that are increasingly more difficult (i.e. Bed mobility, Transfers, Gait). PLAN:   FREQUENCY/DURATION: PT Plan of Care: 3 times/week for duration of hospital stay or until stated goals are met, whichever comes first.    PROBLEM LIST:   (Skilled intervention is medically necessary to address:)  1. Decreased Activity Tolerance  2. Decreased Balance  3. Decreased Gait Ability   INTERVENTIONS PLANNED:   (Benefits and precautions of physical therapy have been discussed with the patient.)  1. Therapeutic Activity  2. Therapeutic Exercise/HEP  3. Neuromuscular Re-education  4. Gait Training  5. Manual Therapy  6. Education     TREATMENT:     EVALUATION: Low Complexity : (Untimed Charge)    TREATMENT:   (     )  Therapeutic Activity (8 Minutes): Therapeutic activity included Supine to Sit, Transfer Training, Ambulation on level ground, Sitting balance  and Standing balance to improve functional Mobility, Strength and Activity tolerance.     AFTER TREATMENT POSITION/PRECAUTIONS:  Chair, Needs within reach and RN notified    INTERDISCIPLINARY COLLABORATION:  RN/PCT and PT/PTA    TOTAL TREATMENT DURATION:  PT Patient Time In/Time Out  Time In: 0910  Time Out: [facility],

## 2021-02-18 PROBLEM — E88.09 HYPOALBUMINEMIA: Status: ACTIVE | Noted: 2021-02-18

## 2021-02-18 PROBLEM — K52.9 COLITIS, ACUTE: Status: ACTIVE | Noted: 2021-02-18

## 2021-02-18 LAB
ALBUMIN SERPL ELPH-MCNC: 3.12 G/DL (ref 3.2–5.6)
ALBUMIN/GLOB SERPL: 0.7 {RATIO}
ALPHA1 GLOB SERPL ELPH-MCNC: 0.36 G/DL (ref 0.1–0.4)
ALPHA2 GLOB SERPL ELPH-MCNC: 0.93 G/DL (ref 0.4–1.2)
ANION GAP SERPL CALC-SCNC: 8 MMOL/L (ref 7–16)
B-GLOBULIN SERPL QL ELPH: 1.54 G/DL (ref 0.6–1.3)
BUN SERPL-MCNC: 16 MG/DL (ref 8–23)
CALCIUM SERPL-MCNC: 8.5 MG/DL (ref 8.3–10.4)
CHLORIDE SERPL-SCNC: 101 MMOL/L (ref 98–107)
CO2 SERPL-SCNC: 29 MMOL/L (ref 21–32)
CREAT SERPL-MCNC: 1.58 MG/DL (ref 0.6–1)
ERYTHROCYTE [DISTWIDTH] IN BLOOD BY AUTOMATED COUNT: 16.1 % (ref 11.9–14.6)
GAMMA GLOB MFR SERPL ELPH: 1.45 G/DL (ref 0.5–1.6)
GLUCOSE BLD STRIP.AUTO-MCNC: 120 MG/DL (ref 65–100)
GLUCOSE BLD STRIP.AUTO-MCNC: 168 MG/DL (ref 65–100)
GLUCOSE BLD STRIP.AUTO-MCNC: 174 MG/DL (ref 65–100)
GLUCOSE BLD STRIP.AUTO-MCNC: 197 MG/DL (ref 65–100)
GLUCOSE BLD STRIP.AUTO-MCNC: 252 MG/DL (ref 65–100)
GLUCOSE SERPL-MCNC: 151 MG/DL (ref 65–100)
HCT VFR BLD AUTO: 26 % (ref 35.8–46.3)
HGB BLD-MCNC: 8.1 G/DL (ref 11.7–15.4)
M PROTEIN SERPL ELPH-MCNC: ABNORMAL G/DL
MCH RBC QN AUTO: 27.7 PG (ref 26.1–32.9)
MCHC RBC AUTO-ENTMCNC: 31.2 G/DL (ref 31.4–35)
MCV RBC AUTO: 89 FL (ref 79.6–97.8)
NRBC # BLD: 0 K/UL (ref 0–0.2)
PLATELET # BLD AUTO: 529 K/UL (ref 150–450)
PMV BLD AUTO: 9.3 FL (ref 9.4–12.3)
POTASSIUM SERPL-SCNC: 3 MMOL/L (ref 3.5–5.1)
PROT PATTERN SERPL ELPH-IMP: ABNORMAL
PROT SERPL-MCNC: 7.4 G/DL (ref 6.3–8.2)
RBC # BLD AUTO: 2.92 M/UL (ref 4.05–5.2)
SODIUM SERPL-SCNC: 138 MMOL/L (ref 136–145)
WBC # BLD AUTO: 12.3 K/UL (ref 4.3–11.1)

## 2021-02-18 PROCEDURE — 2709999900 HC NON-CHARGEABLE SUPPLY

## 2021-02-18 PROCEDURE — 74011250636 HC RX REV CODE- 250/636: Performed by: INTERNAL MEDICINE

## 2021-02-18 PROCEDURE — 36415 COLL VENOUS BLD VENIPUNCTURE: CPT

## 2021-02-18 PROCEDURE — 74011250637 HC RX REV CODE- 250/637: Performed by: INTERNAL MEDICINE

## 2021-02-18 PROCEDURE — 74011636637 HC RX REV CODE- 636/637: Performed by: INTERNAL MEDICINE

## 2021-02-18 PROCEDURE — 99233 SBSQ HOSP IP/OBS HIGH 50: CPT | Performed by: INTERNAL MEDICINE

## 2021-02-18 PROCEDURE — 97530 THERAPEUTIC ACTIVITIES: CPT

## 2021-02-18 PROCEDURE — 82962 GLUCOSE BLOOD TEST: CPT

## 2021-02-18 PROCEDURE — 74011636637 HC RX REV CODE- 636/637: Performed by: FAMILY MEDICINE

## 2021-02-18 PROCEDURE — 65660000000 HC RM CCU STEPDOWN

## 2021-02-18 PROCEDURE — 85027 COMPLETE CBC AUTOMATED: CPT

## 2021-02-18 PROCEDURE — 80048 BASIC METABOLIC PNL TOTAL CA: CPT

## 2021-02-18 RX ORDER — CIPROFLOXACIN 2 MG/ML
400 INJECTION, SOLUTION INTRAVENOUS EVERY 12 HOURS
Status: DISCONTINUED | OUTPATIENT
Start: 2021-02-18 | End: 2021-02-19

## 2021-02-18 RX ORDER — HYDROMORPHONE HYDROCHLORIDE 1 MG/ML
1 INJECTION, SOLUTION INTRAMUSCULAR; INTRAVENOUS; SUBCUTANEOUS
Status: DISCONTINUED | OUTPATIENT
Start: 2021-02-18 | End: 2021-02-24 | Stop reason: HOSPADM

## 2021-02-18 RX ORDER — POTASSIUM CHLORIDE 20 MEQ/1
40 TABLET, EXTENDED RELEASE ORAL 2 TIMES DAILY
Status: COMPLETED | OUTPATIENT
Start: 2021-02-18 | End: 2021-02-18

## 2021-02-18 RX ORDER — METRONIDAZOLE 500 MG/100ML
500 INJECTION, SOLUTION INTRAVENOUS EVERY 12 HOURS
Status: DISCONTINUED | OUTPATIENT
Start: 2021-02-18 | End: 2021-02-24 | Stop reason: HOSPADM

## 2021-02-18 RX ORDER — FUROSEMIDE 10 MG/ML
40 INJECTION INTRAMUSCULAR; INTRAVENOUS DAILY
Status: DISCONTINUED | OUTPATIENT
Start: 2021-02-19 | End: 2021-02-19

## 2021-02-18 RX ADMIN — CARVEDILOL 6.25 MG: 6.25 TABLET, FILM COATED ORAL at 09:01

## 2021-02-18 RX ADMIN — INSULIN GLARGINE 8 UNITS: 100 INJECTION, SOLUTION SUBCUTANEOUS at 22:03

## 2021-02-18 RX ADMIN — NIFEDIPINE 90 MG: 90 TABLET, FILM COATED, EXTENDED RELEASE ORAL at 09:01

## 2021-02-18 RX ADMIN — POTASSIUM CHLORIDE 40 MEQ: 20 TABLET, EXTENDED RELEASE ORAL at 17:16

## 2021-02-18 RX ADMIN — INSULIN LISPRO 2 UNITS: 100 INJECTION, SOLUTION INTRAVENOUS; SUBCUTANEOUS at 09:02

## 2021-02-18 RX ADMIN — FUROSEMIDE 40 MG: 10 INJECTION, SOLUTION INTRAMUSCULAR; INTRAVENOUS at 09:03

## 2021-02-18 RX ADMIN — ACETAMINOPHEN 650 MG: 325 TABLET ORAL at 01:40

## 2021-02-18 RX ADMIN — METRONIDAZOLE 500 MG: 500 INJECTION, SOLUTION INTRAVENOUS at 09:04

## 2021-02-18 RX ADMIN — ROSUVASTATIN CALCIUM 40 MG: 20 TABLET, COATED ORAL at 21:53

## 2021-02-18 RX ADMIN — ENOXAPARIN SODIUM 40 MG: 100 INJECTION SUBCUTANEOUS at 09:02

## 2021-02-18 RX ADMIN — SODIUM CHLORIDE 10 ML: 9 INJECTION, SOLUTION INTRAMUSCULAR; INTRAVENOUS; SUBCUTANEOUS at 05:31

## 2021-02-18 RX ADMIN — INSULIN LISPRO 6 UNITS: 100 INJECTION, SOLUTION INTRAVENOUS; SUBCUTANEOUS at 17:16

## 2021-02-18 RX ADMIN — INSULIN LISPRO 2 UNITS: 100 INJECTION, SOLUTION INTRAVENOUS; SUBCUTANEOUS at 12:47

## 2021-02-18 RX ADMIN — ONDANSETRON 4 MG: 2 INJECTION INTRAMUSCULAR; INTRAVENOUS at 23:11

## 2021-02-18 RX ADMIN — SODIUM CHLORIDE 10 ML: 9 INJECTION, SOLUTION INTRAMUSCULAR; INTRAVENOUS; SUBCUTANEOUS at 22:02

## 2021-02-18 RX ADMIN — CIPROFLOXACIN 400 MG: 2 INJECTION, SOLUTION INTRAVENOUS at 09:34

## 2021-02-18 RX ADMIN — ONDANSETRON 4 MG: 2 INJECTION INTRAMUSCULAR; INTRAVENOUS at 17:10

## 2021-02-18 RX ADMIN — METRONIDAZOLE 500 MG: 500 INJECTION, SOLUTION INTRAVENOUS at 22:57

## 2021-02-18 RX ADMIN — ONDANSETRON 4 MG: 2 INJECTION INTRAMUSCULAR; INTRAVENOUS at 10:52

## 2021-02-18 RX ADMIN — ISOSORBIDE MONONITRATE 60 MG: 30 TABLET, EXTENDED RELEASE ORAL at 09:02

## 2021-02-18 RX ADMIN — CIPROFLOXACIN 400 MG: 2 INJECTION, SOLUTION INTRAVENOUS at 21:54

## 2021-02-18 RX ADMIN — ASPIRIN 81 MG: 81 TABLET, CHEWABLE ORAL at 09:02

## 2021-02-18 RX ADMIN — POTASSIUM CHLORIDE 40 MEQ: 20 TABLET, EXTENDED RELEASE ORAL at 14:54

## 2021-02-18 RX ADMIN — HYDROMORPHONE HYDROCHLORIDE 1 MG: 1 INJECTION, SOLUTION INTRAMUSCULAR; INTRAVENOUS; SUBCUTANEOUS at 17:10

## 2021-02-18 RX ADMIN — CLOPIDOGREL BISULFATE 75 MG: 75 TABLET ORAL at 09:02

## 2021-02-18 RX ADMIN — SODIUM CHLORIDE 10 ML: 9 INJECTION, SOLUTION INTRAMUSCULAR; INTRAVENOUS; SUBCUTANEOUS at 14:55

## 2021-02-18 RX ADMIN — CARVEDILOL 6.25 MG: 6.25 TABLET, FILM COATED ORAL at 17:00

## 2021-02-18 RX ADMIN — ONDANSETRON 4 MG: 2 INJECTION INTRAMUSCULAR; INTRAVENOUS at 01:40

## 2021-02-18 NOTE — PROGRESS NOTES
Hourly rounds completed. Had  3X small BM but mostly with mucus, no blood noted complaining of crampy abdominal pain, offered Mylicon but declined. Zofran given for complaints of nausea. Resting in bed comfortably at this time, will give report to oncoming RN.

## 2021-02-18 NOTE — PROGRESS NOTES
Chart screened by  for discharge planning. PT has stated patient has no further skilled needs at discharge. CM will continue to follow patient during hospitalization for discharge planning and needs. No CM needs have been identified at this time. Please consult or notify  if any new issues arise.

## 2021-02-18 NOTE — PROGRESS NOTES
Hospitalist Progress Note       Name: Susan Jose MRN: [id number]  : 1950  Age:70 y.o.  female  Admit Date:  2/15/2021 LOS: 3    Reason for Admission:  Acute respiratory failure with hypoxemia (HCC) [J96.01]  Congestive heart failure (CHF) ([facility] [address] [I50.9]  Acute on chronic combined systolic and diastolic congestive heart failure ([facility] [address] [I50.43]  Acute respiratory failure with hypoxia ([facility] [address] [J96.01]    Hospital Course:  Please refer to the admission H&P for details of presentation. In summary, Susan Jose is a 65 y.o. female with medical history significant for  CAD s/p stents x3, DMII, CKD stage 3, COVID diagnosed last month not a candidate for remdesivir or plasma who presented shortness of breathe and edema to LE bilaterally. CT chest with contrast showed No evidence of a pulmonary embolism. Findings support heart failure with cardiomegaly, pleural effusions, basilar atelectasis, and pulmonary interstitial edema. CT abdomen pelvis this was done due to patient's ongoing and worsening lower abdominal pain. CT shows colitis and has been started on antibiotics. Subjective/24 hr Events (21) : Patient is seen and examined at bedside. No acute events reported overnight by nursing staff. Sitting comfortably in her recliner, on 2 L nasal cannula with saturation above 94%. Reports that her breathing has been better. Denies any shortness of breath, fever, chills, chest pains, palpitations. Had Nausea vomiting yesterday but has been resolved and abdominal pain has been improving. ROS: 10 point review of systems is otherwise negative with the exception of the elements mentioned above.     Objective:    Patient Vitals for the past 24 hrs:   Temp Pulse Resp BP SpO2   21 1126 99 °F (37.2 °C) 61 17 (!) 105/48 93 %   21 0906     96 %   21 0818 99.5 °F (37.5 °C) 78 18 (!) 133/55 93 %   21 0443 98.8 °F (37.1 °C) 68 18 (!) 121/48 96 %   21 2353 98.9 °F (37.2 °C) 76 17 126/61 95 %   02/17/21 1955 99.5 °F (37.5 °C) 82 16 (!) 133/56 95 %   02/17/21 1636 100.2 °F (37.9 °C) 84 16 (!) 131/57 95 %     Oxygen Therapy  O2 Sat (%): 93 % (02/18/21 1126)  Pulse via Oximetry: 88 beats per minute (02/15/21 1659)  O2 Device: Room air (02/18/21 1100)  O2 Flow Rate (L/min): 1 l/min(decreased from 2 to 1 L, RN made aware) (02/18/21 0906)  ETCO2 (mmHg): 95 mmHg (02/16/21 1955)    Body mass index is 26.43 kg/m². Physical Exam:   General:     alert, awake, no acute distress. Well nourished   Head:   normocephalic, atraumatic  Eyes, Ears, nose: PERRL, EOMI. Normal conjunctiva  Neck:    supple, non-tender. Trachea midline. Lungs:   Scattered crackles, no wheezing  Cardiac:   RRR, Normal S1 and S2. Abdomen:   Soft, non distended, nontender, +BS  Extremities:   Warm, dry. No edema   Skin:   No rashes, no jaundice  Neuro:  AAOx3.  No gross focal neurological deficit  Psychiatric:  No anxiety, calm, cooperative    Data Review:  I have reviewed all labs, meds, and studies from the last 24 hours:      Labs:    Recent Results (from the past 24 hour(s))   GLUCOSE, POC    Collection Time: 02/17/21  3:44 PM   Result Value Ref Range    Glucose (POC) 212 (H) 65 - 100 mg/dL   RENAL FUNCTION PANEL    Collection Time: 02/17/21  6:08 PM   Result Value Ref Range    Sodium 139 136 - 145 mmol/L    Potassium 3.4 (L) 3.5 - 5.1 mmol/L    Chloride 104 98 - 107 mmol/L    CO2 30 21 - 32 mmol/L    Anion gap 5 (L) 7 - 16 mmol/L    Glucose 232 (H) 65 - 100 mg/dL    BUN 17 8 - 23 MG/DL    Creatinine 1.52 (H) 0.6 - 1.0 MG/DL    GFR est AA 43 (L) >60 ml/min/1.73m2    GFR est non-AA 36 (L) >60 ml/min/1.73m2    Calcium 8.5 8.3 - 10.4 MG/DL    Phosphorus 4.2 (H) 2.3 - 3.7 MG/DL    Albumin 2.4 (L) 3.2 - 4.6 g/dL   GLUCOSE, POC    Collection Time: 02/17/21  9:06 PM   Result Value Ref Range    Glucose (POC) 210 (H) 65 - 100 mg/dL   GLUCOSE, POC    Collection Time: 02/18/21  8:22 AM   Result Value Ref Range Glucose (POC) 168 (H) 65 - 100 mg/dL   CBC W/O DIFF    Collection Time: 02/18/21  8:54 AM   Result Value Ref Range    WBC 12.3 (H) 4.3 - 11.1 K/uL    RBC 2.92 (L) 4.05 - 5.2 M/uL    HGB 8.1 (L) 11.7 - 15.4 g/dL    HCT 26.0 (L) 35.8 - 46.3 %    MCV 89.0 79.6 - 97.8 FL    MCH 27.7 26.1 - 32.9 PG    MCHC 31.2 (L) 31.4 - 35.0 g/dL    RDW 16.1 (H) 11.9 - 14.6 %    PLATELET 974 (H) 373 - 450 K/uL    MPV 9.3 (L) 9.4 - 12.3 FL    ABSOLUTE NRBC 0.00 0.0 - 0.2 K/uL   METABOLIC PANEL, BASIC    Collection Time: 02/18/21  8:54 AM   Result Value Ref Range    Sodium 138 136 - 145 mmol/L    Potassium 3.0 (L) 3.5 - 5.1 mmol/L    Chloride 101 98 - 107 mmol/L    CO2 29 21 - 32 mmol/L    Anion gap 8 7 - 16 mmol/L    Glucose 151 (H) 65 - 100 mg/dL    BUN 16 8 - 23 MG/DL    Creatinine 1.58 (H) 0.6 - 1.0 MG/DL    GFR est AA 41 (L) >60 ml/min/1.73m2    GFR est non-AA 34 (L) >60 ml/min/1.73m2    Calcium 8.5 8.3 - 10.4 MG/DL   GLUCOSE, POC    Collection Time: 02/18/21 11:04 AM   Result Value Ref Range    Glucose (POC) 174 (H) 65 - 100 mg/dL         All Micro Results     None          Current Meds:  Current Facility-Administered Medications   Medication Dose Route Frequency    metroNIDAZOLE (FLAGYL) IVPB premix 500 mg  500 mg IntraVENous Q12H    ciprofloxacin (CIPRO) 400 mg in D5W IVPB (premix)  400 mg IntraVENous Q12H    simethicone (MYLICON) tablet 80 mg  80 mg Oral QID PRN    furosemide (LASIX) injection 40 mg  40 mg IntraVENous BID    carvediloL (COREG) tablet 6.25 mg  6.25 mg Oral BID WITH MEALS    isosorbide mononitrate ER (IMDUR) tablet 60 mg  60 mg Oral DAILY    insulin lispro (HUMALOG) injection 0-10 Units  0-10 Units SubCUTAneous AC&HS    sodium chloride (NS) flush 5-40 mL  5-40 mL IntraVENous Q8H    sodium chloride (NS) flush 5-40 mL  5-40 mL IntraVENous PRN    acetaminophen (TYLENOL) tablet 650 mg  650 mg Oral Q6H PRN    Or    acetaminophen (TYLENOL) suppository 650 mg  650 mg Rectal Q6H PRN    polyethylene glycol (MIRALAX) packet 17 g  17 g Oral DAILY PRN    promethazine (PHENERGAN) tablet 12.5 mg  12.5 mg Oral Q6H PRN    Or    ondansetron (ZOFRAN) injection 4 mg  4 mg IntraVENous Q6H PRN    enoxaparin (LOVENOX) injection 40 mg  40 mg SubCUTAneous DAILY    aspirin chewable tablet 81 mg  81 mg Oral 7am    clopidogreL (PLAVIX) tablet 75 mg  75 mg Oral DAILY    insulin glargine (LANTUS) injection 8 Units  8 Units SubCUTAneous QHS    NIFEdipine ER (PROCARDIA XL) tablet 90 mg  90 mg Oral DAILY    rosuvastatin (CRESTOR) tablet 40 mg  40 mg Oral QHS         Other Studies:  Ct Chest W Cont    Result Date: 2/15/2021  EXAMINATION: CT CHEST W CONT 2/15/2021 1:55 PM ACCESSION NUMBER: [id number] INDICATION: Shortness of breath COMPARISON: 08/10/2020 TECHNIQUE: Multiple contiguous axial CT images of the chest were obtained from the lung apices to the lung bases after the intravenous administration of 75 mL Isovue-370 contrast material . Radiation dose reduction techniques were used for this study:  Our CT scanners use one or all of the following: Automated exposure control, adjustment of the mA and/or kVp according to patient's size, iterative reconstruction. FINDINGS: Lungs: There is a small left pleural effusion with a small to moderate right pleural effusion. Atelectasis is seen in both lung bases. There is mild diffuse increased attenuation throughout the lungs. There is no suspicious nodule. Airways are patent. Opacification of the pulmonary arteries is excellent. There is no evidence of a pulmonary embolism. Mediastinum: Heart size is enlarged. Atherosclerosis is seen in the aorta and its major branches including the coronary arteries. There shotty paratracheal adenopathy. This is unchanged from the comparison exam. Visualized upper abdomen: The liver has some nodularity to its serosal surface. There is no focal suspicious lesion. The adrenal glands are normal. Osseous structures:  Old fractures are seen anteriorly in the right mid ribs. There is no suspicious lytic or blastic lesion. 1. No evidence of a pulmonary embolism. 2. Findings support heart failure with cardiomegaly, pleural effusions, basilar atelectasis, and pulmonary interstitial edema. 3. Cirrhosis. 4. Atherosclerosis including coronary atherosclerosis. Xr Chest Port    Result Date: 2/15/2021  EXAMINATION: CHEST RADIOGRAPH 2/15/2021 10:22 AM ACCESSION NUMBER: [id number] COMPARISON: 02/09/2021 INDICATION: Chest pain with shortness of breath TECHNIQUE: A single view of the chest was obtained. FINDINGS: Support Devices: None Lungs: There is persistent pulmonary vascular indistinctness with small pleural effusions. This is similar to the prior exam. Cardiac Silhouette: Within normal limits in size. Mediastinum: Calcifications are seen in the aorta. Upper Abdomen: Normal Miscellaneous: There is previous fusion in the lower cervical spine. This is partially visualized. 1. Persistent pulmonary edema 2.  Atherosclerosis          Assessment:    Hospital Problems as of 2/18/2021 Date Reviewed: 10/21/2020          Codes Class Noted - Resolved POA    Hypoalbuminemia ICD-10-CM: E88.09  ICD-9-CM: 273.8  2/18/2021 - Present Unknown        Colitis, acute ICD-10-CM: K52.9  ICD-9-CM: 558.9  2/18/2021 - Present Unknown        Acute respiratory failure with hypoxia (HCC) ICD-10-CM: J96.01  ICD-9-CM: 518.81  2/17/2021 - Present Unknown        Acute on chronic combined systolic and diastolic congestive heart failure (HCC) ICD-10-CM: I50.43  ICD-9-CM: 428.43, 428.0  2/17/2021 - Present Unknown        * (Principal) CHF (congestive heart failure) (HCC) ICD-10-CM: I50.9  ICD-9-CM: 428.0  2/15/2021 - Present Unknown        Congestive heart failure (CHF) (HCC) ICD-10-CM: I50.9  ICD-9-CM: 428.0  2/15/2021 - Present Unknown        Acute respiratory failure with hypoxemia (HCC) ICD-10-CM: J96.01  ICD-9-CM: 518.81  2/15/2021 - Present Unknown        Mixed hyperlipidemia ICD-10-CM: E78.2  ICD-9-CM: 272.2  10/21/2020 - Present Yes        S/P PTCA (percutaneous transluminal coronary angioplasty) ICD-10-CM: Z98.61  ICD-9-CM: V45.82  9/1/2020 - Present Yes        Type 2 diabetes mellitus with hyperglycemia, without long-term current use of insulin (HCC) ICD-10-CM: E11.65  ICD-9-CM: 250.00, 790.29  10/11/2018 - Present Yes        Gastroesophageal reflux disease ICD-10-CM: K21.9  ICD-9-CM: 530.81  2/16/2017 - Present Yes        Essential hypertension (Chronic) ICD-10-CM: I10  ICD-9-CM: 401.9  9/15/2016 - Present Yes        Chronic kidney disease, stage III (moderate) ([facility] [address] ICD-10-CM: N18.30  ICD-9-CM: 585.3  9/15/2016 - Present Yes        Coronary artery disease involving native coronary artery of native heart without angina pectoris ICD-10-CM: I25.10  ICD-9-CM: 414.01  9/4/2013 - Present Yes               Plan:    Acute respiratory failure with hypoxia likely due to acute on chronic systolic CHF  Echo on 6/43 shows EF 55 to 01% with diastolic dysfunction. Continue with Lasix BID and monitor renal function  Continue with O2 supplement and wean as tolerated    Acute Colitis  Noted in CT A/P.  - cipro and flagyl (2/18 -)  - Symptomatic management    CAD s/p 3 Stents // HTN // HLD : Stable. Continue with home medications. Slightly hypertensive this morning, will add hydralazine as needed    T2DM: sliding scale and lantus 8u qHS    CKD stage 3 : Creatinine appears stable. Monitor renal function given patient is on Lasix. Diet:  DIET DIABETIC CONSISTENT CARB  DVT PPx: lovenox  Code: DNR    Dispo: home  Estimated Discharge: TBD based on clinical course    Labs/Imaging Reviewed. Plan discussed with staff, patient/family and are in agreement. Part of this note was written by using a voice dictation software and the note has been proof read but may still contain some grammatical/other typographical errors.     Signed By: Carlita Polo MD     February 18, 2021

## 2021-02-18 NOTE — PROGRESS NOTES
[facility] CARDIOLOGY PROGRESS NOTE           2/18/2021 6:59 AM    Admit Date: 2/15/2021      Subjective: The patient does not report angina, shortness of breath at rest, or palpitations. The patient reports lower abdominal pain. She reports chest discomfort from chest compressions. ROS:  Constitutional:   Negative for fevers and unexplained weight loss. Eyes:   Negative for visual disturbance. ENT:   Negative for significant hearing loss and tinnitus. Respiratory:   Negative for hemoptysis. Cardiovascular:   Negative except as noted in HPI. Gastrointestinal:   Positive for abdominal pain. Genitourinary:   Negative for hematuria, renal stones. Integumentary:   Negative for rash or non-healing wounds  Hematologic/Lymphatic:   Negative for excessive bleeding hx or clotting disorder. Musculoskeletal:  Negative for active, unexplained/severe joint pain. Neurological:   Negative for stroke. Behavioral/Psych:   Negative for suicidal ideations. Endocrine:   Negative for uncontrolled diabetic symptoms including polyuria, polydipsia and poor wound healing. Objective:      Vitals:    02/17/21 1636 02/17/21 1955 02/17/21 2353 02/18/21 0443   BP: (!) 131/57 (!) 133/56 126/61 (!) 121/48   Pulse: 84 82 76 68   Resp: 16 16 17 18   Temp: 100.2 °F (37.9 °C) 99.5 °F (37.5 °C) 98.9 °F (37.2 °C) 98.8 °F (37.1 °C)   SpO2: 95% 95% 95% 96%   Weight:    154 lb (69.9 kg)   Height:           Physical Exam:  General-No Acute Distress  Neck- supple, no JVD  CV- regular rate and rhythm no RG; 3 out of 6 systolic murmur best heard in the right upper sternal border  Lung-crackles of the right lung; bibasilar rales  Abd- soft, nontender, nondistended  Ext- no edema bilaterally.   Skin- warm and dry    Data Review:   Recent Labs     02/17/21  1808 02/16/21  0557 02/15/21  1010    139 136   K 3.4* 3.6 3.4*   BUN 17 15 16   CREA 1.52* 1.50* 1.57*   * 142* 205*   WBC  --   --  9.6   HGB  -- --  9.2*   HCT  --   --  30.6*   PLT  --   --  551*       Assessment/Plan:     Acute HFpEF  - Continues to have rales on supplemental oxygen and serum creatinine stable with bicarb normal  - Continue IV diuresis at this time    CAD in native artery  - Continue baby aspirin daily, Plavix, carvedilol, Imdur, and Crestor    HTN  - Continue with Coreg and nifedipine    HLD  - Continue with Crestor    Colitis  - Defer to hospitalist for Citlali Vasquez MD

## 2021-02-18 NOTE — PROGRESS NOTES
ACUTE PHYSICAL THERAPY GOALS:  (Developed with and agreed upon by patient and/or caregiver. )  LTG:  (1.)Ms. Javier Salas will move from supine to sit and sit to supine , scoot up and down and roll side to side in bed with INDEPENDENT within 7 treatment day(s). (2.)Ms. Javier Salas will transfer from bed to chair and chair to bed with MODIFIED INDEPENDENCE using the least restrictive device within 7 treatment day(s). (3.)Ms. Javier Salas will ambulate with MODIFIED INDEPENDENCE for 250+ feet with the least restrictive device within 7 treatment day(s) while maintaining normal vital signs. .     PHYSICAL THERAPY: Daily Note and AM Treatment Day # 2    Luis Savage is a 65 y.o. female   PRIMARY DIAGNOSIS: CHF (congestive heart failure) ([facility])  Acute respiratory failure with hypoxemia (HCC) [J96.01]  Congestive heart failure (CHF) (HCC) [I50.9]  Acute on chronic combined systolic and diastolic congestive heart failure (HCC) [I50.43]  Acute respiratory failure with hypoxia (HCC) [J96.01]         ASSESSMENT:     REHAB RECOMMENDATIONS: CURRENT LEVEL OF FUNCTION:  (Most Recently Demonstrated)   Recommendation to date pending progress:  Setting:   No further skilled therapy   Equipment:    None Bed Mobility:   Independent  Sit to Stand:  [employer] Assistance  Transfers:   Standby Assistance  Gait/Mobility:  [address] in room to chair only     ASSESSMENT:  Ms. Javier Salas initially declined any activity, eventually agreeable to OOB to chair only. Pt remains SBA for activity, unable to fully assess ambulation needs due to pt refusal to do more than bed to chair activity. Pt on 2 L, stats 98% at rest, decreased to 1 L 96%, RN aware. PT to cont to follow for acute care needs. No real progress made this date. SUBJECTIVE:   Ms. Javier Salas states, \"I don't feel good. \"    SOCIAL HISTORY/ LIVING ENVIRONMENT:   Home Environment: Private residence  # Steps to Enter: 0  One/Two Story Residence: [facility] story  Living Alone: No  Support Systems: Spouse/Significant Other/Partner  OBJECTIVE:     PAIN: VITAL SIGNS: LINES/DRAINS:   Pre Treatment: Pain Screen  Pain Scale 1: Numeric (0 - 10)  Pain Intensity 1: 0  Post Treatment: 0 Vital Signs  O2 Sat (%): 96 %  O2 Device: Nasal cannula  O2 Flow Rate (L/min): 1 l/min(decreased from 2 to 1 L, RN made aware) Purewick  O2 Device: Nasal cannula     MOBILITY: I Mod I S SBA CGA Min Mod Max Total  NT x2 Comments:   Bed Mobility    Rolling [x] [] [] [] [] [] [] [] [] [] []    Supine to Sit [x] [] [] [] [] [] [] [] [] [] []    Scooting [x] [] [] [] [] [] [] [] [] [] []    Sit to Supine [x] [] [] [] [] [] [] [] [] [] []    Transfers    Sit to Stand [] [] [] [x] [] [] [] [] [] [] []    Bed to Chair [] [] [] [x] [] [] [] [] [] [] []    Stand to Sit [] [] [] [x] [] [] [] [] [] [] []    I=Independent, Mod I=Modified Independent, S=Supervision, SBA=Standby Assistance, CGA=Contact Guard Assistance,   Min=Minimal Assistance, Mod=Moderate Assistance, Max=Maximal Assistance, Total=Total Assistance, NT=Not Tested    GAIT: I Mod I S SBA CGA Min Mod Max Total  NT x2 Comments:   Level of Assistance [] [] [] [] [x] [] [] [] [] [] []    Distance 3    DME None    Gait Quality Slow     Weightbearing  Status N/A     I=Independent, Mod I=Modified Independent, S=Supervision, SBA=Standby Assistance, CGA=Contact Guard Assistance,   Min=Minimal Assistance, Mod=Moderate Assistance, Max=Maximal Assistance, Total=Total Assistance, NT=Not Tested    PLAN:   FREQUENCY/DURATION: PT Plan of Care: 3 times/week for duration of hospital stay or until stated goals are met, whichever comes first.  TREATMENT:     TREATMENT:   ($$ Therapeutic Activity: 8-22 mins    )  Therapeutic Activity (8 Minutes): Therapeutic activity included Supine to Sit, Transfer Training, Ambulation on level ground, Sitting balance  and Standing balance to improve functional Mobility and Activity tolerance.     AFTER TREATMENT POSITION/PRECAUTIONS:  Chair, Needs within reach and RN notified    INTERDISCIPLINARY COLLABORATION:  RN/PCT and PT/PTA    TOTAL TREATMENT DURATION:  PT Patient Time In/Time Out  Time In: 0906  Time Out: 100 Elida Rubio PT

## 2021-02-18 NOTE — PROGRESS NOTES
Hourly rounding completely during shift. All needs met at this time. Bed L/L with call bell in reach. Report given to oncoming RN.

## 2021-02-19 ENCOUNTER — APPOINTMENT (OUTPATIENT)
Dept: ULTRASOUND IMAGING | Age: 71
DRG: 291 | End: 2021-02-19
Attending: INTERNAL MEDICINE
Payer: MEDICARE

## 2021-02-19 LAB
ANION GAP SERPL CALC-SCNC: 10 MMOL/L (ref 7–16)
BUN SERPL-MCNC: 20 MG/DL (ref 8–23)
CALCIUM SERPL-MCNC: 8.3 MG/DL (ref 8.3–10.4)
CHLORIDE SERPL-SCNC: 101 MMOL/L (ref 98–107)
CO2 SERPL-SCNC: 26 MMOL/L (ref 21–32)
CREAT SERPL-MCNC: 1.95 MG/DL (ref 0.6–1)
ERYTHROCYTE [DISTWIDTH] IN BLOOD BY AUTOMATED COUNT: 15.7 % (ref 11.9–14.6)
GLUCOSE BLD STRIP.AUTO-MCNC: 187 MG/DL (ref 65–100)
GLUCOSE BLD STRIP.AUTO-MCNC: 206 MG/DL (ref 65–100)
GLUCOSE BLD STRIP.AUTO-MCNC: 220 MG/DL (ref 65–100)
GLUCOSE BLD STRIP.AUTO-MCNC: 91 MG/DL (ref 65–100)
GLUCOSE SERPL-MCNC: 195 MG/DL (ref 65–100)
HCT VFR BLD AUTO: 25.3 % (ref 35.8–46.3)
HGB BLD-MCNC: 7.8 G/DL (ref 11.7–15.4)
MAGNESIUM SERPL-MCNC: 2 MG/DL (ref 1.8–2.4)
MCH RBC QN AUTO: 27.7 PG (ref 26.1–32.9)
MCHC RBC AUTO-ENTMCNC: 30.8 G/DL (ref 31.4–35)
MCV RBC AUTO: 89.7 FL (ref 79.6–97.8)
NRBC # BLD: 0 K/UL (ref 0–0.2)
PLATELET # BLD AUTO: 479 K/UL (ref 150–450)
PMV BLD AUTO: 9.3 FL (ref 9.4–12.3)
POTASSIUM SERPL-SCNC: 3 MMOL/L (ref 3.5–5.1)
RBC # BLD AUTO: 2.82 M/UL (ref 4.05–5.2)
SODIUM SERPL-SCNC: 137 MMOL/L (ref 136–145)
WBC # BLD AUTO: 11.6 K/UL (ref 4.3–11.1)

## 2021-02-19 PROCEDURE — 82962 GLUCOSE BLOOD TEST: CPT

## 2021-02-19 PROCEDURE — 65270000029 HC RM PRIVATE

## 2021-02-19 PROCEDURE — 2709999900 HC NON-CHARGEABLE SUPPLY

## 2021-02-19 PROCEDURE — 99232 SBSQ HOSP IP/OBS MODERATE 35: CPT | Performed by: INTERNAL MEDICINE

## 2021-02-19 PROCEDURE — 36415 COLL VENOUS BLD VENIPUNCTURE: CPT

## 2021-02-19 PROCEDURE — 74011250637 HC RX REV CODE- 250/637: Performed by: INTERNAL MEDICINE

## 2021-02-19 PROCEDURE — 80048 BASIC METABOLIC PNL TOTAL CA: CPT

## 2021-02-19 PROCEDURE — 76770 US EXAM ABDO BACK WALL COMP: CPT

## 2021-02-19 PROCEDURE — 77030038269 HC DRN EXT URIN PURWCK BARD -A

## 2021-02-19 PROCEDURE — 74011250636 HC RX REV CODE- 250/636: Performed by: INTERNAL MEDICINE

## 2021-02-19 PROCEDURE — 85027 COMPLETE CBC AUTOMATED: CPT

## 2021-02-19 PROCEDURE — 83735 ASSAY OF MAGNESIUM: CPT

## 2021-02-19 PROCEDURE — 74011250637 HC RX REV CODE- 250/637: Performed by: PHYSICIAN ASSISTANT

## 2021-02-19 PROCEDURE — 74011636637 HC RX REV CODE- 636/637: Performed by: FAMILY MEDICINE

## 2021-02-19 PROCEDURE — 74011636637 HC RX REV CODE- 636/637: Performed by: INTERNAL MEDICINE

## 2021-02-19 PROCEDURE — 74011000250 HC RX REV CODE- 250: Performed by: INTERNAL MEDICINE

## 2021-02-19 RX ORDER — SODIUM CHLORIDE 9 MG/ML
50 INJECTION, SOLUTION INTRAVENOUS CONTINUOUS
Status: DISCONTINUED | OUTPATIENT
Start: 2021-02-19 | End: 2021-02-19

## 2021-02-19 RX ORDER — DICYCLOMINE HYDROCHLORIDE 10 MG/1
10 CAPSULE ORAL 4 TIMES DAILY
Status: DISCONTINUED | OUTPATIENT
Start: 2021-02-19 | End: 2021-02-24 | Stop reason: HOSPADM

## 2021-02-19 RX ORDER — CIPROFLOXACIN 2 MG/ML
400 INJECTION, SOLUTION INTRAVENOUS EVERY 24 HOURS
Status: DISCONTINUED | OUTPATIENT
Start: 2021-02-20 | End: 2021-02-24 | Stop reason: HOSPADM

## 2021-02-19 RX ORDER — POTASSIUM CHLORIDE 20 MEQ/1
40 TABLET, EXTENDED RELEASE ORAL 2 TIMES DAILY
Status: COMPLETED | OUTPATIENT
Start: 2021-02-19 | End: 2021-02-19

## 2021-02-19 RX ORDER — POLYETHYLENE GLYCOL 3350 17 G/17G
17 POWDER, FOR SOLUTION ORAL 2 TIMES DAILY
Status: DISCONTINUED | OUTPATIENT
Start: 2021-02-19 | End: 2021-02-21

## 2021-02-19 RX ADMIN — ISOSORBIDE MONONITRATE 60 MG: 30 TABLET, EXTENDED RELEASE ORAL at 08:03

## 2021-02-19 RX ADMIN — PROMETHAZINE HYDROCHLORIDE 12.5 MG: 25 INJECTION INTRAMUSCULAR; INTRAVENOUS at 23:55

## 2021-02-19 RX ADMIN — POTASSIUM CHLORIDE 40 MEQ: 20 TABLET, EXTENDED RELEASE ORAL at 17:12

## 2021-02-19 RX ADMIN — POLYETHYLENE GLYCOL 3350 17 G: 17 POWDER, FOR SOLUTION ORAL at 17:12

## 2021-02-19 RX ADMIN — ROSUVASTATIN CALCIUM 40 MG: 20 TABLET, COATED ORAL at 21:32

## 2021-02-19 RX ADMIN — DICYCLOMINE HYDROCHLORIDE 10 MG: 10 CAPSULE ORAL at 17:12

## 2021-02-19 RX ADMIN — SODIUM CHLORIDE 10 ML: 9 INJECTION, SOLUTION INTRAMUSCULAR; INTRAVENOUS; SUBCUTANEOUS at 14:55

## 2021-02-19 RX ADMIN — INSULIN LISPRO 4 UNITS: 100 INJECTION, SOLUTION INTRAVENOUS; SUBCUTANEOUS at 08:05

## 2021-02-19 RX ADMIN — SODIUM CHLORIDE 10 ML: 9 INJECTION, SOLUTION INTRAMUSCULAR; INTRAVENOUS; SUBCUTANEOUS at 05:51

## 2021-02-19 RX ADMIN — METRONIDAZOLE 500 MG: 500 INJECTION, SOLUTION INTRAVENOUS at 08:01

## 2021-02-19 RX ADMIN — ENOXAPARIN SODIUM 40 MG: 100 INJECTION SUBCUTANEOUS at 08:02

## 2021-02-19 RX ADMIN — INSULIN LISPRO 2 UNITS: 100 INJECTION, SOLUTION INTRAVENOUS; SUBCUTANEOUS at 17:15

## 2021-02-19 RX ADMIN — HYDROMORPHONE HYDROCHLORIDE 1 MG: 1 INJECTION, SOLUTION INTRAMUSCULAR; INTRAVENOUS; SUBCUTANEOUS at 13:16

## 2021-02-19 RX ADMIN — POTASSIUM CHLORIDE 40 MEQ: 20 TABLET, EXTENDED RELEASE ORAL at 09:01

## 2021-02-19 RX ADMIN — INSULIN LISPRO 4 UNITS: 100 INJECTION, SOLUTION INTRAVENOUS; SUBCUTANEOUS at 21:58

## 2021-02-19 RX ADMIN — INSULIN GLARGINE 8 UNITS: 100 INJECTION, SOLUTION SUBCUTANEOUS at 21:57

## 2021-02-19 RX ADMIN — CARVEDILOL 6.25 MG: 6.25 TABLET, FILM COATED ORAL at 08:03

## 2021-02-19 RX ADMIN — ONDANSETRON 4 MG: 2 INJECTION INTRAMUSCULAR; INTRAVENOUS at 12:10

## 2021-02-19 RX ADMIN — SODIUM CHLORIDE 10 ML: 9 INJECTION, SOLUTION INTRAMUSCULAR; INTRAVENOUS; SUBCUTANEOUS at 21:59

## 2021-02-19 RX ADMIN — FUROSEMIDE 40 MG: 10 INJECTION, SOLUTION INTRAMUSCULAR; INTRAVENOUS at 08:03

## 2021-02-19 RX ADMIN — PROMETHAZINE HYDROCHLORIDE 12.5 MG: 25 INJECTION INTRAMUSCULAR; INTRAVENOUS at 17:12

## 2021-02-19 RX ADMIN — PROMETHAZINE HYDROCHLORIDE 12.5 MG: 25 INJECTION INTRAMUSCULAR; INTRAVENOUS at 13:16

## 2021-02-19 RX ADMIN — ASPIRIN 81 MG: 81 TABLET, CHEWABLE ORAL at 05:51

## 2021-02-19 RX ADMIN — NIFEDIPINE 90 MG: 90 TABLET, FILM COATED, EXTENDED RELEASE ORAL at 08:03

## 2021-02-19 RX ADMIN — METRONIDAZOLE 500 MG: 500 INJECTION, SOLUTION INTRAVENOUS at 21:33

## 2021-02-19 RX ADMIN — DICYCLOMINE HYDROCHLORIDE 10 MG: 10 CAPSULE ORAL at 21:32

## 2021-02-19 RX ADMIN — CIPROFLOXACIN 400 MG: 2 INJECTION, SOLUTION INTRAVENOUS at 08:05

## 2021-02-19 RX ADMIN — HYDROMORPHONE HYDROCHLORIDE 1 MG: 1 INJECTION, SOLUTION INTRAMUSCULAR; INTRAVENOUS; SUBCUTANEOUS at 08:03

## 2021-02-19 RX ADMIN — CLOPIDOGREL BISULFATE 75 MG: 75 TABLET ORAL at 08:03

## 2021-02-19 NOTE — PROGRESS NOTES
Physician Progress Note      Saige Clement  [facility] #:                  [id number]  :                       1950  ADMIT DATE:       2/15/2021 9:46 AM  DISCH DATE:  Devang Patton  PROVIDER #:        Jcarlos Candelaria MD          QUERY TEXT:    Patient admitted with acute systolic CHF. Noted documentation of acute respiratory failure in H&P on 2/15/21. In order to support the diagnosis of acute respiratory failure, please include additional clinical indicators in your documentation. Or please document if the diagnosis of acute respiratory failure has been ruled out after further study. The medical record reflects the following:  Risk Factors: CHF, Recent COVID-19    Clinical Indicators:  --VS on arrival: O2 96% on RA, RR 24, T 98.4, HR 84, /68  --2/15 ED PN states: Pulmonary effort is normal. Tachypnea present. No accessory muscle usage, respiratory distress or  retractions. --2/15 H&P states:  No respiratory accessory muscle use; The patient is a pleasant elderly female in acute respiratory distress. On oxygen    Treatment: Supplemental O2 maxed at 3L NC    Acute Respiratory Failure Clinical Indicators per [employer] Training Guide and Quick Reference Guide:  pO2 < 60 mmHg or SpO2 (pulse oximetry) < 91% breathing room air  pCO2 > 50 and pH < 7.35  P/F ratio (pO2 / FIO2) < 300  pO2 decrease or pCO2 increase by 10 mmHg from baseline (if known)  Supplemental oxygen of 40% or more  Presence of respiratory distress, tachypnea, dyspnea, shortness of breath, wheezing  Unable to speak in complete sentences  Use of accessory muscles to breathe  Extreme anxiety and feeling of impending doom  Tripod position  Confusion/altered mental status/obtunded  Options provided:  -- Acute Respiratory Failure as evidenced by, Please document evidence.   -- Acute Respiratory Failure ruled out after study  -- Other - I will add my own diagnosis  -- Disagree - Not applicable / Not valid  -- Disagree - Clinically unable to determine / Unknown  -- Refer to Clinical Documentation Reviewer    PROVIDER RESPONSE TEXT:    This patient is in acute respiratory failure as evidenced by per ED evaluation: Patient looks mildly in distress does not use oxygen at home requiring supplemental oxygen in the ER    Query created by: Patrick Figueroa on 2/19/2021 11:50 AM      Electronically signed by:  Bishop Hanna MD 2/19/2021 12:16 PM

## 2021-02-19 NOTE — CONSULTS
[facility] Consult Note       Primary GI Physician: Dr. Esther Cramer    Referring Provider:  Dr. Carolyn Bonilla Physician: Dr. Rees Presume Date:  2/19/2021    Admit Date:  2/15/2021    Chief Complaint:  Colitis with worsening abdominal pain. patient requesting to see GI    Subjective:     History of Present Illness:  Patient is a 65 y.o. female being seen in GI consult at the request of Dr. Danuta Good for colitis with worsening abdominal pain. Pt reports new onset diffuse abdominal pain, more prominent in her left mid abdomen. She denies known triggers for pain. She denies alleviating or aggravating factors. She specifically says that pain is not worsened with eating or with BMs. She denies diarrhea. Instead she reports new onset constipation x3wks with small, hard stools daily. She says that she thought that she had blood in her stool but that nursing told her that no blood was present. She has had new onset of nausea with vomiting as recent as yesterday, none today. Her appetite has been decreased without associated weight loss. She denies history of reflux, dysphagia. She denies known hx of colitis. Colonoscopy 8/5/20 for screening revealed 3mm TA transverse colon polyp, Diverticulosis, internal hemorrhoids with fair prep and f/u colon was recommended for 8/2025. EGD then for LAURA showed mild reflux esophagitis, gastritis, and duodenitis with unremarkable gastric and duodenal bx.       She has multiple medical issues to include but not limited to GERD, CAD s/p cardiac stents in 2008, 2012, CHFpEF, HTN, HLD, DM II, CKD, Depression, recent history of COVID requiring admission, complicated by pulseless VT and code blue which was ultimately felt to be respiratory related hypoxia cardiac arrest.  She was admitted 2/15/21 for increasing shortness of breath with elevated Ddimer, xray/CT imaging that revealed pulmonary edema, pleural effusions, basilar atelectasis, cardiomegaly but no PE.  CT also reported atherosclerotic coronary artery disease and Cirrhosis. She was placed on IV Lasix. PMH:  Past Medical History:   Diagnosis Date    CAD (coronary artery disease) 3/1/2012    MI, 3 stents    Chest pain     Coronary artery disease involving native coronary artery without angina pectoris 5/21/2015    Depression 3/1/2012    Diabetes mellitus ([facility] 3/1/2012    oral agents. . hypo- 80's, Last A1c 6.9 in 6/2018    DM2 (diabetes mellitus, type 2) ([facility] 5/21/2015    Elevated troponin 3/2/2012    Essential hypertension 5/21/2015    External hemorrhoids without mention of complication 9512    GERD (gastroesophageal reflux disease)     History of MI (myocardial infarction) 11/12    x2    Hypercholesterolemia     Hypertension 3/1/2012    Insomnia     Personal history of colonic polyps 2013    hyperplastic    Platelet inhibition due to Plavix     holding for colonoscopy per GI Dr. Oleg Ansari Rectocele 2013    Tobacco abuse 3/1/2012    quit for 1 year    Tobacco use disorder     Unstable angina ([facility] 3/1/2012    ntg as needed. PSH:  Past Surgical History:   Procedure Laterality Date    HX CAROTID ENDARTERECTOMY Left 12/12/2018    HX CATARACT REMOVAL Left 09/19/2016    Dr Juana Balderas, [address] [address] Right 11/07/2016    Dr Juana Balderas, [address]      neck w/plate    HX COLONOSCOPY  12/17/13    Anna Marie--single transverse hyperplastic polyp--7-10 year recall    HX HEMORRHOIDECTOMY      x2    HX ORTHOPAEDIC      left elbow    HX CAL AND BSO      HX WISDOM TEETH EXTRACTION      [facility] LEFT HEART CATH,PERCUTANEOUS  last stented 11/12 x 2    stent x3 , mi x 2       Allergies: Allergies   Allergen Reactions    Cephalosporins Hives and Swelling    Sulfamethoxazole-Trimethoprim Other (comments)     Diarrhea     Codeine Other (comments)    Lisinopril Other (comments)     Passing out spells.  // pt sts not allergic to med        Home Medications:  Prior to Admission medications    Medication Sig Start Date End Date Taking? Authorizing Provider   isosorbide mononitrate ER (IMDUR) 30 mg tablet TAKE 1 TABLET BY MOUTH EVERY MORNING 2/16/21   Selin Horn DO   traMADoL (ULTRAM) 50 mg tablet Take 50 mg by mouth every six (6) hours as needed for Pain. Provider, Historical   ondansetron (ZOFRAN ODT) 4 mg disintegrating tablet Take 1 Tab by mouth every eight (8) hours as needed for Nausea or Vomiting. 1/20/21   Bronwyn Soliman MD   insulin glargine (LANTUS,BASAGLAR) 100 unit/mL (3 mL) inpn 8 units at night 1/19/21   Lena Valencia DO   insulin lispro (HUMALOG) 100 unit/mL kwikpen Less than 150 =   0 units           150 -199 =   3 units  200 -249 =   6 units  250 -299 =   9 units  300 -349 =   12 units  Before meals and at night 1/19/21   Lena Valencia DO   albuterol (PROVENTIL HFA, VENTOLIN HFA, PROAIR HFA) 90 mcg/actuation inhaler Take 1 Puff by inhalation every six (6) hours as needed for Wheezing, Shortness of Breath or Cough. 1/19/21   Lena Valencia DO   citalopram (CELEXA) 20 mg tablet TAKE 1 TABLET BY MOUTH IN  THE MORNING 1/18/21   Bronwyn Soliman MD   traZODone (DESYREL) 50 mg tablet TAKE 1 TABLET BY MOUTH AT  NIGHT 1/18/21   Bronwyn Soliman MD   metoprolol succinate (TOPROL-XL) 50 mg XL tablet Take 1 Tab by mouth daily. Patient taking differently: Take 25 mg by mouth two (2) times a day. 8/14/20   Sejal KING NP   NIFEdipine ER (PROCARDIA XL) 60 mg ER tablet TAKE 1 TABLET BY MOUTH  DAILY  Patient taking differently: Take 90 mg by mouth daily. TAKE 1 TABLET BY MOUTH  DAILY 7/17/20   Bronwyn Soliman MD   rosuvastatin (CRESTOR) 40 mg tablet Take 1 Tab by mouth nightly. 7/16/20   Bronwyn Soliman MD   clopidogreL (PLAVIX) 75 mg tab TAKE 1 TABLET BY MOUTH  DAILY 6/23/20   Diego Poole MD   nitroglycerin (NITROSTAT) 0.4 mg SL tablet 1 Tab by SubLINGual route every five (5) minutes as needed for Chest Pain.  11/6/18   Diego Poole MD magnesium oxide (MAG-OX) 400 mg tablet TAKE 1 TABLET BY MOUTH TWICE DAILY  Patient taking differently: Take 400 mg by mouth daily. 11/30/16   Carey Cochran MD   cholecalciferol, vitamin D3, (VITAMIN D3) 2,000 unit tab Take 2,000 Units by mouth daily. Provider, Historical   cyanocobalamin (VITAMIN B-12) 1,000 mcg tablet Take 2,500 mcg by mouth daily. Provider, Historical   aspirin 81 mg chewable tablet Take 81 mg by mouth every morning.  Indications: continue per anesthesia guidelines    Provider, Historical       Hospital Medications:  Current Facility-Administered Medications   Medication Dose Route Frequency    potassium chloride (K-DUR, KLOR-CON) SR tablet 40 mEq  40 mEq Oral BID    0.9% sodium chloride infusion  50 mL/hr IntraVENous CONTINUOUS    promethazine (PHENERGAN) with saline injection 12.5 mg  12.5 mg IntraVENous Q6H    metroNIDAZOLE (FLAGYL) IVPB premix 500 mg  500 mg IntraVENous Q12H    ciprofloxacin (CIPRO) 400 mg in D5W IVPB (premix)  400 mg IntraVENous Q12H    HYDROmorphone (PF) (DILAUDID) injection 1 mg  1 mg IntraVENous Q6H PRN    simethicone (MYLICON) tablet 80 mg  80 mg Oral QID PRN    [Held by provider] carvediloL (COREG) tablet 6.25 mg  6.25 mg Oral BID WITH MEALS    [Held by provider] isosorbide mononitrate ER (IMDUR) tablet 60 mg  60 mg Oral DAILY    insulin lispro (HUMALOG) injection 0-10 Units  0-10 Units SubCUTAneous AC&HS    sodium chloride (NS) flush 5-40 mL  5-40 mL IntraVENous Q8H    sodium chloride (NS) flush 5-40 mL  5-40 mL IntraVENous PRN    acetaminophen (TYLENOL) tablet 650 mg  650 mg Oral Q6H PRN    Or    acetaminophen (TYLENOL) suppository 650 mg  650 mg Rectal Q6H PRN    polyethylene glycol (MIRALAX) packet 17 g  17 g Oral DAILY PRN    promethazine (PHENERGAN) tablet 12.5 mg  12.5 mg Oral Q6H PRN    Or    ondansetron (ZOFRAN) injection 4 mg  4 mg IntraVENous Q6H PRN    enoxaparin (LOVENOX) injection 40 mg  40 mg SubCUTAneous DAILY    aspirin chewable tablet 81 mg  81 mg Oral 7am    clopidogreL (PLAVIX) tablet 75 mg  75 mg Oral DAILY    insulin glargine (LANTUS) injection 8 Units  8 Units SubCUTAneous QHS    NIFEdipine ER (PROCARDIA XL) tablet 90 mg  90 mg Oral DAILY    rosuvastatin (CRESTOR) tablet 40 mg  40 mg Oral QHS       Social History:  Social History     Tobacco Use    Smoking status: Former Smoker     Packs/day: 1.00     Years: 40.00     Pack years: 40.00     Quit date: 2012     Years since quittin.2    Smokeless tobacco: Never Used    Tobacco comment: quit  . has stopped prior also   Substance Use Topics    Alcohol use: No     Frequency: Never       Family History:  Family History   Problem Relation Age of Onset    Heart Disease Mother     Osteoporosis Mother     Heart Attack Mother 67        mi    Thyroid Disease Mother         Multinodular Goiter    Heart Disease Father     Cancer Father         pancreatic    Heart Attack Father 67        MI    Cancer Sister         Pre-Cancerous dysplasia    Thyroid Cancer Sister     Ulcerative Colitis Brother     Thyroid Cancer Brother     Breast Cancer Neg Hx        Review of Systems:  A detailed 10 system ROS is obtained, with pertinent positives as listed above. All others are negative. Diet:      Objective:     Physical Exam:  Vitals:  Visit Vitals  BP (!) 106/48 (BP 1 Location: Right upper arm, BP Patient Position: Lying)   Pulse 96   Temp 98.9 °F (37.2 °C)   Resp 18   Ht 5' 4\" (1.626 m)   Wt 69.9 kg (154 lb)   SpO2 96%   BMI 26.43 kg/m²     Gen:  Pt is alert, cooperative, no acute distress  Skin:  Face reveal no rashes. HEENT: Sclerae anicteric. Cardiovascular: Regular rate and rhythm. Respiratory:  Comfortable breathing with no accessory muscle use. Clear breath sounds anteriorly with no wheezes, rales, or rhonchi. GI:  Abdomen nondistended, soft. +Left sided ttp. Normal active bowel sounds. No obvious masses palpable.   Rectal:  Deferred  Musculoskeletal:  No pitting edema of the lower legs. Neurological:  Gross memory appears intact. Patient is alert and oriented. Psychiatric:  Mood appears appropriate with judgement intact. Laboratory:    Recent Labs     02/19/21  0538 02/18/21  0854 02/17/21  1808   WBC 11.6* 12.3*  --    HGB 7.8* 8.1*  --    HCT 25.3* 26.0*  --    * 529*  --    MCV 89.7 89.0  --     138 139   K 3.0* 3.0* 3.4*    101 104   CO2 26 29 30   BUN 20 16 17   CREA 1.95* 1.58* 1.52*   CA 8.3 8.5 8.5   MG 2.0  --   --    * 151* 232*   ALB  --   --  2.4*      CT Chest w contrast 2/15/21 IMPRESSION  1. No evidence of a pulmonary embolism. 2. Findings support heart failure with cardiomegaly, pleural effusions, basilar atelectasis, and pulmonary interstitial edema. 3. Cirrhosis.     4. Atherosclerosis including coronary atherosclerosis. CT a/p w contrast 2/17/21 FINDINGS:  Small bilateral pleural effusions and associated atelectasis. Heart is enlarged. There is coronary artery calcification. Abdomen findings: There is cirrhotic morphology of the liver. No focal liver lesion. There is no radiopaque gallstone. The pancreas, spleen, and adrenal glands are unremarkable. There is lobulated contour of the kidneys. No hydronephrosis or suspicious renal mass.   Abdominal aorta is normal in course and caliber with prominent atherosclerotic calcifications. Stomach is normal in contour. Small bowel loops are normal caliber. No small bowel obstruction. Evidence of lymphadenopathy.     Pelvic findings: There is long segment wall thickening of the descending colon. The descending colon is also decompressed. There is moderate stool volume in the ascending and transverse colon. No evidence of appendicitis. Urinary bladder is normal in contour. There is no free air or free fluid. Surrounding bones are intact. IMPRESSION  1. Colitis involving the descending colon (infectious or inflammatory). Ischemic colitis considered less likely. Negative for diverticulitis, bowel obstruction or bowel perforation. 2. Cirrhotic liver. 3. Small bilateral pleural effusions. Assessment:     Principal Problem:    CHF (congestive heart failure) ([address] (2/15/2021)    Active Problems:    Coronary artery disease involving native coronary artery of native heart without angina pectoris (9/4/2013)      Essential hypertension (9/15/2016)      Chronic kidney disease, stage III (moderate) ([facility]) (9/15/2016)      Gastroesophageal reflux disease (2/16/2017)      Type 2 diabetes mellitus with hyperglycemia, without long-term current use of insulin ([facility]) (10/11/2018)      S/P PTCA (percutaneous transluminal coronary angioplasty) (9/1/2020)      Mixed hyperlipidemia (10/21/2020)      Congestive heart failure (CHF) ([address] (2/15/2021)      Acute respiratory failure with hypoxemia ([address] (2/15/2021)      Acute respiratory failure with hypoxia ([address] (2/17/2021)      Acute on chronic combined systolic and diastolic congestive heart failure ([address] (2/17/2021)      Hypoalbuminemia (2/18/2021)      Colitis, acute (2/18/2021)      65 y.o. female being seen in GI consult at the request of Dr. Tuan Saul for colitis. Pt reports new onset diffuse abdominal pain, most prominent in her left mid abdomen and worsening since onset 4days ago with N/V yesterday, no diarrhea but new onset constipation with small, hard stools daily x3wks. Labs reveal leukocytosis, chronic anemia, MIRANDA/CKD. CT abd/pelvis w contrast 2/17 reported descending colon colitis amongst other findings as above, including moderate stool volume in the ascending and transverse colon and Cirrhosis (which appears to be new findings). No improvement on Cipro and Flagyl per pt. She is s/p recent Colonoscopy that was negative for evidence of IBD. Colonoscopy 8/5/20 for screening revealed 3mm TA transverse colon polyp, Diverticulosis, internal hemorrhoids with fair prep and f/u colon was recommended for 8/2025.   EGD then for LAURA showed mild reflux esophagitis, gastritis, and duodenitis with unremarkable gastric and duodenal bx. Considering more likely infectious colitis vs. Ischemia. Suspect that her constipation with moderate stool volume in colon could also be contributing to her abdominal discomfort. Plan:     -Continue Cipro and Flagyl  -Start MiraLax 17g b.i.d  -Start trial of Bentyl 10mg qACHS   -May gradually advance diet as tolerated. -If pain continues/worsens may consider additional imaging (e.g. CTA)- if renal function allows.  -Note reported findings of Cirrhosis on recent CT. Can f/u further in outpatient setting. Hepatitis panel was non reactive 7/2020. INR 1.1 on 1/13. Further recommendations will be based upon pt clinical course. Amairani Cast PA-C  [facility]    Patient is seen and examined in collaboration with Dr. Brian Snowden. Assessment and plan as per Dr. Brian Snowden.

## 2021-02-19 NOTE — ROUTINE PROCESS
CHF note: CHF teaching completed, verbalize emphasis on monitoring self and report to MD: If you gain 2 lbs in one day or 5 lbs in a week, and short of breath. If you can not lay flat without developing short of breath or rapid breathing at night; or if it wakes you up. Develop a cough or wheezing. If you notice swollen hands/feet/ankles or stomach with a bloated/ full feeling. If you are  more confused or mentally fuzzy or dizzy. If you notice a rapid or change in your heart rate. If you become more exhausted all the time and unable to do the same level of activity without stopping to catch your breath. Drink no more than 8 cups a day in 8 oz. cups. Limit Cola Drinks. Your Heart can not handle any more. Stay away from salt (limit anything with salt or sodium in it). Limit to 250mg per serving. Exercise needs to be started with your Doctors approval. 
Reduce stress; Call myself or Provider if assistance is needed. Pass post test via teach back, will make self available post DC ,if an questions arise. Diabetic teaching completed.  Planner/scale @ BS:  60 mins total

## 2021-02-19 NOTE — PROGRESS NOTES
PT note: Patient reports nauseous, just returned to floor, declined currently. PT will attempt treatment at later time and date as schedule allows.    Maurice Aaron, PT  2/19/2021

## 2021-02-19 NOTE — PROGRESS NOTES
Hospitalist Progress Note       Name: Daniele Archer MRN: [id number]  : 1950  Age:70 y.o.  female  Admit Date:  2/15/2021 LOS: 4    Reason for Admission:  Acute respiratory failure with hypoxemia (HCC) [J96.01]  Congestive heart failure (CHF) ([facility] [address] [I50.9]  Acute on chronic combined systolic and diastolic congestive heart failure ([facility] [address] [I50.43]  Acute respiratory failure with hypoxia ([facility] [address] [J96.01]    Hospital Course:  Please refer to the admission H&P for details of presentation. In summary, Daniele Archer is a 65 y.o. female with medical history significant for  CAD s/p stents x3, DMII, CKD stage 3, COVID diagnosed last month not a candidate for remdesivir or plasma who presented shortness of breathe and edema to LE bilaterally. CT chest with contrast showed No evidence of a pulmonary embolism. Findings support heart failure with cardiomegaly, pleural effusions, basilar atelectasis, and pulmonary interstitial edema. CT abdomen pelvis this was done due to patient's ongoing and worsening lower abdominal pain. CT shows colitis and has been started on antibiotics. Subjective/24 hr Events (21) :  Patient is seen and examined at bedside. No acute events reported overnight by nursing staff. Patient complains of ongoing lower abdominal pain as well as nausea. Unable to tolerate anything p.o. due to nausea. Has been weaned to room air. Denies any shortness of breath, fever, chills, chest pains, palpitations. ROS: 10 point review of systems is otherwise negative with the exception of the elements mentioned above.     Objective:    Patient Vitals for the past 24 hrs:   Temp Pulse Resp BP SpO2   21 1138 98.9 °F (37.2 °C) 96 18 (!) 106/48 96 %   21 0733 98.8 °F (37.1 °C) 88 18 121/61 96 %   21 0426 98 °F (36.7 °C) 88 18 (!) 126/59 92 %   21 2311 98.3 °F (36.8 °C) 90 18 (!) 133/55 92 %   21 1935 99.7 °F (37.6 °C) 81 18 (!) 121/59 92 %   21 1522 98.2 °F (36.8 °C) 83 18 (!) 122/55 91 %     Oxygen Therapy  O2 Sat (%): 96 % (02/19/21 1138)  Pulse via Oximetry: 88 beats per minute (02/15/21 1659)  O2 Device: Room air (02/18/21 1500)  O2 Flow Rate (L/min): 1 l/min(decreased from 2 to 1 L, RN made aware) (02/18/21 0906)  ETCO2 (mmHg): 95 mmHg (02/16/21 1955)    Body mass index is 26.43 kg/m². Physical Exam:   General:     alert, awake, no acute distress. Well nourished   Head:   normocephalic, atraumatic  Eyes, Ears, nose: PERRL, EOMI. Normal conjunctiva  Neck:    supple, non-tender. Trachea midline. Lungs:   Scattered crackles, no wheezing  Cardiac:   RRR, Normal S1 and S2. Abdomen:   Soft, non distended, tender in lower quadrants (more on LLQ), +BS  Extremities:   Warm, dry. No edema   Skin:   No rashes, no jaundice  Neuro:  AAOx3.  No gross focal neurological deficit  Psychiatric:  No anxiety, calm, cooperative    Data Review:  I have reviewed all labs, meds, and studies from the last 24 hours:      Labs:    Recent Results (from the past 24 hour(s))   GLUCOSE, POC    Collection Time: 02/18/21  4:48 PM   Result Value Ref Range    Glucose (POC) 252 (H) 65 - 100 mg/dL   GLUCOSE, POC    Collection Time: 02/18/21  8:43 PM   Result Value Ref Range    Glucose (POC) 120 (H) 65 - 100 mg/dL   GLUCOSE, POC    Collection Time: 02/18/21 11:07 PM   Result Value Ref Range    Glucose (POC) 197 (H) 65 - 100 mg/dL   CBC W/O DIFF    Collection Time: 02/19/21  5:38 AM   Result Value Ref Range    WBC 11.6 (H) 4.3 - 11.1 K/uL    RBC 2.82 (L) 4.05 - 5.2 M/uL    HGB 7.8 (L) 11.7 - 15.4 g/dL    HCT 25.3 (L) 35.8 - 46.3 %    MCV 89.7 79.6 - 97.8 FL    MCH 27.7 26.1 - 32.9 PG    MCHC 30.8 (L) 31.4 - 35.0 g/dL    RDW 15.7 (H) 11.9 - 14.6 %    PLATELET 928 (H) 759 - 450 K/uL    MPV 9.3 (L) 9.4 - 12.3 FL    ABSOLUTE NRBC 0.00 0.0 - 0.2 K/uL   METABOLIC PANEL, BASIC    Collection Time: 02/19/21  5:38 AM   Result Value Ref Range    Sodium 137 136 - 145 mmol/L    Potassium 3.0 (L) 3.5 - 5.1 mmol/L    Chloride 101 98 - 107 mmol/L    CO2 26 21 - 32 mmol/L    Anion gap 10 7 - 16 mmol/L    Glucose 195 (H) 65 - 100 mg/dL    BUN 20 8 - 23 MG/DL    Creatinine 1.95 (H) 0.6 - 1.0 MG/DL    GFR est AA 32 (L) >60 ml/min/1.73m2    GFR est non-AA 27 (L) >60 ml/min/1.73m2    Calcium 8.3 8.3 - 10.4 MG/DL   MAGNESIUM    Collection Time: 02/19/21  5:38 AM   Result Value Ref Range    Magnesium 2.0 1.8 - 2.4 mg/dL   GLUCOSE, POC    Collection Time: 02/19/21  7:39 AM   Result Value Ref Range    Glucose (POC) 220 (H) 65 - 100 mg/dL   GLUCOSE, POC    Collection Time: 02/19/21 11:34 AM   Result Value Ref Range    Glucose (POC) 91 65 - 100 mg/dL         All Micro Results     None          Current Meds:  Current Facility-Administered Medications   Medication Dose Route Frequency    potassium chloride (K-DUR, KLOR-CON) SR tablet 40 mEq  40 mEq Oral BID    promethazine (PHENERGAN) with saline injection 12.5 mg  12.5 mg IntraVENous Q6H    polyethylene glycol (MIRALAX) packet 17 g  17 g Oral BID    dicyclomine (BENTYL) capsule 10 mg  10 mg Oral QID    0.9% sodium chloride infusion  50 mL/hr IntraVENous CONTINUOUS    metroNIDAZOLE (FLAGYL) IVPB premix 500 mg  500 mg IntraVENous Q12H    ciprofloxacin (CIPRO) 400 mg in D5W IVPB (premix)  400 mg IntraVENous Q12H    HYDROmorphone (PF) (DILAUDID) injection 1 mg  1 mg IntraVENous Q6H PRN    simethicone (MYLICON) tablet 80 mg  80 mg Oral QID PRN    [Held by provider] carvediloL (COREG) tablet 6.25 mg  6.25 mg Oral BID WITH MEALS    [Held by provider] isosorbide mononitrate ER (IMDUR) tablet 60 mg  60 mg Oral DAILY    insulin lispro (HUMALOG) injection 0-10 Units  0-10 Units SubCUTAneous AC&HS    sodium chloride (NS) flush 5-40 mL  5-40 mL IntraVENous Q8H    sodium chloride (NS) flush 5-40 mL  5-40 mL IntraVENous PRN    acetaminophen (TYLENOL) tablet 650 mg  650 mg Oral Q6H PRN    Or    acetaminophen (TYLENOL) suppository 650 mg  650 mg Rectal Q6H PRN    polyethylene glycol (MIRALAX) packet 17 g  17 g Oral DAILY PRN    promethazine (PHENERGAN) tablet 12.5 mg  12.5 mg Oral Q6H PRN    Or    ondansetron (ZOFRAN) injection 4 mg  4 mg IntraVENous Q6H PRN    enoxaparin (LOVENOX) injection 40 mg  40 mg SubCUTAneous DAILY    aspirin chewable tablet 81 mg  81 mg Oral 7am    clopidogreL (PLAVIX) tablet 75 mg  75 mg Oral DAILY    insulin glargine (LANTUS) injection 8 Units  8 Units SubCUTAneous QHS    NIFEdipine ER (PROCARDIA XL) tablet 90 mg  90 mg Oral DAILY    rosuvastatin (CRESTOR) tablet 40 mg  40 mg Oral QHS         Other Studies:  Ct Chest W Cont    Result Date: 2/15/2021  EXAMINATION: CT CHEST W CONT 2/15/2021 1:55 PM ACCESSION NUMBER: [id number] INDICATION: Shortness of breath COMPARISON: 08/10/2020 TECHNIQUE: Multiple contiguous axial CT images of the chest were obtained from the lung apices to the lung bases after the intravenous administration of 75 mL Isovue-370 contrast material . Radiation dose reduction techniques were used for this study:  Our CT scanners use one or all of the following: Automated exposure control, adjustment of the mA and/or kVp according to patient's size, iterative reconstruction. FINDINGS: Lungs: There is a small left pleural effusion with a small to moderate right pleural effusion. Atelectasis is seen in both lung bases. There is mild diffuse increased attenuation throughout the lungs. There is no suspicious nodule. Airways are patent. Opacification of the pulmonary arteries is excellent. There is no evidence of a pulmonary embolism. Mediastinum: Heart size is enlarged. Atherosclerosis is seen in the aorta and its major branches including the coronary arteries. There shotty paratracheal adenopathy. This is unchanged from the comparison exam. Visualized upper abdomen: The liver has some nodularity to its serosal surface. There is no focal suspicious lesion. The adrenal glands are normal. Osseous structures:  Old fractures are seen anteriorly in the right mid ribs. There is no suspicious lytic or blastic lesion. 1. No evidence of a pulmonary embolism. 2. Findings support heart failure with cardiomegaly, pleural effusions, basilar atelectasis, and pulmonary interstitial edema. 3. Cirrhosis. 4. Atherosclerosis including coronary atherosclerosis. Xr Chest Port    Result Date: 2/15/2021  EXAMINATION: CHEST RADIOGRAPH 2/15/2021 10:22 AM ACCESSION NUMBER: [id number] COMPARISON: 02/09/2021 INDICATION: Chest pain with shortness of breath TECHNIQUE: A single view of the chest was obtained. FINDINGS: Support Devices: None Lungs: There is persistent pulmonary vascular indistinctness with small pleural effusions. This is similar to the prior exam. Cardiac Silhouette: Within normal limits in size. Mediastinum: Calcifications are seen in the aorta. Upper Abdomen: Normal Miscellaneous: There is previous fusion in the lower cervical spine. This is partially visualized. 1. Persistent pulmonary edema 2.  Atherosclerosis          Assessment:    Hospital Problems as of 2/19/2021 Date Reviewed: 10/21/2020          Codes Class Noted - Resolved POA    Hypoalbuminemia ICD-10-CM: E88.09  ICD-9-CM: 273.8  2/18/2021 - Present Unknown        Colitis, acute ICD-10-CM: K52.9  ICD-9-CM: 558.9  2/18/2021 - Present Unknown        Acute respiratory failure with hypoxia (HCC) ICD-10-CM: J96.01  ICD-9-CM: 518.81  2/17/2021 - Present Unknown        Acute on chronic combined systolic and diastolic congestive heart failure (HCC) ICD-10-CM: I50.43  ICD-9-CM: 428.43, 428.0  2/17/2021 - Present Unknown        * (Principal) CHF (congestive heart failure) (HCC) ICD-10-CM: I50.9  ICD-9-CM: 428.0  2/15/2021 - Present Unknown        Congestive heart failure (CHF) (HCC) ICD-10-CM: I50.9  ICD-9-CM: 428.0  2/15/2021 - Present Unknown        Acute respiratory failure with hypoxemia (HCC) ICD-10-CM: J96.01  ICD-9-CM: 518.81  2/15/2021 - Present Unknown        Mixed hyperlipidemia ICD-10-CM: E78.2  ICD-9-CM: 272.2  10/21/2020 - Present Yes        S/P PTCA (percutaneous transluminal coronary angioplasty) ICD-10-CM: Z98.61  ICD-9-CM: V45.82  9/1/2020 - Present Yes        Type 2 diabetes mellitus with hyperglycemia, without long-term current use of insulin (HCC) ICD-10-CM: E11.65  ICD-9-CM: 250.00, 790.29  10/11/2018 - Present Yes        Gastroesophageal reflux disease ICD-10-CM: K21.9  ICD-9-CM: 530.81  2/16/2017 - Present Yes        Essential hypertension (Chronic) ICD-10-CM: I10  ICD-9-CM: 401.9  9/15/2016 - Present Yes        Chronic kidney disease, stage III (moderate) ([facility] [address] ICD-10-CM: N18.30  ICD-9-CM: 585.3  9/15/2016 - Present Yes        Coronary artery disease involving native coronary artery of native heart without angina pectoris ICD-10-CM: I25.10  ICD-9-CM: 414.01  9/4/2013 - Present Yes               Plan:    Acute respiratory failure with hypoxia likely due to acute on chronic systolic CHF  Echo on 0/58 shows EF 55 to 75% with diastolic dysfunction. hold lasix due to worsening renal function  Continue with O2 supplement and wean as tolerated    Acute Colitis  Noted in CT A/P.  cipro and flagyl (2/18 -)  GI recs apprecaited  Symptomatic management      MIRANDA CKD stage 3   Creatinine increased to 1.95 likely due to dehydration from nausea/vomiting and decreased p.o. intake. Hold Lasix  We will give trial of low IVF for 5 hours. CAD s/p 3 Stents // HTN // HLD : Stable. Continue with home medications. Slightly hypertensive this morning, will add hydralazine as needed    T2DM: sliding scale and lantus 8u qHS      Diet:  DIET CLEAR LIQUID  DVT PPx: lovenox  Code: DNR    Dispo: home  Estimated Discharge: TBD based on clinical course    Labs/Imaging Reviewed. Plan discussed with staff, patient/family and are in agreement.       Part of this note was written by using a voice dictation software and the note has been proof read but may still contain some grammatical/other typographical errors.    Signed By: Gamaliel Lewis MD     February 19, 2021

## 2021-02-19 NOTE — PROGRESS NOTES
[facility] CARDIOLOGY PROGRESS NOTE           2/19/2021 12:52 PM    Admit Date: 2/15/2021    Subjective:     Improved dyspnea; on 2L NC. States mostly issues with abdominal pain/nausea; being treated for colitis per primary team. Some rib pain from prior CPR which appears musculoskeletal related but improved. ~3.6L net neg    ROS:  Cardiovascular:  As noted above    Objective:      Vitals:    02/18/21 2311 02/19/21 0426 02/19/21 0733 02/19/21 1138   BP: (!) 133/55 (!) 126/59 121/61 (!) 106/48   Pulse: 90 88 88 96   Resp: 18 18 18 18   Temp: 98.3 °F (36.8 °C) 98 °F (36.7 °C) 98.8 °F (37.1 °C) 98.9 °F (37.2 °C)   SpO2: 92% 92% 96% 96%   Weight:       Height:         Physical Exam:  General-No Acute Distress   Neck- supple, no JVD  CV- regular rate and rhythm no MRG  Lung- basilar rales  Abd- soft, nontender, nondistended  Ext- no edema bilaterally in legs   Skin- warm and dry    Data Review:   Recent Labs     02/19/21  0538 02/18/21  0854    138   K 3.0* 3.0*   MG 2.0  --    BUN 20 16   CREA 1.95* 1.58*   * 151*   WBC 11.6* 12.3*   HGB 7.8* 8.1*   HCT 25.3* 26.0*   * 529*       Assessment/Plan:     Principal Problem:    Congestive heart failure (CHF) ([facility] [address] (2/15/2021)    - HFpEF    - unclear etiology and possibly HTN related    - lasix held with worsening renal function. On no home diuretics. Hold off restarting. Exam with no sig vol o/l.  Replete K    - Effusions noted also likely contributed by hypoalbuminemia (2.4)    -predominant issue with abdominal pain (colitis noted on CT scan); appears GI consulted    Active Problems:    Coronary artery disease involving native coronary artery of native heart without angina pectoris    S/P PTCA (percutaneous transluminal coronary angioplasty) (9/1/2020) (9/4/2013)     -Last coronary angiogram from 2/2017 with patent stents in the LAD/diagonal.    - on DAPT and defer scaling back to single agent per outpatient cardiologist, on a high intensity statin. Essential hypertension (9/15/2016)    - increased Imdur to 60mg , continue nifedipine. Changed toprol to coreg for better BP control. Chronic kidney disease, stage III (moderate) (HCC) (9/15/2016)    -worse; diuretics held.  See above      Gastroesophageal reflux disease (2/16/2017)    - PPI       Type 2 diabetes mellitus with hyperglycemia, without long-term current use of insulin ([facility] [address] (10/11/2018)    - per primary       Mixed hyperlipidemia (10/21/2020)    - on a high intensity statin      Acute respiratory failure with hypoxemia ([address] (2/15/2021)    - secondary to above, improving     Frances Solo MD  2/19/2021 12:52 PM

## 2021-02-20 LAB
ANION GAP SERPL CALC-SCNC: 10 MMOL/L (ref 7–16)
BUN SERPL-MCNC: 23 MG/DL (ref 8–23)
CALCIUM SERPL-MCNC: 8.4 MG/DL (ref 8.3–10.4)
CHLORIDE SERPL-SCNC: 103 MMOL/L (ref 98–107)
CO2 SERPL-SCNC: 24 MMOL/L (ref 21–32)
CREAT SERPL-MCNC: 2.23 MG/DL (ref 0.6–1)
ERYTHROCYTE [DISTWIDTH] IN BLOOD BY AUTOMATED COUNT: 15.7 % (ref 11.9–14.6)
GLUCOSE BLD STRIP.AUTO-MCNC: 221 MG/DL (ref 65–100)
GLUCOSE BLD STRIP.AUTO-MCNC: 235 MG/DL (ref 65–100)
GLUCOSE BLD STRIP.AUTO-MCNC: 241 MG/DL (ref 65–100)
GLUCOSE BLD STRIP.AUTO-MCNC: 264 MG/DL (ref 65–100)
GLUCOSE SERPL-MCNC: 146 MG/DL (ref 65–100)
HCT VFR BLD AUTO: 26 % (ref 35.8–46.3)
HGB BLD-MCNC: 8.1 G/DL (ref 11.7–15.4)
MCH RBC QN AUTO: 27.7 PG (ref 26.1–32.9)
MCHC RBC AUTO-ENTMCNC: 31.2 G/DL (ref 31.4–35)
MCV RBC AUTO: 89 FL (ref 79.6–97.8)
NRBC # BLD: 0 K/UL (ref 0–0.2)
PLATELET # BLD AUTO: 479 K/UL (ref 150–450)
PMV BLD AUTO: 9.5 FL (ref 9.4–12.3)
POTASSIUM SERPL-SCNC: 3.2 MMOL/L (ref 3.5–5.1)
RBC # BLD AUTO: 2.92 M/UL (ref 4.05–5.2)
SODIUM SERPL-SCNC: 137 MMOL/L (ref 136–145)
WBC # BLD AUTO: 11.6 K/UL (ref 4.3–11.1)

## 2021-02-20 PROCEDURE — 74011250636 HC RX REV CODE- 250/636: Performed by: INTERNAL MEDICINE

## 2021-02-20 PROCEDURE — 74011250637 HC RX REV CODE- 250/637: Performed by: INTERNAL MEDICINE

## 2021-02-20 PROCEDURE — 82962 GLUCOSE BLOOD TEST: CPT

## 2021-02-20 PROCEDURE — 65270000029 HC RM PRIVATE

## 2021-02-20 PROCEDURE — 94760 N-INVAS EAR/PLS OXIMETRY 1: CPT

## 2021-02-20 PROCEDURE — 74011636637 HC RX REV CODE- 636/637: Performed by: INTERNAL MEDICINE

## 2021-02-20 PROCEDURE — 74011250637 HC RX REV CODE- 250/637: Performed by: PHYSICIAN ASSISTANT

## 2021-02-20 PROCEDURE — 2709999900 HC NON-CHARGEABLE SUPPLY

## 2021-02-20 PROCEDURE — 85027 COMPLETE CBC AUTOMATED: CPT

## 2021-02-20 PROCEDURE — 74011000250 HC RX REV CODE- 250: Performed by: INTERNAL MEDICINE

## 2021-02-20 PROCEDURE — 99232 SBSQ HOSP IP/OBS MODERATE 35: CPT | Performed by: INTERNAL MEDICINE

## 2021-02-20 PROCEDURE — 80048 BASIC METABOLIC PNL TOTAL CA: CPT

## 2021-02-20 PROCEDURE — 36415 COLL VENOUS BLD VENIPUNCTURE: CPT

## 2021-02-20 PROCEDURE — 74011636637 HC RX REV CODE- 636/637: Performed by: FAMILY MEDICINE

## 2021-02-20 RX ORDER — SODIUM CHLORIDE 9 MG/ML
75 INJECTION, SOLUTION INTRAVENOUS CONTINUOUS
Status: DISCONTINUED | OUTPATIENT
Start: 2021-02-20 | End: 2021-02-20

## 2021-02-20 RX ADMIN — INSULIN GLARGINE 8 UNITS: 100 INJECTION, SOLUTION SUBCUTANEOUS at 21:42

## 2021-02-20 RX ADMIN — NIFEDIPINE 90 MG: 90 TABLET, FILM COATED, EXTENDED RELEASE ORAL at 09:00

## 2021-02-20 RX ADMIN — DICYCLOMINE HYDROCHLORIDE 10 MG: 10 CAPSULE ORAL at 18:13

## 2021-02-20 RX ADMIN — DICYCLOMINE HYDROCHLORIDE 10 MG: 10 CAPSULE ORAL at 12:06

## 2021-02-20 RX ADMIN — DICYCLOMINE HYDROCHLORIDE 10 MG: 10 CAPSULE ORAL at 21:35

## 2021-02-20 RX ADMIN — ACETAMINOPHEN 650 MG: 325 TABLET ORAL at 21:35

## 2021-02-20 RX ADMIN — ASPIRIN 81 MG: 81 TABLET, CHEWABLE ORAL at 05:48

## 2021-02-20 RX ADMIN — SODIUM CHLORIDE 10 ML: 9 INJECTION, SOLUTION INTRAMUSCULAR; INTRAVENOUS; SUBCUTANEOUS at 14:22

## 2021-02-20 RX ADMIN — POLYETHYLENE GLYCOL 3350 17 G: 17 POWDER, FOR SOLUTION ORAL at 08:59

## 2021-02-20 RX ADMIN — PROMETHAZINE HYDROCHLORIDE 12.5 MG: 25 INJECTION INTRAMUSCULAR; INTRAVENOUS at 12:05

## 2021-02-20 RX ADMIN — ENOXAPARIN SODIUM 40 MG: 100 INJECTION SUBCUTANEOUS at 08:59

## 2021-02-20 RX ADMIN — INSULIN LISPRO 4 UNITS: 100 INJECTION, SOLUTION INTRAVENOUS; SUBCUTANEOUS at 18:13

## 2021-02-20 RX ADMIN — ROSUVASTATIN CALCIUM 40 MG: 20 TABLET, COATED ORAL at 21:35

## 2021-02-20 RX ADMIN — DICYCLOMINE HYDROCHLORIDE 10 MG: 10 CAPSULE ORAL at 09:00

## 2021-02-20 RX ADMIN — SODIUM CHLORIDE 75 ML/HR: 900 INJECTION, SOLUTION INTRAVENOUS at 10:08

## 2021-02-20 RX ADMIN — PROMETHAZINE HYDROCHLORIDE 12.5 MG: 25 INJECTION INTRAMUSCULAR; INTRAVENOUS at 18:13

## 2021-02-20 RX ADMIN — INSULIN LISPRO 4 UNITS: 100 INJECTION, SOLUTION INTRAVENOUS; SUBCUTANEOUS at 21:41

## 2021-02-20 RX ADMIN — METRONIDAZOLE 500 MG: 500 INJECTION, SOLUTION INTRAVENOUS at 08:59

## 2021-02-20 RX ADMIN — CIPROFLOXACIN 400 MG: 2 INJECTION, SOLUTION INTRAVENOUS at 10:07

## 2021-02-20 RX ADMIN — METRONIDAZOLE 500 MG: 500 INJECTION, SOLUTION INTRAVENOUS at 21:35

## 2021-02-20 RX ADMIN — PROMETHAZINE HYDROCHLORIDE 12.5 MG: 25 INJECTION INTRAMUSCULAR; INTRAVENOUS at 05:47

## 2021-02-20 RX ADMIN — CLOPIDOGREL BISULFATE 75 MG: 75 TABLET ORAL at 09:00

## 2021-02-20 RX ADMIN — SODIUM CHLORIDE 10 ML: 9 INJECTION, SOLUTION INTRAMUSCULAR; INTRAVENOUS; SUBCUTANEOUS at 21:42

## 2021-02-20 RX ADMIN — POLYETHYLENE GLYCOL 3350 17 G: 17 POWDER, FOR SOLUTION ORAL at 18:13

## 2021-02-20 RX ADMIN — INSULIN LISPRO 4 UNITS: 100 INJECTION, SOLUTION INTRAVENOUS; SUBCUTANEOUS at 12:05

## 2021-02-20 NOTE — PROGRESS NOTES
.  [facility] Progress Note             Date: 2/20/2021    GI Problem: Infectious colitis    History of Present Illness:  Patient is a 65 y.o. female who is seen in consultation for infectious colitis based on WBC and CT scan. No diarrhea or bleeding. In fact, having bm with miralax last night resolved abdominal pain.   Feeling good this AM.     Hospital Medications:  Current Facility-Administered Medications   Medication Dose Route Frequency    promethazine (PHENERGAN) with saline injection 12.5 mg  12.5 mg IntraVENous Q6H    polyethylene glycol (MIRALAX) packet 17 g  17 g Oral BID    dicyclomine (BENTYL) capsule 10 mg  10 mg Oral QID    ciprofloxacin (CIPRO) 400 mg in D5W IVPB (premix)  400 mg IntraVENous Q24H    metroNIDAZOLE (FLAGYL) IVPB premix 500 mg  500 mg IntraVENous Q12H    HYDROmorphone (PF) (DILAUDID) injection 1 mg  1 mg IntraVENous Q6H PRN    simethicone (MYLICON) tablet 80 mg  80 mg Oral QID PRN    [Held by provider] carvediloL (COREG) tablet 6.25 mg  6.25 mg Oral BID WITH MEALS    [Held by provider] isosorbide mononitrate ER (IMDUR) tablet 60 mg  60 mg Oral DAILY    insulin lispro (HUMALOG) injection 0-10 Units  0-10 Units SubCUTAneous AC&HS    sodium chloride (NS) flush 5-40 mL  5-40 mL IntraVENous Q8H    sodium chloride (NS) flush 5-40 mL  5-40 mL IntraVENous PRN    acetaminophen (TYLENOL) tablet 650 mg  650 mg Oral Q6H PRN    Or    acetaminophen (TYLENOL) suppository 650 mg  650 mg Rectal Q6H PRN    polyethylene glycol (MIRALAX) packet 17 g  17 g Oral DAILY PRN    promethazine (PHENERGAN) tablet 12.5 mg  12.5 mg Oral Q6H PRN    Or    ondansetron (ZOFRAN) injection 4 mg  4 mg IntraVENous Q6H PRN    enoxaparin (LOVENOX) injection 40 mg  40 mg SubCUTAneous DAILY    aspirin chewable tablet 81 mg  81 mg Oral 7am    clopidogreL (PLAVIX) tablet 75 mg  75 mg Oral DAILY    insulin glargine (LANTUS) injection 8 Units  8 Units SubCUTAneous QHS    NIFEdipine ER (PROCARDIA XL) tablet 90 mg  90 mg Oral DAILY    rosuvastatin (CRESTOR) tablet 40 mg  40 mg Oral QHS       Objective:     Physical Exam:  Vitals:  Visit Vitals  BP (!) 124/54 (BP 1 Location: Right upper arm, BP Patient Position: At rest)   Pulse 93   Temp 98.8 °F (37.1 °C)   Resp 17   Ht 5' 4\" (1.626 m)   Wt 69.9 kg (154 lb)   SpO2 98%   BMI 26.43 kg/m²       General: No acute distress. Skin:  Extremities and face reveal no rashes. No johns erythema. No telangiectasias on the chest wall. HEENT: Sclerae anicteric. No oral ulcers. No abnormal pigmentation of the lips. The neck is supple. Cardiovascular: Regular rate and rhythm. No murmurs, gallops, or rubs. Respiratory:  Comfortable breathing  With no accessory muscle use. Clear breath sounds with no wheezes, rales, or rhonchi. GI:  Abdomen nondistended, soft, and nontender. Normal active bowel sounds. No enlargement of the liver or spleen. No masses palpable. Musculoskeletal:  No pitting edema of the lower legs. Extremities have good range of motion. Neurological:  Gross memory appears intact. Patient is alert and oriented. Psychiatric:  Mood appears appropriate with judgement intact. Lymphatic:  No cervical or supraclavicular adenopathy. Laboratory:    Recent Labs     02/20/21  0727   WBC 11.6*   RBC 2.92*   HGB 8.1*   HCT 26.0*   *      Recent Labs     02/20/21  0727   *      K 3.2*      CO2 24   BUN 23   CREA 2.23*   CA 8.4     No results for input(s): PTP, INR, APTT, INREXT in the last 72 hours. Recent Labs     02/17/21  1808   ALB 2.4*       Assessment:       A 65 y.o. female with presumed infectious colitis and constipation. Seems to be improving a lot.       Plan:       Would do 7 days total cipro/ flagyl  Continue miralax  Continue bentyl    Signed By: Zacarias Ulrich MD     February 20, 2021

## 2021-02-20 NOTE — PROGRESS NOTES
Hospitalist Progress Note       Name: Manuela Linares MRN: [id number]  : 1950  Age:70 y.o.  female  Admit Date:  2/15/2021 LOS: 5    Reason for Admission:  Acute respiratory failure with hypoxemia (HCC) [J96.01]  Congestive heart failure (CHF) ([facility] [address] [I50.9]  Acute on chronic combined systolic and diastolic congestive heart failure ([address] [I50.43]  Acute respiratory failure with hypoxia ([facility] [address] [J96.01]    Hospital Course:  Please refer to the admission H&P for details of presentation. In summary, Manuela Linares is a 65 y.o. female with medical history significant for   CAD s/p stents x3, DMII, CKD stage 3, COVID diagnosed last month not a candidate for remdesivir or plasma who presented shortness of breathe and edema to LE bilaterally. CT chest with contrast showed No evidence of a pulmonary embolism. Findings support heart failure with cardiomegaly, pleural effusions, basilar atelectasis, and pulmonary interstitial edema. CT abdomen pelvis this was done due to patient's ongoing and worsening lower abdominal pain. CT shows colitis and has been started on antibiotics. GI was consulted as per request of the patient and ongoing abdominal pain. Subjective/24 hr Events (21) :  Patient is seen and examined at bedside. No acute events reported overnight by nursing staff. Reports improvement of her abdominal pain. Currently she reports no discomfort. Able to tolerate some PO so far. Reports having a good BM yesterday evening. On RA at 95% saturations. Denies any shortness of breath, fever, chills, chest pains, palpitations. ROS: 10 point review of systems is otherwise negative with the exception of the elements mentioned above.     Objective:    Patient Vitals for the past 24 hrs:   Temp Pulse Resp BP SpO2   21 1243     95 %   21 1022 98.6 °F (37 °C) 88 18 (!) 131/58 94 %   21 0807 98.8 °F (37.1 °C) 93 17 (!) 124/54 98 %   21 0418 98.7 °F (37.1 °C) 97 18 (!) 131/54 97 %   02/20/21 0027 99.2 °F (37.3 °C) 84 18 (!) 123/51 93 %   02/19/21 1946 98.8 °F (37.1 °C) 92 18 (!) 129/51 92 %   02/19/21 1642 99 °F (37.2 °C) 90 18 (!) 119/56 96 %     Oxygen Therapy  O2 Sat (%): 95 % (02/20/21 1243)  Pulse via Oximetry: 88 beats per minute (02/15/21 1659)  O2 Device: Room air (02/20/21 1243)  O2 Flow Rate (L/min): 1 l/min(decreased from 2 to 1 L, RN made aware) (02/18/21 0906)  ETCO2 (mmHg): 95 mmHg (02/16/21 1955)    Body mass index is 26.43 kg/m². Physical Exam:   General:     alert, awake, no acute distress. Well nourished   Head:   normocephalic, atraumatic  Eyes, Ears, nose: PERRL, EOMI. Normal conjunctiva  Neck:    supple, non-tender. Trachea midline. Lungs:   Scattered crackles, no wheezing  Cardiac:   RRR, Normal S1 and S2. Abdomen:   Soft, non distended, nontender, +BS  Extremities:   Warm, dry. No edema   Skin:   No rashes, no jaundice  Neuro:  AAOx3.  No gross focal neurological deficit  Psychiatric:  No anxiety, calm, cooperative    Data Review:  I have reviewed all labs, meds, and studies from the last 24 hours:      Labs:    Recent Results (from the past 24 hour(s))   GLUCOSE, POC    Collection Time: 02/19/21  8:31 PM   Result Value Ref Range    Glucose (POC) 206 (H) 65 - 100 mg/dL   CBC W/O DIFF    Collection Time: 02/20/21  7:27 AM   Result Value Ref Range    WBC 11.6 (H) 4.3 - 11.1 K/uL    RBC 2.92 (L) 4.05 - 5.2 M/uL    HGB 8.1 (L) 11.7 - 15.4 g/dL    HCT 26.0 (L) 35.8 - 46.3 %    MCV 89.0 79.6 - 97.8 FL    MCH 27.7 26.1 - 32.9 PG    MCHC 31.2 (L) 31.4 - 35.0 g/dL    RDW 15.7 (H) 11.9 - 14.6 %    PLATELET 106 (H) 009 - 450 K/uL    MPV 9.5 9.4 - 12.3 FL    ABSOLUTE NRBC 0.00 0.0 - 0.2 K/uL   METABOLIC PANEL, BASIC    Collection Time: 02/20/21  7:27 AM   Result Value Ref Range    Sodium 137 136 - 145 mmol/L    Potassium 3.2 (L) 3.5 - 5.1 mmol/L    Chloride 103 98 - 107 mmol/L    CO2 24 21 - 32 mmol/L    Anion gap 10 7 - 16 mmol/L    Glucose 146 (H) 65 - 100 mg/dL    BUN 23 8 - 23 MG/DL    Creatinine 2.23 (H) 0.6 - 1.0 MG/DL    GFR est AA 28 (L) >60 ml/min/1.73m2    GFR est non-AA 23 (L) >60 ml/min/1.73m2    Calcium 8.4 8.3 - 10.4 MG/DL   GLUCOSE, POC    Collection Time: 02/20/21  8:10 AM   Result Value Ref Range    Glucose (POC) 264 (H) 65 - 100 mg/dL   GLUCOSE, POC    Collection Time: 02/20/21 11:30 AM   Result Value Ref Range    Glucose (POC) 241 (H) 65 - 100 mg/dL         All Micro Results     None          Current Meds:  Current Facility-Administered Medications   Medication Dose Route Frequency    promethazine (PHENERGAN) with saline injection 12.5 mg  12.5 mg IntraVENous Q6H    polyethylene glycol (MIRALAX) packet 17 g  17 g Oral BID    dicyclomine (BENTYL) capsule 10 mg  10 mg Oral QID    ciprofloxacin (CIPRO) 400 mg in D5W IVPB (premix)  400 mg IntraVENous Q24H    metroNIDAZOLE (FLAGYL) IVPB premix 500 mg  500 mg IntraVENous Q12H    HYDROmorphone (PF) (DILAUDID) injection 1 mg  1 mg IntraVENous Q6H PRN    simethicone (MYLICON) tablet 80 mg  80 mg Oral QID PRN    [Held by provider] carvediloL (COREG) tablet 6.25 mg  6.25 mg Oral BID WITH MEALS    [Held by provider] isosorbide mononitrate ER (IMDUR) tablet 60 mg  60 mg Oral DAILY    insulin lispro (HUMALOG) injection 0-10 Units  0-10 Units SubCUTAneous AC&HS    sodium chloride (NS) flush 5-40 mL  5-40 mL IntraVENous Q8H    sodium chloride (NS) flush 5-40 mL  5-40 mL IntraVENous PRN    acetaminophen (TYLENOL) tablet 650 mg  650 mg Oral Q6H PRN    Or    acetaminophen (TYLENOL) suppository 650 mg  650 mg Rectal Q6H PRN    polyethylene glycol (MIRALAX) packet 17 g  17 g Oral DAILY PRN    promethazine (PHENERGAN) tablet 12.5 mg  12.5 mg Oral Q6H PRN    Or    ondansetron (ZOFRAN) injection 4 mg  4 mg IntraVENous Q6H PRN    enoxaparin (LOVENOX) injection 40 mg  40 mg SubCUTAneous DAILY    aspirin chewable tablet 81 mg  81 mg Oral 7am    clopidogreL (PLAVIX) tablet 75 mg  75 mg Oral DAILY    insulin glargine (LANTUS) injection 8 Units  8 Units SubCUTAneous QHS    NIFEdipine ER (PROCARDIA XL) tablet 90 mg  90 mg Oral DAILY    rosuvastatin (CRESTOR) tablet 40 mg  40 mg Oral QHS         Other Studies:  Ct Chest W Cont    Result Date: 2/15/2021  EXAMINATION: CT CHEST W CONT 2/15/2021 1:55 PM ACCESSION NUMBER: [id number] INDICATION: Shortness of breath COMPARISON: 08/10/2020 TECHNIQUE: Multiple contiguous axial CT images of the chest were obtained from the lung apices to the lung bases after the intravenous administration of 75 mL Isovue-370 contrast material . Radiation dose reduction techniques were used for this study:  Our CT scanners use one or all of the following: Automated exposure control, adjustment of the mA and/or kVp according to patient's size, iterative reconstruction. FINDINGS: Lungs: There is a small left pleural effusion with a small to moderate right pleural effusion. Atelectasis is seen in both lung bases. There is mild diffuse increased attenuation throughout the lungs. There is no suspicious nodule. Airways are patent. Opacification of the pulmonary arteries is excellent. There is no evidence of a pulmonary embolism. Mediastinum: Heart size is enlarged. Atherosclerosis is seen in the aorta and its major branches including the coronary arteries. There shotty paratracheal adenopathy. This is unchanged from the comparison exam. Visualized upper abdomen: The liver has some nodularity to its serosal surface. There is no focal suspicious lesion. The adrenal glands are normal. Osseous structures: Old fractures are seen anteriorly in the right mid ribs. There is no suspicious lytic or blastic lesion. 1. No evidence of a pulmonary embolism. 2. Findings support heart failure with cardiomegaly, pleural effusions, basilar atelectasis, and pulmonary interstitial edema. 3. Cirrhosis. 4. Atherosclerosis including coronary atherosclerosis.      Xr Chest Port    Result Date: 2/15/2021  EXAMINATION: CHEST RADIOGRAPH 2/15/2021 10:22 AM ACCESSION NUMBER: [id number] COMPARISON: 02/09/2021 INDICATION: Chest pain with shortness of breath TECHNIQUE: A single view of the chest was obtained. FINDINGS: Support Devices: None Lungs: There is persistent pulmonary vascular indistinctness with small pleural effusions. This is similar to the prior exam. Cardiac Silhouette: Within normal limits in size. Mediastinum: Calcifications are seen in the aorta. Upper Abdomen: Normal Miscellaneous: There is previous fusion in the lower cervical spine. This is partially visualized. 1. Persistent pulmonary edema 2.  Atherosclerosis          Assessment:    Hospital Problems as of 2/20/2021 Date Reviewed: 2/20/2021          Codes Class Noted - Resolved POA    * (Principal) Acute on chronic combined systolic and diastolic congestive heart failure (HCC) ICD-10-CM: I50.43  ICD-9-CM: 428.43, 428.0  2/17/2021 - Present Unknown        Acute colitis ICD-10-CM: K52.9  ICD-9-CM: 558.9  2/18/2021 - Present Unknown        Acute respiratory failure with hypoxia (HCC) ICD-10-CM: J96.01  ICD-9-CM: 518.81  2/17/2021 - Present Unknown        Acute renal failure superimposed on stage 3b chronic kidney disease ([facility] ICD-10-CM: N17.9, N18.32  ICD-9-CM: 584.9, 585.3  1/12/2021 - Present Yes        Hypoalbuminemia ICD-10-CM: E88.09  ICD-9-CM: 273.8  2/18/2021 - Present Unknown        Congestive heart failure (CHF) ([facility] ICD-10-CM: I50.9  ICD-9-CM: 428.0  2/15/2021 - Present Unknown        Mixed hyperlipidemia ICD-10-CM: E78.2  ICD-9-CM: 272.2  10/21/2020 - Present Yes        S/P PTCA (percutaneous transluminal coronary angioplasty) ICD-10-CM: Z98.61  ICD-9-CM: V45.82  9/1/2020 - Present Yes        Type 2 diabetes mellitus with hyperglycemia, without long-term current use of insulin (HCC) ICD-10-CM: E11.65  ICD-9-CM: 250.00, 790.29  10/11/2018 - Present Yes        Gastroesophageal reflux disease ICD-10-CM: K21.9  ICD-9-CM: 530.81  2/16/2017 - Present Yes Essential hypertension (Chronic) ICD-10-CM: I10  ICD-9-CM: 401.9  9/15/2016 - Present Yes        Chronic kidney disease, stage III (moderate) (HCC) ICD-10-CM: N18.30  ICD-9-CM: 585.3  9/15/2016 - Present Yes        Coronary artery disease involving native coronary artery of native heart without angina pectoris ICD-10-CM: I25.10  ICD-9-CM: 414.01  9/4/2013 - Present Yes              Plan:    Acute respiratory failure with hypoxia likely due to acute on chronic systolic CHF  Echo on 1/66 shows EF 55 to 25% with diastolic dysfunction. hold lasix due to worsening renal function  Continue with O2 supplement and wean as tolerated    Acute Colitis  Noted in CT A/P.  cipro and flagyl (2/18 - 2/24)  [facility] apprecaited  Symptomatic management      MIRANDA CKD stage 3   Worsening kidney function today with creatinine increased to 2.23.   Hold Lasix  We will give trial of low IVF for 5 hours. CAD s/p 3 Stents // HTN // HLD : Stable. Cardiology recommendations appreciated. Medications have been adjusted by cardio. T2DM: sliding scale and lantus 8u qHS      Diet:  DIET GI SOFT  DVT PPx: lovenox  Code: DNR    Dispo: home  Estimated Discharge: 24-48hrs    Labs/Imaging Reviewed. Plan discussed with staff, patient/family and are in agreement. Part of this note was written by using a voice dictation software and the note has been proof read but may still contain some grammatical/other typographical errors.     Signed By: Carlita Polo MD     February 20, 2021

## 2021-02-20 NOTE — PROGRESS NOTES
[facility] CARDIOLOGY PROGRESS NOTE    2/20/2021 11:01 AM    Admit Date: 2/15/2021        Subjective:   Stable overnight without angina, CHF, or palpitations. Lying flat comfortably. Vitals stable and controlled. No other complaints overnight. Tolerating meds well. Net 4.5L out. Objective:      Vitals:    02/20/21 0251 02/20/21 0418 02/20/21 0807 02/20/21 1022   BP:  (!) 131/54 (!) 124/54 (!) 131/58   Pulse:  97 93 88   Resp:  18 17 18   Temp:  98.7 °F (37.1 °C) 98.8 °F (37.1 °C) 98.6 °F (37 °C)   SpO2:  97% 98% 94%   Weight: 154 lb (69.9 kg)      Height:           Physical Exam:  Neck- supple, no JVD  CV- regular rate and rhythm no MRG  Lung- clear bilaterally but mild bibasilar crackles  Abd- soft, nontender, nondistended  Ext- no edema  Skin- warm and dry    Data Review:   Recent Labs     02/20/21  0727 02/19/21  0538    137   K 3.2* 3.0*   MG  --  2.0   BUN 23 20   CREA 2.23* 1.95*   * 195*   WBC 11.6* 11.6*   HGB 8.1* 7.8*   HCT 26.0* 25.3*   * 479*       Assessment and Plan:       Principal Problem:    Congestive heart failure (CHF) (HCC)     - HFpEF- euvolemic clinically but renal function worse, continue holding diuretics, see below    - unclear etiology and possibly HTN related    - lasix held with worsening renal function. On no home diuretics. Hold off restarting. Exam with no sig vol o/l. Replete K+ gently today, recheck in AM    - Effusions noted also likely contributed by hypoalbuminemia (2.4)    -predominant issue with abdominal pain (colitis noted on CT scan); appears GI consulted     Active Problems:    Coronary artery disease involving native coronary artery of native heart without angina pectoris    S/P PTCA (percutaneous transluminal coronary angioplasty) (9/1/2020) (9/4/2013)     -Last coronary angiogram from 2/2017 with patent stents in the LAD/diagonal.    - on DAPT and defer scaling back to single agent per outpatient cardiologist, on a high intensity statin.      Essential hypertension      - increased Imdur to 60mg , continue nifedipine. Changed toprol to coreg for better BP control.       Chronic kidney disease, stage III (moderate) (HCC)      -worse; diuretics held. See above       Gastroesophageal reflux disease (2/16/2017)    - PPI        Type 2 diabetes mellitus with hyperglycemia, without long-term current use of insulin ([facility] [address] (10/11/2018)    - per primary        Mixed hyperlipidemia (10/21/2020)    - on a high intensity statin       Acute respiratory failure with hypoxemia ([facility] [address] (2/15/2021)    - secondary to above, improving     Permissive HTN today to encourage renal perfusion, continue holding meds today  Recheck renal function in AM, continue to hold diuretics    XANDER Joya MD  [facility] Cardiology  Pager [phone]

## 2021-02-21 PROBLEM — J96.01 ACUTE RESPIRATORY FAILURE WITH HYPOXIA (HCC): Status: RESOLVED | Noted: 2021-02-17 | Resolved: 2021-02-21

## 2021-02-21 LAB
ALBUMIN SERPL-MCNC: 2.6 G/DL (ref 3.2–4.6)
ANION GAP SERPL CALC-SCNC: 9 MMOL/L (ref 7–16)
BUN SERPL-MCNC: 23 MG/DL (ref 8–23)
CALCIUM SERPL-MCNC: 8.4 MG/DL (ref 8.3–10.4)
CHLORIDE SERPL-SCNC: 105 MMOL/L (ref 98–107)
CO2 SERPL-SCNC: 25 MMOL/L (ref 21–32)
CREAT SERPL-MCNC: 2.2 MG/DL (ref 0.6–1)
GLUCOSE BLD STRIP.AUTO-MCNC: 167 MG/DL (ref 65–100)
GLUCOSE BLD STRIP.AUTO-MCNC: 235 MG/DL (ref 65–100)
GLUCOSE BLD STRIP.AUTO-MCNC: 242 MG/DL (ref 65–100)
GLUCOSE BLD STRIP.AUTO-MCNC: 258 MG/DL (ref 65–100)
GLUCOSE SERPL-MCNC: 146 MG/DL (ref 65–100)
MAGNESIUM SERPL-MCNC: 2.2 MG/DL (ref 1.8–2.4)
PHOSPHATE SERPL-MCNC: 3.8 MG/DL (ref 2.3–3.7)
POTASSIUM SERPL-SCNC: 2.9 MMOL/L (ref 3.5–5.1)
SODIUM SERPL-SCNC: 139 MMOL/L (ref 136–145)

## 2021-02-21 PROCEDURE — 74011636637 HC RX REV CODE- 636/637: Performed by: INTERNAL MEDICINE

## 2021-02-21 PROCEDURE — 36415 COLL VENOUS BLD VENIPUNCTURE: CPT

## 2021-02-21 PROCEDURE — 65270000029 HC RM PRIVATE

## 2021-02-21 PROCEDURE — 74011250637 HC RX REV CODE- 250/637: Performed by: INTERNAL MEDICINE

## 2021-02-21 PROCEDURE — 82962 GLUCOSE BLOOD TEST: CPT

## 2021-02-21 PROCEDURE — 99232 SBSQ HOSP IP/OBS MODERATE 35: CPT | Performed by: INTERNAL MEDICINE

## 2021-02-21 PROCEDURE — 74011250636 HC RX REV CODE- 250/636: Performed by: INTERNAL MEDICINE

## 2021-02-21 PROCEDURE — 80069 RENAL FUNCTION PANEL: CPT

## 2021-02-21 PROCEDURE — 74011000250 HC RX REV CODE- 250: Performed by: INTERNAL MEDICINE

## 2021-02-21 PROCEDURE — 74011636637 HC RX REV CODE- 636/637: Performed by: FAMILY MEDICINE

## 2021-02-21 PROCEDURE — 2709999900 HC NON-CHARGEABLE SUPPLY

## 2021-02-21 PROCEDURE — 83735 ASSAY OF MAGNESIUM: CPT

## 2021-02-21 PROCEDURE — 74011250637 HC RX REV CODE- 250/637: Performed by: PHYSICIAN ASSISTANT

## 2021-02-21 RX ORDER — DIPHENOXYLATE HYDROCHLORIDE AND ATROPINE SULFATE 2.5; .025 MG/1; MG/1
1 TABLET ORAL
Status: DISCONTINUED | OUTPATIENT
Start: 2021-02-21 | End: 2021-02-24 | Stop reason: HOSPADM

## 2021-02-21 RX ORDER — CARVEDILOL 3.12 MG/1
3.12 TABLET ORAL 2 TIMES DAILY WITH MEALS
Status: DISCONTINUED | OUTPATIENT
Start: 2021-02-21 | End: 2021-02-24 | Stop reason: HOSPADM

## 2021-02-21 RX ORDER — POTASSIUM CHLORIDE 20 MEQ/1
40 TABLET, EXTENDED RELEASE ORAL
Status: COMPLETED | OUTPATIENT
Start: 2021-02-21 | End: 2021-02-21

## 2021-02-21 RX ADMIN — ACETAMINOPHEN 650 MG: 325 TABLET ORAL at 21:24

## 2021-02-21 RX ADMIN — INSULIN LISPRO 4 UNITS: 100 INJECTION, SOLUTION INTRAVENOUS; SUBCUTANEOUS at 21:26

## 2021-02-21 RX ADMIN — PROMETHAZINE HYDROCHLORIDE 12.5 MG: 25 INJECTION INTRAMUSCULAR; INTRAVENOUS at 00:01

## 2021-02-21 RX ADMIN — ENOXAPARIN SODIUM 40 MG: 100 INJECTION SUBCUTANEOUS at 09:19

## 2021-02-21 RX ADMIN — INSULIN LISPRO 4 UNITS: 100 INJECTION, SOLUTION INTRAVENOUS; SUBCUTANEOUS at 12:07

## 2021-02-21 RX ADMIN — SODIUM CHLORIDE 10 ML: 9 INJECTION, SOLUTION INTRAMUSCULAR; INTRAVENOUS; SUBCUTANEOUS at 21:27

## 2021-02-21 RX ADMIN — INSULIN GLARGINE 8 UNITS: 100 INJECTION, SOLUTION SUBCUTANEOUS at 22:00

## 2021-02-21 RX ADMIN — INSULIN LISPRO 2 UNITS: 100 INJECTION, SOLUTION INTRAVENOUS; SUBCUTANEOUS at 09:21

## 2021-02-21 RX ADMIN — PROMETHAZINE HYDROCHLORIDE 12.5 MG: 25 INJECTION INTRAMUSCULAR; INTRAVENOUS at 05:23

## 2021-02-21 RX ADMIN — CARVEDILOL 3.12 MG: 3.12 TABLET, FILM COATED ORAL at 09:19

## 2021-02-21 RX ADMIN — CIPROFLOXACIN 400 MG: 2 INJECTION, SOLUTION INTRAVENOUS at 09:19

## 2021-02-21 RX ADMIN — NIFEDIPINE 90 MG: 90 TABLET, FILM COATED, EXTENDED RELEASE ORAL at 09:18

## 2021-02-21 RX ADMIN — POTASSIUM CHLORIDE 40 MEQ: 20 TABLET, EXTENDED RELEASE ORAL at 09:19

## 2021-02-21 RX ADMIN — DICYCLOMINE HYDROCHLORIDE 10 MG: 10 CAPSULE ORAL at 17:00

## 2021-02-21 RX ADMIN — METRONIDAZOLE 500 MG: 500 INJECTION, SOLUTION INTRAVENOUS at 12:54

## 2021-02-21 RX ADMIN — METRONIDAZOLE 500 MG: 500 INJECTION, SOLUTION INTRAVENOUS at 21:24

## 2021-02-21 RX ADMIN — DIPHENOXYLATE HYDROCHLORIDE AND ATROPINE SULFATE 1 TABLET: 2.5; .025 TABLET ORAL at 12:11

## 2021-02-21 RX ADMIN — DICYCLOMINE HYDROCHLORIDE 10 MG: 10 CAPSULE ORAL at 21:24

## 2021-02-21 RX ADMIN — SODIUM CHLORIDE 10 ML: 9 INJECTION, SOLUTION INTRAMUSCULAR; INTRAVENOUS; SUBCUTANEOUS at 00:00

## 2021-02-21 RX ADMIN — SODIUM CHLORIDE 10 ML: 9 INJECTION, SOLUTION INTRAMUSCULAR; INTRAVENOUS; SUBCUTANEOUS at 05:23

## 2021-02-21 RX ADMIN — INSULIN LISPRO 6 UNITS: 100 INJECTION, SOLUTION INTRAVENOUS; SUBCUTANEOUS at 16:40

## 2021-02-21 RX ADMIN — CARVEDILOL 3.12 MG: 3.12 TABLET, FILM COATED ORAL at 16:41

## 2021-02-21 RX ADMIN — DICYCLOMINE HYDROCHLORIDE 10 MG: 10 CAPSULE ORAL at 09:19

## 2021-02-21 RX ADMIN — CLOPIDOGREL BISULFATE 75 MG: 75 TABLET ORAL at 09:19

## 2021-02-21 RX ADMIN — SODIUM CHLORIDE 10 ML: 9 INJECTION, SOLUTION INTRAMUSCULAR; INTRAVENOUS; SUBCUTANEOUS at 14:49

## 2021-02-21 RX ADMIN — ROSUVASTATIN CALCIUM 40 MG: 20 TABLET, COATED ORAL at 21:25

## 2021-02-21 RX ADMIN — DICYCLOMINE HYDROCHLORIDE 10 MG: 10 CAPSULE ORAL at 12:11

## 2021-02-21 RX ADMIN — ASPIRIN 81 MG: 81 TABLET, CHEWABLE ORAL at 09:18

## 2021-02-21 NOTE — PROGRESS NOTES
Problem: Falls - Risk of  Goal: *Absence of Falls  Description: Document Bridget Doan Fall Risk and appropriate interventions in the flowsheet.   Outcome: Progressing Towards Goal  Note: Fall Risk Interventions:  Mobility Interventions: Bed/chair exit alarm         Medication Interventions: Bed/chair exit alarm, Patient to call before getting OOB    Elimination Interventions: Bed/chair exit alarm, Call light in reach              Problem: Patient Education: Go to Patient Education Activity  Goal: Patient/Family Education  Outcome: Progressing Towards Goal     Problem: Patient Education: Go to Patient Education Activity  Goal: Patient/Family Education  Outcome: Progressing Towards Goal

## 2021-02-21 NOTE — PROGRESS NOTES
.  [facility] Progress Note             Date: 2/21/2021    GI Problem: Abdominal pain, infectious colitis, constipation    History of Present Illness:  Patient is a 65 y.o. female who is seen in consultation for abdominal pain likely from infectious colitis based on WBC and CT. Also had constipation now resolved after miralax. No more abdominal pain. Feels good from GI standpoint. Now with some diarrhea due to miralax.      Hospital Medications:  Current Facility-Administered Medications   Medication Dose Route Frequency    potassium chloride (K-DUR, KLOR-CON) SR tablet 40 mEq  40 mEq Oral NOW    carvediloL (COREG) tablet 3.125 mg  3.125 mg Oral BID WITH MEALS    dicyclomine (BENTYL) capsule 10 mg  10 mg Oral QID    ciprofloxacin (CIPRO) 400 mg in D5W IVPB (premix)  400 mg IntraVENous Q24H    metroNIDAZOLE (FLAGYL) IVPB premix 500 mg  500 mg IntraVENous Q12H    HYDROmorphone (PF) (DILAUDID) injection 1 mg  1 mg IntraVENous Q6H PRN    simethicone (MYLICON) tablet 80 mg  80 mg Oral QID PRN    [Held by provider] isosorbide mononitrate ER (IMDUR) tablet 60 mg  60 mg Oral DAILY    insulin lispro (HUMALOG) injection 0-10 Units  0-10 Units SubCUTAneous AC&HS    sodium chloride (NS) flush 5-40 mL  5-40 mL IntraVENous Q8H    sodium chloride (NS) flush 5-40 mL  5-40 mL IntraVENous PRN    acetaminophen (TYLENOL) tablet 650 mg  650 mg Oral Q6H PRN    Or    acetaminophen (TYLENOL) suppository 650 mg  650 mg Rectal Q6H PRN    polyethylene glycol (MIRALAX) packet 17 g  17 g Oral DAILY PRN    promethazine (PHENERGAN) tablet 12.5 mg  12.5 mg Oral Q6H PRN    Or    ondansetron (ZOFRAN) injection 4 mg  4 mg IntraVENous Q6H PRN    enoxaparin (LOVENOX) injection 40 mg  40 mg SubCUTAneous DAILY    aspirin chewable tablet 81 mg  81 mg Oral 7am    clopidogreL (PLAVIX) tablet 75 mg  75 mg Oral DAILY    insulin glargine (LANTUS) injection 8 Units  8 Units SubCUTAneous QHS    NIFEdipine ER (PROCARDIA XL) tablet 90 mg  90 mg Oral DAILY    rosuvastatin (CRESTOR) tablet 40 mg  40 mg Oral QHS       Objective:     Physical Exam:  Vitals:  Visit Vitals  BP (P) 133/68 (BP 1 Location: Right upper arm, BP Patient Position: At rest)   Pulse 88   Temp (P) 99 °F (37.2 °C)   Resp (P) 16   Ht 5' 4\" (1.626 m)   Wt 75.9 kg (167 lb 6.4 oz)   SpO2 (P) 98%   BMI 28.73 kg/m²       General: No acute distress. Skin:  Extremities and face reveal no rashes. No johns erythema. No telangiectasias on the chest wall. HEENT: Sclerae anicteric. No oral ulcers. No abnormal pigmentation of the lips. The neck is supple. Cardiovascular: Regular rate and rhythm. No murmurs, gallops, or rubs. Respiratory:  Comfortable breathing  With no accessory muscle use. Clear breath sounds with no wheezes, rales, or rhonchi. GI:  Abdomen nondistended, soft, and nontender. Normal active bowel sounds. No enlargement of the liver or spleen. No masses palpable. Musculoskeletal:  No pitting edema of the lower legs. Extremities have good range of motion. Neurological:  Gross memory appears intact. Patient is alert and oriented. Psychiatric:  Mood appears appropriate with judgement intact. Lymphatic:  No cervical or supraclavicular adenopathy. Laboratory:    Recent Labs     02/20/21  0727   WBC 11.6*   RBC 2.92*   HGB 8.1*   HCT 26.0*   *      Recent Labs     02/20/21  0727   *      K 3.2*      CO2 24   BUN 23   CREA 2.23*   CA 8.4     No results for input(s): PTP, INR, APTT, INREXT in the last 72 hours. No results for input(s): TBIL, CBIL, AP, ALB, TP, AML, LPSE in the last 72 hours. No lab exists for component: SGOT, GPT    Assessment:       A 65 y.o. female with abdominal pain from infectious colitis and constipation, both now better.       Plan:       Cipro/ flagyl for 7 days total  Will stop miralax since diarrhea  Will sign off for now; please call back if further issues      Signed By: Stephanie Potts MD February 21, 2021

## 2021-02-21 NOTE — PROGRESS NOTES
[facility] PROGRESS NOTE    2/21/2021 8:36 AM    Admit Date: 2/15/2021        Subjective:   Stable overnight without angina, CHF, or palpitations. Vitals stable and controlled. No other complaints overnight. Tolerating meds well. Mild chest pain last night but sounds musculoskeletal by her description. Objective:      Vitals:    02/20/21 2002 02/20/21 2312 02/21/21 0344 02/21/21 0737   BP: (!) 136/50 (!) 124/55 133/61 (P) 133/68   Pulse: 97 84 88    Resp: 19 19 18 (P) 16   Temp: 100.4 °F (38 °C) 100.2 °F (37.9 °C) 98.3 °F (36.8 °C) (P) 99 °F (37.2 °C)   SpO2: 95% 95% 99% (P) 98%   Weight:   167 lb 6.4 oz (75.9 kg)    Height:           Physical Exam:  Neck- supple, no JVD  CV- regular rate and rhythm no MRG  Lung- clear bilaterally  Abd- soft, nontender, nondistended  Ext- no edema  Skin- warm and dry    Data Review:   Recent Labs     02/20/21  0727 02/19/21  0538    137   K 3.2* 3.0*   MG  --  2.0   BUN 23 20   CREA 2.23* 1.95*   * 195*   WBC 11.6* 11.6*   HGB 8.1* 7.8*   HCT 26.0* 25.3*   * 479*       Assessment and Plan:      Principal Problem:    Congestive heart failure (CHF) (HCC)     - HFpEF- euvolemic clinically but renal function worse, await results from today, continue holding diuretics, see below    - unclear etiology and possibly HTN related    - lasix held with worsening renal function. On no home diuretics. Hold off restarting. Exam with no sig vol o/l.  Replete K+ gently today, recheck in AM    - Effusions noted also likely contributed by hypoalbuminemia (2.4)    -predominant issue with abdominal pain (colitis noted on CT scan); appears GI consulted     Active Problems:    Coronary artery disease involving native coronary artery of native heart without angina pectoris    S/P PTCA (percutaneous transluminal coronary angioplasty) (9/1/2020) (9/4/2013)     -Last coronary angiogram from 2/2017 with patent stents in the LAD/diagonal.    - on DAPT and defer scaling back to single agent per outpatient cardiologist, on a high intensity statin.        Essential hypertension      - increased Imdur to 60mg , continue nifedipine. Changed toprol to coreg for better BP control. Resume coreg today.        Chronic kidney disease, stage III (moderate) (HCC)      -worse; diuretics held. See above       Gastroesophageal reflux disease (2/16/2017)    - PPI        Type 2 diabetes mellitus with hyperglycemia, without long-term current use of insulin ([facility] (10/11/2018)    - per primary        Mixed hyperlipidemia (10/21/2020)    - on a high intensity statin       Acute respiratory failure with hypoxemia ([address] (2/15/2021)    - secondary to above, improving      Permissive HTN today to encourage renal perfusion, resume coreg today, avoid diuretics for now, await BMP results  Recheck renal function in AM, continue to hold diuretics, EF normal on echo. We will be on standby and follow from a distance. Please call if any further assistance is needed or if any new questions arise. Thanks for the consult.        Trenton Martinez MD  [facility] Cardiology  Pager [phone]

## 2021-02-21 NOTE — PROGRESS NOTES
Oxygen Qualifier       Room air: SpO2 with O2 and liter flow   Resting SpO2  93%     Ambulating SpO2  93%        Completed by:Patient ambulated with difficulty but did not require any supplemental O2    Arlene Ruffin, RT

## 2021-02-21 NOTE — PROGRESS NOTES
Hourly rounds completed. Pt had a temp of 100.6 F in the beginning of shift, Tylenol 650 mg given, tolerated IV antibiotics. Remained on 2L NC due to SOB on exertion. Rested well. No other complaints presented. Latest temp 98.3 F. Call light within reach, will give report to oncoming RN.

## 2021-02-22 LAB
ANION GAP SERPL CALC-SCNC: 8 MMOL/L (ref 7–16)
BUN SERPL-MCNC: 17 MG/DL (ref 8–23)
CALCIUM SERPL-MCNC: 8.3 MG/DL (ref 8.3–10.4)
CHLORIDE SERPL-SCNC: 109 MMOL/L (ref 98–107)
CO2 SERPL-SCNC: 24 MMOL/L (ref 21–32)
CREAT SERPL-MCNC: 1.8 MG/DL (ref 0.6–1)
ERYTHROCYTE [DISTWIDTH] IN BLOOD BY AUTOMATED COUNT: 16 % (ref 11.9–14.6)
GLUCOSE BLD STRIP.AUTO-MCNC: 110 MG/DL (ref 65–100)
GLUCOSE BLD STRIP.AUTO-MCNC: 183 MG/DL (ref 65–100)
GLUCOSE BLD STRIP.AUTO-MCNC: 192 MG/DL (ref 65–100)
GLUCOSE BLD STRIP.AUTO-MCNC: 214 MG/DL (ref 65–100)
GLUCOSE BLD STRIP.AUTO-MCNC: 264 MG/DL (ref 65–100)
GLUCOSE SERPL-MCNC: 150 MG/DL (ref 65–100)
HCT VFR BLD AUTO: 27.4 % (ref 35.8–46.3)
HGB BLD-MCNC: 8.5 G/DL (ref 11.7–15.4)
MAGNESIUM SERPL-MCNC: 2.1 MG/DL (ref 1.8–2.4)
MCH RBC QN AUTO: 27.2 PG (ref 26.1–32.9)
MCHC RBC AUTO-ENTMCNC: 31 G/DL (ref 31.4–35)
MCV RBC AUTO: 87.8 FL (ref 79.6–97.8)
NRBC # BLD: 0 K/UL (ref 0–0.2)
PLATELET # BLD AUTO: 498 K/UL (ref 150–450)
PMV BLD AUTO: 9.5 FL (ref 9.4–12.3)
POTASSIUM SERPL-SCNC: 2.8 MMOL/L (ref 3.5–5.1)
RBC # BLD AUTO: 3.12 M/UL (ref 4.05–5.2)
SODIUM SERPL-SCNC: 141 MMOL/L (ref 136–145)
WBC # BLD AUTO: 9.8 K/UL (ref 4.3–11.1)

## 2021-02-22 PROCEDURE — 2709999900 HC NON-CHARGEABLE SUPPLY

## 2021-02-22 PROCEDURE — 74011250637 HC RX REV CODE- 250/637: Performed by: INTERNAL MEDICINE

## 2021-02-22 PROCEDURE — 74011636637 HC RX REV CODE- 636/637: Performed by: FAMILY MEDICINE

## 2021-02-22 PROCEDURE — 74011250636 HC RX REV CODE- 250/636: Performed by: INTERNAL MEDICINE

## 2021-02-22 PROCEDURE — 82962 GLUCOSE BLOOD TEST: CPT

## 2021-02-22 PROCEDURE — 74011636637 HC RX REV CODE- 636/637: Performed by: INTERNAL MEDICINE

## 2021-02-22 PROCEDURE — 83735 ASSAY OF MAGNESIUM: CPT

## 2021-02-22 PROCEDURE — 36415 COLL VENOUS BLD VENIPUNCTURE: CPT

## 2021-02-22 PROCEDURE — 65270000029 HC RM PRIVATE

## 2021-02-22 PROCEDURE — 97530 THERAPEUTIC ACTIVITIES: CPT

## 2021-02-22 PROCEDURE — 80048 BASIC METABOLIC PNL TOTAL CA: CPT

## 2021-02-22 PROCEDURE — 85027 COMPLETE CBC AUTOMATED: CPT

## 2021-02-22 PROCEDURE — 74011250637 HC RX REV CODE- 250/637: Performed by: PHYSICIAN ASSISTANT

## 2021-02-22 RX ORDER — SAME BUTANEDISULFONATE/BETAINE 400-600 MG
250 POWDER IN PACKET (EA) ORAL 2 TIMES DAILY
Status: DISCONTINUED | OUTPATIENT
Start: 2021-02-22 | End: 2021-02-24 | Stop reason: HOSPADM

## 2021-02-22 RX ORDER — HYDRALAZINE HYDROCHLORIDE 10 MG/1
25 TABLET, FILM COATED ORAL
Status: DISCONTINUED | OUTPATIENT
Start: 2021-02-22 | End: 2021-02-24 | Stop reason: HOSPADM

## 2021-02-22 RX ADMIN — INSULIN GLARGINE 8 UNITS: 100 INJECTION, SOLUTION SUBCUTANEOUS at 21:29

## 2021-02-22 RX ADMIN — DICYCLOMINE HYDROCHLORIDE 10 MG: 10 CAPSULE ORAL at 11:48

## 2021-02-22 RX ADMIN — METRONIDAZOLE 500 MG: 500 INJECTION, SOLUTION INTRAVENOUS at 09:31

## 2021-02-22 RX ADMIN — INSULIN LISPRO 4 UNITS: 100 INJECTION, SOLUTION INTRAVENOUS; SUBCUTANEOUS at 11:46

## 2021-02-22 RX ADMIN — PROMETHAZINE HYDROCHLORIDE 12.5 MG: 25 TABLET ORAL at 21:28

## 2021-02-22 RX ADMIN — NIFEDIPINE 90 MG: 90 TABLET, FILM COATED, EXTENDED RELEASE ORAL at 09:32

## 2021-02-22 RX ADMIN — ACETAMINOPHEN 650 MG: 325 TABLET ORAL at 05:04

## 2021-02-22 RX ADMIN — DICYCLOMINE HYDROCHLORIDE 10 MG: 10 CAPSULE ORAL at 17:41

## 2021-02-22 RX ADMIN — SODIUM CHLORIDE 10 ML: 9 INJECTION, SOLUTION INTRAMUSCULAR; INTRAVENOUS; SUBCUTANEOUS at 20:19

## 2021-02-22 RX ADMIN — DICYCLOMINE HYDROCHLORIDE 10 MG: 10 CAPSULE ORAL at 09:32

## 2021-02-22 RX ADMIN — ENOXAPARIN SODIUM 40 MG: 100 INJECTION SUBCUTANEOUS at 09:31

## 2021-02-22 RX ADMIN — ROSUVASTATIN CALCIUM 40 MG: 20 TABLET, COATED ORAL at 20:18

## 2021-02-22 RX ADMIN — SODIUM CHLORIDE 10 ML: 9 INJECTION, SOLUTION INTRAMUSCULAR; INTRAVENOUS; SUBCUTANEOUS at 16:00

## 2021-02-22 RX ADMIN — ASPIRIN 81 MG: 81 TABLET, CHEWABLE ORAL at 05:04

## 2021-02-22 RX ADMIN — ACETAMINOPHEN 650 MG: 325 TABLET ORAL at 21:28

## 2021-02-22 RX ADMIN — SODIUM CHLORIDE 10 ML: 9 INJECTION, SOLUTION INTRAMUSCULAR; INTRAVENOUS; SUBCUTANEOUS at 05:04

## 2021-02-22 RX ADMIN — RDII 250 MG CAPSULE 250 MG: at 11:47

## 2021-02-22 RX ADMIN — RDII 250 MG CAPSULE 250 MG: at 17:40

## 2021-02-22 RX ADMIN — CARVEDILOL 3.12 MG: 3.12 TABLET, FILM COATED ORAL at 17:41

## 2021-02-22 RX ADMIN — CIPROFLOXACIN 400 MG: 2 INJECTION, SOLUTION INTRAVENOUS at 10:18

## 2021-02-22 RX ADMIN — INSULIN LISPRO 2 UNITS: 100 INJECTION, SOLUTION INTRAVENOUS; SUBCUTANEOUS at 09:31

## 2021-02-22 RX ADMIN — CLOPIDOGREL BISULFATE 75 MG: 75 TABLET ORAL at 09:32

## 2021-02-22 RX ADMIN — METRONIDAZOLE 500 MG: 500 INJECTION, SOLUTION INTRAVENOUS at 20:24

## 2021-02-22 RX ADMIN — INSULIN LISPRO 6 UNITS: 100 INJECTION, SOLUTION INTRAVENOUS; SUBCUTANEOUS at 21:29

## 2021-02-22 RX ADMIN — CARVEDILOL 3.12 MG: 3.12 TABLET, FILM COATED ORAL at 09:32

## 2021-02-22 RX ADMIN — DICYCLOMINE HYDROCHLORIDE 10 MG: 10 CAPSULE ORAL at 20:18

## 2021-02-22 NOTE — PROGRESS NOTES
ACUTE PHYSICAL THERAPY GOALS:  (Developed with and agreed upon by patient and/or caregiver. )  LTG:  (1.)Ms. Florencia Duffy will move from supine to sit and sit to supine , scoot up and down and roll side to side in bed with INDEPENDENCE within 7 treatment day(s). - Goal Met 2/22/21  (2.)Ms. Florencia Duffy will transfer from bed to chair and chair to bed with MODIFIED INDEPENDENCE using the least restrictive device within 7 treatment day(s). - Progressing 2/22/21  (3.)Ms. Florencia Duffy will ambulate with MODIFIED INDEPENDENCE for 250+ feet with the least restrictive device within 7 treatment day(s) while maintaining normal vital signs. - Progressing 2/22/21    PHYSICAL THERAPY: Daily Note and PM Treatment Day # 3    Petar Oliveros is a 65 y.o. female   PRIMARY DIAGNOSIS: Acute on chronic combined systolic and diastolic congestive heart failure (HCC)  Acute respiratory failure with hypoxemia (HCC) [J96.01]  Congestive heart failure (CHF) (HCC) [I50.9]  Acute on chronic combined systolic and diastolic congestive heart failure (HCC) [I50.43]  Acute respiratory failure with hypoxia (HCC) [J96.01]     ASSESSMENT:     REHAB RECOMMENDATIONS: CURRENT LEVEL OF FUNCTION:  (Most Recently Demonstrated)   Recommendation to date pending progress:  Setting:   No further skilled therapy   Equipment:    None Bed Mobility:   Independent  Sit to Stand:  [employer] Assistance  Transfers:   Standby Assistance  Gait/Mobility:   Standby Assistance     ASSESSMENT:  Ms. Florencia Duffy made very good progress with therapy today and is feeling much better per pt report. Pt performed mobility in room and hallway with SBA. Ambulated a distance of x250 ft in room and hallway with SBA; pt fatigued following but this is understandable given her decreased activity due to not feeling well here in the hospital. PT instructed pt in seated BLE exercise to promote functional strength and activity tolerance.  Great progress, goal #1 met and Goals #2 and #3 in good progress. SUBJECTIVE:   Ms. Heriberto Wasserman states, \"I am feeling much better today. \"    SOCIAL HISTORY/ LIVING ENVIRONMENT:   Home Environment: Private residence  # Steps to Enter: 0  One/Two Story Residence: One story  Living Alone: No  Support Systems: Spouse/Significant Other/Partner  OBJECTIVE:     PAIN: VITAL SIGNS: LINES/DRAINS:   Pre Treatment: Pain Screen  Pain Scale 1: Numeric (0 - 10)  Pain Intensity 1: 0  Post Treatment: 0 Vital Signs  O2 Device: Room air Purewick  O2 Device: Room air     MOBILITY: I Mod I S SBA CGA Min Mod Max Total  NT x2 Comments:   Bed Mobility    Rolling [x] [] [] [] [] [] [] [] [] [] []    Supine to Sit [x] [] [] [] [] [] [] [] [] [] []    Scooting [x] [] [] [] [] [] [] [] [] [] []    Sit to Supine [x] [] [] [] [] [] [] [] [] [] []    Transfers    Sit to Stand [] [] [] [x] [] [] [] [] [] [] []    Bed to Chair [] [] [] [x] [] [] [] [] [] [] []    Stand to Sit [] [] [] [x] [] [] [] [] [] [] []    I=Independent, Mod I=Modified Independent, S=Supervision, SBA=Standby Assistance, CGA=Contact Guard Assistance,   Min=Minimal Assistance, Mod=Moderate Assistance, Max=Maximal Assistance, Total=Total Assistance, NT=Not Tested    GAIT: I Mod I S SBA CGA Min Mod Max Total  NT x2 Comments:   Level of Assistance [] [] [] [x] [] [] [] [] [] [] []    Distance 250 ft    DME None    Gait Quality Good nick and speed, WNL    Weightbearing  Status N/A     I=Independent, Mod I=Modified Independent, S=Supervision, SBA=Standby Assistance, CGA=Contact Guard Assistance,   Min=Minimal Assistance, Mod=Moderate Assistance, Max=Maximal Assistance, Total=Total Assistance, NT=Not Tested    PLAN:   FREQUENCY/DURATION: PT Plan of Care: 3 times/week for duration of hospital stay or until stated goals are met, whichever comes first.  TREATMENT:     TREATMENT:   ($$ Therapeutic Activity: 23-37 mins    )  Therapeutic Activity (23 Minutes):  Therapeutic activity included Supine to Sit, Transfer Training, Ambulation on level ground, Sitting balance , Standing balance and seated BLE exercises with emphasis on LAQ to improve functional Mobility, Strength and Activity tolerance.     AFTER TREATMENT POSITION/PRECAUTIONS:  Bed, Needs within reach and RN notified    INTERDISCIPLINARY COLLABORATION:  RN/PCT and PT/PTA    TOTAL TREATMENT DURATION:  PT Patient Time In/Time Out  Time In: 1440  Time Out: [address], PT

## 2021-02-22 NOTE — PROGRESS NOTES
Chart screened by  for discharge planning. Patient to discharge home possibly 2/23/2021. PT recommends no further skilled needs. CM will continue to follow patient during hospitalization for discharge planning and needs. No needs identified at this time. Please consult or notify  if any new issues arise.

## 2021-02-22 NOTE — PROGRESS NOTES
Pt has had complaints of post nasal drip/headache and generalized aches pain this shift. Reports, \"I just don't feel good. \" Medicated per MAR. Pt has had small amount of loose stool X3 this shift. MD aware. Unable to obtain urine specimen ordered on 2/19 this shift due to urine being contaminated with stool. Pt appears to be sleeping at this time. Bed in low position and call light/ personal items within reach. Will continue to monitor and give bedside shift report to oncoming day shift nurse.

## 2021-02-22 NOTE — PROGRESS NOTES
Hospitalist Progress Note       Name: Abril Paul MRN: [id number]  : 1950  Age:70 y.o.  female  Admit Date:  2/15/2021 LOS: 7    Reason for Admission:  Acute respiratory failure with hypoxemia (HCC) [J96.01]  Congestive heart failure (CHF) ([facility] [I50.9]  Acute on chronic combined systolic and diastolic congestive heart failure ([facility] [I50.43]  Acute respiratory failure with hypoxia ([facility] [J96.01]    Hospital Course:  Please refer to the admission H&P for details of presentation. In summary, Abril Paul is a 65 y.o. female with medical history significant for   CAD s/p stents x3, DMII, CKD stage 3, COVID diagnosed last month not a candidate for remdesivir or plasma who presented shortness of breathe and edema to LE bilaterally. CT chest with contrast showed No evidence of a pulmonary embolism. Findings support heart failure with cardiomegaly, pleural effusions, basilar atelectasis, and pulmonary interstitial edema. CT abdomen pelvis this was done due to patient's ongoing and worsening lower abdominal pain. CT shows colitis and has been started on antibiotics. GI was consulted as per request of the patient and ongoing abdominal pain. Patient's abdominal pain has improved with antibiotics and after having a bowel movement with MiraLAX. However, patient's hospitalization was complicated by having loose bowel movements after MiraLAX. Subjective/24 hr Events (21) : Patient is seen and examined at bedside. No acute events reported overnight by nursing staff. Patient reports feeling much better today no longer having any nausea, vomiting, abdominal pain. Continues to have soft loose bowel movements but improved slightly since yesterday. Wondering when she can go home. RT walk test show patient does not need any O2 on ambulation as well as at rest.  Currently on room air without any discomfort or shortness of breath. Low-grade temperature of 100.3 °F overnight. Patient denies any fever, chills, abdominal pain, nausea, vomiting, shortness of breath, chest pain, headaches.    ROS: 10 point review of systems is otherwise negative with the exception of the elements mentioned above.    Objective:    Patient Vitals for the past 24 hrs:   Temp Pulse Resp BP SpO2   02/22/21 1115 97.9 °F (36.6 °C) 78 19 (!) 158/74 94 %   02/22/21 0729 98.1 °F (36.7 °C) 85 19 (!) 161/55 95 %   02/22/21 0436 98.4 °F (36.9 °C) 99 20 (!) 158/63 93 %   02/22/21 0034 99.4 °F (37.4 °C) 89 20 (!) 127/51 92 %   02/21/21 2006 100.3 °F (37.9 °C) 91 20 137/65 97 %   02/21/21 1526 99.7 °F (37.6 °C) (!) 106 20 123/64 94 %     Oxygen Therapy  O2 Sat (%): 94 % (02/22/21 1115)  Pulse via Oximetry: 88 beats per minute (02/15/21 1659)  O2 Device: Room air (02/22/21 0700)  O2 Flow Rate (L/min): 1 l/min(decreased from 2 to 1 L, RN made aware) (02/18/21 0906)  ETCO2 (mmHg): 95 mmHg (02/16/21 1955)    Body mass index is 28.73 kg/m².    Physical Exam:   General:     alert, awake, no acute distress. Well nourished   Head:   normocephalic, atraumatic  Eyes, Ears, nose: PERRL, EOMI. Normal conjunctiva  Neck:    supple, non-tender. Trachea midline.   Lungs:   Scattered crackles, no wheezing  Cardiac:   RRR, Normal S1 and S2.  Abdomen:   Soft, non distended, nontender, +BS  Extremities:   Warm, dry. No edema   Skin:   No rashes, no jaundice  Neuro:  AAOx3. No gross focal neurological deficit  Psychiatric:  No anxiety, calm, cooperative    Data Review:  I have reviewed all labs, meds, and studies from the last 24 hours:      Labs:    Recent Results (from the past 24 hour(s))   GLUCOSE, POC    Collection Time: 02/21/21  4:34 PM   Result Value Ref Range    Glucose (POC) 258 (H) 65 - 100 mg/dL   GLUCOSE, POC    Collection Time: 02/21/21  8:35 PM   Result Value Ref Range    Glucose (POC) 242 (H) 65 - 100 mg/dL   GLUCOSE, POC    Collection Time: 02/22/21  4:27 AM   Result Value Ref Range    Glucose (POC) 192 (H) 65 - 100 mg/dL   CBC  W/O DIFF    Collection Time: 02/22/21  6:59 AM   Result Value Ref Range    WBC 9.8 4.3 - 11.1 K/uL    RBC 3.12 (L) 4.05 - 5.2 M/uL    HGB 8.5 (L) 11.7 - 15.4 g/dL    HCT 27.4 (L) 35.8 - 46.3 %    MCV 87.8 79.6 - 97.8 FL    MCH 27.2 26.1 - 32.9 PG    MCHC 31.0 (L) 31.4 - 35.0 g/dL    RDW 16.0 (H) 11.9 - 14.6 %    PLATELET 714 (H) 700 - 450 K/uL    MPV 9.5 9.4 - 12.3 FL    ABSOLUTE NRBC 0.00 0.0 - 0.2 K/uL   METABOLIC PANEL, BASIC    Collection Time: 02/22/21  6:59 AM   Result Value Ref Range    Sodium 141 136 - 145 mmol/L    Potassium 2.8 (L) 3.5 - 5.1 mmol/L    Chloride 109 (H) 98 - 107 mmol/L    CO2 24 21 - 32 mmol/L    Anion gap 8 7 - 16 mmol/L    Glucose 150 (H) 65 - 100 mg/dL    BUN 17 8 - 23 MG/DL    Creatinine 1.80 (H) 0.6 - 1.0 MG/DL    GFR est AA 36 (L) >60 ml/min/1.73m2    GFR est non-AA 29 (L) >60 ml/min/1.73m2    Calcium 8.3 8.3 - 10.4 MG/DL   MAGNESIUM    Collection Time: 02/22/21  6:59 AM   Result Value Ref Range    Magnesium 2.1 1.8 - 2.4 mg/dL   GLUCOSE, POC    Collection Time: 02/22/21  7:32 AM   Result Value Ref Range    Glucose (POC) 183 (H) 65 - 100 mg/dL   GLUCOSE, POC    Collection Time: 02/22/21 11:18 AM   Result Value Ref Range    Glucose (POC) 214 (H) 65 - 100 mg/dL         All Micro Results     None          Current Meds:  Current Facility-Administered Medications   Medication Dose Route Frequency    Saccharomyces boulardii (FLORASTOR) capsule 250 mg  250 mg Oral BID    carvediloL (COREG) tablet 3.125 mg  3.125 mg Oral BID WITH MEALS    diphenoxylate-atropine (LOMOTIL) tablet 1 Tab  1 Tab Oral QID PRN    dicyclomine (BENTYL) capsule 10 mg  10 mg Oral QID    ciprofloxacin (CIPRO) 400 mg in D5W IVPB (premix)  400 mg IntraVENous Q24H    metroNIDAZOLE (FLAGYL) IVPB premix 500 mg  500 mg IntraVENous Q12H    HYDROmorphone (PF) (DILAUDID) injection 1 mg  1 mg IntraVENous Q6H PRN    simethicone (MYLICON) tablet 80 mg  80 mg Oral QID PRN    [Held by provider] isosorbide mononitrate ER (IMDUR) tablet 60 mg  60 mg Oral DAILY    insulin lispro (HUMALOG) injection 0-10 Units  0-10 Units SubCUTAneous AC&HS    sodium chloride (NS) flush 5-40 mL  5-40 mL IntraVENous Q8H    sodium chloride (NS) flush 5-40 mL  5-40 mL IntraVENous PRN    acetaminophen (TYLENOL) tablet 650 mg  650 mg Oral Q6H PRN    Or    acetaminophen (TYLENOL) suppository 650 mg  650 mg Rectal Q6H PRN    polyethylene glycol (MIRALAX) packet 17 g  17 g Oral DAILY PRN    promethazine (PHENERGAN) tablet 12.5 mg  12.5 mg Oral Q6H PRN    Or    ondansetron (ZOFRAN) injection 4 mg  4 mg IntraVENous Q6H PRN    enoxaparin (LOVENOX) injection 40 mg  40 mg SubCUTAneous DAILY    aspirin chewable tablet 81 mg  81 mg Oral 7am    clopidogreL (PLAVIX) tablet 75 mg  75 mg Oral DAILY    insulin glargine (LANTUS) injection 8 Units  8 Units SubCUTAneous QHS    NIFEdipine ER (PROCARDIA XL) tablet 90 mg  90 mg Oral DAILY    rosuvastatin (CRESTOR) tablet 40 mg  40 mg Oral QHS         Other Studies:  Ct Chest W Cont    Result Date: 2/15/2021  EXAMINATION: CT CHEST W CONT 2/15/2021 1:55 PM ACCESSION NUMBER: [id number] INDICATION: Shortness of breath COMPARISON: 08/10/2020 TECHNIQUE: Multiple contiguous axial CT images of the chest were obtained from the lung apices to the lung bases after the intravenous administration of 75 mL Isovue-370 contrast material . Radiation dose reduction techniques were used for this study:  Our CT scanners use one or all of the following: Automated exposure control, adjustment of the mA and/or kVp according to patient's size, iterative reconstruction. FINDINGS: Lungs: There is a small left pleural effusion with a small to moderate right pleural effusion. Atelectasis is seen in both lung bases. There is mild diffuse increased attenuation throughout the lungs. There is no suspicious nodule. Airways are patent. Opacification of the pulmonary arteries is excellent. There is no evidence of a pulmonary embolism.  Mediastinum: Heart size is enlarged. Atherosclerosis is seen in the aorta and its major branches including the coronary arteries. There shotty paratracheal adenopathy. This is unchanged from the comparison exam. Visualized upper abdomen: The liver has some nodularity to its serosal surface. There is no focal suspicious lesion. The adrenal glands are normal. Osseous structures: Old fractures are seen anteriorly in the right mid ribs. There is no suspicious lytic or blastic lesion. 1. No evidence of a pulmonary embolism. 2. Findings support heart failure with cardiomegaly, pleural effusions, basilar atelectasis, and pulmonary interstitial edema. 3. Cirrhosis. 4. Atherosclerosis including coronary atherosclerosis. Xr Chest Port    Result Date: 2/15/2021  EXAMINATION: CHEST RADIOGRAPH 2/15/2021 10:22 AM ACCESSION NUMBER: [id number] COMPARISON: 02/09/2021 INDICATION: Chest pain with shortness of breath TECHNIQUE: A single view of the chest was obtained. FINDINGS: Support Devices: None Lungs: There is persistent pulmonary vascular indistinctness with small pleural effusions. This is similar to the prior exam. Cardiac Silhouette: Within normal limits in size. Mediastinum: Calcifications are seen in the aorta. Upper Abdomen: Normal Miscellaneous: There is previous fusion in the lower cervical spine. This is partially visualized. 1. Persistent pulmonary edema 2.  Atherosclerosis          Assessment:  Hospital Problems as of 2/22/2021 Date Reviewed: 2/20/2021          Codes Class Noted - Resolved POA    * (Principal) Acute on chronic combined systolic and diastolic congestive heart failure (HCC) ICD-10-CM: I50.43  ICD-9-CM: 428.43, 428.0  2/17/2021 - Present Unknown        Acute colitis ICD-10-CM: K52.9  ICD-9-CM: 558.9  2/18/2021 - Present Unknown        RESOLVED: Acute respiratory failure with hypoxia ([facility] ICD-10-CM: J96.01  ICD-9-CM: 518.81  2/17/2021 - 2/21/2021 Unknown        Acute renal failure superimposed on stage 3b chronic kidney disease ([facility] ICD-10-CM: N17.9, N18.32  ICD-9-CM: 584.9, 585.3  1/12/2021 - Present Yes        Hypoalbuminemia ICD-10-CM: E88.09  ICD-9-CM: 273.8  2/18/2021 - Present Unknown        Congestive heart failure (CHF) ([facility]) ICD-10-CM: I50.9  ICD-9-CM: 428.0  2/15/2021 - Present         Mixed hyperlipidemia ICD-10-CM: E78.2  ICD-9-CM: 272.2  10/21/2020 - Present Yes        S/P PTCA (percutaneous transluminal coronary angioplasty) ICD-10-CM: Z98.61  ICD-9-CM: V45.82  9/1/2020 - Present Yes        Type 2 diabetes mellitus with hyperglycemia, without long-term current use of insulin ([facility]) ICD-10-CM: E11.65  ICD-9-CM: 250.00, 790.29  10/11/2018 - Present Yes        Gastroesophageal reflux disease ICD-10-CM: K21.9  ICD-9-CM: 530.81  2/16/2017 - Present Yes        Essential hypertension (Chronic) ICD-10-CM: I10  ICD-9-CM: 401.9  9/15/2016 - Present Yes        Chronic kidney disease, stage III (moderate) ([facility] ICD-10-CM: N18.30  ICD-9-CM: 585.3  9/15/2016 - Present Yes        Coronary artery disease involving native coronary artery of native heart without angina pectoris ICD-10-CM: I25.10  ICD-9-CM: 414.01  9/4/2013 - Present Yes              Plan:    Acute Colitis  Noted in CT A/P. Febrile episode overnight with fever 100.3 °F.  cipro and flagyl (2/18 - 2/24)  Concho Cos recs apprecaited  Symptomatic management: pain control, lomotil    Acute respiratory failure with hypoxia likely due to acute on chronic systolic CHF (resolved)  Echo on 2/16 shows EF 55 to 10% with diastolic dysfunction. No oxygen needed on ambulation or at rest.    MIRANDA CKD stage 3   kidney function improving   - continue to monitor. CAD s/p 3 Stents // HTN // HLD : Stable. Cardiology recommendations appreciated. Medications have been adjusted by cardio.     T2DM: sliding scale and lantus 8u qHS      Diet:  DIET GI SOFT  DVT PPx: lovenox  Code: DNR    Dispo: home  Estimated Discharge: 24-48hrs as patient had temperature spike of 100.3 °F overnight. Also having diarrhea. Labs/Imaging Reviewed. Plan discussed with staff, patient/family and are in agreement. Part of this note was written by using a voice dictation software and the note has been proof read but may still contain some grammatical/other typographical errors.     Signed By: Niko Owen MD     February 22, 2021

## 2021-02-23 LAB
ANION GAP SERPL CALC-SCNC: 7 MMOL/L (ref 7–16)
BUN SERPL-MCNC: 10 MG/DL (ref 8–23)
CALCIUM SERPL-MCNC: 8.3 MG/DL (ref 8.3–10.4)
CHLORIDE SERPL-SCNC: 113 MMOL/L (ref 98–107)
CO2 SERPL-SCNC: 25 MMOL/L (ref 21–32)
CREAT SERPL-MCNC: 1.3 MG/DL (ref 0.6–1)
ERYTHROCYTE [DISTWIDTH] IN BLOOD BY AUTOMATED COUNT: 16.1 % (ref 11.9–14.6)
GLUCOSE BLD STRIP.AUTO-MCNC: 118 MG/DL (ref 65–100)
GLUCOSE BLD STRIP.AUTO-MCNC: 233 MG/DL (ref 65–100)
GLUCOSE BLD STRIP.AUTO-MCNC: 236 MG/DL (ref 65–100)
GLUCOSE BLD STRIP.AUTO-MCNC: 267 MG/DL (ref 65–100)
GLUCOSE SERPL-MCNC: 102 MG/DL (ref 65–100)
HCT VFR BLD AUTO: 28.1 % (ref 35.8–46.3)
HGB BLD-MCNC: 8.7 G/DL (ref 11.7–15.4)
MAGNESIUM SERPL-MCNC: 1.9 MG/DL (ref 1.8–2.4)
MCH RBC QN AUTO: 27.4 PG (ref 26.1–32.9)
MCHC RBC AUTO-ENTMCNC: 31 G/DL (ref 31.4–35)
MCV RBC AUTO: 88.4 FL (ref 79.6–97.8)
NRBC # BLD: 0 K/UL (ref 0–0.2)
PLATELET # BLD AUTO: 440 K/UL (ref 150–450)
PMV BLD AUTO: 9.5 FL (ref 9.4–12.3)
POTASSIUM SERPL-SCNC: 2.9 MMOL/L (ref 3.5–5.1)
POTASSIUM SERPL-SCNC: 3.1 MMOL/L (ref 3.5–5.1)
RBC # BLD AUTO: 3.18 M/UL (ref 4.05–5.2)
SODIUM SERPL-SCNC: 145 MMOL/L (ref 136–145)
WBC # BLD AUTO: 7.6 K/UL (ref 4.3–11.1)

## 2021-02-23 PROCEDURE — 74011250637 HC RX REV CODE- 250/637: Performed by: PHYSICIAN ASSISTANT

## 2021-02-23 PROCEDURE — 74011250637 HC RX REV CODE- 250/637: Performed by: INTERNAL MEDICINE

## 2021-02-23 PROCEDURE — 74011636637 HC RX REV CODE- 636/637: Performed by: FAMILY MEDICINE

## 2021-02-23 PROCEDURE — 74011250636 HC RX REV CODE- 250/636: Performed by: INTERNAL MEDICINE

## 2021-02-23 PROCEDURE — 82962 GLUCOSE BLOOD TEST: CPT

## 2021-02-23 PROCEDURE — 2709999900 HC NON-CHARGEABLE SUPPLY

## 2021-02-23 PROCEDURE — 85027 COMPLETE CBC AUTOMATED: CPT

## 2021-02-23 PROCEDURE — 36415 COLL VENOUS BLD VENIPUNCTURE: CPT

## 2021-02-23 PROCEDURE — 84132 ASSAY OF SERUM POTASSIUM: CPT

## 2021-02-23 PROCEDURE — 80048 BASIC METABOLIC PNL TOTAL CA: CPT

## 2021-02-23 PROCEDURE — 74011636637 HC RX REV CODE- 636/637: Performed by: INTERNAL MEDICINE

## 2021-02-23 PROCEDURE — 65270000029 HC RM PRIVATE

## 2021-02-23 PROCEDURE — 83735 ASSAY OF MAGNESIUM: CPT

## 2021-02-23 RX ORDER — POTASSIUM CHLORIDE 14.9 MG/ML
20 INJECTION INTRAVENOUS
Status: COMPLETED | OUTPATIENT
Start: 2021-02-23 | End: 2021-02-23

## 2021-02-23 RX ORDER — POTASSIUM CHLORIDE 20 MEQ/1
40 TABLET, EXTENDED RELEASE ORAL 2 TIMES DAILY
Status: DISCONTINUED | OUTPATIENT
Start: 2021-02-23 | End: 2021-02-23

## 2021-02-23 RX ORDER — PANTOPRAZOLE SODIUM 40 MG/1
40 TABLET, DELAYED RELEASE ORAL
Status: DISCONTINUED | OUTPATIENT
Start: 2021-02-24 | End: 2021-02-24 | Stop reason: HOSPADM

## 2021-02-23 RX ORDER — POTASSIUM CHLORIDE 20 MEQ/1
40 TABLET, EXTENDED RELEASE ORAL
Status: COMPLETED | OUTPATIENT
Start: 2021-02-23 | End: 2021-02-23

## 2021-02-23 RX ORDER — MAGNESIUM SULFATE 1 G/100ML
1 INJECTION INTRAVENOUS ONCE
Status: COMPLETED | OUTPATIENT
Start: 2021-02-23 | End: 2021-02-23

## 2021-02-23 RX ADMIN — CIPROFLOXACIN 400 MG: 2 INJECTION, SOLUTION INTRAVENOUS at 16:02

## 2021-02-23 RX ADMIN — ISOSORBIDE MONONITRATE 60 MG: 30 TABLET, EXTENDED RELEASE ORAL at 09:12

## 2021-02-23 RX ADMIN — INSULIN LISPRO 6 UNITS: 100 INJECTION, SOLUTION INTRAVENOUS; SUBCUTANEOUS at 18:18

## 2021-02-23 RX ADMIN — SODIUM CHLORIDE 10 ML: 9 INJECTION, SOLUTION INTRAMUSCULAR; INTRAVENOUS; SUBCUTANEOUS at 16:08

## 2021-02-23 RX ADMIN — DICYCLOMINE HYDROCHLORIDE 10 MG: 10 CAPSULE ORAL at 18:18

## 2021-02-23 RX ADMIN — RDII 250 MG CAPSULE 250 MG: at 09:13

## 2021-02-23 RX ADMIN — ROSUVASTATIN CALCIUM 40 MG: 20 TABLET, COATED ORAL at 21:14

## 2021-02-23 RX ADMIN — CARVEDILOL 3.12 MG: 3.12 TABLET, FILM COATED ORAL at 16:07

## 2021-02-23 RX ADMIN — SODIUM CHLORIDE 10 ML: 9 INJECTION, SOLUTION INTRAMUSCULAR; INTRAVENOUS; SUBCUTANEOUS at 05:18

## 2021-02-23 RX ADMIN — POTASSIUM CHLORIDE 20 MEQ: 200 INJECTION, SOLUTION INTRAVENOUS at 11:40

## 2021-02-23 RX ADMIN — POTASSIUM CHLORIDE 40 MEQ: 20 TABLET, EXTENDED RELEASE ORAL at 09:00

## 2021-02-23 RX ADMIN — INSULIN LISPRO 4 UNITS: 100 INJECTION, SOLUTION INTRAVENOUS; SUBCUTANEOUS at 11:43

## 2021-02-23 RX ADMIN — POTASSIUM CHLORIDE 40 MEQ: 1500 TABLET, EXTENDED RELEASE ORAL at 16:07

## 2021-02-23 RX ADMIN — ASPIRIN 81 MG: 81 TABLET, CHEWABLE ORAL at 05:18

## 2021-02-23 RX ADMIN — RDII 250 MG CAPSULE 250 MG: at 18:18

## 2021-02-23 RX ADMIN — POTASSIUM CHLORIDE 40 MEQ: 20 TABLET, EXTENDED RELEASE ORAL at 11:40

## 2021-02-23 RX ADMIN — DICYCLOMINE HYDROCHLORIDE 10 MG: 10 CAPSULE ORAL at 21:14

## 2021-02-23 RX ADMIN — INSULIN GLARGINE 8 UNITS: 100 INJECTION, SOLUTION SUBCUTANEOUS at 21:19

## 2021-02-23 RX ADMIN — DICYCLOMINE HYDROCHLORIDE 10 MG: 10 CAPSULE ORAL at 11:43

## 2021-02-23 RX ADMIN — DICYCLOMINE HYDROCHLORIDE 10 MG: 10 CAPSULE ORAL at 09:13

## 2021-02-23 RX ADMIN — SODIUM CHLORIDE 10 ML: 9 INJECTION, SOLUTION INTRAMUSCULAR; INTRAVENOUS; SUBCUTANEOUS at 21:16

## 2021-02-23 RX ADMIN — NIFEDIPINE 90 MG: 90 TABLET, FILM COATED, EXTENDED RELEASE ORAL at 09:13

## 2021-02-23 RX ADMIN — INSULIN LISPRO 4 UNITS: 100 INJECTION, SOLUTION INTRAVENOUS; SUBCUTANEOUS at 21:20

## 2021-02-23 RX ADMIN — POTASSIUM CHLORIDE 20 MEQ: 200 INJECTION, SOLUTION INTRAVENOUS at 09:12

## 2021-02-23 RX ADMIN — METRONIDAZOLE 500 MG: 500 INJECTION, SOLUTION INTRAVENOUS at 16:02

## 2021-02-23 RX ADMIN — ENOXAPARIN SODIUM 40 MG: 100 INJECTION SUBCUTANEOUS at 09:12

## 2021-02-23 RX ADMIN — CARVEDILOL 3.12 MG: 3.12 TABLET, FILM COATED ORAL at 09:13

## 2021-02-23 RX ADMIN — MAGNESIUM SULFATE HEPTAHYDRATE 1 G: 1 INJECTION, SOLUTION INTRAVENOUS at 18:27

## 2021-02-23 RX ADMIN — ACETAMINOPHEN 650 MG: 325 TABLET ORAL at 21:14

## 2021-02-23 RX ADMIN — CLOPIDOGREL BISULFATE 75 MG: 75 TABLET ORAL at 09:13

## 2021-02-23 RX ADMIN — PROMETHAZINE HYDROCHLORIDE 12.5 MG: 25 TABLET ORAL at 16:29

## 2021-02-23 NOTE — PROGRESS NOTES
Hospitalist Progress Note       Name: Kenyetta Todd MRN: [id number]  : 1950  Age:70 y.o.  female  Admit Date:  2/15/2021 LOS: 8    Reason for Admission:  Acute respiratory failure with hypoxemia (HCC) [J96.01]  Congestive heart failure (CHF) ([facility] [I50.9]  Acute on chronic combined systolic and diastolic congestive heart failure ([facility] [I50.43]  Acute respiratory failure with hypoxia ([facility] [J96.01]    Hospital Course:  Please refer to the admission H&P for details of presentation. In summary, Kenyetta Todd is a 65 y.o. female with medical history significant for   CAD s/p stents x3, DMII, CKD stage 3, COVID diagnosed last month not a candidate for remdesivir or plasma who presented shortness of breathe and edema to LE bilaterally. CT chest with contrast showed No evidence of a pulmonary embolism. Findings support heart failure with cardiomegaly, pleural effusions, basilar atelectasis, and pulmonary interstitial edema. CT abdomen pelvis this was done due to patient's ongoing and worsening lower abdominal pain. CT shows colitis and has been started on antibiotics. GI was consulted as per request of the patient and ongoing abdominal pain. Patient's abdominal pain has improved with antibiotics and after having a bowel movement with MiraLAX. However, patient's hospitalization was complicated by having loose bowel movements after MiraLAX. Subjective/24 hr Events (21) : Patient is seen and examined at bedside. No acute events reported overnight by nursing staff. Patient reports feeling much better today no longer having any nausea, vomiting, abdominal pain. Continues to have soft loose bowel movements but improved slightly since yesterday. Wondering when she can go home. RT walk test show patient does not need any O2 on ambulation as well as at rest.  Currently on room air without any discomfort or shortness of breath. Low-grade temperature of 100.3 °F overnight. Patient denies any fever, chills, abdominal pain, nausea, vomiting, shortness of breath, chest pain, headaches. ROS: 10 point review of systems is otherwise negative with the exception of the elements mentioned above. Objective:    Patient Vitals for the past 24 hrs:   Temp Pulse Resp BP SpO2   02/23/21 1527 98.7 °F (37.1 °C) 81 20 130/62 92 %   02/23/21 1045 98.8 °F (37.1 °C) 82 20 (!) 160/62 91 %   02/23/21 0718 98.5 °F (36.9 °C) 86 20 (!) 163/59 95 %   02/23/21 0519  85  (!) 148/72    02/23/21 0508 98.7 °F (37.1 °C) 86 20 (!) 173/72 94 %   02/23/21 0034 98.8 °F (37.1 °C) 75 20 (!) 155/69 94 %   02/22/21 2015  89 20 (!) 158/70 96 %   02/22/21 2003 98.9 °F (37.2 °C) 94 19 (!) 175/73 90 %   02/22/21 1956  94  (!) 175/73    02/22/21 1626 99.3 °F (37.4 °C) (!) 104 19 (!) 147/64 93 %     Oxygen Therapy  O2 Sat (%): 92 % (02/23/21 1527)  Pulse via Oximetry: 88 beats per minute (02/15/21 1659)  O2 Device: Room air (02/23/21 0700)  O2 Flow Rate (L/min): 1 l/min(decreased from 2 to 1 L, RN made aware) (02/18/21 0906)  ETCO2 (mmHg): 95 mmHg (02/16/21 1955)    Body mass index is 28.73 kg/m². Physical Exam:   General:     alert, awake, no acute distress. Well nourished   Head:   normocephalic, atraumatic  Eyes, Ears, nose: PERRL, EOMI. Normal conjunctiva  Neck:    supple, non-tender. Trachea midline. Lungs:   Scattered crackles, no wheezing  Cardiac:   RRR, Normal S1 and S2. Abdomen:   Soft, non distended, nontender, +BS  Extremities:   Warm, dry. No edema   Skin:   No rashes, no jaundice  Neuro:  AAOx3.  No gross focal neurological deficit  Psychiatric:  No anxiety, calm, cooperative    Data Review:  I have reviewed all labs, meds, and studies from the last 24 hours:      Labs:    Recent Results (from the past 24 hour(s))   GLUCOSE, POC    Collection Time: 02/22/21  4:31 PM   Result Value Ref Range    Glucose (POC) 110 (H) 65 - 100 mg/dL   GLUCOSE, POC    Collection Time: 02/22/21  8:33 PM   Result Value Ref Range    Glucose (POC) 264 (H) 65 - 100 mg/dL   CBC W/O DIFF    Collection Time: 02/23/21  6:35 AM   Result Value Ref Range    WBC 7.6 4.3 - 11.1 K/uL    RBC 3.18 (L) 4.05 - 5.2 M/uL    HGB 8.7 (L) 11.7 - 15.4 g/dL    HCT 28.1 (L) 35.8 - 46.3 %    MCV 88.4 79.6 - 97.8 FL    MCH 27.4 26.1 - 32.9 PG    MCHC 31.0 (L) 31.4 - 35.0 g/dL    RDW 16.1 (H) 11.9 - 14.6 %    PLATELET 182 818 - 215 K/uL    MPV 9.5 9.4 - 12.3 FL    ABSOLUTE NRBC 0.00 0.0 - 0.2 K/uL   METABOLIC PANEL, BASIC    Collection Time: 02/23/21  6:35 AM   Result Value Ref Range    Sodium 145 136 - 145 mmol/L    Potassium 2.9 (L) 3.5 - 5.1 mmol/L    Chloride 113 (H) 98 - 107 mmol/L    CO2 25 21 - 32 mmol/L    Anion gap 7 7 - 16 mmol/L    Glucose 102 (H) 65 - 100 mg/dL    BUN 10 8 - 23 MG/DL    Creatinine 1.30 (H) 0.6 - 1.0 MG/DL    GFR est AA 52 (L) >60 ml/min/1.73m2    GFR est non-AA 43 (L) >60 ml/min/1.73m2    Calcium 8.3 8.3 - 10.4 MG/DL   MAGNESIUM    Collection Time: 02/23/21  6:35 AM   Result Value Ref Range    Magnesium 1.9 1.8 - 2.4 mg/dL   GLUCOSE, POC    Collection Time: 02/23/21  7:21 AM   Result Value Ref Range    Glucose (POC) 118 (H) 65 - 100 mg/dL   GLUCOSE, POC    Collection Time: 02/23/21 10:49 AM   Result Value Ref Range    Glucose (POC) 233 (H) 65 - 100 mg/dL   POTASSIUM    Collection Time: 02/23/21  2:34 PM   Result Value Ref Range    Potassium 3.1 (L) 3.5 - 5.1 mmol/L   GLUCOSE, POC    Collection Time: 02/23/21  3:24 PM   Result Value Ref Range    Glucose (POC) 267 (H) 65 - 100 mg/dL         All Micro Results     None          Current Meds:  Current Facility-Administered Medications   Medication Dose Route Frequency    potassium chloride (K-DUR, KLOR-CON) SR tablet 40 mEq  40 mEq Oral BID    magnesium sulfate 1 g/100 ml IVPB (premix or compounded)  1 g IntraVENous ONCE    Saccharomyces boulardii (FLORASTOR) capsule 250 mg  250 mg Oral BID    hydrALAZINE (APRESOLINE) tablet 25 mg  25 mg Oral QID PRN    carvediloL (COREG) tablet 3.125 mg  3.125 mg Oral BID WITH MEALS    diphenoxylate-atropine (LOMOTIL) tablet 1 Tab  1 Tab Oral QID PRN    dicyclomine (BENTYL) capsule 10 mg  10 mg Oral QID    ciprofloxacin (CIPRO) 400 mg in D5W IVPB (premix)  400 mg IntraVENous Q24H    metroNIDAZOLE (FLAGYL) IVPB premix 500 mg  500 mg IntraVENous Q12H    HYDROmorphone (PF) (DILAUDID) injection 1 mg  1 mg IntraVENous Q6H PRN    simethicone (MYLICON) tablet 80 mg  80 mg Oral QID PRN    isosorbide mononitrate ER (IMDUR) tablet 60 mg  60 mg Oral DAILY    insulin lispro (HUMALOG) injection 0-10 Units  0-10 Units SubCUTAneous AC&HS    sodium chloride (NS) flush 5-40 mL  5-40 mL IntraVENous Q8H    sodium chloride (NS) flush 5-40 mL  5-40 mL IntraVENous PRN    acetaminophen (TYLENOL) tablet 650 mg  650 mg Oral Q6H PRN    Or    acetaminophen (TYLENOL) suppository 650 mg  650 mg Rectal Q6H PRN    polyethylene glycol (MIRALAX) packet 17 g  17 g Oral DAILY PRN    promethazine (PHENERGAN) tablet 12.5 mg  12.5 mg Oral Q6H PRN    Or    ondansetron (ZOFRAN) injection 4 mg  4 mg IntraVENous Q6H PRN    enoxaparin (LOVENOX) injection 40 mg  40 mg SubCUTAneous DAILY    aspirin chewable tablet 81 mg  81 mg Oral 7am    clopidogreL (PLAVIX) tablet 75 mg  75 mg Oral DAILY    insulin glargine (LANTUS) injection 8 Units  8 Units SubCUTAneous QHS    NIFEdipine ER (PROCARDIA XL) tablet 90 mg  90 mg Oral DAILY    rosuvastatin (CRESTOR) tablet 40 mg  40 mg Oral QHS         Other Studies:  Ct Chest W Cont    Result Date: 2/15/2021  EXAMINATION: CT CHEST W CONT 2/15/2021 1:55 PM ACCESSION NUMBER: [id number] INDICATION: Shortness of breath COMPARISON: 08/10/2020 TECHNIQUE: Multiple contiguous axial CT images of the chest were obtained from the lung apices to the lung bases after the intravenous administration of 75 mL Isovue-370 contrast material . Radiation dose reduction techniques were used for this study:  Our CT scanners use one or all of the following: Automated exposure control, adjustment of the mA and/or kVp according to patient's size, iterative reconstruction. FINDINGS: Lungs: There is a small left pleural effusion with a small to moderate right pleural effusion. Atelectasis is seen in both lung bases. There is mild diffuse increased attenuation throughout the lungs. There is no suspicious nodule. Airways are patent. Opacification of the pulmonary arteries is excellent. There is no evidence of a pulmonary embolism. Mediastinum: Heart size is enlarged. Atherosclerosis is seen in the aorta and its major branches including the coronary arteries. There shotty paratracheal adenopathy. This is unchanged from the comparison exam. Visualized upper abdomen: The liver has some nodularity to its serosal surface. There is no focal suspicious lesion. The adrenal glands are normal. Osseous structures: Old fractures are seen anteriorly in the right mid ribs. There is no suspicious lytic or blastic lesion. 1. No evidence of a pulmonary embolism. 2. Findings support heart failure with cardiomegaly, pleural effusions, basilar atelectasis, and pulmonary interstitial edema. 3. Cirrhosis. 4. Atherosclerosis including coronary atherosclerosis. Xr Chest Port    Result Date: 2/15/2021  EXAMINATION: CHEST RADIOGRAPH 2/15/2021 10:22 AM ACCESSION NUMBER: [id number] COMPARISON: 02/09/2021 INDICATION: Chest pain with shortness of breath TECHNIQUE: A single view of the chest was obtained. FINDINGS: Support Devices: None Lungs: There is persistent pulmonary vascular indistinctness with small pleural effusions. This is similar to the prior exam. Cardiac Silhouette: Within normal limits in size. Mediastinum: Calcifications are seen in the aorta. Upper Abdomen: Normal Miscellaneous: There is previous fusion in the lower cervical spine. This is partially visualized. 1. Persistent pulmonary edema 2.  Atherosclerosis          Assessment:  Hospital Problems as of 2/23/2021 Date Reviewed: 2/20/2021          Codes Class Noted - Resolved POA    * (Principal) Acute on chronic combined systolic and diastolic congestive heart failure ([facility] ICD-10-CM: I50.43  ICD-9-CM: 428.43, 428.0  2/17/2021 - Present Unknown        Acute colitis ICD-10-CM: K52.9  ICD-9-CM: 558.9  2/18/2021 - Present Unknown        RESOLVED: Acute respiratory failure with hypoxia (HCC) ICD-10-CM: J96.01  ICD-9-CM: 518.81  2/17/2021 - 2/21/2021 Unknown        Acute renal failure superimposed on stage 3b chronic kidney disease ([facility] ICD-10-CM: N17.9, N18.32  ICD-9-CM: 584.9, 585.3  1/12/2021 - Present Yes        Hypoalbuminemia ICD-10-CM: E88.09  ICD-9-CM: 273.8  2/18/2021 - Present Unknown        Congestive heart failure (CHF) ([facility] ICD-10-CM: I50.9  ICD-9-CM: 428.0  2/15/2021 - Present         Mixed hyperlipidemia ICD-10-CM: E78.2  ICD-9-CM: 272.2  10/21/2020 - Present Yes        S/P PTCA (percutaneous transluminal coronary angioplasty) ICD-10-CM: Z98.61  ICD-9-CM: V45.82  9/1/2020 - Present Yes        Type 2 diabetes mellitus with hyperglycemia, without long-term current use of insulin (HCC) ICD-10-CM: E11.65  ICD-9-CM: 250.00, 790.29  10/11/2018 - Present Yes        Gastroesophageal reflux disease ICD-10-CM: K21.9  ICD-9-CM: 530.81  2/16/2017 - Present Yes        Essential hypertension (Chronic) ICD-10-CM: I10  ICD-9-CM: 401.9  9/15/2016 - Present Yes        Chronic kidney disease, stage III (moderate) ([facility] ICD-10-CM: N18.30  ICD-9-CM: 585.3  9/15/2016 - Present Yes        Coronary artery disease involving native coronary artery of native heart without angina pectoris ICD-10-CM: I25.10  ICD-9-CM: 414.01  9/4/2013 - Present Yes              Plan:    Acute Colitis  Noted in CT A/P. Febrile episode overnight with fever 100.3 °F.  cipro and flagyl (2/18 - 2/24)  Jackson cifuentes apprecaited  Symptomatic management: pain control, lomotil    Hyperkalemia  Severe hyperkalemia with K of 2.9.  - replete.        Acute respiratory failure with hypoxia likely due to acute on chronic systolic CHF (resolved)  Echo on 2/16 shows EF 55 to 25% with diastolic dysfunction. No oxygen needed on ambulation or at rest.    MIRANDA CKD stage 3   kidney function improving   - continue to monitor. CAD s/p 3 Stents // HTN // HLD : Stable. Cardiology recommendations appreciated. Medications have been adjusted by cardio. T2DM: sliding scale and lantus 8u qHS      Diet:  DIET GI SOFT  DVT PPx: lovenox  Code: DNR    Dispo: home  Estimated Discharge: 24hrs    Labs/Imaging Reviewed. Plan discussed with staff, patient/family and are in agreement. Part of this note was written by using a voice dictation software and the note has been proof read but may still contain some grammatical/other typographical errors.     Signed By: Carmen Burkitt, MD     February 23, 2021

## 2021-02-23 NOTE — PROGRESS NOTES
Pt BP is 175/73 with HR of 94. Pt does not have any PRN medication for hypertension. MD notified. Recheck at 2015 /70 HR 89. Did not medicate. Will continue to monitor.

## 2021-02-23 NOTE — PROGRESS NOTES
Pt resting in bed. Denies pain or needs at this time. Hourly rounds performed;all pt needs met. Bed in low position and call light/ personal items within reach. Will continue to monitor and give bedside shift report to [facility] day shift nurse. Date 02/22/21 0700 - 02/23/21 0659 02/23/21 0700 - 02/24/21 0659   Shift 5548-8380 2839-2920 24 Hour Total 0031-2088 9925-2649 24 Hour Total   INTAKE   P.O. 600 360 960        P. O. 600 360 960      Shift Total(mL/kg) 600(7.9) 360(4.7) 960(12.6)      OUTPUT   Urine(mL/kg/hr)           Urine Occurrence(s) 4 x 3 x 7 x      Stool           Stool Occurrence(s) 2 x 2 x 4 x      Shift Total(mL/kg)          360 960      Weight (kg) 75.9 75.9 75.9 75.9 75.9 75.9

## 2021-02-24 VITALS
DIASTOLIC BLOOD PRESSURE: 53 MMHG | RESPIRATION RATE: 18 BRPM | BODY MASS INDEX: 29.26 KG/M2 | HEART RATE: 79 BPM | TEMPERATURE: 100.2 F | WEIGHT: 171.4 LBS | OXYGEN SATURATION: 89 % | SYSTOLIC BLOOD PRESSURE: 114 MMHG | HEIGHT: 64 IN

## 2021-02-24 PROBLEM — K52.9 ACUTE COLITIS: Status: RESOLVED | Noted: 2021-02-18 | Resolved: 2021-02-24

## 2021-02-24 LAB
ANION GAP SERPL CALC-SCNC: 7 MMOL/L (ref 7–16)
BUN SERPL-MCNC: 8 MG/DL (ref 8–23)
CALCIUM SERPL-MCNC: 8.1 MG/DL (ref 8.3–10.4)
CHLORIDE SERPL-SCNC: 111 MMOL/L (ref 98–107)
CO2 SERPL-SCNC: 23 MMOL/L (ref 21–32)
CREAT SERPL-MCNC: 1.24 MG/DL (ref 0.6–1)
GLUCOSE BLD STRIP.AUTO-MCNC: 190 MG/DL (ref 65–100)
GLUCOSE BLD STRIP.AUTO-MCNC: 249 MG/DL (ref 65–100)
GLUCOSE SERPL-MCNC: 168 MG/DL (ref 65–100)
MAGNESIUM SERPL-MCNC: 1.8 MG/DL (ref 1.8–2.4)
POTASSIUM SERPL-SCNC: 3.2 MMOL/L (ref 3.5–5.1)
SODIUM SERPL-SCNC: 141 MMOL/L (ref 136–145)

## 2021-02-24 PROCEDURE — 74011250636 HC RX REV CODE- 250/636: Performed by: INTERNAL MEDICINE

## 2021-02-24 PROCEDURE — 36415 COLL VENOUS BLD VENIPUNCTURE: CPT

## 2021-02-24 PROCEDURE — 80048 BASIC METABOLIC PNL TOTAL CA: CPT

## 2021-02-24 PROCEDURE — 74011250637 HC RX REV CODE- 250/637: Performed by: INTERNAL MEDICINE

## 2021-02-24 PROCEDURE — 74011250637 HC RX REV CODE- 250/637: Performed by: PHYSICIAN ASSISTANT

## 2021-02-24 PROCEDURE — 74011636637 HC RX REV CODE- 636/637: Performed by: FAMILY MEDICINE

## 2021-02-24 PROCEDURE — 82962 GLUCOSE BLOOD TEST: CPT

## 2021-02-24 PROCEDURE — 83735 ASSAY OF MAGNESIUM: CPT

## 2021-02-24 RX ORDER — PANTOPRAZOLE SODIUM 40 MG/1
40 TABLET, DELAYED RELEASE ORAL
Qty: 30 TAB | Refills: 1 | Status: SHIPPED | OUTPATIENT
Start: 2021-02-25 | End: 2021-03-03

## 2021-02-24 RX ORDER — CIPROFLOXACIN 500 MG/1
500 TABLET ORAL 2 TIMES DAILY
Qty: 4 TAB | Refills: 0 | Status: SHIPPED | OUTPATIENT
Start: 2021-02-24 | End: 2021-02-26

## 2021-02-24 RX ORDER — ISOSORBIDE MONONITRATE 60 MG/1
60 TABLET, EXTENDED RELEASE ORAL DAILY
Qty: 30 TAB | Refills: 1 | Status: SHIPPED | OUTPATIENT
Start: 2021-02-24 | End: 2021-04-02 | Stop reason: SDUPTHER

## 2021-02-24 RX ORDER — METRONIDAZOLE 500 MG/1
500 TABLET ORAL 3 TIMES DAILY
Qty: 6 TAB | Refills: 0 | Status: SHIPPED | OUTPATIENT
Start: 2021-02-24 | End: 2021-02-26

## 2021-02-24 RX ORDER — POTASSIUM CHLORIDE 20 MEQ/1
40 TABLET, EXTENDED RELEASE ORAL 2 TIMES DAILY
Status: DISCONTINUED | OUTPATIENT
Start: 2021-02-24 | End: 2021-02-24 | Stop reason: HOSPADM

## 2021-02-24 RX ORDER — POTASSIUM CHLORIDE 1500 MG/1
20 TABLET, FILM COATED, EXTENDED RELEASE ORAL DAILY
Qty: 5 TAB | Refills: 0 | Status: SHIPPED | OUTPATIENT
Start: 2021-02-24 | End: 2021-03-01

## 2021-02-24 RX ORDER — CARVEDILOL 3.12 MG/1
3.12 TABLET ORAL 2 TIMES DAILY WITH MEALS
Qty: 60 TAB | Refills: 1 | Status: SHIPPED | OUTPATIENT
Start: 2021-02-24 | End: 2021-04-02 | Stop reason: SDUPTHER

## 2021-02-24 RX ADMIN — NIFEDIPINE 90 MG: 90 TABLET, FILM COATED, EXTENDED RELEASE ORAL at 08:19

## 2021-02-24 RX ADMIN — SODIUM CHLORIDE 10 ML: 9 INJECTION, SOLUTION INTRAMUSCULAR; INTRAVENOUS; SUBCUTANEOUS at 05:36

## 2021-02-24 RX ADMIN — PANTOPRAZOLE SODIUM 40 MG: 40 TABLET, DELAYED RELEASE ORAL at 05:38

## 2021-02-24 RX ADMIN — CARVEDILOL 3.12 MG: 3.12 TABLET, FILM COATED ORAL at 08:20

## 2021-02-24 RX ADMIN — METRONIDAZOLE 500 MG: 500 INJECTION, SOLUTION INTRAVENOUS at 03:50

## 2021-02-24 RX ADMIN — ENOXAPARIN SODIUM 40 MG: 100 INJECTION SUBCUTANEOUS at 08:19

## 2021-02-24 RX ADMIN — INSULIN LISPRO 4 UNITS: 100 INJECTION, SOLUTION INTRAVENOUS; SUBCUTANEOUS at 12:01

## 2021-02-24 RX ADMIN — ONDANSETRON 4 MG: 2 INJECTION INTRAMUSCULAR; INTRAVENOUS at 08:20

## 2021-02-24 RX ADMIN — CLOPIDOGREL BISULFATE 75 MG: 75 TABLET ORAL at 08:20

## 2021-02-24 RX ADMIN — ISOSORBIDE MONONITRATE 60 MG: 30 TABLET, EXTENDED RELEASE ORAL at 08:19

## 2021-02-24 RX ADMIN — RDII 250 MG CAPSULE 250 MG: at 08:19

## 2021-02-24 RX ADMIN — DICYCLOMINE HYDROCHLORIDE 10 MG: 10 CAPSULE ORAL at 12:01

## 2021-02-24 RX ADMIN — DICYCLOMINE HYDROCHLORIDE 10 MG: 10 CAPSULE ORAL at 08:20

## 2021-02-24 RX ADMIN — ASPIRIN 81 MG: 81 TABLET, CHEWABLE ORAL at 05:37

## 2021-02-24 RX ADMIN — INSULIN LISPRO 2 UNITS: 100 INJECTION, SOLUTION INTRAVENOUS; SUBCUTANEOUS at 08:21

## 2021-02-24 NOTE — DISCHARGE SUMMARY
Hospitalist Discharge Summary     Admit Date:  2/15/2021  9:46 AM   Name:  Roly Barcenas   Age:  65 y.o.  :  1950   MRN:  [id number]   PCP:  Emery Rodrigues MD  Treatment Team: Attending Provider: Kristan Beltran MD; Utilization Review:  Carlos Hodge; Care Manager: Susan Gustafson, RN; Utilization Review: April Jones; Physical Therapist: Haile Cardoso PTA    Problem List for this Hospitalization:    Hospital Problems as of 2021 Date Reviewed: 2021          Codes Class Noted - Resolved POA    * (Principal) Acute on chronic combined systolic and diastolic congestive heart failure ([facility] ICD-10-CM: I50.43  ICD-9-CM: 428.43, 428.0  2021 - Present Unknown        RESOLVED: Acute colitis ICD-10-CM: K52.9  ICD-9-CM: 558.9  2021 - 2021 Unknown        RESOLVED: Acute respiratory failure with hypoxia ([facility] ICD-10-CM: J96.01  ICD-9-CM: 518.81  2021 - 2021 Unknown        Acute renal failure superimposed on stage 3b chronic kidney disease ([facility] ICD-10-CM: N17.9, N18.32  ICD-9-CM: 584.9, 585.3  2021 - Present Yes        Hypoalbuminemia ICD-10-CM: E88.09  ICD-9-CM: 273.8  2021 - Present Unknown        Congestive heart failure (CHF) ([facility] ICD-10-CM: I50.9  ICD-9-CM: 428.0  2/15/2021 - Present         Mixed hyperlipidemia ICD-10-CM: E78.2  ICD-9-CM: 272.2  10/21/2020 - Present Yes        S/P PTCA (percutaneous transluminal coronary angioplasty) ICD-10-CM: Z98.61  ICD-9-CM: V45.82  2020 - Present Yes        Type 2 diabetes mellitus with hyperglycemia, without long-term current use of insulin ([facility] ICD-10-CM: E11.65  ICD-9-CM: 250.00, 790.29  10/11/2018 - Present Yes        Gastroesophageal reflux disease ICD-10-CM: K21.9  ICD-9-CM: 530.81  2017 - Present Yes        Essential hypertension (Chronic) ICD-10-CM: I10  ICD-9-CM: 401.9  9/15/2016 - Present Yes        Chronic kidney disease, stage III (moderate) ([facility] ICD-10-CM: N18.30  ICD-9-CM: 585.3 9/15/2016 - Present Yes        Coronary artery disease involving native coronary artery of native heart without angina pectoris ICD-10-CM: I25.10  ICD-9-CM: 414.01  9/4/2013 - Present Yes                  Hospital Course :  Please refer to the admission H&P for details of presentation. In summary, Susie Nelson is a 65 y.o. female with past medical history significant for CAD s/p stents x3, DMII, CKD stage 3, COVID diagnosed last month not a candidate for remdesivir or plasma who presented shortness of breathe and edema to LE bilaterally. CT chest with contrast showed No evidence of a pulmonary embolism. Findings support heart failure with cardiomegaly, pleural effusions, basilar atelectasis, and pulmonary interstitial edema. CT abdomen pelvis this was done due to patient's ongoing and worsening lower abdominal pain. CT shows colitis and has been started on antibiotics. GI was consulted as per request of the patient and ongoing abdominal pain. Patient is abdominal pain slowly improved with antibiotics and resolved after having a bowel movement. She have multiple loose bowel movements after taking MiraLAX but has resolved since. Her hospital stay is complicated by continued hypocalcemia which has been repleted. Patient is medically stable for discharge. Patient is to continue taking medications as prescribed and to follow up with PCP on discharge. Patient is instructed to return to ED if symptoms worsened. Disposition: Home  Activity: Activity as tolerated  Diet: DIET GI SOFT No options chosen  Code Status: DNR      Follow up instructions, discharge meds at bottom of this note. Plan was discussed with patient/family. All questions answered. Patient was stable at time of discharge. Patient will call a physician or return if any concerns.     Diagnostic Imaging/Tests:   Ct Chest W Cont    Result Date: 2/15/2021  EXAMINATION: CT CHEST W CONT 2/15/2021 1:55 PM ACCESSION NUMBER: [id number] INDICATION: Shortness of breath COMPARISON: 08/10/2020 TECHNIQUE: Multiple contiguous axial CT images of the chest were obtained from the lung apices to the lung bases after the intravenous administration of 75 mL Isovue-370 contrast material . Radiation dose reduction techniques were used for this study:  Our CT scanners use one or all of the following: Automated exposure control, adjustment of the mA and/or kVp according to patient's size, iterative reconstruction. FINDINGS: Lungs: There is a small left pleural effusion with a small to moderate right pleural effusion. Atelectasis is seen in both lung bases. There is mild diffuse increased attenuation throughout the lungs. There is no suspicious nodule. Airways are patent. Opacification of the pulmonary arteries is excellent. There is no evidence of a pulmonary embolism. Mediastinum: Heart size is enlarged. Atherosclerosis is seen in the aorta and its major branches including the coronary arteries. There shotty paratracheal adenopathy. This is unchanged from the comparison exam. Visualized upper abdomen: The liver has some nodularity to its serosal surface. There is no focal suspicious lesion. The adrenal glands are normal. Osseous structures: Old fractures are seen anteriorly in the right mid ribs. There is no suspicious lytic or blastic lesion. 1. No evidence of a pulmonary embolism. 2. Findings support heart failure with cardiomegaly, pleural effusions, basilar atelectasis, and pulmonary interstitial edema. 3. Cirrhosis. 4. Atherosclerosis including coronary atherosclerosis. Xr Chest Port    Result Date: 2/15/2021  EXAMINATION: CHEST RADIOGRAPH 2/15/2021 10:22 AM ACCESSION NUMBER: [id number] COMPARISON: 02/09/2021 INDICATION: Chest pain with shortness of breath TECHNIQUE: A single view of the chest was obtained. FINDINGS: Support Devices: None Lungs: There is persistent pulmonary vascular indistinctness with small pleural effusions.  This is similar to the prior exam. Cardiac Silhouette: Within normal limits in size. Mediastinum: Calcifications are seen in the aorta. Upper Abdomen: Normal Miscellaneous: There is previous fusion in the lower cervical spine. This is partially visualized. 1. Persistent pulmonary edema 2. Atherosclerosis       Echocardiogram results:  Results for orders placed or performed during the hospital encounter of 02/15/21   2D ECHO COMPLETE ADULT (TTE) [address]  [address] [facility], [address] [facility]  [phone]    Transthoracic Echocardiogram  2D, M-mode, Doppler, and Color Doppler    Patient: Emma Desai  MR #: [id number]  : 1950  Age: 65 years  Gender: Female  Study date: 2021  Account #: [de-identified]  Height: 63.8 in  Weight: 156.6 lb  BSA: 1.76 mï¾²  Status:Routine  Location: 609  BP: 140/ 68    Allergies: CEPHALOSPORINS, SULFAMETHOXAZOLE-TRIMETHOPRIM, CODEINE, LISINOPRIL    Sonographer:  Raleigh Severin, RDCS  Group:  [facility]  Referring Physician:  Sivan Hager MD  Reading Physician:  Marcos Kussmaul. Ryan Olivares [place] [facility] [place]    INDICATIONS: CHF with recent Covid    PROCEDURE: This was a routine study. A transthoracic echocardiogram was  performed. The study included complete 2D imaging, M-mode, complete spectral  Doppler, and color Doppler. Image quality was adequate. LEFT VENTRICLE: Size was normal. Systolic function was normal. Ejection  fraction was estimated in the range of 55 % to 60 %. There were no regional  wall motion abnormalities. Wall thickness was normal. The E/e' ratio was   20.6. There was diastolic dysfunction. RIGHT VENTRICLE: The size was normal. Systolic function was normal. The  tricuspid jet envelope definition was inadequate for estimation of RV   systolic  pressure. LEFT ATRIUM: The atrium was moderately dilated. RIGHT ATRIUM: The atrium was mildly dilated.     SYSTEMIC VEINS: IVC: The inferior vena cava was dilated. The respirophasic  change in diameter was less than 50%. AORTIC VALVE: The valve was probably trileaflet. Leaflets exhibited moderate  sclerosis. There was no evidence for stenosis. There was no insufficiency. MITRAL VALVE: There was mild annular calcification. There was no evidence for  stenosis. There was mild to moderate regurgitation. TRICUSPID VALVE: The valve structure was normal. There was no evidence for  stenosis. There was trivial regurgitation. PULMONIC VALVE: Not well visualized. There was no evidence for stenosis. There  was no insufficiency. PERICARDIUM: A small pericardial effusion was identified along the right   atrial  free wall. There was no evidence of hemodynamic compromise. There was a left  pleural effusion. AORTA: The root exhibited normal size. SUMMARY:    -  Left ventricle: Systolic function was normal. Ejection fraction was  estimated in the range of 55 % to 60 %. There were no regional wall motion  abnormalities. -  Left atrium: The atrium was moderately dilated. -  Right atrium: The atrium was mildly dilated. -  Inferior vena cava, hepatic veins: The inferior vena cava was dilated. The  respirophasic change in diameter was less than 50%. -  Mitral valve: There was mild annular calcification. There was mild to  moderate regurgitation.    -  Pericardium: A small pericardial effusion was identified along the right  atrial free wall. There was no evidence of hemodynamic compromise. There was   a  left pleural effusion.     SYSTEM MEASUREMENT TABLES    2D mode  AoR Diam (2D): 2.8 cm  Left Atrium Systolic Volume Index; Method of Disks, Biplane; 2D mode;: 48   ml/m2  IVS/LVPW (2D): 1  IVSd (2D): 0.9 cm  LVIDd (2D): 4.8 cm  LVIDs (2D): 3.1 cm  LVOT Area (2D): 3.1 cm2  LVPWd (2D): 0.9 cm  RVIDd (2D): 3.3 cm    Tissue Doppler Imaging  LV Peak Early [place]ley Tissue Jigar; Lateral MA (TDI): 6.7 cm/s  LV Peak Early [place] Tissue Jigar; Medial MA (TDI): 5.7 cm/s    Unspecified Scan Mode  Peak Grad; Mean; Antegrade Flow: 11 mm[Hg]  Vmax; Antegrade Flow: 165 cm/s  LVOT Diam: 2 cm  MV Peak Jigar/LV Peak Tissue Jigar E-Wave; Lateral MA: 19  MV Peak Jigar/LV Peak Tissue Jigar E-Wave; Medial MA: 22.2  Peak Grad; Mean; Antegrade Flow: 11 mm[Hg]  Vmax; Antegrade Flow: 165 cm/s    Prepared and signed by    Yovany Mackey MD [facility]  Signed 16-Feb-2021 16:17:48         Procedures done this admission:  * No surgery found *    All Micro Results     None          Labs: Results:       BMP, Mg, Phos Recent Labs     02/24/21  0629 02/23/21  1434 02/23/21  0635 02/22/21  0659     --  145 141   K 3.2* 3.1* 2.9* 2.8*   *  --  113* 109*   CO2 23  --  25 24   AGAP 7  --  7 8   BUN 8  --  10 17   CREA 1.24*  --  1.30* 1.80*   CA 8.1*  --  8.3 8.3   *  --  102* 150*   MG 1.8  --  1.9 2.1      CBC Recent Labs     02/23/21  0635 02/22/21  0659   WBC 7.6 9.8   RBC 3.18* 3.12*   HGB 8.7* 8.5*   HCT 28.1* 27.4*    498*      LFT No results for input(s): ALT, TBIL, AP, TP, ALB, GLOB, AGRAT in the last 72 hours.     No lab exists for component: SGOT, GPT   Cardiac Testing Lab Results   Component Value Date/Time    BNP 99 03/01/2012 04:15 PM    CK 67 03/01/2012 04:15 PM    CK - MB <0.5 (L) 03/01/2012 04:15 PM    CK-MB Index CANNOT BE CALCULATED 03/01/2012 04:15 PM    Troponin-I, Qt. <0.02 (L) 02/05/2017 09:08 PM    Troponin-I, Qt. 0.02 02/05/2017 05:45 PM    Troponin-I, Qt. 0.02 10/22/2015 12:42 PM      Coagulation Tests Lab Results   Component Value Date/Time    Prothrombin time 14.0 01/13/2021 06:21 AM    Prothrombin time 15.2 (H) 07/27/2020 06:18 PM    Prothrombin time 11.4 09/18/2014 01:00 PM    INR 1.1 01/13/2021 06:21 AM    INR 1.2 07/27/2020 06:18 PM    INR 1.1 09/18/2014 01:00 PM      A1c Lab Results   Component Value Date/Time    Hemoglobin A1c 10.2 (H) 01/13/2021 12:43 AM    Hemoglobin A1c 6.7 (H) 07/16/2020 01:50 PM    Hemoglobin A1c 6.4 (H) 09/03/2019 02:28 PM Lipid Panel Lab Results   Component Value Date/Time    Cholesterol, total 95 08/12/2020 05:24 AM    HDL Cholesterol 34 (L) 08/12/2020 05:24 AM    LDL, calculated 45.4 08/12/2020 05:24 AM    VLDL, calculated 15.6 08/12/2020 05:24 AM    Triglyceride 78 08/12/2020 05:24 AM    CHOL/HDL Ratio 2.8 08/12/2020 05:24 AM      Thyroid Panel Lab Results   Component Value Date/Time    TSH 2.060 07/16/2020 01:50 PM    TSH 3.900 09/03/2019 02:28 PM        Most Recent UA Lab Results   Component Value Date/Time    Color YELLOW 01/18/2021 03:08 PM    Appearance CLOUDY 01/18/2021 03:08 PM    Specific gravity 1.018 01/18/2021 03:08 PM    pH (UA) 5.5 01/18/2021 03:08 PM    Protein 300 (A) 01/18/2021 03:08 PM    Glucose Negative 01/18/2021 03:08 PM    Ketone Negative 01/18/2021 03:08 PM    Bilirubin Negative 01/18/2021 03:08 PM    Blood MODERATE (A) 01/18/2021 03:08 PM    Urobilinogen 1.0 01/18/2021 03:08 PM    Nitrites Negative 01/18/2021 03:08 PM    Leukocyte Esterase Negative 01/18/2021 03:08 PM    WBC 3-5 01/18/2021 03:08 PM    RBC 0-3 01/18/2021 03:08 PM    Epithelial cells 0-3 01/18/2021 03:08 PM    Bacteria 1+ (H) 01/18/2021 03:08 PM    Casts HYALINE 01/18/2021 03:08 PM    Other observations RESULTS VERIFIED MANUALLY 01/18/2021 03:08 PM        Allergies   Allergen Reactions    Cephalosporins Hives and Swelling    Sulfamethoxazole-Trimethoprim Other (comments)     Diarrhea     Codeine Other (comments)    Lisinopril Other (comments)     Passing out spells.  // pt sts not allergic to med      Immunization History   Administered Date(s) Administered    Influenza High Dose Vaccine PF 10/04/2017, 10/11/2018    Influenza Vaccine 09/04/2013, 11/18/2014, 08/24/2015    Influenza Vaccine (Quad) PF (>6 Mo Flulaval, Fluarix, and >3 [place], Fluzone [id number]) 12/15/2016    Influenza Vaccine (Tri) Adjuvanted (>65 Yrs FLUAD TRI 51420) 09/03/2019    Influenza, Quadrivalent, Adjuvanted (>65 Yrs FLUAD QUAD [id number]) 10/21/2020    Pneumococcal Conjugate (PCV-13) 08/24/2015    Pneumococcal Polysaccharide (PPSV-23) 09/04/2013       All Labs from Last 24 Hrs:  Recent Results (from the past 24 hour(s))   POTASSIUM    Collection Time: 02/23/21  2:34 PM   Result Value Ref Range    Potassium 3.1 (L) 3.5 - 5.1 mmol/L   GLUCOSE, POC    Collection Time: 02/23/21  3:24 PM   Result Value Ref Range    Glucose (POC) 267 (H) 65 - 100 mg/dL   GLUCOSE, POC    Collection Time: 02/23/21  9:13 PM   Result Value Ref Range    Glucose (POC) 236 (H) 65 - 521 mg/dL   METABOLIC PANEL, BASIC    Collection Time: 02/24/21  6:29 AM   Result Value Ref Range    Sodium 141 136 - 145 mmol/L    Potassium 3.2 (L) 3.5 - 5.1 mmol/L    Chloride 111 (H) 98 - 107 mmol/L    CO2 23 21 - 32 mmol/L    Anion gap 7 7 - 16 mmol/L    Glucose 168 (H) 65 - 100 mg/dL    BUN 8 8 - 23 MG/DL    Creatinine 1.24 (H) 0.6 - 1.0 MG/DL    GFR est AA 55 (L) >60 ml/min/1.73m2    GFR est non-AA 45 (L) >60 ml/min/1.73m2    Calcium 8.1 (L) 8.3 - 10.4 MG/DL   MAGNESIUM    Collection Time: 02/24/21  6:29 AM   Result Value Ref Range    Magnesium 1.8 1.8 - 2.4 mg/dL   GLUCOSE, POC    Collection Time: 02/24/21  7:40 AM   Result Value Ref Range    Glucose (POC) 190 (H) 65 - 100 mg/dL   GLUCOSE, POC    Collection Time: 02/24/21 11:28 AM   Result Value Ref Range    Glucose (POC) 249 (H) 65 - 100 mg/dL       Current Med List in Hospital:   Current Facility-Administered Medications   Medication Dose Route Frequency    potassium chloride (K-DUR, KLOR-CON) SR tablet 40 mEq  40 mEq Oral BID    pantoprazole (PROTONIX) tablet 40 mg  40 mg Oral ACB    Saccharomyces boulardii (FLORASTOR) capsule 250 mg  250 mg Oral BID    hydrALAZINE (APRESOLINE) tablet 25 mg  25 mg Oral QID PRN    carvediloL (COREG) tablet 3.125 mg  3.125 mg Oral BID WITH MEALS    diphenoxylate-atropine (LOMOTIL) tablet 1 Tab  1 Tab Oral QID PRN    dicyclomine (BENTYL) capsule 10 mg  10 mg Oral QID    ciprofloxacin (CIPRO) 400 mg in D5W IVPB (premix)  400 mg IntraVENous Q24H    metroNIDAZOLE (FLAGYL) IVPB premix 500 mg  500 mg IntraVENous Q12H    HYDROmorphone (PF) (DILAUDID) injection 1 mg  1 mg IntraVENous Q6H PRN    simethicone (MYLICON) tablet 80 mg  80 mg Oral QID PRN    isosorbide mononitrate ER (IMDUR) tablet 60 mg  60 mg Oral DAILY    insulin lispro (HUMALOG) injection 0-10 Units  0-10 Units SubCUTAneous AC&HS    sodium chloride (NS) flush 5-40 mL  5-40 mL IntraVENous Q8H    sodium chloride (NS) flush 5-40 mL  5-40 mL IntraVENous PRN    acetaminophen (TYLENOL) tablet 650 mg  650 mg Oral Q6H PRN    Or    acetaminophen (TYLENOL) suppository 650 mg  650 mg Rectal Q6H PRN    polyethylene glycol (MIRALAX) packet 17 g  17 g Oral DAILY PRN    promethazine (PHENERGAN) tablet 12.5 mg  12.5 mg Oral Q6H PRN    Or    ondansetron (ZOFRAN) injection 4 mg  4 mg IntraVENous Q6H PRN    enoxaparin (LOVENOX) injection 40 mg  40 mg SubCUTAneous DAILY    aspirin chewable tablet 81 mg  81 mg Oral 7am    clopidogreL (PLAVIX) tablet 75 mg  75 mg Oral DAILY    insulin glargine (LANTUS) injection 8 Units  8 Units SubCUTAneous QHS    NIFEdipine ER (PROCARDIA XL) tablet 90 mg  90 mg Oral DAILY    rosuvastatin (CRESTOR) tablet 40 mg  40 mg Oral QHS       Discharge Exam:  Patient Vitals for the past 24 hrs:   Temp Pulse Resp BP SpO2   02/24/21 1126 100.2 °F (37.9 °C) 79 18 (!) 114/53    02/24/21 0815 98.8 °F (37.1 °C) 93 18 (!) 143/60 (!) 89 %   02/24/21 0450 98.3 °F (36.8 °C) 92 18 136/76 95 %   02/23/21 2347 98.8 °F (37.1 °C) 85 18 133/68 95 %   02/23/21 2000 99.3 °F (37.4 °C) 86 20 137/62 92 %   02/23/21 1527 98.7 °F (37.1 °C) 81 20 130/62 92 %     Oxygen Therapy  O2 Sat (%): (!) 89 % (02/24/21 0815)  Pulse via Oximetry: 88 beats per minute (02/15/21 1659)  O2 Device: Room air (02/23/21 2339)  O2 Flow Rate (L/min): 1 l/min(decreased from 2 to 1 L, RN made aware) (02/18/21 0906)  ETCO2 (mmHg): 95 mmHg (02/16/21 1955)    Estimated body mass index is 29.42 kg/m² as calculated from the following:    Height as of this encounter: 5' 4\" (1.626 m).    Weight as of this encounter: 77.7 kg (171 lb 6.4 oz).      Intake/Output Summary (Last 24 hours) at 2/24/2021 1154  Last data filed at 2/24/2021 0821  Gross per 24 hour   Intake 660 ml   Output —   Net 660 ml       *Note that automatically entered I/Os may not be accurate; dependent on patient compliance with collection and accurate  by assistants.    Physical Exam:   General:                     alert, awake, no acute distress. Well nourished   Head:                          normocephalic, atraumatic  Eyes, Ears, nose:      PERRL, EOMI. Normal conjunctiva  Neck:                          supple, non-tender. Trachea midline.   Lungs:                        Scattered crackles, no wheezing  Cardiac:                      RRR, Normal S1 and S2.  Abdomen:                  Soft, non distended, nontender, +BS  Extremities:               Warm, dry. No edema   Skin:                           No rashes, no jaundice  Neuro:              AAOx3. No gross focal neurological deficit  Psychiatric:                No anxiety, calm, cooperative      Discharge Info:   Current Discharge Medication List      START taking these medications    Details   carvediloL (COREG) 3.125 mg tablet Take 1 Tab by mouth two (2) times daily (with meals).  Qty: 60 Tab, Refills: 1      pantoprazole (PROTONIX) 40 mg tablet Take 1 Tab by mouth Daily (before breakfast).  Qty: 30 Tab, Refills: 1      metroNIDAZOLE (FLAGYL) 500 mg tablet Take 1 Tab by mouth three (3) times daily for 2 days.  Qty: 6 Tab, Refills: 0      ciprofloxacin HCl (CIPRO) 500 mg tablet Take 1 Tab by mouth two (2) times a day for 2 days.  Qty: 4 Tab, Refills: 0         CONTINUE these medications which have CHANGED    Details   potassium chloride SR (K-TAB) 20 mEq tablet Take 1 Tab by mouth daily for 5 days.  Qty: 5 Tab, Refills: 0      isosorbide mononitrate ER (IMDUR) 60 mg CR tablet Take  1 Tab by mouth daily.  Qty: 30 Tab, Refills: 1         CONTINUE these medications which have NOT CHANGED    Details   traMADoL (ULTRAM) 50 mg tablet Take 50 mg by mouth every six (6) hours as needed for Pain.      ondansetron (ZOFRAN ODT) 4 mg disintegrating tablet Take 1 Tab by mouth every eight (8) hours as needed for Nausea or Vomiting.  Qty: 30 Tab, Refills: 0    Associated Diagnoses: Nausea      insulin glargine (LANTUS,BASAGLAR) 100 unit/mL (3 mL) inpn 8 units at night  Qty: 1 Pen, Refills: 0      insulin lispro (HUMALOG) 100 unit/mL kwikpen Less than 150 =   0 units           150 -199 =   3 units  200 -249 =   6 units  250 -299 =   9 units  300 -349 =   12 units  Before meals and at night  Qty: 2 Pen, Refills: 0      albuterol (PROVENTIL HFA, VENTOLIN HFA, PROAIR HFA) 90 mcg/actuation inhaler Take 1 Puff by inhalation every six (6) hours as needed for Wheezing, Shortness of Breath or Cough.  Qty: 1 Inhaler, Refills: 0      citalopram (CELEXA) 20 mg tablet TAKE 1 TABLET BY MOUTH IN  THE MORNING  Qty: 90 Tab, Refills: 0    Comments: Requesting 1 year supply  Associated Diagnoses: Major depressive disorder with single episode, in full remission (HCC)      traZODone (DESYREL) 50 mg tablet TAKE 1 TABLET BY MOUTH AT  NIGHT  Qty: 90 Tab, Refills: 0    Comments: Requesting 1 year supply  Associated Diagnoses: Primary insomnia      NIFEdipine ER (PROCARDIA XL) 60 mg ER tablet TAKE 1 TABLET BY MOUTH  DAILY  Qty: 90 Tab, Refills: 1    Associated Diagnoses: Essential hypertension      rosuvastatin (CRESTOR) 40 mg tablet Take 1 Tab by mouth nightly.  Qty: 90 Tab, Refills: 1    Associated Diagnoses: Coronary artery disease involving native coronary artery of native heart without angina pectoris; Mixed hyperlipidemia      clopidogreL (PLAVIX) 75 mg tab TAKE 1 TABLET BY MOUTH  DAILY  Qty: 90 Tab, Refills: 2      nitroglycerin (NITROSTAT) 0.4 mg SL tablet 1 Tab by SubLINGual route every five (5) minutes as needed for Chest  Pain.  Qty: 3 Bottle, Refills: 3      magnesium oxide (MAG-OX) 400 mg tablet TAKE 1 TABLET BY MOUTH TWICE DAILY  Qty: 180 Tab, Refills: 3    Associated Diagnoses: Hypomagnesemia      cholecalciferol, vitamin D3, (VITAMIN D3) 2,000 unit tab Take 2,000 Units by mouth daily. cyanocobalamin (VITAMIN B-12) 1,000 mcg tablet Take 2,500 mcg by mouth daily. aspirin 81 mg chewable tablet Take 81 mg by mouth every morning. Indications: continue per anesthesia guidelines         STOP taking these medications       metoprolol succinate (TOPROL-XL) 50 mg XL tablet Comments:   Reason for Stopping: Follow Up Orders:  No orders of the defined types were placed in this encounter. Follow-up Information     Follow up With Specialties Details Why Contact Info    [facility] CARDIOLOGY  Schedule an appointment as soon as possible for a visit in 1 week Dr. Bert Segura [address] [facility] [id number] [phone]          Time spent in patient discharge planning and coordination 40 minutes.     Signed:  Anatoly Bartlett MD

## 2021-02-24 NOTE — PROGRESS NOTES
Patient resting in room. No signs or symptoms of distress. No changes in status. Patient denies any further needs or pain at this time. Bed in low position and call light/ personal items within reach. Will continue to monitor and give bedside shift report to [facility] day shift nurse. Date 02/23/21 0700 - 02/24/21 0659 02/24/21 0700 - 02/25/21 0659   Shift 9161-8535 9601-5861 24 Hour Total 3913-3354 2755-4867 24 Hour Total   INTAKE   P.O. 480 180 660        P. O. 480 180 660      Shift Total(mL/kg) 480(6.3) 180(2.3) 660(8.5)      OUTPUT   Urine(mL/kg/hr)           Urine Occurrence(s) 3 x 3 x 6 x      Shift Total(mL/kg)          180 660      Weight (kg) 75.9 77.7 77.7 77.7 77.7 77.7

## 2021-02-24 NOTE — PROGRESS NOTES
Care Management Interventions  PCP Verified by CM: Yes(Dr. Kendrick Comp)  Mode of Transport at Discharge: Self  Transition of Care Consult (CM Consult): Discharge Planning  Discharge Durable Medical Equipment: No  Physical Therapy Consult: No  Occupational Therapy Consult: No  Speech Therapy Consult: No  Current Support Network: Own Home, Lives with Spouse  Confirm Follow Up Transport: Self  [place] Resource Information Provided?: No  Discharge Location  Discharge Placement: Home    Patient to discharge home today. No CM needs have been identified. Patient to transport home with family. All milestones for discharge have been met. CM remains available should any needs arise.

## 2021-02-24 NOTE — PROGRESS NOTES
Pt is refusing all forms of K+. She just wants to go home. MD made aware and teaching given to pt on importance. Pt voiced understanding but still refused.

## 2021-02-24 NOTE — PROGRESS NOTES
Discharge instructions reviewed with pt. Stressed importance of finding a way that works for her to take the PO K+. IV removed and scripts sent to Pharmacy.

## 2021-02-25 NOTE — ROUTINE PROCESS
CHF F/U call to pt; aware of appt. On 3/3. Pt feeling not 100%, can not elaborate further, aware to report worsening usnea, not other complains.

## 2021-03-01 PROBLEM — R94.31 LONG QT INTERVAL: Status: RESOLVED | Noted: 2021-01-12 | Resolved: 2021-03-01

## 2021-03-01 PROBLEM — A41.9 SEPSIS (HCC): Status: RESOLVED | Noted: 2021-01-12 | Resolved: 2021-03-01

## 2021-03-01 PROBLEM — E87.20 LACTIC ACIDOSIS: Status: RESOLVED | Noted: 2021-01-12 | Resolved: 2021-03-01

## 2021-03-01 PROBLEM — N17.9 ACUTE RENAL FAILURE SUPERIMPOSED ON STAGE 3B CHRONIC KIDNEY DISEASE (HCC): Status: RESOLVED | Noted: 2021-01-12 | Resolved: 2021-03-01

## 2021-03-01 PROBLEM — R09.02 HYPOXEMIA: Status: RESOLVED | Noted: 2020-08-12 | Resolved: 2021-03-01

## 2021-03-01 PROBLEM — J18.9 HCAP (HEALTHCARE-ASSOCIATED PNEUMONIA): Status: RESOLVED | Noted: 2021-01-18 | Resolved: 2021-03-01

## 2021-03-01 PROBLEM — E88.09 HYPOALBUMINEMIA: Status: RESOLVED | Noted: 2021-02-18 | Resolved: 2021-03-01

## 2021-03-01 PROBLEM — R79.89 ELEVATED LFTS: Status: RESOLVED | Noted: 2021-01-12 | Resolved: 2021-03-01

## 2021-03-01 PROBLEM — N18.32 ACUTE RENAL FAILURE SUPERIMPOSED ON STAGE 3B CHRONIC KIDNEY DISEASE (HCC): Status: RESOLVED | Noted: 2021-01-12 | Resolved: 2021-03-01

## 2021-03-01 PROBLEM — U07.1 COVID-19 VIRUS INFECTION: Status: RESOLVED | Noted: 2021-01-12 | Resolved: 2021-03-01

## 2021-03-01 PROBLEM — J90 PLEURAL EFFUSION, BILATERAL: Status: RESOLVED | Noted: 2020-08-11 | Resolved: 2021-03-01

## 2021-03-01 PROBLEM — E87.1 HYPONATREMIA: Status: RESOLVED | Noted: 2021-01-12 | Resolved: 2021-03-01

## 2021-04-19 PROBLEM — I50.32 CHRONIC DIASTOLIC CONGESTIVE HEART FAILURE (HCC): Status: ACTIVE | Noted: 2021-04-19

## 2021-07-07 PROBLEM — M13.0 POLYARTHRITIS: Status: ACTIVE | Noted: 2021-07-07

## 2021-07-07 PROBLEM — E11.65 TYPE 2 DIABETES MELLITUS WITH HYPERGLYCEMIA, WITHOUT LONG-TERM CURRENT USE OF INSULIN (HCC): Status: RESOLVED | Noted: 2018-10-11 | Resolved: 2021-07-07

## 2021-07-07 PROBLEM — I50.43 ACUTE ON CHRONIC COMBINED SYSTOLIC AND DIASTOLIC CONGESTIVE HEART FAILURE (HCC): Status: RESOLVED | Noted: 2021-02-17 | Resolved: 2021-07-07

## 2021-07-07 PROBLEM — R74.01 ELEVATED ALT MEASUREMENT: Status: ACTIVE | Noted: 2021-07-07

## 2021-07-07 PROBLEM — Z79.4 TYPE 2 DIABETES MELLITUS WITH STAGE 3B CHRONIC KIDNEY DISEASE, WITH LONG-TERM CURRENT USE OF INSULIN (HCC): Status: ACTIVE | Noted: 2018-10-11

## 2021-07-07 PROBLEM — N18.32 TYPE 2 DIABETES MELLITUS WITH STAGE 3B CHRONIC KIDNEY DISEASE, WITH LONG-TERM CURRENT USE OF INSULIN (HCC): Status: ACTIVE | Noted: 2018-10-11

## 2021-07-07 PROBLEM — E11.22 TYPE 2 DIABETES MELLITUS WITH STAGE 3B CHRONIC KIDNEY DISEASE, WITH LONG-TERM CURRENT USE OF INSULIN (HCC): Status: ACTIVE | Noted: 2018-10-11

## 2021-08-17 ENCOUNTER — APPOINTMENT (OUTPATIENT)
Dept: ULTRASOUND IMAGING | Age: 71
DRG: 389 | End: 2021-08-17
Attending: EMERGENCY MEDICINE
Payer: MEDICARE

## 2021-08-17 ENCOUNTER — HOSPITAL ENCOUNTER (INPATIENT)
Age: 71
LOS: 3 days | Discharge: HOME | DRG: 389 | End: 2021-08-20
Attending: EMERGENCY MEDICINE | Admitting: STUDENT IN AN ORGANIZED HEALTH CARE EDUCATION/TRAINING PROGRAM
Payer: MEDICARE

## 2021-08-17 ENCOUNTER — APPOINTMENT (OUTPATIENT)
Dept: CT IMAGING | Age: 71
DRG: 389 | End: 2021-08-17
Attending: EMERGENCY MEDICINE
Payer: MEDICARE

## 2021-08-17 DIAGNOSIS — N17.9 ACUTE RENAL FAILURE SUPERIMPOSED ON CHRONIC KIDNEY DISEASE, UNSPECIFIED CKD STAGE, UNSPECIFIED ACUTE RENAL FAILURE TYPE (HCC): ICD-10-CM

## 2021-08-17 DIAGNOSIS — I50.30 DIASTOLIC CONGESTIVE HEART FAILURE, UNSPECIFIED HF CHRONICITY (HCC): ICD-10-CM

## 2021-08-17 DIAGNOSIS — N18.9 ACUTE RENAL FAILURE SUPERIMPOSED ON CHRONIC KIDNEY DISEASE, UNSPECIFIED CKD STAGE, UNSPECIFIED ACUTE RENAL FAILURE TYPE (HCC): ICD-10-CM

## 2021-08-17 DIAGNOSIS — K56.609 SBO (SMALL BOWEL OBSTRUCTION) (HCC): Primary | ICD-10-CM

## 2021-08-17 LAB
ALBUMIN SERPL-MCNC: 4.5 G/DL (ref 3.2–4.6)
ALBUMIN/GLOB SERPL: 0.9 {RATIO} (ref 1.2–3.5)
ALP SERPL-CCNC: 162 U/L (ref 50–136)
ALT SERPL-CCNC: 119 U/L (ref 12–65)
ANION GAP SERPL CALC-SCNC: 11 MMOL/L (ref 7–16)
AST SERPL-CCNC: 64 U/L (ref 15–37)
BASOPHILS # BLD: 0 K/UL (ref 0–0.2)
BASOPHILS NFR BLD: 0 % (ref 0–2)
BILIRUB SERPL-MCNC: 0.7 MG/DL (ref 0.2–1.1)
BUN SERPL-MCNC: 59 MG/DL (ref 8–23)
CALCIUM SERPL-MCNC: 10.1 MG/DL (ref 8.3–10.4)
CHLORIDE SERPL-SCNC: 105 MMOL/L (ref 98–107)
CO2 SERPL-SCNC: 19 MMOL/L (ref 21–32)
CREAT SERPL-MCNC: 2.74 MG/DL (ref 0.6–1)
CRP SERPL-MCNC: <0.3 MG/DL (ref 0–0.9)
DIFFERENTIAL METHOD BLD: ABNORMAL
EOSINOPHIL # BLD: 0.1 K/UL (ref 0–0.8)
EOSINOPHIL NFR BLD: 1 % (ref 0.5–7.8)
ERYTHROCYTE [DISTWIDTH] IN BLOOD BY AUTOMATED COUNT: 13.3 % (ref 11.9–14.6)
GLOBULIN SER CALC-MCNC: 5.2 G/DL (ref 2.3–3.5)
GLUCOSE BLD STRIP.AUTO-MCNC: 118 MG/DL (ref 65–100)
GLUCOSE SERPL-MCNC: 147 MG/DL (ref 65–100)
HCT VFR BLD AUTO: 42.4 % (ref 35.8–46.3)
HGB BLD-MCNC: 14.2 G/DL (ref 11.7–15.4)
IMM GRANULOCYTES # BLD AUTO: 0 K/UL (ref 0–0.5)
IMM GRANULOCYTES NFR BLD AUTO: 0 % (ref 0–5)
LIPASE SERPL-CCNC: 184 U/L (ref 73–393)
LYMPHOCYTES # BLD: 0.9 K/UL (ref 0.5–4.6)
LYMPHOCYTES NFR BLD: 9 % (ref 13–44)
MCH RBC QN AUTO: 31.7 PG (ref 26.1–32.9)
MCHC RBC AUTO-ENTMCNC: 33.5 G/DL (ref 31.4–35)
MCV RBC AUTO: 94.6 FL (ref 79.6–97.8)
MONOCYTES # BLD: 0.7 K/UL (ref 0.1–1.3)
MONOCYTES NFR BLD: 6 % (ref 4–12)
NEUTS SEG # BLD: 9 K/UL (ref 1.7–8.2)
NEUTS SEG NFR BLD: 83 % (ref 43–78)
NRBC # BLD: 0 K/UL (ref 0–0.2)
PLATELET # BLD AUTO: 318 K/UL (ref 150–450)
PMV BLD AUTO: 9.7 FL (ref 9.4–12.3)
POTASSIUM SERPL-SCNC: 5.3 MMOL/L (ref 3.5–5.1)
PROT SERPL-MCNC: 9.7 G/DL (ref 6.3–8.2)
RBC # BLD AUTO: 4.48 M/UL (ref 4.05–5.2)
SERVICE CMNT-IMP: ABNORMAL
SODIUM SERPL-SCNC: 135 MMOL/L (ref 136–145)
WBC # BLD AUTO: 10.8 K/UL (ref 4.3–11.1)

## 2021-08-17 PROCEDURE — 99222 1ST HOSP IP/OBS MODERATE 55: CPT | Performed by: SURGERY

## 2021-08-17 PROCEDURE — 74011250636 HC RX REV CODE- 250/636: Performed by: EMERGENCY MEDICINE

## 2021-08-17 PROCEDURE — 74011250636 HC RX REV CODE- 250/636: Performed by: STUDENT IN AN ORGANIZED HEALTH CARE EDUCATION/TRAINING PROGRAM

## 2021-08-17 PROCEDURE — 74011250637 HC RX REV CODE- 250/637: Performed by: EMERGENCY MEDICINE

## 2021-08-17 PROCEDURE — 96374 THER/PROPH/DIAG INJ IV PUSH: CPT

## 2021-08-17 PROCEDURE — 74176 CT ABD & PELVIS W/O CONTRAST: CPT

## 2021-08-17 PROCEDURE — 86140 C-REACTIVE PROTEIN: CPT

## 2021-08-17 PROCEDURE — 74011000636 HC RX REV CODE- 636: Performed by: EMERGENCY MEDICINE

## 2021-08-17 PROCEDURE — 82962 GLUCOSE BLOOD TEST: CPT

## 2021-08-17 PROCEDURE — 96361 HYDRATE IV INFUSION ADD-ON: CPT

## 2021-08-17 PROCEDURE — 74011000250 HC RX REV CODE- 250: Performed by: STUDENT IN AN ORGANIZED HEALTH CARE EDUCATION/TRAINING PROGRAM

## 2021-08-17 PROCEDURE — 83690 ASSAY OF LIPASE: CPT

## 2021-08-17 PROCEDURE — 65270000029 HC RM PRIVATE

## 2021-08-17 PROCEDURE — 80053 COMPREHEN METABOLIC PANEL: CPT

## 2021-08-17 PROCEDURE — 76705 ECHO EXAM OF ABDOMEN: CPT

## 2021-08-17 PROCEDURE — 85025 COMPLETE CBC W/AUTO DIFF WBC: CPT

## 2021-08-17 PROCEDURE — 99285 EMERGENCY DEPT VISIT HI MDM: CPT

## 2021-08-17 RX ORDER — HEPARIN SODIUM 5000 [USP'U]/ML
5000 INJECTION, SOLUTION INTRAVENOUS; SUBCUTANEOUS EVERY 8 HOURS
Status: DISCONTINUED | OUTPATIENT
Start: 2021-08-17 | End: 2021-08-20 | Stop reason: HOSPADM

## 2021-08-17 RX ORDER — DEXTROSE 40 %
15 GEL (GRAM) ORAL AS NEEDED
Status: DISCONTINUED | OUTPATIENT
Start: 2021-08-17 | End: 2021-08-20 | Stop reason: HOSPADM

## 2021-08-17 RX ORDER — ACETAMINOPHEN 650 MG/1
650 SUPPOSITORY RECTAL
Status: DISCONTINUED | OUTPATIENT
Start: 2021-08-17 | End: 2021-08-20 | Stop reason: HOSPADM

## 2021-08-17 RX ORDER — ONDANSETRON 4 MG/1
4 TABLET, ORALLY DISINTEGRATING ORAL
Status: DISCONTINUED | OUTPATIENT
Start: 2021-08-17 | End: 2021-08-20 | Stop reason: HOSPADM

## 2021-08-17 RX ORDER — MORPHINE SULFATE 4 MG/ML
4 INJECTION INTRAVENOUS
Status: COMPLETED | OUTPATIENT
Start: 2021-08-17 | End: 2021-08-17

## 2021-08-17 RX ORDER — SODIUM CHLORIDE 0.9 % (FLUSH) 0.9 %
5-40 SYRINGE (ML) INJECTION AS NEEDED
Status: DISCONTINUED | OUTPATIENT
Start: 2021-08-17 | End: 2021-08-20 | Stop reason: HOSPADM

## 2021-08-17 RX ORDER — SODIUM CHLORIDE, SODIUM LACTATE, POTASSIUM CHLORIDE, CALCIUM CHLORIDE 600; 310; 30; 20 MG/100ML; MG/100ML; MG/100ML; MG/100ML
150 INJECTION, SOLUTION INTRAVENOUS CONTINUOUS
Status: DISCONTINUED | OUTPATIENT
Start: 2021-08-17 | End: 2021-08-17

## 2021-08-17 RX ORDER — POLYETHYLENE GLYCOL 3350 17 G/17G
17 POWDER, FOR SOLUTION ORAL DAILY PRN
Status: DISCONTINUED | OUTPATIENT
Start: 2021-08-17 | End: 2021-08-20 | Stop reason: HOSPADM

## 2021-08-17 RX ORDER — DEXTROSE 50 % IN WATER (D50W) INTRAVENOUS SYRINGE
25-50 AS NEEDED
Status: DISCONTINUED | OUTPATIENT
Start: 2021-08-17 | End: 2021-08-20 | Stop reason: HOSPADM

## 2021-08-17 RX ORDER — SODIUM CHLORIDE 0.9 % (FLUSH) 0.9 %
5-10 SYRINGE (ML) INJECTION AS NEEDED
Status: DISCONTINUED | OUTPATIENT
Start: 2021-08-17 | End: 2021-08-20 | Stop reason: HOSPADM

## 2021-08-17 RX ORDER — MAG HYDROX/ALUMINUM HYD/SIMETH 200-200-20
15 SUSPENSION, ORAL (FINAL DOSE FORM) ORAL
Status: DISCONTINUED | OUTPATIENT
Start: 2021-08-17 | End: 2021-08-20 | Stop reason: HOSPADM

## 2021-08-17 RX ORDER — FACIAL-BODY WIPES
10 EACH TOPICAL DAILY PRN
Status: DISCONTINUED | OUTPATIENT
Start: 2021-08-17 | End: 2021-08-20 | Stop reason: HOSPADM

## 2021-08-17 RX ORDER — LABETALOL HYDROCHLORIDE 5 MG/ML
10 INJECTION, SOLUTION INTRAVENOUS
Status: DISCONTINUED | OUTPATIENT
Start: 2021-08-17 | End: 2021-08-20 | Stop reason: HOSPADM

## 2021-08-17 RX ORDER — FAMOTIDINE 20 MG/1
20 TABLET, FILM COATED ORAL
Status: DISCONTINUED | OUTPATIENT
Start: 2021-08-17 | End: 2021-08-20 | Stop reason: HOSPADM

## 2021-08-17 RX ORDER — MORPHINE SULFATE 2 MG/ML
1 INJECTION, SOLUTION INTRAMUSCULAR; INTRAVENOUS
Status: DISCONTINUED | OUTPATIENT
Start: 2021-08-17 | End: 2021-08-19 | Stop reason: SDUPTHER

## 2021-08-17 RX ORDER — SODIUM CHLORIDE, SODIUM LACTATE, POTASSIUM CHLORIDE, CALCIUM CHLORIDE 600; 310; 30; 20 MG/100ML; MG/100ML; MG/100ML; MG/100ML
75 INJECTION, SOLUTION INTRAVENOUS CONTINUOUS
Status: DISCONTINUED | OUTPATIENT
Start: 2021-08-17 | End: 2021-08-18

## 2021-08-17 RX ORDER — SODIUM CHLORIDE 0.9 % (FLUSH) 0.9 %
5-40 SYRINGE (ML) INJECTION AS NEEDED
Status: DISCONTINUED | OUTPATIENT
Start: 2021-08-17 | End: 2021-08-18

## 2021-08-17 RX ORDER — ACETAMINOPHEN 325 MG/1
650 TABLET ORAL
Status: DISCONTINUED | OUTPATIENT
Start: 2021-08-17 | End: 2021-08-20 | Stop reason: HOSPADM

## 2021-08-17 RX ORDER — SODIUM CHLORIDE 0.9 % (FLUSH) 0.9 %
5-40 SYRINGE (ML) INJECTION EVERY 8 HOURS
Status: DISCONTINUED | OUTPATIENT
Start: 2021-08-17 | End: 2021-08-20 | Stop reason: HOSPADM

## 2021-08-17 RX ORDER — INSULIN LISPRO 100 [IU]/ML
INJECTION, SOLUTION INTRAVENOUS; SUBCUTANEOUS
Status: DISCONTINUED | OUTPATIENT
Start: 2021-08-17 | End: 2021-08-20 | Stop reason: HOSPADM

## 2021-08-17 RX ORDER — ONDANSETRON 2 MG/ML
4 INJECTION INTRAMUSCULAR; INTRAVENOUS ONCE
Status: DISCONTINUED | OUTPATIENT
Start: 2021-08-17 | End: 2021-08-17 | Stop reason: ALTCHOICE

## 2021-08-17 RX ORDER — SODIUM CHLORIDE 0.9 % (FLUSH) 0.9 %
5-40 SYRINGE (ML) INJECTION EVERY 8 HOURS
Status: DISCONTINUED | OUTPATIENT
Start: 2021-08-17 | End: 2021-08-18

## 2021-08-17 RX ORDER — SODIUM CHLORIDE 0.9 % (FLUSH) 0.9 %
5-10 SYRINGE (ML) INJECTION EVERY 8 HOURS
Status: DISCONTINUED | OUTPATIENT
Start: 2021-08-17 | End: 2021-08-20 | Stop reason: HOSPADM

## 2021-08-17 RX ORDER — ONDANSETRON 2 MG/ML
4 INJECTION INTRAMUSCULAR; INTRAVENOUS
Status: DISCONTINUED | OUTPATIENT
Start: 2021-08-17 | End: 2021-08-20 | Stop reason: HOSPADM

## 2021-08-17 RX ORDER — HYOSCYAMINE SULFATE 0.12 MG/1
0.12 TABLET SUBLINGUAL
Status: COMPLETED | OUTPATIENT
Start: 2021-08-17 | End: 2021-08-17

## 2021-08-17 RX ADMIN — DIATRIZOATE MEGLUMINE AND DIATRIZOATE SODIUM 15 ML: 660; 100 LIQUID ORAL; RECTAL at 12:21

## 2021-08-17 RX ADMIN — ONDANSETRON 4 MG: 2 INJECTION INTRAMUSCULAR; INTRAVENOUS at 16:32

## 2021-08-17 RX ADMIN — MORPHINE SULFATE 4 MG: 4 INJECTION INTRAVENOUS at 12:21

## 2021-08-17 RX ADMIN — HYOSCYAMINE SULFATE 0.12 MG: 0.12 TABLET ORAL; SUBLINGUAL at 12:21

## 2021-08-17 RX ADMIN — MORPHINE SULFATE 1 MG: 2 INJECTION, SOLUTION INTRAMUSCULAR; INTRAVENOUS at 18:35

## 2021-08-17 RX ADMIN — LABETALOL HYDROCHLORIDE 10 MG: 5 INJECTION INTRAVENOUS at 18:36

## 2021-08-17 RX ADMIN — SODIUM CHLORIDE, SODIUM LACTATE, POTASSIUM CHLORIDE, AND CALCIUM CHLORIDE 150 ML/HR: 600; 310; 30; 20 INJECTION, SOLUTION INTRAVENOUS at 13:26

## 2021-08-17 RX ADMIN — HEPARIN SODIUM 5000 UNITS: 5000 INJECTION INTRAVENOUS; SUBCUTANEOUS at 21:40

## 2021-08-17 NOTE — ED TRIAGE NOTES
Patient presents via [facility] from home with complaints of abdominal pain. Patient in addition with complaints of nausea/vomiting. Last BM was 2 days ago. 8mg zofran and 800ml of fluid.  98.6  Denies any previous abdominal surgeries.

## 2021-08-17 NOTE — CONSULTS
H&P/Consult Note/Progress Note/Office Note:   Chrystal Nicholas  MRN: [id number]  :1950  Age:71 y.o.    HPI: Chrystal Nicholas is a 66 y.o. female who we are asked by Dr. Mimi Quevedo to see for SBO. The patient has a PMHx of CAD with stents, liver dx, DM, CKD3. She presented to the ER on 2021 with complaints of abdominal pain, constipation, nausea and vomiting that began on 8/15/21. She denies fever, chills or diarrhea. ER workup showed CTAP with SBO and transition point in the RLQ. She has a surgical hx of hysterectomy. No prior SBO. States her last BM was  but reports 2 weeks of constipation. She described her last BM as \"hard balls\". Not currently passing flatus. Past Medical History:   Diagnosis Date    Acute on chronic combined systolic and diastolic congestive heart failure ([facility] 2021    Acute renal failure superimposed on stage 3b chronic kidney disease ([facility] 2021    CAD (coronary artery disease) 3/1/2012    MI, 3 stents    Chest pain     Coronary artery disease involving native coronary artery without angina pectoris 2015    COVID-19 virus infection 2021    Depression 3/1/2012    Diabetes mellitus ([facility] 3/1/2012    oral agents. . hypo- 80's, Last A1c 6.9 in 2018    DM2 (diabetes mellitus, type 2) ([facility] 2015    Elevated LFTs 2021    Elevated troponin 3/2/2012    Essential hypertension 2015    External hemorrhoids without mention of complication 4337    GERD (gastroesophageal reflux disease)     HCAP (healthcare-associated pneumonia) 2021    History of MI (myocardial infarction) 11/12    x2    Hypercholesterolemia     Hypertension 3/1/2012    Hypoalbuminemia 2021    Hyponatremia 2021    Hypoxemia 2020    Insomnia     Lactic acidosis 2021    Long QT interval 2021    Personal history of colonic polyps     hyperplastic    Platelet inhibition due to Plavix     holding for colonoscopy per GI Dr. Norah Hightower Pleural effusion, bilateral 8/11/2020    Rectocele 2013    Sepsis ([facility] 1/12/2021    Tobacco abuse 3/1/2012    quit for 1 year    Tobacco use disorder     Unstable angina ([facility] 3/1/2012    ntg as needed. Past Surgical History:   Procedure Laterality Date    HX CAROTID ENDARTERECTOMY Left 12/12/2018    HX CATARACT REMOVAL Left 09/19/2016    Dr Fani Kerr, [address] [place] [address] Right 11/07/2016    Dr Fani Kerr, [facility]    [facility]      neck w/plate    HX COLONOSCOPY  12/17/13    Anna Marie--single transverse hyperplastic polyp--7-10 year recall    HX HEMORRHOIDECTOMY      x2    HX ORTHOPAEDIC      left elbow    HX CAL AND BSO      HX WISDOM TEETH EXTRACTION      AK LEFT HEART CATH,PERCUTANEOUS  last stented 11/12 x 2    stent x3 , mi x 2     Current Facility-Administered Medications   Medication Dose Route Frequency    sodium chloride (NS) flush 5-10 mL  5-10 mL IntraVENous Q8H    sodium chloride (NS) flush 5-10 mL  5-10 mL IntraVENous PRN    lactated Ringers infusion  150 mL/hr IntraVENous CONTINUOUS     Current Outpatient Medications   Medication Sig    rosuvastatin (CRESTOR) 40 mg tablet Take 1 Tablet by mouth nightly.  citalopram (CELEXA) 20 mg tablet TAKE 1 TABLET BY MOUTH IN  THE MORNING    traZODone (DESYREL) 50 mg tablet TAKE 1 TABLET BY MOUTH AT  NIGHT    Insulin Needles, Disposable, (BD Ligia 2nd Gen Pen Needle) 32 gauge x 5/32\" ndle Test blood sugar three times today.  glucose blood VI test strips (OneTouch Verio test strips) strip 1 Each by Does Not Apply route two (2) times daily as needed for Other (diabetes monitoring) for up to 180 days. Check blood sugar twice a day and as needed via finger stick.  lancets (One Touch Delica) 33 gauge misc 1 Lancet by IntraDERMal route two (2) times daily as needed for Other (diabetes monitoring). Finger stick    NIFEdipine ER (PROCARDIA XL) 90 mg ER tablet Take 1 Tablet by mouth daily.     insulin lispro (HUMALOG) 100 unit/mL kwikpen Per sliding scale  Indications: type 2 diabetes mellitus    ferrous sulfate (IRON) 325 mg (65 mg iron) EC tablet Take 1 Tablet by mouth two (2) times a day.  insulin degludec Jenna Maury FlexTouch U-100) 100 unit/mL (3 mL) inpn 12 Units by abdominal subcutaneous route nightly. Adjust 1 unit increase every 3 days goal am fasting blood sugar  as tolerated    furosemide (Lasix) 40 mg tablet Take 1 Tab by mouth daily.  spironolactone (ALDACTONE) 25 mg tablet Take 1 Tab by mouth daily.  isosorbide mononitrate ER (IMDUR) 60 mg CR tablet Take 1 Tab by mouth daily.  carvediloL (COREG) 3.125 mg tablet Take 1 Tab by mouth two (2) times daily (with meals).  ascorbic acid, vitamin C, (VITAMIN C) 500 mg tablet Take 1 Tab by mouth daily.  nitroglycerin (NITROSTAT) 0.4 mg SL tablet 1 Tab by SubLINGual route every five (5) minutes as needed for Chest Pain.  magnesium oxide (MAG-OX) 400 mg tablet TAKE 1 TABLET BY MOUTH TWICE DAILY (Patient taking differently: Take 400 mg by mouth daily.)    cholecalciferol, vitamin D3, (VITAMIN D3) 2,000 unit tab Take 2,000 Units by mouth daily.  cyanocobalamin (VITAMIN B-12) 1,000 mcg tablet Take 2,500 mcg by mouth daily.  aspirin 81 mg chewable tablet Take 81 mg by mouth every morning.  Indications: continue per anesthesia guidelines     Cephalosporins, Sulfamethoxazole-trimethoprim, Codeine, and Lisinopril  Social History     Socioeconomic History    Marital status:      Spouse name: Not on file    Number of children: Not on file    Years of education: Not on file    Highest education level: Not on file   Tobacco Use    Smoking status: Former Smoker     Packs/day: 1.00     Years: 40.00     Pack years: 40.00     Quit date: 2012     Years since quittin.7    Smokeless tobacco: Never Used    Tobacco comment: quit  . has stopped prior also   Vaping Use    Vaping Use: Never used   Substance and Sexual Activity    Alcohol use: No    Drug use: No     Social Determinants of Health     Financial Resource Strain:     Difficulty of Paying Living Expenses:    Food Insecurity:     Worried About Running Out of Food in the Last Year:     [address] in the Last Year:    Transportation Needs:     Lack of Transportation (Medical):  Lack of Transportation (Non-Medical):    Physical Activity:     Days of Exercise per Week:     Minutes of Exercise per Session:    Stress:     Feeling of Stress :    Social Connections:     Frequency of Communication with Friends and Family:     Frequency of Social Gatherings with Friends and Family:     Attends Taoism Services:     Active Member of Clubs or Organizations:     Attends Club or Organization Meetings:     Marital Status:      Social History     Tobacco Use   Smoking Status Former Smoker    Packs/day: 1.00    Years: 40.00    Pack years: 40.00    Quit date: 2012    Years since quittin.7   Smokeless Tobacco Never Used   Tobacco Comment    quit  . has stopped prior also     Family History   Problem Relation Age of Onset    Heart Disease Mother     Osteoporosis Mother     Heart Attack Mother 67        mi    Thyroid Disease Mother         Multinodular Goiter    Heart Disease Father     Cancer Father         pancreatic    Heart Attack Father 67        MI    Cancer Sister         Pre-Cancerous dysplasia    Thyroid Cancer Sister     Ulcerative Colitis Brother     Thyroid Cancer Brother     Breast Cancer Neg Hx      ROS: The patient has no difficulty with chest pain or shortness of breath. No fever or chills. Comprehensive review of systems was otherwise unremarkable except as noted above.     Physical Exam:   Visit Vitals  BP (!) 167/74   Pulse 83   Temp 97.6 °F (36.4 °C)   Resp 16   Wt 152 lb (68.9 kg)   SpO2 96%   BMI 26.09 kg/m²     Constitutional: Alert, oriented, cooperative patient in no acute distress; appears stated age Eyes:Sclera are clear. EOMs intact  ENMT: no external lesions gross hearing normal; no obvious neck masses, no ear or lip lesions, nares normal  CV: RRR. Normal perfusion  Resp: No JVD. Breathing is  non-labored; no audible wheezing. GI: soft, nondistended, diffuse tenderness without rebound. Musculoskeletal: unremarkable with normal function. No embolic signs or cyanosis. Neuro:  Oriented; moves all 4; no focal deficits  Psychiatric: normal affect and mood, no memory impairment    Recent vitals (if inpt):  Patient Vitals for the past 24 hrs:   BP Temp Pulse Resp SpO2 Weight   08/17/21 1405 (!) 167/74 -- -- -- 96 % --   08/17/21 1336 (!) 162/70 -- -- -- 95 % --   08/17/21 1321 (!) 167/79 -- -- -- 96 % --   08/17/21 1305 (!) 169/79 -- -- -- 94 % --   08/17/21 1236 (!) 172/79 -- -- -- 97 % --   08/17/21 1221 (!) 172/79 -- -- -- 99 % --   08/17/21 1152 -- -- -- -- -- 152 lb (68.9 kg)   08/17/21 1151 (!) 191/78 97.6 °F (36.4 °C) 83 16 100 % --       Labs:  Recent Labs     08/17/21  1203   WBC 10.8   HGB 14.2      *   K 5.3*      CO2 19*   BUN 59*   CREA 2.74*   *   TBILI 0.7   *   *   LPSE 184       Lab Results   Component Value Date/Time    WBC 10.8 08/17/2021 12:03 PM    HGB 14.2 08/17/2021 12:03 PM    PLATELET 457 43/94/1702 12:03 PM    Sodium 135 (L) 08/17/2021 12:03 PM    Potassium 5.3 (H) 08/17/2021 12:03 PM    Chloride 105 08/17/2021 12:03 PM    CO2 19 (L) 08/17/2021 12:03 PM    BUN 59 (H) 08/17/2021 12:03 PM    Creatinine 2.74 (H) 08/17/2021 12:03 PM    Glucose 147 (H) 08/17/2021 12:03 PM    INR 1.1 01/13/2021 06:21 AM    Bilirubin, total 0.7 08/17/2021 12:03 PM    Bilirubin, direct 0.13 08/02/2021 02:31 PM    ALT (SGPT) 119 (H) 08/17/2021 12:03 PM    Alk.  phosphatase 162 (H) 08/17/2021 12:03 PM    Amylase 45 11/05/2013 04:27 PM    Lipase 184 08/17/2021 12:03 PM    Troponin-I, Qt. <0.02 (L) 02/05/2017 09:08 PM       I reviewed recent labs and recent radiologic studies. CT Results (most recent):  Results from Hospital Encounter encounter on 08/17/21    CT ABD PELV WO CONT    Narrative  EXAMINATION: CT ABD PELV WO CONT 8/17/2021 2:24 PM    INDICATION:  Epigastric and right upper quadrant pain    COMPARISON: CT abdomen and pelvis 2/17/2021    TECHNIQUE: Contiguous axial computed tomographic images were obtained from the  domes of the diaphragm to the symphysis pubis without intravenous contrast.  Coronal reconstructions were also performed. Oral contrast was also  administered. Please note that the detection of solid organ and vascular abnormalities is  limited in the absence of intravenous contrast.  Radiation dose reduction  techniques were used for this study. Our CT scanners use one or all of the  following: Automated exposure control, adjustment of the mA and/or kV according  to patient size, iterative reconstruction. FINDINGS:    Lung bases: Clear    Heptobiliary: No hepatic lesion. Normal gallbladder. No biliary dilation. Spleen, adrenal, pancreas: Normal spleen. No pancreatic ductal dilation. No  adrenal mass. Kidneys/urinary: No hydronephrosis or nephrolithiasis. Normal bladder. Reproductive: Prior hysterectomy. No adnexal mass. Bowel: Distended small bowel loops with a transition point in the right lower  abdomen (coronal #45) contents medially proximal to the transition show  pneumatized contents. No pneumatosis or portal venous gas. Peritoneum: No free air. Trace perihepatic fluid. And fluid in the mesentery    Vessels/lymph nodes: No AAA. No adenopathy. Abdominal wall: No bowel-containing hernia. Bones: No suspicious osseous lesion. Impression  Small bowel obstruction with transition point in the right lower abdomen. No  pneumatosis or portal venous gas.     XR Results (most recent):  Results from Hospital Encounter encounter on 02/15/21    XR CHEST PORT    Narrative  EXAMINATION: CHEST RADIOGRAPH 2/15/2021 10:22 AM    ACCESSION NUMBER: [id number]    COMPARISON: 02/09/2021    INDICATION: Chest pain with shortness of breath    TECHNIQUE: A single view of the chest was obtained. FINDINGS:    Support Devices: None    Lungs: There is persistent pulmonary vascular indistinctness with small pleural  effusions. This is similar to the prior exam.    Cardiac Silhouette: Within normal limits in size. Mediastinum: Calcifications are seen in the aorta. Upper Abdomen: Normal    Miscellaneous: There is previous fusion in the lower cervical spine. This is  partially visualized. Impression  1. Persistent pulmonary edema    2. Atherosclerosis      I independently reviewed radiology images for studies I described above or studies I have ordered.    Admission date (for inpatients): 8/17/2021   * No surgery found *  * No surgery found *    ASSESSMENT/PLAN:  Problem List  Date Reviewed: 8/2/2021        Codes Class Noted    Elevated ALT measurement ICD-10-CM: R74.01  ICD-9-CM: 790.4  7/7/2021        Polyarthritis ICD-10-CM: M13.0  ICD-9-CM: 716.50  7/7/2021        Chronic diastolic congestive heart failure (HCC) ICD-10-CM: I50.32  ICD-9-CM: 428.32, 428.0  4/19/2021    Overview Signed 7/7/2021 11:06 AM by Janna Will NP     Heart Failure (HFpEF), EF 43-79%, with diastolic dysfunction, ECHO 2/16/2021               Congestive heart failure (CHF) ([facility] ICD-10-CM: I50.9  ICD-9-CM: 428.0  2/15/2021        Normocytic anemia ICD-10-CM: D64.9  ICD-9-CM: 285.9  1/12/2021        Mixed hyperlipidemia ICD-10-CM: E09.9  ICD-9-CM: 272.2  10/21/2020        S/P PTCA (percutaneous transluminal coronary angioplasty) ICD-10-CM: Z98.61  ICD-9-CM: V45.82  9/1/2020        Other cirrhosis of liver ([facility] ICD-10-CM: U16.84  ICD-9-CM: 571.5  8/11/2020        History of left-sided carotid endarterectomy ICD-10-CM: I60.423  ICD-9-CM: V45.89  3/12/2020        Carotid stenosis, asymptomatic, right ICD-10-CM: A78.41  ICD-9-CM: 433.10  3/12/2020        Carotid artery stenosis ICD-10-CM: I65.29  ICD-9-CM: 433.10  12/12/2018        Right carotid bruit ICD-10-CM: R09.89  ICD-9-CM: 785.9  11/6/2018        Type 2 diabetes mellitus with stage 3b chronic kidney disease, with long-term current use of insulin (HCC) ICD-10-CM: E11.22, N18.32, Z79.4  ICD-9-CM: 250.40, 585.3, V58.67  10/11/2018        Gastroesophageal reflux disease ICD-10-CM: K21.9  ICD-9-CM: 530.81  2/16/2017        Essential hypertension (Chronic) ICD-10-CM: I10  ICD-9-CM: 401.9  9/15/2016        Stage 3b chronic kidney disease ([facility] ICD-10-CM: X81.67  ICD-9-CM: 585.3  9/15/2016        Age-related osteoporosis without current pathological fracture ICD-10-CM: M81.0  ICD-9-CM: 733.01  9/15/2016        Major depressive disorder with single episode, in full remission [facility]) ICD-10-CM: F32.5  ICD-9-CM: 296.26  9/15/2016        Primary insomnia ICD-10-CM: F51.01  ICD-9-CM: 307.42  9/15/2016        Coronary artery disease involving native coronary artery of native heart without angina pectoris ICD-10-CM: I25.10  ICD-9-CM: 414.01  9/4/2013            Active Problems:    * No active hospital problems. *       Care management per primary team  NPO  IVFs  NGT to [facility]WS if continues to vomit  Monitor labs, replace lytes PRN  Serial exams  Follow up KUB  Hopeful for conservative management  Will continue to monitor     Sudden start abdominal pain with nausea vomiting, concerning for SBO with CT evidence. This is complicated with numerous medical issues as above. Hope to avoid surgery. Will follow closely.      Marsha Meza MD

## 2021-08-17 NOTE — ED NOTES
Pt back from CT. Was made aware that her IV infiltrated while being in the holding bay. Pt back to ER at this time. Swelling around site.  IV taken out

## 2021-08-17 NOTE — ED PROVIDER NOTES
Patient presents to the emergency department by EMS from home with a complaint of epigastric and right upper quadrant abdominal pain as well as nausea and vomiting. Patient describes the pain is sharp and stabbing in nature and nonradiating with no provoking or palliating factors. Onset was last night and progressively worsening. The pain is colicky in nature as well. Vomiting began this morning with no fever or chills and no diarrhea. Patient reports a history of \"a blocked blood vessel in her liver\", for which she is being followed by gastroenterology. She denies anticoagulant use. The history is provided by the patient and medical records. Abdominal Pain   This is a new problem. The current episode started 12 to 24 hours ago. The problem occurs constantly. The problem has been gradually worsening. The pain is associated with vomiting. The pain is located in the epigastric region and RUQ. The quality of the pain is sharp. The pain is severe. Associated symptoms include nausea and vomiting. Pertinent negatives include no anorexia, no fever, no belching, no diarrhea, no flatus, no hematochezia, no melena, no constipation, no dysuria, no hematuria, no headaches, no arthralgias, no myalgias, no trauma, no chest pain and no back pain. Nothing worsens the pain. The pain is relieved by nothing. Past medical history comments: History of colitis as well as cirrhosis. The patient's surgical history includes hysterectomy.        Past Medical History:   Diagnosis Date    Acute on chronic combined systolic and diastolic congestive heart failure ([address] 2/17/2021    Acute renal failure superimposed on stage 3b chronic kidney disease ([address] 1/12/2021    CAD (coronary artery disease) 3/1/2012    MI, 3 stents    Chest pain     Coronary artery disease involving native coronary artery without angina pectoris 5/21/2015    COVID-19 virus infection 1/12/2021    Depression 3/1/2012    Diabetes mellitus ([address] 3/1/2012    oral agents. . hypo- 80's, Last A1c 6.9 in 6/2018    DM2 (diabetes mellitus, type 2) ([facility] 5/21/2015    Elevated LFTs 1/12/2021    Elevated troponin 3/2/2012    Essential hypertension 5/21/2015    External hemorrhoids without mention of complication 2131    GERD (gastroesophageal reflux disease)     HCAP (healthcare-associated pneumonia) 1/18/2021    History of MI (myocardial infarction) 11/12    x2    Hypercholesterolemia     Hypertension 3/1/2012    Hypoalbuminemia 2/18/2021    Hyponatremia 1/12/2021    Hypoxemia 8/12/2020    Insomnia     Lactic acidosis 1/12/2021    Long QT interval 1/12/2021    Personal history of colonic polyps 2013    hyperplastic    Platelet inhibition due to Plavix     holding for colonoscopy per GI Dr. Allison Lima Pleural effusion, bilateral 8/11/2020    Rectocele 2013    Sepsis ([facility] 1/12/2021    Tobacco abuse 3/1/2012    quit for 1 year    Tobacco use disorder     Unstable angina ([facility] 3/1/2012    ntg as needed.         Past Surgical History:   Procedure Laterality Date    HX CAROTID ENDARTERECTOMY Left 12/12/2018    HX CATARACT REMOVAL Left 09/19/2016    Dr Aaron Mcnair, [id number] [address] Right 11/07/2016    Dr Aaron Mcnair, [facility]    HX CERVICAL FUSION      neck w/plate    HX COLONOSCOPY  12/17/13    Anna Marie--single transverse hyperplastic polyp--7-10 year recall    HX HEMORRHOIDECTOMY      x2    HX ORTHOPAEDIC      left elbow    HX CAL AND BSO      HX WISDOM TEETH EXTRACTION      IN LEFT HEART CATH,PERCUTANEOUS  last stented 11/12 x 2    stent x3 , mi x 2         Family History:   Problem Relation Age of Onset    Heart Disease Mother     Osteoporosis Mother     Heart Attack Mother 67        mi    Thyroid Disease Mother         Multinodular Goiter    Heart Disease Father     Cancer Father         pancreatic    Heart Attack Father 67        MI    Cancer Sister         Pre-Cancerous dysplasia    Thyroid Cancer Sister     Ulcerative Colitis Brother     Thyroid Cancer Brother     Breast Cancer Neg Hx        Social History     Socioeconomic History    Marital status:      Spouse name: Not on file    Number of children: Not on file    Years of education: Not on file    Highest education level: Not on file   Occupational History    Not on file   Tobacco Use    Smoking status: Former Smoker     Packs/day: 1.00     Years: 40.00     Pack years: 40.00     Quit date: 2012     Years since quittin.7    Smokeless tobacco: Never Used    Tobacco comment: quit  . has stopped prior also   Vaping Use    Vaping Use: Never used   Substance and Sexual Activity    Alcohol use: No    Drug use: No    Sexual activity: Not on file   Other Topics Concern    Not on file   Social History Narrative    Not on file     Social Determinants of Health     Financial Resource Strain:     Difficulty of Paying Living Expenses:    Food Insecurity:     Worried About Running Out of Food in the Last Year:     [address] in the Last Year:    Transportation Needs:     Lack of Transportation (Medical):  Lack of Transportation (Non-Medical):    Physical Activity:     Days of Exercise per Week:     Minutes of Exercise per Session:    Stress:     Feeling of Stress :    Social Connections:     Frequency of Communication with Friends and Family:     Frequency of Social Gatherings with Friends and Family:     Attends [employer] Services:     Active Member of Clubs or Organizations:     Attends Club or Organization Meetings:     Marital Status:    Intimate Partner Violence:     Fear of Current or Ex-Partner:     Emotionally Abused:     Physically Abused:     Sexually Abused: ALLERGIES: Cephalosporins, Sulfamethoxazole-trimethoprim, Codeine, and Lisinopril    Review of Systems   Constitutional: Negative for chills and fever. Cardiovascular: Negative for chest pain. Gastrointestinal: Positive for abdominal pain, nausea and vomiting. Negative for anorexia, constipation, diarrhea, flatus, hematochezia and melena. Genitourinary: Negative for dysuria and hematuria. Musculoskeletal: Negative for arthralgias, back pain and myalgias. Neurological: Negative for headaches. All other systems reviewed and are negative. Vitals:    08/17/21 1151 08/17/21 1152   BP: (!) 191/78    Pulse: 83    Resp: 16    Temp: 97.6 °F (36.4 °C)    SpO2: 100%    Weight:  68.9 kg (152 lb)            Physical Exam  Constitutional:       General: She is not in acute distress. Appearance: Normal appearance. She is not ill-appearing, toxic-appearing or diaphoretic. HENT:      Head: Normocephalic and atraumatic. Nose: Nose normal.      Mouth/Throat:      Mouth: Mucous membranes are moist.      Pharynx: Oropharynx is clear. Eyes:      Extraocular Movements: Extraocular movements intact. Conjunctiva/sclera: Conjunctivae normal.      Pupils: Pupils are equal, round, and reactive to light. Cardiovascular:      Rate and Rhythm: Normal rate and regular rhythm. Pulses: Normal pulses. Heart sounds: Normal heart sounds. Pulmonary:      Effort: Pulmonary effort is normal.      Breath sounds: Normal breath sounds. Abdominal:      General: There is no distension. Palpations: Abdomen is soft. Tenderness: There is abdominal tenderness. There is guarding. There is no right CVA tenderness, left CVA tenderness or rebound. Comments: Bowel sounds absent   Musculoskeletal:      Cervical back: Normal range of motion and neck supple. Skin:     General: Skin is warm and dry. Capillary Refill: Capillary refill takes less than 2 seconds. Neurological:      General: No focal deficit present. Mental Status: She is alert and oriented to person, place, and time. Mental status is at baseline. Psychiatric:         Mood and Affect: Mood normal.         Behavior: Behavior normal.         Thought Content:  Thought content normal. MDM  Number of Diagnoses or Management Options     Amount and/or Complexity of Data Reviewed  Clinical lab tests: ordered and reviewed  Tests in the radiology section of CPT®: ordered and reviewed  Review and summarize past medical records: yes  Independent visualization of images, tracings, or specimens: yes    Risk of Complications, Morbidity, and/or Mortality  Presenting problems: moderate  Diagnostic procedures: moderate  Management options: moderate    Patient Progress  Patient progress: stable         Procedures

## 2021-08-17 NOTE — H&P
Hospitalist History and Physical   Admit Date:  2021 11:49 AM   Name:  Jed Candelaria   Age:  66 y.o. Sex:  female  :  1950   MRN:  [id number]     Presenting Complaint: Abdominal Pain    Reason(s) for Admission: SBO (small bowel obstruction) ([facility] [K56.609]     History of Present Illness:   Jed Candelaria is a 66 y.o. female with medical history of CAD, HFpEF, HTN, DM2, HLD, GERD, PUD, LAURA, constipation CKD3, previous COVID infection, Depression who presented to the ED with abd pain, n/v. Patient states that she started abdominal pain mostly in her epigastric region yesterday. She has started to suffer from nausea and one episode of emesis this morning. She states it was brown in color but denies any hematemesis. She does follow with GI for recent infectious colitis and constipation that resolved with Abx and miralax. Patient reports that she suffering from repeat constipation over the last several weeks with her last bowel movement being on  which was small and hard. She tried taking a stool softener on Monday but is not had any bowel movement since then. She does not take any other scheduled stool softeners/laxatives at home recently. She denies any recent fevers, chills, dysuria, increased or decreased urination, cough, congestion, palpitations, near syncope or syncope. In the ED, patient had CT scan which showed small bowel obstruction with transition point in the RLQ. Surgery was consulted from ED. Review of Systems:  10 systems reviewed and negative except as noted in HPI.   Assessment & Plan:     Principal Problem:    SBO (small bowel obstruction) ([facility] (2021)   - seen on CT scan with transition point in RLQ  - surgery consulted from ED, not recommending NGT now   - place NGT if patient starts vomiting  - NPO  - start pain/nausea control prn   - start mIVF LR  - discussed with surgery about starting bowel regimen, will f/u their rec's  - follow KUB  - appreciate surgery recommendations. #MIRANDA on CKD  - Cr 2.74 on admission, baseline ~ 1.5  - likely due to dehydration  - start LR mIVF  - recheck BMP in AM     #DM2  - hold home long-acting insulin with NPO  - start SSI     #HTN  - hold home meds while NPO   - start labetalol IV prn     #LAURA  - hold home iron as likely causing continued constipation, now with SBO  - start bowel regimen when appropriate       Dispo/Discharge Planning: Likely 2-3 midnight hospital stay pending clinical course. Diet: DIET NPO  VTE ppx: heparin subq  Code status: Prior    Active Hospital Problems    Diagnosis Date Noted    SBO (small bowel obstruction) ([facility] 08/17/2021       Past Medical History:   Diagnosis Date    Acute on chronic combined systolic and diastolic congestive heart failure ([facility] 2/17/2021    Acute renal failure superimposed on stage 3b chronic kidney disease ([facility] 1/12/2021    CAD (coronary artery disease) 3/1/2012    MI, 3 stents    Chest pain     Coronary artery disease involving native coronary artery without angina pectoris 5/21/2015    COVID-19 virus infection 1/12/2021    Depression 3/1/2012    Diabetes mellitus ([facility] 3/1/2012    oral agents. . hypo- 80's, Last A1c 6.9 in 6/2018    DM2 (diabetes mellitus, type 2) ([facility] 5/21/2015    Elevated LFTs 1/12/2021    Elevated troponin 3/2/2012    Essential hypertension 5/21/2015    External hemorrhoids without mention of complication 6458    GERD (gastroesophageal reflux disease)     HCAP (healthcare-associated pneumonia) 1/18/2021    History of MI (myocardial infarction) 11/12    x2    Hypercholesterolemia     Hypertension 3/1/2012    Hypoalbuminemia 2/18/2021    Hyponatremia 1/12/2021    Hypoxemia 8/12/2020    Insomnia     Lactic acidosis 1/12/2021    Long QT interval 1/12/2021    Personal history of colonic polyps 2013    hyperplastic    Platelet inhibition due to Plavix     holding for colonoscopy per GI Dr. [facility] Ramiro Pleural effusion, bilateral 2020    Rectocele 2013    Sepsis ([facility] 2021    Tobacco abuse 3/1/2012    quit for 1 year    Tobacco use disorder     Unstable angina ([facility] 3/1/2012    ntg as needed. Past Surgical History:   Procedure Laterality Date    HX CAROTID ENDARTERECTOMY Left 2018    HX CATARACT REMOVAL Left 2016    Dr Carlos Manuel Yao, [id number] [address] Right 2016    Dr Carlos Manuel Yao, [facility]    HX CERVICAL FUSION      neck w/plate    HX COLONOSCOPY  13    Anna Marie--single transverse hyperplastic polyp--7-10 year recall    HX HEMORRHOIDECTOMY      x2    HX ORTHOPAEDIC      left elbow    HX CAL AND BSO      HX WISDOM TEETH EXTRACTION      GA LEFT HEART CATH,PERCUTANEOUS  last stented 11/12 x 2    stent x3 , mi x 2      Allergies   Allergen Reactions    Cephalosporins Hives and Swelling    Sulfamethoxazole-Trimethoprim Other (comments)     Diarrhea     Codeine Other (comments)    Lisinopril Other (comments)     Passing out spells. // pt sts not allergic to med       Social History     Tobacco Use    Smoking status: Former Smoker     Packs/day: 1.00     Years: 40.00     Pack years: 40.00     Quit date: 2012     Years since quittin.7    Smokeless tobacco: Never Used    Tobacco comment: quit  . has stopped prior also   Substance Use Topics    Alcohol use: No      Family History   Problem Relation Age of Onset    Heart Disease Mother     Osteoporosis Mother     Heart Attack Mother 67        mi    Thyroid Disease Mother         Multinodular Goiter    Heart Disease Father     Cancer Father         pancreatic    Heart Attack Father 67        MI    Cancer Sister         Pre-Cancerous dysplasia    Thyroid Cancer Sister     Ulcerative Colitis Brother     Thyroid Cancer Brother     Breast Cancer Neg Hx       Family history reviewed and noncontributory to patient's acute condition; no relevant family history unless otherwise noted above.   Immunization History   Administered Date(s) Administered    Covid-19, MODERNA, Mrna, Lnp-s, Pf, 100mcg/0.5mL 04/23/2021, 05/25/2021    Influenza High Dose Vaccine PF 10/04/2017, 10/11/2018    Influenza Vaccine 09/04/2013, 11/18/2014, 08/24/2015    Influenza Vaccine (Quad) PF (>6 Mo Flulaval, Fluarix, and >3 Yrs Afluria, Fluzone 55156) 12/15/2016    Influenza Vaccine (Tri) Adjuvanted (>65 Yrs FLUAD TRI 78360) 09/03/2019    Influenza, Quadrivalent, Adjuvanted (>65 Yrs FLUAD QUAD 40497) 10/21/2020    Pneumococcal Conjugate (PCV-13) 08/24/2015    Pneumococcal Polysaccharide (PPSV-23) 09/04/2013     PTA Medications:  Current Outpatient Medications   Medication Instructions    ascorbic acid (vitamin C) (VITAMIN C) 500 mg, Oral, DAILY    aspirin 81 mg, 7AM    carvediloL (COREG) 3.125 mg, Oral, 2 TIMES DAILY WITH MEALS    citalopram (CELEXA) 20 mg tablet TAKE 1 TABLET BY MOUTH IN  THE MORNING    cyanocobalamin (VITAMIN B-12) 2,500 mcg, Oral, DAILY    ferrous sulfate (IRON) 325 mg, Oral, 2 TIMES DAILY    furosemide (LASIX) 40 mg, Oral, DAILY    glucose blood VI test strips (OneTouch Verio test strips) strip 1 Each, Does Not Apply, 2 TIMES DAILY AS NEEDED, Check blood sugar twice a day and as needed via finger stick.  insulin lispro (HUMALOG) 100 unit/mL kwikpen Per sliding scale    Insulin Needles, Disposable, (BD Ligia 2nd Gen Pen Needle) 32 gauge x 5/32\" ndle Test blood sugar three times today.     isosorbide mononitrate ER (IMDUR) 60 mg, Oral, DAILY    lancets (One Touch Delica) 33 gauge misc 1 Lancet, IntraDERMal, 2 TIMES DAILY AS NEEDED, Finger stick    magnesium oxide (MAG-OX) 400 mg tablet TAKE 1 TABLET BY MOUTH TWICE DAILY    NIFEdipine ER (PROCARDIA XL) 90 mg, Oral, DAILY    nitroglycerin (NITROSTAT) 0.4 mg, SubLINGual, EVERY 5 MIN AS NEEDED    rosuvastatin (CRESTOR) 40 mg, Oral, EVERY BEDTIME    spironolactone (ALDACTONE) 25 mg, Oral, DAILY    traZODone (DESYREL) 50 mg tablet TAKE 1 TABLET BY MOUTH AT NIGHT   Sean Diaz FlexTouch U-100 12 Units, abdominal subcutaneous, EVERY BEDTIME, Adjust 1 unit increase every 3 days goal am fasting blood sugar  as tolerated    Vitamin D3 2,000 Units, Oral, DAILY       Objective:     Patient Vitals for the past 24 hrs:   Temp Pulse Resp BP SpO2   08/17/21 1405 -- -- -- (!) 167/74 96 %   08/17/21 1336 -- -- -- (!) 162/70 95 %   08/17/21 1321 -- -- -- (!) 167/79 96 %   08/17/21 1305 -- -- -- (!) 169/79 94 %   08/17/21 1236 -- -- -- (!) 172/79 97 %   08/17/21 1221 -- -- -- (!) 172/79 99 %   08/17/21 1151 97.6 °F (36.4 °C) 83 16 (!) 191/78 100 %     Oxygen Therapy  O2 Sat (%): 96 % (08/17/21 1405)  Pulse via Oximetry: 82 beats per minute (08/17/21 1405)    Estimated body mass index is 26.09 kg/m² as calculated from the following:    Height as of 8/2/21: 5' 4\" (1.626 m). Weight as of this encounter: 68.9 kg (152 lb). No intake or output data in the 24 hours ending 08/17/21 1643      Physical Exam:    General:    Well nourished. No overt distress. Elderly  Head:  Normocephalic, atraumatic  Eyes:  Sclerae appear normal.  Pupils equally round. HENT:  Nares appear normal, no drainage. Moist mucous membranes  Neck:  No restricted ROM. Trachea midline  CV:   RRR. No m/r/g. No JVD  Lungs:   CTAB. No wheezing, rhonchi, or rales. Even, unlabored  Abdomen:   Hypoactive Bowel sounds. Soft, tender epigastric, right sided abd, nondistended. Extremities: Warm and dry. No cyanosis or clubbing. No edema. Skin:     No rashes. Normal turgor. Normal coloration  Neuro:  Cranial nerves II-XII grossly intact. Moves all extremities. Sensation intact  Psych:  Normal mood and affect.   Alert and oriented x3    Data Ordered and Personally Reviewed:    Last 24hr Labs:  Recent Results (from the past 24 hour(s))   CBC WITH AUTOMATED DIFF    Collection Time: 08/17/21 12:03 PM   Result Value Ref Range    WBC 10.8 4.3 - 11.1 K/uL    RBC 4.48 4.05 - 5.2 M/uL    HGB 14.2 11.7 - 15.4 g/dL    HCT 42.4 35.8 - 46.3 %    MCV 94.6 79.6 - 97.8 FL    MCH 31.7 26.1 - 32.9 PG    MCHC 33.5 31.4 - 35.0 g/dL    RDW 13.3 11.9 - 14.6 %    PLATELET 302 584 - 125 K/uL    MPV 9.7 9.4 - 12.3 FL    ABSOLUTE NRBC 0.00 0.0 - 0.2 K/uL    DF AUTOMATED      NEUTROPHILS 83 (H) 43 - 78 %    LYMPHOCYTES 9 (L) 13 - 44 %    MONOCYTES 6 4.0 - 12.0 %    EOSINOPHILS 1 0.5 - 7.8 %    BASOPHILS 0 0.0 - 2.0 %    IMMATURE GRANULOCYTES 0 0.0 - 5.0 %    ABS. NEUTROPHILS 9.0 (H) 1.7 - 8.2 K/UL    ABS. LYMPHOCYTES 0.9 0.5 - 4.6 K/UL    ABS. MONOCYTES 0.7 0.1 - 1.3 K/UL    ABS. EOSINOPHILS 0.1 0.0 - 0.8 K/UL    ABS. BASOPHILS 0.0 0.0 - 0.2 K/UL    ABS. IMM. GRANS. 0.0 0.0 - 0.5 K/UL   METABOLIC PANEL, COMPREHENSIVE    Collection Time: 08/17/21 12:03 PM   Result Value Ref Range    Sodium 135 (L) 136 - 145 mmol/L    Potassium 5.3 (H) 3.5 - 5.1 mmol/L    Chloride 105 98 - 107 mmol/L    CO2 19 (L) 21 - 32 mmol/L    Anion gap 11 7 - 16 mmol/L    Glucose 147 (H) 65 - 100 mg/dL    BUN 59 (H) 8 - 23 MG/DL    Creatinine 2.74 (H) 0.6 - 1.0 MG/DL    GFR est AA 22 (L) >60 ml/min/1.73m2    GFR est non-AA 18 (L) >60 ml/min/1.73m2    Calcium 10.1 8.3 - 10.4 MG/DL    Bilirubin, total 0.7 0.2 - 1.1 MG/DL    ALT (SGPT) 119 (H) 12 - 65 U/L    AST (SGOT) 64 (H) 15 - 37 U/L    Alk.  phosphatase 162 (H) 50 - 136 U/L    Protein, total 9.7 (H) 6.3 - 8.2 g/dL    Albumin 4.5 3.2 - 4.6 g/dL    Globulin 5.2 (H) 2.3 - 3.5 g/dL    A-G Ratio 0.9 (L) 1.2 - 3.5     LIPASE    Collection Time: 08/17/21 12:03 PM   Result Value Ref Range    Lipase 184 73 - 393 U/L   C REACTIVE PROTEIN, QT    Collection Time: 08/17/21 12:03 PM   Result Value Ref Range    C-Reactive protein <0.3 0.0 - 0.9 mg/dL       All Micro Results     None          Other Studies:  CT ABD PELV WO CONT    Result Date: 8/17/2021  EXAMINATION: CT ABD PELV WO CONT 8/17/2021 2:24 PM INDICATION:  Epigastric and right upper quadrant pain COMPARISON: CT abdomen and pelvis 2/17/2021 TECHNIQUE: Contiguous axial computed tomographic images were obtained from the domes of the diaphragm to the symphysis pubis without intravenous contrast. Coronal reconstructions were also performed. Oral contrast was also administered. Please note that the detection of solid organ and vascular abnormalities is limited in the absence of intravenous contrast.  Radiation dose reduction techniques were used for this study. Our CT scanners use one or all of the following: Automated exposure control, adjustment of the mA and/or kV according to patient size, iterative reconstruction. FINDINGS: Lung bases: Clear Heptobiliary: No hepatic lesion. Normal gallbladder. No biliary dilation. Spleen, adrenal, pancreas: Normal spleen. No pancreatic ductal dilation. No adrenal mass. Kidneys/urinary: No hydronephrosis or nephrolithiasis. Normal bladder. Reproductive: Prior hysterectomy. No adnexal mass. Bowel: Distended small bowel loops with a transition point in the right lower abdomen (coronal #45) contents medially proximal to the transition show pneumatized contents. No pneumatosis or portal venous gas. Peritoneum: No free air. Trace perihepatic fluid. And fluid in the mesentery Vessels/lymph nodes: No AAA. No adenopathy. Abdominal wall: No bowel-containing hernia. Bones: No suspicious osseous lesion. Small bowel obstruction with transition point in the right lower abdomen. No pneumatosis or portal venous gas. [employer] ABD [employer]    Result Date: 8/17/2021  EXAMINATION: US ABD LTD 8/17/2021 2:48 PM INDICATION: Right upper quadrant and epigastric abdominal pain COMPARISON: Same day CT abdomen and pelvis TECHNIQUE: Multiple real-time sonographic images were obtained of the abdomen utilizing a multi-hertz transducer. FINDINGS: Pancreas: Unremarkable in its visualized portions. Abdominal aorta: Obscured by bowel gas Inferior vena cava: Patent Liver: Size: 12.2 cm Echogenicity: Normal Biliary system: No intrahepatic biliary ductal dilation.  Gallbladder: Biliary sludge. No wall thickening. No pericholecystic fluid. Negative sonographic Rivera's sign, per the sonographer Common bile duct: Size: 6 mm Right kidney: Size: 9.0 cm Cortex: 0.9 cm cyst in the upper pole  Collecting system: No evidence of hydronephrosis. 1.  Biliary sludge without additional abnormality. Medications Administered     diatrizoate maryann-diatrizoat sod (MD-GASTROVIEW,GASTROGRAFIN) 66-10 % contrast solution 15 mL     Admin Date  08/17/2021 Action  Given Dose  15 mL Route  Oral Administered By  Georgina Villagomez RN          hyoscyamine SL (LEVSIN/SL) tablet 0.125 mg     Admin Date  08/17/2021 Action  Given Dose  0.125 mg Route  SubLINGual Administered By  Georgina Villagomez RN          lactated Ringers infusion     Admin Date  08/17/2021 Action  New Bag Dose  150 mL/hr Rate  150 mL/hr Route  IntraVENous Administered By  Georgina Villagomez RN           Admin Date  08/17/2021 Action  Rate Change Dose  75 mL/hr Rate  75 mL/hr Route  IntraVENous Administered By  Ibrahima Heath          morphine injection 4 mg     Admin Date  08/17/2021 Action  Given Dose  4 mg Route  IntraVENous Administered By  Georgina Villagomez RN                  Signed:  Lalo Venegas MD    Part of this note may have been written by using a voice dictation software. The note has been proof read but may still contain some grammatical/other typographical errors.

## 2021-08-17 NOTE — ED NOTES
TRANSFER - OUT REPORT:    Verbal report given to receiving RN (name) on [address]  being transferred to 215 (unit) for routine progression of care       Report consisted of patients Situation, Background, Assessment and   Recommendations(SBAR). Information from the following report(s) ED Summary was reviewed with the receiving nurse. Lines:   Peripheral IV 08/17/21 Anterior;Right Forearm (Active)        Opportunity for questions and clarification was provided.       Patient transported with:   steffi

## 2021-08-18 ENCOUNTER — APPOINTMENT (OUTPATIENT)
Dept: GENERAL RADIOLOGY | Age: 71
DRG: 389 | End: 2021-08-18
Attending: NURSE PRACTITIONER
Payer: MEDICARE

## 2021-08-18 PROBLEM — N17.9 ACUTE RENAL FAILURE SUPERIMPOSED ON STAGE 3A CHRONIC KIDNEY DISEASE (HCC): Status: ACTIVE | Noted: 2021-08-18

## 2021-08-18 PROBLEM — N18.31 ACUTE RENAL FAILURE SUPERIMPOSED ON STAGE 3A CHRONIC KIDNEY DISEASE (HCC): Status: ACTIVE | Noted: 2021-08-18

## 2021-08-18 PROBLEM — N18.32 ACUTE RENAL FAILURE SUPERIMPOSED ON STAGE 3B CHRONIC KIDNEY DISEASE (HCC): Status: ACTIVE | Noted: 2021-08-18

## 2021-08-18 PROBLEM — N18.31 ACUTE RENAL FAILURE SUPERIMPOSED ON STAGE 3A CHRONIC KIDNEY DISEASE (HCC): Status: ACTIVE | Noted: 2021-01-12

## 2021-08-18 LAB
ANION GAP SERPL CALC-SCNC: 7 MMOL/L (ref 7–16)
BASOPHILS # BLD: 0 K/UL (ref 0–0.2)
BASOPHILS NFR BLD: 0 % (ref 0–2)
BUN SERPL-MCNC: 45 MG/DL (ref 8–23)
CALCIUM SERPL-MCNC: 8.9 MG/DL (ref 8.3–10.4)
CHLORIDE SERPL-SCNC: 112 MMOL/L (ref 98–107)
CO2 SERPL-SCNC: 21 MMOL/L (ref 21–32)
CREAT SERPL-MCNC: 2.05 MG/DL (ref 0.6–1)
DIFFERENTIAL METHOD BLD: ABNORMAL
EOSINOPHIL # BLD: 0.3 K/UL (ref 0–0.8)
EOSINOPHIL NFR BLD: 4 % (ref 0.5–7.8)
ERYTHROCYTE [DISTWIDTH] IN BLOOD BY AUTOMATED COUNT: 13.2 % (ref 11.9–14.6)
GLUCOSE BLD STRIP.AUTO-MCNC: 100 MG/DL (ref 65–100)
GLUCOSE BLD STRIP.AUTO-MCNC: 103 MG/DL (ref 65–100)
GLUCOSE BLD STRIP.AUTO-MCNC: 105 MG/DL (ref 65–100)
GLUCOSE BLD STRIP.AUTO-MCNC: 120 MG/DL (ref 65–100)
GLUCOSE BLD STRIP.AUTO-MCNC: 94 MG/DL (ref 65–100)
GLUCOSE SERPL-MCNC: 108 MG/DL (ref 65–100)
HCT VFR BLD AUTO: 33.9 % (ref 35.8–46.3)
HGB BLD-MCNC: 10.8 G/DL (ref 11.7–15.4)
IMM GRANULOCYTES # BLD AUTO: 0 K/UL (ref 0–0.5)
IMM GRANULOCYTES NFR BLD AUTO: 1 % (ref 0–5)
LYMPHOCYTES # BLD: 1.2 K/UL (ref 0.5–4.6)
LYMPHOCYTES NFR BLD: 18 % (ref 13–44)
MCH RBC QN AUTO: 31.2 PG (ref 26.1–32.9)
MCHC RBC AUTO-ENTMCNC: 31.9 G/DL (ref 31.4–35)
MCV RBC AUTO: 98 FL (ref 79.6–97.8)
MONOCYTES # BLD: 0.8 K/UL (ref 0.1–1.3)
MONOCYTES NFR BLD: 12 % (ref 4–12)
NEUTS SEG # BLD: 4.1 K/UL (ref 1.7–8.2)
NEUTS SEG NFR BLD: 65 % (ref 43–78)
NRBC # BLD: 0 K/UL (ref 0–0.2)
PLATELET # BLD AUTO: 225 K/UL (ref 150–450)
PMV BLD AUTO: 9.2 FL (ref 9.4–12.3)
POTASSIUM SERPL-SCNC: 4.6 MMOL/L (ref 3.5–5.1)
RBC # BLD AUTO: 3.46 M/UL (ref 4.05–5.2)
SERVICE CMNT-IMP: ABNORMAL
SERVICE CMNT-IMP: NORMAL
SERVICE CMNT-IMP: NORMAL
SODIUM SERPL-SCNC: 140 MMOL/L (ref 136–145)
WBC # BLD AUTO: 6.4 K/UL (ref 4.3–11.1)

## 2021-08-18 PROCEDURE — 74018 RADEX ABDOMEN 1 VIEW: CPT

## 2021-08-18 PROCEDURE — 82962 GLUCOSE BLOOD TEST: CPT

## 2021-08-18 PROCEDURE — 85025 COMPLETE CBC W/AUTO DIFF WBC: CPT

## 2021-08-18 PROCEDURE — 74011250636 HC RX REV CODE- 250/636: Performed by: STUDENT IN AN ORGANIZED HEALTH CARE EDUCATION/TRAINING PROGRAM

## 2021-08-18 PROCEDURE — 36415 COLL VENOUS BLD VENIPUNCTURE: CPT

## 2021-08-18 PROCEDURE — 80048 BASIC METABOLIC PNL TOTAL CA: CPT

## 2021-08-18 PROCEDURE — 99232 SBSQ HOSP IP/OBS MODERATE 35: CPT | Performed by: SURGERY

## 2021-08-18 PROCEDURE — 74011000250 HC RX REV CODE- 250: Performed by: INTERNAL MEDICINE

## 2021-08-18 PROCEDURE — 65270000029 HC RM PRIVATE

## 2021-08-18 PROCEDURE — 74011250637 HC RX REV CODE- 250/637: Performed by: STUDENT IN AN ORGANIZED HEALTH CARE EDUCATION/TRAINING PROGRAM

## 2021-08-18 PROCEDURE — APPSS15 APP SPLIT SHARED TIME 0-15 MINUTES: Performed by: NURSE PRACTITIONER

## 2021-08-18 RX ORDER — INSULIN DEGLUDEC 100 U/ML
14 INJECTION, SOLUTION SUBCUTANEOUS
Status: DISCONTINUED | OUTPATIENT
Start: 2021-08-18 | End: 2021-08-19

## 2021-08-18 RX ORDER — DEXTROSE MONOHYDRATE AND SODIUM CHLORIDE 5; .45 G/100ML; G/100ML
75 INJECTION, SOLUTION INTRAVENOUS CONTINUOUS
Status: DISCONTINUED | OUTPATIENT
Start: 2021-08-18 | End: 2021-08-20

## 2021-08-18 RX ORDER — INSULIN DEGLUDEC 100 U/ML
14 INJECTION, SOLUTION SUBCUTANEOUS
Status: DISCONTINUED | OUTPATIENT
Start: 2021-08-18 | End: 2021-08-18

## 2021-08-18 RX ADMIN — HEPARIN SODIUM 5000 UNITS: 5000 INJECTION INTRAVENOUS; SUBCUTANEOUS at 16:35

## 2021-08-18 RX ADMIN — SODIUM CHLORIDE, SODIUM LACTATE, POTASSIUM CHLORIDE, AND CALCIUM CHLORIDE 75 ML/HR: 600; 310; 30; 20 INJECTION, SOLUTION INTRAVENOUS at 01:20

## 2021-08-18 RX ADMIN — HEPARIN SODIUM 5000 UNITS: 5000 INJECTION INTRAVENOUS; SUBCUTANEOUS at 05:55

## 2021-08-18 RX ADMIN — Medication 10 ML: at 15:00

## 2021-08-18 RX ADMIN — Medication 10 ML: at 21:37

## 2021-08-18 RX ADMIN — DEXTROSE MONOHYDRATE AND SODIUM CHLORIDE 75 ML/HR: 5; .45 INJECTION, SOLUTION INTRAVENOUS at 21:36

## 2021-08-18 RX ADMIN — Medication 10 ML: at 17:33

## 2021-08-18 RX ADMIN — ACETAMINOPHEN 650 MG: 325 TABLET ORAL at 21:48

## 2021-08-18 RX ADMIN — HEPARIN SODIUM 5000 UNITS: 5000 INJECTION INTRAVENOUS; SUBCUTANEOUS at 21:36

## 2021-08-18 NOTE — PROGRESS NOTES
Chart review complete, CM met with pt at bedside, CM maintained social distance and PPE in place, pt found laying in bed alert and oriented x4, pt states she lives with spouse in 1 level home, independent with ADLs, drives and no current DME in home for use. Demographics, insurance and PCP confirmed. No anticipated dc needs noted, please consult CM if needed. Care Management Interventions  PCP Verified by CM:  Yes (Dr Hiram Lewis lst visit 8/2/21)  Discharge Durable Medical Equipment: No  Physical Therapy Consult: No  Occupational Therapy Consult: No  Speech Therapy Consult: No  Current Support Network: Own Home, Lives with Spouse  Confirm Follow Up Transport: Family  Discharge Location  Discharge Placement: Home

## 2021-08-18 NOTE — PROGRESS NOTES
END OF SHIFT NOTE:    INTAKE/OUTPUT  08/17 0701 - 08/18 0700  In: -   Out: 1150 [Urine:1150]  Voiding: YES  Catheter: NO  Drain:        Flatus: Patient does have flatus present. Stool:  0 occurrences. Characteristics:       Emesis: 0 occurrences. Characteristics:        VITAL SIGNS  Patient Vitals for the past 12 hrs:   Temp Pulse Resp BP SpO2   08/18/21 1818 98.9 °F (37.2 °C) 68 18 (!) 162/62 98 %   08/18/21 1508 98.4 °F (36.9 °C) 68 16 (!) 160/65 94 %   08/18/21 1106 98.1 °F (36.7 °C) 63 17 (!) 143/65 95 %       Pain Assessment  Pain Intensity 1: 0 (deneis) (08/18/21 1310)        Patient Stated Pain Goal: 0    Ambulating  Yes    Shift report given to oncoming nurse at the bedside.     Mildred Law RN

## 2021-08-18 NOTE — PROGRESS NOTES
TRANSFER - IN REPORT:    Verbal report received from Gerardo Campos on [facility]  being received from ER for routine progression of care      Report consisted of patients Situation, Background, Assessment and   Recommendations(SBAR). Information from the following report(s) SBAR, Kardex, ED Summary, Intake/Output, MAR and Recent Results was reviewed with the receiving nurse. Opportunity for questions and clarification was provided. Assessment to be completed upon patients arrival to unit and care assumed.

## 2021-08-18 NOTE — PROGRESS NOTES
END OF SHIFT NOTE:    INTAKE/OUTPUT  08/17 0701 - 08/18 0700  In: -   Out: 1150 [Urine:1150]  Voiding: YES  Catheter: NO  Drain:              Flatus: Patient does not have flatus present. Stool:  0 occurrences. Characteristics:       Emesis: 0 occurrences. Characteristics:        VITAL SIGNS  Patient Vitals for the past 12 hrs:   Temp Pulse Resp BP SpO2   08/18/21 0327 98.1 °F (36.7 °C) 70 18 (!) 143/54 95 %   08/17/21 2250 98.1 °F (36.7 °C) 67 18 (!) 120/50 97 %   08/17/21 1940 98.3 °F (36.8 °C) 100 18 (!) 160/64 97 %   08/17/21 1905 -- -- -- (!) 165/74 96 %       Pain Assessment  Pain Intensity 1: 0 (08/18/21 0145)        Patient Stated Pain Goal: 0    Ambulating  Yes    Shift report given to oncoming nurse at the bedside.     Ember Farley RN

## 2021-08-18 NOTE — PROGRESS NOTES
H&P/Consult Note/Progress Note/Office Note:   Efren Menendez  MRN: [id number]  :1950  Age:71 y.o.    HPI: Efren Menendez is a 66 y.o. female who we are asked by Dr. Fransisco Ornelas to see for SBO. The patient has a PMHx of CAD with stents, liver dx, DM, CKD3. She presented to the ER on 2021 with complaints of abdominal pain, constipation, nausea and vomiting that began on 8/15/21. She denies fever, chills or diarrhea. ER workup showed CTAP with SBO and transition point in the RLQ. She has a surgical hx of hysterectomy. No prior SBO. States her last BM was  but reports 2 weeks of constipation. She described her last BM as \"hard balls\". Not currently passing flatus. 21- Patient states pain is better. +flatus. Nausea better. Past Medical History:   Diagnosis Date    Acute on chronic combined systolic and diastolic congestive heart failure ([facility] 2021    Acute renal failure superimposed on stage 3b chronic kidney disease ([facility] 2021    CAD (coronary artery disease) 3/1/2012    MI, 3 stents    Chest pain     Coronary artery disease involving native coronary artery without angina pectoris 2015    COVID-19 virus infection 2021    Depression 3/1/2012    Diabetes mellitus ([address] 3/1/2012    oral agents. . hypo- 80's, Last A1c 6.9 in 2018    DM2 (diabetes mellitus, type 2) ([facility] 2015    Elevated LFTs 2021    Elevated troponin 3/2/2012    Essential hypertension 2015    External hemorrhoids without mention of complication 5595    GERD (gastroesophageal reflux disease)     HCAP (healthcare-associated pneumonia) 2021    History of MI (myocardial infarction) 11/12    x2    Hypercholesterolemia     Hypertension 3/1/2012    Hypoalbuminemia 2021    Hyponatremia 2021    Hypoxemia 2020    Insomnia     Lactic acidosis 2021    Long QT interval 2021    Personal history of colonic polyps     hyperplastic    Platelet inhibition due to Plavix     holding for colonoscopy per GI Dr.    [facility] Pleural effusion, bilateral 8/11/2020    Rectocele 2013    Sepsis ([facility] 1/12/2021    Tobacco abuse 3/1/2012    quit for 1 year    Tobacco use disorder     Unstable angina ([facility] 3/1/2012    ntg as needed.       Past Surgical History:   Procedure Laterality Date    HX CAROTID ENDARTERECTOMY Left 12/12/2018    HX CATARACT REMOVAL Left 09/19/2016    Dr Fani Kerr, [address] [place] [address] Right 11/07/2016    Dr Fani Kerr, [facility]    HX CERVICAL FUSION      neck w/plate    HX COLONOSCOPY  12/17/13    Anna Marie--single transverse hyperplastic polyp--7-10 year recall    HX HEMORRHOIDECTOMY      x2    HX ORTHOPAEDIC      left elbow    HX CAL AND BSO      HX WISDOM TEETH EXTRACTION      AK LEFT HEART CATH,PERCUTANEOUS  last stented 11/12 x 2    stent x3 , mi x 2     Current Facility-Administered Medications   Medication Dose Route Frequency    insulin degludec soln 14 Units (Patient Supplied)  14 Units SubCUTAneous QHS    sodium chloride (NS) flush 5-10 mL  5-10 mL IntraVENous Q8H    sodium chloride (NS) flush 5-10 mL  5-10 mL IntraVENous PRN    sodium chloride (NS) flush 5-40 mL  5-40 mL IntraVENous Q8H    sodium chloride (NS) flush 5-40 mL  5-40 mL IntraVENous PRN    acetaminophen (TYLENOL) tablet 650 mg  650 mg Oral Q6H PRN    Or    acetaminophen (TYLENOL) suppository 650 mg  650 mg Rectal Q6H PRN    polyethylene glycol (MIRALAX) packet 17 g  17 g Oral DAILY PRN    bisacodyL (DULCOLAX) suppository 10 mg  10 mg Rectal DAILY PRN    ondansetron (ZOFRAN ODT) tablet 4 mg  4 mg Oral Q8H PRN    Or    ondansetron (ZOFRAN) injection 4 mg  4 mg IntraVENous Q6H PRN    famotidine (PEPCID) tablet 20 mg  20 mg Oral BID PRN    alum-mag hydroxide-simeth (MYLANTA) oral suspension 15 mL  15 mL Oral Q6H PRN    sodium chloride (NS) flush 5-40 mL  5-40 mL IntraVENous Q8H    sodium chloride (NS) flush 5-40 mL  5-40 mL IntraVENous PRN    heparin (porcine) injection 5,000 Units  5,000 Units SubCUTAneous Q8H    morphine injection 1 mg  1 mg IntraVENous Q4H PRN    promethazine (PHENERGAN) with saline injection 12.5 mg  12.5 mg IntraVENous Q6H PRN    insulin lispro (HUMALOG) injection   SubCUTAneous AC&HS    dextrose 40% (GLUTOSE) oral gel 1 Tube  15 g Oral PRN    glucagon (GLUCAGEN) injection 1 mg  1 mg IntraMUSCular PRN    dextrose (D50W) injection syrg 12.5-25 g  25-50 mL IntraVENous PRN    lactated Ringers infusion  75 mL/hr IntraVENous CONTINUOUS    labetaloL (NORMODYNE;TRANDATE) injection 10 mg  10 mg IntraVENous Q4H PRN     Cephalosporins, Sulfamethoxazole-trimethoprim, Codeine, and Lisinopril  Social History     Socioeconomic History    Marital status:      Spouse name: Not on file    Number of children: Not on file    Years of education: Not on file    Highest education level: Not on file   Tobacco Use    Smoking status: Former Smoker     Packs/day: 1.00     Years: 40.00     Pack years: 40.00     Quit date: 2012     Years since quittin.7    Smokeless tobacco: Never Used    Tobacco comment: quit  . has stopped prior also   Vaping Use    Vaping Use: Never used   Substance and Sexual Activity    Alcohol use: No    Drug use: No     Social Determinants of Health     Financial Resource Strain:     Difficulty of Paying Living Expenses:    Food Insecurity:     Worried About Running Out of Food in the Last Year:     Ran Out of Food in the Last Year:    Transportation Needs:     Lack of Transportation (Medical):      Lack of Transportation (Non-Medical):    Physical Activity:     Days of Exercise per Week:     Minutes of Exercise per Session:    Stress:     Feeling of Stress :    Social Connections:     Frequency of Communication with Friends and Family:     Frequency of Social Gatherings with Friends and Family:     Attends [employer] Services:     Active Member of Clubs or Organizations:     Attends Club or Organization Meetings:     Marital Status:      Social History     Tobacco Use   Smoking Status Former Smoker    Packs/day: 1.00    Years: 40.00    Pack years: 40.00    Quit date: 2012    Years since quittin.7   Smokeless Tobacco Never Used   Tobacco Comment    quit  . has stopped prior also     Family History   Problem Relation Age of Onset    Heart Disease Mother     Osteoporosis Mother     Heart Attack Mother 67        mi    Thyroid Disease Mother         Multinodular Goiter    Heart Disease Father     Cancer Father         pancreatic    Heart Attack Father 67        MI    Cancer Sister         Pre-Cancerous dysplasia    Thyroid Cancer Sister     Ulcerative Colitis Brother     Thyroid Cancer Brother     Breast Cancer Neg Hx      ROS: The patient has no difficulty with chest pain or shortness of breath. No fever or chills. Comprehensive review of systems was otherwise unremarkable except as noted above. Physical Exam:   Visit Vitals  BP (!) 146/54   Pulse 77   Temp 97.9 °F (36.6 °C)   Resp 19   Wt 159 lb 1.6 oz (72.2 kg)   SpO2 93%   BMI 27.31 kg/m²     Constitutional: Alert, oriented, cooperative patient in no acute distress; appears stated age    Eyes:Sclera are clear. EOMs intact  ENMT: no external lesions gross hearing normal; no obvious neck masses, no ear or lip lesions, nares normal  CV: RRR. Normal perfusion  Resp: No JVD. Breathing is  non-labored; no audible wheezing. GI: soft, nondistended, less tender     Musculoskeletal: unremarkable with normal function. No embolic signs or cyanosis.    Neuro:  Oriented; moves all 4; no focal deficits  Psychiatric: normal affect and mood, no memory impairment    Recent vitals (if inpt):  Patient Vitals for the past 24 hrs:   BP Temp Pulse Resp SpO2 Weight   21 0657 (!) 146/54 97.9 °F (36.6 °C) 77 19 93 % --   21 0627 -- -- -- -- -- 159 lb 1.6 oz (72.2 kg)   21 0327 (!) 143/54 98.1 °F (36.7 °C) 70 18 95 % --   08/17/21 2250 (!) 120/50 98.1 °F (36.7 °C) 67 18 97 % --   08/17/21 1940 (!) 160/64 98.3 °F (36.8 °C) 100 18 97 % --   08/17/21 1905 (!) 165/74 -- -- -- 96 % --   08/17/21 1854 (!) 181/77 -- -- -- 97 % --   08/17/21 1836 (!) 184/80 -- 73 -- -- --   08/17/21 1820 (!) 193/84 -- -- -- 96 % --   08/17/21 1405 (!) 167/74 -- -- -- 96 % --   08/17/21 1336 (!) 162/70 -- -- -- 95 % --   08/17/21 1321 (!) 167/79 -- -- -- 96 % --   08/17/21 1305 (!) 169/79 -- -- -- 94 % --   08/17/21 1236 (!) 172/79 -- -- -- 97 % --   08/17/21 1221 (!) 172/79 -- -- -- 99 % --   08/17/21 1152 -- -- -- -- -- 152 lb (68.9 kg)   08/17/21 1151 (!) 191/78 97.6 °F (36.4 °C) 83 16 100 % --       Labs:  Recent Labs     08/18/21  0555 08/17/21  1203 08/17/21  1203   WBC 6.4   < > 10.8   HGB 10.8*   < > 14.2      < > 318      < > 135*   K 4.6   < > 5.3*   *   < > 105   CO2 21   < > 19*   BUN 45*   < > 59*   CREA 2.05*   < > 2.74*   *   < > 147*   TBILI  --   --  0.7   ALT  --   --  119*   AP  --   --  162*   LPSE  --   --  184    < > = values in this interval not displayed. Lab Results   Component Value Date/Time    WBC 6.4 08/18/2021 05:55 AM    HGB 10.8 (L) 08/18/2021 05:55 AM    PLATELET 143 13/67/0525 05:55 AM    Sodium 140 08/18/2021 05:55 AM    Potassium 4.6 08/18/2021 05:55 AM    Chloride 112 (H) 08/18/2021 05:55 AM    CO2 21 08/18/2021 05:55 AM    BUN 45 (H) 08/18/2021 05:55 AM    Creatinine 2.05 (H) 08/18/2021 05:55 AM    Glucose 108 (H) 08/18/2021 05:55 AM    INR 1.1 01/13/2021 06:21 AM    Bilirubin, total 0.7 08/17/2021 12:03 PM    Bilirubin, direct 0.13 08/02/2021 02:31 PM    ALT (SGPT) 119 (H) 08/17/2021 12:03 PM    Alk. phosphatase 162 (H) 08/17/2021 12:03 PM    Amylase 45 11/05/2013 04:27 PM    Lipase 184 08/17/2021 12:03 PM    Troponin-I, Qt. <0.02 (L) 02/05/2017 09:08 PM       I reviewed recent labs and recent radiologic studies.     CT Results (most recent):  Results from Hospital Encounter encounter on 08/17/21    CT ABD PELV WO CONT    Narrative  EXAMINATION: CT ABD PELV WO CONT 8/17/2021 2:24 PM    INDICATION:  Epigastric and right upper quadrant pain    COMPARISON: CT abdomen and pelvis 2/17/2021    TECHNIQUE: Contiguous axial computed tomographic images were obtained from the  domes of the diaphragm to the symphysis pubis without intravenous contrast.  Coronal reconstructions were also performed. Oral contrast was also  administered. Please note that the detection of solid organ and vascular abnormalities is  limited in the absence of intravenous contrast.  Radiation dose reduction  techniques were used for this study. Our CT scanners use one or all of the  following: Automated exposure control, adjustment of the mA and/or kV according  to patient size, iterative reconstruction. FINDINGS:    Lung bases: Clear    Heptobiliary: No hepatic lesion. Normal gallbladder. No biliary dilation. Spleen, adrenal, pancreas: Normal spleen. No pancreatic ductal dilation. No  adrenal mass. Kidneys/urinary: No hydronephrosis or nephrolithiasis. Normal bladder. Reproductive: Prior hysterectomy. No adnexal mass. Bowel: Distended small bowel loops with a transition point in the right lower  abdomen (coronal #45) contents medially proximal to the transition show  pneumatized contents. No pneumatosis or portal venous gas. Peritoneum: No free air. Trace perihepatic fluid. And fluid in the mesentery    Vessels/lymph nodes: No AAA. No adenopathy. Abdominal wall: No bowel-containing hernia. Bones: No suspicious osseous lesion. Impression  Small bowel obstruction with transition point in the right lower abdomen. No  pneumatosis or portal venous gas.     XR Results (most recent):  Results from Hospital Encounter encounter on 02/15/21    XR CHEST PORT    Narrative  EXAMINATION: CHEST RADIOGRAPH 2/15/2021 10:22 AM    ACCESSION NUMBER: [id number]    COMPARISON: 02/09/2021    INDICATION: Chest pain with shortness of breath    TECHNIQUE: A single view of the chest was obtained. FINDINGS:    Support Devices: None    Lungs: There is persistent pulmonary vascular indistinctness with small pleural  effusions. This is similar to the prior exam.    Cardiac Silhouette: Within normal limits in size. Mediastinum: Calcifications are seen in the aorta. Upper Abdomen: Normal    Miscellaneous: There is previous fusion in the lower cervical spine. This is  partially visualized. Impression  1. Persistent pulmonary edema    2. Atherosclerosis      I independently reviewed radiology images for studies I described above or studies I have ordered.    Admission date (for inpatients): 8/17/2021   * No surgery found *  * No surgery found *    ASSESSMENT/PLAN:  Problem List  Date Reviewed: 8/2/2021        Codes Class Noted    Acute renal failure superimposed on stage 3b chronic kidney disease ([facility] ICD-10-CM: N17.9, N18.32  ICD-9-CM: 584.9, 585.3  8/18/2021        * (Principal) SBO (small bowel obstruction) ([facility] ICD-10-CM: K56.609  ICD-9-CM: 560.9  8/17/2021        Elevated ALT measurement ICD-10-CM: R74.01  ICD-9-CM: 790.4  7/7/2021        Polyarthritis ICD-10-CM: M13.0  ICD-9-CM: 716.50  7/7/2021        Chronic diastolic congestive heart failure (HCC) ICD-10-CM: I50.32  ICD-9-CM: 428.32, 428.0  4/19/2021    Overview Signed 7/7/2021 11:06 AM by Shira Radford NP     Heart Failure (HFpEF), EF 83-03%, with diastolic dysfunction, ECHO 2/16/2021               Congestive heart failure (CHF) ([facility] ICD-10-CM: I50.9  ICD-9-CM: 428.0  2/15/2021        Normocytic anemia ICD-10-CM: D64.9  ICD-9-CM: 285.9  1/12/2021        Mixed hyperlipidemia ICD-10-CM: Q63.5  ICD-9-CM: 272.2  10/21/2020        S/P PTCA (percutaneous transluminal coronary angioplasty) ICD-10-CM: Z98.61  ICD-9-CM: V45.82  9/1/2020        Other cirrhosis of liver ([facility] ICD-10-CM: G79.51  ICD-9-CM: 571.5  8/11/2020        History of left-sided carotid endarterectomy ICD-10-CM: Z98.890  ICD-9-CM: V45.89  3/12/2020        Carotid stenosis, asymptomatic, right ICD-10-CM: I65.21  ICD-9-CM: 433.10  3/12/2020        Carotid artery stenosis ICD-10-CM: I65.29  ICD-9-CM: 433.10  12/12/2018        Right carotid bruit ICD-10-CM: R09.89  ICD-9-CM: 785.9  11/6/2018        Type 2 diabetes mellitus with stage 3b chronic kidney disease, with long-term current use of insulin (HCC) ICD-10-CM: E11.22, N18.32, Z79.4  ICD-9-CM: 250.40, 585.3, V58.67  10/11/2018        Gastroesophageal reflux disease ICD-10-CM: K21.9  ICD-9-CM: 530.81  2/16/2017        Essential hypertension (Chronic) ICD-10-CM: I10  ICD-9-CM: 401.9  9/15/2016        Stage 3b chronic kidney disease ([facility] ICD-10-CM: I23.95  ICD-9-CM: 585.3  9/15/2016        Age-related osteoporosis without current pathological fracture ICD-10-CM: M81.0  ICD-9-CM: 733.01  9/15/2016        Major depressive disorder with single episode, in full remission ([facility] ICD-10-CM: F32.5  ICD-9-CM: 296.26  9/15/2016        Primary insomnia ICD-10-CM: F51.01  ICD-9-CM: 307.42  9/15/2016        Coronary artery disease involving native coronary artery of native heart without angina pectoris ICD-10-CM: I25.10  ICD-9-CM: 414.01  9/4/2013            Principal Problem:    SBO (small bowel obstruction) ([facility] (8/17/2021)    Active Problems:    Stage 3b chronic kidney disease ([facility] (9/15/2016)      Type 2 diabetes mellitus with stage 3b chronic kidney disease, with long-term current use of insulin ([facility] (10/11/2018)      Acute renal failure superimposed on stage 3b chronic kidney disease ([facility] (8/18/2021)         Care management per primary team  Sips of clears  IVFs  Hopeful for conservative management  Will continue to monitor     [address] Bre Townsend NP    I have seen and examined the patient with the Nurse Practitioner and agree with the above assessment and plan. abd pain is improving, passing flatus. No NG was placed.  abd exam is benign. Resolving SBO? Tuyet Salmeron Will try to advance diet.      Carolyn Melendez MD

## 2021-08-18 NOTE — PROGRESS NOTES
Hospitalist Progress Note   Admit Date:  2021 11:49 AM   Name:  Emily Dickerson   Age:  66 y.o. Sex:  female  :  1950   MRN:  [id number]     Presenting Complaint: Abdominal Pain    Reason(s) for Admission: SBO (small bowel obstruction) [facility]) [facility] Course & Interval History:   71F PMHx type 2 diabetes, stage IIIb kidney disease, CAD admitted  for abdominal pain, nausea and vomiting. She had mostly epigastric pain on the day prior leading to nausea and a single episode of emesis. She follows with GI for recent infectious colitis and constipation that resolved with antibiotics and MiraLAX. She reported several weeks of constipation and numerous hard stools despite stool specimens and laxatives. She denied any fever, chills, dysuria, cough, headache. In the emergency department CT showed a small bowel obstruction with a transition point in the right lower quadrant. Surgery was consulted. Hospitalist service admitted her for further evaluation and management. She remained n.p.o. with improvement in symptoms through . Subjective (21):  Pain nearly resolved. Reports mild epigastric discomfort. No other complaints today. Assessment & Plan:   SBO (small bowel obstruction) ([address] (2021)  Small bowel obstruction seen on CT with transition point in the right lower quadrant. Patient has a history of hysterectomy in the distant past.  Also recent episodes of constipation prior weeks and recent treatment for colitis.   -Surgery consulted  -N.p.o.  -IV pain, nausea control  -Follow-up KUB    Acute renal failure superimposed on stage 3b chronic kidney disease ([address] (2021)  Admission creatinine 2.7 from baseline of 1.5, likely secondary to dehydration  -LR IV fluids  -Serial BMP      Type 2 diabetes mellitus with stage 3b chronic kidney disease, with long-term current use of insulin ([address]   -Hold home meds while n.p.o.  -LD SSI with plan to adjust basal as needed once eating    Hypertension  -Hold home meds while n.p.o.  -IV labetalol as needed    Dispo/Discharge Planning:    Home with outpatient follow-up in 2 to 3 days    Diet:  DIET NPO  DVT PPx: Heparin  Code status: Full Code    Active Hospital Problems    Diagnosis Date Noted    Acute renal failure superimposed on stage 3b chronic kidney disease ([facility] 08/18/2021     Priority: 3 - Three    Stage 3b chronic kidney disease ([facility] 09/15/2016     Priority: 3 - Three    SBO (small bowel obstruction) ([facility] 08/17/2021    Type 2 diabetes mellitus with stage 3b chronic kidney disease, with long-term current use of insulin ([facility] 10/11/2018       Objective:     Patient Vitals for the past 24 hrs:   Temp Pulse Resp BP SpO2   08/18/21 1508 98.4 °F (36.9 °C) 68 16 (!) 160/65 94 %   08/18/21 1106 98.1 °F (36.7 °C) 63 17 (!) 143/65 95 %   08/18/21 0657 97.9 °F (36.6 °C) 77 19 (!) 146/54 93 %   08/18/21 0327 98.1 °F (36.7 °C) 70 18 (!) 143/54 95 %   08/17/21 2250 98.1 °F (36.7 °C) 67 18 (!) 120/50 97 %   08/17/21 1940 98.3 °F (36.8 °C) 100 18 (!) 160/64 97 %   08/17/21 1905 -- -- -- (!) 165/74 96 %   08/17/21 1854 -- -- -- (!) 181/77 97 %   08/17/21 1836 -- 73 -- (!) 184/80 --   08/17/21 1820 -- -- -- (!) 193/84 96 %     Oxygen Therapy  O2 Sat (%): 94 % (08/18/21 1508)  Pulse via Oximetry: 73 beats per minute (08/17/21 1905)  O2 Device: None (Room air) (08/17/21 1930)    Estimated body mass index is 27.31 kg/m² as calculated from the following:    Height as of 8/2/21: 5' 4\" (1.626 m). Weight as of this encounter: 72.2 kg (159 lb 1.6 oz). Intake/Output Summary (Last 24 hours) at 8/18/2021 1622  Last data filed at 8/18/2021 1106  Gross per 24 hour   Intake --   Output 1550 ml   Net -1550 ml       Physical Exam  Vitals and nursing note reviewed. Constitutional:       General: She is not in acute distress. Appearance: Normal appearance.       Comments: Affable, appears younger than stated age   HENT:      Head: Normocephalic. Eyes:      Extraocular Movements: Extraocular movements intact. Cardiovascular:      Rate and Rhythm: Normal rate. Pulses:           Radial pulses are 2+ on the left side. Pulmonary:      Effort: Pulmonary effort is normal. No respiratory distress. Abdominal:      General: Bowel sounds are decreased. Palpations: There is no fluid wave. Tenderness: There is abdominal tenderness in the epigastric area. There is no guarding or rebound. Musculoskeletal:         General: No deformity. Cervical back: No rigidity. Skin:     General: Skin is warm and dry. Neurological:      General: No focal deficit present. Mental Status: She is alert.    Psychiatric:         Mood and Affect: Mood normal.         Behavior: Behavior normal.         I have reviewed ordered lab tests and independently visualized imaging below:    Last 24hr Labs:  Recent Results (from the past 24 hour(s))   GLUCOSE, POC    Collection Time: 08/17/21  9:03 PM   Result Value Ref Range    Glucose (POC) 118 (H) 65 - 100 mg/dL    Performed by [facility]    METABOLIC PANEL, BASIC    Collection Time: 08/18/21  5:55 AM   Result Value Ref Range    Sodium 140 136 - 145 mmol/L    Potassium 4.6 3.5 - 5.1 mmol/L    Chloride 112 (H) 98 - 107 mmol/L    CO2 21 21 - 32 mmol/L    Anion gap 7 7 - 16 mmol/L    Glucose 108 (H) 65 - 100 mg/dL    BUN 45 (H) 8 - 23 MG/DL    Creatinine 2.05 (H) 0.6 - 1.0 MG/DL    GFR est AA 31 (L) >60 ml/min/1.73m2    GFR est non-AA 25 (L) >60 ml/min/1.73m2    Calcium 8.9 8.3 - 10.4 MG/DL   CBC WITH AUTOMATED DIFF    Collection Time: 08/18/21  5:55 AM   Result Value Ref Range    WBC 6.4 4.3 - 11.1 K/uL    RBC 3.46 (L) 4.05 - 5.2 M/uL    HGB 10.8 (L) 11.7 - 15.4 g/dL    HCT 33.9 (L) 35.8 - 46.3 %    MCV 98.0 (H) 79.6 - 97.8 FL    MCH 31.2 26.1 - 32.9 PG    MCHC 31.9 31.4 - 35.0 g/dL    RDW 13.2 11.9 - 14.6 %    PLATELET 233 662 - 025 K/uL    MPV 9.2 (L) 9.4 - 12.3 FL    ABSOLUTE NRBC 0.00 0.0 - 0.2 K/uL    DF AUTOMATED      NEUTROPHILS 65 43 - 78 %    LYMPHOCYTES 18 13 - 44 %    MONOCYTES 12 4.0 - 12.0 %    EOSINOPHILS 4 0.5 - 7.8 %    BASOPHILS 0 0.0 - 2.0 %    IMMATURE GRANULOCYTES 1 0.0 - 5.0 %    ABS. NEUTROPHILS 4.1 1.7 - 8.2 K/UL    ABS. LYMPHOCYTES 1.2 0.5 - 4.6 K/UL    ABS. MONOCYTES 0.8 0.1 - 1.3 K/UL    ABS. EOSINOPHILS 0.3 0.0 - 0.8 K/UL    ABS. BASOPHILS 0.0 0.0 - 0.2 K/UL    ABS. IMM. GRANS. 0.0 0.0 - 0.5 K/UL   GLUCOSE, POC    Collection Time: 08/18/21  7:03 AM   Result Value Ref Range    Glucose (POC) 120 (H) 65 - 100 mg/dL    Performed by Sulaiman Luster    GLUCOSE, POC    Collection Time: 08/18/21 11:11 AM   Result Value Ref Range    Glucose (POC) 105 (H) 65 - 100 mg/dL    Performed by Sulaiman Shanekaster        All Micro Results     None          Other Studies:  XR ABD (KUB)    Result Date: 8/18/2021  History: Small bowel obstruction EXAM: KUB FINDINGS: There is contrast present within the colon. Mildly prominent loops of small bowel seen. No free air. Mildly prominent loops of small bowel. Cannot exclude partial small bowel obstruction or ileus. There is oral contrast present in the colon.       Current Meds:  Current Facility-Administered Medications   Medication Dose Route Frequency    insulin degludec soln 14 Units (Patient Supplied)  14 Units SubCUTAneous QHS    sodium chloride (NS) flush 5-10 mL  5-10 mL IntraVENous Q8H    sodium chloride (NS) flush 5-10 mL  5-10 mL IntraVENous PRN    sodium chloride (NS) flush 5-40 mL  5-40 mL IntraVENous Q8H    sodium chloride (NS) flush 5-40 mL  5-40 mL IntraVENous PRN    acetaminophen (TYLENOL) tablet 650 mg  650 mg Oral Q6H PRN    Or    acetaminophen (TYLENOL) suppository 650 mg  650 mg Rectal Q6H PRN    polyethylene glycol (MIRALAX) packet 17 g  17 g Oral DAILY PRN    bisacodyL (DULCOLAX) suppository 10 mg  10 mg Rectal DAILY PRN    ondansetron (ZOFRAN ODT) tablet 4 mg  4 mg Oral Q8H PRN    Or    ondansetron (ZOFRAN) injection 4 mg  4 mg IntraVENous Q6H PRN    famotidine (PEPCID) tablet 20 mg  20 mg Oral BID PRN    alum-mag hydroxide-simeth (MYLANTA) oral suspension 15 mL  15 mL Oral Q6H PRN    heparin (porcine) injection 5,000 Units  5,000 Units SubCUTAneous Q8H    morphine injection 1 mg  1 mg IntraVENous Q4H PRN    promethazine (PHENERGAN) with saline injection 12.5 mg  12.5 mg IntraVENous Q6H PRN    insulin lispro (HUMALOG) injection   SubCUTAneous AC&HS    dextrose 40% (GLUTOSE) oral gel 1 Tube  15 g Oral PRN    glucagon (GLUCAGEN) injection 1 mg  1 mg IntraMUSCular PRN    dextrose (D50W) injection syrg 12.5-25 g  25-50 mL IntraVENous PRN    lactated Ringers infusion  75 mL/hr IntraVENous CONTINUOUS    labetaloL (NORMODYNE;TRANDATE) injection 10 mg  10 mg IntraVENous Q4H PRN       Signed:  Jean Hogue MD

## 2021-08-18 NOTE — ACP (ADVANCE CARE PLANNING)
ACP conversation complete.  Pt states she hs HCPOA and LW and requests to be full code on this admission, Spouse Brody Duarte is primary decision maker

## 2021-08-18 NOTE — PROGRESS NOTES
08/17/21 1930   Dual Skin Pressure Injury Assessment   Dual Skin Pressure Injury Assessment WDL   Second Care Provider (Based on [address] [place] [address]) KEYANNA Lawton   Skin Integumentary   Skin Integumentary (WDL) WDL    Pressure  Injury Documentation No Pressure Injury Noted-Pressure Ulcer Prevention Initiated   Wound Prevention and Protection Methods   Orientation of Wound Prevention Posterior   Location of Wound Prevention Sacrum/Coccyx   Dressing Present  No   Wound Offloading (Prevention Methods) Bed, pressure reduction mattress

## 2021-08-18 NOTE — ASSESSMENT & PLAN NOTE
Admission sCr 2.7 from bl ~1.2  -IVF, serial BMP  -avoid nephrotoxic medications  -consider nephrology consult

## 2021-08-19 PROBLEM — I25.10 CAD (CORONARY ARTERY DISEASE): Chronic | Status: ACTIVE | Noted: 2021-08-19

## 2021-08-19 PROBLEM — R07.9 CHEST PAIN: Status: ACTIVE | Noted: 2021-08-19

## 2021-08-19 PROBLEM — D64.9 ANEMIA: Status: ACTIVE | Noted: 2021-08-19

## 2021-08-19 LAB
ALBUMIN SERPL-MCNC: 3.3 G/DL (ref 3.2–4.6)
ALBUMIN/GLOB SERPL: 0.8 {RATIO} (ref 1.2–3.5)
ALP SERPL-CCNC: 115 U/L (ref 50–136)
ALT SERPL-CCNC: 58 U/L (ref 12–65)
ANION GAP SERPL CALC-SCNC: 7 MMOL/L (ref 7–16)
AST SERPL-CCNC: 20 U/L (ref 15–37)
ATRIAL RATE: 76 BPM
BASOPHILS # BLD: 0 K/UL (ref 0–0.2)
BASOPHILS NFR BLD: 0 % (ref 0–2)
BILIRUB SERPL-MCNC: 0.5 MG/DL (ref 0.2–1.1)
BUN SERPL-MCNC: 31 MG/DL (ref 8–23)
CALCIUM SERPL-MCNC: 8.9 MG/DL (ref 8.3–10.4)
CALCULATED P AXIS, ECG09: 75 DEGREES
CALCULATED R AXIS, ECG10: -70 DEGREES
CALCULATED T AXIS, ECG11: 68 DEGREES
CHLORIDE SERPL-SCNC: 108 MMOL/L (ref 98–107)
CO2 SERPL-SCNC: 25 MMOL/L (ref 21–32)
CREAT SERPL-MCNC: 1.87 MG/DL (ref 0.6–1)
DIAGNOSIS, 93000: NORMAL
DIFFERENTIAL METHOD BLD: ABNORMAL
EOSINOPHIL # BLD: 0.3 K/UL (ref 0–0.8)
EOSINOPHIL NFR BLD: 6 % (ref 0.5–7.8)
ERYTHROCYTE [DISTWIDTH] IN BLOOD BY AUTOMATED COUNT: 12.8 % (ref 11.9–14.6)
GLOBULIN SER CALC-MCNC: 4 G/DL (ref 2.3–3.5)
GLUCOSE BLD STRIP.AUTO-MCNC: 136 MG/DL (ref 65–100)
GLUCOSE BLD STRIP.AUTO-MCNC: 146 MG/DL (ref 65–100)
GLUCOSE BLD STRIP.AUTO-MCNC: 164 MG/DL (ref 65–100)
GLUCOSE BLD STRIP.AUTO-MCNC: 170 MG/DL (ref 65–100)
GLUCOSE BLD STRIP.AUTO-MCNC: 172 MG/DL (ref 65–100)
GLUCOSE BLD STRIP.AUTO-MCNC: 215 MG/DL (ref 65–100)
GLUCOSE SERPL-MCNC: 141 MG/DL (ref 65–100)
HCT VFR BLD AUTO: 32.5 % (ref 35.8–46.3)
HGB BLD-MCNC: 10.5 G/DL (ref 11.7–15.4)
IMM GRANULOCYTES # BLD AUTO: 0 K/UL (ref 0–0.5)
IMM GRANULOCYTES NFR BLD AUTO: 0 % (ref 0–5)
LYMPHOCYTES # BLD: 1.5 K/UL (ref 0.5–4.6)
LYMPHOCYTES NFR BLD: 28 % (ref 13–44)
MCH RBC QN AUTO: 31.7 PG (ref 26.1–32.9)
MCHC RBC AUTO-ENTMCNC: 32.3 G/DL (ref 31.4–35)
MCV RBC AUTO: 98.2 FL (ref 79.6–97.8)
MONOCYTES # BLD: 0.7 K/UL (ref 0.1–1.3)
MONOCYTES NFR BLD: 13 % (ref 4–12)
NEUTS SEG # BLD: 2.7 K/UL (ref 1.7–8.2)
NEUTS SEG NFR BLD: 53 % (ref 43–78)
NRBC # BLD: 0 K/UL (ref 0–0.2)
P-R INTERVAL, ECG05: 134 MS
PLATELET # BLD AUTO: 209 K/UL (ref 150–450)
PMV BLD AUTO: 9 FL (ref 9.4–12.3)
POTASSIUM SERPL-SCNC: 3.9 MMOL/L (ref 3.5–5.1)
PROT SERPL-MCNC: 7.3 G/DL (ref 6.3–8.2)
Q-T INTERVAL, ECG07: 416 MS
QRS DURATION, ECG06: 96 MS
QTC CALCULATION (BEZET), ECG08: 468 MS
RBC # BLD AUTO: 3.31 M/UL (ref 4.05–5.2)
SERVICE CMNT-IMP: ABNORMAL
SODIUM SERPL-SCNC: 140 MMOL/L (ref 136–145)
TROPONIN-HIGH SENSITIVITY: 28.7 PG/ML (ref 0–14)
TROPONIN-HIGH SENSITIVITY: 29.2 PG/ML (ref 0–14)
VENTRICULAR RATE, ECG03: 76 BPM
WBC # BLD AUTO: 5.1 K/UL (ref 4.3–11.1)

## 2021-08-19 PROCEDURE — 74011636637 HC RX REV CODE- 636/637: Performed by: STUDENT IN AN ORGANIZED HEALTH CARE EDUCATION/TRAINING PROGRAM

## 2021-08-19 PROCEDURE — APPSS15 APP SPLIT SHARED TIME 0-15 MINUTES: Performed by: NURSE PRACTITIONER

## 2021-08-19 PROCEDURE — 85025 COMPLETE CBC W/AUTO DIFF WBC: CPT

## 2021-08-19 PROCEDURE — 74011250636 HC RX REV CODE- 250/636: Performed by: INTERNAL MEDICINE

## 2021-08-19 PROCEDURE — 74011000250 HC RX REV CODE- 250: Performed by: INTERNAL MEDICINE

## 2021-08-19 PROCEDURE — 65270000029 HC RM PRIVATE

## 2021-08-19 PROCEDURE — 93005 ELECTROCARDIOGRAM TRACING: CPT | Performed by: INTERNAL MEDICINE

## 2021-08-19 PROCEDURE — 74011000250 HC RX REV CODE- 250: Performed by: STUDENT IN AN ORGANIZED HEALTH CARE EDUCATION/TRAINING PROGRAM

## 2021-08-19 PROCEDURE — 99232 SBSQ HOSP IP/OBS MODERATE 35: CPT | Performed by: SURGERY

## 2021-08-19 PROCEDURE — 36415 COLL VENOUS BLD VENIPUNCTURE: CPT

## 2021-08-19 PROCEDURE — 74011250637 HC RX REV CODE- 250/637: Performed by: INTERNAL MEDICINE

## 2021-08-19 PROCEDURE — 80053 COMPREHEN METABOLIC PANEL: CPT

## 2021-08-19 PROCEDURE — 82962 GLUCOSE BLOOD TEST: CPT

## 2021-08-19 PROCEDURE — 74011250636 HC RX REV CODE- 250/636: Performed by: STUDENT IN AN ORGANIZED HEALTH CARE EDUCATION/TRAINING PROGRAM

## 2021-08-19 PROCEDURE — 84484 ASSAY OF TROPONIN QUANT: CPT

## 2021-08-19 RX ORDER — MORPHINE SULFATE 2 MG/ML
2 INJECTION, SOLUTION INTRAMUSCULAR; INTRAVENOUS
Status: DISCONTINUED | OUTPATIENT
Start: 2021-08-19 | End: 2021-08-20 | Stop reason: HOSPADM

## 2021-08-19 RX ORDER — TRAZODONE HYDROCHLORIDE 50 MG/1
50 TABLET ORAL
Status: DISCONTINUED | OUTPATIENT
Start: 2021-08-19 | End: 2021-08-20 | Stop reason: HOSPADM

## 2021-08-19 RX ORDER — CARVEDILOL 3.12 MG/1
3.12 TABLET ORAL 2 TIMES DAILY WITH MEALS
Status: DISCONTINUED | OUTPATIENT
Start: 2021-08-19 | End: 2021-08-20 | Stop reason: HOSPADM

## 2021-08-19 RX ORDER — CITALOPRAM 20 MG/1
20 TABLET, FILM COATED ORAL DAILY
Status: DISCONTINUED | OUTPATIENT
Start: 2021-08-19 | End: 2021-08-20 | Stop reason: HOSPADM

## 2021-08-19 RX ORDER — LORAZEPAM 0.5 MG/1
0.5 TABLET ORAL
Status: DISCONTINUED | OUTPATIENT
Start: 2021-08-19 | End: 2021-08-20 | Stop reason: HOSPADM

## 2021-08-19 RX ORDER — ISOSORBIDE MONONITRATE 30 MG/1
60 TABLET, EXTENDED RELEASE ORAL DAILY
Status: DISCONTINUED | OUTPATIENT
Start: 2021-08-20 | End: 2021-08-20 | Stop reason: HOSPADM

## 2021-08-19 RX ORDER — METOPROLOL TARTRATE 5 MG/5ML
2.5 INJECTION INTRAVENOUS EVERY 6 HOURS
Status: DISCONTINUED | OUTPATIENT
Start: 2021-08-19 | End: 2021-08-19

## 2021-08-19 RX ORDER — HYDRALAZINE HYDROCHLORIDE 20 MG/ML
10 INJECTION INTRAMUSCULAR; INTRAVENOUS
Status: DISCONTINUED | OUTPATIENT
Start: 2021-08-19 | End: 2021-08-19

## 2021-08-19 RX ADMIN — CITALOPRAM HYDROBROMIDE 20 MG: 20 TABLET ORAL at 14:36

## 2021-08-19 RX ADMIN — HEPARIN SODIUM 5000 UNITS: 5000 INJECTION INTRAVENOUS; SUBCUTANEOUS at 05:23

## 2021-08-19 RX ADMIN — HEPARIN SODIUM 5000 UNITS: 5000 INJECTION INTRAVENOUS; SUBCUTANEOUS at 13:14

## 2021-08-19 RX ADMIN — INSULIN LISPRO 2 UNITS: 100 INJECTION, SOLUTION INTRAVENOUS; SUBCUTANEOUS at 12:30

## 2021-08-19 RX ADMIN — MORPHINE SULFATE 2 MG: 2 INJECTION, SOLUTION INTRAMUSCULAR; INTRAVENOUS at 11:38

## 2021-08-19 RX ADMIN — Medication 10 ML: at 14:27

## 2021-08-19 RX ADMIN — INSULIN LISPRO 2 UNITS: 100 INJECTION, SOLUTION INTRAVENOUS; SUBCUTANEOUS at 08:00

## 2021-08-19 RX ADMIN — METOPROLOL TARTRATE 2.5 MG: 5 INJECTION INTRAVENOUS at 11:37

## 2021-08-19 RX ADMIN — HYDRALAZINE HYDROCHLORIDE 10 MG: 20 INJECTION INTRAMUSCULAR; INTRAVENOUS at 11:35

## 2021-08-19 RX ADMIN — Medication 10 ML: at 13:13

## 2021-08-19 RX ADMIN — INSULIN LISPRO 4 UNITS: 100 INJECTION, SOLUTION INTRAVENOUS; SUBCUTANEOUS at 16:56

## 2021-08-19 RX ADMIN — CARVEDILOL 3.12 MG: 3.12 TABLET, FILM COATED ORAL at 16:57

## 2021-08-19 RX ADMIN — Medication 10 ML: at 05:25

## 2021-08-19 RX ADMIN — Medication 10 ML: at 01:46

## 2021-08-19 RX ADMIN — DEXTROSE MONOHYDRATE AND SODIUM CHLORIDE 75 ML/HR: 5; .45 INJECTION, SOLUTION INTRAVENOUS at 08:05

## 2021-08-19 RX ADMIN — LABETALOL HYDROCHLORIDE 10 MG: 5 INJECTION INTRAVENOUS at 01:47

## 2021-08-19 RX ADMIN — TRAZODONE HYDROCHLORIDE 50 MG: 50 TABLET ORAL at 21:39

## 2021-08-19 RX ADMIN — Medication 10 ML: at 21:40

## 2021-08-19 RX ADMIN — LORAZEPAM 0.5 MG: 0.5 TABLET ORAL at 13:04

## 2021-08-19 RX ADMIN — LABETALOL HYDROCHLORIDE 10 MG: 5 INJECTION INTRAVENOUS at 07:58

## 2021-08-19 RX ADMIN — Medication 10 ML: at 21:39

## 2021-08-19 RX ADMIN — HEPARIN SODIUM 5000 UNITS: 5000 INJECTION INTRAVENOUS; SUBCUTANEOUS at 21:39

## 2021-08-19 RX ADMIN — Medication 10 ML: at 05:26

## 2021-08-19 NOTE — PROGRESS NOTES
Hospitalist Progress Note   Admit Date:  2021 11:49 AM   Name:  Pieter Cristobal   Age:  66 y.o. Sex:  female  :  1950   MRN:  [id number]     Presenting Complaint: Abdominal Pain    Reason(s) for Admission: SBO (small bowel obstruction) [facility]) [facility] Course & Interval History:       Ms. Shantal Ocampo is a 65 yo female with PMH of DM2, CKD3, CAD admitted with SBO. She has been NPO, followed by surgery. discharge plans pending to home. Subjective (21): Rapid response called due to chest pain and anxiety after receiving hydalazine, tearful, complaints of chest pain, was going to eat , no joey abdominal pain       Assessment & Plan:     Principal Problem:    SBO (small bowel obstruction) ([address] (2021)  ·   Clears ordered per surgery for 21  ·    appreciate surgery   · Continued IVF for now     Active Problems:    Stage 3b chronic kidney disease ([address] (9/15/2016)  ·   Improved  · followup BMP        Type 2 diabetes mellitus with stage 3b chronic kidney disease, with long-term current use of insulin ([address] (10/11/2018)  ·  holding tresiba  · Continued sliding scale insulin   · On dextrose IVF      HTN:  · Stop IV hydralazine   · Scheduled IV lopressor   · Oral meds on hold       Anemia:  · Trend HGB      CAD with active chest pain:  · Placed on remote   · STAT EKG ordered   · Trend STAT troponin   · Stop hydralazine             Dispo/Discharge Planning:    Pending to home     Diet:  ADULT DIET Clear Liquid  DVT PPx: heparin  Code status: Full Code       There is a high probability of acute organ impairment or life-threatening deterioration in the patient's condition from: CAD with chest pain, rapid response     Critical care interventions: STAT EKG and troponin- personally reviewed as NSR    Total critical care time spent: 35 minutes. Time is indicative of direct patient attendance at bedside and on the patient's floor nearby.     Critical care time includes time spent at bedside performing history and exam, performing chart review, discussing findings and treatment plan with patient and/or family, discussing patient with consultants and colleagues, ordering and reviewing pertinent laboratory and radiographic evaluations, and discussing patient with nursing staff. Time excludes procedures. There is a high probability of acute organ impairment or life-threatening deterioration in the patient's condition from: chest pain, acute rapid response         Active Hospital Problems    Diagnosis Date Noted    CAD (coronary artery disease) 08/19/2021    Chest pain 08/19/2021    Anemia 08/19/2021    Acute renal failure superimposed on stage 3b chronic kidney disease ([facility] 08/18/2021    SBO (small bowel obstruction) ([facility] 08/17/2021    Type 2 diabetes mellitus with stage 3b chronic kidney disease, with long-term current use of insulin ([facility] 10/11/2018    Stage 3b chronic kidney disease ([facility] 09/15/2016       Objective:     Patient Vitals for the past 24 hrs:   Temp Pulse Resp BP SpO2   08/19/21 1135 -- (!) 106 -- (!) 195/62 --   08/19/21 1129 98.1 °F (36.7 °C) 67 18 (!) 194/62 96 %   08/19/21 0701 98.4 °F (36.9 °C) 62 18 (!) 188/63 97 %   08/19/21 0253 98.2 °F (36.8 °C) 67 17 (!) 168/70 98 %   08/19/21 0103 98.7 °F (37.1 °C) 66 17 (!) 172/65 94 %   08/18/21 2300 98.4 °F (36.9 °C) 67 18 (!) 161/68 93 %   08/18/21 1818 98.9 °F (37.2 °C) 68 18 (!) 162/62 98 %   08/18/21 1508 98.4 °F (36.9 °C) 68 16 (!) 160/65 94 %     Oxygen Therapy  O2 Sat (%): 96 % (08/19/21 1129)  Pulse via Oximetry: 73 beats per minute (08/17/21 1905)  O2 Device: None (Room air) (08/19/21 0147)    Estimated body mass index is 26.54 kg/m² as calculated from the following:    Height as of 8/2/21: 5' 4\" (1.626 m). Weight as of this encounter: 70.1 kg (154 lb 9.6 oz).     Intake/Output Summary (Last 24 hours) at 8/19/2021 1235  Last data filed at 8/19/2021 1026  Gross per 24 hour   Intake --   Output 1900 ml   Net -1900 ml         Physical Exam:     General:    Well nourished. No overt distress  Head:  Normocephalic, atraumatic  Eyes:  Sclerae appear normal.  Pupils equally round. ENT:  Nares appear normal, no drainage. Moist oral mucosa  Neck:  No restricted ROM. Trachea midline   CV:   RRR. No m/r/g. No jugular venous distension. Lungs:   CTAB. No wheezing, rhonchi, or rales. Respirations even, unlabored  Abdomen: Bowel sounds present. Soft, nontender, nondistended. Extremities: No cyanosis or clubbing. No edema  Skin:     No rashes and normal coloration. Warm and dry. Neuro:  Cranial nerves II-XII grossly intact. Sensation intact  Psych:  Normal mood and affect. Alert and oriented x3    I have reviewed ordered lab tests and independently visualized imaging below:    Last 24hr Labs:  Recent Results (from the past 24 hour(s))   GLUCOSE, POC    Collection Time: 08/18/21  4:57 PM   Result Value Ref Range    Glucose (POC) 103 (H) 65 - 100 mg/dL    Performed by Ana Jerez    GLUCOSE, POC    Collection Time: 08/18/21  8:47 PM   Result Value Ref Range    Glucose (POC) 100 65 - 100 mg/dL    Performed by Ana Jerez    GLUCOSE, POC    Collection Time: 08/18/21  9:15 PM   Result Value Ref Range    Glucose (POC) 94 65 - 100 mg/dL    Performed by Joanna HOWARD    GLUCOSE, POC    Collection Time: 08/19/21  1:05 AM   Result Value Ref Range    Glucose (POC) 136 (H) 65 - 100 mg/dL    Performed by Cole (Teal)    CBC WITH AUTOMATED DIFF    Collection Time: 08/19/21  3:42 AM   Result Value Ref Range    WBC 5.1 4.3 - 11.1 K/uL    RBC 3.31 (L) 4.05 - 5.2 M/uL    HGB 10.5 (L) 11.7 - 15.4 g/dL    HCT 32.5 (L) 35.8 - 46.3 %    MCV 98.2 (H) 79.6 - 97.8 FL    MCH 31.7 26.1 - 32.9 PG    MCHC 32.3 31.4 - 35.0 g/dL    RDW 12.8 11.9 - 14.6 %    PLATELET 710 502 - 350 K/uL    MPV 9.0 (L) 9.4 - 12.3 FL    ABSOLUTE NRBC 0.00 0.0 - 0.2 K/uL    DF AUTOMATED      NEUTROPHILS 53 43 - 78 %    LYMPHOCYTES 28 13 - 44 %    MONOCYTES 13 (H) 4.0 - 12.0 %    EOSINOPHILS 6 0.5 - 7.8 %    BASOPHILS 0 0.0 - 2.0 %    IMMATURE GRANULOCYTES 0 0.0 - 5.0 %    ABS. NEUTROPHILS 2.7 1.7 - 8.2 K/UL    ABS. LYMPHOCYTES 1.5 0.5 - 4.6 K/UL    ABS. MONOCYTES 0.7 0.1 - 1.3 K/UL    ABS. EOSINOPHILS 0.3 0.0 - 0.8 K/UL    ABS. BASOPHILS 0.0 0.0 - 0.2 K/UL    ABS. IMM. GRANS. 0.0 0.0 - 0.5 K/UL   METABOLIC PANEL, COMPREHENSIVE    Collection Time: 08/19/21  3:42 AM   Result Value Ref Range    Sodium 140 136 - 145 mmol/L    Potassium 3.9 3.5 - 5.1 mmol/L    Chloride 108 (H) 98 - 107 mmol/L    CO2 25 21 - 32 mmol/L    Anion gap 7 7 - 16 mmol/L    Glucose 141 (H) 65 - 100 mg/dL    BUN 31 (H) 8 - 23 MG/DL    Creatinine 1.87 (H) 0.6 - 1.0 MG/DL    GFR est AA 34 (L) >60 ml/min/1.73m2    GFR est non-AA 28 (L) >60 ml/min/1.73m2    Calcium 8.9 8.3 - 10.4 MG/DL    Bilirubin, total 0.5 0.2 - 1.1 MG/DL    ALT (SGPT) 58 12 - 65 U/L    AST (SGOT) 20 15 - 37 U/L    Alk. phosphatase 115 50 - 136 U/L    Protein, total 7.3 6.3 - 8.2 g/dL    Albumin 3.3 3.2 - 4.6 g/dL    Globulin 4.0 (H) 2.3 - 3.5 g/dL    A-G Ratio 0.8 (L) 1.2 - 3.5     GLUCOSE, POC    Collection Time: 08/19/21  7:23 AM   Result Value Ref Range    Glucose (POC) 164 (H) 65 - 100 mg/dL    Performed by [facility]    GLUCOSE, POC    Collection Time: 08/19/21 12:07 PM   Result Value Ref Range    Glucose (POC) 170 (H) 65 - 100 mg/dL    Performed by [facility]        All Micro Results     None          Other Studies:  No results found.     Current Meds:  Current Facility-Administered Medications   Medication Dose Route Frequency    morphine injection 2 mg  2 mg IntraVENous Q4H PRN    metoprolol (LOPRESSOR) injection 2.5 mg  2.5 mg IntraVENous Q6H    dextrose 5 % - 0.45% NaCl infusion  75 mL/hr IntraVENous CONTINUOUS    sodium chloride (NS) flush 5-10 mL  5-10 mL IntraVENous Q8H    sodium chloride (NS) flush 5-10 mL  5-10 mL IntraVENous PRN    sodium chloride (NS) flush 5-40 mL  5-40 mL IntraVENous Q8H    sodium chloride (NS) flush 5-40 mL  5-40 mL IntraVENous PRN    acetaminophen (TYLENOL) tablet 650 mg  650 mg Oral Q6H PRN    Or    acetaminophen (TYLENOL) suppository 650 mg  650 mg Rectal Q6H PRN    polyethylene glycol (MIRALAX) packet 17 g  17 g Oral DAILY PRN    bisacodyL (DULCOLAX) suppository 10 mg  10 mg Rectal DAILY PRN    ondansetron (ZOFRAN ODT) tablet 4 mg  4 mg Oral Q8H PRN    Or    ondansetron (ZOFRAN) injection 4 mg  4 mg IntraVENous Q6H PRN    famotidine (PEPCID) tablet 20 mg  20 mg Oral BID PRN    alum-mag hydroxide-simeth (MYLANTA) oral suspension 15 mL  15 mL Oral Q6H PRN    heparin (porcine) injection 5,000 Units  5,000 Units SubCUTAneous Q8H    promethazine (PHENERGAN) with saline injection 12.5 mg  12.5 mg IntraVENous Q6H PRN    insulin lispro (HUMALOG) injection   SubCUTAneous AC&HS    dextrose 40% (GLUTOSE) oral gel 1 Tube  15 g Oral PRN    glucagon (GLUCAGEN) injection 1 mg  1 mg IntraMUSCular PRN    dextrose (D50W) injection syrg 12.5-25 g  25-50 mL IntraVENous PRN    labetaloL (NORMODYNE;TRANDATE) injection 10 mg  10 mg IntraVENous Q4H PRN       Signed:  Robert De Souza MD    Part of this note may have been written by using a voice dictation software. The note has been proof read but may still contain some grammatical/other typographical errors.

## 2021-08-19 NOTE — PROGRESS NOTES
PT BP has been 620-237 systolic. Labetalol given at 0147 for /65. At 0253 BP now 168/70. MD notified due to labetalol parameters being >160. No new orders at this time.

## 2021-08-19 NOTE — PROGRESS NOTES
Night shift note:    Patient with steadily dropping blood sugars, last one was 94. She is currently NPO. Hold home [place] and avoid basal insulin for now. Switch LR to D5 infusion. Continue with insulin sliding scale. Rest per progress notes.

## 2021-08-19 NOTE — PROGRESS NOTES
H&P/Consult Note/Progress Note/Office Note:   Sean Parra  MRN: [id number]  :1950  Age:71 y.o.    HPI: Sean Parra is a 66 y.o. female who we are asked by Dr. Fidelina Ruby to see for SBO. The patient has a PMHx of CAD with stents, liver dx, DM, CKD3. She presented to the ER on 2021 with complaints of abdominal pain, constipation, nausea and vomiting that began on 8/15/21. She denies fever, chills or diarrhea. ER workup showed CTAP with SBO and transition point in the RLQ. She has a surgical hx of hysterectomy. No prior SBO. States her last BM was  but reports 2 weeks of constipation. She described her last BM as \"hard balls\". Not currently passing flatus. 21- Patient states pain is better. +flatus. Nausea better. 21- continues to have flatus, states she feels like she needs to have a BM. Tolerated sips of clears. Abdomen soft, pain much improved since admission. Past Medical History:   Diagnosis Date    Acute on chronic combined systolic and diastolic congestive heart failure ([address] 2021    Acute renal failure superimposed on stage 3b chronic kidney disease ([address] 2021    CAD (coronary artery disease) 3/1/2012    MI, 3 stents    Chest pain     Coronary artery disease involving native coronary artery without angina pectoris 2015    COVID-19 virus infection 2021    Depression 3/1/2012    Diabetes mellitus ([address] 3/1/2012    oral agents. . hypo- 80's, Last A1c 6.9 in 2018    DM2 (diabetes mellitus, type 2) ([facility] 2015    Elevated LFTs 2021    Elevated troponin 3/2/2012    Essential hypertension 2015    External hemorrhoids without mention of complication 2813    GERD (gastroesophageal reflux disease)     HCAP (healthcare-associated pneumonia) 2021    History of MI (myocardial infarction) 11/12    x2    Hypercholesterolemia     Hypertension 3/1/2012    Hypoalbuminemia 2021    Hyponatremia 2021    Hypoxemia 8/12/2020    Insomnia     Lactic acidosis 1/12/2021    Long QT interval 1/12/2021    Personal history of colonic polyps 2013    hyperplastic    Platelet inhibition due to Plavix     holding for colonoscopy per GI Dr. Greene Saliva Pleural effusion, bilateral 8/11/2020    Rectocele 2013    Sepsis ([facility] 1/12/2021    Tobacco abuse 3/1/2012    quit for 1 year    Tobacco use disorder     Unstable angina ([facility] 3/1/2012    ntg as needed.       Past Surgical History:   Procedure Laterality Date    HX CAROTID ENDARTERECTOMY Left 12/12/2018    HX CATARACT REMOVAL Left 09/19/2016    Dr Sandip Monroy, [address] [place] [address] Right 11/07/2016    Dr Sandip Monroy, [facility]    HX CERVICAL FUSION      neck w/plate    HX COLONOSCOPY  12/17/13    Anna Marie--single transverse hyperplastic polyp--7-10 year recall    HX HEMORRHOIDECTOMY      x2    HX ORTHOPAEDIC      left elbow    HX CAL AND BSO      HX WISDOM TEETH EXTRACTION      [place] LEFT HEART CATH,PERCUTANEOUS  last stented 11/12 x 2    stent x3 , mi x 2     Current Facility-Administered Medications   Medication Dose Route Frequency    [Held by provider] TRESIBA (insulin degludec) soln 14 Units (Patient Supplied)  14 Units SubCUTAneous QHS    dextrose 5 % - 0.45% NaCl infusion  75 mL/hr IntraVENous CONTINUOUS    sodium chloride (NS) flush 5-10 mL  5-10 mL IntraVENous Q8H    sodium chloride (NS) flush 5-10 mL  5-10 mL IntraVENous PRN    sodium chloride (NS) flush 5-40 mL  5-40 mL IntraVENous Q8H    sodium chloride (NS) flush 5-40 mL  5-40 mL IntraVENous PRN    acetaminophen (TYLENOL) tablet 650 mg  650 mg Oral Q6H PRN    Or    acetaminophen (TYLENOL) suppository 650 mg  650 mg Rectal Q6H PRN    polyethylene glycol (MIRALAX) packet 17 g  17 g Oral DAILY PRN    bisacodyL (DULCOLAX) suppository 10 mg  10 mg Rectal DAILY PRN    ondansetron (ZOFRAN ODT) tablet 4 mg  4 mg Oral Q8H PRN    Or    ondansetron (ZOFRAN) injection 4 mg  4 mg IntraVENous Q6H PRN  famotidine (PEPCID) tablet 20 mg  20 mg Oral BID PRN    alum-mag hydroxide-simeth (MYLANTA) oral suspension 15 mL  15 mL Oral Q6H PRN    heparin (porcine) injection 5,000 Units  5,000 Units SubCUTAneous Q8H    morphine injection 1 mg  1 mg IntraVENous Q4H PRN    promethazine (PHENERGAN) with saline injection 12.5 mg  12.5 mg IntraVENous Q6H PRN    insulin lispro (HUMALOG) injection   SubCUTAneous AC&HS    dextrose 40% (GLUTOSE) oral gel 1 Tube  15 g Oral PRN    glucagon (GLUCAGEN) injection 1 mg  1 mg IntraMUSCular PRN    dextrose (D50W) injection syrg 12.5-25 g  25-50 mL IntraVENous PRN    labetaloL (NORMODYNE;TRANDATE) injection 10 mg  10 mg IntraVENous Q4H PRN     Cephalosporins, Sulfamethoxazole-trimethoprim, Codeine, and Lisinopril  Social History     Socioeconomic History    Marital status:      Spouse name: Not on file    Number of children: Not on file    Years of education: Not on file    Highest education level: Not on file   Tobacco Use    Smoking status: Former Smoker     Packs/day: 1.00     Years: 40.00     Pack years: 40.00     Quit date: 2012     Years since quittin.7    Smokeless tobacco: Never Used    Tobacco comment: quit  . has stopped prior also   Vaping Use    Vaping Use: Never used   Substance and Sexual Activity    Alcohol use: No    Drug use: No     Social Determinants of Health     Financial Resource Strain:     Difficulty of Paying Living Expenses:    Food Insecurity:     Worried About Running Out of Food in the Last Year:     Ran Out of Food in the Last Year:    Transportation Needs:     Lack of Transportation (Medical):      Lack of Transportation (Non-Medical):    Physical Activity:     Days of Exercise per Week:     Minutes of Exercise per Session:    Stress:     Feeling of Stress :    Social Connections:     Frequency of Communication with Friends and Family:     Frequency of Social Gatherings with Friends and Family:     Attends [employer] Services:     Active Member of Clubs or Organizations:    Sean Attends Club or Organization Meetings:     Marital Status:      Social History     Tobacco Use   Smoking Status Former Smoker    Packs/day: 1.00    Years: 40.00    Pack years: 40.00    Quit date: 2012    Years since quittin.7   Smokeless Tobacco Never Used   Tobacco Comment    quit  . has stopped prior also     Family History   Problem Relation Age of Onset    Heart Disease Mother     Osteoporosis Mother     Heart Attack Mother 67        mi    Thyroid Disease Mother         Multinodular Goiter    Heart Disease Father     Cancer Father         pancreatic    Heart Attack Father 67        MI    Cancer Sister         Pre-Cancerous dysplasia    Thyroid Cancer Sister     Ulcerative Colitis Brother     Thyroid Cancer Brother     Breast Cancer Neg Hx      ROS: The patient has no difficulty with chest pain or shortness of breath. No fever or chills. Comprehensive review of systems was otherwise unremarkable except as noted above. Physical Exam:   Visit Vitals  BP (!) 188/63 Comment: RN was notified   Pulse 62   Temp 98.4 °F (36.9 °C)   Resp 18   Wt 154 lb 9.6 oz (70.1 kg)   SpO2 97%   BMI 26.54 kg/m²     Constitutional: Alert, oriented, cooperative patient in no acute distress; appears stated age    Eyes:Sclera are clear. EOMs intact  ENMT: no external lesions gross hearing normal; no obvious neck masses, no ear or lip lesions, nares normal  CV: RRR. Normal perfusion  Resp: No JVD. Breathing is  non-labored; no audible wheezing. GI: soft, nondistended    Musculoskeletal: unremarkable with normal function. No embolic signs or cyanosis.    Neuro:  Oriented; moves all 4; no focal deficits  Psychiatric: normal affect and mood, no memory impairment    Recent vitals (if inpt):  Patient Vitals for the past 24 hrs:   BP Temp Pulse Resp SpO2 Weight   21 0701 (!) 188/63 98.4 °F (36.9 °C) 62 18 97 % --   21 0635 -- -- -- -- -- 154 lb 9.6 oz (70.1 kg)   08/19/21 0253 (!) 168/70 98.2 °F (36.8 °C) 67 17 98 % --   08/19/21 0103 (!) 172/65 98.7 °F (37.1 °C) 66 17 94 % --   08/18/21 2300 (!) 161/68 98.4 °F (36.9 °C) 67 18 93 % --   08/18/21 1818 (!) 162/62 98.9 °F (37.2 °C) 68 18 98 % --   08/18/21 1508 (!) 160/65 98.4 °F (36.9 °C) 68 16 94 % --   08/18/21 1106 (!) 143/65 98.1 °F (36.7 °C) 63 17 95 % --       Labs:  Recent Labs     08/19/21  0342 08/18/21  0555 08/17/21  1203   WBC 5.1   < > 10.8   HGB 10.5*   < > 14.2      < > 318      < > 135*   K 3.9   < > 5.3*   *   < > 105   CO2 25   < > 19*   BUN 31*   < > 59*   CREA 1.87*   < > 2.74*   *   < > 147*   TBILI 0.5  --  0.7   ALT 58  --  119*     --  162*   LPSE  --   --  184    < > = values in this interval not displayed. Lab Results   Component Value Date/Time    WBC 5.1 08/19/2021 03:42 AM    HGB 10.5 (L) 08/19/2021 03:42 AM    PLATELET 455 39/07/9555 03:42 AM    Sodium 140 08/19/2021 03:42 AM    Potassium 3.9 08/19/2021 03:42 AM    Chloride 108 (H) 08/19/2021 03:42 AM    CO2 25 08/19/2021 03:42 AM    BUN 31 (H) 08/19/2021 03:42 AM    Creatinine 1.87 (H) 08/19/2021 03:42 AM    Glucose 141 (H) 08/19/2021 03:42 AM    INR 1.1 01/13/2021 06:21 AM    Bilirubin, total 0.5 08/19/2021 03:42 AM    Bilirubin, direct 0.13 08/02/2021 02:31 PM    ALT (SGPT) 58 08/19/2021 03:42 AM    Alk. phosphatase 115 08/19/2021 03:42 AM    Amylase 45 11/05/2013 04:27 PM    Lipase 184 08/17/2021 12:03 PM    Troponin-I, Qt. <0.02 (L) 02/05/2017 09:08 PM       I reviewed recent labs and recent radiologic studies.     CT Results (most recent):  Results from Hospital Encounter encounter on 08/17/21    CT ABD PELV WO CONT    Narrative  EXAMINATION: CT ABD PELV WO CONT 8/17/2021 2:24 PM    INDICATION:  Epigastric and right upper quadrant pain    COMPARISON: CT abdomen and pelvis 2/17/2021    TECHNIQUE: Contiguous axial computed tomographic images were obtained from the  domes of the diaphragm to the symphysis pubis without intravenous contrast.  Coronal reconstructions were also performed. Oral contrast was also  administered. Please note that the detection of solid organ and vascular abnormalities is  limited in the absence of intravenous contrast.  Radiation dose reduction  techniques were used for this study. Our CT scanners use one or all of the  following: Automated exposure control, adjustment of the mA and/or kV according  to patient size, iterative reconstruction. FINDINGS:    Lung bases: Clear    Heptobiliary: No hepatic lesion. Normal gallbladder. No biliary dilation. Spleen, adrenal, pancreas: Normal spleen. No pancreatic ductal dilation. No  adrenal mass. Kidneys/urinary: No hydronephrosis or nephrolithiasis. Normal bladder. Reproductive: Prior hysterectomy. No adnexal mass. Bowel: Distended small bowel loops with a transition point in the right lower  abdomen (coronal #45) contents medially proximal to the transition show  pneumatized contents. No pneumatosis or portal venous gas. Peritoneum: No free air. Trace perihepatic fluid. And fluid in the mesentery    Vessels/lymph nodes: No AAA. No adenopathy. Abdominal wall: No bowel-containing hernia. Bones: No suspicious osseous lesion. Impression  Small bowel obstruction with transition point in the right lower abdomen. No  pneumatosis or portal venous gas. XR Results (most recent):  Results from Hospital Encounter encounter on 08/17/21    XR ABD (KUB)    Narrative  History: Small bowel obstruction    EXAM: KUB    FINDINGS: There is contrast present within the colon. Mildly prominent loops of  small bowel seen. No free air. Impression  Mildly prominent loops of small bowel. Cannot exclude partial small  bowel obstruction or ileus. There is oral contrast present in the colon. I independently reviewed radiology images for studies I described above or studies I have ordered. Admission date (for inpatients): 8/17/2021   * No surgery found *  * No surgery found *    ASSESSMENT/PLAN:  Problem List  Date Reviewed: 8/2/2021        Codes Class Noted    Acute renal failure superimposed on stage 3b chronic kidney disease ([facility] ICD-10-CM: N17.9, N18.32  ICD-9-CM: 584.9, 585.3  8/18/2021        * (Principal) SBO (small bowel obstruction) ([facility] ICD-10-CM: K56.609  ICD-9-CM: 560.9  8/17/2021        Elevated ALT measurement ICD-10-CM: R74.01  ICD-9-CM: 790.4  7/7/2021        Polyarthritis ICD-10-CM: M13.0  ICD-9-CM: 716.50  7/7/2021        Chronic diastolic congestive heart failure (HCC) ICD-10-CM: I50.32  ICD-9-CM: 428.32, 428.0  4/19/2021    Overview Signed 7/7/2021 11:06 AM by Angeles Gagnon NP     Heart Failure (HFpEF), EF 52-01%, with diastolic dysfunction, ECHO 2/16/2021               Congestive heart failure (CHF) ([facility] ICD-10-CM: I50.9  ICD-9-CM: 428.0  2/15/2021        Normocytic anemia ICD-10-CM: D64.9  ICD-9-CM: 285.9  1/12/2021        Mixed hyperlipidemia ICD-10-CM: E78.2  ICD-9-CM: 272.2  10/21/2020        S/P PTCA (percutaneous transluminal coronary angioplasty) ICD-10-CM: Z98.61  ICD-9-CM: V45.82  9/1/2020        Other cirrhosis of liver ([facility] ICD-10-CM: K74.69  ICD-9-CM: 571.5  8/11/2020        History of left-sided carotid endarterectomy ICD-10-CM: Z98.890  ICD-9-CM: V45.89  3/12/2020        Carotid stenosis, asymptomatic, right ICD-10-CM: X11.07  ICD-9-CM: 433.10  3/12/2020        Carotid artery stenosis ICD-10-CM: I65.29  ICD-9-CM: 433.10  12/12/2018        Right carotid bruit ICD-10-CM: R09.89  ICD-9-CM: 785.9  11/6/2018        Type 2 diabetes mellitus with stage 3b chronic kidney disease, with long-term current use of insulin (HCC) ICD-10-CM: E11.22, N18.32, Z79.4  ICD-9-CM: 250.40, 585.3, V58.67  10/11/2018        Gastroesophageal reflux disease ICD-10-CM: K21.9  ICD-9-CM: 530.81  2/16/2017        Essential hypertension (Chronic) ICD-10-CM: I10  ICD-9-CM: 401.9  9/15/2016 Stage 3b chronic kidney disease ([facility] ICD-10-CM: E79.12  ICD-9-CM: 585.3  9/15/2016        Age-related osteoporosis without current pathological fracture ICD-10-CM: M81.0  ICD-9-CM: 733.01  9/15/2016        Major depressive disorder with single episode, in full remission ([facility] ICD-10-CM: F32.5  ICD-9-CM: 296.26  9/15/2016        Primary insomnia ICD-10-CM: F51.01  ICD-9-CM: 307.42  9/15/2016        Coronary artery disease involving native coronary artery of native heart without angina pectoris ICD-10-CM: I25.10  ICD-9-CM: 414.01  9/4/2013            Principal Problem:    SBO (small bowel obstruction) ([facility] (8/17/2021)    Active Problems:    Stage 3b chronic kidney disease ([facility] (9/15/2016)      Type 2 diabetes mellitus with stage 3b chronic kidney disease, with long-term current use of insulin ([facility] (10/11/2018)      Acute renal failure superimposed on stage 3b chronic kidney disease ([facility] (8/18/2021)         Care management per primary team  Clear liquids- advance as tolerated- if pain worsens then stop diet  IVFs  Hopeful for conservative management  Will continue to monitor     [address] Townsend NP    I have seen and examined the patient with the Nurse Practitioner and agree with the above assessment and plan. Still some abdominal pain, denies nausea/vomiting, positive flatus. Vitals are normal. abd exam is benign. Will try to advance diet.      Duglas Duarte MD

## 2021-08-19 NOTE — PROGRESS NOTES
Date of Outreach Update:  Nighat Thornton was seen and assessed. MEWS Score: 1 (08/19/21 0701)  Vitals:    08/19/21 0701 08/19/21 1129 08/19/21 1135 08/19/21 1515   BP: (!) 188/63 (!) 194/62 (!) 195/62 (!) 161/54   Pulse: 62 67 (!) 106 71   Resp: 18 18 19   Temp: 98.4 °F (36.9 °C) 98.1 °F (36.7 °C)  98.5 °F (36.9 °C)   SpO2: 97% 96%  99%   Weight:             Pain Assessment  Pain Intensity 1: 8 (08/19/21 1138)  Pain Location 1: Abdomen  Pain Intervention(s) 1: Medication (see MAR), Emotional support  Patient Stated Pain Goal: 0      Pt resting comfortably in bed, no distress, reading. 22G peripheral IV placed for primary RN. BP/HR stable. Will continue to follow up per outreach protocol.     Signed By:   Toribio Francisco    August 19, 2021 3:32 PM

## 2021-08-19 NOTE — PROGRESS NOTES
Patients Blood Glucose was a steady decline from 105-94 within a 30 min window with pt feeling symptomatic. patient NPO/sips of clears. MD notified. New orders received.

## 2021-08-19 NOTE — PROGRESS NOTES
Problem: Falls - Risk of  Goal: *Absence of Falls  Description: Document Ruby Glez Fall Risk and appropriate interventions in the flowsheet.   Outcome: Progressing Towards Goal  Note: Fall Risk Interventions:            Medication Interventions: Evaluate medications/consider consulting pharmacy, Patient to call before getting OOB

## 2021-08-19 NOTE — PROGRESS NOTES
RAPID RESPONSE CRITICAL CARE OUTREACH NURSE NOTE      Pt was seen and examined by this Outreach RN following Rapid Response. Pt status/focused assessment upon arrival to Rapid Response as follows:      SITUATION: Pt anxious and complaining of chest pain after receiving hydralazine. NEURO: Alert, oriented. Very anxious and tearful. RESP: Tachypneic       CARDIAC: NSR       LATEST VITAL SIGNS AND LAB VALUES RECORDED:  MEWS Score: 1 (08/19/21 0701)  Vitals:    08/19/21 0635 08/19/21 0701 08/19/21 1129 08/19/21 1135   BP:  (!) 188/63 (!) 194/62 (!) 195/62   Pulse:  62 67 (!) 106   Resp:  18 18    Temp:  98.4 °F (36.9 °C) 98.1 °F (36.7 °C)    SpO2:  97% 96%    Weight: 70.1 kg (154 lb 9.6 oz)        Recent Labs     08/19/21  0342 08/18/21  0555 08/17/21  1203    140 135*   K 3.9 4.6 5.3*   * 112* 105   CO2 25 21 19*   AGAP 7 7 11   * 108* 147*   BUN 31* 45* 59*   CREA 1.87* 2.05* 2.74*   GFRAA 34* 31* 22*   GFRNA 28* 25* 18*   CA 8.9 8.9 10.1   ALB 3.3  --  4.5   TP 7.3  --  9.7*   GLOB 4.0*  --  5.2*   AGRAT 0.8*  --  0.9*   ALT 58  --  119*        Recent Labs     08/19/21  0342 08/18/21  0555 08/17/21  1203   WBC 5.1 6.4 10.8   HGB 10.5* 10.8* 14.2   HCT 32.5* 33.9* 42.4    225 318          LAB WORK PENDING/SENT DURING RAPID RESPONSE: Troponin        Interventions completed during Rapid Response: EKG          Is pt transferring to Critical Care bed? No  Room #:   Did Outreach RN assist with transfer? Pt disposition IF not transferring to Critical Care: Remote tele, remains in room. Post Rapid Response plan of care: Continue to follow per outreach protocol.

## 2021-08-19 NOTE — PROGRESS NOTES
END OF SHIFT NOTE:    INTAKE/OUTPUT  08/18 0701 - 08/19 0700  In: -   Out: 1300 [Urine:1300]  Voiding: YES  Catheter: NO  Drain:              Flatus: Patient does have flatus present. Stool:  1 occurrences. Characteristics: hard, formed  large       Emesis: 0 occurrences. Characteristics:        VITAL SIGNS  Patient Vitals for the past 12 hrs:   Temp Pulse Resp BP SpO2   08/19/21 1515 98.5 °F (36.9 °C) 71 19 (!) 161/54 99 %   08/19/21 1135 -- (!) 106 -- (!) 195/62 --   08/19/21 1129 98.1 °F (36.7 °C) 67 18 (!) 194/62 96 %   08/19/21 0701 98.4 °F (36.9 °C) 62 18 (!) 188/63 97 %       Pain Assessment  Pain Intensity 1: 8 (08/19/21 1138)  Pain Location 1: Abdomen  Pain Intervention(s) 1: Medication (see MAR), Emotional support  Patient Stated Pain Goal: 0    Ambulating  Yes    Shift report given to oncoming nurse at the bedside.     Hector Kay RN

## 2021-08-19 NOTE — PROGRESS NOTES
END OF SHIFT NOTE:    INTAKE/OUTPUT  08/18 0701 - 08/19 0700  In: -   Out: 1300 [Urine:1300]  Voiding: YES  Catheter: NO  Drain:              Flatus: Patient does have flatus present. Stool:  0 occurrences. Characteristics:       Emesis: 0 occurrences. Characteristics:        VITAL SIGNS  Patient Vitals for the past 12 hrs:   Temp Pulse Resp BP SpO2   08/19/21 0253 98.2 °F (36.8 °C) 67 17 (!) 168/70 98 %   08/19/21 0103 98.7 °F (37.1 °C) 66 17 (!) 172/65 94 %   08/18/21 2300 98.4 °F (36.9 °C) 67 18 (!) 161/68 93 %   08/18/21 1818 98.9 °F (37.2 °C) 68 18 (!) 162/62 98 %       Pain Assessment  Pain Intensity 1: 0 (08/19/21 0147)  Pain Location 1: Abdomen  Pain Intervention(s) 1: Medication (see MAR)  Patient Stated Pain Goal: 0    Ambulating  Yes    Shift report given to oncoming nurse at the bedside.     Judy Jara, KEYANNA

## 2021-08-20 VITALS
HEART RATE: 60 BPM | SYSTOLIC BLOOD PRESSURE: 151 MMHG | BODY MASS INDEX: 27.17 KG/M2 | TEMPERATURE: 98 F | RESPIRATION RATE: 16 BRPM | OXYGEN SATURATION: 95 % | DIASTOLIC BLOOD PRESSURE: 73 MMHG | WEIGHT: 158.3 LBS

## 2021-08-20 LAB
ALBUMIN SERPL-MCNC: 3.1 G/DL (ref 3.2–4.6)
ALBUMIN/GLOB SERPL: 0.9 {RATIO} (ref 1.2–3.5)
ALP SERPL-CCNC: 107 U/L (ref 50–136)
ALT SERPL-CCNC: 45 U/L (ref 12–65)
ANION GAP SERPL CALC-SCNC: 7 MMOL/L (ref 7–16)
AST SERPL-CCNC: 17 U/L (ref 15–37)
BASOPHILS # BLD: 0 K/UL (ref 0–0.2)
BASOPHILS NFR BLD: 0 % (ref 0–2)
BILIRUB SERPL-MCNC: 0.4 MG/DL (ref 0.2–1.1)
BUN SERPL-MCNC: 19 MG/DL (ref 8–23)
CALCIUM SERPL-MCNC: 8.6 MG/DL (ref 8.3–10.4)
CHLORIDE SERPL-SCNC: 109 MMOL/L (ref 98–107)
CO2 SERPL-SCNC: 23 MMOL/L (ref 21–32)
CREAT SERPL-MCNC: 1.65 MG/DL (ref 0.6–1)
DIFFERENTIAL METHOD BLD: ABNORMAL
EOSINOPHIL # BLD: 0.4 K/UL (ref 0–0.8)
EOSINOPHIL NFR BLD: 7 % (ref 0.5–7.8)
ERYTHROCYTE [DISTWIDTH] IN BLOOD BY AUTOMATED COUNT: 12.9 % (ref 11.9–14.6)
GLOBULIN SER CALC-MCNC: 3.6 G/DL (ref 2.3–3.5)
GLUCOSE BLD STRIP.AUTO-MCNC: 168 MG/DL (ref 65–100)
GLUCOSE BLD STRIP.AUTO-MCNC: 215 MG/DL (ref 65–100)
GLUCOSE SERPL-MCNC: 148 MG/DL (ref 65–100)
HCT VFR BLD AUTO: 31.4 % (ref 35.8–46.3)
HGB BLD-MCNC: 10.4 G/DL (ref 11.7–15.4)
IMM GRANULOCYTES # BLD AUTO: 0 K/UL (ref 0–0.5)
IMM GRANULOCYTES NFR BLD AUTO: 0 % (ref 0–5)
LYMPHOCYTES # BLD: 1.2 K/UL (ref 0.5–4.6)
LYMPHOCYTES NFR BLD: 20 % (ref 13–44)
MCH RBC QN AUTO: 31.4 PG (ref 26.1–32.9)
MCHC RBC AUTO-ENTMCNC: 33.1 G/DL (ref 31.4–35)
MCV RBC AUTO: 94.9 FL (ref 79.6–97.8)
MONOCYTES # BLD: 0.8 K/UL (ref 0.1–1.3)
MONOCYTES NFR BLD: 13 % (ref 4–12)
NEUTS SEG # BLD: 3.5 K/UL (ref 1.7–8.2)
NEUTS SEG NFR BLD: 59 % (ref 43–78)
NRBC # BLD: 0 K/UL (ref 0–0.2)
PLATELET # BLD AUTO: 202 K/UL (ref 150–450)
PMV BLD AUTO: 9.6 FL (ref 9.4–12.3)
POTASSIUM SERPL-SCNC: 4.1 MMOL/L (ref 3.5–5.1)
PROT SERPL-MCNC: 6.7 G/DL (ref 6.3–8.2)
RBC # BLD AUTO: 3.31 M/UL (ref 4.05–5.2)
SERVICE CMNT-IMP: ABNORMAL
SERVICE CMNT-IMP: ABNORMAL
SODIUM SERPL-SCNC: 139 MMOL/L (ref 136–145)
TROPONIN-HIGH SENSITIVITY: 27.5 PG/ML (ref 0–14)
WBC # BLD AUTO: 6 K/UL (ref 4.3–11.1)

## 2021-08-20 PROCEDURE — 85025 COMPLETE CBC W/AUTO DIFF WBC: CPT

## 2021-08-20 PROCEDURE — 84484 ASSAY OF TROPONIN QUANT: CPT

## 2021-08-20 PROCEDURE — 99231 SBSQ HOSP IP/OBS SF/LOW 25: CPT | Performed by: SURGERY

## 2021-08-20 PROCEDURE — 74011000250 HC RX REV CODE- 250: Performed by: STUDENT IN AN ORGANIZED HEALTH CARE EDUCATION/TRAINING PROGRAM

## 2021-08-20 PROCEDURE — 74011636637 HC RX REV CODE- 636/637: Performed by: STUDENT IN AN ORGANIZED HEALTH CARE EDUCATION/TRAINING PROGRAM

## 2021-08-20 PROCEDURE — 82962 GLUCOSE BLOOD TEST: CPT

## 2021-08-20 PROCEDURE — 74011250637 HC RX REV CODE- 250/637: Performed by: STUDENT IN AN ORGANIZED HEALTH CARE EDUCATION/TRAINING PROGRAM

## 2021-08-20 PROCEDURE — 74011250636 HC RX REV CODE- 250/636: Performed by: STUDENT IN AN ORGANIZED HEALTH CARE EDUCATION/TRAINING PROGRAM

## 2021-08-20 PROCEDURE — 80053 COMPREHEN METABOLIC PANEL: CPT

## 2021-08-20 PROCEDURE — 74011000250 HC RX REV CODE- 250: Performed by: INTERNAL MEDICINE

## 2021-08-20 PROCEDURE — 74011250637 HC RX REV CODE- 250/637: Performed by: INTERNAL MEDICINE

## 2021-08-20 PROCEDURE — 36415 COLL VENOUS BLD VENIPUNCTURE: CPT

## 2021-08-20 RX ADMIN — ACETAMINOPHEN 650 MG: 325 TABLET ORAL at 07:16

## 2021-08-20 RX ADMIN — ISOSORBIDE MONONITRATE 60 MG: 30 TABLET, EXTENDED RELEASE ORAL at 08:50

## 2021-08-20 RX ADMIN — INSULIN LISPRO 4 UNITS: 100 INJECTION, SOLUTION INTRAVENOUS; SUBCUTANEOUS at 12:29

## 2021-08-20 RX ADMIN — Medication 10 ML: at 05:14

## 2021-08-20 RX ADMIN — INSULIN LISPRO 2 UNITS: 100 INJECTION, SOLUTION INTRAVENOUS; SUBCUTANEOUS at 08:49

## 2021-08-20 RX ADMIN — HEPARIN SODIUM 5000 UNITS: 5000 INJECTION INTRAVENOUS; SUBCUTANEOUS at 05:14

## 2021-08-20 RX ADMIN — Medication 10 ML: at 05:15

## 2021-08-20 RX ADMIN — CARVEDILOL 3.12 MG: 3.12 TABLET, FILM COATED ORAL at 08:51

## 2021-08-20 RX ADMIN — LABETALOL HYDROCHLORIDE 10 MG: 5 INJECTION INTRAVENOUS at 07:08

## 2021-08-20 RX ADMIN — CITALOPRAM HYDROBROMIDE 20 MG: 20 TABLET ORAL at 08:51

## 2021-08-20 RX ADMIN — DEXTROSE MONOHYDRATE AND SODIUM CHLORIDE 75 ML/HR: 5; .45 INJECTION, SOLUTION INTRAVENOUS at 04:20

## 2021-08-20 NOTE — PROGRESS NOTES
Chart review complete, pt per surgery note ready for dc home, pending hospitalist to come by to potentially dc pt home today, no dc needs noted from previous assessment.

## 2021-08-20 NOTE — DISCHARGE INSTRUCTIONS
DISCHARGE SUMMARY from Nurse    PATIENT INSTRUCTIONS:    After general anesthesia or intravenous sedation, for 24 hours or while taking prescription Narcotics:  · Limit your activities  · Do not drive and operate hazardous machinery  · Do not make important personal or business decisions  · Do  not drink alcoholic beverages  · If you have not urinated within 8 hours after discharge, please contact your surgeon on call. Report the following to your surgeon:  · Excessive pain, swelling, redness or odor of or around the surgical area  · Temperature over 100.5  · Nausea and vomiting lasting longer than 4 hours or if unable to take medications  · Any signs of decreased circulation or nerve impairment to extremity: change in color, persistent  numbness, tingling, coldness or increase pain  · Any questions    What to do at Home:  Recommended activity: No lifting greater than 5-10lbs, Driving while taking pain meds, or Strenuous exercise for 2-3 weeks until follow up appt. If you experience any of the following symptoms fever of 101 or greater, constipation, uncontrolled nausea or pain, please follow up with PCP. *  Please give a list of your current medications to your Primary Care Provider. *  Please update this list whenever your medications are discontinued, doses are      changed, or new medications (including over-the-counter products) are added. *  Please carry medication information at all times in case of emergency situations. These are general instructions for a healthy lifestyle:    No smoking/ No tobacco products/ Avoid exposure to second hand smoke  Surgeon General's Warning:  Quitting smoking now greatly reduces serious risk to your health.     Obesity, smoking, and sedentary lifestyle greatly increases your risk for illness    A healthy diet, regular physical exercise & weight monitoring are important for maintaining a healthy lifestyle    You may be retaining fluid if you have a history of heart failure or if you experience any of the following symptoms:  Weight gain of 3 pounds or more overnight or 5 pounds in a week, increased swelling in our hands or feet or shortness of breath while lying flat in bed. Please call your doctor as soon as you notice any of these symptoms; do not wait until your next office visit. The discharge information has been reviewed with the patient. The patient verbalized understanding. Discharge medications reviewed with the patient and appropriate educational materials and side effects teaching were provided. ___________________________________________________________________________________________________________________________________      Patient Education        Bowel Blockage (Intestinal Obstruction): Care Instructions  Your Care Instructions  A bowel blockage, also called an intestinal obstruction, can prevent gas, fluids, or solids from moving through the intestines normally. It can cause constipation and, rarely, diarrhea. You may have pain, nausea, vomiting, and cramping. Most of the time, complete blockages require a stay in the hospital and possibly surgery. But if your bowel is only partly blocked, your doctor may tell you to wait until it clears on its own and you are able to pass gas and stool. If so, there are things you can do at home to help make you feel better. If you have had surgery for a bowel blockage, there are things you can do at home to make sure you heal well. You can also make some changes to keep your bowel from becoming blocked again. Follow-up care is a key part of your treatment and safety. Be sure to make and go to all appointments, and call your doctor if you are having problems. It's also a good idea to know your test results and keep a list of the medicines you take. How can you care for yourself at home?   If your doctor has told you to wait at home for a blockage to clear on its own:  · Follow your doctor's instructions. These may include eating a liquid diet to avoid complete blockage. · Be safe with medicines. Take your medicines exactly as prescribed. Call your doctor if you think you are having a problem with your medicine. · Put a heating pad set on low on your belly to relieve mild cramps and pain. To prevent another blockage  · Try to eat smaller amounts of food more often. For example, have 5 or 6 small meals throughout the day instead of 2 or 3 large meals. · Chew your food very well. Try to chew each bite about 20 times or until it is liquid. · Avoid high-fiber foods and raw fruits and vegetables with skins, husks, strings, or seeds. These can form a ball of undigested material that can cause a blockage if a part of your bowel is scarred or narrowed. · Check with your doctor before you eat whole-grain products or use a fiber supplement such as Citrucel or Metamucil. · To help you have regular bowel movements, eat at regular times, do not strain during a bowel movement, and drink plenty of water. If you have kidney, heart, or liver disease and have to limit fluids, talk with your doctor before you increase the amount of fluids you drink. · Drink high-calorie liquid formulas if your doctor says to. Severe symptoms may make it hard for your body to take in vitamins and minerals. · Get regular exercise. It helps you digest your food better. Get at least 30 minutes of physical activity on most days of the week. Walking is a good choice. When should you call for help? Call your doctor now or seek immediate medical care if:    · You have a fever.     · You are vomiting.     · You have new or worse belly pain.     · You cannot pass stools or gas. Watch closely for changes in your health, and be sure to contact your doctor if you have any problems. Where can you learn more?   Go to http://www.gray.com/  Enter B082 in the search box to learn more about \"Bowel Blockage (Intestinal Obstruction): Care Instructions. \"  Current as of: April 15, 2020               Content Version: 12.8  © 8813-1733 [employer]. Care instructions adapted under license by [employer] (which disclaims liability or warranty for this information). If you have questions about a medical condition or this instruction, always ask your healthcare professional. [place] [address] any warranty or liability for your use of this information.

## 2021-08-20 NOTE — PROGRESS NOTES
haD/C from unit with belongings and RX. Accompanied by  hospital personnel. No distress at time of D/C. Transported via w/c.

## 2021-08-20 NOTE — PROGRESS NOTES
Date of Outreach Update:  Jed Candelaria was seen and assessed. MEWS Score: 2 (08/19/21 1913)  Vitals:    08/19/21 1900 08/19/21 1911 08/19/21 1913 08/19/21 2000   BP: (!) 160/69  (!) 159/63    Pulse: 63 70 70 66   Resp: 19 22 22    Temp: 98.1 °F (36.7 °C) 98.7 °F (37.1 °C) 98.7 °F (37.1 °C)    SpO2: 98% 100% 100%    Weight:             Pain Assessment  Pain Intensity 1: 0 (08/19/21 1930)  Pain Location 1: Abdomen  Pain Intervention(s) 1: Medication (see MAR), Emotional support  Patient Stated Pain Goal: 0      Previous Outreach assessment has been reviewed. There have been no significant clinical changes since the completion of the last dated Outreach assessment. Pt resting comfortably in bed with no complaints at this time. No concerns from primary RN. Will continue to follow up per outreach protocol.     Signed By:   Becca Alarcon RN    August 20, 2021 12:09 AM

## 2021-08-20 NOTE — PROGRESS NOTES
DC order noted in system, pt to be dc home to care for self, no dc needs noted at this time. Care Management Interventions  PCP Verified by CM:  Yes (Dr Jonatan Leslie lst visit 8/2/21)  Discharge Durable Medical Equipment: No  Physical Therapy Consult: No  Occupational Therapy Consult: No  Speech Therapy Consult: No  Current Support Network: Own Home, Lives with Spouse  Confirm Follow Up Transport: Family  Discharge Location  Discharge Placement: Home

## 2021-08-20 NOTE — PROGRESS NOTES
H&P/Consult Note/Progress Note/Office Note:   Ezequiel Hinton  MRN: [id number]  :1950  Age:71 y.o.    HPI: Ezequiel Hinton is a 66 y.o. female who we are asked by Dr. Peter Christianson to see for SBO. The patient has a PMHx of CAD with stents, liver dx, DM, CKD3. She presented to the ER on 2021 with complaints of abdominal pain, constipation, nausea and vomiting that began on 8/15/21. She denies fever, chills or diarrhea. ER workup showed CTAP with SBO and transition point in the RLQ. She has a surgical hx of hysterectomy. No prior SBO. States her last BM was  but reports 2 weeks of constipation. She described her last BM as \"hard balls\". Not currently passing flatus. 21- Patient states pain is better. +flatus. Nausea better. 21- continues to have flatus, states she feels like she needs to have a BM. Tolerated sips of clears. Abdomen soft, pain much improved since admission. 21- Pt awake in bed. Feeling good and wants to go home. Abdomen soft. Denies abd pain. Tolerating Clear Liquids. No n/v. +flatus/+BM. Past Medical History:   Diagnosis Date    Acute on chronic combined systolic and diastolic congestive heart failure ([facility] [address] 2021    Acute renal failure superimposed on stage 3b chronic kidney disease ([facility] [address] 2021    CAD (coronary artery disease) 3/1/2012    MI, 3 stents    Chest pain     Coronary artery disease involving native coronary artery without angina pectoris 2015    COVID-19 virus infection 2021    Depression 3/1/2012    Diabetes mellitus ([address] 3/1/2012    oral agents. . hypo- 80's, Last A1c 6.9 in 2018    DM2 (diabetes mellitus, type 2) ([facility] [address] 2015    Elevated LFTs 2021    Elevated troponin 3/2/2012    Essential hypertension 2015    External hemorrhoids without mention of complication [id number]    GERD (gastroesophageal reflux disease)     HCAP (healthcare-associated pneumonia) 2021    History of MI (myocardial infarction) 11/12    x2    Hypercholesterolemia     Hypertension 3/1/2012    Hypoalbuminemia 2/18/2021    Hyponatremia 1/12/2021    Hypoxemia 8/12/2020    Insomnia     Lactic acidosis 1/12/2021    Long QT interval 1/12/2021    Personal history of colonic polyps 2013    hyperplastic    Platelet inhibition due to Plavix     holding for colonoscopy per GI Dr.    [facility] Pleural effusion, bilateral 8/11/2020    Rectocele 2013    Sepsis ([facility] [address] 1/12/2021    Tobacco abuse 3/1/2012    quit for 1 year    Tobacco use disorder     Unstable angina ([facility] [address] 3/1/2012    ntg as needed.       Past Surgical History:   Procedure Laterality Date    HX CAROTID ENDARTERECTOMY Left 12/12/2018    HX CATARACT REMOVAL Left 09/19/2016    Dr Foster Montilla, [address] [place] [address] Right 11/07/2016    Dr Foster [facility]illa, [facility]    HX CERVICAL FUSION      neck w/plate    HX COLONOSCOPY  12/17/13    Anna Marie--single transverse hyperplastic polyp--7-10 year recall    HX HEMORRHOIDECTOMY      x2    HX ORTHOPAEDIC      left elbow    HX CAL AND BSO      HX WISDOM TEETH EXTRACTION      DE LEFT HEART CATH,PERCUTANEOUS  last stented 11/12 x 2    stent x3 , mi x 2     Current Facility-Administered Medications   Medication Dose Route Frequency    morphine injection 2 mg  2 mg IntraVENous Q4H PRN    LORazepam (ATIVAN) tablet 0.5 mg  0.5 mg Oral Q6H PRN    carvediloL (COREG) tablet 3.125 mg  3.125 mg Oral BID WITH MEALS    citalopram (CELEXA) tablet 20 mg  20 mg Oral DAILY    isosorbide mononitrate ER (IMDUR) tablet 60 mg  60 mg Oral DAILY    traZODone (DESYREL) tablet 50 mg  50 mg Oral QHS    dextrose 5 % - 0.45% NaCl infusion  75 mL/hr IntraVENous CONTINUOUS    sodium chloride (NS) flush 5-10 mL  5-10 mL IntraVENous Q8H    sodium chloride (NS) flush 5-10 mL  5-10 mL IntraVENous PRN    sodium chloride (NS) flush 5-40 mL  5-40 mL IntraVENous Q8H    sodium chloride (NS) flush 5-40 mL  5-40 mL IntraVENous PRN    acetaminophen (TYLENOL) tablet 650 mg  650 mg Oral Q6H PRN    Or    acetaminophen (TYLENOL) suppository 650 mg  650 mg Rectal Q6H PRN    polyethylene glycol (MIRALAX) packet 17 g  17 g Oral DAILY PRN    bisacodyL (DULCOLAX) suppository 10 mg  10 mg Rectal DAILY PRN    ondansetron (ZOFRAN ODT) tablet 4 mg  4 mg Oral Q8H PRN    Or    ondansetron (ZOFRAN) injection 4 mg  4 mg IntraVENous Q6H PRN    famotidine (PEPCID) tablet 20 mg  20 mg Oral BID PRN    alum-mag hydroxide-simeth (MYLANTA) oral suspension 15 mL  15 mL Oral Q6H PRN    heparin (porcine) injection 5,000 Units  5,000 Units SubCUTAneous Q8H    promethazine (PHENERGAN) with saline injection 12.5 mg  12.5 mg IntraVENous Q6H PRN    insulin lispro (HUMALOG) injection   SubCUTAneous AC&HS    dextrose 40% (GLUTOSE) oral gel 1 Tube  15 g Oral PRN    glucagon (GLUCAGEN) injection 1 mg  1 mg IntraMUSCular PRN    dextrose (D50W) injection syrg 12.5-25 g  25-50 mL IntraVENous PRN    labetaloL (NORMODYNE;TRANDATE) injection 10 mg  10 mg IntraVENous Q4H PRN     Cephalosporins, Hydralazine, Sulfamethoxazole-trimethoprim, Codeine, and Lisinopril  Social History     Socioeconomic History    Marital status:      Spouse name: Not on file    Number of children: Not on file    Years of education: Not on file    Highest education level: Not on file   Tobacco Use    Smoking status: Former Smoker     Packs/day: 1.00     Years: 40.00     Pack years: 40.00     Quit date: 2012     Years since quittin.7    Smokeless tobacco: Never Used    Tobacco comment: quit 2012 . has stopped prior also   Vaping Use    Vaping Use: Never used   Substance and Sexual Activity    Alcohol use: No    Drug use: No     Social Determinants of Health     Financial Resource Strain:     Difficulty of Paying Living Expenses:    Food Insecurity:     Worried About Running Out of Food in the Last Year:     Ran Out of Food in the Last Year:    Transportation Needs:     Lack of Transportation (Medical):  Lack of Transportation (Non-Medical):    Physical Activity:     Days of Exercise per Week:     Minutes of Exercise per Session:    Stress:     Feeling of Stress :    Social Connections:     Frequency of Communication with Friends and Family:     Frequency of Social Gatherings with Friends and Family:     Attends [employer]oration[employer] Services:     Active Member of Clubs or Organizations:     Attends Club or Organization Meetings:     Marital Status:      Social History     Tobacco Use   Smoking Status Former Smoker    Packs/day: 1.00    Years: 40.00    Pack years: 40.00    Quit date: 2012    Years since quittin.7   Smokeless Tobacco Never Used   Tobacco Comment    quit  . has stopped prior also     Family History   Problem Relation Age of Onset    Heart Disease Mother     Osteoporosis Mother     Heart Attack Mother 67        mi    Thyroid Disease Mother         Multinodular Goiter    Heart Disease Father     Cancer Father         pancreatic    Heart Attack Father 67        MI    Cancer Sister         Pre-Cancerous dysplasia    Thyroid Cancer Sister     Ulcerative Colitis Brother     Thyroid Cancer Brother     Breast Cancer Neg Hx      ROS: The patient has no difficulty with chest pain or shortness of breath. No fever or chills. Comprehensive review of systems was otherwise unremarkable except as noted above. Physical Exam:   Visit Vitals  BP (!) 172/63 (BP 1 Location: Right upper arm, BP Patient Position: At rest;Supine) Comment: RN aware   Pulse 64   Temp 98.1 °F (36.7 °C)   Resp 18   Wt 158 lb 4.8 oz (71.8 kg)   SpO2 97%   BMI 27.17 kg/m²     Constitutional: Alert, oriented, cooperative patient in no acute distress; appears stated age    Eyes:Sclera are clear. EOMs intact  ENMT: no external lesions gross hearing normal; no obvious neck masses, no ear or lip lesions, nares normal  CV: RRR. Normal perfusion  Resp: No JVD.   Breathing is non-labored; no audible wheezing. GI: soft, nondistended, non-tender  Musculoskeletal: unremarkable with normal function. No embolic signs or cyanosis. Neuro:  Oriented; moves all 4; no focal deficits  Psychiatric: normal affect and mood, no memory impairment    Recent vitals (if inpt):  Patient Vitals for the past 24 hrs:   BP Temp Pulse Resp SpO2 Weight   08/20/21 0800 -- -- 64 -- -- --   08/20/21 0701 (!) 172/63 98.1 °F (36.7 °C) 63 18 97 % --   08/20/21 0638 (!) 165/69 -- 65 -- -- --   08/20/21 0458 -- -- -- -- -- 158 lb 4.8 oz (71.8 kg)   08/20/21 0342 (!) 188/68 98.1 °F (36.7 °C) 68 18 97 % --   08/19/21 2311 (!) 156/67 98.3 °F (36.8 °C) 68 18 99 % --   08/19/21 2000 -- -- 66 -- -- --   08/19/21 1913 (!) 159/63 98.7 °F (37.1 °C) 70 22 100 % --   08/19/21 1911 -- 98.7 °F (37.1 °C) 70 22 100 % --   08/19/21 1900 (!) 160/69 98.1 °F (36.7 °C) 63 19 98 % --   08/19/21 1515 (!) 161/54 98.5 °F (36.9 °C) 71 19 99 % --   08/19/21 1135 (!) 195/62 -- (!) 106 -- -- --   08/19/21 1129 (!) 194/62 98.1 °F (36.7 °C) 67 18 96 % --       Labs:  Recent Labs     08/20/21  0441 08/18/21  0555 08/17/21  1203   WBC 6.0   < > 10.8   HGB 10.4*   < > 14.2      < > 318      < > 135*   K 4.1   < > 5.3*   *   < > 105   CO2 23   < > 19*   BUN 19   < > 59*   CREA 1.65*   < > 2.74*   *   < > 147*   TBILI 0.4   < > 0.7   ALT 45   < > 119*      < > 162*   LPSE  --   --  184    < > = values in this interval not displayed.        Lab Results   Component Value Date/Time    WBC 6.0 08/20/2021 04:41 AM    HGB 10.4 (L) 08/20/2021 04:41 AM    PLATELET 592 43/09/9309 04:41 AM    Sodium 139 08/20/2021 04:41 AM    Potassium 4.1 08/20/2021 04:41 AM    Chloride 109 (H) 08/20/2021 04:41 AM    CO2 23 08/20/2021 04:41 AM    BUN 19 08/20/2021 04:41 AM    Creatinine 1.65 (H) 08/20/2021 04:41 AM    Glucose 148 (H) 08/20/2021 04:41 AM    INR 1.1 01/13/2021 06:21 AM    Bilirubin, total 0.4 08/20/2021 04:41 AM    Bilirubin, direct 0.13 08/02/2021 02:31 PM    ALT (SGPT) 45 08/20/2021 04:41 AM    Alk. phosphatase 107 08/20/2021 04:41 AM    Amylase 45 11/05/2013 04:27 PM    Lipase 184 08/17/2021 12:03 PM    Troponin-I, Qt. <0.02 (L) 02/05/2017 09:08 PM       I reviewed recent labs and recent radiologic studies. CT Results (most recent):  Results from Hospital Encounter encounter on 08/17/21    CT ABD PELV WO CONT    Narrative  EXAMINATION: CT ABD PELV WO CONT 8/17/2021 2:24 PM    INDICATION:  Epigastric and right upper quadrant pain    COMPARISON: CT abdomen and pelvis 2/17/2021    TECHNIQUE: Contiguous axial computed tomographic images were obtained from the  domes of the diaphragm to the symphysis pubis without intravenous contrast.  Coronal reconstructions were also performed. Oral contrast was also  administered. Please note that the detection of solid organ and vascular abnormalities is  limited in the absence of intravenous contrast.  Radiation dose reduction  techniques were used for this study. Our CT scanners use one or all of the  following: Automated exposure control, adjustment of the mA and/or kV according  to patient size, iterative reconstruction. FINDINGS:    Lung bases: Clear    Heptobiliary: No hepatic lesion. Normal gallbladder. No biliary dilation. Spleen, adrenal, pancreas: Normal spleen. No pancreatic ductal dilation. No  adrenal mass. Kidneys/urinary: No hydronephrosis or nephrolithiasis. Normal bladder. Reproductive: Prior hysterectomy. No adnexal mass. Bowel: Distended small bowel loops with a transition point in the right lower  abdomen (coronal #45) contents medially proximal to the transition show  pneumatized contents. No pneumatosis or portal venous gas. Peritoneum: No free air. Trace perihepatic fluid. And fluid in the mesentery    Vessels/lymph nodes: No AAA. No adenopathy. Abdominal wall: No bowel-containing hernia. Bones: No suspicious osseous lesion.     Impression  Small bowel obstruction with transition point in the right lower abdomen. No  pneumatosis or portal venous gas. XR Results (most recent):  Results from Hospital Encounter encounter on 08/17/21    XR ABD (KUB)    Narrative  History: Small bowel obstruction    EXAM: KUB    FINDINGS: There is contrast present within the colon. Mildly prominent loops of  small bowel seen. No free air. Impression  Mildly prominent loops of small bowel. Cannot exclude partial small  bowel obstruction or ileus. There is oral contrast present in the colon. I independently reviewed radiology images for studies I described above or studies I have ordered.    Admission date (for inpatients): 8/17/2021   * No surgery found *  * No surgery found *    ASSESSMENT/PLAN:  Problem List  Date Reviewed: 8/2/2021        Codes Class Noted    CAD (coronary artery disease) (Chronic) ICD-10-CM: I25.10  ICD-9-CM: 414.00  8/19/2021        Chest pain ICD-10-CM: R07.9  ICD-9-CM: 786.50  8/19/2021        Anemia ICD-10-CM: D64.9  ICD-9-CM: 285.9  8/19/2021        Acute renal failure superimposed on stage 3b chronic kidney disease ([facility] ICD-10-CM: N17.9, N18.32  ICD-9-CM: 584.9, 585.3  8/18/2021        * (Principal) SBO (small bowel obstruction) ([facility] ICD-10-CM: E83.660  ICD-9-CM: 560.9  8/17/2021        Elevated ALT measurement ICD-10-CM: R74.01  ICD-9-CM: 790.4  7/7/2021        Polyarthritis ICD-10-CM: M13.0  ICD-9-CM: 716.50  7/7/2021        Chronic diastolic congestive heart failure (HCC) ICD-10-CM: I50.32  ICD-9-CM: 428.32, 428.0  4/19/2021    Overview Signed 7/7/2021 11:06 AM by Janna Will NP     Heart Failure (HFpEF), EF 34-80%, with diastolic dysfunction, ECHO 2/16/2021               Congestive heart failure (CHF) (HCC) ICD-10-CM: I50.9  ICD-9-CM: 428.0  2/15/2021        Normocytic anemia ICD-10-CM: D64.9  ICD-9-CM: 285.9  1/12/2021        Mixed hyperlipidemia ICD-10-CM: M50.2  ICD-9-CM: 272.2  10/21/2020        S/P PTCA (percutaneous transluminal coronary angioplasty) ICD-10-CM: Z98.61  ICD-9-CM: V45.82  9/1/2020        Other cirrhosis of liver ([address] ICD-10-CM: K74.69  ICD-9-CM: 571.5  8/11/2020        History of left-sided carotid endarterectomy ICD-10-CM: Z98.890  ICD-9-CM: V45.89  3/12/2020        Carotid stenosis, asymptomatic, right ICD-10-CM: I65.21  ICD-9-CM: 433.10  3/12/2020        Carotid artery stenosis ICD-10-CM: I65.29  ICD-9-CM: 433.10  12/12/2018        Right carotid bruit ICD-10-CM: R09.89  ICD-9-CM: 785.9  11/6/2018        Type 2 diabetes mellitus with stage 3b chronic kidney disease, with long-term current use of insulin ([facility]) ICD-10-CM: E11.22, N18.32, Z79.4  ICD-9-CM: 250.40, 585.3, V58.67  10/11/2018        Gastroesophageal reflux disease ICD-10-CM: K21.9  ICD-9-CM: 530.81  2/16/2017        Essential hypertension (Chronic) ICD-10-CM: I10  ICD-9-CM: 401.9  9/15/2016        Stage 3b chronic kidney disease ([address] ICD-10-CM: N18.32  ICD-9-CM: 585.3  9/15/2016        Age-related osteoporosis without current pathological fracture ICD-10-CM: M81.0  ICD-9-CM: 733.01  9/15/2016        Major depressive disorder with single episode, in full remission ([address] ICD-10-CM: F32.5  ICD-9-CM: 296.26  9/15/2016        Primary insomnia ICD-10-CM: F51.01  ICD-9-CM: 307.42  9/15/2016        Coronary artery disease involving native coronary artery of native heart without angina pectoris ICD-10-CM: I25.10  ICD-9-CM: 414.01  9/4/2013            Principal Problem:    SBO (small bowel obstruction) ([address] (8/17/2021)    Active Problems:    Essential hypertension (9/15/2016)      Stage 3b [facility] ([facility] (9/15/2016)      Type 2 diabetes mellitus with stage 3b chronic kidney disease, with long-term current use of insulin ([facility] (10/11/2018)      Acute renal failure superimposed on stage 3b chronic kidney disease ([facility] (8/18/2021)      CAD (coronary artery disease) (8/19/2021)      Chest pain (8/19/2021)      Anemia (8/19/2021)         Care management per Hospitalist  [phone] to discharge from surgical standpoint     Marianna Mackey NP     I have seen and examined the patient with the Nurse Practitioner and agree with the above assessment and plan. Resolved SBO. Call prn.      Juanita Haile MD

## 2021-08-20 NOTE — DISCHARGE SUMMARY
Hospitalist Discharge Summary   Admit Date:  2021 11:49 AM   Name:  Swetha Kraft   Age:  66 y.o. Sex:  female  :  1950   MRN:  [id number]   PCP:  Judithann Essex, MD    Presenting Complaint: Abdominal Pain    Initial Admission Diagnosis: SBO (small bowel obstruction) ([facility] [K56.609]     Problem List for this Hospitalization:  Hospital Problems as of 2021 Date Reviewed: 2021        Codes Class Noted - Resolved POA    CAD (coronary artery disease) (Chronic) ICD-10-CM: I25.10  ICD-9-CM: 414.00  2021 - Present Yes        Chest pain ICD-10-CM: R07.9  ICD-9-CM: 786.50  2021 - Present No        Anemia ICD-10-CM: D64.9  ICD-9-CM: 285.9  2021 - Present Yes        Acute renal failure superimposed on stage 3b chronic kidney disease ([facility] ICD-10-CM: N17.9, N18.32  ICD-9-CM: 584.9, 585.3  2021 - Present Yes        * (Principal) SBO (small bowel obstruction) ([facility] ICD-10-CM: K11.799  ICD-9-CM: 560.9  2021 - Present Yes        Type 2 diabetes mellitus with stage 3b chronic kidney disease, with long-term current use of insulin ([facility] ICD-10-CM: E11.22, N18.32, Z79.4  ICD-9-CM: 250.40, 585.3, V58.67  10/11/2018 - Present Yes        Essential hypertension (Chronic) ICD-10-CM: I10  ICD-9-CM: 401.9  9/15/2016 - Present Yes        Stage 3b chronic kidney disease ([facility] ICD-10-CM: B14.42  ICD-9-CM: 585.3  9/15/2016 - Present Yes            Did Patient have Sepsis (YES OR NO): no      Hospital Course:    Ms. Teresita Arredondo is a 67 yo female with PMH of DM2, CKD3, CAD admitted with SBO. She was NPO, followed by surgery. She gradually improved with no need for surgical intervention. She is pain free, having bowel movements and resumed normal diet. She has been hypertensive due to holding some of her medications while NPO that are now resumed. She developed anemia that has been stable. Her CKD3 is also stable.          discharge plans are  to home.          Disposition: Home or Self Care  Diet: ADULT DIET Easy to Chew; 3 carb choices (45 gm/meal)  Code Status: Full Code    Follow Up Orders: Follow-up Appointments   Procedures    FOLLOW UP VISIT Appointment in: One Week 1 week PCP     1 week PCP     Standing Status:   Standing     Number of Occurrences:   1     Order Specific Question:   Appointment in     Answer: One Week       Follow-up Information     Follow up With Specialties Details Why Contact Info    Tanvir Jane MD Internal Medicine   [address]  [phone]            Time spent in patient discharge and coordination 30 minutes. Plan was discussed with patient. All questions answered. Patient was stable at time of discharge. Instructions given to call a physician or return if any concerns. Discharge Info:   Current Discharge Medication List      CONTINUE these medications which have NOT CHANGED    Details   rosuvastatin (CRESTOR) 40 mg tablet Take 1 Tablet by mouth nightly. Qty: 90 Tablet, Refills: 1    Associated Diagnoses: Coronary artery disease involving native coronary artery of native heart without angina pectoris; Mixed hyperlipidemia      citalopram (CELEXA) 20 mg tablet TAKE 1 TABLET BY MOUTH IN  THE MORNING  Qty: 90 Tablet, Refills: 1    Comments: Requesting 1 year supply  Associated Diagnoses: Major depressive disorder with single episode, in full remission ([facility])      traZODone (DESYREL) 50 mg tablet TAKE 1 TABLET BY MOUTH AT  NIGHT  Qty: 90 Tablet, Refills: 1    Comments: Requesting 1 year supply  Associated Diagnoses: Primary insomnia      NIFEdipine ER (PROCARDIA XL) 90 mg ER tablet Take 1 Tablet by mouth daily.   Qty: 90 Tablet, Refills: 1      insulin lispro (HUMALOG) 100 unit/mL kwikpen Per sliding scale  Indications: type 2 diabetes mellitus  Qty: 6 Pen, Refills: 5    Associated Diagnoses: Type 2 diabetes mellitus with stage 3b chronic kidney disease, with long-term current use of insulin ([facility])      ferrous sulfate (IRON) 325 mg (65 mg iron) EC tablet Take 1 Tablet by mouth two (2) times a day. Qty: 180 Tablet, Refills: 0    Associated Diagnoses: Anemia due to stage 3a chronic kidney disease (HCC)      insulin degludec Inderjit Theresa FlexTouch U-100) 100 unit/mL (3 mL) inpn 12 Units by abdominal subcutaneous route nightly. Adjust 1 unit increase every 3 days goal am fasting blood sugar  as tolerated  Qty: 5 Pen, Refills: 4    Associated Diagnoses: Type 2 diabetes mellitus with stage 3b chronic kidney disease, with long-term current use of insulin (HCC)      furosemide (Lasix) 40 mg tablet Take 1 Tab by mouth daily. Qty: 80 Tab, Refills: 3    Associated Diagnoses: Essential hypertension; Coronary artery disease involving native coronary artery of native heart without angina pectoris; Leg edema      spironolactone (ALDACTONE) 25 mg tablet Take 1 Tab by mouth daily. Qty: 80 Tab, Refills: 3    Associated Diagnoses: Essential hypertension; Coronary artery disease involving native coronary artery of native heart without angina pectoris; Leg edema      isosorbide mononitrate ER (IMDUR) 60 mg CR tablet Take 1 Tab by mouth daily. Qty: 80 Tab, Refills: 3    Associated Diagnoses: Essential hypertension; Coronary artery disease involving native coronary artery of native heart without angina pectoris; Leg edema      carvediloL (COREG) 3.125 mg tablet Take 1 Tab by mouth two (2) times daily (with meals). Qty: 180 Tab, Refills: 3    Associated Diagnoses: Essential hypertension; Coronary artery disease involving native coronary artery of native heart without angina pectoris; Leg edema      magnesium oxide (MAG-OX) 400 mg tablet TAKE 1 TABLET BY MOUTH TWICE DAILY  Qty: 180 Tab, Refills: 3    Associated Diagnoses: Hypomagnesemia      cholecalciferol, vitamin D3, (VITAMIN D3) 2,000 unit tab Take 2,000 Units by mouth daily. cyanocobalamin (VITAMIN B-12) 1,000 mcg tablet Take 2,500 mcg by mouth daily.       aspirin 81 mg chewable tablet Take 81 mg by mouth every morning. Indications: continue per anesthesia guidelines      Insulin Needles, Disposable, (BD Ligia 2nd Gen Pen Needle) 32 gauge x 5/32\" ndle Test blood sugar three times today. Qty: 300 Pen Needle, Refills: 3    Associated Diagnoses: Type 2 diabetes mellitus with stage 3b chronic kidney disease, with long-term current use of insulin (HCC)      glucose blood VI test strips (OneTouch Verio test strips) strip 1 Each by Does Not Apply route two (2) times daily as needed for Other (diabetes monitoring) for up to 180 days. Check blood sugar twice a day and as needed via finger stick. Qty: 100 Strip, Refills: 5    Associated Diagnoses: Uncontrolled type II diabetes mellitus with polyneuropathy (HCC)      lancets (One Touch Delica) 33 gauge misc 1 Lancet by IntraDERMal route two (2) times daily as needed for Other (diabetes monitoring). Finger stick  Qty: 100 Lancet, Refills: 5    Associated Diagnoses: Uncontrolled type II diabetes mellitus with polyneuropathy (HCC)      nitroglycerin (NITROSTAT) 0.4 mg SL tablet 1 Tab by SubLINGual route every five (5) minutes as needed for Chest Pain. Qty: 3 Bottle, Refills: 3             Procedures done this admission:  * No surgery found *    Consults this admission:  None    Echocardiogram/EKG results:  No results found for this visit on 08/17/21.     EKG Results     Procedure 720 Value Units Date/Time    EKG, 12 LEAD, INITIAL [[id number]] Collected: 08/19/21 1231    Order Status: Completed Updated: 08/19/21 1336     Ventricular Rate 76 BPM      Atrial Rate 76 BPM      P-R Interval 134 ms      QRS Duration 96 ms      Q-T Interval 416 ms      QTC Calculation (Bezet) 468 ms      Calculated P Axis 75 degrees      Calculated R Axis -70 degrees      Calculated T Axis 68 degrees      Diagnosis --     Sinus rhythm with Premature supraventricular complexes  Left axis deviation  Septal infarct , age undetermined  Abnormal ECG    Confirmed by ST LEROY BARNES MD (), JOSE REYNA ([id number]) on 8/19/2021 1:36:40 PM            Diagnostic Imaging/Tests:   XR ABD (KUB)    Result Date: 8/18/2021  History: Small bowel obstruction EXAM: KUB FINDINGS: There is contrast present within the colon. Mildly prominent loops of small bowel seen. No free air. Mildly prominent loops of small bowel. Cannot exclude partial small bowel obstruction or ileus. There is oral contrast present in the colon. CT ABD PELV WO CONT    Result Date: 8/17/2021  EXAMINATION: CT ABD PELV WO CONT 8/17/2021 2:24 PM INDICATION:  Epigastric and right upper quadrant pain COMPARISON: CT abdomen and pelvis 2/17/2021 TECHNIQUE: Contiguous axial computed tomographic images were obtained from the domes of the diaphragm to the symphysis pubis without intravenous contrast. Coronal reconstructions were also performed. Oral contrast was also administered. Please note that the detection of solid organ and vascular abnormalities is limited in the absence of intravenous contrast.  Radiation dose reduction techniques were used for this study. Our CT scanners use one or all of the following: Automated exposure control, adjustment of the mA and/or kV according to patient size, iterative reconstruction. FINDINGS: Lung bases: Clear Heptobiliary: No hepatic lesion. Normal gallbladder. No biliary dilation. Spleen, adrenal, pancreas: Normal spleen. No pancreatic ductal dilation. No adrenal mass. Kidneys/urinary: No hydronephrosis or nephrolithiasis. Normal bladder. Reproductive: Prior hysterectomy. No adnexal mass. Bowel: Distended small bowel loops with a transition point in the right lower abdomen (coronal #45) contents medially proximal to the transition show pneumatized contents. No pneumatosis or portal venous gas. Peritoneum: No free air. Trace perihepatic fluid. And fluid in the mesentery Vessels/lymph nodes: No AAA. No adenopathy. Abdominal wall: No bowel-containing hernia. Bones: No suspicious osseous lesion.      Small bowel obstruction with transition point in the right lower abdomen. No pneumatosis or portal venous gas. US ABD LTD    Result Date: 8/17/2021  EXAMINATION: US ABD LTD 8/17/2021 2:48 PM INDICATION: Right upper quadrant and epigastric abdominal pain COMPARISON: Same day CT abdomen and pelvis TECHNIQUE: Multiple real-time sonographic images were obtained of the abdomen utilizing a multi-hertz transducer. FINDINGS: Pancreas: Unremarkable in its visualized portions. Abdominal aorta: Obscured by bowel gas Inferior vena cava: Patent Liver: Size: 12.2 cm Echogenicity: Normal Biliary system: No intrahepatic biliary ductal dilation. Gallbladder: Biliary sludge. No wall thickening. No pericholecystic fluid. Negative sonographic Rivera's sign, per the sonographer Common bile duct: Size: 6 mm Right kidney: Size: 9.0 cm Cortex: 0.9 cm cyst in the upper pole  Collecting system: No evidence of hydronephrosis. 1.  Biliary sludge without additional abnormality.        All Micro Results     None          Labs: Results:       BMP, Mg, Phos Recent Labs     08/20/21 0441 08/19/21 0342 08/18/21  0555    140 140   K 4.1 3.9 4.6   * 108* 112*   CO2 23 25 21   AGAP 7 7 7   BUN 19 31* 45*   CREA 1.65* 1.87* 2.05*   CA 8.6 8.9 8.9   * 141* 108*      CBC Recent Labs     08/20/21 0441 08/19/21 0342 08/18/21  0555   WBC 6.0 5.1 6.4   RBC 3.31* 3.31* 3.46*   HGB 10.4* 10.5* 10.8*   HCT 31.4* 32.5* 33.9*    209 225   GRANS 59 53 65   LYMPH 20 28 18   EOS 7 6 4   MONOS 13* 13* 12   BASOS 0 0 0   IG 0 0 1   ANEU 3.5 2.7 4.1   ABL 1.2 1.5 1.2   ANA ROSA 0.4 0.3 0.3   ABM 0.8 0.7 0.8   ABB 0.0 0.0 0.0   AIG 0.0 0.0 0.0      LFT Recent Labs     08/20/21 0441 08/19/21 0342 08/17/21  1203   ALT 45 58 119*    115 162*   TP 6.7 7.3 9.7*   ALB 3.1* 3.3 4.5   GLOB 3.6* 4.0* 5.2*   AGRAT 0.9* 0.8* 0.9*      Cardiac Testing Lab Results   Component Value Date/Time    BNP 99 03/01/2012 04:15 PM    CK 67 03/01/2012 04:15 PM    CK - MB <0.5 (L) 03/01/2012 04:15 PM    CK-MB Index CANNOT BE CALCULATED 03/01/2012 04:15 PM    Troponin-I, Qt. <0.02 (L) 02/05/2017 09:08 PM    Troponin-I, Qt. 0.02 02/05/2017 05:45 PM    Troponin-I, Qt. 0.02 10/22/2015 12:42 PM      Coagulation Tests Lab Results   Component Value Date/Time    Prothrombin time 14.0 01/13/2021 06:21 AM    Prothrombin time 15.2 (H) 07/27/2020 06:18 PM    Prothrombin time 11.4 09/18/2014 01:00 PM    INR 1.1 01/13/2021 06:21 AM    INR 1.2 07/27/2020 06:18 PM    INR 1.1 09/18/2014 01:00 PM      A1c Lab Results   Component Value Date/Time    Hemoglobin A1c 6.8 (H) 04/12/2021 03:41 PM    Hemoglobin A1c 10.2 (H) 01/13/2021 12:43 AM    Hemoglobin A1c 6.7 (H) 07/16/2020 01:50 PM      Lipid Panel Lab Results   Component Value Date/Time    Cholesterol, total 124 03/01/2021 03:07 PM    HDL Cholesterol 37 (L) 03/01/2021 03:07 PM    LDL, calculated 70 03/01/2021 03:07 PM    LDL, calculated 45.4 08/12/2020 05:24 AM    VLDL, calculated 17 03/01/2021 03:07 PM    VLDL, calculated 15.6 08/12/2020 05:24 AM    Triglyceride 87 03/01/2021 03:07 PM    CHOL/HDL Ratio 2.8 08/12/2020 05:24 AM      Thyroid Panel Lab Results   Component Value Date/Time    TSH 1.380 04/12/2021 03:41 PM    TSH 4.810 (H) 03/01/2021 03:07 PM    T4, Free 1.11 04/12/2021 03:41 PM        Most Recent UA Lab Results   Component Value Date/Time    Color YELLOW 01/18/2021 03:08 PM    Appearance CLOUDY 01/18/2021 03:08 PM    Specific gravity 1.018 01/18/2021 03:08 PM    pH (UA) 5.5 01/18/2021 03:08 PM    Protein 300 (A) 01/18/2021 03:08 PM    Glucose Negative 01/18/2021 03:08 PM    Ketone Negative 01/18/2021 03:08 PM    Bilirubin Negative 01/18/2021 03:08 PM    Blood MODERATE (A) 01/18/2021 03:08 PM    Urobilinogen 1.0 01/18/2021 03:08 PM    Nitrites Negative 01/18/2021 03:08 PM    Leukocyte Esterase Negative 01/18/2021 03:08 PM    WBC 3-5 01/18/2021 03:08 PM    RBC 0-3 01/18/2021 03:08 PM    Epithelial cells 0-3 01/18/2021 03:08 PM    Bacteria 1+ (H) 01/18/2021 03:08 PM    Casts HYALINE 01/18/2021 03:08 PM    Other observations RESULTS VERIFIED MANUALLY 01/18/2021 03:08 PM          All Labs from Last 24 Hrs:  Recent Results (from the past 24 hour(s))   GLUCOSE, POC    Collection Time: 08/19/21 12:07 PM   Result Value Ref Range    Glucose (POC) 170 (H) 65 - 100 mg/dL    Performed by [facility]    EKG, 12 LEAD, INITIAL    Collection Time: 08/19/21 12:31 PM   Result Value Ref Range    Ventricular Rate 76 BPM    Atrial Rate 76 BPM    P-R Interval 134 ms    QRS Duration 96 ms    Q-T Interval 416 ms    QTC Calculation (Bezet) 468 ms    Calculated P Axis 75 degrees    Calculated R Axis -70 degrees    Calculated T Axis 68 degrees    Diagnosis       Sinus rhythm with Premature supraventricular complexes  Left axis deviation  Septal infarct , age undetermined  Abnormal ECG    Confirmed by ST LEROY BARNES MD (), JOSE REYNA ([id number]) on 8/19/2021 1:36:40 PM     TROPONIN-HIGH SENSITIVITY    Collection Time: 08/19/21 12:57 PM   Result Value Ref Range    Troponin-High Sensitivity 28.7 (H) 0 - 14 pg/mL   GLUCOSE, POC    Collection Time: 08/19/21  1:10 PM   Result Value Ref Range    Glucose (POC) 172 (H) 65 - 100 mg/dL    Performed by Matt Pedroza    GLUCOSE, POC    Collection Time: 08/19/21  4:15 PM   Result Value Ref Range    Glucose (POC) 215 (H) 65 - 100 mg/dL    Performed by MeghanHERB    TROPONIN-HIGH SENSITIVITY    Collection Time: 08/19/21  8:43 PM   Result Value Ref Range    Troponin-High Sensitivity 29.2 (H) 0 - 14 pg/mL   GLUCOSE, POC    Collection Time: 08/19/21  9:25 PM   Result Value Ref Range    Glucose (POC) 146 (H) 65 - 100 mg/dL    Performed by [facility]    METABOLIC PANEL, COMPREHENSIVE    Collection Time: 08/20/21  4:41 AM   Result Value Ref Range    Sodium 139 136 - 145 mmol/L    Potassium 4.1 3.5 - 5.1 mmol/L    Chloride 109 (H) 98 - 107 mmol/L    CO2 23 21 - 32 mmol/L    Anion gap 7 7 - 16 mmol/L    Glucose 148 (H) 65 - 100 mg/dL    BUN 19 8 - 23 MG/DL    Creatinine 1.65 (H) 0.6 - 1.0 MG/DL    GFR est AA 39 (L) >60 ml/min/1.73m2    GFR est non-AA 32 (L) >60 ml/min/1.73m2    Calcium 8.6 8.3 - 10.4 MG/DL    Bilirubin, total 0.4 0.2 - 1.1 MG/DL    ALT (SGPT) 45 12 - 65 U/L    AST (SGOT) 17 15 - 37 U/L    Alk. phosphatase 107 50 - 136 U/L    Protein, total 6.7 6.3 - 8.2 g/dL    Albumin 3.1 (L) 3.2 - 4.6 g/dL    Globulin 3.6 (H) 2.3 - 3.5 g/dL    A-G Ratio 0.9 (L) 1.2 - 3.5     CBC WITH AUTOMATED DIFF    Collection Time: 08/20/21  4:41 AM   Result Value Ref Range    WBC 6.0 4.3 - 11.1 K/uL    RBC 3.31 (L) 4.05 - 5.2 M/uL    HGB 10.4 (L) 11.7 - 15.4 g/dL    HCT 31.4 (L) 35.8 - 46.3 %    MCV 94.9 79.6 - 97.8 FL    MCH 31.4 26.1 - 32.9 PG    MCHC 33.1 31.4 - 35.0 g/dL    RDW 12.9 11.9 - 14.6 %    PLATELET 549 216 - 729 K/uL    MPV 9.6 9.4 - 12.3 FL    ABSOLUTE NRBC 0.00 0.0 - 0.2 K/uL    DF AUTOMATED      NEUTROPHILS 59 43 - 78 %    LYMPHOCYTES 20 13 - 44 %    MONOCYTES 13 (H) 4.0 - 12.0 %    EOSINOPHILS 7 0.5 - 7.8 %    BASOPHILS 0 0.0 - 2.0 %    IMMATURE GRANULOCYTES 0 0.0 - 5.0 %    ABS. NEUTROPHILS 3.5 1.7 - 8.2 K/UL    ABS. LYMPHOCYTES 1.2 0.5 - 4.6 K/UL    ABS. MONOCYTES 0.8 0.1 - 1.3 K/UL    ABS. EOSINOPHILS 0.4 0.0 - 0.8 K/UL    ABS. BASOPHILS 0.0 0.0 - 0.2 K/UL    ABS. IMM.  GRANS. 0.0 0.0 - 0.5 K/UL   TROPONIN-HIGH SENSITIVITY    Collection Time: 08/20/21  4:41 AM   Result Value Ref Range    Troponin-High Sensitivity 27.5 (H) 0 - 14 pg/mL   GLUCOSE, POC    Collection Time: 08/20/21  7:05 AM   Result Value Ref Range    Glucose (POC) 168 (H) 65 - 100 mg/dL    Performed by [address] [place] [address] in Hospital:   Current Facility-Administered Medications   Medication Dose Route Frequency    morphine injection 2 mg  2 mg IntraVENous Q4H PRN    LORazepam (ATIVAN) tablet 0.5 mg  0.5 mg Oral Q6H PRN    carvediloL (COREG) tablet 3.125 mg  3.125 mg Oral BID WITH MEALS    citalopram (CELEXA) tablet 20 mg  20 mg Oral DAILY    isosorbide mononitrate ER (IMDUR) tablet 60 mg  60 mg Oral DAILY    traZODone (DESYREL) tablet 50 mg  50 mg Oral QHS    sodium chloride (NS) flush 5-10 mL  5-10 mL IntraVENous Q8H    sodium chloride (NS) flush 5-10 mL  5-10 mL IntraVENous PRN    sodium chloride (NS) flush 5-40 mL  5-40 mL IntraVENous Q8H    sodium chloride (NS) flush 5-40 mL  5-40 mL IntraVENous PRN    acetaminophen (TYLENOL) tablet 650 mg  650 mg Oral Q6H PRN    Or    acetaminophen (TYLENOL) suppository 650 mg  650 mg Rectal Q6H PRN    polyethylene glycol (MIRALAX) packet 17 g  17 g Oral DAILY PRN    bisacodyL (DULCOLAX) suppository 10 mg  10 mg Rectal DAILY PRN    ondansetron (ZOFRAN ODT) tablet 4 mg  4 mg Oral Q8H PRN    Or    ondansetron (ZOFRAN) injection 4 mg  4 mg IntraVENous Q6H PRN    famotidine (PEPCID) tablet 20 mg  20 mg Oral BID PRN    alum-mag hydroxide-simeth (MYLANTA) oral suspension 15 mL  15 mL Oral Q6H PRN    heparin (porcine) injection 5,000 Units  5,000 Units SubCUTAneous Q8H    promethazine (PHENERGAN) with saline injection 12.5 mg  12.5 mg IntraVENous Q6H PRN    insulin lispro (HUMALOG) injection   SubCUTAneous AC&HS    dextrose 40% (GLUTOSE) oral gel 1 Tube  15 g Oral PRN    glucagon (GLUCAGEN) injection 1 mg  1 mg IntraMUSCular PRN    dextrose (D50W) injection syrg 12.5-25 g  25-50 mL IntraVENous PRN    labetaloL (NORMODYNE;TRANDATE) injection 10 mg  10 mg IntraVENous Q4H PRN       Allergies   Allergen Reactions    Cephalosporins Hives and Swelling    Hydralazine Unknown (comments)     Chest pain    Sulfamethoxazole-Trimethoprim Other (comments)     Diarrhea     Codeine Other (comments)    Lisinopril Other (comments)     Passing out spells.  // pt sts not allergic to med      Immunization History   Administered Date(s) Administered    Covid-19, MODERNA, Mrna, Lnp-s, Pf, 100mcg/0.5mL 04/23/2021, 05/25/2021    Influenza High Dose Vaccine PF 10/04/2017, 10/11/2018    Influenza Vaccine 09/04/2013, 11/18/2014, 08/24/2015    Influenza Vaccine (Quad) PF (>6 Mo Flulaval, Fluarix, and >3 Yrs Afluria, Fluzone [id number]) 12/15/2016    Influenza Vaccine (Tri) Adjuvanted (>65 Yrs FLUAD TRI [id number]) 09/03/2019    Influenza, Quadrivalent, Adjuvanted (>65 Yrs FLUAD QUAD [id number]) 10/21/2020    Pneumococcal Conjugate (PCV-13) 08/24/2015    Pneumococcal Polysaccharide (PPSV-23) 09/04/2013       Recent Vital Data:  Patient Vitals for the past 24 hrs:   Temp Pulse Resp BP SpO2   08/20/21 0800 -- 64 -- -- --   08/20/21 0701 98.1 °F (36.7 °C) 63 18 (!) 172/63 97 %   08/20/21 0638 -- 65 -- (!) 165/69 --   08/20/21 0342 98.1 °F (36.7 °C) 68 18 (!) 188/68 97 %   08/19/21 2311 98.3 °F (36.8 °C) 68 18 (!) 156/67 99 %   08/19/21 2000 -- 66 -- -- --   08/19/21 1913 98.7 °F (37.1 °C) 70 22 (!) 159/63 100 %   08/19/21 1911 98.7 °F (37.1 °C) 70 22 -- 100 %   08/19/21 1900 98.1 °F (36.7 °C) 63 19 (!) 160/69 98 %   08/19/21 1515 98.5 °F (36.9 °C) 71 19 (!) 161/54 99 %   08/19/21 1135 -- (!) 106 -- (!) 195/62 --     Oxygen Therapy  O2 Sat (%): 97 % (08/20/21 0701)  Pulse via Oximetry: 73 beats per minute (08/17/21 1905)  O2 Device: None (Room air) (08/19/21 1930)    Estimated body mass index is 27.17 kg/m² as calculated from the following:    Height as of 8/2/21: 5' 4\" (1.626 m). Weight as of this encounter: 71.8 kg (158 lb 4.8 oz). Intake/Output Summary (Last 24 hours) at 8/20/2021 1131  Last data filed at 8/20/2021 9414  Gross per 24 hour   Intake 4081 ml   Output 1150 ml   Net 2931 ml         Physical Exam:    General:    Well nourished. No overt distress  Head:  Normocephalic, atraumatic  Eyes:  Sclerae appear normal.  Pupils equally round. HENT:  Nares appear normal, no drainage. Moist mucous membranes  Neck:  No restricted ROM. Trachea midline  CV:   RRR. No m/r/g. No JVD  Lungs:   CTAB. No wheezing, rhonchi, or rales. Even, unlabored, anterior   Abdomen:   Soft, nontender, nondistended. Present BS  Extremities: Warm and dry.   No cyanosis or clubbing. No edema. Skin:     No rashes. Normal coloration  Neuro:  Cranial nerves II-XII grossly intact. Psych:  Normal mood and affect. Signed:  Romero Bishop MD    Part of this note may have been written by using a voice dictation software. The note has been proof read but may still contain some grammatical/other typographical errors.

## 2021-08-20 NOTE — PROGRESS NOTES
Spiritual Care Visit, initial visit. Crisis; Rapid Response. Visited with patient at bedside. Affirmed her emotions. Prayed for patient's healing and health. Visit by Anatoly Sorenson, Staff .  Julianne, Rosa.B., B.A.

## 2021-08-20 NOTE — PROGRESS NOTES
END OF SHIFT NOTE:    INTAKE/OUTPUT  08/19 0701 - 08/20 0700  In: 3087 [P.O.:600; I.V.:2487]  Out: 2150 [Urine:2150]  Voiding: YES  Catheter: NO  Drain:              Flatus: Patient does have flatus present. Stool:  0 occurrences. Characteristics:  Stool Assessment  Stool Color: Brown  Stool Appearance: Hard, Formed  Stool Amount: Large  Stool Source/Status: Rectum    Emesis: 0 occurrences. Characteristics:        VITAL SIGNS  Patient Vitals for the past 12 hrs:   Temp Pulse Resp BP SpO2   08/20/21 0638 -- 65 -- (!) 165/69 --   08/20/21 0342 98.1 °F (36.7 °C) 68 18 (!) 188/68 97 %   08/19/21 2311 98.3 °F (36.8 °C) 68 18 (!) 156/67 99 %   08/19/21 2000 -- 66 -- -- --       Pain Assessment  Pain Intensity 1: 0 (08/20/21 0230)  Pain Location 1: Abdomen  Pain Intervention(s) 1: Medication (see MAR), Emotional support  Patient Stated Pain Goal: 0    Ambulating  Yes    Shift report given to oncoming nurse at the bedside.     Lainey Knox RN

## 2021-09-30 ENCOUNTER — TRANSCRIBE ORDER (OUTPATIENT)
Dept: SCHEDULING | Age: 71
End: 2021-09-30

## 2021-09-30 DIAGNOSIS — N18.5 CKD (CHRONIC KIDNEY DISEASE), STAGE V (HCC): Primary | ICD-10-CM

## 2021-10-04 ENCOUNTER — HOSPITAL ENCOUNTER (OUTPATIENT)
Dept: LAB | Age: 71
Discharge: HOME | End: 2021-10-04
Payer: MEDICARE

## 2021-10-04 DIAGNOSIS — I25.10 CORONARY ARTERY DISEASE INVOLVING NATIVE CORONARY ARTERY OF NATIVE HEART WITHOUT ANGINA PECTORIS: ICD-10-CM

## 2021-10-04 DIAGNOSIS — Z98.61 S/P PTCA (PERCUTANEOUS TRANSLUMINAL CORONARY ANGIOPLASTY): ICD-10-CM

## 2021-10-04 DIAGNOSIS — N18.31 STAGE 3A CHRONIC KIDNEY DISEASE (HCC): ICD-10-CM

## 2021-10-04 LAB
ANION GAP SERPL CALC-SCNC: 6 MMOL/L (ref 7–16)
BUN SERPL-MCNC: 31 MG/DL (ref 8–23)
CALCIUM SERPL-MCNC: 9.6 MG/DL (ref 8.3–10.4)
CHLORIDE SERPL-SCNC: 103 MMOL/L (ref 98–107)
CO2 SERPL-SCNC: 29 MMOL/L (ref 21–32)
CREAT SERPL-MCNC: 2.12 MG/DL (ref 0.6–1)
GLUCOSE SERPL-MCNC: 238 MG/DL (ref 65–100)
POTASSIUM SERPL-SCNC: 3.7 MMOL/L (ref 3.5–5.1)
SODIUM SERPL-SCNC: 138 MMOL/L (ref 136–145)

## 2021-10-04 PROCEDURE — 36415 COLL VENOUS BLD VENIPUNCTURE: CPT

## 2021-10-04 PROCEDURE — 80048 BASIC METABOLIC PNL TOTAL CA: CPT

## 2021-10-05 NOTE — PROGRESS NOTES
Please call the patient regarding their result. Kidney function labs abnormal similar to what had measured with your nephrologist.  Creatinine 2.1. Take your Lasix every other day as we discussed at your visit yesterday. Be sure to follow-up with your kidney doctor next couple weeks.

## 2021-10-15 ENCOUNTER — HOSPITAL ENCOUNTER (OUTPATIENT)
Dept: ULTRASOUND IMAGING | Age: 71
Discharge: HOME | End: 2021-10-15
Attending: TRANSPLANT SURGERY
Payer: MEDICARE

## 2021-10-15 DIAGNOSIS — N18.5 CKD (CHRONIC KIDNEY DISEASE), STAGE V (HCC): ICD-10-CM

## 2021-10-15 PROCEDURE — 76770 US EXAM ABDO BACK WALL COMP: CPT

## 2021-10-18 NOTE — PROGRESS NOTES
Called and spoke with pt. Informed her that per Dr Seble Winter, Kidney function labs abnormal similar to what had measured with your nephrologist.  Creatinine 2.1. Take your Lasix every other day as we discussed at your visit yesterday. Be sure to follow-up with your kidney doctor next couple weeks. She stated understanding.

## 2021-11-03 PROBLEM — N17.9 ACUTE RENAL FAILURE SUPERIMPOSED ON STAGE 3B CHRONIC KIDNEY DISEASE (HCC): Status: RESOLVED | Noted: 2021-08-18 | Resolved: 2021-11-03

## 2021-11-03 PROBLEM — K56.609 SBO (SMALL BOWEL OBSTRUCTION) (HCC): Status: RESOLVED | Noted: 2021-08-17 | Resolved: 2021-11-03

## 2021-11-03 PROBLEM — N18.32 ACUTE RENAL FAILURE SUPERIMPOSED ON STAGE 3B CHRONIC KIDNEY DISEASE (HCC): Status: RESOLVED | Noted: 2021-08-18 | Resolved: 2021-11-03

## 2021-11-03 PROBLEM — N18.4 TYPE 2 DIABETES MELLITUS WITH STAGE 4 CHRONIC KIDNEY DISEASE, WITH LONG-TERM CURRENT USE OF INSULIN (HCC): Status: ACTIVE | Noted: 2018-10-11

## 2022-02-07 PROBLEM — K74.69 OTHER CIRRHOSIS OF LIVER (HCC): Status: RESOLVED | Noted: 2020-08-11 | Resolved: 2022-02-07

## 2022-02-28 ENCOUNTER — TRANSCRIBE ORDER (OUTPATIENT)
Dept: SCHEDULING | Age: 72
End: 2022-02-28

## 2022-02-28 DIAGNOSIS — R10.32 LLQ PAIN: Primary | ICD-10-CM

## 2022-02-28 DIAGNOSIS — Z87.19 HX OF DIVERTICULITIS OF COLON: ICD-10-CM

## 2022-03-02 ENCOUNTER — APPOINTMENT (OUTPATIENT)
Dept: CT IMAGING | Age: 72
DRG: 344 | End: 2022-03-02
Attending: EMERGENCY MEDICINE
Payer: MEDICARE

## 2022-03-02 ENCOUNTER — HOSPITAL ENCOUNTER (INPATIENT)
Age: 72
LOS: 11 days | Discharge: HOME | DRG: 344 | End: 2022-03-13
Attending: EMERGENCY MEDICINE | Admitting: INTERNAL MEDICINE
Payer: MEDICARE

## 2022-03-02 DIAGNOSIS — R10.9 ACUTE ABDOMINAL PAIN: Primary | ICD-10-CM

## 2022-03-02 DIAGNOSIS — N18.4 STAGE 4 CHRONIC KIDNEY DISEASE (HCC): ICD-10-CM

## 2022-03-02 DIAGNOSIS — K52.89 STERCORAL COLITIS: ICD-10-CM

## 2022-03-02 DIAGNOSIS — K56.609 COLON OBSTRUCTION (HCC): ICD-10-CM

## 2022-03-02 DIAGNOSIS — E87.6 HYPOKALEMIA: ICD-10-CM

## 2022-03-02 DIAGNOSIS — K56.0: ICD-10-CM

## 2022-03-02 PROBLEM — N17.9 AKI (ACUTE KIDNEY INJURY) (HCC): Status: ACTIVE | Noted: 2022-03-02

## 2022-03-02 LAB
ALBUMIN SERPL-MCNC: 3.7 G/DL (ref 3.2–4.6)
ALBUMIN/GLOB SERPL: 0.8 (ref 1.2–3.5)
ALP SERPL-CCNC: 151 U/L (ref 50–136)
ALT SERPL-CCNC: 71 U/L (ref 12–65)
ANION GAP SERPL CALC-SCNC: 7 MMOL/L (ref 7–16)
AST SERPL-CCNC: 49 U/L (ref 15–37)
BACTERIA URNS QL MICRO: NORMAL /HPF
BASOPHILS # BLD: 0 K/UL (ref 0–0.2)
BASOPHILS NFR BLD: 1 % (ref 0–2)
BILIRUB SERPL-MCNC: 0.4 MG/DL (ref 0.2–1.1)
BILIRUB UR QL: NEGATIVE
BUN SERPL-MCNC: 43 MG/DL (ref 8–23)
CALCIUM SERPL-MCNC: 9.3 MG/DL (ref 8.3–10.4)
CASTS URNS QL MICRO: NORMAL /LPF
CHLORIDE SERPL-SCNC: 107 MMOL/L (ref 98–107)
CO2 SERPL-SCNC: 23 MMOL/L (ref 21–32)
CREAT SERPL-MCNC: 2.2 MG/DL (ref 0.6–1)
CRYSTALS URNS QL MICRO: 0 /LPF
DIFFERENTIAL METHOD BLD: ABNORMAL
EOSINOPHIL # BLD: 0.3 K/UL (ref 0–0.8)
EOSINOPHIL NFR BLD: 4 % (ref 0.5–7.8)
EPI CELLS #/AREA URNS HPF: NORMAL /HPF
ERYTHROCYTE [DISTWIDTH] IN BLOOD BY AUTOMATED COUNT: 12.1 % (ref 11.9–14.6)
GLOBULIN SER CALC-MCNC: 4.5 G/DL (ref 2.3–3.5)
GLUCOSE BLD STRIP.AUTO-MCNC: 133 MG/DL (ref 65–100)
GLUCOSE BLD STRIP.AUTO-MCNC: 210 MG/DL (ref 65–100)
GLUCOSE SERPL-MCNC: 242 MG/DL (ref 65–100)
GLUCOSE UR QL STRIP.AUTO: NEGATIVE MG/DL
HCT VFR BLD AUTO: 38.2 % (ref 35.8–46.3)
HGB BLD-MCNC: 12.8 G/DL (ref 11.7–15.4)
IMM GRANULOCYTES # BLD AUTO: 0 K/UL (ref 0–0.5)
IMM GRANULOCYTES NFR BLD AUTO: 0 % (ref 0–5)
KETONES UR-MCNC: NEGATIVE MG/DL
LACTATE SERPL-SCNC: 0.9 MMOL/L (ref 0.4–2)
LEUKOCYTE ESTERASE UR QL STRIP: ABNORMAL
LIPASE SERPL-CCNC: 157 U/L (ref 73–393)
LYMPHOCYTES # BLD: 1.5 K/UL (ref 0.5–4.6)
LYMPHOCYTES NFR BLD: 21 % (ref 13–44)
MCH RBC QN AUTO: 31.4 PG (ref 26.1–32.9)
MCHC RBC AUTO-ENTMCNC: 33.5 G/DL (ref 31.4–35)
MCV RBC AUTO: 93.9 FL (ref 79.6–97.8)
MONOCYTES # BLD: 1 K/UL (ref 0.1–1.3)
MONOCYTES NFR BLD: 14 % (ref 4–12)
MUCOUS THREADS URNS QL MICRO: 0 /LPF
NEUTS SEG # BLD: 4.3 K/UL (ref 1.7–8.2)
NEUTS SEG NFR BLD: 59 % (ref 43–78)
NITRITE UR QL: NEGATIVE
NRBC # BLD: 0 K/UL (ref 0–0.2)
OTHER OBSERVATIONS,UCOM: NORMAL
PH UR: 5 (ref 5–9)
PLATELET # BLD AUTO: 255 K/UL (ref 150–450)
PMV BLD AUTO: 9.5 FL (ref 9.4–12.3)
POTASSIUM SERPL-SCNC: 3.5 MMOL/L (ref 3.5–5.1)
PROT SERPL-MCNC: 8.2 G/DL (ref 6.3–8.2)
PROT UR QL: 100 MG/DL
RBC # BLD AUTO: 4.07 M/UL (ref 4.05–5.2)
RBC # UR STRIP: ABNORMAL
RBC #/AREA URNS HPF: 0 /HPF
SERVICE CMNT-IMP: ABNORMAL
SODIUM SERPL-SCNC: 137 MMOL/L (ref 136–145)
SP GR UR: 1.02 (ref 1–1.02)
UROBILINOGEN UR QL: 0.2 EU/DL (ref 0.2–1)
WBC # BLD AUTO: 7.2 K/UL (ref 4.3–11.1)
WBC URNS QL MICRO: NORMAL /HPF

## 2022-03-02 PROCEDURE — 74011000636 HC RX REV CODE- 636: Performed by: EMERGENCY MEDICINE

## 2022-03-02 PROCEDURE — 96375 TX/PRO/DX INJ NEW DRUG ADDON: CPT

## 2022-03-02 PROCEDURE — 80053 COMPREHEN METABOLIC PANEL: CPT

## 2022-03-02 PROCEDURE — 83690 ASSAY OF LIPASE: CPT

## 2022-03-02 PROCEDURE — 96374 THER/PROPH/DIAG INJ IV PUSH: CPT

## 2022-03-02 PROCEDURE — 74011000250 HC RX REV CODE- 250: Performed by: INTERNAL MEDICINE

## 2022-03-02 PROCEDURE — 74011000250 HC RX REV CODE- 250: Performed by: EMERGENCY MEDICINE

## 2022-03-02 PROCEDURE — 74011250636 HC RX REV CODE- 250/636: Performed by: EMERGENCY MEDICINE

## 2022-03-02 PROCEDURE — 74176 CT ABD & PELVIS W/O CONTRAST: CPT

## 2022-03-02 PROCEDURE — 74011250637 HC RX REV CODE- 250/637: Performed by: INTERNAL MEDICINE

## 2022-03-02 PROCEDURE — 81003 URINALYSIS AUTO W/O SCOPE: CPT

## 2022-03-02 PROCEDURE — 85025 COMPLETE CBC W/AUTO DIFF WBC: CPT

## 2022-03-02 PROCEDURE — 83605 ASSAY OF LACTIC ACID: CPT

## 2022-03-02 PROCEDURE — 81015 MICROSCOPIC EXAM OF URINE: CPT

## 2022-03-02 PROCEDURE — 99285 EMERGENCY DEPT VISIT HI MDM: CPT

## 2022-03-02 PROCEDURE — 74011636637 HC RX REV CODE- 636/637: Performed by: INTERNAL MEDICINE

## 2022-03-02 PROCEDURE — 74011250636 HC RX REV CODE- 250/636: Performed by: INTERNAL MEDICINE

## 2022-03-02 PROCEDURE — 82962 GLUCOSE BLOOD TEST: CPT

## 2022-03-02 PROCEDURE — 65270000029 HC RM PRIVATE

## 2022-03-02 PROCEDURE — 96376 TX/PRO/DX INJ SAME DRUG ADON: CPT

## 2022-03-02 RX ORDER — SODIUM CHLORIDE 9 MG/ML
100 INJECTION, SOLUTION INTRAVENOUS CONTINUOUS
Status: DISCONTINUED | OUTPATIENT
Start: 2022-03-02 | End: 2022-03-10

## 2022-03-02 RX ORDER — ROSUVASTATIN CALCIUM 20 MG/1
40 TABLET, COATED ORAL
Status: DISCONTINUED | OUTPATIENT
Start: 2022-03-02 | End: 2022-03-13 | Stop reason: HOSPADM

## 2022-03-02 RX ORDER — GUAIFENESIN 100 MG/5ML
81 LIQUID (ML) ORAL
Status: DISCONTINUED | OUTPATIENT
Start: 2022-03-03 | End: 2022-03-13 | Stop reason: HOSPADM

## 2022-03-02 RX ORDER — SODIUM CHLORIDE 0.9 % (FLUSH) 0.9 %
5-40 SYRINGE (ML) INJECTION AS NEEDED
Status: DISCONTINUED | OUTPATIENT
Start: 2022-03-02 | End: 2022-03-13 | Stop reason: HOSPADM

## 2022-03-02 RX ORDER — SODIUM CHLORIDE 0.9 % (FLUSH) 0.9 %
5-40 SYRINGE (ML) INJECTION EVERY 8 HOURS
Status: DISCONTINUED | OUTPATIENT
Start: 2022-03-02 | End: 2022-03-13 | Stop reason: HOSPADM

## 2022-03-02 RX ORDER — SODIUM CHLORIDE 0.9 % (FLUSH) 0.9 %
5-10 SYRINGE (ML) INJECTION EVERY 8 HOURS
Status: DISCONTINUED | OUTPATIENT
Start: 2022-03-02 | End: 2022-03-05

## 2022-03-02 RX ORDER — HYDROMORPHONE HYDROCHLORIDE 1 MG/ML
0.5 INJECTION, SOLUTION INTRAMUSCULAR; INTRAVENOUS; SUBCUTANEOUS ONCE
Status: COMPLETED | OUTPATIENT
Start: 2022-03-02 | End: 2022-03-02

## 2022-03-02 RX ORDER — ACETAMINOPHEN 650 MG/1
650 SUPPOSITORY RECTAL
Status: DISCONTINUED | OUTPATIENT
Start: 2022-03-02 | End: 2022-03-13 | Stop reason: HOSPADM

## 2022-03-02 RX ORDER — ONDANSETRON 4 MG/1
4 TABLET, ORALLY DISINTEGRATING ORAL
Status: DISCONTINUED | OUTPATIENT
Start: 2022-03-02 | End: 2022-03-13 | Stop reason: HOSPADM

## 2022-03-02 RX ORDER — ONDANSETRON 2 MG/ML
4 INJECTION INTRAMUSCULAR; INTRAVENOUS
Status: COMPLETED | OUTPATIENT
Start: 2022-03-02 | End: 2022-03-02

## 2022-03-02 RX ORDER — INSULIN LISPRO 100 [IU]/ML
INJECTION, SOLUTION INTRAVENOUS; SUBCUTANEOUS
Status: DISCONTINUED | OUTPATIENT
Start: 2022-03-02 | End: 2022-03-13 | Stop reason: HOSPADM

## 2022-03-02 RX ORDER — POLYETHYLENE GLYCOL 3350 17 G/17G
17 POWDER, FOR SOLUTION ORAL 2 TIMES DAILY
Status: DISCONTINUED | OUTPATIENT
Start: 2022-03-02 | End: 2022-03-08

## 2022-03-02 RX ORDER — METRONIDAZOLE 500 MG/100ML
500 INJECTION, SOLUTION INTRAVENOUS EVERY 8 HOURS
Status: DISCONTINUED | OUTPATIENT
Start: 2022-03-02 | End: 2022-03-05

## 2022-03-02 RX ORDER — FACIAL-BODY WIPES
10 EACH TOPICAL DAILY
Status: DISCONTINUED | OUTPATIENT
Start: 2022-03-03 | End: 2022-03-13 | Stop reason: HOSPADM

## 2022-03-02 RX ORDER — CIPROFLOXACIN 2 MG/ML
400 INJECTION, SOLUTION INTRAVENOUS EVERY 24 HOURS
Status: DISCONTINUED | OUTPATIENT
Start: 2022-03-02 | End: 2022-03-06

## 2022-03-02 RX ORDER — ENOXAPARIN SODIUM 100 MG/ML
30 INJECTION SUBCUTANEOUS DAILY
Status: DISCONTINUED | OUTPATIENT
Start: 2022-03-03 | End: 2022-03-13 | Stop reason: HOSPADM

## 2022-03-02 RX ORDER — HYDROMORPHONE HYDROCHLORIDE 1 MG/ML
1 INJECTION, SOLUTION INTRAMUSCULAR; INTRAVENOUS; SUBCUTANEOUS
Status: DISCONTINUED | OUTPATIENT
Start: 2022-03-02 | End: 2022-03-05

## 2022-03-02 RX ORDER — TRAZODONE HYDROCHLORIDE 50 MG/1
50 TABLET ORAL
Status: DISCONTINUED | OUTPATIENT
Start: 2022-03-02 | End: 2022-03-13 | Stop reason: HOSPADM

## 2022-03-02 RX ORDER — SODIUM CHLORIDE 0.9 % (FLUSH) 0.9 %
5-10 SYRINGE (ML) INJECTION AS NEEDED
Status: DISCONTINUED | OUTPATIENT
Start: 2022-03-02 | End: 2022-03-05

## 2022-03-02 RX ORDER — ONDANSETRON 2 MG/ML
4 INJECTION INTRAMUSCULAR; INTRAVENOUS
Status: DISCONTINUED | OUTPATIENT
Start: 2022-03-02 | End: 2022-03-13 | Stop reason: HOSPADM

## 2022-03-02 RX ORDER — SODIUM CHLORIDE, SODIUM LACTATE, POTASSIUM CHLORIDE, CALCIUM CHLORIDE 600; 310; 30; 20 MG/100ML; MG/100ML; MG/100ML; MG/100ML
200 INJECTION, SOLUTION INTRAVENOUS ONCE
Status: COMPLETED | OUTPATIENT
Start: 2022-03-02 | End: 2022-03-02

## 2022-03-02 RX ORDER — ISOSORBIDE MONONITRATE 60 MG/1
60 TABLET, EXTENDED RELEASE ORAL DAILY
Status: DISCONTINUED | OUTPATIENT
Start: 2022-03-03 | End: 2022-03-13 | Stop reason: HOSPADM

## 2022-03-02 RX ORDER — ACETAMINOPHEN 325 MG/1
650 TABLET ORAL
Status: DISCONTINUED | OUTPATIENT
Start: 2022-03-02 | End: 2022-03-13 | Stop reason: HOSPADM

## 2022-03-02 RX ORDER — CARVEDILOL 3.12 MG/1
3.12 TABLET ORAL 2 TIMES DAILY WITH MEALS
Status: DISCONTINUED | OUTPATIENT
Start: 2022-03-02 | End: 2022-03-13 | Stop reason: HOSPADM

## 2022-03-02 RX ORDER — HYDROMORPHONE HYDROCHLORIDE 1 MG/ML
0.5 INJECTION, SOLUTION INTRAMUSCULAR; INTRAVENOUS; SUBCUTANEOUS
Status: DISCONTINUED | OUTPATIENT
Start: 2022-03-02 | End: 2022-03-05

## 2022-03-02 RX ORDER — SPIRONOLACTONE 25 MG/1
25 TABLET ORAL DAILY
Status: DISCONTINUED | OUTPATIENT
Start: 2022-03-03 | End: 2022-03-03

## 2022-03-02 RX ORDER — POLYETHYLENE GLYCOL 3350 17 G/17G
17 POWDER, FOR SOLUTION ORAL DAILY PRN
Status: DISCONTINUED | OUTPATIENT
Start: 2022-03-02 | End: 2022-03-06

## 2022-03-02 RX ADMIN — Medication 4 UNITS: at 23:16

## 2022-03-02 RX ADMIN — ONDANSETRON 4 MG: 2 INJECTION INTRAMUSCULAR; INTRAVENOUS at 23:40

## 2022-03-02 RX ADMIN — CARVEDILOL 3.12 MG: 3.12 TABLET, FILM COATED ORAL at 17:35

## 2022-03-02 RX ADMIN — TRAZODONE HYDROCHLORIDE 50 MG: 50 TABLET ORAL at 22:34

## 2022-03-02 RX ADMIN — HYDROMORPHONE HYDROCHLORIDE 1 MG: 1 INJECTION, SOLUTION INTRAMUSCULAR; INTRAVENOUS; SUBCUTANEOUS at 15:51

## 2022-03-02 RX ADMIN — SODIUM CHLORIDE, PRESERVATIVE FREE 10 ML: 5 INJECTION INTRAVENOUS at 22:37

## 2022-03-02 RX ADMIN — HYDROMORPHONE HYDROCHLORIDE 0.5 MG: 1 INJECTION, SOLUTION INTRAMUSCULAR; INTRAVENOUS; SUBCUTANEOUS at 11:46

## 2022-03-02 RX ADMIN — CIPROFLOXACIN 400 MG: 2 INJECTION, SOLUTION INTRAVENOUS at 23:28

## 2022-03-02 RX ADMIN — SODIUM CHLORIDE, PRESERVATIVE FREE 10 ML: 5 INJECTION INTRAVENOUS at 22:35

## 2022-03-02 RX ADMIN — ROSUVASTATIN CALCIUM 40 MG: 20 TABLET, FILM COATED ORAL at 22:34

## 2022-03-02 RX ADMIN — METRONIDAZOLE 500 MG: 500 INJECTION, SOLUTION INTRAVENOUS at 23:02

## 2022-03-02 RX ADMIN — SODIUM CHLORIDE 75 ML/HR: 900 INJECTION, SOLUTION INTRAVENOUS at 20:01

## 2022-03-02 RX ADMIN — HYDROMORPHONE HYDROCHLORIDE 1 MG: 1 INJECTION, SOLUTION INTRAMUSCULAR; INTRAVENOUS; SUBCUTANEOUS at 22:53

## 2022-03-02 RX ADMIN — POLYETHYLENE GLYCOL 3350 17 G: 17 POWDER, FOR SOLUTION ORAL at 20:35

## 2022-03-02 RX ADMIN — ONDANSETRON 4 MG: 2 INJECTION INTRAMUSCULAR; INTRAVENOUS at 11:46

## 2022-03-02 RX ADMIN — DIATRIZOATE MEGLUMINE AND DIATRIZOATE SODIUM 15 ML: 660; 100 LIQUID ORAL; RECTAL at 11:46

## 2022-03-02 RX ADMIN — SODIUM CHLORIDE, SODIUM LACTATE, POTASSIUM CHLORIDE, AND CALCIUM CHLORIDE 200 ML/HR: 600; 310; 30; 20 INJECTION, SOLUTION INTRAVENOUS at 11:44

## 2022-03-02 NOTE — ED TRIAGE NOTES
Patient presents with complaints of left flank pain into left lower abdomen. Patient was recently seen in the office and has been treated for diverticulosis. Patient denies pain/blood with urination. Patient with complaints of constipation since last Wednesday. Patient denies nausea/vomiting.

## 2022-03-02 NOTE — H&P
Hospitalist History and Physical   Admit Date:  3/2/2022 11:11 AM   Name:  Niko Franco   Age:  66 y.o. Sex:  female  :  1950   MRN:  [id number]   Room:  ER/    Presenting Complaint: Abdominal Pain    Reason(s) for Admission: Stercoral colitis [K52.89]  MIRANDA (acute kidney injury) ([facility] [N17.9]     History of Present Illness:   Niko Franco is a 66 y.o. female with medical history significant for chronic constipation, stage III chronic kidney disease and hypertension who was relatively well until 8 days ago (2022), when she had a nice large bowel movement. Unfortunately the next day she began experiencing severe abdominal pain on her left side in the lower abdomen. Patient states that the pain was severe. She went to see her gastroenterologist Dr. Porsha Willoughby 2 days ago on 2022 she was placed on Cipro and Flagyl for possible diverticulitis. However her symptoms became worse and she she had not passed any stool since 2022. She is passing gas per her. .    Because the pain became so severe she decided to come to the ER today where she was diagnosed with stercolitis. ER physician attempted fecal decompaction and requested that the hospitalist service admit her. Lactic acid pending. The patient endorses nausea. She denies chest pain, vomiting, burning or pain on micturition, fevers or chills. She was admitted here last year for similar symptoms but was not found to have an obstruction. She did have a colonoscopy subsequent to that admission where a polyp was found. Review of Systems:  10 systems reviewed and negative except as noted in HPI. Assessment & Plan:      Active Problems:    MIRANDA (acute kidney injury) ([facility] (3/2/2022)  Patient has a history of chronic stage III kidney disease  She has acute on chronic kidney injury  We will give gentle IV fluids  Avoid nephrotoxins  And dose her medications for her renal function Stercoral colitis (3/2/2022)  Follow-up lactic acid  IV antibiotics  Supportive care  Consider GI consult if clinically indicated six    Diabetes  Monitor her blood sugars  Cover her with insulin  Titrate insulin as indicated    Hypertension  Continue appropriate home medications  Further work-up and management based on her clinical course    Concern for narcotic allergy as codeine is listed as allergy here  Consult pharmacy to see if she has had narcotics in the past  Pharmacy confirmed that the patient has had narcotics here in the past  She is high risk with IV narcotics on board      Dispo/Discharge Planning:   Admit to inpatient  Anticipate at least two midnights  Actual discharge time TBD pending her lactic acid and clinical course  Anticipate home with home health services    Further work-up and management based on her clinical course  I have requested for the med reconciliation to be done for nurses to notify me or on call MD when it is completed.     Diet: DIET NPO  VTE ppx: Heparin  Code status: Prior    Hospital Problems as of 3/2/2022 Date Reviewed: 2/7/2022          Codes Class Noted - Resolved POA    MIRANDA (acute kidney injury) ([facility] ICD-10-CM: N17.9  ICD-9-CM: 584.9  3/2/2022 - Present Unknown        Stercoral colitis ICD-10-CM: K52.89  ICD-9-CM: 558.9  3/2/2022 - Present Unknown        Type 2 diabetes mellitus with stage 4 chronic kidney disease, with long-term current use of insulin ([facility] ICD-10-CM: E11.22, N18.4, Z79.4  ICD-9-CM: 250.40, 585.4, V58.67  10/11/2018 - Present Yes        Gastroesophageal reflux disease ICD-10-CM: K21.9  ICD-9-CM: 530.81  2/16/2017 - Present Yes        HLD (hyperlipidemia) ICD-10-CM: E78.5  ICD-9-CM: 272.4  9/4/2013 - Present Yes        Hypertension (Chronic) ICD-10-CM: I10  ICD-9-CM: 401.9  3/1/2012 - Present Yes              Past History:  Past Medical History:   Diagnosis Date    Acute on chronic combined systolic and diastolic congestive heart failure ([facility] 2/17/2021  Acute renal failure superimposed on stage 3b chronic kidney disease ([facility] 1/12/2021    CAD (coronary artery disease) 3/1/2012    MI, 3 stents    Chest pain     Coronary artery disease involving native coronary artery without angina pectoris 5/21/2015    COVID-19 virus infection 1/12/2021    Depression 3/1/2012    Diabetes mellitus ([facility] 3/1/2012    oral agents. . hypo- 80's, Last A1c 6.9 in 6/2018    DM2 (diabetes mellitus, type 2) ([facility] 5/21/2015    Elevated LFTs 1/12/2021    Elevated troponin 3/2/2012    Essential hypertension 5/21/2015    External hemorrhoids without mention of complication 8485    GERD (gastroesophageal reflux disease)     HCAP (healthcare-associated pneumonia) 1/18/2021    History of MI (myocardial infarction) 11/12    x2    Hypercholesterolemia     Hypertension 3/1/2012    Hypoalbuminemia 2/18/2021    Hyponatremia 1/12/2021    Hypoxemia 8/12/2020    Insomnia     Lactic acidosis 1/12/2021    Long QT interval 1/12/2021    Personal history of colonic polyps 2013    hyperplastic    Platelet inhibition due to Plavix     holding for colonoscopy per GI Dr. Rolando Braxton Pleural effusion, bilateral 8/11/2020    Rectocele 2013    Sepsis ([facility] 1/12/2021    Tobacco abuse 3/1/2012    quit for 1 year    Tobacco use disorder     Unstable angina ([facility] 3/1/2012    ntg as needed.       Past Surgical History:   Procedure Laterality Date    HX CAROTID ENDARTERECTOMY Left 12/12/2018    HX CATARACT REMOVAL Left 09/19/2016    Dr Angel Bonilla, [id number] [address] Right 11/07/2016    Dr Angel Bonilla, [facility]    HX CERVICAL FUSION      neck w/plate    HX COLONOSCOPY  12/17/13    Anna Marie--single transverse hyperplastic polyp--7-10 year recall    HX HEMORRHOIDECTOMY      x2    HX ORTHOPAEDIC      left elbow    HX CAL AND BSO      HX WISDOM TEETH EXTRACTION      OH LEFT HEART CATH,PERCUTANEOUS  last stented 11/12 x 2    stent x3 , mi x 2      Allergies   Allergen Reactions    Cephalosporins Hives and Swelling    Hydralazine Unknown (comments)     Chest pain    Sulfamethoxazole-Trimethoprim Other (comments)     Diarrhea     Codeine Other (comments)    Lisinopril Other (comments)     Passing out spells. // pt sts not allergic to med       Social History     Tobacco Use    Smoking status: Former Smoker     Packs/day: 1.00     Years: 40.00     Pack years: 40.00     Quit date: 2012     Years since quittin.3    Smokeless tobacco: Never Used    Tobacco comment: quit  . has stopped prior also   Substance Use Topics    Alcohol use: No      Family History   Problem Relation Age of Onset    Heart Disease Mother     Osteoporosis Mother     Heart Attack Mother 67        mi    Thyroid Disease Mother         Multinodular Goiter    Heart Disease Father     Cancer Father         pancreatic    Heart Attack Father 67        MI    Cancer Sister         Pre-Cancerous dysplasia    Thyroid Cancer Sister     Ulcerative Colitis Brother     Thyroid Cancer Brother     Breast Cancer Neg Hx       Family history reviewed and negative except as noted above.     Immunization History   Administered Date(s) Administered    COVID-19, Moderna, Primary or Immunocompromised Series, MRNA, PF, 100mcg/0.5mL 2021, 2021    Influenza High Dose Vaccine PF 10/04/2017, 10/11/2018, 2021    Influenza Vaccine 2013, 2014, 2015    Influenza Vaccine (Quad) PF (>6 Mo Flulaval, Fluarix, and >3 Yrs Afluria, Fluzone 84348) 12/15/2016    Influenza Vaccine (Tri) Adjuvanted (>65 Yrs FLUAD TRI 21287) 2019    Influenza, Quadrivalent, Adjuvanted (>65 Yrs FLUAD QUAD 64920) 10/21/2020    Pneumococcal Conjugate (PCV-13) 2015    Pneumococcal Polysaccharide (PPSV-23) 2013     Prior to Admit Medications:  Current Outpatient Medications   Medication Instructions    aspirin 81 mg, 7AM    Blood-Glucose Meter,Continuous (Dexcom G6 ) misc 1 Each, Does Not Apply, 4 TIMES DAILY    Blood-Glucose Sensor (Dexcom G6 Sensor) angelic 10 Each, Does Not Apply, 4 TIMES DAILY    Blood-Glucose Transmitter (Dexcom G6 Transmitter) angelic 1 Each, Does Not Apply, 4 TIMES DAILY    carvediloL (COREG) 3.125 mg, Oral, 2 TIMES DAILY WITH MEALS    citalopram (CELEXA) 20 mg tablet TAKE 1 TABLET BY MOUTH IN  THE MORNING    cyanocobalamin (VITAMIN B-12) 2,500 mcg, Oral, DAILY    ferrous sulfate (IRON) 325 mg, Oral, 2 TIMES DAILY    furosemide (LASIX) 40 mg, Oral, DAILY    glucose blood VI test strips (OneTouch Verio test strips) strip 1 Each, Does Not Apply, 2 TIMES DAILY AS NEEDED    insulin lispro (HUMALOG) 100 unit/mL kwikpen 4 Units SubQ with breakfast, 4 Units SubQ with lunch, 6 Units SubQ with dinner.  Insulin Needles, Disposable, (BD Ligia 2nd Gen Pen Needle) 32 gauge x 5/32\" ndle Test blood sugar three times today.     isosorbide mononitrate ER (IMDUR) 60 mg, Oral, DAILY    lancets (One Touch Delica) 33 gauge misc 1 Lancet, IntraDERMal, 2 TIMES DAILY AS NEEDED, Finger stick    magnesium oxide (MAG-OX) 400 mg tablet TAKE 1 TABLET BY MOUTH TWICE DAILY    NIFEdipine ER (PROCARDIA XL) 90 mg, Oral, DAILY    nitroglycerin (NITROSTAT) 0.4 mg, SubLINGual, EVERY 5 MIN AS NEEDED    rosuvastatin (CRESTOR) 40 mg tablet TAKE 1 TABLET BY MOUTH AT  NIGHT    spironolactone (ALDACTONE) 25 mg, Oral, DAILY    traZODone (DESYREL) 50 mg, Oral, EVERY BEDTIME    Althia Marinelli FlexTouch U-100 12 Units, abdominal subcutaneous, EVERY BEDTIME    Vitamin D3 2,000 Units, Oral, DAILY       Objective:     Patient Vitals for the past 24 hrs:   Temp Pulse Resp BP SpO2   03/02/22 1245 -- -- -- (!) 175/79 99 %   03/02/22 1230 -- -- -- (!) 170/62 99 %   03/02/22 1130 -- -- -- (!) 155/59 99 %   03/02/22 1108 98 °F (36.7 °C) 63 18 (!) 161/62 100 %   03/02/22 0902 98.2 °F (36.8 °C) 70 16 (!) 148/64 98 %     Oxygen Therapy  O2 Sat (%): 99 % (03/02/22 1245)  Pulse via Oximetry: 66 beats per minute (03/02/22 1245)  O2 Device: None (Room air) (03/02/22 1122)    Estimated body mass index is 29.18 kg/m² as calculated from the following:    Height as of this encounter: 5' 4\" (1.626 m). Weight as of this encounter: 77.1 kg (170 lb). No intake or output data in the 24 hours ending 03/02/22 2757      Physical Exam:    Blood pressure (!) 175/79, pulse 63, temperature 98 °F (36.7 °C), resp. rate 18, height 5' 4\" (1.626 m), weight 77.1 kg (170 lb), SpO2 99 %. General:    Well nourished. Pleasant female in obvious painful distress. Blood pressure elevated. Sitting with legs up to relieve abdominal pain and pressure. Head:  Normocephalic, atraumatic  Eyes:  Sclerae appear normal.  Pupils equally round. ENT:  Nares appear normal, no drainage. Moist oral mucosa  Neck:  No restricted ROM. Trachea midline   CV:   RRR. No m/r/g. No jugular venous distension. Lungs:   CTAB. No wheezing, rhonchi, or rales. Respirations even, unlabored  Abdomen: Bowel sounds present. Soft, tender on the left side particularly in the left flank, no rebound tenderness nondistended. Extremities: No cyanosis or clubbing. No edema  Skin:     No rashes and normal coloration. Warm and dry. Neuro:  CN II-XII grossly intact. Sensation intact. A&Ox3  Psych:  Normal mood and affect.       I have reviewed ordered lab tests and independently visualized imaging below:    Last 24hr Labs:  Recent Results (from the past 24 hour(s))   CBC WITH AUTOMATED DIFF    Collection Time: 03/02/22  9:14 AM   Result Value Ref Range    WBC 7.2 4.3 - 11.1 K/uL    RBC 4.07 4.05 - 5.2 M/uL    HGB 12.8 11.7 - 15.4 g/dL    HCT 38.2 35.8 - 46.3 %    MCV 93.9 79.6 - 97.8 FL    MCH 31.4 26.1 - 32.9 PG    MCHC 33.5 31.4 - 35.0 g/dL    RDW 12.1 11.9 - 14.6 %    PLATELET 582 137 - 932 K/uL    MPV 9.5 9.4 - 12.3 FL    ABSOLUTE NRBC 0.00 0.0 - 0.2 K/uL    DF AUTOMATED      NEUTROPHILS 59 43 - 78 %    LYMPHOCYTES 21 13 - 44 %    MONOCYTES 14 (H) 4.0 - 12.0 %    EOSINOPHILS 4 0.5 - 7.8 %    BASOPHILS 1 0.0 - 2.0 %    IMMATURE GRANULOCYTES 0 0.0 - 5.0 %    ABS. NEUTROPHILS 4.3 1.7 - 8.2 K/UL    ABS. LYMPHOCYTES 1.5 0.5 - 4.6 K/UL    ABS. MONOCYTES 1.0 0.1 - 1.3 K/UL    ABS. EOSINOPHILS 0.3 0.0 - 0.8 K/UL    ABS. BASOPHILS 0.0 0.0 - 0.2 K/UL    ABS. IMM. GRANS. 0.0 0.0 - 0.5 K/UL   METABOLIC PANEL, COMPREHENSIVE    Collection Time: 03/02/22  9:14 AM   Result Value Ref Range    Sodium 137 136 - 145 mmol/L    Potassium 3.5 3.5 - 5.1 mmol/L    Chloride 107 98 - 107 mmol/L    CO2 23 21 - 32 mmol/L    Anion gap 7 7 - 16 mmol/L    Glucose 242 (H) 65 - 100 mg/dL    BUN 43 (H) 8 - 23 MG/DL    Creatinine 2.20 (H) 0.6 - 1.0 MG/DL    GFR est AA 28 (L) >60 ml/min/1.73m2    GFR est non-AA 23 (L) >60 ml/min/1.73m2    Calcium 9.3 8.3 - 10.4 MG/DL    Bilirubin, total 0.4 0.2 - 1.1 MG/DL    ALT (SGPT) 71 (H) 12 - 65 U/L    AST (SGOT) 49 (H) 15 - 37 U/L    Alk.  phosphatase 151 (H) 50 - 136 U/L    Protein, total 8.2 6.3 - 8.2 g/dL    Albumin 3.7 3.2 - 4.6 g/dL    Globulin 4.5 (H) 2.3 - 3.5 g/dL    A-G Ratio 0.8 (L) 1.2 - 3.5     LIPASE    Collection Time: 03/02/22  9:14 AM   Result Value Ref Range    Lipase 157 73 - 393 U/L   POC URINE MACROSCOPIC    Collection Time: 03/02/22 11:27 AM   Result Value Ref Range    Spec. gravity (POC) 1.025 (H) 1.001 - 1.023      pH, urine  (POC) 5.0 5.0 - 9.0      Protein (POC) 100 (A) NEG mg/dL    Glucose, urine (POC) Negative NEG mg/dL    Ketones (POC) Negative NEG mg/dL    Bilirubin (POC) Negative NEG      Blood (POC) Trace Intact (A) NEG      Urobilinogen (POC) 0.2 0.2 - 1.0 EU/dL    Nitrite (POC) Negative NEG      Leukocyte esterase (POC) TRACE (A) NEG      Performed by Matilda Marin    URINE MICROSCOPIC    Collection Time: 03/02/22 11:34 AM   Result Value Ref Range    WBC 5-10 0 /hpf    RBC 0 0 /hpf    Epithelial cells 3-5 0 /hpf    Bacteria TRACE 0 /hpf    Casts 5-10 0 /lpf    Crystals, urine 0 0 /LPF    Mucus 0 0 /lpf    Other observations RESULTS VERIFIED MANUALLY All Micro Results     None          Other Studies:  CT ABD PELV WO CONT    Result Date: 3/2/2022  EXAMINATION: CT ABD PELV WO CONT 3/2/2022 1:54 PM ACCESSION NUMBER: [id number] COMPARISON: Abdominal x-rays 8/18/2021 CT abdomen and pelvis 8/17/2021 INDICATION: Left lower quadrant pain. Suspected diverticulitis Patient presents with complaints of left flank pain into left lower abdomen. Patient was recently seen in the office and has been treated for diverticulosis. TECHNIQUE: Contiguous axial computed tomographic images were obtained from the domes of the diaphragm to the symphysis pubis without intravenous contrast. Coronal and sagittal reformats are provided. Oral contrast was administered. Please note that the detection of solid organ and vascular abnormalities is limited in the absence of intravenous contrast. Radiation dose reduction techniques were used for this study. Our CT scanners use one or all of the following: Automated exposure control, adjustment of the mA and/or kV according to patient size, iterative reconstruction. FINDINGS: LIMITED THORAX: Mitral annulus calcifications. Right coronary artery atherosclerosis. The included portions of the pericardium are unremarkable. The included lung bases are clear. PERITONEUM/MESENTERY: No evidence of ascites, free gas, or lymphadenopathy. GI TRACT: There is large volume stool extending from the cecum through the mid descending colon. There is an abrupt marketed transition in colonic caliber in the mid descending colon. There is mild colonic wall thickening and subtle free fluid in the area of abrupt caliber transition. The distal portions of the colon are decompressed. The terminal ileum is unremarkable. There is no evidence of upstream small bowel obstruction. Nonvisualized appendix, without pericecal inflammatory change. The stomach is unremarkable.  SPLEEN: Normal ADRENALS: Normal PANCREAS: Normal LIVER: Normal GALLBLADDER: Normal KIDNEYS: Mild bilateral renal cortical thinning. No evidence of hydroureteronephrosis or nephroureterolithiasis. Duplicated left renal collecting system. BLADDER/: Small posterior bladder wall diverticulum. Prior hysterectomy. VESSELS: Scattered atherosclerosis. BONES/SOFT TISSUES: Lumbar spine spondylosis without suspicious lytic or blastic bony lesions. 1.  There is colonic obstruction consequent to a severe stool burden extending from the cecum through the mid descending colon. There is an abrupt transition in colonic caliber in the mid descending colon. There is subtle bowel wall thickening and surrounding mesenteric stranding of the area of transition in caliber, consistent with a developing stercoral colitis. There is no evidence of associated colonic perforation or upstream small bowel obstruction. 2.  Chronic findings otherwise as above. Medications Administered     diatrizoate maryann-diatrizoat sod (MD-GASTROVIEW,GASTROGRAFIN) 66-10 % contrast solution 15 mL     Admin Date  03/02/2022 Action  Given Dose  15 mL Route  Oral Administered By  Barbara Tobar RN          HYDROmorphone (DILAUDID) injection 0.5 mg     Admin Date  03/02/2022 Action  Given Dose  0.5 mg Route  IntraVENous Administered By  Barbara Tobar RN          lactated Ringers infusion     Admin Date  03/02/2022 Action  New Bag Dose  200 mL/hr Rate  200 mL/hr Route  IntraVENous Administered By  Barbara Tobar RN          ondansetron [facility]) injection 4 mg     Admin Date  03/02/2022 Action  Given Dose  4 mg Route  IntraVENous Administered By  Barbara Toabr RN                Signed:  Gualberto Lyon MD    Part of this note may have been written by using a voice dictation software. The note has been proof read but may still contain some grammatical/other typographical errors.

## 2022-03-02 NOTE — ED PROVIDER NOTES
66-year-old female presents with 5-day history of left lower quadrant pain. She has history hypertension diabetes congestive heart failure and some renal insufficiency. Also history of coronary disease with stenting. History of diverticulosis I believe in the past.  She saw her doctor 2 days ago and was placed on Cipro and Flagyl for presumed diverticulitis. Outpatient CT scan was ordered but not accomplished as of yet. Pain is steadily increased and was not tolerable today. No vomiting or diarrhea or abnormal bleeding. No dysuria. Chest pain or shortness of breath. The history is provided by the patient. Abdominal Pain   This is a new problem. The current episode started more than 2 days ago. The problem occurs constantly. The problem has been gradually worsening. The pain is located in the LLQ. The quality of the pain is aching and dull. The pain is moderate. Associated symptoms include nausea. Pertinent negatives include no fever, no diarrhea, no hematochezia, no melena, no vomiting, no constipation, no dysuria, no frequency and no chest pain. Nothing worsens the pain. The pain is relieved by nothing. Her past medical history does not include gallstones, pancreatitis or diverticulitis. The patient's surgical history includes hysterectomy. Past Medical History:   Diagnosis Date    Acute on chronic combined systolic and diastolic congestive heart failure ([address] 2/17/2021    Acute renal failure superimposed on stage 3b chronic kidney disease ([address] 1/12/2021    CAD (coronary artery disease) 3/1/2012    MI, 3 stents    Chest pain     Coronary artery disease involving native coronary artery without angina pectoris 5/21/2015    COVID-19 virus infection 1/12/2021    Depression 3/1/2012    Diabetes mellitus ([address] 3/1/2012    oral agents. . hypo- 80's, Last A1c 6.9 in 6/2018    DM2 (diabetes mellitus, type 2) ([address] 5/21/2015    Elevated LFTs 1/12/2021    Elevated troponin 3/2/2012    Essential hypertension 5/21/2015    External hemorrhoids without mention of complication 8219    GERD (gastroesophageal reflux disease)     HCAP (healthcare-associated pneumonia) 1/18/2021    History of MI (myocardial infarction) 11/12    x2    Hypercholesterolemia     Hypertension 3/1/2012    Hypoalbuminemia 2/18/2021    Hyponatremia 1/12/2021    Hypoxemia 8/12/2020    Insomnia     Lactic acidosis 1/12/2021    Long QT interval 1/12/2021    Personal history of colonic polyps 2013    hyperplastic    Platelet inhibition due to Plavix     holding for colonoscopy per GI Dr. Sherwood Pleural effusion, bilateral 8/11/2020    Rectocele 2013    Sepsis ([address] 1/12/2021    Tobacco abuse 3/1/2012    quit for 1 year    Tobacco use disorder     Unstable angina ([address] 3/1/2012    ntg as needed.         Past Surgical History:   Procedure Laterality Date    HX CAROTID ENDARTERECTOMY Left 12/12/2018    HX CATARACT REMOVAL Left 09/19/2016    Dr Marsha Jenkins, [address] [place] [address] Right 11/07/2016    Dr Marsha Jenkins, [facility]    HX CERVICAL FUSION      neck w/plate    HX COLONOSCOPY  12/17/13    Anna Marie--single transverse hyperplastic polyp--7-10 year recall    HX HEMORRHOIDECTOMY      x2    HX ORTHOPAEDIC      left elbow    HX CAL AND BSO      HX WISDOM TEETH EXTRACTION      [place] LEFT HEART CATH,PERCUTANEOUS  last stented 11/12 x 2    stent x3 , mi x 2         Family History:   Problem Relation Age of Onset    Heart Disease Mother     Osteoporosis Mother     Heart Attack Mother 67        mi    Thyroid Disease Mother         Multinodular Goiter    Heart Disease Father     Cancer Father         pancreatic    Heart Attack Father 67        MI    Cancer Sister         Pre-Cancerous dysplasia    Thyroid Cancer Sister     Ulcerative Colitis Brother     Thyroid Cancer Brother     Breast Cancer Neg Hx        Social History     Socioeconomic History    Marital status:      Spouse name: Not on file    Number of children: Not on file    Years of education: Not on file    Highest education level: Not on file   Occupational History    Not on file   Tobacco Use    Smoking status: Former Smoker     Packs/day: 1.00     Years: 40.00     Pack years: 40.00     Quit date: 2012     Years since quittin.3    Smokeless tobacco: Never Used    Tobacco comment: quit  . has stopped prior also   Vaping Use    Vaping Use: Never used   Substance and Sexual Activity    Alcohol use: No    Drug use: No    Sexual activity: Not on file   Other Topics Concern    Not on file   Social History Narrative    Not on file     Social Determinants of Health     Financial Resource Strain:     Difficulty of Paying Living Expenses: Not on file   Food Insecurity:     Worried About [address] [employer] in the Last Year: Not on file    [place] of Food in the Last Year: Not on file   Transportation Needs:     Lack of Transportation (Medical): Not on file    Lack of Transportation (Non-Medical):  Not on file   Physical Activity:     Days of Exercise per Week: Not on file    Minutes of Exercise per Session: Not on file   Stress:     Feeling of Stress : Not on file   Social Connections:     Frequency of Communication with Friends and Family: Not on file    Frequency of Social Gatherings with Friends and Family: Not on file    Attends Hindu Services: Not on file    Active Member of [address] [place] [address] or Organizations: Not on file    Attends Club or Organization Meetings: Not on file    Marital Status: Not on file   Intimate Partner Violence:     Fear of Current or Ex-Partner: Not on file    Emotionally Abused: Not on file    Physically Abused: Not on file    Sexually Abused: Not on file   Housing Stability:     Unable to Pay for Housing in the Last Year: Not on file    Number of [place] in the Last Year: Not on file    Unstable Housing in the Last Year: Not on file         ALLERGIES: Cephalosporins, Hydralazine, Sulfamethoxazole-trimethoprim, Codeine, and Lisinopril    Review of Systems   Constitutional: Negative for chills and fever. Cardiovascular: Negative for chest pain. Gastrointestinal: Positive for abdominal pain and nausea. Negative for constipation, diarrhea, hematochezia, melena and vomiting. Genitourinary: Negative for dysuria and frequency. All other systems reviewed and are negative. Vitals:    03/02/22 0902 03/02/22 1108   BP: (!) 148/64 (!) 161/62   Pulse: 70 63   Resp: 16 18   Temp: 98.2 °F (36.8 °C) 98 °F (36.7 °C)   SpO2: 98% 100%   Weight: 77.1 kg (170 lb)    Height: 5' 4\" (1.626 m)             Physical Exam  Vitals and nursing note reviewed. Constitutional:       General: She is in acute distress (Uncomfortable). Appearance: She is well-developed. She is not ill-appearing. HENT:      Head: Normocephalic and atraumatic. Right Ear: External ear normal.      Left Ear: External ear normal.      Mouth/Throat:      Pharynx: No oropharyngeal exudate. Eyes:      Conjunctiva/sclera: Conjunctivae normal.      Pupils: Pupils are equal, round, and reactive to light. Cardiovascular:      Rate and Rhythm: Normal rate and regular rhythm. Heart sounds: No murmur heard. Pulmonary:      Effort: No respiratory distress. Breath sounds: Normal breath sounds. Abdominal:      General: Bowel sounds are normal.      Palpations: Abdomen is soft. There is no mass. Tenderness: There is abdominal tenderness in the left lower quadrant. There is no guarding or rebound. Hernia: No hernia is present. Musculoskeletal:         General: No swelling or tenderness. Skin:     General: Skin is warm and dry. Neurological:      Mental Status: She is alert and oriented to person, place, and time.       Gait: Gait normal.      Comments: Nl speech   Psychiatric:         Speech: Speech normal.          MDM  Number of Diagnoses or Management Options  Diagnosis management comments: Concern for diverticulitis. CT scan. May have UTI, kidney stone. Possibility of bowel obstruction. Patient less likely to have appendicitis or cholecystitis. Amount and/or Complexity of Data Reviewed  Clinical lab tests: ordered and reviewed  Tests in the radiology section of CPT®: ordered and reviewed  Decide to obtain previous medical records or to obtain history from someone other than the patient: yes    Risk of Complications, Morbidity, and/or Mortality  Presenting problems: moderate  Diagnostic procedures: low  Management options: low    Patient Progress  Patient progress: stable         Procedures      Results Include:    Recent Results (from the past 24 hour(s))   CBC WITH AUTOMATED DIFF    Collection Time: 03/02/22  9:14 AM   Result Value Ref Range    WBC 7.2 4.3 - 11.1 K/uL    RBC 4.07 4.05 - 5.2 M/uL    HGB 12.8 11.7 - 15.4 g/dL    HCT 38.2 35.8 - 46.3 %    MCV 93.9 79.6 - 97.8 FL    MCH 31.4 26.1 - 32.9 PG    MCHC 33.5 31.4 - 35.0 g/dL    RDW 12.1 11.9 - 14.6 %    PLATELET 144 277 - 401 K/uL    MPV 9.5 9.4 - 12.3 FL    ABSOLUTE NRBC 0.00 0.0 - 0.2 K/uL    DF AUTOMATED      NEUTROPHILS 59 43 - 78 %    LYMPHOCYTES 21 13 - 44 %    MONOCYTES 14 (H) 4.0 - 12.0 %    EOSINOPHILS 4 0.5 - 7.8 %    BASOPHILS 1 0.0 - 2.0 %    IMMATURE GRANULOCYTES 0 0.0 - 5.0 %    ABS. NEUTROPHILS 4.3 1.7 - 8.2 K/UL    ABS. LYMPHOCYTES 1.5 0.5 - 4.6 K/UL    ABS. MONOCYTES 1.0 0.1 - 1.3 K/UL    ABS. EOSINOPHILS 0.3 0.0 - 0.8 K/UL    ABS. BASOPHILS 0.0 0.0 - 0.2 K/UL    ABS. IMM.  GRANS. 0.0 0.0 - 0.5 K/UL   METABOLIC PANEL, COMPREHENSIVE    Collection Time: 03/02/22  9:14 AM   Result Value Ref Range    Sodium 137 136 - 145 mmol/L    Potassium 3.5 3.5 - 5.1 mmol/L    Chloride 107 98 - 107 mmol/L    CO2 23 21 - 32 mmol/L    Anion gap 7 7 - 16 mmol/L    Glucose 242 (H) 65 - 100 mg/dL    BUN 43 (H) 8 - 23 MG/DL    Creatinine 2.20 (H) 0.6 - 1.0 MG/DL    GFR est AA 28 (L) >60 ml/min/1.73m2    GFR est non-AA 23 (L) >60 ml/min/1.73m2 Calcium 9.3 8.3 - 10.4 MG/DL    Bilirubin, total 0.4 0.2 - 1.1 MG/DL    ALT (SGPT) 71 (H) 12 - 65 U/L    AST (SGOT) 49 (H) 15 - 37 U/L    Alk. phosphatase 151 (H) 50 - 136 U/L    Protein, total 8.2 6.3 - 8.2 g/dL    Albumin 3.7 3.2 - 4.6 g/dL    Globulin 4.5 (H) 2.3 - 3.5 g/dL    A-G Ratio 0.8 (L) 1.2 - 3.5     LIPASE    Collection Time: 03/02/22  9:14 AM   Result Value Ref Range    Lipase 157 73 - 393 U/L   POC URINE MACROSCOPIC    Collection Time: 03/02/22 11:27 AM   Result Value Ref Range    Spec. gravity (POC) 1.025 (H) 1.001 - 1.023      pH, urine  (POC) 5.0 5.0 - 9.0      Protein (POC) 100 (A) NEG mg/dL    Glucose, urine (POC) Negative NEG mg/dL    Ketones (POC) Negative NEG mg/dL    Bilirubin (POC) Negative NEG      Blood (POC) Trace Intact (A) NEG      Urobilinogen (POC) 0.2 0.2 - 1.0 EU/dL    Nitrite (POC) Negative NEG      Leukocyte esterase (POC) TRACE (A) NEG      Performed by Radha Weaver    URINE MICROSCOPIC    Collection Time: 03/02/22 11:34 AM   Result Value Ref Range    WBC 5-10 0 /hpf    RBC 0 0 /hpf    Epithelial cells 3-5 0 /hpf    Bacteria TRACE 0 /hpf    Casts 5-10 0 /lpf    Crystals, urine 0 0 /LPF    Mucus 0 0 /lpf    Other observations RESULTS VERIFIED MANUALLY            CT ABD PELV WO CONT    Result Date: 3/2/2022  EXAMINATION: CT ABD PELV WO CONT 3/2/2022 1:54 PM ACCESSION NUMBER: [id number] COMPARISON: Abdominal x-rays 8/18/2021 CT abdomen and pelvis 8/17/2021 INDICATION: Left lower quadrant pain. Suspected diverticulitis Patient presents with complaints of left flank pain into left lower abdomen. Patient was recently seen in the office and has been treated for diverticulosis. TECHNIQUE: Contiguous axial computed tomographic images were obtained from the domes of the diaphragm to the symphysis pubis without intravenous contrast. Coronal and sagittal reformats are provided. Oral contrast was administered.  Please note that the detection of solid organ and vascular abnormalities is limited in the absence of intravenous contrast. Radiation dose reduction techniques were used for this study. Our CT scanners use one or all of the following: Automated exposure control, adjustment of the mA and/or kV according to patient size, iterative reconstruction. FINDINGS: LIMITED THORAX: Mitral annulus calcifications. Right coronary artery atherosclerosis. The included portions of the pericardium are unremarkable. The included lung bases are clear. PERITONEUM/MESENTERY: No evidence of ascites, free gas, or lymphadenopathy. GI TRACT: There is large volume stool extending from the cecum through the mid descending colon. There is an abrupt marketed transition in colonic caliber in the mid descending colon. There is mild colonic wall thickening and subtle free fluid in the area of abrupt caliber transition. The distal portions of the colon are decompressed. The terminal ileum is unremarkable. There is no evidence of upstream small bowel obstruction. Nonvisualized appendix, without pericecal inflammatory change. The stomach is unremarkable. SPLEEN: Normal ADRENALS: Normal PANCREAS: Normal LIVER: Normal GALLBLADDER: Normal KIDNEYS: Mild bilateral renal cortical thinning. No evidence of hydroureteronephrosis or nephroureterolithiasis. Duplicated left renal collecting system. BLADDER/: Small posterior bladder wall diverticulum. Prior hysterectomy. VESSELS: Scattered atherosclerosis. BONES/SOFT TISSUES: Lumbar spine spondylosis without suspicious lytic or blastic bony lesions. 1.  There is colonic obstruction consequent to a severe stool burden extending from the cecum through the mid descending colon. There is an abrupt transition in colonic caliber in the mid descending colon. There is subtle bowel wall thickening and surrounding mesenteric stranding of the area of transition in caliber, consistent with a developing stercoral colitis.  There is no evidence of associated colonic perforation or upstream small bowel obstruction. 2.  Chronic findings otherwise as above. Discussed with GI and with hospitalist.    Rectal exam reveals empty vault and heme-negative.   Will order enema

## 2022-03-02 NOTE — ED NOTES
TRANSFER - OUT REPORT:    [facility]  [facility] to [facility] for routine progression of care       Report consisted of patients Situation, Background, Assessment and   Recommendations(SBAR). Information from the following report(s) SBAR, ED Summary, [facility] and Recent Results was reviewed with the receiving nurse. Lines:   Peripheral IV Anterior;Left;Proximal Forearm (Active)        Opportunity for questions and clarification was provided.       Patient transported with:   Transport

## 2022-03-02 NOTE — H&P (VIEW-ONLY)
[facility] Consult Note       Primary GI Physician: Dr. Юлия Gutierrez     Referring Provider: Dr. Wetzel Bench Date:  3/2/2022    Admit Date:  3/2/2022    Chief Complaint: abdominal pain and stercoral colitis     Subjective:     History of Present Illness:  Patient is a 66 y.o. female with PMH including but not limited to coronary artery disease status post PCI 2012, diabetes, hypertension, chronic kidney disease stage 3, who is seen in consultation at the request of Dr. Velazquez Crew for abdominal pain and stercoral colitis. CT abdomen and pelvis without contrast 3/1/22  IMPRESSION  1. There is colonic obstruction consequent to a severe stool burden extending  from the cecum through the mid descending colon. There is an abrupt transition  in colonic caliber in the mid descending colon. There is subtle bowel wall  thickening and surrounding mesenteric stranding of the area of transition in  caliber, consistent with a developing stercoral colitis. There is no evidence of  associated colonic perforation or upstream small bowel obstruction. 2.  Chronic findings otherwise as above.     Patient reports last BM she had was on 2/23/22, it was large and brown in color. Since then she had developed severe LLQ pain that is now felt more in the LUQ. She rates it a \"3\" on Morphine and describes it as constant and \"stabbing\". Morphine helps the pain and eating makes it worse. She has not passed a bowel movement since but reports small amounts of black mucous when she feels that she needs to pass a stool. Patient reports baseline constipation, having 3 BM's per week that can be hard to pass. She does not take a regular bowel regimen, only PRN when she feels she needs to have a bowel movement. RN and patient confirm she has received a milk and molasses enema X 1 with no relief. Patient reports being active, trying to walk most days, drinks plenty of water, and eats fruits and vegetables in her diet.  The only change she reports is being started on a trial of a medication (unsure of the name) for her kidney disease that was intended to help with inflammation on 2/20/22. Patient stopped the medication after 3 days since she was unsure if she should continue with her GI symptoms. Patient last evaluated in our office by Sandy Ochoa NP on 2/28/22. At this time patient reports having a good bowel movement last Wednesday (2/23/22). On Thursday morning she began experiencing left-sided abdominal pain mostly in her left lower quadrant. Pain is constant but does get better and worse. She has had no bowel movement since Wednesday. She does report passing a small amount of mucus with black in it. She is on oral iron daily. She denies bright red rectal bleeding. She denies fever or chills. She denies nausea, vomiting or significant NSAID use. She reports being treated in the past for diverticulitis. She reports Augmentin causing diarrhea and abdominal pain in the past. She feels her current symptoms are similar to the last time she was treated for diverticulitis. Plan was for Labs today including CBC and CMP, CT the abdomen and pelvis to evaluate for infection, inflammation or abscess, 14 day course of Cipro and Flagyl, and Begin MiraLAX daily, titrate as needed. EGD 8/5/20 by Dr. Noemy Gallo: reflux esophagitis, acute gastritis, duodenitis. Pathology unremarkable. Colonoscopy 8/5/20 by Dr. Noemy Gallo: transverse colon polyp, diverticulosis, IH. Pathology c/w TA. Recall 5 years. Patient was recently seen in the hospital on 2/21/21 (Dr. Viktor ram MD) with abdominal pain likely from infectious colitis based on WBC and CT. Also had constipation, resolved with MiraLax. Abdominal pain resolved. She was started on Cipro/Flagyl x7 days. MiraLax was discontinued due to diarrhea.     PMH:  Past Medical History:   Diagnosis Date    Acute on chronic combined systolic and diastolic congestive heart failure ([facility] [address] 2/17/2021    Acute renal failure superimposed on stage 3b [facility] 1/12/2021    CAD (coronary artery disease) 3/1/2012    MI, 3 stents    Chest pain     Coronary artery disease involving native coronary artery without angina pectoris 5/21/2015    COVID-19 virus infection 1/12/2021    Depression 3/1/2012    Diabetes mellitus ([facility] 3/1/2012    oral agents. . hypo- 80's, Last A1c 6.9 in 6/2018    DM2 (diabetes mellitus, type 2) ([facility] 5/21/2015    Elevated LFTs 1/12/2021    Elevated troponin 3/2/2012    Essential hypertension 5/21/2015    External hemorrhoids without mention of complication 0558    GERD (gastroesophageal reflux disease)     HCAP (healthcare-associated pneumonia) 1/18/2021    History of MI (myocardial infarction) 11/12    x2    Hypercholesterolemia     Hypertension 3/1/2012    Hypoalbuminemia 2/18/2021    Hyponatremia 1/12/2021    Hypoxemia 8/12/2020    Insomnia     Lactic acidosis 1/12/2021    Long QT interval 1/12/2021    Personal history of colonic polyps 2013    hyperplastic    Platelet inhibition due to Plavix     holding for colonoscopy per GI Dr. [place] Pleural effusion, bilateral 8/11/2020    Rectocele 2013    Sepsis ([facility] 1/12/2021    Tobacco abuse 3/1/2012    quit for 1 year    Tobacco use disorder     Unstable angina ([facility] 3/1/2012    ntg as needed. PSH:  Past Surgical History:   Procedure Laterality Date    HX CAROTID ENDARTERECTOMY Left 12/12/2018    HX CATARACT REMOVAL Left 09/19/2016    Dr Emma Chery, [address] [address] Right 11/07/2016    Dr Emma Chery, [address]      neck w/plate    HX COLONOSCOPY  12/17/13    Anna Marie--single transverse hyperplastic polyp--7-10 year recall    HX HEMORRHOIDECTOMY      x2    HX ORTHOPAEDIC      left elbow    HX CAL AND BSO      HX WISDOM TEETH EXTRACTION      MN LEFT HEART CATH,PERCUTANEOUS  last stented 11/12 x 2    stent x3 , mi x 2       Allergies:   Allergies Allergen Reactions    Cephalosporins Hives and Swelling    Hydralazine Unknown (comments)     Chest pain    Sulfamethoxazole-Trimethoprim Other (comments)     Diarrhea     Codeine Other (comments)    Lisinopril Other (comments)     Passing out spells. // pt sts not allergic to med        Home Medications:  Prior to Admission medications    Medication Sig Start Date End Date Taking? Authorizing Provider   isosorbide mononitrate ER (IMDUR) 60 mg CR tablet Take 1 Tablet by mouth daily. 2/22/22   Mounika Records, DO   traZODone (DESYREL) 50 mg tablet Take 1 Tablet by mouth nightly. 2/7/22   Guillermina Menjivar MD   rosuvastatin (CRESTOR) 40 mg tablet TAKE 1 TABLET BY MOUTH AT  NIGHT 2/7/22   Guillermina Menjivar MD   NIFEdipine ER (PROCARDIA XL) 90 mg ER tablet Take 1 Tablet by mouth daily. 2/7/22   Guillermina Menjivar MD   insulin degludec Honora Lewis FlexTouch U-100) 100 unit/mL (3 mL) inpn 12 Units by abdominal subcutaneous route nightly. 2/7/22   Guillermina Menjivar MD   Blood-Glucose Meter,Continuous (Dexcom G6 ) misc 1 Each by Does Not Apply route four (4) times daily. 2/7/22   Guillermina Menjivar MD   Blood-Glucose Sensor (Dexcom G6 Sensor) angelic 10 Each by Does Not Apply route four (4) times daily. 2/7/22   Guillermina Menjivar MD   Blood-Glucose Transmitter (Dexcom G6 Transmitter) angelic 1 Each by Does Not Apply route four (4) times daily. 2/7/22   Guillermina Menjivar MD   furosemide (Lasix) 40 mg tablet Take 1 Tablet by mouth daily. 1/12/22   Mounika Records, DO   carvediloL (COREG) 3.125 mg tablet Take 1 Tablet by mouth two (2) times daily (with meals). 1/12/22   Mounika Records, DO   glucose blood VI test strips (OneTouch Verio test strips) strip 1 Each by Does Not Apply route two (2) times daily as needed for PRN Reason (Other) (diabetes monitoring) for up to 180 days. 12/6/21 6/4/22  Guillermina Menjivar MD   ferrous sulfate (IRON) 325 mg (65 mg iron) EC tablet Take 1 Tablet by mouth two (2) times a day.  11/3/21   Guillermina Menjivar MD   insulin lispro (HUMALOG) 100 unit/mL kwikpen 4 Units SubQ with breakfast, 4 Units SubQ with lunch, 6 Units SubQ with dinner. Indications: type 2 diabetes mellitus 11/3/21   Ce Francisco MD   citalopram (CELEXA) 20 mg tablet TAKE 1 TABLET BY MOUTH IN  THE MORNING 8/2/21   Ce Francisco MD   Insulin Needles, Disposable, (BD Ligia 2nd Gen Pen Needle) 32 gauge x 5/32\" ndle Test blood sugar three times today. 8/2/21   Ce Francisco MD   lancets (One Touch Delica) 33 gauge misc 1 Lancet by IntraDERMal route two (2) times daily as needed for Other (diabetes monitoring). Finger stick 8/2/21   Ce Francisco MD   spironolactone (ALDACTONE) 25 mg tablet Take 1 Tab by mouth daily. 4/2/21   Stephani Leonard DO   nitroglycerin (NITROSTAT) 0.4 mg SL tablet 1 Tab by SubLINGual route every five (5) minutes as needed for Chest Pain. 11/6/18   Lalit Del Castillo MD   magnesium oxide (MAG-OX) 400 mg tablet TAKE 1 TABLET BY MOUTH TWICE DAILY  Patient taking differently: Take 400 mg by mouth nightly. 11/30/16   Ce Francisco MD   cholecalciferol, vitamin D3, (VITAMIN D3) 2,000 unit tab Take 2,000 Units by mouth daily. Provider, Historical   cyanocobalamin (VITAMIN B-12) 1,000 mcg tablet Take 2,500 mcg by mouth daily. Provider, Historical   aspirin 81 mg chewable tablet Take 81 mg by mouth every morning.  Indications: continue per anesthesia guidelines    Provider, Historical       Hospital Medications:  Current Facility-Administered Medications   Medication Dose Route Frequency    sodium chloride (NS) flush 5-10 mL  5-10 mL IntraVENous Q8H    sodium chloride (NS) flush 5-10 mL  5-10 mL IntraVENous PRN    ondansetron (ZOFRAN ODT) tablet 4 mg  4 mg Oral Q8H PRN    Or    ondansetron (ZOFRAN) injection 4 mg  4 mg IntraVENous Q6H PRN    0.9% sodium chloride infusion  75 mL/hr IntraVENous CONTINUOUS    HYDROmorphone (DILAUDID) injection 0.5 mg  0.5 mg IntraVENous Q4H PRN    HYDROmorphone (DILAUDID) injection 1 mg  1 mg IntraVENous Q4H PRN     Current Outpatient Medications   Medication Sig    isosorbide mononitrate ER (IMDUR) 60 mg CR tablet Take 1 Tablet by mouth daily.  traZODone (DESYREL) 50 mg tablet Take 1 Tablet by mouth nightly.  rosuvastatin (CRESTOR) 40 mg tablet TAKE 1 TABLET BY MOUTH AT  NIGHT    NIFEdipine ER (PROCARDIA XL) 90 mg ER tablet Take 1 Tablet by mouth daily.  insulin degludec Anatoly Bellow FlexTouch U-100) 100 unit/mL (3 mL) inpn 12 Units by abdominal subcutaneous route nightly.  Blood-Glucose Meter,Continuous (Dexcom G6 ) misc 1 Each by Does Not Apply route four (4) times daily.  Blood-Glucose Sensor (Dexcom G6 Sensor) angelic 10 Each by Does Not Apply route four (4) times daily.  Blood-Glucose Transmitter (Dexcom G6 Transmitter) angelic 1 Each by Does Not Apply route four (4) times daily.  furosemide (Lasix) 40 mg tablet Take 1 Tablet by mouth daily.  carvediloL (COREG) 3.125 mg tablet Take 1 Tablet by mouth two (2) times daily (with meals).  glucose blood VI test strips (OneTouch Verio test strips) strip 1 Each by Does Not Apply route two (2) times daily as needed for PRN Reason (Other) (diabetes monitoring) for up to 180 days.  ferrous sulfate (IRON) 325 mg (65 mg iron) EC tablet Take 1 Tablet by mouth two (2) times a day.  insulin lispro (HUMALOG) 100 unit/mL kwikpen 4 Units SubQ with breakfast, 4 Units SubQ with lunch, 6 Units SubQ with dinner. Indications: type 2 diabetes mellitus    citalopram (CELEXA) 20 mg tablet TAKE 1 TABLET BY MOUTH IN  THE MORNING    Insulin Needles, Disposable, (BD Ligia 2nd Gen Pen Needle) 32 gauge x 5/32\" ndle Test blood sugar three times today.  lancets (One Touch Delica) 33 gauge misc 1 Lancet by IntraDERMal route two (2) times daily as needed for Other (diabetes monitoring). Finger stick    spironolactone (ALDACTONE) 25 mg tablet Take 1 Tab by mouth daily.     nitroglycerin (NITROSTAT) 0.4 mg SL tablet 1 Tab by SubLINGual route every five (5) minutes as needed for Chest Pain.  magnesium oxide (MAG-OX) 400 mg tablet TAKE 1 TABLET BY MOUTH TWICE DAILY (Patient taking differently: Take 400 mg by mouth nightly.)    cholecalciferol, vitamin D3, (VITAMIN D3) 2,000 unit tab Take 2,000 Units by mouth daily.  cyanocobalamin (VITAMIN B-12) 1,000 mcg tablet Take 2,500 mcg by mouth daily.  aspirin 81 mg chewable tablet Take 81 mg by mouth every morning. Indications: continue per anesthesia guidelines       Social History:  Social History     Tobacco Use    Smoking status: Former Smoker     Packs/day: 1.00     Years: 40.00     Pack years: 40.00     Quit date: 2012     Years since quittin.3    Smokeless tobacco: Never Used    Tobacco comment: quit  . has stopped prior also   Substance Use Topics    Alcohol use: No       Family History:  Family History   Problem Relation Age of Onset    Heart Disease Mother     Osteoporosis Mother     Heart Attack Mother 67        mi    Thyroid Disease Mother         Multinodular Goiter    Heart Disease Father     Cancer Father         pancreatic    Heart Attack Father 67        MI    Cancer Sister         Pre-Cancerous dysplasia    Thyroid Cancer Sister     Ulcerative Colitis Brother     Thyroid Cancer Brother     Breast Cancer Neg Hx        Review of Systems:  A detailed 10 system ROS is obtained, with pertinent positives as listed above. All others are negative. Diet:  NPO    Objective:     Physical Exam:  Vitals:  Visit Vitals  BP (!) 175/79   Pulse 63   Temp 98 °F (36.7 °C)   Resp 18   Ht 5' 4\" (1.626 m)   Wt 77.1 kg (170 lb)   SpO2 99%   BMI 29.18 kg/m²     Gen:  Pt is alert, cooperative, no acute distress, lying in bed  Skin:  Extremities and face reveal no rashes. HEENT: Sclerae anicteric. No abnormal pigmentation of the lips. The neck is supple. Cardiovascular: Regular rate and rhythm. No murmurs, gallops, or rubs. Respiratory:  Comfortable breathing with no accessory muscle use.  Clear breath sounds anteriorly with no wheezes, rales, or rhonchi. GI:  Abdomen distended, soft, TTP to LUQ>LLQ, non tender to right side. Hypoactive bowel sounds. No enlargement of the liver or spleen. No masses palpable. No G/R. Rectal:  Deferred  Musculoskeletal:  No pitting edema of the lower legs. Tremors to BLE  Neurological:  Gross memory appears intact. Patient is alert and oriented. Psychiatric:  Mood appears appropriate with judgement intact. Lymphatic:  No cervical or supraclavicular adenopathy. Laboratory:    Recent Labs     03/02/22  0914   WBC 7.2   HGB 12.8   HCT 38.2      MCV 93.9      K 3.5      CO2 23   BUN 43*   CREA 2.20*   CA 9.3   *   *   AST 49*   ALT 71*   TBILI 0.4   ALB 3.7   TP 8.2   LPSE 157      CT abdomen and pelvis without contrast 3/2/22  IMPRESSION  1. There is colonic obstruction consequent to a severe stool burden extending  from the cecum through the mid descending colon. There is an abrupt transition  in colonic caliber in the mid descending colon. There is subtle bowel wall  thickening and surrounding mesenteric stranding of the area of transition in  caliber, consistent with a developing stercoral colitis. There is no evidence of  associated colonic perforation or upstream small bowel obstruction. 2.  Chronic findings otherwise as above.     Assessment:     Active Problems:    MIRANDA (acute kidney injury) ([facility] [address] (3/2/2022)      Stercoral colitis (3/2/2022)      66 y.o. female with PMH including but not limited to coronary artery disease status post PCI 2012, diabetes, hypertension, chronic kidney disease stage 3, who is seen in consultation at the request of Dr. Mala Stein for constipation abdominal pain and stercoral colitis.     Plan:      - Supportive care, maintain electrolytes  - hold antibiotics unless rising WBC or fevers, etc  - Start bowel regimen- Miralax BID (has worked well in the past)  -Dulcolax suppository daily  -No results yet from prior enema, will cont to monitor. Diet clears until moving her bowels.      Kumar Pop MD

## 2022-03-02 NOTE — CONSULTS
[facility] Consult Note       Primary GI Physician: Dr. Petros Negron     Referring Provider: Dr. Danial Leahy Date:  3/2/2022    Admit Date:  3/2/2022    Chief Complaint: abdominal pain and stercoral colitis     Subjective:     History of Present Illness:  Patient is a 66 y.o. female with PMH including but not limited to coronary artery disease status post PCI 2012, diabetes, hypertension, chronic kidney disease stage 3, who is seen in consultation at the request of Dr. Jaden Bryant for abdominal pain and stercoral colitis. CT abdomen and pelvis without contrast 3/1/22  IMPRESSION  1. There is colonic obstruction consequent to a severe stool burden extending  from the cecum through the mid descending colon. There is an abrupt transition  in colonic caliber in the mid descending colon. There is subtle bowel wall  thickening and surrounding mesenteric stranding of the area of transition in  caliber, consistent with a developing stercoral colitis. There is no evidence of  associated colonic perforation or upstream small bowel obstruction. 2.  Chronic findings otherwise as above.     Patient reports last BM she had was on 2/23/22, it was large and brown in color. Since then she had developed severe LLQ pain that is now felt more in the LUQ. She rates it a \"3\" on Morphine and describes it as constant and \"stabbing\". Morphine helps the pain and eating makes it worse. She has not passed a bowel movement since but reports small amounts of black mucous when she feels that she needs to pass a stool. Patient reports baseline constipation, having 3 BM's per week that can be hard to pass. She does not take a regular bowel regimen, only PRN when she feels she needs to have a bowel movement. RN and patient confirm she has received a milk and molasses enema X 1 with no relief. Patient reports being active, trying to walk most days, drinks plenty of water, and eats fruits and vegetables in her diet.  The only change she reports is being started on a trial of a medication (unsure of the name) for her kidney disease that was intended to help with inflammation on 2/20/22. Patient stopped the medication after 3 days since she was unsure if she should continue with her GI symptoms. Patient last evaluated in our office by Carina Garcia NP on 2/28/22. At this time patient reports having a good bowel movement last Wednesday (2/23/22). On Thursday morning she began experiencing left-sided abdominal pain mostly in her left lower quadrant. Pain is constant but does get better and worse. She has had no bowel movement since Wednesday. She does report passing a small amount of mucus with black in it. She is on oral iron daily. She denies bright red rectal bleeding. She denies fever or chills. She denies nausea, vomiting or significant NSAID use. She reports being treated in the past for diverticulitis. She reports Augmentin causing diarrhea and abdominal pain in the past. She feels her current symptoms are similar to the last time she was treated for diverticulitis. Plan was for Labs today including CBC and CMP, CT the abdomen and pelvis to evaluate for infection, inflammation or abscess, 14 day course of Cipro and Flagyl, and Begin MiraLAX daily, titrate as needed. EGD 8/5/20 by Dr. Marti Garibay: reflux esophagitis, acute gastritis, duodenitis. Pathology unremarkable. Colonoscopy 8/5/20 by Dr. Marti Garibay: transverse colon polyp, diverticulosis, IH. Pathology c/w TA. Recall 5 years. Patient was recently seen in the hospital on 2/21/21 (Dr. Evelyne Ashford consulting MD) with abdominal pain likely from infectious colitis based on WBC and CT. Also had constipation, resolved with MiraLax. Abdominal pain resolved. She was started on Cipro/Flagyl x7 days. MiraLax was discontinued due to diarrhea.     PMH:  Past Medical History:   Diagnosis Date    Acute on chronic combined systolic and diastolic congestive heart failure ([facility] 2/17/2021    Acute renal failure superimposed on stage 3b chronic kidney disease [facility] 1/12/2021    CAD (coronary artery disease) 3/1/2012    MI, 3 stents    Chest pain     Coronary artery disease involving native coronary artery without angina pectoris 5/21/2015    COVID-19 virus infection 1/12/2021    Depression 3/1/2012    Diabetes mellitus ([facility] 3/1/2012    oral agents. . hypo- 80's, Last A1c 6.9 in 6/2018    DM2 (diabetes mellitus, type 2) ([facility] 5/21/2015    Elevated LFTs 1/12/2021    Elevated troponin 3/2/2012    Essential hypertension 5/21/2015    External hemorrhoids without mention of complication 6623    GERD (gastroesophageal reflux disease)     HCAP (healthcare-associated pneumonia) 1/18/2021    History of MI (myocardial infarction) 11/12    x2    Hypercholesterolemia     Hypertension 3/1/2012    Hypoalbuminemia 2/18/2021    Hyponatremia 1/12/2021    Hypoxemia 8/12/2020    Insomnia     Lactic acidosis 1/12/2021    Long QT interval 1/12/2021    Personal history of colonic polyps 2013    hyperplastic    Platelet inhibition due to Plavix     holding for colonoscopy per GI Dr. Adames Raw Pleural effusion, bilateral 8/11/2020    Rectocele 2013    Sepsis ([facility] 1/12/2021    Tobacco abuse 3/1/2012    quit for 1 year    Tobacco use disorder     Unstable angina ([facility] 3/1/2012    ntg as needed. PSH:  Past Surgical History:   Procedure Laterality Date    HX CAROTID ENDARTERECTOMY Left 12/12/2018    HX CATARACT REMOVAL Left 09/19/2016    Dr Wilber Stein, [id number] [address] Right 11/07/2016    Dr Wilber Stein, [address]      neck w/plate    HX COLONOSCOPY  12/17/13    Anna Marie--single transverse hyperplastic polyp--7-10 year recall    HX HEMORRHOIDECTOMY      x2    HX ORTHOPAEDIC      left elbow    HX CAL AND BSO      HX WISDOM TEETH EXTRACTION      AK LEFT HEART CATH,PERCUTANEOUS  last stented 11/12 x 2    stent x3 , mi x 2       Allergies:   Allergies Allergen Reactions    Cephalosporins Hives and Swelling    Hydralazine Unknown (comments)     Chest pain    Sulfamethoxazole-Trimethoprim Other (comments)     Diarrhea     Codeine Other (comments)    Lisinopril Other (comments)     Passing out spells. // pt sts not allergic to med        Home Medications:  Prior to Admission medications    Medication Sig Start Date End Date Taking? Authorizing Provider   isosorbide mononitrate ER (IMDUR) 60 mg CR tablet Take 1 Tablet by mouth daily. 2/22/22   Shanell Rutledge DO   traZODone (DESYREL) 50 mg tablet Take 1 Tablet by mouth nightly. 2/7/22   Jhon Herrera MD   rosuvastatin (CRESTOR) 40 mg tablet TAKE 1 TABLET BY MOUTH AT  NIGHT 2/7/22   Jhon Herrera MD   NIFEdipine ER (PROCARDIA XL) 90 mg ER tablet Take 1 Tablet by mouth daily. 2/7/22   Jhon Herrera MD   insulin degludec Jolena Mitts FlexTouch U-100) 100 unit/mL (3 mL) inpn 12 Units by abdominal subcutaneous route nightly. 2/7/22   Jhon Herrera MD   Blood-Glucose Meter,Continuous (Dexcom G6 ) misc 1 Each by Does Not Apply route four (4) times daily. 2/7/22   Jhon Herrera MD   Blood-Glucose Sensor (Dexcom G6 Sensor) angelic 10 Each by Does Not Apply route four (4) times daily. 2/7/22   Jhon Herrera MD   Blood-Glucose Transmitter (Dexcom G6 Transmitter) angelic 1 Each by Does Not Apply route four (4) times daily. 2/7/22   Jhon Herrera MD   furosemide (Lasix) 40 mg tablet Take 1 Tablet by mouth daily. 1/12/22   Shanell Rutledge DO   carvediloL (COREG) 3.125 mg tablet Take 1 Tablet by mouth two (2) times daily (with meals). 1/12/22   Shanell Rutledge DO   glucose blood VI test strips (OneTouch Verio test strips) strip 1 Each by Does Not Apply route two (2) times daily as needed for PRN Reason (Other) (diabetes monitoring) for up to 180 days. 12/6/21 6/4/22  Jhon Herrera MD   ferrous sulfate (IRON) 325 mg (65 mg iron) EC tablet Take 1 Tablet by mouth two (2) times a day.  11/3/21   Jhon Herrera MD   insulin lispro (HUMALOG) 100 unit/mL kwikpen 4 Units SubQ with breakfast, 4 Units SubQ with lunch, 6 Units SubQ with dinner. Indications: type 2 diabetes mellitus 11/3/21   Terry Pérez MD   citalopram (CELEXA) 20 mg tablet TAKE 1 TABLET BY MOUTH IN  THE MORNING 8/2/21   Terry Pérez MD   Insulin Needles, Disposable, (BD Ligia 2nd Gen Pen Needle) 32 gauge x 5/32\" ndle Test blood sugar three times today. 8/2/21   Terry Pérez MD   lancets (One Touch Delica) 33 gauge misc 1 Lancet by IntraDERMal route two (2) times daily as needed for Other (diabetes monitoring). Finger stick 8/2/21   Terry Pérez MD   spironolactone (ALDACTONE) 25 mg tablet Take 1 Tab by mouth daily. 4/2/21   Manisha Couch DO   nitroglycerin (NITROSTAT) 0.4 mg SL tablet 1 Tab by SubLINGual route every five (5) minutes as needed for Chest Pain. 11/6/18   Chetna Escobar MD   magnesium oxide (MAG-OX) 400 mg tablet TAKE 1 TABLET BY MOUTH TWICE DAILY  Patient taking differently: Take 400 mg by mouth nightly. 11/30/16   Terry Pérez MD   cholecalciferol, vitamin D3, (VITAMIN D3) 2,000 unit tab Take 2,000 Units by mouth daily. Provider, Historical   cyanocobalamin (VITAMIN B-12) 1,000 mcg tablet Take 2,500 mcg by mouth daily. Provider, Historical   aspirin 81 mg chewable tablet Take 81 mg by mouth every morning.  Indications: continue per anesthesia guidelines    Provider, Historical       Hospital Medications:  Current Facility-Administered Medications   Medication Dose Route Frequency    sodium chloride (NS) flush 5-10 mL  5-10 mL IntraVENous Q8H    sodium chloride (NS) flush 5-10 mL  5-10 mL IntraVENous PRN    ondansetron (ZOFRAN ODT) tablet 4 mg  4 mg Oral Q8H PRN    Or    ondansetron (ZOFRAN) injection 4 mg  4 mg IntraVENous Q6H PRN    0.9% sodium chloride infusion  75 mL/hr IntraVENous CONTINUOUS    HYDROmorphone (DILAUDID) injection 0.5 mg  0.5 mg IntraVENous Q4H PRN    HYDROmorphone (DILAUDID) injection 1 mg  1 mg IntraVENous Q4H PRN     Current Outpatient Medications   Medication Sig    isosorbide mononitrate ER (IMDUR) 60 mg CR tablet Take 1 Tablet by mouth daily.  traZODone (DESYREL) 50 mg tablet Take 1 Tablet by mouth nightly.  rosuvastatin (CRESTOR) 40 mg tablet TAKE 1 TABLET BY MOUTH AT  NIGHT    NIFEdipine ER (PROCARDIA XL) 90 mg ER tablet Take 1 Tablet by mouth daily.  insulin degludec Miguel Angel Dukes FlexTouch U-100) 100 unit/mL (3 mL) inpn 12 Units by abdominal subcutaneous route nightly.  Blood-Glucose Meter,Continuous (Dexcom G6 ) misc 1 Each by Does Not Apply route four (4) times daily.  Blood-Glucose Sensor (Dexcom G6 Sensor) angelic 10 Each by Does Not Apply route four (4) times daily.  Blood-Glucose Transmitter (Dexcom G6 Transmitter) angelic 1 Each by Does Not Apply route four (4) times daily.  furosemide (Lasix) 40 mg tablet Take 1 Tablet by mouth daily.  carvediloL (COREG) 3.125 mg tablet Take 1 Tablet by mouth two (2) times daily (with meals).  glucose blood VI test strips (OneTouch Verio test strips) strip 1 Each by Does Not Apply route two (2) times daily as needed for PRN Reason (Other) (diabetes monitoring) for up to 180 days.  ferrous sulfate (IRON) 325 mg (65 mg iron) EC tablet Take 1 Tablet by mouth two (2) times a day.  insulin lispro (HUMALOG) 100 unit/mL kwikpen 4 Units SubQ with breakfast, 4 Units SubQ with lunch, 6 Units SubQ with dinner. Indications: type 2 diabetes mellitus    citalopram (CELEXA) 20 mg tablet TAKE 1 TABLET BY MOUTH IN  THE MORNING    Insulin Needles, Disposable, (BD Ligia 2nd Gen Pen Needle) 32 gauge x 5/32\" ndle Test blood sugar three times today.  lancets (One Touch Delica) 33 gauge misc 1 Lancet by IntraDERMal route two (2) times daily as needed for Other (diabetes monitoring). Finger stick    spironolactone (ALDACTONE) 25 mg tablet Take 1 Tab by mouth daily.     nitroglycerin (NITROSTAT) 0.4 mg SL tablet 1 Tab by SubLINGual route every five (5) minutes as needed for Chest Pain.  magnesium oxide (MAG-OX) 400 mg tablet TAKE 1 TABLET BY MOUTH TWICE DAILY (Patient taking differently: Take 400 mg by mouth nightly.)    cholecalciferol, vitamin D3, (VITAMIN D3) 2,000 unit tab Take 2,000 Units by mouth daily.  cyanocobalamin (VITAMIN B-12) 1,000 mcg tablet Take 2,500 mcg by mouth daily.  aspirin 81 mg chewable tablet Take 81 mg by mouth every morning. Indications: continue per anesthesia guidelines       Social History:  Social History     Tobacco Use    Smoking status: Former Smoker     Packs/day: 1.00     Years: 40.00     Pack years: 40.00     Quit date: 2012     Years since quittin.3    Smokeless tobacco: Never Used    Tobacco comment: quit  . has stopped prior also   Substance Use Topics    Alcohol use: No       Family History:  Family History   Problem Relation Age of Onset    Heart Disease Mother     Osteoporosis Mother     Heart Attack Mother 67        mi    Thyroid Disease Mother         Multinodular Goiter    Heart Disease Father     Cancer Father         pancreatic    Heart Attack Father 67        MI    Cancer Sister         Pre-Cancerous dysplasia    Thyroid Cancer Sister     Ulcerative Colitis Brother     Thyroid Cancer Brother     Breast Cancer Neg Hx        Review of Systems:  A detailed 10 system ROS is obtained, with pertinent positives as listed above. All others are negative. Diet:  NPO    Objective:     Physical Exam:  Vitals:  Visit Vitals  BP (!) 175/79   Pulse 63   Temp 98 °F (36.7 °C)   Resp 18   Ht 5' 4\" (1.626 m)   Wt 77.1 kg (170 lb)   SpO2 99%   BMI 29.18 kg/m²     Gen:  Pt is alert, cooperative, no acute distress, lying in bed  Skin:  Extremities and face reveal no rashes. HEENT: Sclerae anicteric. No abnormal pigmentation of the lips. The neck is supple. Cardiovascular: Regular rate and rhythm. No murmurs, gallops, or rubs. Respiratory:  Comfortable breathing with no accessory muscle use.  Clear breath sounds anteriorly with no wheezes, rales, or rhonchi. GI:  Abdomen distended, soft, TTP to LUQ>LLQ, non tender to right side. Hypoactive bowel sounds. No enlargement of the liver or spleen. No masses palpable. No G/R. Rectal:  Deferred  Musculoskeletal:  No pitting edema of the lower legs. Tremors to BLE  Neurological:  Gross memory appears intact. Patient is alert and oriented. Psychiatric:  Mood appears appropriate with judgement intact. Lymphatic:  No cervical or supraclavicular adenopathy. Laboratory:    Recent Labs     03/02/22  0914   WBC 7.2   HGB 12.8   HCT 38.2      MCV 93.9      K 3.5      CO2 23   BUN 43*   CREA 2.20*   CA 9.3   *   *   AST 49*   ALT 71*   TBILI 0.4   ALB 3.7   TP 8.2   LPSE 157      CT abdomen and pelvis without contrast 3/2/22  IMPRESSION  1. There is colonic obstruction consequent to a severe stool burden extending  from the cecum through the mid descending colon. There is an abrupt transition  in colonic caliber in the mid descending colon. There is subtle bowel wall  thickening and surrounding mesenteric stranding of the area of transition in  caliber, consistent with a developing stercoral colitis. There is no evidence of  associated colonic perforation or upstream small bowel obstruction. 2.  Chronic findings otherwise as above.     Assessment:     Active Problems:    MIRANDA (acute kidney injury) ([facility] (3/2/2022)      Stercoral colitis (3/2/2022)      66 y.o. female with PMH including but not limited to coronary artery disease status post PCI 2012, diabetes, hypertension, chronic kidney disease stage 3, who is seen in consultation at the request of Dr. Timi Christy for constipation abdominal pain and stercoral colitis.     Plan:      - Supportive care, maintain electrolytes  - hold antibiotics unless rising WBC or fevers, etc  - Start bowel regimen- Miralax BID (has worked well in the past)  -Dulcolax suppository daily  -No results yet from prior enema, will cont to monitor. Diet clears until moving her bowels.      Zo Smith MD

## 2022-03-03 LAB
ALBUMIN SERPL-MCNC: 3.6 G/DL (ref 3.2–4.6)
ALBUMIN/GLOB SERPL: 0.8 (ref 1.2–3.5)
ALP SERPL-CCNC: 141 U/L (ref 50–136)
ALT SERPL-CCNC: 57 U/L (ref 12–65)
ANION GAP SERPL CALC-SCNC: 11 MMOL/L (ref 7–16)
AST SERPL-CCNC: 33 U/L (ref 15–37)
BASOPHILS # BLD: 0 K/UL (ref 0–0.2)
BASOPHILS NFR BLD: 0 % (ref 0–2)
BILIRUB SERPL-MCNC: 0.4 MG/DL (ref 0.2–1.1)
BUN SERPL-MCNC: 34 MG/DL (ref 8–23)
CALCIUM SERPL-MCNC: 9.4 MG/DL (ref 8.3–10.4)
CHLORIDE SERPL-SCNC: 106 MMOL/L (ref 98–107)
CO2 SERPL-SCNC: 23 MMOL/L (ref 21–32)
CREAT SERPL-MCNC: 1.9 MG/DL (ref 0.6–1)
DIFFERENTIAL METHOD BLD: ABNORMAL
EOSINOPHIL # BLD: 0.1 K/UL (ref 0–0.8)
EOSINOPHIL NFR BLD: 1 % (ref 0.5–7.8)
ERYTHROCYTE [DISTWIDTH] IN BLOOD BY AUTOMATED COUNT: 12.3 % (ref 11.9–14.6)
GLOBULIN SER CALC-MCNC: 4.3 G/DL (ref 2.3–3.5)
GLUCOSE BLD STRIP.AUTO-MCNC: 144 MG/DL (ref 65–100)
GLUCOSE BLD STRIP.AUTO-MCNC: 148 MG/DL (ref 65–100)
GLUCOSE BLD STRIP.AUTO-MCNC: 229 MG/DL (ref 65–100)
GLUCOSE BLD STRIP.AUTO-MCNC: 240 MG/DL (ref 65–100)
GLUCOSE SERPL-MCNC: 156 MG/DL (ref 65–100)
HCT VFR BLD AUTO: 40.6 % (ref 35.8–46.3)
HGB BLD-MCNC: 13 G/DL (ref 11.7–15.4)
IMM GRANULOCYTES # BLD AUTO: 0 K/UL (ref 0–0.5)
IMM GRANULOCYTES NFR BLD AUTO: 1 % (ref 0–5)
LYMPHOCYTES # BLD: 0.9 K/UL (ref 0.5–4.6)
LYMPHOCYTES NFR BLD: 10 % (ref 13–44)
MCH RBC QN AUTO: 30.9 PG (ref 26.1–32.9)
MCHC RBC AUTO-ENTMCNC: 32 G/DL (ref 31.4–35)
MCV RBC AUTO: 96.4 FL (ref 79.6–97.8)
MONOCYTES # BLD: 0.9 K/UL (ref 0.1–1.3)
MONOCYTES NFR BLD: 11 % (ref 4–12)
NEUTS SEG # BLD: 6.6 K/UL (ref 1.7–8.2)
NEUTS SEG NFR BLD: 78 % (ref 43–78)
NRBC # BLD: 0 K/UL (ref 0–0.2)
PLATELET # BLD AUTO: 231 K/UL (ref 150–450)
PMV BLD AUTO: 9.6 FL (ref 9.4–12.3)
POTASSIUM SERPL-SCNC: 3.9 MMOL/L (ref 3.5–5.1)
PROT SERPL-MCNC: 7.9 G/DL (ref 6.3–8.2)
RBC # BLD AUTO: 4.21 M/UL (ref 4.05–5.2)
SERVICE CMNT-IMP: ABNORMAL
SODIUM SERPL-SCNC: 140 MMOL/L (ref 136–145)
TROPONIN-HIGH SENSITIVITY: 36.6 PG/ML (ref 0–14)
WBC # BLD AUTO: 8.5 K/UL (ref 4.3–11.1)

## 2022-03-03 PROCEDURE — 85025 COMPLETE CBC W/AUTO DIFF WBC: CPT

## 2022-03-03 PROCEDURE — 93005 ELECTROCARDIOGRAM TRACING: CPT | Performed by: STUDENT IN AN ORGANIZED HEALTH CARE EDUCATION/TRAINING PROGRAM

## 2022-03-03 PROCEDURE — 82962 GLUCOSE BLOOD TEST: CPT

## 2022-03-03 PROCEDURE — 96376 TX/PRO/DX INJ SAME DRUG ADON: CPT

## 2022-03-03 PROCEDURE — 74011250636 HC RX REV CODE- 250/636: Performed by: INTERNAL MEDICINE

## 2022-03-03 PROCEDURE — 74011000250 HC RX REV CODE- 250: Performed by: EMERGENCY MEDICINE

## 2022-03-03 PROCEDURE — 74011636637 HC RX REV CODE- 636/637: Performed by: INTERNAL MEDICINE

## 2022-03-03 PROCEDURE — 74011250637 HC RX REV CODE- 250/637: Performed by: STUDENT IN AN ORGANIZED HEALTH CARE EDUCATION/TRAINING PROGRAM

## 2022-03-03 PROCEDURE — 96372 THER/PROPH/DIAG INJ SC/IM: CPT

## 2022-03-03 PROCEDURE — 36415 COLL VENOUS BLD VENIPUNCTURE: CPT

## 2022-03-03 PROCEDURE — 84484 ASSAY OF TROPONIN QUANT: CPT

## 2022-03-03 PROCEDURE — 65270000029 HC RM PRIVATE

## 2022-03-03 PROCEDURE — 80053 COMPREHEN METABOLIC PANEL: CPT

## 2022-03-03 PROCEDURE — 74011250637 HC RX REV CODE- 250/637: Performed by: NURSE PRACTITIONER

## 2022-03-03 PROCEDURE — 87040 BLOOD CULTURE FOR BACTERIA: CPT

## 2022-03-03 PROCEDURE — 74011000250 HC RX REV CODE- 250: Performed by: INTERNAL MEDICINE

## 2022-03-03 PROCEDURE — 74011250637 HC RX REV CODE- 250/637: Performed by: INTERNAL MEDICINE

## 2022-03-03 RX ORDER — POLYETHYLENE GLYCOL 3350 17 G/17G
119 POWDER, FOR SOLUTION ORAL ONCE
Status: COMPLETED | OUTPATIENT
Start: 2022-03-03 | End: 2022-03-03

## 2022-03-03 RX ORDER — NIFEDIPINE 90 MG/1
90 TABLET, EXTENDED RELEASE ORAL DAILY
Status: DISCONTINUED | OUTPATIENT
Start: 2022-03-03 | End: 2022-03-13 | Stop reason: HOSPADM

## 2022-03-03 RX ADMIN — SODIUM CHLORIDE, PRESERVATIVE FREE 10 ML: 5 INJECTION INTRAVENOUS at 22:11

## 2022-03-03 RX ADMIN — ROSUVASTATIN CALCIUM 40 MG: 20 TABLET, FILM COATED ORAL at 22:01

## 2022-03-03 RX ADMIN — POLYETHYLENE GLYCOL 3350 17 G: 17 POWDER, FOR SOLUTION ORAL at 17:16

## 2022-03-03 RX ADMIN — POLYETHYLENE GLYCOL 3350 119 G: 17 POWDER, FOR SOLUTION ORAL at 11:22

## 2022-03-03 RX ADMIN — ENOXAPARIN SODIUM 30 MG: 100 INJECTION SUBCUTANEOUS at 09:51

## 2022-03-03 RX ADMIN — NIFEDIPINE 90 MG: 90 TABLET, EXTENDED RELEASE ORAL at 10:27

## 2022-03-03 RX ADMIN — ONDANSETRON 4 MG: 4 TABLET, ORALLY DISINTEGRATING ORAL at 10:27

## 2022-03-03 RX ADMIN — CIPROFLOXACIN 400 MG: 2 INJECTION, SOLUTION INTRAVENOUS at 20:51

## 2022-03-03 RX ADMIN — ASPIRIN 81 MG 81 MG: 81 TABLET ORAL at 06:07

## 2022-03-03 RX ADMIN — CARVEDILOL 3.12 MG: 3.12 TABLET, FILM COATED ORAL at 17:16

## 2022-03-03 RX ADMIN — TRAZODONE HYDROCHLORIDE 50 MG: 50 TABLET ORAL at 22:00

## 2022-03-03 RX ADMIN — CARVEDILOL 3.12 MG: 3.12 TABLET, FILM COATED ORAL at 09:51

## 2022-03-03 RX ADMIN — SODIUM CHLORIDE 75 ML/HR: 900 INJECTION, SOLUTION INTRAVENOUS at 10:03

## 2022-03-03 RX ADMIN — Medication 4 UNITS: at 12:43

## 2022-03-03 RX ADMIN — METRONIDAZOLE 500 MG: 500 INJECTION, SOLUTION INTRAVENOUS at 05:57

## 2022-03-03 RX ADMIN — METRONIDAZOLE 500 MG: 500 INJECTION, SOLUTION INTRAVENOUS at 22:05

## 2022-03-03 RX ADMIN — POLYETHYLENE GLYCOL 3350 17 G: 17 POWDER, FOR SOLUTION ORAL at 09:51

## 2022-03-03 RX ADMIN — HYDROMORPHONE HYDROCHLORIDE 1 MG: 1 INJECTION, SOLUTION INTRAMUSCULAR; INTRAVENOUS; SUBCUTANEOUS at 17:16

## 2022-03-03 RX ADMIN — ISOSORBIDE MONONITRATE 60 MG: 60 TABLET, EXTENDED RELEASE ORAL at 09:52

## 2022-03-03 RX ADMIN — BISACODYL 10 MG: 10 SUPPOSITORY RECTAL at 09:51

## 2022-03-03 RX ADMIN — METRONIDAZOLE 500 MG: 500 INJECTION, SOLUTION INTRAVENOUS at 14:50

## 2022-03-03 RX ADMIN — SODIUM CHLORIDE, PRESERVATIVE FREE 10 ML: 5 INJECTION INTRAVENOUS at 05:58

## 2022-03-03 NOTE — ACP (ADVANCE CARE PLANNING)
[facility] Hospitalist Service  At the heart of better care     Advance Care Planning   Admit Date:  3/2/2022 11:11 AM   Name:  Salinas Stanford   Age:  66 y.o. Sex:  female  :  1950   MRN:  [id number]   Room:  Kyler is able to make her own decisions: Yes  If pt unable to make decisions, POA/surrogate decision maker:  Sachin Johnston at [phone]    Other members present in the meeting:   n/a    Patient / surrogate decision-maker directed: Full code    Other ACP topics discussed, if applicable:       Patient or surrogate consented to discussion of the current conditions, workup, management plans, prognosis, and understand the risk for further deterioration. Time spent: 16 minutes in direct discussion (face to face and/or over phone).     Signed:  Anson Hurst MD

## 2022-03-03 NOTE — PROGRESS NOTES
Tap water enema given to the patient. The patient tolerated it well. No stool was passed with the enema.

## 2022-03-03 NOTE — PROGRESS NOTES
[facility] Progress Note         Admit Date:  3/2/2022    Today's Date:  3/3/2022    CC:  Abdominal pain and stercoral colitis    Subjective:     Patient : No BM since admission. Continues to have left sided abdominal pain. Nausea controlled with antiemetics. Had some rectal mucous last night.      Medications:   Current Facility-Administered Medications   Medication Dose Route Frequency    sodium chloride (NS) flush 5-10 mL  5-10 mL IntraVENous Q8H    sodium chloride (NS) flush 5-10 mL  5-10 mL IntraVENous PRN    sodium chloride (NS) flush 5-40 mL  5-40 mL IntraVENous Q8H    sodium chloride (NS) flush 5-40 mL  5-40 mL IntraVENous PRN    acetaminophen (TYLENOL) tablet 650 mg  650 mg Oral Q6H PRN    Or    acetaminophen (TYLENOL) suppository 650 mg  650 mg Rectal Q6H PRN    polyethylene glycol (MIRALAX) packet 17 g  17 g Oral DAILY PRN    ondansetron (ZOFRAN ODT) tablet 4 mg  4 mg Oral Q8H PRN    Or    ondansetron (ZOFRAN) injection 4 mg  4 mg IntraVENous Q6H PRN    enoxaparin (LOVENOX) injection 30 mg  30 mg SubCUTAneous DAILY    0.9% sodium chloride infusion  75 mL/hr IntraVENous CONTINUOUS    HYDROmorphone (DILAUDID) injection 0.5 mg  0.5 mg IntraVENous Q4H PRN    HYDROmorphone (DILAUDID) injection 1 mg  1 mg IntraVENous Q4H PRN    ciprofloxacin (CIPRO) 400 mg in D5W IVPB (premix)  400 mg IntraVENous Q24H    metroNIDAZOLE (FLAGYL) IVPB premix 500 mg  500 mg IntraVENous Q8H    insulin lispro (HUMALOG) injection   SubCUTAneous AC&HS    aspirin chewable tablet 81 mg  81 mg Oral 7am    carvediloL (COREG) tablet 3.125 mg  3.125 mg Oral BID WITH MEALS    isosorbide mononitrate ER (IMDUR) tablet 60 mg  60 mg Oral DAILY    rosuvastatin (CRESTOR) tablet 40 mg  40 mg Oral QHS    traZODone (DESYREL) tablet 50 mg  50 mg Oral QHS    spironolactone (ALDACTONE) tablet 25 mg  25 mg Oral DAILY    polyethylene glycol (MIRALAX) packet 17 g  17 g Oral BID    bisacodyL (DULCOLAX) suppository 10 mg  10 mg Rectal DAILY       Review of Systems:  ROS was obtained, with pertinent positives as listed above. No chest pain or SOB. Diet:  Clear liquid diet    Objective:   Vitals:  Visit Vitals  BP (!) 158/66 (BP 1 Location: Right upper arm, BP Patient Position: Sitting)   Pulse 85   Temp 98.9 °F (37.2 °C)   Resp 16   Ht 5' 4\" (1.626 m)   Wt 77.1 kg (170 lb)   SpO2 92%   BMI 29.18 kg/m²     Intake/Output:  No intake/output data recorded. 03/01 1901 - 03/03 0700  In: 7573 [P.O.:236; I.V.:1000]  Out: -   Exam:  General appearance: alert, cooperative, no distress   Lungs: clear to auscultation bilaterally anteriorly  Heart: regular rate and rhythm  Abdomen: soft, WITHOUT DISTENTION; LEFT SIDED TENDERNESS Bowel sounds HYPOACTIVE BUT PRESENT X 4 QUADRANTS. No masses, no organomegaly  Extremities: extremities normal, atraumatic, no cyanosis or edema  Neuro:  alert and oriented    Data Review (Labs):    Recent Labs     03/03/22  0532 03/02/22  0914   WBC 8.5 7.2   HGB 13.0 12.8   HCT 40.6 38.2    255   MCV 96.4 93.9    137   K 3.9 3.5    107   CO2 23 23   BUN 34* 43*   CREA 1.90* 2.20*   CA 9.4 9.3   * 242*   * 151*   AST 33 49*   ALT 57 71*   TBILI 0.4 0.4   ALB 3.6 3.7   TP 7.9 8.2   LPSE  --  157       Assessment:     Principal Problem:    Stercoral colitis (3/2/2022)    Active Problems:    Hypertension (3/1/2012)      HLD (hyperlipidemia) (9/4/2013)      Gastroesophageal reflux disease (2/16/2017)      Type 2 diabetes mellitus with stage 4 chronic kidney disease, with long-term current use of insulin ([facility] (10/11/2018)      MIRANDA (acute kidney injury) ([facility] (3/2/2022)    66 y.o. female with PMH including but not limited to coronary artery disease status post PCI 2012, diabetes, hypertension, chronic kidney disease stage 3, who we are following for constipation, abdominal pain and stercoral colitis. No results with enema yesterday.      Plan:     -Tap enema x 1 today  -Dulcolax supp 10mg x 1 today  -Miralax mini bowel cleanse today  -Continue clear liquid diet    Tru Blackman.  Darryl Coleman in collaboration with Dr. Noa Canseco  [facility]

## 2022-03-03 NOTE — PROGRESS NOTES
Initial visit by  to convey care and concern and to explore spiritual needs. Ms. Meeta Bridges expressed she was experiencing pain due to a bowel blockage. I offered emotional/spiritual support including prayer as requested. Chaplains remain available for follow-up care.      Caleb Calvin 66  Board Certified

## 2022-03-03 NOTE — PROGRESS NOTES
03/02/22 2001   Dual Skin Pressure Injury Assessment   Dual Skin Pressure Injury Assessment WDL   Second Care Provider (Based on Facility Policy) Arabella KING RN    Skin Integumentary   Skin Integumentary (WDL) WDL    Pressure  Injury Documentation No Pressure Injury Noted-Pressure Ulcer Prevention Initiated   no skin issue noted.

## 2022-03-03 NOTE — PROGRESS NOTES
Hospitalist Progress Note   Admit Date:  3/2/2022 11:11 AM   Name:  Jade Grady   Age:  66 y.o. Sex:  female  :  1950   MRN:  [id number]   Room:      Presenting Complaint: Abdominal Pain    Reason(s) for Admission: Stercoral colitis [K52.89]  MIRANDA (acute kidney injury) [facility]) [N17.9]     Hospital Course & Interval History:   Jade Grady is a 66 y.o. female with medical history significant for chronic constipation, stage IV chronic kidney disease, chronic diastolic CHF, CAD, hypertension who was relatively well until 8 days ago (2022) when she had a nice large bowel movement. Unfortunately, the next day she began experiencing severe abdominal pain on her left side in the lower abdomen. She went to see her gastroenterologist Dr. Fredis Clarke 2 days ago on 2022 where she was placed on Cipro and Flagyl for possible diverticulitis. However, her symptoms became worse, and she had not passed any stool since 2022. Ultimately the severe abdominal pain caused her to come in for ED evaluation. ED workup with diagnosis of stercoral colitis based on CT A/P imaging. No noted colonic perforation or upstream small bowel obstruction. ED physician attempted fecal decompaction and requested that the hospitalist service admit her. Gastroenterology was also consulted and is following. Subjective/24hr Events (22): Patient is alert and oriented, resting in bed. Ambulatory to bathroom in room without difficulty. Passed mucus this am but no bowel movement. She is tolerating clear liquid diet without vomiting but with intermittent nausea. She endorses no real appetite. She endorses intermittent chest tightness but no persistent chest pain. Denies SOB.     Assessment & Plan:     Principal Problem:  Stercoral colitis   -Gastroenterology following  -Continue Cipro/Flagyl, currently with low grade fever to 37.9  -Continue bowel regimen per GI  -Continue CLD    Active Problems:  Hypertension   -Resume home Nifedipine today    HLD (hyperlipidemia)   -Continue Rosuvastatin    MIRANDA on Stage 4 chronic kidney disease ([facility] resolved  -Follows Dr. Jose Sifuentes, outpatient Nephrology  -Cr currently 1.90, appears stable at baseline    Type 2 diabetes mellitus, with long-term current use of insulin ([facility]   -Home insulin regimen on hold with current CLD, resume with diet progression  -Continue SSI Humalog with ACHS glucose checks    Chronic diastolic congestive heart failure (HCC)  -Appears euvolemic, not in exacerbation  -Continue Imdur, Furosemide held/consider resuming tomorrow    CAD  -Follows Dr. Jannet Ellis  -Continue ASA / BB / statin    Chest tightness, intermittent  -Obtain troponin and EKG    Dispo/Discharge Planning:  Pending clinical course, anticipate d/c plan home without needs    Diet:  ADULT DIET Clear Liquid  DVT PPx: Lovenox  Code status: Full Code    Hospital Problems as of 3/3/2022 Date Reviewed: 2/7/2022          Codes Class Noted - Resolved POA    MIRANDA (acute kidney injury) ([facility] ICD-10-CM: N17.9  ICD-9-CM: 584.9  3/2/2022 - Present Unknown        * (Principal) Stercoral colitis ICD-10-CM: K52.89  ICD-9-CM: 558.9  3/2/2022 - Present Unknown        Chronic diastolic congestive heart failure ([facility] ICD-10-CM: I50.32  ICD-9-CM: 428.32, 428.0  4/19/2021 - Present Yes    Overview Signed 7/7/2021 11:06 AM by Jaja Glover NP     Heart Failure (HFpEF), EF 62-45%, with diastolic dysfunction, ECHO 2/16/2021               Type 2 diabetes mellitus with stage 4 chronic kidney disease, with long-term current use of insulin (HCC) ICD-10-CM: E11.22, N18.4, Z79.4  ICD-9-CM: 250.40, 585.4, V58.67  10/11/2018 - Present Yes        Gastroesophageal reflux disease ICD-10-CM: K21.9  ICD-9-CM: 530.81  2/16/2017 - Present Yes        Stage 4 chronic kidney disease ([facility] ICD-10-CM: N18.4  ICD-9-CM: 585.4  9/15/2016 - Present Yes        HLD (hyperlipidemia) ICD-10-CM: E78.5  ICD-9-CM: 272.4 9/4/2013 - Present Yes        Hypertension (Chronic) ICD-10-CM: I10  ICD-9-CM: 401.9  3/1/2012 - Present Yes              Objective:     Patient Vitals for the past 24 hrs:   Temp Pulse Resp BP SpO2   03/03/22 0808 98.9 °F (37.2 °C) 85 16 (!) 158/66 92 %   03/03/22 0437 98.6 °F (37 °C) 80 20 (!) 147/64 95 %   03/02/22 2358 98.8 °F (37.1 °C) 62 18 127/65 91 %   03/02/22 1954 98 °F (36.7 °C) 68 18 (!) 145/62 93 %   03/02/22 1832 98 °F (36.7 °C) 66 16 (!) 152/58 93 %   03/02/22 1735 -- 65 -- 129/71 --   03/02/22 1245 -- -- -- (!) 175/79 99 %   03/02/22 1230 -- -- -- (!) 170/62 99 %   03/02/22 1130 -- -- -- (!) 155/59 99 %   03/02/22 1108 98 °F (36.7 °C) 63 18 (!) 161/62 100 %     Oxygen Therapy  O2 Sat (%): 92 % (03/03/22 0808)  Pulse via Oximetry: 66 beats per minute (03/02/22 1245)  O2 Device: None (Room air) (03/03/22 0808)    Estimated body mass index is 29.18 kg/m² as calculated from the following:    Height as of this encounter: 5' 4\" (1.626 m). Weight as of this encounter: 77.1 kg (170 lb). Intake/Output Summary (Last 24 hours) at 3/3/2022 1003  Last data filed at 3/2/2022 2021  Gross per 24 hour   Intake 1236 ml   Output --   Net 1236 ml         Physical Exam:   Blood pressure (!) 158/66, pulse 85, temperature 98.9 °F (37.2 °C), resp. rate 16, height 5' 4\" (1.626 m), weight 77.1 kg (170 lb), SpO2 92 %. General:    Ill-appearing, no acute distress, alert, calm, conversational.  Head:  Normocephalic, atraumatic  Eyes:  Sclerae appear normal.  Pupils equally round. ENT:  Nares appear normal, no drainage. Moist oral mucosa  Neck:  No restricted ROM. Trachea midline   CV:   RRR. No m/r/g. No jugular venous distension. Lungs:   CTAB. No wheezing, rhonchi, or rales. Respirations even, unlabored on RA. Abdomen: Bowel sounds hypoactive. Soft, non-distended. LLQ tenderness to palpation. Extremities: No cyanosis or clubbing. No edema  Skin:     No rashes and normal coloration. Warm and dry. Neuro:  CN II-XII grossly intact. Sensation intact. A&Ox3. Moves all extremities. No focal deficits. Psych:  Normal mood and affect. I have reviewed ordered lab tests and independently visualized imaging below:    Recent Labs:  Recent Results (from the past 48 hour(s))   CBC WITH AUTOMATED DIFF    Collection Time: 03/02/22  9:14 AM   Result Value Ref Range    WBC 7.2 4.3 - 11.1 K/uL    RBC 4.07 4.05 - 5.2 M/uL    HGB 12.8 11.7 - 15.4 g/dL    HCT 38.2 35.8 - 46.3 %    MCV 93.9 79.6 - 97.8 FL    MCH 31.4 26.1 - 32.9 PG    MCHC 33.5 31.4 - 35.0 g/dL    RDW 12.1 11.9 - 14.6 %    PLATELET 035 880 - 510 K/uL    MPV 9.5 9.4 - 12.3 FL    ABSOLUTE NRBC 0.00 0.0 - 0.2 K/uL    DF AUTOMATED      NEUTROPHILS 59 43 - 78 %    LYMPHOCYTES 21 13 - 44 %    MONOCYTES 14 (H) 4.0 - 12.0 %    EOSINOPHILS 4 0.5 - 7.8 %    BASOPHILS 1 0.0 - 2.0 %    IMMATURE GRANULOCYTES 0 0.0 - 5.0 %    ABS. NEUTROPHILS 4.3 1.7 - 8.2 K/UL    ABS. LYMPHOCYTES 1.5 0.5 - 4.6 K/UL    ABS. MONOCYTES 1.0 0.1 - 1.3 K/UL    ABS. EOSINOPHILS 0.3 0.0 - 0.8 K/UL    ABS. BASOPHILS 0.0 0.0 - 0.2 K/UL    ABS. IMM. GRANS. 0.0 0.0 - 0.5 K/UL   METABOLIC PANEL, COMPREHENSIVE    Collection Time: 03/02/22  9:14 AM   Result Value Ref Range    Sodium 137 136 - 145 mmol/L    Potassium 3.5 3.5 - 5.1 mmol/L    Chloride 107 98 - 107 mmol/L    CO2 23 21 - 32 mmol/L    Anion gap 7 7 - 16 mmol/L    Glucose 242 (H) 65 - 100 mg/dL    BUN 43 (H) 8 - 23 MG/DL    Creatinine 2.20 (H) 0.6 - 1.0 MG/DL    GFR est AA 28 (L) >60 ml/min/1.73m2    GFR est non-AA 23 (L) >60 ml/min/1.73m2    Calcium 9.3 8.3 - 10.4 MG/DL    Bilirubin, total 0.4 0.2 - 1.1 MG/DL    ALT (SGPT) 71 (H) 12 - 65 U/L    AST (SGOT) 49 (H) 15 - 37 U/L    Alk.  phosphatase 151 (H) 50 - 136 U/L    Protein, total 8.2 6.3 - 8.2 g/dL    Albumin 3.7 3.2 - 4.6 g/dL    Globulin 4.5 (H) 2.3 - 3.5 g/dL    A-G Ratio 0.8 (L) 1.2 - 3.5     LIPASE    Collection Time: 03/02/22  9:14 AM   Result Value Ref Range    Lipase 157 73 - 393 U/L   POC URINE MACROSCOPIC    Collection Time: 03/02/22 11:27 AM   Result Value Ref Range    Spec. gravity (POC) 1.025 (H) 1.001 - 1.023      pH, urine  (POC) 5.0 5.0 - 9.0      Protein (POC) 100 (A) NEG mg/dL    Glucose, urine (POC) Negative NEG mg/dL    Ketones (POC) Negative NEG mg/dL    Bilirubin (POC) Negative NEG      Blood (POC) Trace Intact (A) NEG      Urobilinogen (POC) 0.2 0.2 - 1.0 EU/dL    Nitrite (POC) Negative NEG      Leukocyte esterase (POC) TRACE (A) NEG      Performed by Cindy Molina    URINE MICROSCOPIC    Collection Time: 03/02/22 11:34 AM   Result Value Ref Range    WBC 5-10 0 /hpf    RBC 0 0 /hpf    Epithelial cells 3-5 0 /hpf    Bacteria TRACE 0 /hpf    Casts 5-10 0 /lpf    Crystals, urine 0 0 /LPF    Mucus 0 0 /lpf    Other observations RESULTS VERIFIED MANUALLY     LACTIC ACID    Collection Time: 03/02/22  3:52 PM   Result Value Ref Range    Lactic acid 0.9 0.4 - 2.0 MMOL/L   GLUCOSE, POC    Collection Time: 03/02/22  4:55 PM   Result Value Ref Range    Glucose (POC) 133 (H) 65 - 100 mg/dL    Performed by Alissa    GLUCOSE, POC    Collection Time: 03/02/22  9:09 PM   Result Value Ref Range    Glucose (POC) 210 (H) 65 - 100 mg/dL    Performed by Delilah    METABOLIC PANEL, COMPREHENSIVE    Collection Time: 03/03/22  5:32 AM   Result Value Ref Range    Sodium 140 136 - 145 mmol/L    Potassium 3.9 3.5 - 5.1 mmol/L    Chloride 106 98 - 107 mmol/L    CO2 23 21 - 32 mmol/L    Anion gap 11 7 - 16 mmol/L    Glucose 156 (H) 65 - 100 mg/dL    BUN 34 (H) 8 - 23 MG/DL    Creatinine 1.90 (H) 0.6 - 1.0 MG/DL    GFR est AA 33 (L) >60 ml/min/1.73m2    GFR est non-AA 28 (L) >60 ml/min/1.73m2    Calcium 9.4 8.3 - 10.4 MG/DL    Bilirubin, total 0.4 0.2 - 1.1 MG/DL    ALT (SGPT) 57 12 - 65 U/L    AST (SGOT) 33 15 - 37 U/L    Alk.  phosphatase 141 (H) 50 - 136 U/L    Protein, total 7.9 6.3 - 8.2 g/dL    Albumin 3.6 3.2 - 4.6 g/dL    Globulin 4.3 (H) 2.3 - 3.5 g/dL    A-G Ratio 0.8 (L) 1.2 - 3.5     CBC WITH AUTOMATED DIFF    Collection Time: 03/03/22  5:32 AM   Result Value Ref Range    WBC 8.5 4.3 - 11.1 K/uL    RBC 4.21 4.05 - 5.2 M/uL    HGB 13.0 11.7 - 15.4 g/dL    HCT 40.6 35.8 - 46.3 %    MCV 96.4 79.6 - 97.8 FL    MCH 30.9 26.1 - 32.9 PG    MCHC 32.0 31.4 - 35.0 g/dL    RDW 12.3 11.9 - 14.6 %    PLATELET 245 815 - 037 K/uL    MPV 9.6 9.4 - 12.3 FL    ABSOLUTE NRBC 0.00 0.0 - 0.2 K/uL    DF AUTOMATED      NEUTROPHILS 78 43 - 78 %    LYMPHOCYTES 10 (L) 13 - 44 %    MONOCYTES 11 4.0 - 12.0 %    EOSINOPHILS 1 0.5 - 7.8 %    BASOPHILS 0 0.0 - 2.0 %    IMMATURE GRANULOCYTES 1 0.0 - 5.0 %    ABS. NEUTROPHILS 6.6 1.7 - 8.2 K/UL    ABS. LYMPHOCYTES 0.9 0.5 - 4.6 K/UL    ABS. MONOCYTES 0.9 0.1 - 1.3 K/UL    ABS. EOSINOPHILS 0.1 0.0 - 0.8 K/UL    ABS. BASOPHILS 0.0 0.0 - 0.2 K/UL    ABS. IMM. GRANS. 0.0 0.0 - 0.5 K/UL   GLUCOSE, POC    Collection Time: 03/03/22  6:02 AM   Result Value Ref Range    Glucose (POC) 148 (H) 65 - 100 mg/dL    Performed by [facility]        All Micro Results     None          Other Studies:  CT ABD PELV WO CONT    Result Date: 3/2/2022  EXAMINATION: CT ABD PELV WO CONT 3/2/2022 1:54 PM ACCESSION NUMBER: [id number] COMPARISON: Abdominal x-rays 8/18/2021 CT abdomen and pelvis 8/17/2021 INDICATION: Left lower quadrant pain. Suspected diverticulitis Patient presents with complaints of left flank pain into left lower abdomen. Patient was recently seen in the office and has been treated for diverticulosis. TECHNIQUE: Contiguous axial computed tomographic images were obtained from the domes of the diaphragm to the symphysis pubis without intravenous contrast. Coronal and sagittal reformats are provided. Oral contrast was administered. Please note that the detection of solid organ and vascular abnormalities is limited in the absence of intravenous contrast. Radiation dose reduction techniques were used for this study.  Our CT scanners use one or all of the following: Automated exposure control, adjustment of the mA and/or kV according to patient size, iterative reconstruction. FINDINGS: LIMITED THORAX: Mitral annulus calcifications. Right coronary artery atherosclerosis. The included portions of the pericardium are unremarkable. The included lung bases are clear. PERITONEUM/MESENTERY: No evidence of ascites, free gas, or lymphadenopathy. GI TRACT: There is large volume stool extending from the cecum through the mid descending colon. There is an abrupt marketed transition in colonic caliber in the mid descending colon. There is mild colonic wall thickening and subtle free fluid in the area of abrupt caliber transition. The distal portions of the colon are decompressed. The terminal ileum is unremarkable. There is no evidence of upstream small bowel obstruction. Nonvisualized appendix, without pericecal inflammatory change. The stomach is unremarkable. SPLEEN: Normal ADRENALS: Normal PANCREAS: Normal LIVER: Normal GALLBLADDER: Normal KIDNEYS: Mild bilateral renal cortical thinning. No evidence of hydroureteronephrosis or nephroureterolithiasis. Duplicated left renal collecting system. BLADDER/: Small posterior bladder wall diverticulum. Prior hysterectomy. VESSELS: Scattered atherosclerosis. BONES/SOFT TISSUES: Lumbar spine spondylosis without suspicious lytic or blastic bony lesions. 1.  There is colonic obstruction consequent to a severe stool burden extending from the cecum through the mid descending colon. There is an abrupt transition in colonic caliber in the mid descending colon. There is subtle bowel wall thickening and surrounding mesenteric stranding of the area of transition in caliber, consistent with a developing stercoral colitis. There is no evidence of associated colonic perforation or upstream small bowel obstruction. 2.  Chronic findings otherwise as above.        Current Meds:  Current Facility-Administered Medications   Medication Dose Route Frequency    polyethylene glycol (MIRALAX) powder 119 g  119 g Oral ONCE    NIFEdipine ER (PROCARDIA XL) tablet 90 mg  90 mg Oral DAILY    sodium chloride (NS) flush 5-10 mL  5-10 mL IntraVENous Q8H    sodium chloride (NS) flush 5-10 mL  5-10 mL IntraVENous PRN    sodium chloride (NS) flush 5-40 mL  5-40 mL IntraVENous Q8H    sodium chloride (NS) flush 5-40 mL  5-40 mL IntraVENous PRN    acetaminophen (TYLENOL) tablet 650 mg  650 mg Oral Q6H PRN    Or    acetaminophen (TYLENOL) suppository 650 mg  650 mg Rectal Q6H PRN    polyethylene glycol (MIRALAX) packet 17 g  17 g Oral DAILY PRN    ondansetron (ZOFRAN ODT) tablet 4 mg  4 mg Oral Q8H PRN    Or    ondansetron (ZOFRAN) injection 4 mg  4 mg IntraVENous Q6H PRN    enoxaparin (LOVENOX) injection 30 mg  30 mg SubCUTAneous DAILY    0.9% sodium chloride infusion  75 mL/hr IntraVENous CONTINUOUS    HYDROmorphone (DILAUDID) injection 0.5 mg  0.5 mg IntraVENous Q4H PRN    HYDROmorphone (DILAUDID) injection 1 mg  1 mg IntraVENous Q4H PRN    [Held by provider] ciprofloxacin (CIPRO) 400 mg in D5W IVPB (premix)  400 mg IntraVENous Q24H    [Held by provider] metroNIDAZOLE (FLAGYL) IVPB premix 500 mg  500 mg IntraVENous Q8H    insulin lispro (HUMALOG) injection   SubCUTAneous AC&HS    aspirin chewable tablet 81 mg  81 mg Oral 7am    carvediloL (COREG) tablet 3.125 mg  3.125 mg Oral BID WITH MEALS    isosorbide mononitrate ER (IMDUR) tablet 60 mg  60 mg Oral DAILY    rosuvastatin (CRESTOR) tablet 40 mg  40 mg Oral QHS    traZODone (DESYREL) tablet 50 mg  50 mg Oral QHS    polyethylene glycol (MIRALAX) packet 17 g  17 g Oral BID    bisacodyL (DULCOLAX) suppository 10 mg  10 mg Rectal DAILY     Labs, vital signs, diagnostic imaging reviewed. Case discussed with patient, care team, Dr. Abi Huntley. Signed:  Sandeep Morales NP    Part of this note may have been written by using a voice dictation software.   The note has been proof read but may still contain some grammatical/other typographical errors.

## 2022-03-03 NOTE — PROGRESS NOTES
Pt discussed during IDR and chart screened by CM for d/c planning. Pt resides with her spouse Allyson Alvarez [phone] in a one-story house. Prior to hospitalization pt was independent with ADLs, to include driving, and does not have any home DME. Pt does not have a Hx of HH or STR. Pt has insurance with pharmacy benefits and a PCP. Pt presented to the ED with c/o abdominal pain. Pt admitted with Dx of Stercoral colitis, MIRANDA, DM-II, HTN, CKD-IV, GERD, and HLD. There have been no PT, OT, or SLP consults ordered. GI is following. Currently treating pt with enemas and plan for a bowel cleanse today. Pt is currently on a clear liquid diet. On RA. Anticipated d/c plan is for pt to return home with spouse when medically stable. No supportive care needs identified at this time. CM will continue to follow and remain available if any needs arise. Care Management Interventions  PCP Verified by CM: Yes Ana Melendez MD)  Mode of Transport at Discharge:  Other (see comment) (Family)  Transition of Care Consult (CM Consult): Discharge Planning  Physical Therapy Consult: No  Occupational Therapy Consult: No  Speech Therapy Consult: No  Support Systems: Spouse/Significant Other,Child(babatunde)  Confirm Follow Up Transport: Family  The Patient and/or Patient Representative was Provided with a Choice of Provider and Agrees with the Discharge Plan?: Yes  Name of the Patient Representative Who was Provided with a Choice of Provider and Agrees with the Discharge Plan: [place]  [place] of Choice List was Provided with Basic Dialogue that Supports the Patient's Individualized Plan of Care/Goals, Treatment Preferences and Shares the Quality Data Associated with the Providers?: Yes   Resource Information Provided?: No  Discharge Location  Patient Expects to be Discharged to[de-identified] Home with family assistance

## 2022-03-04 ENCOUNTER — APPOINTMENT (OUTPATIENT)
Dept: GENERAL RADIOLOGY | Age: 72
DRG: 344 | End: 2022-03-04
Attending: FAMILY MEDICINE
Payer: MEDICARE

## 2022-03-04 ENCOUNTER — APPOINTMENT (OUTPATIENT)
Dept: GENERAL RADIOLOGY | Age: 72
DRG: 344 | End: 2022-03-04
Attending: INTERNAL MEDICINE
Payer: MEDICARE

## 2022-03-04 PROBLEM — E87.6 HYPOKALEMIA: Status: ACTIVE | Noted: 2022-03-04

## 2022-03-04 LAB
ALBUMIN SERPL-MCNC: 3.1 G/DL (ref 3.2–4.6)
ALBUMIN/GLOB SERPL: 0.8 (ref 1.2–3.5)
ALP SERPL-CCNC: 117 U/L (ref 50–136)
ALT SERPL-CCNC: 39 U/L (ref 12–65)
ANION GAP SERPL CALC-SCNC: 9 MMOL/L (ref 7–16)
AST SERPL-CCNC: 28 U/L (ref 15–37)
ATRIAL RATE: 79 BPM
BASOPHILS # BLD: 0 K/UL (ref 0–0.2)
BASOPHILS NFR BLD: 0 % (ref 0–2)
BILIRUB SERPL-MCNC: 0.4 MG/DL (ref 0.2–1.1)
BNP SERPL-MCNC: 2633 PG/ML (ref 5–125)
BUN SERPL-MCNC: 28 MG/DL (ref 8–23)
CALCIUM SERPL-MCNC: 8.6 MG/DL (ref 8.3–10.4)
CALCULATED P AXIS, ECG09: 67 DEGREES
CALCULATED R AXIS, ECG10: 57 DEGREES
CALCULATED T AXIS, ECG11: 39 DEGREES
CHLORIDE SERPL-SCNC: 111 MMOL/L (ref 98–107)
CO2 SERPL-SCNC: 21 MMOL/L (ref 21–32)
CREAT SERPL-MCNC: 2.1 MG/DL (ref 0.6–1)
DIAGNOSIS, 93000: NORMAL
DIFFERENTIAL METHOD BLD: ABNORMAL
EOSINOPHIL # BLD: 0.4 K/UL (ref 0–0.8)
EOSINOPHIL NFR BLD: 4 % (ref 0.5–7.8)
ERYTHROCYTE [DISTWIDTH] IN BLOOD BY AUTOMATED COUNT: 12.3 % (ref 11.9–14.6)
GLOBULIN SER CALC-MCNC: 3.8 G/DL (ref 2.3–3.5)
GLUCOSE BLD STRIP.AUTO-MCNC: 141 MG/DL (ref 65–100)
GLUCOSE BLD STRIP.AUTO-MCNC: 160 MG/DL (ref 65–100)
GLUCOSE BLD STRIP.AUTO-MCNC: 164 MG/DL (ref 65–100)
GLUCOSE BLD STRIP.AUTO-MCNC: 178 MG/DL (ref 65–100)
GLUCOSE BLD STRIP.AUTO-MCNC: 182 MG/DL (ref 65–100)
GLUCOSE SERPL-MCNC: 157 MG/DL (ref 65–100)
HCT VFR BLD AUTO: 33.5 % (ref 35.8–46.3)
HGB BLD-MCNC: 10.8 G/DL (ref 11.7–15.4)
IMM GRANULOCYTES # BLD AUTO: 0 K/UL (ref 0–0.5)
IMM GRANULOCYTES NFR BLD AUTO: 0 % (ref 0–5)
LYMPHOCYTES # BLD: 1.7 K/UL (ref 0.5–4.6)
LYMPHOCYTES NFR BLD: 17 % (ref 13–44)
MAGNESIUM SERPL-MCNC: 2.2 MG/DL (ref 1.8–2.4)
MCH RBC QN AUTO: 31.1 PG (ref 26.1–32.9)
MCHC RBC AUTO-ENTMCNC: 32.2 G/DL (ref 31.4–35)
MCV RBC AUTO: 96.5 FL (ref 79.6–97.8)
MONOCYTES # BLD: 1.8 K/UL (ref 0.1–1.3)
MONOCYTES NFR BLD: 18 % (ref 4–12)
NEUTS SEG # BLD: 6.3 K/UL (ref 1.7–8.2)
NEUTS SEG NFR BLD: 61 % (ref 43–78)
NRBC # BLD: 0 K/UL (ref 0–0.2)
P-R INTERVAL, ECG05: 146 MS
PLATELET # BLD AUTO: 220 K/UL (ref 150–450)
PMV BLD AUTO: 9.4 FL (ref 9.4–12.3)
POTASSIUM SERPL-SCNC: 3.2 MMOL/L (ref 3.5–5.1)
PROT SERPL-MCNC: 6.9 G/DL (ref 6.3–8.2)
Q-T INTERVAL, ECG07: 414 MS
QRS DURATION, ECG06: 94 MS
QTC CALCULATION (BEZET), ECG08: 474 MS
RBC # BLD AUTO: 3.47 M/UL (ref 4.05–5.2)
SERVICE CMNT-IMP: ABNORMAL
SODIUM SERPL-SCNC: 141 MMOL/L (ref 136–145)
TROPONIN-HIGH SENSITIVITY: 37.7 PG/ML (ref 0–14)
VENTRICULAR RATE, ECG03: 79 BPM
WBC # BLD AUTO: 10.2 K/UL (ref 4.3–11.1)

## 2022-03-04 PROCEDURE — 36415 COLL VENOUS BLD VENIPUNCTURE: CPT

## 2022-03-04 PROCEDURE — 85025 COMPLETE CBC W/AUTO DIFF WBC: CPT

## 2022-03-04 PROCEDURE — 94760 N-INVAS EAR/PLS OXIMETRY 1: CPT

## 2022-03-04 PROCEDURE — 74011250636 HC RX REV CODE- 250/636: Performed by: INTERNAL MEDICINE

## 2022-03-04 PROCEDURE — 65270000029 HC RM PRIVATE

## 2022-03-04 PROCEDURE — 82962 GLUCOSE BLOOD TEST: CPT

## 2022-03-04 PROCEDURE — 74011000250 HC RX REV CODE- 250: Performed by: INTERNAL MEDICINE

## 2022-03-04 PROCEDURE — 77010033678 HC OXYGEN DAILY

## 2022-03-04 PROCEDURE — 74011000250 HC RX REV CODE- 250: Performed by: EMERGENCY MEDICINE

## 2022-03-04 PROCEDURE — 74011250636 HC RX REV CODE- 250/636: Performed by: STUDENT IN AN ORGANIZED HEALTH CARE EDUCATION/TRAINING PROGRAM

## 2022-03-04 PROCEDURE — 74011250637 HC RX REV CODE- 250/637: Performed by: STUDENT IN AN ORGANIZED HEALTH CARE EDUCATION/TRAINING PROGRAM

## 2022-03-04 PROCEDURE — 84484 ASSAY OF TROPONIN QUANT: CPT

## 2022-03-04 PROCEDURE — 74011250637 HC RX REV CODE- 250/637: Performed by: INTERNAL MEDICINE

## 2022-03-04 PROCEDURE — 96372 THER/PROPH/DIAG INJ SC/IM: CPT

## 2022-03-04 PROCEDURE — 71045 X-RAY EXAM CHEST 1 VIEW: CPT

## 2022-03-04 PROCEDURE — 74011636637 HC RX REV CODE- 636/637: Performed by: INTERNAL MEDICINE

## 2022-03-04 PROCEDURE — 96376 TX/PRO/DX INJ SAME DRUG ADON: CPT

## 2022-03-04 PROCEDURE — 80053 COMPREHEN METABOLIC PANEL: CPT

## 2022-03-04 PROCEDURE — 83735 ASSAY OF MAGNESIUM: CPT

## 2022-03-04 PROCEDURE — 96375 TX/PRO/DX INJ NEW DRUG ADDON: CPT

## 2022-03-04 PROCEDURE — 83880 ASSAY OF NATRIURETIC PEPTIDE: CPT

## 2022-03-04 PROCEDURE — 74018 RADEX ABDOMEN 1 VIEW: CPT

## 2022-03-04 RX ORDER — FUROSEMIDE 10 MG/ML
40 INJECTION INTRAMUSCULAR; INTRAVENOUS DAILY
Status: DISCONTINUED | OUTPATIENT
Start: 2022-03-04 | End: 2022-03-08

## 2022-03-04 RX ORDER — POTASSIUM CHLORIDE 20 MEQ/1
40 TABLET, EXTENDED RELEASE ORAL 2 TIMES DAILY
Status: DISCONTINUED | OUTPATIENT
Start: 2022-03-04 | End: 2022-03-04

## 2022-03-04 RX ADMIN — SODIUM CHLORIDE, PRESERVATIVE FREE 10 ML: 5 INJECTION INTRAVENOUS at 18:21

## 2022-03-04 RX ADMIN — ASPIRIN 81 MG 81 MG: 81 TABLET ORAL at 05:28

## 2022-03-04 RX ADMIN — CARVEDILOL 3.12 MG: 3.12 TABLET, FILM COATED ORAL at 08:56

## 2022-03-04 RX ADMIN — HYDROMORPHONE HYDROCHLORIDE 1 MG: 1 INJECTION, SOLUTION INTRAMUSCULAR; INTRAVENOUS; SUBCUTANEOUS at 21:50

## 2022-03-04 RX ADMIN — POLYETHYLENE GLYCOL 3350 17 G: 17 POWDER, FOR SOLUTION ORAL at 08:57

## 2022-03-04 RX ADMIN — ENOXAPARIN SODIUM 30 MG: 100 INJECTION SUBCUTANEOUS at 08:57

## 2022-03-04 RX ADMIN — ISOSORBIDE MONONITRATE 60 MG: 60 TABLET, EXTENDED RELEASE ORAL at 08:57

## 2022-03-04 RX ADMIN — FUROSEMIDE 40 MG: 10 INJECTION, SOLUTION INTRAMUSCULAR; INTRAVENOUS at 11:44

## 2022-03-04 RX ADMIN — BISACODYL 10 MG: 10 SUPPOSITORY RECTAL at 09:00

## 2022-03-04 RX ADMIN — CIPROFLOXACIN 400 MG: 2 INJECTION, SOLUTION INTRAVENOUS at 22:57

## 2022-03-04 RX ADMIN — METRONIDAZOLE 500 MG: 500 INJECTION, SOLUTION INTRAVENOUS at 21:53

## 2022-03-04 RX ADMIN — Medication 2 UNITS: at 18:18

## 2022-03-04 RX ADMIN — HYDROMORPHONE HYDROCHLORIDE 1 MG: 1 INJECTION, SOLUTION INTRAMUSCULAR; INTRAVENOUS; SUBCUTANEOUS at 14:27

## 2022-03-04 RX ADMIN — ONDANSETRON 4 MG: 2 INJECTION INTRAMUSCULAR; INTRAVENOUS at 14:27

## 2022-03-04 RX ADMIN — TRAZODONE HYDROCHLORIDE 50 MG: 50 TABLET ORAL at 21:53

## 2022-03-04 RX ADMIN — POTASSIUM CHLORIDE 40 MEQ: 20 TABLET, EXTENDED RELEASE ORAL at 11:44

## 2022-03-04 RX ADMIN — CARVEDILOL 3.12 MG: 3.12 TABLET, FILM COATED ORAL at 18:13

## 2022-03-04 RX ADMIN — SODIUM CHLORIDE, PRESERVATIVE FREE 10 ML: 5 INJECTION INTRAVENOUS at 21:51

## 2022-03-04 RX ADMIN — HYDROMORPHONE HYDROCHLORIDE 1 MG: 1 INJECTION, SOLUTION INTRAMUSCULAR; INTRAVENOUS; SUBCUTANEOUS at 03:29

## 2022-03-04 RX ADMIN — Medication 2 UNITS: at 08:57

## 2022-03-04 RX ADMIN — SODIUM CHLORIDE, PRESERVATIVE FREE 10 ML: 5 INJECTION INTRAVENOUS at 21:53

## 2022-03-04 RX ADMIN — SODIUM CHLORIDE, PRESERVATIVE FREE 10 ML: 5 INJECTION INTRAVENOUS at 05:23

## 2022-03-04 RX ADMIN — ROSUVASTATIN CALCIUM 40 MG: 20 TABLET, FILM COATED ORAL at 21:53

## 2022-03-04 RX ADMIN — SODIUM CHLORIDE, PRESERVATIVE FREE 10 ML: 5 INJECTION INTRAVENOUS at 14:00

## 2022-03-04 RX ADMIN — METRONIDAZOLE 500 MG: 500 INJECTION, SOLUTION INTRAVENOUS at 05:23

## 2022-03-04 RX ADMIN — METRONIDAZOLE 500 MG: 500 INJECTION, SOLUTION INTRAVENOUS at 14:28

## 2022-03-04 RX ADMIN — NIFEDIPINE 90 MG: 90 TABLET, EXTENDED RELEASE ORAL at 08:56

## 2022-03-04 NOTE — PROGRESS NOTES
[facility] Progress Note         Admit Date:  3/2/2022    Today's Date:  3/4/2022    CC:  Abdominal pain and stercoral colitis    Subjective:     Still No BM since admission. Had some rectal mucous last night. Having less LLQ pain, but more epigastric pain today.   Some N but no V.     Medications:   Current Facility-Administered Medications   Medication Dose Route Frequency    furosemide (LASIX) injection 40 mg  40 mg IntraVENous DAILY    potassium chloride (K-DUR, KLOR-CON M20) SR tablet 40 mEq  40 mEq Oral BID    NIFEdipine ER (PROCARDIA XL) tablet 90 mg  90 mg Oral DAILY    sodium chloride (NS) flush 5-10 mL  5-10 mL IntraVENous Q8H    sodium chloride (NS) flush 5-10 mL  5-10 mL IntraVENous PRN    sodium chloride (NS) flush 5-40 mL  5-40 mL IntraVENous Q8H    sodium chloride (NS) flush 5-40 mL  5-40 mL IntraVENous PRN    acetaminophen (TYLENOL) tablet 650 mg  650 mg Oral Q6H PRN    Or    acetaminophen (TYLENOL) suppository 650 mg  650 mg Rectal Q6H PRN    polyethylene glycol (MIRALAX) packet 17 g  17 g Oral DAILY PRN    ondansetron (ZOFRAN ODT) tablet 4 mg  4 mg Oral Q8H PRN    Or    ondansetron (ZOFRAN) injection 4 mg  4 mg IntraVENous Q6H PRN    enoxaparin (LOVENOX) injection 30 mg  30 mg SubCUTAneous DAILY    [Held by provider] 0.9% sodium chloride infusion  75 mL/hr IntraVENous CONTINUOUS    HYDROmorphone (DILAUDID) injection 0.5 mg  0.5 mg IntraVENous Q4H PRN    HYDROmorphone (DILAUDID) injection 1 mg  1 mg IntraVENous Q4H PRN    ciprofloxacin (CIPRO) 400 mg in D5W IVPB (premix)  400 mg IntraVENous Q24H    metroNIDAZOLE (FLAGYL) IVPB premix 500 mg  500 mg IntraVENous Q8H    insulin lispro (HUMALOG) injection   SubCUTAneous AC&HS    aspirin chewable tablet 81 mg  81 mg Oral 7am    carvediloL (COREG) tablet 3.125 mg  3.125 mg Oral BID WITH MEALS    isosorbide mononitrate ER (IMDUR) tablet 60 mg  60 mg Oral DAILY    rosuvastatin (CRESTOR) tablet 40 mg  40 mg Oral QHS  traZODone (DESYREL) tablet 50 mg  50 mg Oral QHS    polyethylene glycol (MIRALAX) packet 17 g  17 g Oral BID    bisacodyL (DULCOLAX) suppository 10 mg  10 mg Rectal DAILY       Review of Systems:  ROS was obtained, with pertinent positives as listed above. No chest pain or SOB. Diet:  Clear liquid diet    Objective:   Vitals:  Visit Vitals  BP (!) 143/62 (BP 1 Location: Right upper arm, BP Patient Position: Supine)   Pulse 73   Temp 98.9 °F (37.2 °C)   Resp 16   Ht 5' 4\" (1.626 m)   Wt 77.1 kg (170 lb)   SpO2 94%   BMI 29.18 kg/m²     Intake/Output:  No intake/output data recorded. 03/02 1901 - 03/04 0700  In: 0 [P.O.:816]  Out: -   Exam:  General appearance: alert, cooperative, no distress   Lungs: clear to auscultation bilaterally anteriorly  Heart: regular rate and rhythm  Abdomen: soft, mild dist, hypoactive bowel sounds, mild epigastric / LUQ pain. No G/R. Extremities: extremities normal, atraumatic, no cyanosis or edema  Neuro:  alert and oriented  Rectal : no stool or blood in vault. No mass.        KUB: Stool and gas in colon, asmita descending colon    Data Review (Labs):    Recent Labs     03/04/22  0613 03/03/22  0532 03/02/22  0914   WBC 10.2 8.5 7.2   HGB 10.8* 13.0 12.8   HCT 33.5* 40.6 38.2    231 255   MCV 96.5 96.4 93.9    140 137   K 3.2* 3.9 3.5   * 106 107   CO2 21 23 23   BUN 28* 34* 43*   CREA 2.10* 1.90* 2.20*   CA 8.6 9.4 9.3   MG 2.2  --   --    * 156* 242*    141* 151*   AST 28 33 49*   ALT 39 57 71*   TBILI 0.4 0.4 0.4   ALB 3.1* 3.6 3.7   TP 6.9 7.9 8.2   LPSE  --   --  157       Assessment:     Principal Problem:    Stercoral colitis (3/2/2022)    Active Problems:    Hypertension (3/1/2012)      HLD (hyperlipidemia) (9/4/2013)      Stage 4 chronic kidney disease ([facility] (9/15/2016)      Gastroesophageal reflux disease (2/16/2017)      Type 2 diabetes mellitus with stage 4 chronic kidney disease, with long-term current use of insulin (HCC) (10/11/2018)      Chronic diastolic congestive heart failure ([facility] (4/19/2021)      Overview: Heart Failure (HFpEF), EF 88-42%, with diastolic dysfunction, ECHO       2/16/2021            MIRANDA (acute kidney injury) ([facility] (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o. female with PMH including but not limited to coronary artery disease status post PCI 2012, diabetes, hypertension, chronic kidney disease stage 3, who we are following for constipation, abdominal pain and stercoral colitis.      Plan:     -Repeat enema x 1 today  --Continue clear liquid diet  - Monitor for fevers, rising WBC, etc  - replace K cautiously      Mirella Edwards MD

## 2022-03-04 NOTE — PROGRESS NOTES
Pt is noted with a dry cough, and c/o feeling full with fluid. On auscultation, lungs sounds crackly. IVF stopped and provider is notified. Will monitor pt.

## 2022-03-04 NOTE — PROGRESS NOTES
Hospitalist Progress Note   Admit Date:  3/2/2022 11:11 AM   Name:  Truong Pierce   Age:  66 y.o. Sex:  female  :  1950   MRN:  [id number]   Room:      Presenting Complaint: Abdominal Pain    Reason(s) for Admission: Stercoral colitis [K52.89]  MIRANDA (acute kidney injury) [facility]) [N17.9]     Hospital Course & Interval History:   Truong Pierce is a 66 y.o. female with PMH of chronic constipation, stage IV CKD, chronic diastolic CHF, CAD, HTN who was relatively well until 8 days ago () when she had a nice large bowel movement, then began experiencing severe abdominal pain on her left side in the lower abdomen the following day. She was evaluted by Dr. John Palacios (GI)  and was placed on Cipro and Flagyl for possible diverticulitis. However, her symptoms became worse, and she had not passed any stool since . Ultimately the severe abdominal pain caused her to come in for ED evaluation. ED workup with diagnosis of stercoral colitis based on CT A/P imaging. No noted colonic perforation or upstream small bowel obstruction. ED physician attempted fecal decompaction and requested that the hospitalist service admit her. Gastroenterology was also consulted and is following. Subjective/24hr Events (22): Patient is alert and oriented, resting in bed. No flatus, no BM. She denies nausea and vomiting but also has no appetite. Drank apple juice for breakfast. She denies SOB but did experience SOB with chest pressure overnight. She endorses abdominal distention today primarily epigastric.     Assessment & Plan:     Principal Problem:  Stercoral colitis   -Gastroenterology following  -Continue Cipro/Flagyl, currently with low grade fever to 37.7, no leukocytosis  -BCx 3/3 pending, no growth overnight  -KUB this am reviewed, gaseous distention of colon with constipation particularly in descending segment, continue bowel regimen and diet progression per GI    Active Problems:  Hypertension   -Continue home Nifedipine    HLD (hyperlipidemia)   -Continue Rosuvastatin    MIRANDA on Stage 4 chronic kidney disease ([facility] [address] resolved  -Follows Dr. Ash Espinal, outpatient Nephrology  -Cr currently 2.1, appears stable at baseline    Type 2 diabetes mellitus, with long-term current use of insulin ([facility] [address]   -Home insulin regimen on hold with current CLD, resume with diet progression  -Continue SSI Humalog with ACHS glucose checks    Chronic diastolic congestive heart failure (HCC)  -Continue Imdur, Furosemide resumed 3/4  -Daily weights, strict I&O's    Acute respiratory failure with hypoxia secondary to volume overload  -Overnight 3/3 with hypoxia 87% on RA, CXR obtained with no evidence of pneumonia or edema, placed on 3L NC and d/c IVF, pBNP 2633 this am, resume home Furosemide, give IV 40 mg now, wean O2 as tolerated    CAD s/p PCI  -Follows Dr. Patti Marie  -Continue ASA / Jose De Jesus Maria Elena / True Alamin / statin    Chest tightness, intermittent  -EKG with NSR with sinus arrhythmia, no ST elevation or T wave inversion  -Troponin 36.6, repeat pending  -Currently denies chest pain    Hypokalemia  -K 3.2, Mg wnl, replete K and monitor repeat labs in am    Dispo/Discharge Planning:  Pending clinical course, d/c plan home without needs    Diet:  ADULT DIET Clear Liquid  DVT PPx: Lovenox  Code status: Full Code    Hospital Problems as of 3/4/2022 Date Reviewed: 2/7/2022          Codes Class Noted - Resolved POA    Hypokalemia ICD-10-CM: E87.6  ICD-9-CM: 276.8  3/4/2022 - Present Unknown        MIRANDA (acute kidney injury) ([facility] ICD-10-CM: N17.9  ICD-9-CM: 584.9  3/2/2022 - Present Unknown        * (Principal) Stercoral colitis ICD-10-CM: K52.89  ICD-9-CM: 558.9  3/2/2022 - Present Unknown        Chronic diastolic congestive heart failure ([facility] ICD-10-CM: I50.32  ICD-9-CM: 428.32, 428.0  4/19/2021 - Present Yes    Overview Signed 7/7/2021 11:06 AM by Ana Laura Demarco NP     Heart Failure (HFpEF), EF 22-90%, with diastolic dysfunction, ECHO 2/16/2021               Acute respiratory failure with hypoxia [facility]) ICD-10-CM: J96.01  ICD-9-CM: 518.81  2/17/2021 - Present Unknown        Type 2 diabetes mellitus with stage 4 chronic kidney disease, with long-term current use of insulin (HCC) ICD-10-CM: E11.22, N18.4, Z79.4  ICD-9-CM: 250.40, 585.4, V58.67  10/11/2018 - Present Yes        Gastroesophageal reflux disease ICD-10-CM: K21.9  ICD-9-CM: 530.81  2/16/2017 - Present Yes        Stage 4 chronic kidney disease ([facility] [address] ICD-10-CM: N18.4  ICD-9-CM: 585.4  9/15/2016 - Present Yes        HLD (hyperlipidemia) ICD-10-CM: E78.5  ICD-9-CM: 272.4  9/4/2013 - Present Yes        Hypertension (Chronic) ICD-10-CM: I10  ICD-9-CM: 401.9  3/1/2012 - Present Yes              Objective:     Patient Vitals for the past 24 hrs:   Temp Pulse Resp BP SpO2   03/04/22 1137 99.9 °F (37.7 °C) 72 -- (!) 142/61 94 %   03/04/22 0747 98.9 °F (37.2 °C) 73 16 (!) 143/62 94 %   03/04/22 0243 99.8 °F (37.7 °C) 85 20 (!) 135/54 (!) 87 %   03/03/22 2342 98.2 °F (36.8 °C) 85 16 (!) 148/59 91 %   03/03/22 2001 98.2 °F (36.8 °C) 71 14 (!) 134/59 93 %   03/03/22 1639 99.3 °F (37.4 °C) 82 -- (!) 138/58 (!) 89 %     Oxygen Therapy  O2 Sat (%): 94 % (03/04/22 1137)  Pulse via Oximetry: 66 beats per minute (03/02/22 1245)  O2 Device: Nasal cannula (03/04/22 1137)    Estimated body mass index is 29.18 kg/m² as calculated from the following:    Height as of this encounter: 5' 4\" (1.626 m). Weight as of this encounter: 77.1 kg (170 lb). Intake/Output Summary (Last 24 hours) at 3/4/2022 1142  Last data filed at 3/3/2022 1730  Gross per 24 hour   Intake 340 ml   Output --   Net 340 ml         Physical Exam:   Blood pressure (!) 142/61, pulse 72, temperature 99.9 °F (37.7 °C), resp. rate 16, height 5' 4\" (1.626 m), weight 77.1 kg (170 lb), SpO2 94 %.   General:    Ill-appearing, no acute distress, alert, calm, conversational.  Head:  Normocephalic, atraumatic  Eyes:  Sclerae appear normal.  Pupils equally round. ENT:  Nares appear normal, no drainage. Moist oral mucosa  Neck:  No restricted ROM. Trachea midline   CV:   RRR. No m/r/g. No jugular venous distension. Lungs:   Faint crackles bilateral bases posterior, no wheezing, no rhonchi. Respirations even and unlabored on 3L NC. Abdomen: Bowel sounds hypoactive. Distention bilateral upper quadrants but remains soft. LLQ tenderness to palpation. Extremities: No cyanosis or clubbing. No edema  Skin:     No rashes and normal coloration. Warm and dry. Neuro:  CN II-XII grossly intact. Sensation intact. A&Ox3. Moves all extremities. No focal deficits. Psych:  Normal mood and affect. I have reviewed ordered lab tests and independently visualized imaging below:    Recent Labs:  Recent Results (from the past 48 hour(s))   LACTIC ACID    Collection Time: 03/02/22  3:52 PM   Result Value Ref Range    Lactic acid 0.9 0.4 - 2.0 MMOL/L   GLUCOSE, POC    Collection Time: 03/02/22  4:55 PM   Result Value Ref Range    Glucose (POC) 133 (H) 65 - 100 mg/dL    Performed by Alissa    GLUCOSE, POC    Collection Time: 03/02/22  9:09 PM   Result Value Ref Range    Glucose (POC) 210 (H) 65 - 100 mg/dL    Performed by Delilah    METABOLIC PANEL, COMPREHENSIVE    Collection Time: 03/03/22  5:32 AM   Result Value Ref Range    Sodium 140 136 - 145 mmol/L    Potassium 3.9 3.5 - 5.1 mmol/L    Chloride 106 98 - 107 mmol/L    CO2 23 21 - 32 mmol/L    Anion gap 11 7 - 16 mmol/L    Glucose 156 (H) 65 - 100 mg/dL    BUN 34 (H) 8 - 23 MG/DL    Creatinine 1.90 (H) 0.6 - 1.0 MG/DL    GFR est AA 33 (L) >60 ml/min/1.73m2    GFR est non-AA 28 (L) >60 ml/min/1.73m2    Calcium 9.4 8.3 - 10.4 MG/DL    Bilirubin, total 0.4 0.2 - 1.1 MG/DL    ALT (SGPT) 57 12 - 65 U/L    AST (SGOT) 33 15 - 37 U/L    Alk.  phosphatase 141 (H) 50 - 136 U/L    Protein, total 7.9 6.3 - 8.2 g/dL    Albumin 3.6 3.2 - 4.6 g/dL    Globulin 4.3 (H) 2.3 - 3.5 g/dL    A-G Ratio 0.8 (L) 1.2 - 3.5     CBC WITH AUTOMATED DIFF    Collection Time: 03/03/22  5:32 AM   Result Value Ref Range    WBC 8.5 4.3 - 11.1 K/uL    RBC 4.21 4.05 - 5.2 M/uL    HGB 13.0 11.7 - 15.4 g/dL    HCT 40.6 35.8 - 46.3 %    MCV 96.4 79.6 - 97.8 FL    MCH 30.9 26.1 - 32.9 PG    MCHC 32.0 31.4 - 35.0 g/dL    RDW 12.3 11.9 - 14.6 %    PLATELET 595 796 - 444 K/uL    MPV 9.6 9.4 - 12.3 FL    ABSOLUTE NRBC 0.00 0.0 - 0.2 K/uL    DF AUTOMATED      NEUTROPHILS 78 43 - 78 %    LYMPHOCYTES 10 (L) 13 - 44 %    MONOCYTES 11 4.0 - 12.0 %    EOSINOPHILS 1 0.5 - 7.8 %    BASOPHILS 0 0.0 - 2.0 %    IMMATURE GRANULOCYTES 1 0.0 - 5.0 %    ABS. NEUTROPHILS 6.6 1.7 - 8.2 K/UL    ABS. LYMPHOCYTES 0.9 0.5 - 4.6 K/UL    ABS. MONOCYTES 0.9 0.1 - 1.3 K/UL    ABS. EOSINOPHILS 0.1 0.0 - 0.8 K/UL    ABS. BASOPHILS 0.0 0.0 - 0.2 K/UL    ABS. IMM.  GRANS. 0.0 0.0 - 0.5 K/UL   GLUCOSE, POC    Collection Time: 03/03/22  6:02 AM   Result Value Ref Range    Glucose (POC) 148 (H) 65 - 100 mg/dL    Performed by Scarlett    GLUCOSE, POC    Collection Time: 03/03/22 11:13 AM   Result Value Ref Range    Glucose (POC) 240 (H) 65 - 100 mg/dL    Performed by Cody Barnes    EKG, 12 LEAD, INITIAL    Collection Time: 03/03/22  1:55 PM   Result Value Ref Range    Ventricular Rate 79 BPM    Atrial Rate 79 BPM    P-R Interval 146 ms    QRS Duration 94 ms    Q-T Interval 414 ms    QTC Calculation (Bezet) 474 ms    Calculated P Axis 67 degrees    Calculated R Axis 57 degrees    Calculated T Axis 39 degrees    Diagnosis       Normal sinus rhythm with sinus arrhythmia  Normal ECG  When compared with ECG of 19-AUG-2021 12:31,  Premature supraventricular complexes are no longer Present  Nonspecific T wave abnormality now evident in Inferior leads  Confirmed by Amie Coelho MD (), ROWENA ([id number]) on 3/4/2022 10:54:52 AM     TROPONIN-HIGH SENSITIVITY    Collection Time: 03/03/22  3:47 PM   Result Value Ref Range    Troponin-High Sensitivity 36.6 (H) 0 - 14 pg/mL CULTURE, BLOOD    Collection Time: 03/03/22  3:47 PM    Specimen: Blood   Result Value Ref Range    Special Requests: RIGHT  Antecubital        Culture result: NO GROWTH AFTER 13 HOURS     CULTURE, BLOOD    Collection Time: 03/03/22  3:55 PM    Specimen: Blood   Result Value Ref Range    Special Requests: LEFT  HAND        Culture result: NO GROWTH AFTER 13 HOURS     GLUCOSE, POC    Collection Time: 03/03/22  4:39 PM   Result Value Ref Range    Glucose (POC) 144 (H) 65 - 100 mg/dL    Performed by Maisha 55, POC    Collection Time: 03/03/22  9:08 PM   Result Value Ref Range    Glucose (POC) 229 (H) 65 - 100 mg/dL    Performed by [facility]    GLUCOSE, POC    Collection Time: 03/04/22  6:12 AM   Result Value Ref Range    Glucose (POC) 160 (H) 65 - 100 mg/dL    Performed by [facility]    CBC WITH AUTOMATED DIFF    Collection Time: 03/04/22  6:13 AM   Result Value Ref Range    WBC 10.2 4.3 - 11.1 K/uL    RBC 3.47 (L) 4.05 - 5.2 M/uL    HGB 10.8 (L) 11.7 - 15.4 g/dL    HCT 33.5 (L) 35.8 - 46.3 %    MCV 96.5 79.6 - 97.8 FL    MCH 31.1 26.1 - 32.9 PG    MCHC 32.2 31.4 - 35.0 g/dL    RDW 12.3 11.9 - 14.6 %    PLATELET 689 471 - 037 K/uL    MPV 9.4 9.4 - 12.3 FL    ABSOLUTE NRBC 0.00 0.0 - 0.2 K/uL    DF AUTOMATED      NEUTROPHILS 61 43 - 78 %    LYMPHOCYTES 17 13 - 44 %    MONOCYTES 18 (H) 4.0 - 12.0 %    EOSINOPHILS 4 0.5 - 7.8 %    BASOPHILS 0 0.0 - 2.0 %    IMMATURE GRANULOCYTES 0 0.0 - 5.0 %    ABS. NEUTROPHILS 6.3 1.7 - 8.2 K/UL    ABS. LYMPHOCYTES 1.7 0.5 - 4.6 K/UL    ABS. MONOCYTES 1.8 (H) 0.1 - 1.3 K/UL    ABS. EOSINOPHILS 0.4 0.0 - 0.8 K/UL    ABS. BASOPHILS 0.0 0.0 - 0.2 K/UL    ABS. IMM.  GRANS. 0.0 0.0 - 0.5 K/UL   METABOLIC PANEL, COMPREHENSIVE    Collection Time: 03/04/22  6:13 AM   Result Value Ref Range    Sodium 141 136 - 145 mmol/L    Potassium 3.2 (L) 3.5 - 5.1 mmol/L    Chloride 111 (H) 98 - 107 mmol/L    CO2 21 21 - 32 mmol/L    Anion gap 9 7 - 16 mmol/L    Glucose 157 (H) 65 - 100 mg/dL    BUN 28 (H) 8 - 23 MG/DL    Creatinine 2.10 (H) 0.6 - 1.0 MG/DL    GFR est AA 30 (L) >60 ml/min/1.73m2    GFR est non-AA 25 (L) >60 ml/min/1.73m2    Calcium 8.6 8.3 - 10.4 MG/DL    Bilirubin, total 0.4 0.2 - 1.1 MG/DL    ALT (SGPT) 39 12 - 65 U/L    AST (SGOT) 28 15 - 37 U/L    Alk. phosphatase 117 50 - 136 U/L    Protein, total 6.9 6.3 - 8.2 g/dL    Albumin 3.1 (L) 3.2 - 4.6 g/dL    Globulin 3.8 (H) 2.3 - 3.5 g/dL    A-G Ratio 0.8 (L) 1.2 - 3.5     NT-PRO BNP    Collection Time: 03/04/22  6:13 AM   Result Value Ref Range    NT pro-BNP 2,633 (H) 5 - 125 PG/ML   MAGNESIUM    Collection Time: 03/04/22  6:13 AM   Result Value Ref Range    Magnesium 2.2 1.8 - 2.4 mg/dL   GLUCOSE, POC    Collection Time: 03/04/22  9:13 AM   Result Value Ref Range    Glucose (POC) 164 (H) 65 - 100 mg/dL    Performed by Josie        All Micro Results     Procedure Component Value Units Date/Time    CULTURE, BLOOD [[id number]] Collected: 03/03/22 1547    Order Status: Completed Specimen: Blood Updated: 03/04/22 0622     Special Requests: --        RIGHT  Antecubital       Culture result: NO GROWTH AFTER 13 HOURS       CULTURE, BLOOD [[id number]] Collected: 03/03/22 1555    Order Status: Completed Specimen: Blood Updated: 03/04/22 0622     Special Requests: --        LEFT  HAND       Culture result: NO GROWTH AFTER 13 HOURS       CULTURE, URINE [[id number]] Collected: 03/03/22 1345    Order Status: Canceled Specimen: Urine from Clean catch           Other Studies:  XR CHEST SNGL V    Result Date: 3/4/2022   Portable view of the chest COMPARISON: February 15 CLINICAL HISTORY: Chest pain. FINDINGS: Heart is mildly enlarged. Mediastinal contour is within normal limits. Lungs are underinflated. There is no focal pulmonary consolidation, pneumothorax or pulmonary edema. No large pleural effusion. Surrounding bones are intact. 1. No evidence of pneumonia or pulmonary edema.     XR ABD (KUB)    Result Date: 3/4/2022  Exam: XR ABD (KUB) on 3/4/2022 8:21 AM Clinical History: The Female patient is 66years old  presenting for Inpatient-Severe constipation, LLQ pain. Comparison:  KUB 8/18/2021 Findings:  Single view of the abdomen demonstrates predominantly gaseous distended loops of colon however with areas of moderate constipation particularly in the descending segment. There is no soft tissue mass or organomegaly. No gross free intraperitoneal air is identified. No acute osseous abnormality is demonstrated. Advanced degenerative changes seen throughout the hips. 1. Predominantly gaseous distention of colon however with areas of constipation particularly in the descending segment.  CPT code(s) 14596       Current Meds:  Current Facility-Administered Medications   Medication Dose Route Frequency    furosemide (LASIX) injection 40 mg  40 mg IntraVENous DAILY    potassium chloride (K-DUR, KLOR-CON M20) SR tablet 40 mEq  40 mEq Oral BID    NIFEdipine ER (PROCARDIA XL) tablet 90 mg  90 mg Oral DAILY    sodium chloride (NS) flush 5-10 mL  5-10 mL IntraVENous Q8H    sodium chloride (NS) flush 5-10 mL  5-10 mL IntraVENous PRN    sodium chloride (NS) flush 5-40 mL  5-40 mL IntraVENous Q8H    sodium chloride (NS) flush 5-40 mL  5-40 mL IntraVENous PRN    acetaminophen (TYLENOL) tablet 650 mg  650 mg Oral Q6H PRN    Or    acetaminophen (TYLENOL) suppository 650 mg  650 mg Rectal Q6H PRN    polyethylene glycol (MIRALAX) packet 17 g  17 g Oral DAILY PRN    ondansetron (ZOFRAN ODT) tablet 4 mg  4 mg Oral Q8H PRN    Or    ondansetron (ZOFRAN) injection 4 mg  4 mg IntraVENous Q6H PRN    enoxaparin (LOVENOX) injection 30 mg  30 mg SubCUTAneous DAILY    [Held by provider] 0.9% sodium chloride infusion  75 mL/hr IntraVENous CONTINUOUS    HYDROmorphone (DILAUDID) injection 0.5 mg  0.5 mg IntraVENous Q4H PRN    HYDROmorphone (DILAUDID) injection 1 mg  1 mg IntraVENous Q4H PRN    ciprofloxacin (CIPRO) 400 mg in D5W IVPB (premix)  400 mg IntraVENous Q24H    metroNIDAZOLE (FLAGYL) IVPB premix 500 mg  500 mg IntraVENous Q8H    insulin lispro (HUMALOG) injection   SubCUTAneous AC&HS    aspirin chewable tablet 81 mg  81 mg Oral 7am    carvediloL (COREG) tablet 3.125 mg  3.125 mg Oral BID WITH MEALS    isosorbide mononitrate ER (IMDUR) tablet 60 mg  60 mg Oral DAILY    rosuvastatin (CRESTOR) tablet 40 mg  40 mg Oral QHS    traZODone (DESYREL) tablet 50 mg  50 mg Oral QHS    polyethylene glycol (MIRALAX) packet 17 g  17 g Oral BID    bisacodyL (DULCOLAX) suppository 10 mg  10 mg Rectal DAILY     Labs, vital signs, diagnostic imaging reviewed. Case discussed with patient, care team, Dr. Jocelyn Rivera.     Signed:  Brian Betancourt NP

## 2022-03-04 NOTE — PROGRESS NOTES
Problem: Patient Education: Go to Patient Education Activity  Goal: Patient/Family Education  Outcome: Progressing Towards Goal     Problem: Acute Renal Failure: Day 2  Goal: Off Pathway (Use only if patient is Off Pathway)  Outcome: Progressing Towards Goal  Goal: Activity/Safety  Outcome: Progressing Towards Goal  Goal: Diagnostic Test/Procedures  Outcome: Progressing Towards Goal  Goal: Nutrition/Diet  Outcome: Progressing Towards Goal  Goal: Discharge Planning  Outcome: Progressing Towards Goal  Goal: Medications  Outcome: Progressing Towards Goal  Goal: Respiratory  Outcome: Progressing Towards Goal  Goal: Treatments/Interventions/Procedures  Outcome: Progressing Towards Goal  Goal: Psychosocial  Outcome: Progressing Towards Goal  Goal: *Optimal pain control at patient's stated goal  Outcome: Progressing Towards Goal  Goal: *Urinary output within identified parameters  Outcome: Progressing Towards Goal  Goal: *Hemodynamically stable  Outcome: Progressing Towards Goal  Goal: *Tolerating diet  Outcome: Progressing Towards Goal

## 2022-03-05 ENCOUNTER — APPOINTMENT (OUTPATIENT)
Dept: GENERAL RADIOLOGY | Age: 72
DRG: 344 | End: 2022-03-05
Attending: INTERNAL MEDICINE
Payer: MEDICARE

## 2022-03-05 LAB
ANION GAP SERPL CALC-SCNC: 6 MMOL/L (ref 7–16)
BUN SERPL-MCNC: 30 MG/DL (ref 8–23)
CALCIUM SERPL-MCNC: 8.6 MG/DL (ref 8.3–10.4)
CHLORIDE SERPL-SCNC: 109 MMOL/L (ref 98–107)
CO2 SERPL-SCNC: 23 MMOL/L (ref 21–32)
CREAT SERPL-MCNC: 2.7 MG/DL (ref 0.6–1)
ERYTHROCYTE [DISTWIDTH] IN BLOOD BY AUTOMATED COUNT: 12.1 % (ref 11.9–14.6)
GLUCOSE BLD STRIP.AUTO-MCNC: 151 MG/DL (ref 65–100)
GLUCOSE BLD STRIP.AUTO-MCNC: 151 MG/DL (ref 65–100)
GLUCOSE BLD STRIP.AUTO-MCNC: 158 MG/DL (ref 65–100)
GLUCOSE SERPL-MCNC: 167 MG/DL (ref 65–100)
HCT VFR BLD AUTO: 32.5 % (ref 35.8–46.3)
HGB BLD-MCNC: 10.6 G/DL (ref 11.7–15.4)
MCH RBC QN AUTO: 31.1 PG (ref 26.1–32.9)
MCHC RBC AUTO-ENTMCNC: 32.6 G/DL (ref 31.4–35)
MCV RBC AUTO: 95.3 FL (ref 79.6–97.8)
NRBC # BLD: 0 K/UL (ref 0–0.2)
PLATELET # BLD AUTO: 215 K/UL (ref 150–450)
PMV BLD AUTO: 9.5 FL (ref 9.4–12.3)
POTASSIUM SERPL-SCNC: 3.4 MMOL/L (ref 3.5–5.1)
RBC # BLD AUTO: 3.41 M/UL (ref 4.05–5.2)
SERVICE CMNT-IMP: ABNORMAL
SODIUM SERPL-SCNC: 138 MMOL/L (ref 136–145)
WBC # BLD AUTO: 11.6 K/UL (ref 4.3–11.1)

## 2022-03-05 PROCEDURE — 74011250636 HC RX REV CODE- 250/636: Performed by: INTERNAL MEDICINE

## 2022-03-05 PROCEDURE — 36415 COLL VENOUS BLD VENIPUNCTURE: CPT

## 2022-03-05 PROCEDURE — 74011250637 HC RX REV CODE- 250/637: Performed by: INTERNAL MEDICINE

## 2022-03-05 PROCEDURE — 74011636637 HC RX REV CODE- 636/637: Performed by: INTERNAL MEDICINE

## 2022-03-05 PROCEDURE — 82962 GLUCOSE BLOOD TEST: CPT

## 2022-03-05 PROCEDURE — 96376 TX/PRO/DX INJ SAME DRUG ADON: CPT

## 2022-03-05 PROCEDURE — 94760 N-INVAS EAR/PLS OXIMETRY 1: CPT

## 2022-03-05 PROCEDURE — 80048 BASIC METABOLIC PNL TOTAL CA: CPT

## 2022-03-05 PROCEDURE — 77010033678 HC OXYGEN DAILY

## 2022-03-05 PROCEDURE — 74011250636 HC RX REV CODE- 250/636: Performed by: NURSE PRACTITIONER

## 2022-03-05 PROCEDURE — 74011000250 HC RX REV CODE- 250: Performed by: INTERNAL MEDICINE

## 2022-03-05 PROCEDURE — 74011250636 HC RX REV CODE- 250/636: Performed by: FAMILY MEDICINE

## 2022-03-05 PROCEDURE — 76937 US GUIDE VASCULAR ACCESS: CPT

## 2022-03-05 PROCEDURE — 85027 COMPLETE CBC AUTOMATED: CPT

## 2022-03-05 PROCEDURE — 96372 THER/PROPH/DIAG INJ SC/IM: CPT

## 2022-03-05 PROCEDURE — 77030041974 HC CATH SYS PERIPH TELE -B

## 2022-03-05 PROCEDURE — 96375 TX/PRO/DX INJ NEW DRUG ADDON: CPT

## 2022-03-05 PROCEDURE — 74011250637 HC RX REV CODE- 250/637: Performed by: STUDENT IN AN ORGANIZED HEALTH CARE EDUCATION/TRAINING PROGRAM

## 2022-03-05 PROCEDURE — 65270000029 HC RM PRIVATE

## 2022-03-05 PROCEDURE — 74011000250 HC RX REV CODE- 250: Performed by: EMERGENCY MEDICINE

## 2022-03-05 PROCEDURE — 74018 RADEX ABDOMEN 1 VIEW: CPT

## 2022-03-05 RX ORDER — METRONIDAZOLE 500 MG/100ML
500 INJECTION, SOLUTION INTRAVENOUS EVERY 12 HOURS
Status: DISCONTINUED | OUTPATIENT
Start: 2022-03-05 | End: 2022-03-06

## 2022-03-05 RX ORDER — HYDROMORPHONE HYDROCHLORIDE 1 MG/ML
0.2 INJECTION, SOLUTION INTRAMUSCULAR; INTRAVENOUS; SUBCUTANEOUS
Status: DISCONTINUED | OUTPATIENT
Start: 2022-03-05 | End: 2022-03-05

## 2022-03-05 RX ORDER — HYDROMORPHONE HYDROCHLORIDE 1 MG/ML
1 INJECTION, SOLUTION INTRAMUSCULAR; INTRAVENOUS; SUBCUTANEOUS ONCE
Status: COMPLETED | OUTPATIENT
Start: 2022-03-05 | End: 2022-03-05

## 2022-03-05 RX ORDER — POTASSIUM CHLORIDE 14.9 MG/ML
20 INJECTION INTRAVENOUS ONCE
Status: COMPLETED | OUTPATIENT
Start: 2022-03-05 | End: 2022-03-05

## 2022-03-05 RX ORDER — HYDROMORPHONE HYDROCHLORIDE 1 MG/ML
0.5 INJECTION, SOLUTION INTRAMUSCULAR; INTRAVENOUS; SUBCUTANEOUS
Status: DISCONTINUED | OUTPATIENT
Start: 2022-03-06 | End: 2022-03-06

## 2022-03-05 RX ADMIN — HYDROMORPHONE HYDROCHLORIDE 1 MG: 1 INJECTION, SOLUTION INTRAMUSCULAR; INTRAVENOUS; SUBCUTANEOUS at 21:49

## 2022-03-05 RX ADMIN — Medication 2 UNITS: at 22:29

## 2022-03-05 RX ADMIN — METRONIDAZOLE 500 MG: 500 INJECTION, SOLUTION INTRAVENOUS at 05:59

## 2022-03-05 RX ADMIN — ISOSORBIDE MONONITRATE 60 MG: 60 TABLET, EXTENDED RELEASE ORAL at 11:14

## 2022-03-05 RX ADMIN — ENOXAPARIN SODIUM 30 MG: 100 INJECTION SUBCUTANEOUS at 11:16

## 2022-03-05 RX ADMIN — METRONIDAZOLE 500 MG: 500 INJECTION, SOLUTION INTRAVENOUS at 23:03

## 2022-03-05 RX ADMIN — ASPIRIN 81 MG 81 MG: 81 TABLET ORAL at 06:37

## 2022-03-05 RX ADMIN — SODIUM CHLORIDE, PRESERVATIVE FREE 10 ML: 5 INJECTION INTRAVENOUS at 05:59

## 2022-03-05 RX ADMIN — Medication 2 UNITS: at 07:30

## 2022-03-05 RX ADMIN — HYDROMORPHONE HYDROCHLORIDE 0.2 MG: 1 INJECTION, SOLUTION INTRAMUSCULAR; INTRAVENOUS; SUBCUTANEOUS at 12:45

## 2022-03-05 RX ADMIN — POTASSIUM CHLORIDE 20 MEQ: 14.9 INJECTION, SOLUTION INTRAVENOUS at 18:54

## 2022-03-05 RX ADMIN — CARVEDILOL 3.12 MG: 3.12 TABLET, FILM COATED ORAL at 09:30

## 2022-03-05 RX ADMIN — SODIUM CHLORIDE, PRESERVATIVE FREE 5 ML: 5 INJECTION INTRAVENOUS at 14:00

## 2022-03-05 RX ADMIN — NIFEDIPINE 90 MG: 90 TABLET, EXTENDED RELEASE ORAL at 11:14

## 2022-03-05 RX ADMIN — Medication 2 UNITS: at 11:30

## 2022-03-05 RX ADMIN — SODIUM CHLORIDE 75 ML/HR: 900 INJECTION, SOLUTION INTRAVENOUS at 18:53

## 2022-03-05 RX ADMIN — ROSUVASTATIN CALCIUM 40 MG: 20 TABLET, FILM COATED ORAL at 21:48

## 2022-03-05 RX ADMIN — HYDROMORPHONE HYDROCHLORIDE 1 MG: 1 INJECTION, SOLUTION INTRAMUSCULAR; INTRAVENOUS; SUBCUTANEOUS at 04:09

## 2022-03-05 RX ADMIN — BISACODYL 10 MG: 10 SUPPOSITORY RECTAL at 09:00

## 2022-03-05 RX ADMIN — Medication 2 UNITS: at 16:30

## 2022-03-05 RX ADMIN — METHYLNALTREXONE BROMIDE 12 MG: 12 INJECTION, SOLUTION SUBCUTANEOUS at 11:14

## 2022-03-05 RX ADMIN — CIPROFLOXACIN 400 MG: 2 INJECTION, SOLUTION INTRAVENOUS at 23:07

## 2022-03-05 RX ADMIN — CARVEDILOL 3.12 MG: 3.12 TABLET, FILM COATED ORAL at 20:15

## 2022-03-05 RX ADMIN — HYDROMORPHONE HYDROCHLORIDE 0.2 MG: 1 INJECTION, SOLUTION INTRAMUSCULAR; INTRAVENOUS; SUBCUTANEOUS at 20:17

## 2022-03-05 RX ADMIN — TRAZODONE HYDROCHLORIDE 50 MG: 50 TABLET ORAL at 21:48

## 2022-03-05 RX ADMIN — SODIUM CHLORIDE, PRESERVATIVE FREE 10 ML: 5 INJECTION INTRAVENOUS at 21:55

## 2022-03-05 RX ADMIN — SODIUM CHLORIDE 75 ML/HR: 900 INJECTION, SOLUTION INTRAVENOUS at 15:13

## 2022-03-05 NOTE — PROGRESS NOTES
Patient resting in bed no acute distress noted. Report to be given to oncoming shift bed locked on lowest position and call bell within reach.

## 2022-03-05 NOTE — PROGRESS NOTES
Hospitalist Progress Note   Admit Date:  3/2/2022 11:11 AM   Name:  Cari Rivera   Age:  66 y.o. Sex:  female  :  1950   MRN:  [id number]   Room:      Presenting Complaint: Abdominal Pain    Reason(s) for Admission: Stercoral colitis [K52.89]  MIRANDA (acute kidney injury) [facility]) [N17.9]     Hospital Course & Interval History:   Parminder Smith is a 66 y. o. female with a PMH of chronic constipation, stage IV CKD, chronic diastolic CHF, CAD, HTN who was relatively well until 8 days ago () when she had a bowel movement then began experiencing severe abdominal pain on her left side in the lower abdomen the following day. She was evaluted by Dr. Concepción Polanco (GI)  and was placed on Cipro and Flagyl for possible diverticulitis. However, her symptoms became worse, and she had not passed any stool since . Ultimately, the severe abdominal pain caused her to come in for ER evaluation. ER workup with diagnosis of stercoral colitis based on CT A/P imaging. No noted colonic perforation or upstream small bowel obstruction. ER physician attempted fecal decompaction and requested that the hospitalist service admit her. Gastroenterology was also consulted and is following. Subjective/24hr Events (22): No AEO. Patient denies vomiting; has still not moved bowels. She was updated on her condition and the medical plan. ROS:  10 systems reviewed and negative except as noted above.      Assessment & Plan:     Principal Problem:  Stercoral colitis   -Gastroenterology following  -Continue Cipro/Flagyl  -BCx NG x 2 days  -KUB w/ gaseous distention of colon with constipation particularly in descending segment  Mar/05: NPO; GI ordered NGT to LIS; IVF while NPO   - Trying Relistor   - Serial KUB   - If no improvement, flex sig may become necessary     Active Problems:  MIRANDA on Stage 4 chronic kidney disease ([facility] [address] resolved  -Follows Dr. Ivon Edwards, outpatient Nephrology  -Cr currently 2.1 --> 2.7; hold furosemide on Mar/05     Acute respiratory failure with hypoxia secondary to volume overload  -Overnight 3/3 with hypoxia 87% on RA, CXR obtained with no evidence of pneumonia or edema, placed on 3L NC and d/c IVF, pBNP 2633 this am, resume home Furosemide, give IV 40 mg now, wean O2 as tolerated  -Monitor while on 3-4 L; does not use O2 at home     Chronic diastolic congestive heart failure (HCC)  -Continue Imdur, furosemide resumed 3/4  -Daily weights, strict I&O's  Mar/05: Hold furosemide w/ decline in renal fx; monitor closely, resume as able    CAD s/p PCI  -Follows Dr. Samaniego Oar  -Continue ASA / Deborha [place] / Eric How / statin     Chest tightness, intermittent  -EKG with NSR with sinus arrhythmia, no ST elevation or T wave inversion  -Troponin 36.6 --> 37.7  -Currently denies chest pain     Hypokalemia  - K+ 3.4, replace  - a.m.  BMP    Type 2 diabetes mellitus, with long-term current use of insulin (HCC)   -Home insulin regimen on hold with current CLD, resume with diet progression  -Continue SSI Humalog with ACHS glucose checks    Hypertension   -Continue home Nifedipine     HLD (hyperlipidemia)   -Continue Rosuvastatin      Dispo/Discharge Planning: > 2 midnights    Diet:  DIET NPO Sips of Water with Meds  DVT PPx: enoxaparin  Code status: Full Code    Hospital Problems as of 3/5/2022 Date Reviewed: 2/7/2022          Codes Class Noted - Resolved POA    Hypokalemia ICD-10-CM: E87.6  ICD-9-CM: 276.8  3/4/2022 - Present Unknown        MIRANDA (acute kidney injury) ([facility] [address] ICD-10-CM: N17.9  ICD-9-CM: 584.9  3/2/2022 - Present Unknown        * (Principal) Stercoral colitis ICD-10-CM: K52.89  ICD-9-CM: 558.9  3/2/2022 - Present Unknown        Chronic diastolic congestive heart failure ([facility] [address] ICD-10-CM: I50.32  ICD-9-CM: 428.32, 428.0  4/19/2021 - Present Yes    Overview Signed 7/7/2021 11:06 AM by Stella Mcguire NP     Heart Failure (HFpEF), EF 23-66%, with diastolic dysfunction, ECHO 2/16/2021               Acute respiratory failure with hypoxia [facility]) ICD-10-CM: J96.01  ICD-9-CM: 518.81  2/17/2021 - Present Unknown        Type 2 diabetes mellitus with stage 4 chronic kidney disease, with long-term current use of insulin (HCC) ICD-10-CM: E11.22, N18.4, Z79.4  ICD-9-CM: 250.40, 585.4, V58.67  10/11/2018 - Present Yes        Gastroesophageal reflux disease ICD-10-CM: K21.9  ICD-9-CM: 530.81  2/16/2017 - Present Yes        Stage 4 chronic kidney disease ([facility] [address] ICD-10-CM: N18.4  ICD-9-CM: 585.4  9/15/2016 - Present Yes        HLD (hyperlipidemia) ICD-10-CM: E78.5  ICD-9-CM: 272.4  9/4/2013 - Present Yes        Hypertension (Chronic) ICD-10-CM: I10  ICD-9-CM: 401.9  3/1/2012 - Present Yes              Objective:     Patient Vitals for the past 24 hrs:   Temp Pulse Resp BP SpO2   03/05/22 1307 98 °F (36.7 °C) 90 18 (!) 149/69 91 %   03/05/22 0832 99.1 °F (37.3 °C) 83 18 (!) 140/58 92 %   03/05/22 0348 99.4 °F (37.4 °C) 93 20 131/67 90 %   03/04/22 2332 98 °F (36.7 °C) 72 16 (!) 122/54 90 %   03/04/22 2047 98.5 °F (36.9 °C) 84 20 134/64 (!) 87 %   03/04/22 1724 98.9 °F (37.2 °C) 82 -- (!) 145/67 90 %     Oxygen Therapy  O2 Sat (%): 91 % (03/05/22 1307)  Pulse via Oximetry: 66 beats per minute (03/02/22 1245)  O2 Device: Nasal cannula (03/05/22 1307)  O2 Flow Rate (L/min): 4 l/min (03/05/22 0348)    Estimated body mass index is 29.18 kg/m² as calculated from the following:    Height as of this encounter: 5' 4\" (1.626 m). Weight as of this encounter: 77.1 kg (170 lb). No intake or output data in the 24 hours ending 03/05/22 1326      Physical Exam:   Blood pressure (!) 149/69, pulse 90, temperature 98 °F (36.7 °C), resp. rate 18, height 5' 4\" (1.626 m), weight 77.1 kg (170 lb), SpO2 91 %. General:    No overt distress  Head:  Normocephalic, atraumatic  Eyes:  Sclerae appear normal.  Pupils equally round. ENT:  Nares appear normal, no drainage. Moist oral mucosa  Neck:  No restricted ROM. Trachea midline   CV:   RRR. No m/r/g. Lungs:   CTAB. No wheezing, rhonchi, or rales. Respirations even, unlabored at rest on 3L. Abdomen: Bowel sounds (hollow) present. Soft, nontender, nondistended. Extremities: No cyanosis or clubbing. Skin:     No rashes and normal coloration. Warm and dry. Neuro:  CN II-XII grossly intact. A&Ox3  Psych:  Normal mood and affect.       I have reviewed ordered lab tests and independently visualized imaging below:    Recent Labs:  Recent Results (from the past 48 hour(s))   EKG, 12 LEAD, INITIAL    Collection Time: 03/03/22  1:55 PM   Result Value Ref Range    Ventricular Rate 79 BPM    Atrial Rate 79 BPM    P-R Interval 146 ms    QRS Duration 94 ms    Q-T Interval 414 ms    QTC Calculation (Bezet) 474 ms    Calculated P Axis 67 degrees    Calculated R Axis 57 degrees    Calculated T Axis 39 degrees    Diagnosis       Normal sinus rhythm with sinus arrhythmia  Normal ECG  When compared with ECG of 19-AUG-2021 12:31,  Premature supraventricular complexes are no longer Present  Nonspecific T wave abnormality now evident in Inferior leads  Confirmed by Osmani Fuentes MD (), ROWENA ([id number]) on 3/4/2022 10:54:52 AM     TROPONIN-HIGH SENSITIVITY    Collection Time: 03/03/22  3:47 PM   Result Value Ref Range    Troponin-High Sensitivity 36.6 (H) 0 - 14 pg/mL   CULTURE, BLOOD    Collection Time: 03/03/22  3:47 PM    Specimen: Blood   Result Value Ref Range    Special Requests: RIGHT  Antecubital        Culture result: NO GROWTH 2 DAYS     CULTURE, BLOOD    Collection Time: 03/03/22  3:55 PM    Specimen: Blood   Result Value Ref Range    Special Requests: LEFT  HAND        Culture result: NO GROWTH 2 DAYS     GLUCOSE, POC    Collection Time: 03/03/22  4:39 PM   Result Value Ref Range    Glucose (POC) 144 (H) 65 - 100 mg/dL    Performed by Keila Wise, POC    Collection Time: 03/03/22  9:08 PM   Result Value Ref Range    Glucose (POC) 229 (H) 65 - 100 mg/dL    [facility] by [facility]    GLUCOSE, POC    Collection Time: 03/04/22  6:12 AM   Result Value Ref Range    Glucose (POC) 160 (H) 65 - 100 mg/dL    Performed by [facility]    CBC WITH AUTOMATED DIFF    Collection Time: 03/04/22  6:13 AM   Result Value Ref Range    WBC 10.2 4.3 - 11.1 K/uL    RBC 3.47 (L) 4.05 - 5.2 M/uL    HGB 10.8 (L) 11.7 - 15.4 g/dL    HCT 33.5 (L) 35.8 - 46.3 %    MCV 96.5 79.6 - 97.8 FL    MCH 31.1 26.1 - 32.9 PG    MCHC 32.2 31.4 - 35.0 g/dL    RDW 12.3 11.9 - 14.6 %    PLATELET 212 034 - 332 K/uL    MPV 9.4 9.4 - 12.3 FL    ABSOLUTE NRBC 0.00 0.0 - 0.2 K/uL    DF AUTOMATED      NEUTROPHILS 61 43 - 78 %    LYMPHOCYTES 17 13 - 44 %    MONOCYTES 18 (H) 4.0 - 12.0 %    EOSINOPHILS 4 0.5 - 7.8 %    BASOPHILS 0 0.0 - 2.0 %    IMMATURE GRANULOCYTES 0 0.0 - 5.0 %    ABS. NEUTROPHILS 6.3 1.7 - 8.2 K/UL    ABS. LYMPHOCYTES 1.7 0.5 - 4.6 K/UL    ABS. MONOCYTES 1.8 (H) 0.1 - 1.3 K/UL    ABS. EOSINOPHILS 0.4 0.0 - 0.8 K/UL    ABS. BASOPHILS 0.0 0.0 - 0.2 K/UL    ABS. IMM. GRANS. 0.0 0.0 - 0.5 K/UL   METABOLIC PANEL, COMPREHENSIVE    Collection Time: 03/04/22  6:13 AM   Result Value Ref Range    Sodium 141 136 - 145 mmol/L    Potassium 3.2 (L) 3.5 - 5.1 mmol/L    Chloride 111 (H) 98 - 107 mmol/L    CO2 21 21 - 32 mmol/L    Anion gap 9 7 - 16 mmol/L    Glucose 157 (H) 65 - 100 mg/dL    BUN 28 (H) 8 - 23 MG/DL    Creatinine 2.10 (H) 0.6 - 1.0 MG/DL    GFR est AA 30 (L) >60 ml/min/1.73m2    GFR est non-AA 25 (L) >60 ml/min/1.73m2    Calcium 8.6 8.3 - 10.4 MG/DL    Bilirubin, total 0.4 0.2 - 1.1 MG/DL    ALT (SGPT) 39 12 - 65 U/L    AST (SGOT) 28 15 - 37 U/L    Alk.  phosphatase 117 50 - 136 U/L    Protein, total 6.9 6.3 - 8.2 g/dL    Albumin 3.1 (L) 3.2 - 4.6 g/dL    Globulin 3.8 (H) 2.3 - 3.5 g/dL    A-G Ratio 0.8 (L) 1.2 - 3.5     NT-PRO BNP    Collection Time: 03/04/22  6:13 AM   Result Value Ref Range    NT pro-BNP 2,633 (H) 5 - 125 PG/ML   MAGNESIUM    Collection Time: 03/04/22  6:13 AM   Result Value Ref Range    Magnesium 2.2 1.8 - 2.4 mg/dL   GLUCOSE, POC    Collection Time: 03/04/22  9:13 AM   Result Value Ref Range    Glucose (POC) 164 (H) 65 - 100 mg/dL    Performed by Josie    GLUCOSE, POC    Collection Time: 03/04/22 11:36 AM   Result Value Ref Range    Glucose (POC) 182 (H) 65 - 100 mg/dL    Performed by Niki Silvaus SENSITIVITY    Collection Time: 03/04/22 12:24 PM   Result Value Ref Range    Troponin-High Sensitivity 37.7 (H) 0 - 14 pg/mL   GLUCOSE, POC    Collection Time: 03/04/22  5:25 PM   Result Value Ref Range    Glucose (POC) 178 (H) 65 - 100 mg/dL    Performed by Enio    GLUCOSE, POC    Collection Time: 03/04/22  9:31 PM   Result Value Ref Range    Glucose (POC) 141 (H) 65 - 100 mg/dL    Performed by Jarrett    METABOLIC PANEL, BASIC    Collection Time: 03/05/22  6:16 AM   Result Value Ref Range    Sodium 138 136 - 145 mmol/L    Potassium 3.4 (L) 3.5 - 5.1 mmol/L    Chloride 109 (H) 98 - 107 mmol/L    CO2 23 21 - 32 mmol/L    Anion gap 6 (L) 7 - 16 mmol/L    Glucose 167 (H) 65 - 100 mg/dL    BUN 30 (H) 8 - 23 MG/DL    Creatinine 2.70 (H) 0.6 - 1.0 MG/DL    GFR est AA 22 (L) >60 ml/min/1.73m2    GFR est non-AA 18 (L) >60 ml/min/1.73m2    Calcium 8.6 8.3 - 10.4 MG/DL   CBC W/O DIFF    Collection Time: 03/05/22  6:16 AM   Result Value Ref Range    WBC 11.6 (H) 4.3 - 11.1 K/uL    RBC 3.41 (L) 4.05 - 5.2 M/uL    HGB 10.6 (L) 11.7 - 15.4 g/dL    HCT 32.5 (L) 35.8 - 46.3 %    MCV 95.3 79.6 - 97.8 FL    MCH 31.1 26.1 - 32.9 PG    MCHC 32.6 31.4 - 35.0 g/dL    RDW 12.1 11.9 - 14.6 %    PLATELET 599 318 - 808 K/uL    MPV 9.5 9.4 - 12.3 FL    ABSOLUTE NRBC 0.00 0.0 - 0.2 K/uL   GLUCOSE, POC    Collection Time: 03/05/22  6:37 AM   Result Value Ref Range    Glucose (POC) 151 (H) 65 - 100 mg/dL    Performed by IndotradingDebraeliserenea        All Micro Results     Procedure Component Value Units Date/Time    CULTURE, BLOOD [[id number]] Collected: 03/03/22 1547    Order Status: Completed Specimen: Blood Updated: 03/05/22 0733     Special Requests: --        RIGHT  Antecubital       Culture result: NO GROWTH 2 DAYS       CULTURE, BLOOD [[id number]] Collected: 03/03/22 1555    Order Status: Completed Specimen: Blood Updated: 03/05/22 0733     Special Requests: --        LEFT  HAND       Culture result: NO GROWTH 2 DAYS       CULTURE, URINE [[id number]] Collected: 03/03/22 1345    Order Status: Canceled Specimen: Urine from Clean catch           Other Studies:  No results found.     Current Meds:  Current Facility-Administered Medications   Medication Dose Route Frequency    methylnaltrexone (RELISTOR) injection 12 mg  12 mg SubCUTAneous DAILY    HYDROmorphone (DILAUDID) injection 0.2 mg  0.2 mg IntraVENous Q4H PRN    metroNIDAZOLE (FLAGYL) IVPB premix 500 mg  500 mg IntraVENous Q12H    [Held by provider] furosemide (LASIX) injection 40 mg  40 mg IntraVENous DAILY    NIFEdipine ER (PROCARDIA XL) tablet 90 mg  90 mg Oral DAILY    sodium chloride (NS) flush 5-40 mL  5-40 mL IntraVENous Q8H    sodium chloride (NS) flush 5-40 mL  5-40 mL IntraVENous PRN    acetaminophen (TYLENOL) tablet 650 mg  650 mg Oral Q6H PRN    Or    acetaminophen (TYLENOL) suppository 650 mg  650 mg Rectal Q6H PRN    polyethylene glycol (MIRALAX) packet 17 g  17 g Oral DAILY PRN    ondansetron (ZOFRAN ODT) tablet 4 mg  4 mg Oral Q8H PRN    Or    ondansetron (ZOFRAN) injection 4 mg  4 mg IntraVENous Q6H PRN    enoxaparin (LOVENOX) injection 30 mg  30 mg SubCUTAneous DAILY    0.9% sodium chloride infusion  75 mL/hr IntraVENous CONTINUOUS    ciprofloxacin (CIPRO) 400 mg in D5W IVPB (premix)  400 mg IntraVENous Q24H    insulin lispro (HUMALOG) injection   SubCUTAneous AC&HS    aspirin chewable tablet 81 mg  81 mg Oral 7am    carvediloL (COREG) tablet 3.125 mg  3.125 mg Oral BID WITH MEALS    isosorbide mononitrate ER (IMDUR) tablet 60 mg  60 mg Oral DAILY    rosuvastatin (CRESTOR) tablet 40 mg  40 mg Oral QHS    traZODone (DESYREL) tablet 50 mg  50 mg Oral QHS    polyethylene glycol (MIRALAX) packet 17 g  17 g Oral BID    bisacodyL (DULCOLAX) suppository 10 mg  10 mg Rectal DAILY       Signed:  Jaz Major NP    Part of this note may have been written by using a voice dictation software. The note has been proof read but may still contain some grammatical/other typographical errors.

## 2022-03-05 NOTE — PROGRESS NOTES
Asked to start a PIV. Using US assistance, placed a 20g 8cm Endurance catheter in patients left forearm on first attempt. Primary RN informed.   Nieves Barrios RN, VAT

## 2022-03-05 NOTE — PROGRESS NOTES
Lying supine, HOB elevated, no acute distress  Lungs clear A/P nasal O2 4 liter, neurovascular checks WDL, without complaints of pain.

## 2022-03-05 NOTE — PROGRESS NOTES
[facility] Progress Note         Admit Date:  3/2/2022    Today's Date:  3/5/2022    CC:  Abdominal pain and stercoral colitis    Subjective:     No BM since admission. Despite miralax, enemas and suppositories. Rectal exam w/o palpable mass or stool. Extensive stool in descending colon on films. Diverticulosis, internal hemorrhoids, and transverse colon polyp on Colonoscopy 8/20. Pt w/o N/V. Still w/ pain in epigastric region, BUQ. Frustrated at lack of results.      Medications:   Current Facility-Administered Medications   Medication Dose Route Frequency    methylnaltrexone (RELISTOR) injection 12 mg  12 mg SubCUTAneous DAILY    [Held by provider] furosemide (LASIX) injection 40 mg  40 mg IntraVENous DAILY    NIFEdipine ER (PROCARDIA XL) tablet 90 mg  90 mg Oral DAILY    sodium chloride (NS) flush 5-40 mL  5-40 mL IntraVENous Q8H    sodium chloride (NS) flush 5-40 mL  5-40 mL IntraVENous PRN    acetaminophen (TYLENOL) tablet 650 mg  650 mg Oral Q6H PRN    Or    acetaminophen (TYLENOL) suppository 650 mg  650 mg Rectal Q6H PRN    polyethylene glycol (MIRALAX) packet 17 g  17 g Oral DAILY PRN    ondansetron (ZOFRAN ODT) tablet 4 mg  4 mg Oral Q8H PRN    Or    ondansetron (ZOFRAN) injection 4 mg  4 mg IntraVENous Q6H PRN    enoxaparin (LOVENOX) injection 30 mg  30 mg SubCUTAneous DAILY    0.9% sodium chloride infusion  75 mL/hr IntraVENous CONTINUOUS    HYDROmorphone (DILAUDID) injection 0.5 mg  0.5 mg IntraVENous Q4H PRN    HYDROmorphone (DILAUDID) injection 1 mg  1 mg IntraVENous Q4H PRN    ciprofloxacin (CIPRO) 400 mg in D5W IVPB (premix)  400 mg IntraVENous Q24H    metroNIDAZOLE (FLAGYL) IVPB premix 500 mg  500 mg IntraVENous Q8H    insulin lispro (HUMALOG) injection   SubCUTAneous AC&HS    aspirin chewable tablet 81 mg  81 mg Oral 7am    carvediloL (COREG) tablet 3.125 mg  3.125 mg Oral BID WITH MEALS    isosorbide mononitrate ER (IMDUR) tablet 60 mg  60 mg Oral DAILY  rosuvastatin (CRESTOR) tablet 40 mg  40 mg Oral QHS    traZODone (DESYREL) tablet 50 mg  50 mg Oral QHS    polyethylene glycol (MIRALAX) packet 17 g  17 g Oral BID    bisacodyL (DULCOLAX) suppository 10 mg  10 mg Rectal DAILY       Review of Systems:  ROS was obtained, with pertinent positives as listed above. No chest pain or SOB. Diet:  Clear liquid diet    Objective:   Vitals:  Visit Vitals  BP (!) 140/58 (BP 1 Location: Left upper arm, BP Patient Position: Supine)   Pulse 83   Temp 99.1 °F (37.3 °C)   Resp 20   Ht 5' 4\" (1.626 m)   Wt 77.1 kg (170 lb)   SpO2 92%   BMI 29.18 kg/m²     Intake/Output:  No intake/output data recorded. No intake/output data recorded. Exam:  General appearance: alert, cooperative, no distress   Lungs: clear to auscultation bilaterally anteriorly  Heart: regular rate and rhythm  Abdomen: soft, mild dist, hypoactive bowel sounds, mild epigastric / LUQ pain. No G/R. Extremities: extremities normal, atraumatic, no cyanosis or edema  Neuro:  alert and oriented  Rectal : no stool or blood in vault. No mass.        KUB: Stool and gas in colon, asmita descending colon    Data Review (Labs):    Recent Labs     03/05/22  0616 03/04/22  0613 03/03/22  0532 03/02/22  0914   WBC 11.6* 10.2 8.5 7.2   HGB 10.6* 10.8* 13.0 12.8   HCT 32.5* 33.5* 40.6 38.2    220 231 255   MCV 95.3 96.5 96.4 93.9    141 140 137   K 3.4* 3.2* 3.9 3.5   * 111* 106 107   CO2 23 21 23 23   BUN 30* 28* 34* 43*   CREA 2.70* 2.10* 1.90* 2.20*   CA 8.6 8.6 9.4 9.3   MG  --  2.2  --   --    * 157* 156* 242*   AP  --  117 141* 151*   AST  --  28 33 49*   ALT  --  39 57 71*   TBILI  --  0.4 0.4 0.4   ALB  --  3.1* 3.6 3.7   TP  --  6.9 7.9 8.2   LPSE  --   --   --  157       Assessment:     Principal Problem:    Stercoral colitis (3/2/2022)    Active Problems:    Hypertension (3/1/2012)      HLD (hyperlipidemia) (9/4/2013)      Stage [facility] (9/15/2016) Gastroesophageal reflux disease (2/16/2017)      Type 2 diabetes mellitus with stage 4 chronic kidney disease, with long-term current use of insulin ([facility] [address] (10/11/2018)      Acute respiratory failure with hypoxia (HCC) (2/17/2021)      Chronic diastolic congestive heart failure ([facility] [address] (4/19/2021)      Overview: Heart Failure (HFpEF), EF 79-81%, with diastolic dysfunction, ECHO       2/16/2021            MIRANDA (acute kidney injury) ([facility] [address] (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o. female with PMH including but not limited to coronary artery disease status post PCI 2012, diabetes, hypertension, chronic kidney disease stage 3, who we are following for constipation, abdominal pain and stercoral colitis,     Plan:     - No response w/ enemas and supossitories. - Increased air in colon on KUB with distension  - WIll make NPO, place NGT to suction  - Trial of relistor- OIC is likely contributing to symptoms  - If no improvement w/ this regimen, then will likely need diagnostic flex sig to r/o obstructing lesion. Less likely given recent colonoscopy, but h/o severe diverticulosis, possible stercoral colitis. Discussed risks of perforation w/ invasive procedures in unprepped colon w/ severe diverticulosis  --Optimize electrolytes  - Resume IVF while NPO  - Minimize narcotics, but may be needed for pain control      JOCELIN Lacey MD

## 2022-03-05 NOTE — PROGRESS NOTES
Patient received from day shift breathing with ease on 02 via nasal canula at 4L no acute distress noted. Iv canula intact no redness or swelling noted at site. Patient denies any pain or n/v. Bed locked on lowest position and call bell within reach.

## 2022-03-06 ENCOUNTER — ANESTHESIA EVENT (OUTPATIENT)
Dept: ENDOSCOPY | Age: 72
DRG: 344 | End: 2022-03-06
Payer: MEDICARE

## 2022-03-06 ENCOUNTER — ANESTHESIA (OUTPATIENT)
Dept: ENDOSCOPY | Age: 72
DRG: 344 | End: 2022-03-06
Payer: MEDICARE

## 2022-03-06 ENCOUNTER — APPOINTMENT (OUTPATIENT)
Dept: GENERAL RADIOLOGY | Age: 72
DRG: 344 | End: 2022-03-06
Attending: INTERNAL MEDICINE
Payer: MEDICARE

## 2022-03-06 PROBLEM — K56.0 PARALYTIC ILEUS OF LARGE INTESTINE (HCC): Status: ACTIVE | Noted: 2022-03-06

## 2022-03-06 LAB
ANION GAP SERPL CALC-SCNC: 8 MMOL/L (ref 7–16)
BUN SERPL-MCNC: 38 MG/DL (ref 8–23)
CALCIUM SERPL-MCNC: 8.7 MG/DL (ref 8.3–10.4)
CHLORIDE SERPL-SCNC: 110 MMOL/L (ref 98–107)
CO2 SERPL-SCNC: 22 MMOL/L (ref 21–32)
CREAT SERPL-MCNC: 2.9 MG/DL (ref 0.6–1)
ERYTHROCYTE [DISTWIDTH] IN BLOOD BY AUTOMATED COUNT: 12.2 % (ref 11.9–14.6)
GLUCOSE BLD STRIP.AUTO-MCNC: 152 MG/DL (ref 65–100)
GLUCOSE BLD STRIP.AUTO-MCNC: 153 MG/DL (ref 65–100)
GLUCOSE BLD STRIP.AUTO-MCNC: 161 MG/DL (ref 65–100)
GLUCOSE BLD STRIP.AUTO-MCNC: 169 MG/DL (ref 65–100)
GLUCOSE SERPL-MCNC: 143 MG/DL (ref 65–100)
HCT VFR BLD AUTO: 33.1 % (ref 35.8–46.3)
HGB BLD-MCNC: 10.8 G/DL (ref 11.7–15.4)
MCH RBC QN AUTO: 31 PG (ref 26.1–32.9)
MCHC RBC AUTO-ENTMCNC: 32.6 G/DL (ref 31.4–35)
MCV RBC AUTO: 95.1 FL (ref 79.6–97.8)
NRBC # BLD: 0 K/UL (ref 0–0.2)
PLATELET # BLD AUTO: 231 K/UL (ref 150–450)
PMV BLD AUTO: 9.6 FL (ref 9.4–12.3)
POTASSIUM SERPL-SCNC: 3.2 MMOL/L (ref 3.5–5.1)
RBC # BLD AUTO: 3.48 M/UL (ref 4.05–5.2)
SERVICE CMNT-IMP: ABNORMAL
SODIUM SERPL-SCNC: 140 MMOL/L (ref 136–145)
WBC # BLD AUTO: 10.9 K/UL (ref 4.3–11.1)

## 2022-03-06 PROCEDURE — 80048 BASIC METABOLIC PNL TOTAL CA: CPT

## 2022-03-06 PROCEDURE — 85027 COMPLETE CBC AUTOMATED: CPT

## 2022-03-06 PROCEDURE — 74011000250 HC RX REV CODE- 250: Performed by: INTERNAL MEDICINE

## 2022-03-06 PROCEDURE — 74011636637 HC RX REV CODE- 636/637: Performed by: INTERNAL MEDICINE

## 2022-03-06 PROCEDURE — 74018 RADEX ABDOMEN 1 VIEW: CPT

## 2022-03-06 PROCEDURE — 82962 GLUCOSE BLOOD TEST: CPT

## 2022-03-06 PROCEDURE — 74011250636 HC RX REV CODE- 250/636: Performed by: INTERNAL MEDICINE

## 2022-03-06 PROCEDURE — 96376 TX/PRO/DX INJ SAME DRUG ADON: CPT

## 2022-03-06 PROCEDURE — 74011250637 HC RX REV CODE- 250/637: Performed by: INTERNAL MEDICINE

## 2022-03-06 PROCEDURE — 2709999900 HC NON-CHARGEABLE SUPPLY: Performed by: INTERNAL MEDICINE

## 2022-03-06 PROCEDURE — 74011250636 HC RX REV CODE- 250/636: Performed by: ANESTHESIOLOGY

## 2022-03-06 PROCEDURE — 65270000029 HC RM PRIVATE

## 2022-03-06 PROCEDURE — 74011000250 HC RX REV CODE- 250: Performed by: ANESTHESIOLOGY

## 2022-03-06 PROCEDURE — 0D9L8ZZ DRAINAGE OF TRANSVERSE COLON, VIA NATURAL OR ARTIFICIAL OPENING ENDOSCOPIC: ICD-10-PCS | Performed by: INTERNAL MEDICINE

## 2022-03-06 PROCEDURE — 74011250636 HC RX REV CODE- 250/636: Performed by: FAMILY MEDICINE

## 2022-03-06 PROCEDURE — 36415 COLL VENOUS BLD VENIPUNCTURE: CPT

## 2022-03-06 PROCEDURE — 77030039425 HC BLD LARYNG TRULITE DISP TELE -A: Performed by: ANESTHESIOLOGY

## 2022-03-06 PROCEDURE — 76060000032 HC ANESTHESIA 0.5 TO 1 HR: Performed by: INTERNAL MEDICINE

## 2022-03-06 PROCEDURE — 74011250637 HC RX REV CODE- 250/637: Performed by: STUDENT IN AN ORGANIZED HEALTH CARE EDUCATION/TRAINING PROGRAM

## 2022-03-06 PROCEDURE — 74011250637 HC RX REV CODE- 250/637: Performed by: NURSE PRACTITIONER

## 2022-03-06 PROCEDURE — 76040000025: Performed by: INTERNAL MEDICINE

## 2022-03-06 PROCEDURE — 77030037088 HC TUBE ENDOTRACH ORAL NSL COVD-A: Performed by: ANESTHESIOLOGY

## 2022-03-06 RX ORDER — SODIUM CHLORIDE, SODIUM LACTATE, POTASSIUM CHLORIDE, CALCIUM CHLORIDE 600; 310; 30; 20 MG/100ML; MG/100ML; MG/100ML; MG/100ML
INJECTION, SOLUTION INTRAVENOUS
Status: DISCONTINUED | OUTPATIENT
Start: 2022-03-06 | End: 2022-03-06 | Stop reason: HOSPADM

## 2022-03-06 RX ORDER — HYDROMORPHONE HYDROCHLORIDE 1 MG/ML
0.5 INJECTION, SOLUTION INTRAMUSCULAR; INTRAVENOUS; SUBCUTANEOUS
Status: DISCONTINUED | OUTPATIENT
Start: 2022-03-06 | End: 2022-03-06 | Stop reason: HOSPADM

## 2022-03-06 RX ORDER — DEXAMETHASONE SODIUM PHOSPHATE 4 MG/ML
INJECTION, SOLUTION INTRA-ARTICULAR; INTRALESIONAL; INTRAMUSCULAR; INTRAVENOUS; SOFT TISSUE AS NEEDED
Status: DISCONTINUED | OUTPATIENT
Start: 2022-03-06 | End: 2022-03-06 | Stop reason: HOSPADM

## 2022-03-06 RX ORDER — SODIUM CHLORIDE, SODIUM LACTATE, POTASSIUM CHLORIDE, CALCIUM CHLORIDE 600; 310; 30; 20 MG/100ML; MG/100ML; MG/100ML; MG/100ML
1000 INJECTION, SOLUTION INTRAVENOUS CONTINUOUS
Status: DISCONTINUED | OUTPATIENT
Start: 2022-03-06 | End: 2022-03-06 | Stop reason: HOSPADM

## 2022-03-06 RX ORDER — SODIUM CHLORIDE, SODIUM LACTATE, POTASSIUM CHLORIDE, CALCIUM CHLORIDE 600; 310; 30; 20 MG/100ML; MG/100ML; MG/100ML; MG/100ML
75 INJECTION, SOLUTION INTRAVENOUS CONTINUOUS
Status: DISCONTINUED | OUTPATIENT
Start: 2022-03-06 | End: 2022-03-06 | Stop reason: HOSPADM

## 2022-03-06 RX ORDER — PROPOFOL 10 MG/ML
INJECTION, EMULSION INTRAVENOUS AS NEEDED
Status: DISCONTINUED | OUTPATIENT
Start: 2022-03-06 | End: 2022-03-06 | Stop reason: HOSPADM

## 2022-03-06 RX ORDER — OXYCODONE HYDROCHLORIDE 5 MG/1
5 TABLET ORAL
Status: DISCONTINUED | OUTPATIENT
Start: 2022-03-06 | End: 2022-03-06 | Stop reason: HOSPADM

## 2022-03-06 RX ORDER — SUCCINYLCHOLINE CHLORIDE 20 MG/ML
INJECTION INTRAMUSCULAR; INTRAVENOUS AS NEEDED
Status: DISCONTINUED | OUTPATIENT
Start: 2022-03-06 | End: 2022-03-06 | Stop reason: HOSPADM

## 2022-03-06 RX ORDER — TRAMADOL HYDROCHLORIDE 50 MG/1
50 TABLET ORAL
Status: DISCONTINUED | OUTPATIENT
Start: 2022-03-06 | End: 2022-03-13 | Stop reason: HOSPADM

## 2022-03-06 RX ORDER — SODIUM CHLORIDE, SODIUM LACTATE, POTASSIUM CHLORIDE, CALCIUM CHLORIDE 600; 310; 30; 20 MG/100ML; MG/100ML; MG/100ML; MG/100ML
100 INJECTION, SOLUTION INTRAVENOUS CONTINUOUS
Status: CANCELLED | OUTPATIENT
Start: 2022-03-06

## 2022-03-06 RX ORDER — POTASSIUM CHLORIDE 14.9 MG/ML
20 INJECTION INTRAVENOUS
Status: DISPENSED | OUTPATIENT
Start: 2022-03-06 | End: 2022-03-06

## 2022-03-06 RX ORDER — LIDOCAINE HYDROCHLORIDE 20 MG/ML
INJECTION, SOLUTION EPIDURAL; INFILTRATION; INTRACAUDAL; PERINEURAL AS NEEDED
Status: DISCONTINUED | OUTPATIENT
Start: 2022-03-06 | End: 2022-03-06 | Stop reason: HOSPADM

## 2022-03-06 RX ORDER — FLUMAZENIL 0.1 MG/ML
0.2 INJECTION INTRAVENOUS
Status: DISCONTINUED | OUTPATIENT
Start: 2022-03-06 | End: 2022-03-06 | Stop reason: HOSPADM

## 2022-03-06 RX ORDER — POTASSIUM CHLORIDE 14.9 MG/ML
20 INJECTION INTRAVENOUS
Status: COMPLETED | OUTPATIENT
Start: 2022-03-06 | End: 2022-03-06

## 2022-03-06 RX ORDER — PROCHLORPERAZINE EDISYLATE 5 MG/ML
5 INJECTION INTRAMUSCULAR; INTRAVENOUS
Status: DISCONTINUED | OUTPATIENT
Start: 2022-03-06 | End: 2022-03-06 | Stop reason: HOSPADM

## 2022-03-06 RX ORDER — DIPHENHYDRAMINE HYDROCHLORIDE 50 MG/ML
12.5 INJECTION, SOLUTION INTRAMUSCULAR; INTRAVENOUS
Status: DISCONTINUED | OUTPATIENT
Start: 2022-03-06 | End: 2022-03-06 | Stop reason: HOSPADM

## 2022-03-06 RX ORDER — ONDANSETRON 2 MG/ML
INJECTION INTRAMUSCULAR; INTRAVENOUS AS NEEDED
Status: DISCONTINUED | OUTPATIENT
Start: 2022-03-06 | End: 2022-03-06 | Stop reason: HOSPADM

## 2022-03-06 RX ORDER — NALOXONE HYDROCHLORIDE 0.4 MG/ML
0.1 INJECTION, SOLUTION INTRAMUSCULAR; INTRAVENOUS; SUBCUTANEOUS AS NEEDED
Status: DISCONTINUED | OUTPATIENT
Start: 2022-03-06 | End: 2022-03-06 | Stop reason: HOSPADM

## 2022-03-06 RX ADMIN — HYDROMORPHONE HYDROCHLORIDE 0.5 MG: 1 INJECTION, SOLUTION INTRAMUSCULAR; INTRAVENOUS; SUBCUTANEOUS at 06:03

## 2022-03-06 RX ADMIN — TRAMADOL HYDROCHLORIDE 50 MG: 50 TABLET, COATED ORAL at 20:30

## 2022-03-06 RX ADMIN — Medication 2 UNITS: at 22:15

## 2022-03-06 RX ADMIN — Medication 2 UNITS: at 17:12

## 2022-03-06 RX ADMIN — POTASSIUM CHLORIDE 20 MEQ: 14.9 INJECTION, SOLUTION INTRAVENOUS at 08:58

## 2022-03-06 RX ADMIN — ROSUVASTATIN CALCIUM 40 MG: 20 TABLET, FILM COATED ORAL at 22:15

## 2022-03-06 RX ADMIN — SODIUM CHLORIDE, PRESERVATIVE FREE 10 ML: 5 INJECTION INTRAVENOUS at 14:00

## 2022-03-06 RX ADMIN — POTASSIUM CHLORIDE 20 MEQ: 14.9 INJECTION, SOLUTION INTRAVENOUS at 12:57

## 2022-03-06 RX ADMIN — SODIUM CHLORIDE, SODIUM LACTATE, POTASSIUM CHLORIDE, AND CALCIUM CHLORIDE 1000 ML: 600; 310; 30; 20 INJECTION, SOLUTION INTRAVENOUS at 09:32

## 2022-03-06 RX ADMIN — ONDANSETRON 4 MG: 2 INJECTION INTRAMUSCULAR; INTRAVENOUS at 09:44

## 2022-03-06 RX ADMIN — NIFEDIPINE 90 MG: 90 TABLET, EXTENDED RELEASE ORAL at 08:48

## 2022-03-06 RX ADMIN — PROPOFOL 150 MG: 10 INJECTION, EMULSION INTRAVENOUS at 09:42

## 2022-03-06 RX ADMIN — BENZOCAINE: 200 SPRAY DENTAL; ORAL; PERIODONTAL at 22:16

## 2022-03-06 RX ADMIN — ISOSORBIDE MONONITRATE 60 MG: 60 TABLET, EXTENDED RELEASE ORAL at 08:48

## 2022-03-06 RX ADMIN — CARVEDILOL 3.12 MG: 3.12 TABLET, FILM COATED ORAL at 17:11

## 2022-03-06 RX ADMIN — DEXAMETHASONE SODIUM PHOSPHATE 4 MG: 4 INJECTION, SOLUTION INTRAMUSCULAR; INTRAVENOUS at 09:45

## 2022-03-06 RX ADMIN — SODIUM CHLORIDE 100 ML/HR: 900 INJECTION, SOLUTION INTRAVENOUS at 22:43

## 2022-03-06 RX ADMIN — ASPIRIN 81 MG 81 MG: 81 TABLET ORAL at 05:58

## 2022-03-06 RX ADMIN — POLYETHYLENE GLYCOL 3350 17 G: 17 POWDER, FOR SOLUTION ORAL at 17:11

## 2022-03-06 RX ADMIN — CARVEDILOL 3.12 MG: 3.12 TABLET, FILM COATED ORAL at 08:48

## 2022-03-06 RX ADMIN — SUCCINYLCHOLINE CHLORIDE 160 MG: 20 INJECTION, SOLUTION INTRAMUSCULAR; INTRAVENOUS at 09:42

## 2022-03-06 RX ADMIN — HYDROMORPHONE HYDROCHLORIDE 0.5 MG: 1 INJECTION, SOLUTION INTRAMUSCULAR; INTRAVENOUS; SUBCUTANEOUS at 13:15

## 2022-03-06 RX ADMIN — SODIUM CHLORIDE, SODIUM LACTATE, POTASSIUM CHLORIDE, AND CALCIUM CHLORIDE: 600; 310; 30; 20 INJECTION, SOLUTION INTRAVENOUS at 09:36

## 2022-03-06 RX ADMIN — LIDOCAINE HYDROCHLORIDE 100 MG: 20 INJECTION, SOLUTION EPIDURAL; INFILTRATION; INTRACAUDAL; PERINEURAL at 09:42

## 2022-03-06 RX ADMIN — TRAZODONE HYDROCHLORIDE 50 MG: 50 TABLET ORAL at 22:15

## 2022-03-06 NOTE — INTERVAL H&P NOTE
Update History & Physical    The Patient's History and Physical attached below was reviewed with the patient and I examined the patient. There was no change in the plan, or pertinent findings. The surgical site was confirmed with the patient. Plan:  The risk, benefits, expected outcome, and alternative to the recommended procedure have been discussed with the patient. Patient understands and wants to proceed with the procedure.     Electronically signed by Del Germain MD

## 2022-03-06 NOTE — ANESTHESIA PREPROCEDURE EVALUATION
Anesthetic History     PONV          Review of Systems / Medical History  Patient summary reviewed and pertinent labs reviewed    Pulmonary          Shortness of breath and smoker (Quit 2012)      Comments: Has been requiring O2 for a few days (3-4L)   Neuro/Psych         Psychiatric history (Depression)    Comments: Neuropathy  Cardiovascular    Hypertension: well controlled    Angina (Stable): with exertion  CHF (chronic dCHF)    Past MI, CAD, PAD, cardiac stents and hyperlipidemia    Exercise tolerance: >4 METS  Comments: Nuclear stress 2020 - negative ischemia    2/2017: [facility] with patent stents    Echo 2/20221 - EF normal, mild to mod MR     GI/Hepatic/Renal     GERD: well controlled    Renal disease: ARF and CRI      Comments: Constipation for a few days - concern for stricture and possible megacolon - NGT in place with some gastric output Endo/Other    Diabetes: well controlled, type 2    Arthritis and anemia     Other Findings            Physical Exam    Airway  Mallampati: II  TM Distance: 4 - 6 cm  Neck ROM: normal range of motion   Mouth opening: Normal     Cardiovascular    Rhythm: regular  Rate: normal    Murmur: Grade 2, Mitral area  Pertinent negatives: No peripheral edema   Dental    Dentition: Bridges     Pulmonary  Breath sounds clear to auscultation               Abdominal  GI exam deferred       Other Findings            Anesthetic Plan    ASA: 4, emergent  Anesthesia type: general  ETT,  RSI        Induction: Intravenous and RSI  Anesthetic plan and risks discussed with: Patient

## 2022-03-06 NOTE — PROGRESS NOTES
Hospitalist Progress Note   Admit Date:  3/2/2022 11:11 AM   Name:  Elvira Hernandez   Age:  66 y.o. Sex:  female  :  1950   MRN:  [id number]   Room:  PACU/PL    Presenting Complaint: Abdominal Pain    Reason(s) for Admission: Stercoral colitis [K52.89]  MIRANDA (acute kidney injury) [facility]) [N17.9]     Hospital Course & Interval History:   Tahmina Smith is a 66 y. o. female with a PMH of chronic constipation, stage IV CKD, chronic diastolic CHF, CAD, HTN who was relatively well until 8 days ago () when she had a bowel movement then began experiencing severe abdominal pain on her left side in the lower abdomen the following day. She was evaluted by Dr. Michelle Quiñonez (GI)  and was placed on Cipro and Flagyl for possible diverticulitis. However, her symptoms became worse, and she had not passed any stool since . Ultimately, the severe abdominal pain caused her to come in for ER evaluation. ER workup with diagnosis of stercoral colitis based on CT A/P imaging. No noted colonic perforation or upstream small bowel obstruction. ER physician attempted fecal decompaction and requested that the hospitalist service admit her. Gastroenterology was also consulted. Patient underwent colonoscopy on Mar/06 as she was having no significant improvement. No obstructing lesion was seen; large amount of stool did pass during the procedure. Subjective/24hr Events (22): Patient seen prior to colonoscopy. She had still not moved her bowels but feels like the NG tube to suction has helped. Her abdomen is not tender to palpation this morning. She was later taken for colonoscopy; no obstructing lesion was found in stool was able to be broken up during the procedure. We will continue NGT and monitor for improvement. ROS:  10 systems reviewed and negative except as noted above.      Assessment & Plan:     Principal Problem:  Colonic ileus  -Gastroenterology following  -Discontinue Cipro/Flagyl per GI rec; monitor WBC and for fever  -BCx NGTD  -KUB w/ gaseous distention of colon with constipation particularly in descending segment  Mar/05: NPO; GI ordered NGT to LIS; IVF while NPO   - Trying Relistor   - Serial KUB   - If no improvement, flex sig may become necessary  Mar/06: Incomplete colo today; no obstructing lesion seen; large amount of stool passed   - Continue NGT to LIS; KUB in the morning     Active Problems:  MIRANDA on Stage 4 chronic kidney disease ([address] resolved  -Follows Dr. Umberto Ryan, outpatient Nephrology  -Cr 2.1 --> 2.7 --> 2.9; furosemide on hold  -a.m. BMP     Acute respiratory failure with hypoxia secondary to volume overload  -Overnight 3/3 with hypoxia 87% on RA, CXR obtained with no evidence of pneumonia or edema, placed on 3L NC and d/c IVF, pBNP 2633 this am, resume home Furosemide, give IV 40 mg now, wean O2 as tolerated  -Monitor while on 3-4 L; does not use O2 at home  -Incentive spirometry     Chronic diastolic HF (HCC)  -Continue Imdur, furosemide resumed 3/4  -Daily weight, strict I&O  Mar/05: Hold furosemide w/ decline in renal fx; patient is NPO; monitor closely, resume as able    CAD s/p PCI  -Follows Dr. Imelda Renee  -Continue ASA / Minus Dandy / Roderick Outlaw / statin     Chest tightness, intermittent  -EKG with NSR with sinus arrhythmia, no ST elevation or T wave inversion  -Troponin 36.6 --> 37.7  -Currently denies chest pain     Hypokalemia  Mar/06: K+ 3.2, replace  - a.m.  BMP    Type 2 diabetes mellitus, with long-term current use of insulin (HCC)   -Home insulin regimen on hold with current NPO, resume with diet progression  -Continue SSI Humalog with CBG AC/HS    Hypertension   -Continue home Nifedipine     HLD (hyperlipidemia)   -Continue Rosuvastatin      Dispo/Discharge Planning: > 2 midnights    Diet:  DIET NPO Sips of Water with Meds  DVT PPx: enoxaparin  Code status: Full Code    Hospital Problems as of 3/6/2022 Date Reviewed: 2/7/2022          Codes Class Noted - Resolved POA    Hypokalemia ICD-10-CM: E87.6  ICD-9-CM: 276.8  3/4/2022 - Present Unknown        MIRANDA (acute kidney injury) ([facility] ICD-10-CM: N17.9  ICD-9-CM: 584.9  3/2/2022 - Present Unknown        * (Principal) Stercoral colitis ICD-10-CM: K52.89  ICD-9-CM: 558.9  3/2/2022 - Present Unknown        Chronic diastolic congestive heart failure (HCC) ICD-10-CM: I50.32  ICD-9-CM: 428.32, 428.0  4/19/2021 - Present Yes    Overview Signed 7/7/2021 11:06 AM by Amanda Baxter NP     Heart Failure (HFpEF), EF 60-11%, with diastolic dysfunction, ECHO 2/16/2021               Acute respiratory failure with hypoxia [facility]) ICD-10-CM: J96.01  ICD-9-CM: 518.81  2/17/2021 - Present Unknown        Type 2 diabetes mellitus with stage 4 chronic kidney disease, with long-term current use of insulin (HCC) ICD-10-CM: E11.22, N18.4, Z79.4  ICD-9-CM: 250.40, 585.4, V58.67  10/11/2018 - Present Yes        Gastroesophageal reflux disease ICD-10-CM: K21.9  ICD-9-CM: 530.81  2/16/2017 - Present Yes        Stage 4 chronic kidney disease ([facility] ICD-10-CM: N18.4  ICD-9-CM: 585.4  9/15/2016 - Present Yes        HLD (hyperlipidemia) ICD-10-CM: E78.5  ICD-9-CM: 272.4  9/4/2013 - Present Yes        Hypertension (Chronic) ICD-10-CM: I10  ICD-9-CM: 401.9  3/1/2012 - Present Yes              Objective:     Patient Vitals for the past 24 hrs:   Temp Pulse Resp BP SpO2   03/06/22 1026 -- 83 22 136/63 94 %   03/06/22 1020 -- 84 16 (!) 128/59 95 %   03/06/22 1015 -- 85 14 128/63 94 %   03/06/22 1010 -- 86 26 124/64 91 %   03/06/22 1009 98 °F (36.7 °C) 85 14 135/63 93 %   03/06/22 0924 98.3 °F (36.8 °C) 92 20 139/78 94 %   03/06/22 0715 98.2 °F (36.8 °C) 86 18 (!) 131/58 91 %   03/06/22 0325 98.6 °F (37 °C) 88 18 134/62 91 %   03/05/22 2331 98.2 °F (36.8 °C) 77 20 126/61 90 %   03/05/22 1944 98.9 °F (37.2 °C) 90 18 134/60 91 %   03/05/22 1524 99.2 °F (37.3 °C) 82 16 129/72 93 %   03/05/22 1307 98 °F (36.7 °C) 90 18 (!) 149/69 91 %     Oxygen Therapy  O2 Sat (%): 94 % (03/06/22 1026)  Pulse via Oximetry: 84 beats per minute (03/06/22 1026)  O2 Device: Nasal cannula (03/06/22 1009)  O2 Flow Rate (L/min): 3 l/min (03/06/22 1009)    Estimated body mass index is 30.5 kg/m² as calculated from the following:    Height as of this encounter: 5' 4\" (1.626 m). Weight as of this encounter: 80.6 kg (177 lb 11.2 oz). Intake/Output Summary (Last 24 hours) at 3/6/2022 1039  Last data filed at 3/6/2022 0757  Gross per 24 hour   Intake 4307 ml   Output 450 ml   Net 3857 ml         Physical Exam:   Blood pressure 136/63, pulse 83, temperature 98 °F (36.7 °C), resp. rate 22, height 5' 4\" (1.626 m), weight 80.6 kg (177 lb 11.2 oz), SpO2 94 %. General:    No overt distress  Head:  Normocephalic, atraumatic  Eyes:  Sclerae appear normal.  Pupils equally round. ENT:  Nares appear normal, no drainage. Moist oral mucosa  Neck:  No restricted ROM. Trachea midline   CV:   RRR. No m/r/g. Lungs: No wheezing. Respirations even, unlabored at rest on 3L. Abdomen: Bowel sounds (hollow) present. Soft, non tender to palpation  Extremities: No cyanosis or clubbing. Skin:     No rashes and normal coloration. Warm and dry. Neuro:  CN II-XII grossly intact. A&Ox3  Psych:  Normal mood and affect.       I have reviewed ordered lab tests and independently visualized imaging below:    Recent Labs:  Recent Results (from the past 48 hour(s))   GLUCOSE, POC    Collection Time: 03/04/22 11:36 AM   Result Value Ref Range    Glucose (POC) 182 (H) 65 - 100 mg/dL    Performed by Enio    TROPONIN-HIGH SENSITIVITY    Collection Time: 03/04/22 12:24 PM   Result Value Ref Range    Troponin-High Sensitivity 37.7 (H) 0 - 14 pg/mL   GLUCOSE, POC    Collection Time: 03/04/22  5:25 PM   Result Value Ref Range    Glucose (POC) 178 (H) 65 - 100 mg/dL    Performed by Maisha Ga, POC    Collection Time: 03/04/22  9:31 PM   Result Value Ref Range    Glucose (POC) 141 (H) 65 - 100 mg/dL Performed by [facility]    METABOLIC PANEL, BASIC    Collection Time: 03/05/22  6:16 AM   Result Value Ref Range    Sodium 138 136 - 145 mmol/L    Potassium 3.4 (L) 3.5 - 5.1 mmol/L    Chloride 109 (H) 98 - 107 mmol/L    CO2 23 21 - 32 mmol/L    Anion gap 6 (L) 7 - 16 mmol/L    Glucose 167 (H) 65 - 100 mg/dL    BUN 30 (H) 8 - 23 MG/DL    Creatinine 2.70 (H) 0.6 - 1.0 MG/DL    GFR est AA 22 (L) >60 ml/min/1.73m2    GFR est non-AA 18 (L) >60 ml/min/1.73m2    Calcium 8.6 8.3 - 10.4 MG/DL   CBC W/O DIFF    Collection Time: 03/05/22  6:16 AM   Result Value Ref Range    WBC 11.6 (H) 4.3 - 11.1 K/uL    RBC 3.41 (L) 4.05 - 5.2 M/uL    HGB 10.6 (L) 11.7 - 15.4 g/dL    HCT 32.5 (L) 35.8 - 46.3 %    MCV 95.3 79.6 - 97.8 FL    MCH 31.1 26.1 - 32.9 PG    MCHC 32.6 31.4 - 35.0 g/dL    RDW 12.1 11.9 - 14.6 %    PLATELET 180 676 - 379 K/uL    MPV 9.5 9.4 - 12.3 FL    ABSOLUTE NRBC 0.00 0.0 - 0.2 K/uL   GLUCOSE, POC    Collection Time: 03/05/22  6:37 AM   Result Value Ref Range    Glucose (POC) 151 (H) 65 - 100 mg/dL    Performed by [email or web address]    GLUCOSE, POC    Collection Time: 03/05/22  2:23 PM   Result Value Ref Range    Glucose (POC) 158 (H) 65 - 100 mg/dL    Performed by Renato Handley    GLUCOSE, POC    Collection Time: 03/05/22  9:27 PM   Result Value Ref Range    Glucose (POC) 151 (H) 65 - 100 mg/dL    Performed by [email or web address]    CBC W/O DIFF    Collection Time: 03/06/22  5:59 AM   Result Value Ref Range    WBC 10.9 4.3 - 11.1 K/uL    RBC 3.48 (L) 4.05 - 5.2 M/uL    HGB 10.8 (L) 11.7 - 15.4 g/dL    HCT 33.1 (L) 35.8 - 46.3 %    MCV 95.1 79.6 - 97.8 FL    MCH 31.0 26.1 - 32.9 PG    MCHC 32.6 31.4 - 35.0 g/dL    RDW 12.2 11.9 - 14.6 %    PLATELET 416 612 - 203 K/uL    MPV 9.6 9.4 - 12.3 FL    ABSOLUTE NRBC 0.00 0.0 - 0.2 K/uL   METABOLIC PANEL, BASIC    Collection Time: 03/06/22  5:59 AM   Result Value Ref Range    Sodium 140 136 - 145 mmol/L    Potassium 3.2 (L) 3.5 - 5.1 mmol/L    Chloride 110 (H) 98 - 107 mmol/L    CO2 22 21 - 32 mmol/L    Anion gap 8 7 - 16 mmol/L    Glucose 143 (H) 65 - 100 mg/dL    BUN 38 (H) 8 - 23 MG/DL    Creatinine 2.90 (H) 0.6 - 1.0 MG/DL    GFR est AA 20 (L) >60 ml/min/1.73m2    GFR est non-AA 17 (L) >60 ml/min/1.73m2    Calcium 8.7 8.3 - 10.4 MG/DL   GLUCOSE, POC    Collection Time: 03/06/22  6:02 AM   Result Value Ref Range    Glucose (POC) 152 (H) 65 - 100 mg/dL    Performed by [facility]        All Micro Results     Procedure Component Value Units Date/Time    CULTURE, BLOOD [[id number]] Collected: 03/03/22 1547    Order Status: Completed Specimen: Blood Updated: 03/05/22 0733     Special Requests: --        RIGHT  Antecubital       Culture result: NO GROWTH 2 DAYS       CULTURE, BLOOD [[id number]] Collected: 03/03/22 1555    Order Status: Completed Specimen: Blood Updated: 03/05/22 0733     Special Requests: --        LEFT  HAND       Culture result: NO GROWTH 2 DAYS       CULTURE, URINE [[id number]] Collected: 03/03/22 1345    Order Status: Canceled Specimen: Urine from Clean catch           Other Studies:  XR ABD (KUB)    Result Date: 3/6/2022  KUB INDICATION:   Constipation Supine views of the abdomen were obtained. FINDINGS:  There is persistent marked colon distention. No significant small bowel distention. Nasogastric tube is present, tip in the stomach. No renal calculi are seen. There is minimal infiltrate/atelectasis in the right lung base. Stable colon distention, possible megacolon    XR ABD (KUB)    Result Date: 3/5/2022  PORTABLE ABDOMEN, March 5, 2022 at 2255 hours: CLINICAL HISTORY:  Nasogastric tube placement. COMPARISON:  KUB yesterday. FINDINGS:  AP supine image demonstrates a the gastric tube with the proximal sidehole just beneath the gastroesophageal junction. Dilated bowel loops again are evident in the upper abdomen. Nasogastric tube proximal sidehole is just below the gastroesophageal junction.   Advancement by approximately 10 cm this suggested.        Current Meds:  Current Facility-Administered Medications   Medication Dose Route Frequency    benzocaine (HURRICANE) 20 % spray   Mucous Membrane QID PRN    potassium chloride 20 mEq in 100 ml IVPB  20 mEq IntraVENous Q2H    lactated Ringers infusion  75 mL/hr IntraVENous CONTINUOUS    oxyCODONE IR (ROXICODONE) tablet 5 mg  5 mg Oral ONCE PRN    HYDROmorphone (DILAUDID) injection 0.5 mg  0.5 mg IntraVENous Q15MIN PRN    naloxone (NARCAN) injection 0.1 mg  0.1 mg IntraVENous PRN    flumazeniL (ROMAZICON) 0.1 mg/mL injection 0.2 mg  0.2 mg IntraVENous Multiple    prochlorperazine (COMPAZINE) injection 5 mg  5 mg IntraVENous ONCE PRN    diphenhydrAMINE (BENADRYL) injection 12.5 mg  12.5 mg IntraVENous Q15MIN PRN    methylnaltrexone (RELISTOR) injection 12 mg  12 mg SubCUTAneous DAILY    HYDROmorphone (DILAUDID) injection 0.5 mg  0.5 mg IntraVENous Q4H PRN    [Held by provider] furosemide (LASIX) injection 40 mg  40 mg IntraVENous DAILY    NIFEdipine ER (PROCARDIA XL) tablet 90 mg  90 mg Oral DAILY    sodium chloride (NS) flush 5-40 mL  5-40 mL IntraVENous Q8H    sodium chloride (NS) flush 5-40 mL  5-40 mL IntraVENous PRN    acetaminophen (TYLENOL) tablet 650 mg  650 mg Oral Q6H PRN    Or    acetaminophen (TYLENOL) suppository 650 mg  650 mg Rectal Q6H PRN    polyethylene glycol (MIRALAX) packet 17 g  17 g Oral DAILY PRN    ondansetron (ZOFRAN ODT) tablet 4 mg  4 mg Oral Q8H PRN    Or    ondansetron (ZOFRAN) injection 4 mg  4 mg IntraVENous Q6H PRN    enoxaparin (LOVENOX) injection 30 mg  30 mg SubCUTAneous DAILY    0.9% sodium chloride infusion  100 mL/hr IntraVENous CONTINUOUS    insulin lispro (HUMALOG) injection   SubCUTAneous AC&HS    aspirin chewable tablet 81 mg  81 mg Oral 7am    carvediloL (COREG) tablet 3.125 mg  3.125 mg Oral BID WITH MEALS    isosorbide mononitrate ER (IMDUR) tablet 60 mg  60 mg Oral DAILY    rosuvastatin (CRESTOR) tablet 40 mg  40 mg Oral QHS    traZODone (DESYREL) tablet 50 mg  50 mg Oral QHS    polyethylene glycol (MIRALAX) packet 17 g  17 g Oral BID    bisacodyL (DULCOLAX) suppository 10 mg  10 mg Rectal DAILY       Signed:  Jaz Majro NP    Part of this note may have been written by using a voice dictation software. The note has been proof read but may still contain some grammatical/other typographical errors.

## 2022-03-06 NOTE — PROGRESS NOTES
KUB completed. 0  Pt informed that NG placement will need to be adjusted. NG advance to 66 cm, placement verified with water return noted, tolerated with no c/o, no distress.

## 2022-03-06 NOTE — PROGRESS NOTES
Given Dilaudid 0.2 mg IV for abd pain grade 10, calm, no facial grimacing, no stool today, abd distended semi soft. Vearl Pippins

## 2022-03-06 NOTE — PROGRESS NOTES
Messaged Dr. Mosley Dottie: Pt received Dilaudid 0.2 mg IVP from previous nurse at 2017, for pain rated \"10\" with No relief. May I have a one time order for IV pain med, larger dose for her now, especially since I'm about to place an NG tube in her, per GI Md to attempt again after failed attempts earlier today. Please!

## 2022-03-06 NOTE — PERIOP NOTES
TRANSFER - OUT REPORT:    Verbal report given to Jillianfernie Lydia on [employer]  being transferred to [phone] for routine post - op       Report consisted of patients Situation, Background, Assessment and   Recommendations(SBAR). Information from the following report(s) SBAR, OR Summary, MAR and Cardiac Rhythm nsr was reviewed with the receiving nurse. Lines:   Peripheral IV Anterior;Left;Proximal Forearm (Active)   Site Assessment Clean, dry, & intact 03/05/22 0305   Phlebitis Assessment 0 03/05/22 0305   Infiltration Assessment 0 03/05/22 0305   Dressing Status Clean, dry, & intact 03/05/22 0305   Dressing Type Transparent 03/05/22 0305   Hub Color/Line Status Flushed;Patent 03/05/22 0305       Peripheral IV 03/05/22 Left; Inner Forearm (Active)   Site Assessment Clean, dry, & intact 03/06/22 1009   Phlebitis Assessment 0 03/06/22 1009   Infiltration Assessment 0 03/06/22 1009   Dressing Status Clean, dry, & intact 03/06/22 1009   Dressing Type Transparent;Tape 03/06/22 1009   Hub Color/Line Status Patent 03/06/22 1009   Alcohol Cap Used No 03/06/22 1009        Opportunity for questions and clarification was provided. Patient transported with:   O2 @ 3 liters  Tech    VTE prophylaxis orders have been written for [employer]. Patient and family given floor number and nurses name. Family updated re: pt status after security code verified.

## 2022-03-06 NOTE — PERIOP NOTES
TRANSFER - IN REPORT:    Verbal report received from [facility] on [employer] being received from [phone] for ordered procedure      Report consisted of patients Situation, Background, Assessment and Recommendations(SBAR). Information from the following report(s) SBAR, Kardex, MAR, Recent Results, Procedure Verification and Quality Measures was reviewed with the receiving nurse. Opportunity for questions and clarification was provided. Assessment completed upon patients arrival to unit and care assumed.

## 2022-03-06 NOTE — PROCEDURES
Colonoscopy Procedure Note    PreOp Diagnosis:   Stercoral colitis  Abnormal CT  LUQ pain  Altered bowel habits    PostOp Diagnosis:  Incomplete colonoscopy  Colonic ileus    Medications:  Monitored Anesthesia    Procedure:  Colonoscopy  Instrument: P  AL  After informed consent was obtained, the patient was sedated and the colonoscope was inserted  in the anus and advanced into the cecum without difficulty. The scope was slowly withdrawn while the mucosa was carefully inspected, including a retroflexed view of the rectum. Prep: Poor    Findings:   Ileum: Not seen  Colon: Scope advanced to 79 CM, in the transverse colon. The distal colon was fairly clear. Little or no air was used to minimize risk of over-distension, so visualization was extremely limited. At 40cm, in the descending colon, there is a large amount of dark green/ brown solid stool. There is no evidence of stricture or mass. The scope was advanced along the side of the lumen, to the splenic flexure. Beyond this, the transverse colon was dilated and filled w/ air and liquid stool. This was suctioned for decompression. On withdrawal, the solid stool was irrigated, and seemed to break up into pieces. A large amount of stool was passed during the procedure. Air and liquid was suctioned carefully on withdrawal for decompression. Rectum: No retroflex attempted. Recommendations:  No obstructing lesion. Apparent dysmotility.   Continue miralax and relistor  Cont NGT suction intermittently  NPO for now  Repeat KUB in AM    Shantel Jamil MD

## 2022-03-06 NOTE — PROGRESS NOTES
Reports comfortable. Repositioned to high fowlers. #16F NG tube inserted into left nare, while pt swallowing small amount of ice water, to 56 cm ciera, tolerating well. Malachi Lees RN in to asst. Placement verified with air bolus auscultated over stomach. Secured to nose with silk tape. No suction applied yet due to PO meds having been given PO with sip of water. No distress.

## 2022-03-06 NOTE — ANESTHESIA POSTPROCEDURE EVALUATION
Procedure(s):  SIGMOIDOSCOPY FLEXIBLE. general    Anesthesia Post Evaluation        Patient location during evaluation: PACU  Patient participation: complete - patient participated  Level of consciousness: awake and alert  Pain management: adequate  Airway patency: patent  Anesthetic complications: no  Cardiovascular status: acceptable  Respiratory status: acceptable (back to baseline O2 requirement)  Hydration status: acceptable  Post anesthesia nausea and vomiting:  controlled  Final Post Anesthesia Temperature Assessment:  Normothermia (36.0-37.5 degrees C)      INITIAL Post-op Vital signs:   Vitals Value Taken Time   /68 03/06/22 1115   Temp 36.7 °C (98 °F) 03/06/22 1009   Pulse 85 03/06/22 1117   Resp 28 03/06/22 1117   SpO2 93 % 03/06/22 1117   Vitals shown include unvalidated device data.

## 2022-03-06 NOTE — PROGRESS NOTES
Resting quietly, awake, resp even, unlab, skin warm, dry. Head of bed elevated. AP reg, lungs sounds clear. Abdom semi-soft, distended with hypoactive bowel sounds. Reports ineffective pain relief after medicated. Aware nurse to address. Call light in reach. Aware to call for asst to be out of bed. Informed pt that an NG tube needs to be placed, rationale given.

## 2022-03-06 NOTE — PROGRESS NOTES
TRANSFER - OUT REPORT:    Verbal report given to [facility] (name) on [employer]  being transferred to Pre-op (unit) for ordered procedure       Report consisted of patients Situation, Background, Assessment and   Recommendations(SBAR). Information from the following report(s) Kardex, Intake/Output, MAR, Recent Results and Procedure Verification was reviewed with the receiving nurse. Lines:   Peripheral IV Anterior;Left;Proximal Forearm (Active)   Site Assessment Clean, dry, & intact 03/05/22 0305   Phlebitis Assessment 0 03/05/22 0305   Infiltration Assessment 0 03/05/22 0305   Dressing Status Clean, dry, & intact 03/05/22 0305   Dressing Type Transparent 03/05/22 0305   Hub Color/Line Status Flushed;Patent 03/05/22 0305       Peripheral IV 03/05/22 Left; Inner Forearm (Active)   Site Assessment Clean, dry, & intact 03/05/22 2050   Phlebitis Assessment 0 03/05/22 2050   Infiltration Assessment 0 03/05/22 2050   Dressing Status Clean, dry, & intact 03/05/22 2050   Dressing Type Transparent 03/05/22 2050   Hub Color/Line Status Infusing 03/05/22 2050   Alcohol Cap Used Yes 03/05/22 1735        Opportunity for questions and clarification was provided.

## 2022-03-06 NOTE — PROGRESS NOTES
CM reviewed pt chart for continued stay. KUB scheduled for tomorrow. Will continue to follow pt plan of care and assist with any supportive care needs that arise as appropriate.

## 2022-03-07 ENCOUNTER — APPOINTMENT (OUTPATIENT)
Dept: GENERAL RADIOLOGY | Age: 72
DRG: 344 | End: 2022-03-07
Attending: INTERNAL MEDICINE
Payer: MEDICARE

## 2022-03-07 LAB
ANION GAP SERPL CALC-SCNC: 11 MMOL/L (ref 7–16)
BUN SERPL-MCNC: 43 MG/DL (ref 8–23)
CALCIUM SERPL-MCNC: 8.6 MG/DL (ref 8.3–10.4)
CHLORIDE SERPL-SCNC: 114 MMOL/L (ref 98–107)
CO2 SERPL-SCNC: 18 MMOL/L (ref 21–32)
CREAT SERPL-MCNC: 2.4 MG/DL (ref 0.6–1)
ERYTHROCYTE [DISTWIDTH] IN BLOOD BY AUTOMATED COUNT: 12.3 % (ref 11.9–14.6)
GLUCOSE BLD STRIP.AUTO-MCNC: 118 MG/DL (ref 65–100)
GLUCOSE BLD STRIP.AUTO-MCNC: 138 MG/DL (ref 65–100)
GLUCOSE BLD STRIP.AUTO-MCNC: 167 MG/DL (ref 65–100)
GLUCOSE BLD STRIP.AUTO-MCNC: 176 MG/DL (ref 65–100)
GLUCOSE BLD STRIP.AUTO-MCNC: 185 MG/DL (ref 65–100)
GLUCOSE SERPL-MCNC: 162 MG/DL (ref 65–100)
HCT VFR BLD AUTO: 31.8 % (ref 35.8–46.3)
HGB BLD-MCNC: 10.5 G/DL (ref 11.7–15.4)
MCH RBC QN AUTO: 31.3 PG (ref 26.1–32.9)
MCHC RBC AUTO-ENTMCNC: 33 G/DL (ref 31.4–35)
MCV RBC AUTO: 94.9 FL (ref 79.6–97.8)
NRBC # BLD: 0 K/UL (ref 0–0.2)
PLATELET # BLD AUTO: 252 K/UL (ref 150–450)
PMV BLD AUTO: 9.6 FL (ref 9.4–12.3)
POTASSIUM SERPL-SCNC: 3.3 MMOL/L (ref 3.5–5.1)
RBC # BLD AUTO: 3.35 M/UL (ref 4.05–5.2)
SERVICE CMNT-IMP: ABNORMAL
SODIUM SERPL-SCNC: 143 MMOL/L (ref 136–145)
WBC # BLD AUTO: 12.7 K/UL (ref 4.3–11.1)

## 2022-03-07 PROCEDURE — 74011250637 HC RX REV CODE- 250/637: Performed by: INTERNAL MEDICINE

## 2022-03-07 PROCEDURE — 74011636637 HC RX REV CODE- 636/637: Performed by: INTERNAL MEDICINE

## 2022-03-07 PROCEDURE — 74011000250 HC RX REV CODE- 250: Performed by: INTERNAL MEDICINE

## 2022-03-07 PROCEDURE — 74011250637 HC RX REV CODE- 250/637: Performed by: STUDENT IN AN ORGANIZED HEALTH CARE EDUCATION/TRAINING PROGRAM

## 2022-03-07 PROCEDURE — 74018 RADEX ABDOMEN 1 VIEW: CPT

## 2022-03-07 PROCEDURE — 97162 PT EVAL MOD COMPLEX 30 MIN: CPT

## 2022-03-07 PROCEDURE — 74011250636 HC RX REV CODE- 250/636: Performed by: INTERNAL MEDICINE

## 2022-03-07 PROCEDURE — 74011250637 HC RX REV CODE- 250/637: Performed by: NURSE PRACTITIONER

## 2022-03-07 PROCEDURE — 80048 BASIC METABOLIC PNL TOTAL CA: CPT

## 2022-03-07 PROCEDURE — 94760 N-INVAS EAR/PLS OXIMETRY 1: CPT

## 2022-03-07 PROCEDURE — 85027 COMPLETE CBC AUTOMATED: CPT

## 2022-03-07 PROCEDURE — 77010033678 HC OXYGEN DAILY

## 2022-03-07 PROCEDURE — 74011250636 HC RX REV CODE- 250/636: Performed by: STUDENT IN AN ORGANIZED HEALTH CARE EDUCATION/TRAINING PROGRAM

## 2022-03-07 PROCEDURE — 82962 GLUCOSE BLOOD TEST: CPT

## 2022-03-07 PROCEDURE — 36415 COLL VENOUS BLD VENIPUNCTURE: CPT

## 2022-03-07 PROCEDURE — 97530 THERAPEUTIC ACTIVITIES: CPT

## 2022-03-07 PROCEDURE — 65270000029 HC RM PRIVATE

## 2022-03-07 RX ORDER — POTASSIUM CHLORIDE 14.9 MG/ML
20 INJECTION INTRAVENOUS ONCE
Status: COMPLETED | OUTPATIENT
Start: 2022-03-07 | End: 2022-03-07

## 2022-03-07 RX ORDER — LIDOCAINE 4 G/100G
1 PATCH TOPICAL EVERY 24 HOURS
Status: DISCONTINUED | OUTPATIENT
Start: 2022-03-07 | End: 2022-03-13 | Stop reason: HOSPADM

## 2022-03-07 RX ORDER — TRAMADOL HYDROCHLORIDE 50 MG/1
50 TABLET ORAL
Status: COMPLETED | OUTPATIENT
Start: 2022-03-07 | End: 2022-03-07

## 2022-03-07 RX ADMIN — Medication 2 UNITS: at 07:30

## 2022-03-07 RX ADMIN — ASPIRIN 81 MG 81 MG: 81 TABLET ORAL at 07:38

## 2022-03-07 RX ADMIN — ISOSORBIDE MONONITRATE 60 MG: 60 TABLET, EXTENDED RELEASE ORAL at 09:59

## 2022-03-07 RX ADMIN — CARVEDILOL 3.12 MG: 3.12 TABLET, FILM COATED ORAL at 17:12

## 2022-03-07 RX ADMIN — TRAMADOL HYDROCHLORIDE 50 MG: 50 TABLET, COATED ORAL at 11:56

## 2022-03-07 RX ADMIN — Medication 2 UNITS: at 13:49

## 2022-03-07 RX ADMIN — ROSUVASTATIN CALCIUM 40 MG: 20 TABLET, FILM COATED ORAL at 22:06

## 2022-03-07 RX ADMIN — POTASSIUM CHLORIDE 20 MEQ: 14.9 INJECTION, SOLUTION INTRAVENOUS at 09:54

## 2022-03-07 RX ADMIN — METHYLNALTREXONE BROMIDE 12 MG: 12 INJECTION, SOLUTION SUBCUTANEOUS at 11:36

## 2022-03-07 RX ADMIN — ENOXAPARIN SODIUM 30 MG: 100 INJECTION SUBCUTANEOUS at 09:50

## 2022-03-07 RX ADMIN — SODIUM CHLORIDE, PRESERVATIVE FREE 10 ML: 5 INJECTION INTRAVENOUS at 14:00

## 2022-03-07 RX ADMIN — TRAMADOL HYDROCHLORIDE 50 MG: 50 TABLET, COATED ORAL at 07:38

## 2022-03-07 RX ADMIN — SODIUM CHLORIDE 100 ML/HR: 900 INJECTION, SOLUTION INTRAVENOUS at 22:06

## 2022-03-07 RX ADMIN — TRAZODONE HYDROCHLORIDE 50 MG: 50 TABLET ORAL at 22:06

## 2022-03-07 RX ADMIN — POLYETHYLENE GLYCOL 3350 17 G: 17 POWDER, FOR SOLUTION ORAL at 17:16

## 2022-03-07 RX ADMIN — NIFEDIPINE 90 MG: 90 TABLET, EXTENDED RELEASE ORAL at 09:59

## 2022-03-07 RX ADMIN — CARVEDILOL 3.12 MG: 3.12 TABLET, FILM COATED ORAL at 09:59

## 2022-03-07 RX ADMIN — SODIUM CHLORIDE 100 ML/HR: 900 INJECTION, SOLUTION INTRAVENOUS at 09:49

## 2022-03-07 RX ADMIN — TRAMADOL HYDROCHLORIDE 50 MG: 50 TABLET, COATED ORAL at 22:06

## 2022-03-07 RX ADMIN — SODIUM CHLORIDE, PRESERVATIVE FREE 10 ML: 5 INJECTION INTRAVENOUS at 22:06

## 2022-03-07 NOTE — PROGRESS NOTES
Resting quietly, resp even, unlab. Eyes closed with relaxed facial expression. Awoke easily, reports dozing. No c/o, no distress.

## 2022-03-07 NOTE — PROGRESS NOTES
[facility] Progress Note         Admit Date:  3/2/2022    Today's Date:  3/7/2022    CC:  Ileus    Subjective:     Patient has NGT to suction. She reports having a large stool with gas this morning. KUB unchanged and Abdomen remains distended despite colonic decompression 3/6 amnd report of additional stools. She does not think she is getting any medications for constipation. According to the [facility], she received Relistor injection and dulcolax suppository yesterday but not today.         Medications:   Current Facility-Administered Medications   Medication Dose Route Frequency    potassium chloride 20 mEq in 100 ml IVPB  20 mEq IntraVENous ONCE    benzocaine (HURRICANE) 20 % spray   Mucous Membrane QID PRN    traMADoL (ULTRAM) tablet 50 mg  50 mg Oral Q6H PRN    methylnaltrexone (RELISTOR) injection 12 mg  12 mg SubCUTAneous DAILY    [Held by provider] furosemide (LASIX) injection 40 mg  40 mg IntraVENous DAILY    NIFEdipine ER (PROCARDIA XL) tablet 90 mg  90 mg Oral DAILY    sodium chloride (NS) flush 5-40 mL  5-40 mL IntraVENous Q8H    sodium chloride (NS) flush 5-40 mL  5-40 mL IntraVENous PRN    acetaminophen (TYLENOL) tablet 650 mg  650 mg Oral Q6H PRN    Or    acetaminophen (TYLENOL) suppository 650 mg  650 mg Rectal Q6H PRN    ondansetron (ZOFRAN ODT) tablet 4 mg  4 mg Oral Q8H PRN    Or    ondansetron (ZOFRAN) injection 4 mg  4 mg IntraVENous Q6H PRN    enoxaparin (LOVENOX) injection 30 mg  30 mg SubCUTAneous DAILY    0.9% sodium chloride infusion  100 mL/hr IntraVENous CONTINUOUS    insulin lispro (HUMALOG) injection   SubCUTAneous AC&HS    aspirin chewable tablet 81 mg  81 mg Oral 7am    carvediloL (COREG) tablet 3.125 mg  3.125 mg Oral BID WITH MEALS    isosorbide mononitrate ER (IMDUR) tablet 60 mg  60 mg Oral DAILY    rosuvastatin (CRESTOR) tablet 40 mg  40 mg Oral QHS    traZODone (DESYREL) tablet 50 mg  50 mg Oral QHS    polyethylene glycol (MIRALAX) packet 17 g 17 g Oral BID    bisacodyL (DULCOLAX) suppository 10 mg  10 mg Rectal DAILY       Review of Systems:  ROS was obtained, with pertinent positives as listed above. No chest pain or SOB. Diet:  NPO    Objective:   Vitals:  Visit Vitals  BP (!) 144/75   Pulse (!) 107   Temp 98 °F (36.7 °C)   Resp 18   Ht 5' 4\" (1.626 m)   Wt 80.6 kg (177 lb 11.2 oz)   SpO2 95%   BMI 30.50 kg/m²     Intake/Output:  03/07 0701 - 03/07 1900  In: -   Out: 300   03/05 1901 - 03/07 0700  In: 0281 [I.V.:4307]  Out: 1201 [Urine:1000]  Exam:  General appearance: alert, cooperative, no distress  Lungs: clear to auscultation bilaterally anteriorly  Heart: regular rate and rhythm  Abdomen: DISTENDED, TYMPANIC, DIMINISHED BOWEL SOUNDS, NGT TO SUCTION. No masses, no organomegaly  Extremities: extremities normal, atraumatic, no cyanosis or edema  Neuro:  alert and oriented    Data Review (Labs):    Recent Labs     03/07/22  0549 03/06/22  0559 03/05/22  0616   WBC 12.7* 10.9 11.6*   HGB 10.5* 10.8* 10.6*   HCT 31.8* 33.1* 32.5*    231 215   MCV 94.9 95.1 95.3    140 138   K 3.3* 3.2* 3.4*   * 110* 109*   CO2 18* 22 23   BUN 43* 38* 30*   CREA 2.40* 2.90* 2.70*   CA 8.6 8.7 8.6   * 143* 167*     EGD 8/5/20 by Dr. Anita Wiley: reflux esophagitis, acute gastritis, duodenitis. Pathology unremarkable.     Colonoscopy 8/5/20 by Dr. Anita Wiley: transverse colon polyp, diverticulosis, IH. Pathology c/w TA. Recall 5 years.       CT abdomen and pelvis without contrast 3/1/22  IMPRESSION  1. Mellissa Dietrichshell is colonic obstruction consequent to a severe stool burden extending  from the cecum through the mid descending colon. There is an abrupt transition  in colonic caliber in the mid descending colon. There is subtle bowel wall  thickening and surrounding mesenteric stranding of the area of transition in  caliber, consistent with a developing stercoral colitis.  There is no evidence of  associated colonic perforation or upstream small bowel obstruction. 2.  Chronic findings otherwise as above. KUB 3/4/22: Findings:       Single view of the abdomen demonstrates predominantly gaseous distended loops of  colon however with areas of moderate constipation particularly in the descending  segment. There is no soft tissue mass or organomegaly. No gross free  intraperitoneal air is identified. No acute osseous abnormality is  demonstrated. Advanced degenerative changes seen throughout the hips.     IMPRESSION 1. Predominantly gaseous distention of colon however with areas of constipation  particularly in the descending segment. Colonoscopy 3/6/22 Dr. Carmelo Wilburn:   Prep: Poor  Findings:   Ileum: Not seen  Colon: Scope advanced to 70 CM, in the transverse colon. The distal colon was fairly clear. Little or no air was used to minimize risk of over-distension, so visualization was extremely limited. At 40cm, in the descending colon, there is a large amount of dark green/ brown solid stool. There is no evidence of stricture or mass. The scope was advanced along the side of the lumen, to the splenic flexure. Beyond this, the transverse colon was dilated and filled w/ air and liquid stool. This was suctioned for decompression. On withdrawal, the solid stool was irrigated, and seemed to break up into pieces. A large amount of stool was passed during the procedure. Air and liquid was suctioned carefully on withdrawal for decompression. Rectum: No retroflex attempted. Recommendations:  No obstructing lesion. Apparent dysmotility. Continue miralax and relistor  Cont NGT suction intermittently  NPO for now  Repeat KUB in AM    KUB 3/7/22: FINDINGS:      Gaseous distention of the colon is unchanged when compared to yesterday's x-ray. The sigmoid colon measures up to 11 cm in diameter. No clear dilation of small  bowel. No intraperitoneal free air.  Nasogastric tube terminates in the stomach.     IMPRESSION  Unchanged severe gaseous distention of the colon.    Assessment:     Principal Problem:    Paralytic ileus of large intestine ([facility] [address] (3/6/2022)    Active Problems:    Hypertension (3/1/2012)      HLD (hyperlipidemia) (9/4/2013)      Stage 4 chronic kidney disease ([facility] [address] (9/15/2016)      Gastroesophageal reflux disease (2/16/2017)      Type 2 diabetes mellitus with stage 4 chronic kidney disease, with long-term current use of insulin ([facility] [address] (10/11/2018)      Acute respiratory failure with hypoxia (HCC) (2/17/2021)      Chronic diastolic congestive heart failure ([facility] [address] (4/19/2021)      Overview: Heart Failure (HFpEF), EF 62-16%, with diastolic dysfunction, ECHO       2/16/2021            MIRANDA (acute kidney injury) ([facility] [address] (3/2/2022)      Stercoral colitis (3/2/2022)      Hypokalemia (3/4/2022)      66 y.o. female with PMH including but not limited to coronary artery disease status post PCI 2012, diabetes, hypertension, chronic kidney disease stage 3, who is seen in consultation at the request of Dr. Sreedhar Seaman for constipation abdominal pain and stercoral colitis. S/P decompressive colonoscopy with Dr. Tammy Baum 3/6/22 with report of passing stool this morning but no change on KUB or exam.      Plan:     - recommend continued NGT to suction  - continue Relistor and dulcolax daily. Can hold Miralax if needed with NGT to suction. - repeat KUB in AM and if unchanged, will ask surgery to evaluate. Leopold [place], [place]          Patient is seen and examined in collaboration with Dr. Kinjal Bryan. Assessment and plan as per Dr. Ramiro Carrasco.

## 2022-03-07 NOTE — PROGRESS NOTES
Hospitalist Progress Note   Admit Date:  3/2/2022 11:11 AM   Name:  Kam Harmon   Age:  66 y.o. Sex:  female  :  1950   MRN:  [id number]   Room:  [facility]/01    Presenting Complaint: Abdominal Pain    Reason(s) for Admission: Stercoral colitis [K52.89]  MIRANDA (acute kidney injury) [facility]) [N17.9]     Hospital Course & Interval History:   Kam Harmon is a 66 y.o. female with PMH of chronic constipation, stage IV CKD, chronic diastolic CHF, CAD, HTN who was relatively well until 8 days ago () when she had a nice large bowel movement, then began experiencing severe abdominal pain on her left side in the lower abdomen the following day. She was evaluted by Dr. Vivienne Solomon (GI)  and was placed on Cipro and Flagyl for possible diverticulitis. However, her symptoms became worse, and she had not passed any stool since . Ultimately the severe abdominal pain caused her to come in for ED evaluation. ED workup with diagnosis of stercoral colitis based on CT A/P imaging. No noted colonic perforation or upstream small bowel obstruction. ED physician attempted fecal decompaction and requested that the hospitalist service admit her. Gastroenterology was also consulted and is following. Patient underwent decompressive colonoscopy with Dr. Carmelo Wilburn 3/6. No obstructing lesion but with apparent dysmotility, currently NPO with NGT. KUB scheduled in am.    PT recommendations HH when medically stable to d/c. Subjective/24hr Events (22): Patient is alert and oriented, resting in bed. She had 2 large BM today. She is feeling better today. She is surprised by possible surgical consultation noted by GI. Her lower abdominal pain is resolved.      Assessment & Plan:     Principal Problem:  Stercoral colitis now with Paralytic Ileus Large Intestine  -Gastroenterology consulted and following  -Placed on Cipro/Flagyl, now held per GI  -BCx 3/3 pending, no growth overnight  -KUB 3/4 with gaseous distention of colon with constipation particularly in descending segment, continue bowel regimen per GI  -3/5 no improvement, placed NGT/NPO/IVF  -3/6 no improvement, s/p decompressive colonoscopy with Dr. Young Steinberg 3/6, no obstructing lesion seen but with apparent dysmotility  -3/7 KUB today unchanged, continue NPO, repeat KUB in am, will consult Gen Surgery tomorrow if no improvement  -Continue Relistor and Dulcolax daily    Active Problems:  Hypertension   -Continue home Nifedipine    HLD (hyperlipidemia)   -Continue Rosuvastatin    MIRANDA on Stage 4 chronic kidney disease ([facility] [address] resolved  -Follows Dr. Herman Hancock, outpatient Nephrology  -Cr 2.4, Lasix remains on hold, monitor am BMP    Type 2 diabetes mellitus, with long-term current use of insulin ([facility] [address]   -Home insulin regimen on hold with current NPO status, resume with diet progression  -Continue SSI Humalog     Chronic diastolic congestive heart failure (HCC)  -Continue Imdur, Furosemide resumed 3/4 and subsequently held again 3/5  -Daily weights, strict I&O's    Acute respiratory failure with hypoxia secondary to volume overload  -Overnight 3/3 with hypoxia 87% on RA, CXR obtained with no evidence of pneumonia or edema, placed on 3L NC and d/c IVF, pBNP 2633 this am, home Furosemide resumed  -Furosemide held due to renal function 3/5  -Charted O2 Flow Rate (L/min): 3 l/min.   O2 saturations 98%, wean as tolerated, exam without crackles/wheezing, doubt she needs 3L NC, no evidence of fluid overload    CAD s/p PCI  -Follows Dr. Stacey Puckett  -Continue ASA / Lollie Munch / Lajune Economy / statin    Chest tightness, intermittent  -EKG with NSR with sinus arrhythmia, no ST elevation or T wave inversion  -Troponin 36.6 --> 37.7  -Will order remote telemetry, episode of feeling fluttering today, HR on exam is irregular, currently denies chest pain    Hypokalemia, improving  -K 3.3, replete K and monitor repeat labs in am    Dispo/Discharge Planning:  Pending clinical course    Diet:  DIET NPO  DVT PPx: Lovenox  Code status: Full Code    Hospital Problems as of 3/7/2022 Date Reviewed: 2/7/2022          Codes Class Noted - Resolved POA    * (Principal) Paralytic ileus of large intestine ([facility] ICD-10-CM: K56.0  ICD-9-CM: 560.1  3/6/2022 - Present Unknown        Hypokalemia ICD-10-CM: E87.6  ICD-9-CM: 276.8  3/4/2022 - Present Unknown        MIRANDA (acute kidney injury) ([facility] ICD-10-CM: N17.9  ICD-9-CM: 584.9  3/2/2022 - Present Unknown        Stercoral colitis ICD-10-CM: K52.89  ICD-9-CM: 558.9  3/2/2022 - Present Unknown        Chronic diastolic congestive heart failure (HCC) ICD-10-CM: I50.32  ICD-9-CM: 428.32, 428.0  4/19/2021 - Present Yes    Overview Signed 7/7/2021 11:06 AM by Willian Mojica NP     Heart Failure (HFpEF), EF 19-10%, with diastolic dysfunction, ECHO 2/16/2021               Acute respiratory failure with hypoxia [facility]) ICD-10-CM: J96.01  ICD-9-CM: 518.81  2/17/2021 - Present Unknown        Type 2 diabetes mellitus with stage 4 chronic kidney disease, with long-term current use of insulin (HCC) ICD-10-CM: E11.22, N18.4, Z79.4  ICD-9-CM: 250.40, 585.4, V58.67  10/11/2018 - Present Yes        Gastroesophageal reflux disease ICD-10-CM: K21.9  ICD-9-CM: 530.81  2/16/2017 - Present Yes        Stage 4 chronic kidney disease ([facility] ICD-10-CM: N18.4  ICD-9-CM: 585.4  9/15/2016 - Present Yes        HLD (hyperlipidemia) ICD-10-CM: E78.5  ICD-9-CM: 272.4  9/4/2013 - Present Yes        Hypertension (Chronic) ICD-10-CM: I10  ICD-9-CM: 401.9  3/1/2012 - Present Yes              Objective:     Patient Vitals for the past 24 hrs:   Temp Pulse Resp BP SpO2   03/07/22 1134 97.9 °F (36.6 °C) 93 28 (!) 149/76 98 %   03/07/22 0958 97.6 °F (36.4 °C) 95 16 (!) 159/72 98 %   03/07/22 0900 97.6 °F (36.4 °C) 95 16 (!) 156/72 98 %   03/07/22 0538 98 °F (36.7 °C) (!) 107 18 (!) 144/75 95 %   03/07/22 0229 98.4 °F (36.9 °C) 98 16 (!) 147/69 95 %   03/06/22 2319 97.8 °F (36.6 °C) (!) 102 12 132/61 95 %   03/06/22 1918 97.8 °F (36.6 °C) 97 12 126/62 95 %   03/06/22 1522 97.9 °F (36.6 °C) 98 18 (!) 122/56 92 %     Oxygen Therapy  O2 Sat (%): 98 % (03/07/22 1134)  Pulse via Oximetry: 91 beats per minute (03/06/22 1136)  O2 Device: Nasal cannula (03/07/22 1134)  O2 Flow Rate (L/min): 3 l/min (03/06/22 1041)    Estimated body mass index is 30.5 kg/m² as calculated from the following:    Height as of this encounter: 5' 4\" (1.626 m). Weight as of this encounter: 80.6 kg (177 lb 11.2 oz). Intake/Output Summary (Last 24 hours) at 3/7/2022 1413  Last data filed at 3/7/2022 0738  Gross per 24 hour   Intake --   Output 1051 ml   Net -1051 ml         Physical Exam:   Blood pressure (!) 149/76, pulse 93, temperature 97.9 °F (36.6 °C), resp. rate 28, height 5' 4\" (1.626 m), weight 80.6 kg (177 lb 11.2 oz), SpO2 98 %. General:    Ill-appearing, no acute distress, alert, calm, conversational.  Head:  Normocephalic, atraumatic  Eyes:  Sclerae appear normal.  Pupils equally round. ENT:  Nares appear normal, no drainage. Moist oral mucosa  Neck:  No restricted ROM. Trachea midline   CV:   RRR. No m/r/g. No jugular venous distension. Lungs:   CTAB, no wheezing, no rhonchi. Respirations even and unlabored on 3L NC. Good air flow  Abdomen: Bowel sounds hypoactive but present in all 4 quadrants. Rounded but soft. Nontender to palpation in all 4 quadrants. Extremities: No cyanosis or clubbing. No peripheral edema  Skin:     No rashes and normal coloration. Warm and dry. Neuro:  CN II-XII grossly intact. Sensation intact. A&Ox3. Moves all extremities. No focal deficits. Psych:  Normal mood and affect.       I have reviewed ordered lab tests and independently visualized imaging below:    Recent Labs:  Recent Results (from the past 48 hour(s))   GLUCOSE, POC    Collection Time: 03/05/22  2:23 PM   Result Value Ref Range    Glucose (POC) 158 (H) 65 - 100 mg/dL    Performed by [id number] Daiana Gallardo, POC    Collection Time: 03/05/22 9:27 PM   Result Value Ref Range    Glucose (POC) 151 (H) 65 - 100 mg/dL    Performed by [employer]    CBC W/O DIFF    Collection Time: 03/06/22  5:59 AM   Result Value Ref Range    WBC 10.9 4.3 - 11.1 K/uL    RBC 3.48 (L) 4.05 - 5.2 M/uL    HGB 10.8 (L) 11.7 - 15.4 g/dL    HCT 33.1 (L) 35.8 - 46.3 %    MCV 95.1 79.6 - 97.8 FL    MCH 31.0 26.1 - 32.9 PG    MCHC 32.6 31.4 - 35.0 g/dL    RDW 12.2 11.9 - 14.6 %    PLATELET 272 502 - 781 K/uL    MPV 9.6 9.4 - 12.3 FL    ABSOLUTE NRBC 0.00 0.0 - 0.2 K/uL   METABOLIC PANEL, BASIC    Collection Time: 03/06/22  5:59 AM   Result Value Ref Range    Sodium 140 136 - 145 mmol/L    Potassium 3.2 (L) 3.5 - 5.1 mmol/L    Chloride 110 (H) 98 - 107 mmol/L    CO2 22 21 - 32 mmol/L    Anion gap 8 7 - 16 mmol/L    Glucose 143 (H) 65 - 100 mg/dL    BUN 38 (H) 8 - 23 MG/DL    Creatinine 2.90 (H) 0.6 - 1.0 MG/DL    GFR est AA 20 (L) >60 ml/min/1.73m2    GFR est non-AA 17 (L) >60 ml/min/1.73m2    Calcium 8.7 8.3 - 10.4 MG/DL   GLUCOSE, POC    Collection Time: 03/06/22  6:02 AM   Result Value Ref Range    Glucose (POC) 152 (H) 65 - 100 mg/dL    Performed by [employer]    GLUCOSE, POC    Collection Time: 03/06/22 12:38 PM   Result Value Ref Range    Glucose (POC) 153 (H) 65 - 100 mg/dL    Performed by [employer]    GLUCOSE, POC    Collection Time: 03/06/22  4:46 PM   Result Value Ref Range    Glucose (POC) 161 (H) 65 - 100 mg/dL    Performed by [employer]    GLUCOSE, POC    Collection Time: 03/06/22  9:42 PM   Result Value Ref Range    Glucose (POC) 169 (H) 65 - 100 mg/dL    Performed by [facility]    CBC W/O DIFF    Collection Time: 03/07/22  5:49 AM   Result Value Ref Range    WBC 12.7 (H) 4.3 - 11.1 K/uL    RBC 3.35 (L) 4.05 - 5.2 M/uL    HGB 10.5 (L) 11.7 - 15.4 g/dL    HCT 31.8 (L) 35.8 - 46.3 %    MCV 94.9 79.6 - 97.8 FL    MCH 31.3 26.1 - 32.9 PG    MCHC 33.0 31.4 - 35.0 g/dL    RDW 12.3 11.9 - 14.6 %    PLATELET 224 588 - 284 K/uL    MPV 9.6 9.4 - 12.3 FL ABSOLUTE NRBC 0.00 0.0 - 0.2 K/uL   METABOLIC PANEL, BASIC    Collection Time: 03/07/22  5:49 AM   Result Value Ref Range    Sodium 143 136 - 145 mmol/L    Potassium 3.3 (L) 3.5 - 5.1 mmol/L    Chloride 114 (H) 98 - 107 mmol/L    CO2 18 (L) 21 - 32 mmol/L    Anion gap 11 7 - 16 mmol/L    Glucose 162 (H) 65 - 100 mg/dL    BUN 43 (H) 8 - 23 MG/DL    Creatinine 2.40 (H) 0.6 - 1.0 MG/DL    GFR est AA 25 (L) >60 ml/min/1.73m2    GFR est non-AA 21 (L) >60 ml/min/1.73m2    Calcium 8.6 8.3 - 10.4 MG/DL   GLUCOSE, POC    Collection Time: 03/07/22  6:00 AM   Result Value Ref Range    Glucose (POC) 167 (H) 65 - 100 mg/dL    Performed by GiovaniHERB    GLUCOSE, POC    Collection Time: 03/07/22  9:23 AM   Result Value Ref Range    Glucose (POC) 185 (H) 65 - 100 mg/dL    Performed by Yue    GLUCOSE, POC    Collection Time: 03/07/22 11:32 AM   Result Value Ref Range    Glucose (POC) 176 (H) 65 - 100 mg/dL    Performed by Chelly        All Micro Results     Procedure Component Value Units Date/Time    CULTURE, BLOOD [[id number]] Collected: 03/03/22 1555    Order Status: Completed Specimen: Blood Updated: 03/07/22 0733     Special Requests: --        LEFT  HAND       Culture result: NO GROWTH 4 DAYS       CULTURE, BLOOD [[id number]] Collected: 03/03/22 1547    Order Status: Completed Specimen: Blood Updated: 03/07/22 0733     Special Requests: --        RIGHT  Antecubital       Culture result: NO GROWTH 4 DAYS       CULTURE, URINE [[id number]] Collected: 03/03/22 1345    Order Status: Canceled Specimen: Urine from Clean catch           Other Studies:  XR ABD (KUB)    Result Date: 3/7/2022  EXAM: ABDOMINAL RADIOGRAPH, 1 view INDICATION: Ileus COMPARISON: Abdominal x-ray 3/6/2022 TECHNIQUE: Frontal abdominal radiography was performed. FINDINGS: Gaseous distention of the colon is unchanged when compared to yesterday's x-ray. The sigmoid colon measures up to 11 cm in diameter.  No clear dilation of small bowel. No intraperitoneal free air. Nasogastric tube terminates in the stomach. Unchanged severe gaseous distention of the colon. Current Meds:  Current Facility-Administered Medications   Medication Dose Route Frequency    lidocaine 4 % patch 1 Patch  1 Patch TransDERmal Q24H    benzocaine (HURRICANE) 20 % spray   Mucous Membrane QID PRN    traMADoL (ULTRAM) tablet 50 mg  50 mg Oral Q6H PRN    methylnaltrexone (RELISTOR) injection 12 mg  12 mg SubCUTAneous DAILY    [Held by provider] furosemide (LASIX) injection 40 mg  40 mg IntraVENous DAILY    NIFEdipine ER (PROCARDIA XL) tablet 90 mg  90 mg Oral DAILY    sodium chloride (NS) flush 5-40 mL  5-40 mL IntraVENous Q8H    sodium chloride (NS) flush 5-40 mL  5-40 mL IntraVENous PRN    acetaminophen (TYLENOL) tablet 650 mg  650 mg Oral Q6H PRN    Or    acetaminophen (TYLENOL) suppository 650 mg  650 mg Rectal Q6H PRN    ondansetron (ZOFRAN ODT) tablet 4 mg  4 mg Oral Q8H PRN    Or    ondansetron (ZOFRAN) injection 4 mg  4 mg IntraVENous Q6H PRN    enoxaparin (LOVENOX) injection 30 mg  30 mg SubCUTAneous DAILY    0.9% sodium chloride infusion  100 mL/hr IntraVENous CONTINUOUS    insulin lispro (HUMALOG) injection   SubCUTAneous AC&HS    aspirin chewable tablet 81 mg  81 mg Oral 7am    carvediloL (COREG) tablet 3.125 mg  3.125 mg Oral BID WITH MEALS    isosorbide mononitrate ER (IMDUR) tablet 60 mg  60 mg Oral DAILY    rosuvastatin (CRESTOR) tablet 40 mg  40 mg Oral QHS    traZODone (DESYREL) tablet 50 mg  50 mg Oral QHS    polyethylene glycol (MIRALAX) packet 17 g  17 g Oral BID    bisacodyL (DULCOLAX) suppository 10 mg  10 mg Rectal DAILY     Labs, vital signs, diagnostic imaging reviewed. Case discussed with patient, care team, Dr. Jocelyn Rivera.     Signed:  Brian Betancourt NP

## 2022-03-07 NOTE — PROGRESS NOTES
Resting quietly, awake, resp even, unlab with O2 intact. Skin warm, dry. NG intact to left nare to LIS, output gisela brown. Abdom soft, distended with hypoactive bowel sounds. Up to bathroom with asst, voiding without difficulty and back to bed. Aware to continue to call for asst to be out of bed, call light in reach. No c/o, no distress noted.

## 2022-03-07 NOTE — PROGRESS NOTES
ACUTE PHYSICAL THERAPY GOALS:  (Developed with and agreed upon by patient and/or caregiver.)  1. Ms. Aaron Georges will perform all bed mobility independently in 5 days. 2.  Ms. Aaron Georges will perform all transfers independently in 5 days. 3.  Ms. Aaron Georges will perform gait >200 ft independently in 5 days. PHYSICAL THERAPY ASSESSMENT: Initial Assessment, Daily Note and AM PT Treatment Day # 1      Robert Mcpherson is a 66 y.o. female   PRIMARY DIAGNOSIS: Paralytic ileus of large intestine ([facility]  Stercoral colitis [K52.89]  MIRANDA (acute kidney injury) ([facility] [N17.9]  Procedure(s) (LRB):  SIGMOIDOSCOPY FLEXIBLE (N/A)  1 Day Post-Op  Reason for Referral:    ICD-10: Treatment Diagnosis: Generalized Muscle Weakness (M62.81)  Difficulty in walking, Not elsewhere classified (R26.2)  INPATIENT: Payor: [facility] MEDICARE / Plan: [employer] / Product Type: [place] [employer] Medicare /     ASSESSMENT:     REHAB RECOMMENDATIONS:   Recommendation to date pending progress:  [address] [place] [address] Therapy  Equipment:    None     PRIOR LEVEL OF FUNCTION:  (Prior to Hospitalization) INITIAL/CURRENT LEVEL OF FUNCTION:  (Most Recently Demonstrated)   Bed Mobility:   Independent  Sit to Stand:   Independent  Transfers:   Independent  Gait/Mobility:   Independent Bed Mobility:   Contact Guard Assistance  Sit to Stand:   Contact Guard Assistance  Transfers:   Contact Guard Assistance  Gait/Mobility:   Contact Guard Assistance     ASSESSMENT:  Ms. Aaron Georges presents with NGT and O2 on 3 liters. She tells me she is feeling better after procedure Saturday. Passing gas and having BM. She hopes tube comes out soon. She tells me she does not wear oxygen at home but has CHF and has needed it here. Today her belly is sore from the procedure Saturday so she requires cg for supine to sit and also cg for transfers to stand and to chair. She feels generally weak.   Encouraged her to be up in the chair as much as possible. Expect she can return home at discharge.   Ms. Terrell Heller is functioning below baseline and is therefore appropriate for skilled PT to maximize her rehab potential.     SUBJECTIVE:   Ms. Terrell Heller states, \"I need to get home and take care of my \"    SOCIAL HISTORY/LIVING ENVIRONMENT:   Home Environment: Private residence  One/Two Story Residence: One story  Living Alone: No  Support Systems: Spouse/Significant Other,Child(babatunde)  OBJECTIVE:     PAIN: VITAL SIGNS: LINES/DRAINS:   Pre Treatment: Pain Screen  Pain Scale 1: Numeric (0 - 10)  Post Treatment: sore   IV and Nasogastric Tube  O2 Device: Nasal cannula     GROSS EVALUATION:   Within Functional Limits Abnormal/ Functional Abnormal/ Non-Functional (see comments) Not Tested Comments:   AROM [x] [] [] []    PROM [] [] [] []    Strength [x] [] [] []    Balance [] [x] [] []    Posture [] [] [] []    Sensation [x] [] [] []    Coordination [] [] [] []    Tone [] [] [] []    Edema [] [] [] []    Activity Tolerance [] [x] [] []     [] [] [] []      COGNITION/  PERCEPTION: Intact Impaired   (see comments) Comments:   Orientation [x] []    Vision [x] []    Hearing [x] []    Command Following [x] []    Safety Awareness [x] []     [] []      MOBILITY: I Mod I S SBA CGA Min Mod Max Total  NT x2 Comments:   Bed Mobility    Rolling [] [] [] [] [x] [] [] [] [] [] []    Supine to Sit [] [] [] [] [x] [] [] [] [] [] []    Scooting [] [] [] [] [x] [] [] [] [] [] []    Sit to Supine [] [] [] [] [] [] [] [] [] [x] []    Transfers    Sit to Stand [] [] [] [] [x] [] [] [] [] [] []    Bed to Chair [] [] [] [] [x] [] [] [] [] [] []    Stand to Sit [] [] [] [] [x] [] [] [] [] [] []    I=Independent, Mod I=Modified Independent, S=Supervision, SBA=Standby Assistance, CGA=Contact Guard Assistance,   Min=Minimal Assistance, Mod=Moderate Assistance, Max=Maximal Assistance, Total=Total Assistance, NT=Not Tested  GAIT: I Mod I S SBA CGA Min Mod Max Total  NT x2 Comments:   Level of Assistance [] [] [] [] [x] [] [] [] [] [] []    Distance 5 ft to chair    DME None    Gait Quality slow    Weightbearing Status N/A     I=Independent, Mod I=Modified Independent, S=Supervision, SBA=Standby Assistance, CGA=Contact Guard Assistance,   Min=Minimal Assistance, Mod=Moderate Assistance, Max=Maximal Assistance, Total=Total Assistance, NT=Not Tested    [facility] AM-PAC [facility] Form       How much difficulty does the patient currently have. .. Unable A Lot A Little None   1. Turning over in bed (including adjusting bedclothes, sheets and blankets)? [] 1   [] 2   [x] 3   [] 4   2. Sitting down on and standing up from a chair with arms ( e.g., wheelchair, bedside commode, etc.)   [] 1   [] 2   [x] 3   [] 4   3. Moving from lying on back to sitting on the side of the bed? [] 1   [] 2   [x] 3   [] 4   How much help from another person does the patient currently need. .. Total A Lot A Little None   4. Moving to and from a bed to a chair (including a wheelchair)? [] 1   [] 2   [x] 3   [] 4   5. Need to walk in hospital room? [] 1   [] 2   [x] 3   [] 4   6. Climbing 3-5 steps with a railing? [] 1   [] 2   [x] 3   [] 4   © 2007, Trustees of [facility] [employer], under license to [employer]. All rights reserved     Score:  Initial: 18 Most Recent: X (Date: -- )    Interpretation of Tool:  Represents activities that are increasingly more difficult (i.e. Bed mobility, Transfers, Gait). PLAN:   FREQUENCY/DURATION: PT Plan of Care: 3 times/week for duration of hospital stay or until stated goals are met, whichever comes first.    PROBLEM LIST:   (Skilled intervention is medically necessary to address:)  1. Decreased Activity Tolerance  2. Decreased AROM/PROM  3. Decreased Balance  4. Decreased Gait Ability  5. Decreased Strength  6.  Decreased Transfer Abilities   INTERVENTIONS PLANNED:   (Benefits and precautions of physical therapy have been discussed with the patient.)  1. Therapeutic Activity  2. Therapeutic Exercise/HEP  3. Neuromuscular Re-education  4. Gait Training  5. Education     TREATMENT:     EVALUATION: Moderate Complexity : (Untimed Charge)    TREATMENT:   ($$ Therapeutic Activity: 8-22 mins    )  Therapeutic Activity (10 Minutes): Therapeutic activity included Supine to Sit, Transfer Training, Ambulation on level ground, Sitting balance , Standing balance and plus lower extremity therex in the chair to improve functional Mobility, Strength and Activity tolerance.     TREATMENT GRID:  N/A    AFTER TREATMENT POSITION/PRECAUTIONS:  Chair, Needs within reach and RN notified    INTERDISCIPLINARY COLLABORATION:  RN/PCT and PT/PTA    TOTAL TREATMENT DURATION:  PT Patient Time In/Time Out  Time In: 0910  Time Out: [address] [facility],

## 2022-03-07 NOTE — PROGRESS NOTES
spoke with Fidelia Alvarez in monitor room, she was made aware that we needed tele box. Per Fidelia Alvarez she will be placed on list for monitor. Patient has no acute issues and currently resting in bed. NG clamped at this time related to meds recently given. Remains NPO. Will monitor for changes.

## 2022-03-07 NOTE — PROGRESS NOTES
Continues to rest quietly, arousing easily at intervals during the night and this a.m. NG removed from suction, clamped. Tramadol 50 mg given PO with sip of water for c/o pain. To xray dept via stretcher with O2 at 4L N/C. Reports having had small formed stool during the night and passed gas. Report given to Fidel Kovacs RN.

## 2022-03-08 ENCOUNTER — APPOINTMENT (OUTPATIENT)
Dept: GENERAL RADIOLOGY | Age: 72
DRG: 344 | End: 2022-03-08
Attending: PHYSICIAN ASSISTANT
Payer: MEDICARE

## 2022-03-08 LAB
ANION GAP SERPL CALC-SCNC: 10 MMOL/L (ref 7–16)
ATRIAL RATE: 95 BPM
BACTERIA SPEC CULT: NORMAL
BACTERIA SPEC CULT: NORMAL
BUN SERPL-MCNC: 42 MG/DL (ref 8–23)
CALCIUM SERPL-MCNC: 8.5 MG/DL (ref 8.3–10.4)
CALCULATED P AXIS, ECG09: 76 DEGREES
CALCULATED R AXIS, ECG10: 73 DEGREES
CALCULATED T AXIS, ECG11: -16 DEGREES
CHLORIDE SERPL-SCNC: 115 MMOL/L (ref 98–107)
CO2 SERPL-SCNC: 18 MMOL/L (ref 21–32)
CREAT SERPL-MCNC: 2.2 MG/DL (ref 0.6–1)
DIAGNOSIS, 93000: NORMAL
ERYTHROCYTE [DISTWIDTH] IN BLOOD BY AUTOMATED COUNT: 13 % (ref 11.9–14.6)
GLUCOSE BLD STRIP.AUTO-MCNC: 133 MG/DL (ref 65–100)
GLUCOSE BLD STRIP.AUTO-MCNC: 148 MG/DL (ref 65–100)
GLUCOSE BLD STRIP.AUTO-MCNC: 148 MG/DL (ref 65–100)
GLUCOSE BLD STRIP.AUTO-MCNC: 150 MG/DL (ref 65–100)
GLUCOSE BLD STRIP.AUTO-MCNC: 166 MG/DL (ref 65–100)
GLUCOSE SERPL-MCNC: 138 MG/DL (ref 65–100)
HCT VFR BLD AUTO: 33.8 % (ref 35.8–46.3)
HGB BLD-MCNC: 10.9 G/DL (ref 11.7–15.4)
MCH RBC QN AUTO: 31.2 PG (ref 26.1–32.9)
MCHC RBC AUTO-ENTMCNC: 32.2 G/DL (ref 31.4–35)
MCV RBC AUTO: 96.8 FL (ref 79.6–97.8)
NRBC # BLD: 0 K/UL (ref 0–0.2)
P-R INTERVAL, ECG05: 138 MS
PLATELET # BLD AUTO: 280 K/UL (ref 150–450)
PMV BLD AUTO: 9.5 FL (ref 9.4–12.3)
POTASSIUM SERPL-SCNC: 3.4 MMOL/L (ref 3.5–5.1)
Q-T INTERVAL, ECG07: 372 MS
QRS DURATION, ECG06: 90 MS
QTC CALCULATION (BEZET), ECG08: 467 MS
RBC # BLD AUTO: 3.49 M/UL (ref 4.05–5.2)
SERVICE CMNT-IMP: ABNORMAL
SERVICE CMNT-IMP: NORMAL
SERVICE CMNT-IMP: NORMAL
SODIUM SERPL-SCNC: 143 MMOL/L (ref 136–145)
VENTRICULAR RATE, ECG03: 95 BPM
WBC # BLD AUTO: 10.6 K/UL (ref 4.3–11.1)

## 2022-03-08 PROCEDURE — 80048 BASIC METABOLIC PNL TOTAL CA: CPT

## 2022-03-08 PROCEDURE — 74011250637 HC RX REV CODE- 250/637: Performed by: INTERNAL MEDICINE

## 2022-03-08 PROCEDURE — 74011636637 HC RX REV CODE- 636/637: Performed by: INTERNAL MEDICINE

## 2022-03-08 PROCEDURE — 36415 COLL VENOUS BLD VENIPUNCTURE: CPT

## 2022-03-08 PROCEDURE — 99223 1ST HOSP IP/OBS HIGH 75: CPT | Performed by: SURGERY

## 2022-03-08 PROCEDURE — 96372 THER/PROPH/DIAG INJ SC/IM: CPT

## 2022-03-08 PROCEDURE — 96376 TX/PRO/DX INJ SAME DRUG ADON: CPT

## 2022-03-08 PROCEDURE — 94760 N-INVAS EAR/PLS OXIMETRY 1: CPT

## 2022-03-08 PROCEDURE — 74011250636 HC RX REV CODE- 250/636: Performed by: INTERNAL MEDICINE

## 2022-03-08 PROCEDURE — 97530 THERAPEUTIC ACTIVITIES: CPT

## 2022-03-08 PROCEDURE — 74011000250 HC RX REV CODE- 250: Performed by: HOSPITALIST

## 2022-03-08 PROCEDURE — 74011000250 HC RX REV CODE- 250: Performed by: STUDENT IN AN ORGANIZED HEALTH CARE EDUCATION/TRAINING PROGRAM

## 2022-03-08 PROCEDURE — 77010033678 HC OXYGEN DAILY

## 2022-03-08 PROCEDURE — 74011000250 HC RX REV CODE- 250: Performed by: INTERNAL MEDICINE

## 2022-03-08 PROCEDURE — 82962 GLUCOSE BLOOD TEST: CPT

## 2022-03-08 PROCEDURE — 74011250637 HC RX REV CODE- 250/637: Performed by: NURSE PRACTITIONER

## 2022-03-08 PROCEDURE — 93005 ELECTROCARDIOGRAM TRACING: CPT | Performed by: INTERNAL MEDICINE

## 2022-03-08 PROCEDURE — 85027 COMPLETE CBC AUTOMATED: CPT

## 2022-03-08 PROCEDURE — 65270000029 HC RM PRIVATE

## 2022-03-08 PROCEDURE — 74011250637 HC RX REV CODE- 250/637: Performed by: STUDENT IN AN ORGANIZED HEALTH CARE EDUCATION/TRAINING PROGRAM

## 2022-03-08 PROCEDURE — 74011250636 HC RX REV CODE- 250/636: Performed by: STUDENT IN AN ORGANIZED HEALTH CARE EDUCATION/TRAINING PROGRAM

## 2022-03-08 PROCEDURE — 74018 RADEX ABDOMEN 1 VIEW: CPT

## 2022-03-08 RX ORDER — POTASSIUM CHLORIDE 14.9 MG/ML
20 INJECTION INTRAVENOUS
Status: COMPLETED | OUTPATIENT
Start: 2022-03-08 | End: 2022-03-09

## 2022-03-08 RX ORDER — LIDOCAINE 4 G/100G
1 PATCH TOPICAL ONCE
Status: COMPLETED | OUTPATIENT
Start: 2022-03-08 | End: 2022-03-09

## 2022-03-08 RX ORDER — FUROSEMIDE 40 MG/1
40 TABLET ORAL DAILY
Status: DISCONTINUED | OUTPATIENT
Start: 2022-03-08 | End: 2022-03-13 | Stop reason: HOSPADM

## 2022-03-08 RX ORDER — POTASSIUM CHLORIDE 14.9 MG/ML
20 INJECTION INTRAVENOUS ONCE
Status: DISCONTINUED | OUTPATIENT
Start: 2022-03-08 | End: 2022-03-08

## 2022-03-08 RX ADMIN — NYSTATIN 5 ML: 100000 SUSPENSION ORAL at 21:03

## 2022-03-08 RX ADMIN — Medication 2 UNITS: at 09:05

## 2022-03-08 RX ADMIN — SODIUM CHLORIDE 100 ML/HR: 900 INJECTION, SOLUTION INTRAVENOUS at 05:19

## 2022-03-08 RX ADMIN — TRAMADOL HYDROCHLORIDE 50 MG: 50 TABLET, COATED ORAL at 07:53

## 2022-03-08 RX ADMIN — SODIUM CHLORIDE, PRESERVATIVE FREE 10 ML: 5 INJECTION INTRAVENOUS at 05:19

## 2022-03-08 RX ADMIN — METHYLNALTREXONE BROMIDE 12 MG: 12 INJECTION, SOLUTION SUBCUTANEOUS at 09:31

## 2022-03-08 RX ADMIN — ISOSORBIDE MONONITRATE 60 MG: 60 TABLET, EXTENDED RELEASE ORAL at 09:24

## 2022-03-08 RX ADMIN — ASPIRIN 81 MG 81 MG: 81 TABLET ORAL at 05:19

## 2022-03-08 RX ADMIN — SODIUM CHLORIDE 100 ML/HR: 900 INJECTION, SOLUTION INTRAVENOUS at 17:11

## 2022-03-08 RX ADMIN — POTASSIUM CHLORIDE 20 MEQ: 14.9 INJECTION, SOLUTION INTRAVENOUS at 12:25

## 2022-03-08 RX ADMIN — NYSTATIN 5 ML: 100000 SUSPENSION ORAL at 17:02

## 2022-03-08 RX ADMIN — CARVEDILOL 3.12 MG: 3.12 TABLET, FILM COATED ORAL at 09:24

## 2022-03-08 RX ADMIN — BISACODYL 10 MG: 10 SUPPOSITORY RECTAL at 09:30

## 2022-03-08 RX ADMIN — ROSUVASTATIN CALCIUM 40 MG: 20 TABLET, FILM COATED ORAL at 21:03

## 2022-03-08 RX ADMIN — FUROSEMIDE 40 MG: 40 TABLET ORAL at 12:26

## 2022-03-08 RX ADMIN — TRAZODONE HYDROCHLORIDE 50 MG: 50 TABLET ORAL at 21:03

## 2022-03-08 RX ADMIN — Medication 2 UNITS: at 23:16

## 2022-03-08 RX ADMIN — POLYETHYLENE GLYCOL 3350 17 G: 17 POWDER, FOR SOLUTION ORAL at 17:03

## 2022-03-08 RX ADMIN — ENOXAPARIN SODIUM 30 MG: 100 INJECTION SUBCUTANEOUS at 09:25

## 2022-03-08 RX ADMIN — NIFEDIPINE 90 MG: 90 TABLET, EXTENDED RELEASE ORAL at 09:24

## 2022-03-08 RX ADMIN — TRAMADOL HYDROCHLORIDE 50 MG: 50 TABLET, COATED ORAL at 14:24

## 2022-03-08 RX ADMIN — TRAMADOL HYDROCHLORIDE 50 MG: 50 TABLET, COATED ORAL at 20:15

## 2022-03-08 RX ADMIN — CARVEDILOL 3.12 MG: 3.12 TABLET, FILM COATED ORAL at 17:02

## 2022-03-08 RX ADMIN — POLYETHYLENE GLYCOL 3350 17 G: 17 POWDER, FOR SOLUTION ORAL at 09:26

## 2022-03-08 RX ADMIN — POTASSIUM CHLORIDE 20 MEQ: 14.9 INJECTION, SOLUTION INTRAVENOUS at 14:23

## 2022-03-08 RX ADMIN — SODIUM CHLORIDE, PRESERVATIVE FREE 10 ML: 5 INJECTION INTRAVENOUS at 21:03

## 2022-03-08 RX ADMIN — SODIUM CHLORIDE, PRESERVATIVE FREE 10 ML: 5 INJECTION INTRAVENOUS at 14:26

## 2022-03-08 NOTE — PROGRESS NOTES
Spiritual Consult. PT was in bed with daughter at bedside. PT talked with [address] [place] [address] about her health and upcoming surgery. PT expressed concern for her  for whom she cares while PT is in hospital and recovering. CH offered active listening and therapeutic communication including reframing. CH and PT discussed importance of taking time to recover and rest.  PT is [facility]. [address] offered prayer for PT and her Family and her surgery.  offered additional support if needed. . Wilber Hickman M.Div.

## 2022-03-08 NOTE — PROGRESS NOTES
Pt discussed during IDR and chart screened by CM for d/c planning. Per Sohan Yip, NP pt remains NPO with NGT. Per GI general surgery has been consulted to discuss possible colectomy. There is a possibility that pt will need to be placed on TPN if she remains NPO - she has been NPO for 5 days. D/C plan is undetermined at this time. CM will continue to follow and remain available if any needs arise. LOS = 5 days.

## 2022-03-08 NOTE — PROGRESS NOTES
ACUTE PHYSICAL THERAPY GOALS:  (Developed with and agreed upon by patient and/or caregiver.)  1. Ms. Eli Dillon will perform all bed mobility independently in 5 days. 2.  Ms. Eli Dillon will perform all transfers independently in 5 days. 3.  Ms. Eli Dillon will perform gait >200 ft independently in 5 days    PHYSICAL THERAPY: Daily Note and AM Treatment Day # 2    Elvira Hernandez is a 66 y.o. female   PRIMARY DIAGNOSIS: Paralytic ileus of large intestine (HCC)  Stercoral colitis [K52.89]  MIRANDA (acute kidney injury) ([facility] [N17.9]  Procedure(s) (LRB):  SIGMOIDOSCOPY FLEXIBLE (N/A)  2 Days Post-Op    ASSESSMENT:     REHAB RECOMMENDATIONS: CURRENT LEVEL OF FUNCTION:  (Most Recently Demonstrated)   Recommendation to date pending progress:  [address] [place] [address]  Equipment:    None Bed Mobility:   Contact Guard Assistance  Sit to Stand:   Supervision  Transfers:   Supervision  Gait/Mobility:   Supervision     ASSESSMENT:  Ms. Eli Dillon is upset today because she has been told she will have to have surgery. Took the opportunity to tell her how things will go from PT after surgery also assisted her back to bed. She is moving well but her belly is more distended today and she is uncomfortable. Will need to reassess her post op but expect she will do well.      SUBJECTIVE:   Ms. Eli Dillon states, \"I just found out I need to have surgery\"    SOCIAL HISTORY/ LIVING ENVIRONMENT:   Home Environment: Private residence  One/Two Story Residence: One story  Living Alone: No  Support Systems: Spouse/Significant Other,Child(babatunde)  OBJECTIVE:     PAIN: VITAL SIGNS: LINES/DRAINS:   Pre Treatment: Pain Screen  Pain Scale 1: Numeric (0 - 10)  Post Treatment: uncomfortable   Nasogastric Tube  O2 Device: Nasal cannula     MOBILITY: I Mod I S SBA CGA Min Mod Max Total  NT x2 Comments:   Bed Mobility    Rolling [] [] [x] [] [] [] [] [] [] [] []    Supine to Sit [] [] [] [] [] [] [] [] [] [x] []    Scooting [] [] [x] [] [] [] [] [] [] [] []    Sit to Supine [] [] [] [] [] [] [] [] [] [] []    Transfers    Sit to Stand [] [] [] [x] [] [] [] [] [] [] []    Bed to Chair [] [] [] [x] [] [] [] [] [] [] []    Stand to Sit [] [] [] [x] [] [] [] [] [] [] []    I=Independent, Mod I=Modified Independent, S=Supervision, SBA=Standby Assistance, CGA=Contact Guard Assistance,   Min=Minimal Assistance, Mod=Moderate Assistance, Max=Maximal Assistance, Total=Total Assistance, NT=Not Tested    BALANCE: Good Fair+ Fair Fair- Poor NT Comments   Sitting Static [] [x] [] [] [] []    Sitting Dynamic [] [x] [] [] [] []              Standing Static [] [x] [] [] [] []    Standing Dynamic [] [x] [] [] [] []      GAIT: I Mod I S SBA CGA Min Mod Max Total  NT x2 Comments:   Level of Assistance [] [] [] [x] [] [] [] [] [] [] []    Distance 5 ft    DME     Gait Quality     Weightbearing  Status N/A     I=Independent, Mod I=Modified Independent, S=Supervision, SBA=Standby Assistance, CGA=Contact Guard Assistance,   Min=Minimal Assistance, Mod=Moderate Assistance, Max=Maximal Assistance, Total=Total Assistance, NT=Not Tested    PLAN:   FREQUENCY/DURATION: PT Plan of Care: 3 times/week for duration of hospital stay or until stated goals are met, whichever comes first.  TREATMENT:     TREATMENT:   ($$ Therapeutic Activity: 8-22 mins    )  Therapeutic Activity (10 Minutes): Therapeutic activity included Sit to Supine, Scooting, Transfer Training, Sitting balance  and Standing balance to improve functional Mobility and Strength.     TREATMENT GRID:  N/A    AFTER TREATMENT POSITION/PRECAUTIONS:  Bed, Needs within reach and RN notified    INTERDISCIPLINARY COLLABORATION:  RN/PCT and PT/PTA    TOTAL TREATMENT DURATION:  PT Patient Time In/Time Out  Time In: 1150  Time Out: [address] [facility] [facility], PT

## 2022-03-08 NOTE — PROGRESS NOTES
Comprehensive Nutrition Assessment    Type and Reason for Visit: Initial,NPO/clear liquid    Nutrition Recommendations/Plan:    Continue NPO. Diet progression as GI status allows   If unable to initiate diet within the next 3--5 days, consider nutrition support for primary need. TPN would be appropriate with current GI status. If TPN pursued would need central line placement. Please consult RD to manage if nutrition support pursued. Malnutrition Assessment:  Malnutrition Status: At risk for malnutrition (specify) (NPO x5 days, ? surgery)    Nutrition Assessment:   Nutrition History: Patient reports 2 meals per day at baseline. She states that she was eating normally up until admission. She denies ever having nausea even though she was not having bowel movements. She states that she tried not to eat too much protein as she has CKD. Cultural/Spiritism/Ethnic Food Preference(s): None   Nutrition Background: Patient with PMH significant for chronic constipation, CKD3, HTN, CHF, MI, CAD, DM, GERD, HLD. She presented with increasing abdominal pain for ~2 weeks. CTAP showed steroral colitis. She was admitted with paralytic ileus. Nutrition Interval:  GI has been following and thus far patient is not responding to medical management. She had decompression colonoscopy 3/6. Repeat KUB this am unchanged. Surgery has been consulted. Patient seen sitting up in chair. She states she has \"really packed on the pounds\" since she has not been able to have a bowel movement. She denies abdominal pain at present but does report + bowel distention. Abdominal Status (last documented): Semi-soft,Passing flatus abdomen with Active  bowel sounds. Last BM 03/07/22. Nutrition Related Findings:   No joey wasting of fat/muscle stores. NGT in place.       Current Nutrition Therapies:  DIET NPO    Current Intake:   Average Meal Intake: NPO        Anthropometric Measures:  Height: 5' 4\" (162.6 cm)  Current Body Wt: 81.8 kg (180 lb 5.4 oz) (3/7), Weight source: Bed scale  BMI: 30.9, Obese class 1 (BMI 30.0-34. 9)  Admission Body Weight: 168 lb 6.9 oz (3/4)  Ideal Body Weight (lbs) (Calculated): 120 lbs (55 kg), 150.3 %  Usual Body Wt: 77.6 kg (171 lb) (per patient, confirmed by office wts), Percent weight change: 5.5          Edema: No data recorded   Estimated Daily Nutrient Needs:  Energy (kcal/day): 3809-9162 (Kcal/kg (23-28), Weight Used: Ideal (55 kg))  Protein (g/day): 50-55 (0.9-1 g/kg) Weight Used: (Ideal)  Fluid (ml/day):   (1 ml/kcal)    Nutrition Diagnosis:   · Inadequate oral intake related to altered GI function as evidenced by  (ileus, NGT to LIS)    Nutrition Interventions:   Food and/or Nutrient Delivery: Continue NPO     Coordination of Nutrition Care: Continue to monitor while inpatient  Plan of Care discussed with Haylee Cabrera NP    Goals: Active Goal: Initiate nutrition support vs oral diet by RD follow-up    Nutrition Monitoring and Evaluation:      Food/Nutrient Intake Outcomes: Diet advancement/tolerance  Physical Signs/Symptoms Outcomes: Biochemical data,GI status,Weight    Discharge Planning:     Too soon to determine    [address] [place], LD on 3/8/2022 at 11:55 AM  Contact: [phone]

## 2022-03-08 NOTE — PROGRESS NOTES
Hospitalist Progress Note   Admit Date:  3/2/2022 11:11 AM   Name:  Chico Quintero   Age:  66 y.o. Sex:  female  :  1950   MRN:  [id number]   Room:  [facility]/    Presenting Complaint: Abdominal Pain    Reason(s) for Admission: Stercoral colitis [K52.89]  MIRANDA (acute kidney injury) [facility]) [N17.9]     Hospital Course & Interval History:   Chico Quintero is a 66 y.o. female with PMH of chronic constipation, stage IV CKD, chronic diastolic CHF, CAD, HTN who was relatively well until 8 days ago () when she had a nice large bowel movement, then began experiencing severe abdominal pain on her left side in the lower abdomen the following day. She was evaluted by Dr. Markos Nichole (GI)  and was placed on Cipro and Flagyl for possible diverticulitis. However, her symptoms became worse, and she had not passed any stool since . Ultimately the severe abdominal pain caused her to come in for ED evaluation. ED workup with diagnosis of stercoral colitis based on CT A/P imaging. No noted colonic perforation or upstream small bowel obstruction. ED physician attempted fecal decompaction and requested that the hospitalist service admit her. Gastroenterology was also consulted and continues to follow. Patient underwent decompressive colonoscopy with Dr. Star Ramos 3/6. No obstructing lesion but with apparent dysmotility, currently NPO with NGT. Repeat KUB this am without change. General Surgery has been consulted now to discuss possible partial colectomy given persistent ileus unresponsive to other GI interventions    PT recommendations HH when medically stable to d/c. Recommendations may change if patient undergoes surgical intervention. Subjective/24hr Events (22): Patient evaluated sitting in chair at bedside in obvious emotional distress, very tearful on exam. She denies chest pain and SOB. She endorses abdominal distention worse in epigastric area but denies nausea, vomiting, and diarrhea. I called and spoke with patient's spouse and brother. Brother is going to visit today. Patient is quite tearful over events of today and needs a support system to help her through this. I did try to call her friend, Andrea to ask for a visit but no answer; I left VM. I will consult  services as well.     Assessment & Plan:     Principal Problem:  Stercoral colitis now with Paralytic Ileus Large Intestine  -Gastroenterology consulted and following  -Placed on Cipro/Flagyl, now held per GI  -BCx 3/3 finalized negative  -KUB 3/4 with gaseous distention of colon with constipation particularly in descending segment, continue bowel regimen per GI  -3/5 no improvement, placed NGT/NPO/IVF  -3/6 no improvement, s/p decompressive colonoscopy with Dr. Alfie Ugalde 3/6, no obstructing lesion seen but with apparent dysmotility  -3/7 KUB today unchanged, continue NPO, repeat KUB in am, continue Relistor/Dulcolax  -3/8 KUB today unchanged, Gen Surgery consulted to discuss possible colectomy given persistent Ileus non-responsive to other GI interventions, remains NPO/NGT intact, now with 5 days NPO, may need to consider TPN, currently on IVF at 100 cc/hr    Active Problems:  Hypertension   -Continue home Nifedipine    HLD (hyperlipidemia)   -Continue Rosuvastatin    MIRANDA on Stage 4 chronic kidney disease ([facility] [address] resolved  -Follows Dr. Ashleigh Tello, outpatient Nephrology  -Cr 2.2, stable at baseline, resume home Furosemide today    Type 2 diabetes mellitus, with long-term current use of insulin (HCC)   -Home insulin regimen on hold with current NPO status, resume with diet progression  -Continue SSI Humalog     Chronic diastolic congestive heart failure (HCC)  -Continue Imdur, Furosemide resumed 3/4 and subsequently held again 3/5  -Daily weights, strict I&O's  -Resume home Furosemide today, currently receiving IVF at 100 cc/hr due to NPO/GI status    Acute respiratory failure with hypoxia secondary to volume overload  -Overnight 3/3 with hypoxia 87% on RA, CXR obtained with no evidence of pneumonia or edema, placed on 3L NC and d/c IVF, pBNP 2633 this am, home Furosemide resumed  -Furosemide held due to renal function 3/5, resume 3/8  -Charted O2 Flow Rate (L/min): 3 l/min.   O2 saturations 99%, wean as tolerated, exam without crackles/wheezing, doubt she needs 3L NC    CAD s/p PCI  -Follows Dr. Kedar Schroeder  -Continue ASA / Rosalio Avoca / Kathyanne Me / statin    Chest tightness, intermittent  -EKG with NSR with sinus arrhythmia, no ST elevation or T wave inversion  -Troponin 36.6 --> 37.7  -Telemetry ordered 3/7 due to irregular HR but was not initiated, will be initiated today, currently denies chest pain    Hypokalemia, improving  -K 3.4, replete K and monitor repeat labs in am    Dispo/Discharge Planning:  Pending surgical consultation today    Diet:  DIET NPO  DVT PPx: Lovenox  Code status: Full Code    Hospital Problems as of 3/8/2022 Date Reviewed: 2/7/2022          Codes Class Noted - Resolved POA    * (Principal) Paralytic ileus of large intestine ([email or web address] ICD-10-CM: K56.0  ICD-9-CM: 560.1  3/6/2022 - Present Unknown        Hypokalemia ICD-10-CM: E87.6  ICD-9-CM: 276.8  3/4/2022 - Present Unknown        MIRANDA (acute kidney injury) ([email or web address] ICD-10-CM: N17.9  ICD-9-CM: 584.9  3/2/2022 - Present Unknown        Stercoral colitis ICD-10-CM: K52.89  ICD-9-CM: 558.9  3/2/2022 - Present Unknown        Chronic diastolic congestive heart failure ([facility] [address] ICD-10-CM: I50.32  ICD-9-CM: 428.32, 428.0  4/19/2021 - Present Yes    Overview Signed 7/7/2021 11:06 AM by Cristina Olivas NP     Heart Failure (HFpEF), EF 17-45%, with diastolic dysfunction, ECHO 2/16/2021               Acute respiratory failure with hypoxia [facility]) ICD-10-CM: J96.01  ICD-9-CM: 518.81  2/17/2021 - Present Unknown        Type 2 diabetes mellitus with stage 4 chronic kidney disease, with long-term current use of insulin (HCC) ICD-10-CM: E11.22, N18.4, Z79.4  ICD-9-CM: 250.40, 585.4, V58.67  10/11/2018 - Present Yes        Gastroesophageal reflux disease ICD-10-CM: K21.9  ICD-9-CM: 530.81  2/16/2017 - Present Yes        Stage 4 chronic kidney disease ([facility] [address] ICD-10-CM: N18.4  ICD-9-CM: 585.4  9/15/2016 - Present Yes        HLD (hyperlipidemia) ICD-10-CM: E78.5  ICD-9-CM: 272.4  9/4/2013 - Present Yes        Hypertension (Chronic) ICD-10-CM: I10  ICD-9-CM: 401.9  3/1/2012 - Present Yes              Objective:     Patient Vitals for the past 24 hrs:   Temp Pulse Resp BP SpO2   03/08/22 0845 98.1 °F (36.7 °C) 98 18 139/65 99 %   03/08/22 0322 97.7 °F (36.5 °C) (!) 102 18 136/76 92 %   03/07/22 2359 97.8 °F (36.6 °C) 97 18 138/74 94 %   03/07/22 1931 98.1 °F (36.7 °C) 98 20 (!) 145/75 93 %   03/07/22 1712 -- 98 -- (!) 148/74 --   03/07/22 1602 98 °F (36.7 °C) 98 18 (!) 148/74 96 %   03/07/22 1134 97.9 °F (36.6 °C) 93 28 (!) 149/76 98 %     Oxygen Therapy  O2 Sat (%): 99 % (03/08/22 0845)  Pulse via Oximetry: 91 beats per minute (03/06/22 1136)  O2 Device: Nasal cannula (03/08/22 0845)  Skin Assessment: Clean, dry, & intact (03/07/22 0830)  Skin Protection for O2 Device: N/A (03/07/22 0830)  O2 Flow Rate (L/min): 3 l/min (03/08/22 0749)    Estimated body mass index is 30.97 kg/m² as calculated from the following:    Height as of this encounter: 5' 4\" (1.626 m). Weight as of this encounter: 81.8 kg (180 lb 6.4 oz). Intake/Output Summary (Last 24 hours) at 3/8/2022 1108  Last data filed at 3/7/2022 1457  Gross per 24 hour   Intake --   Output 75 ml   Net -75 ml         Physical Exam:   Blood pressure 139/65, pulse 98, temperature 98.1 °F (36.7 °C), resp. rate 18, height 5' 4\" (1.626 m), weight 81.8 kg (180 lb 6.4 oz), SpO2 99 %. General:    Ill-appearing, emotionally distraught, alert  Head:  Normocephalic, atraumatic  Eyes:  Sclerae appear normal.  Pupils equally round. ENT:  Nares appear normal, no drainage. Moist oral mucosa  Neck:  No restricted ROM. Trachea midline   CV:   RRR. No m/r/g.   No jugular venous distension. Lungs:   CTAB, no wheezing, no rhonchi. Respirations even and unlabored on 3L NC. Good air flow throughout. Abdomen: Bowel sounds hypoactive but present in all 4 quadrants. Rounded/Distended but soft. Nontender to palpation in all 4 quadrants. Extremities: No cyanosis or clubbing. No peripheral edema  Skin:     No rashes and normal coloration. Warm and dry. Neuro:  CN II-XII grossly intact. Sensation intact. A&Ox3. Moves all extremities. No focal deficits.   Psych:  Tearful affect    I have reviewed ordered lab tests and independently visualized imaging below:    Recent Labs:  Recent Results (from the past 48 hour(s))   GLUCOSE, POC    Collection Time: 03/06/22 12:38 PM   Result Value Ref Range    Glucose (POC) 153 (H) 65 - 100 mg/dL    Performed by [facility] Hot[facility]    GLUCOSE, POC    Collection Time: 03/06/22  4:46 PM   Result Value Ref Range    Glucose (POC) 161 (H) 65 - 100 mg/dL    Performed by [facility]    GLUCOSE, POC    Collection Time: 03/06/22  9:42 PM   Result Value Ref Range    Glucose (POC) 169 (H) 65 - 100 mg/dL    Performed by [facility]    CBC W/O DIFF    Collection Time: 03/07/22  5:49 AM   Result Value Ref Range    WBC 12.7 (H) 4.3 - 11.1 K/uL    RBC 3.35 (L) 4.05 - 5.2 M/uL    HGB 10.5 (L) 11.7 - 15.4 g/dL    HCT 31.8 (L) 35.8 - 46.3 %    MCV 94.9 79.6 - 97.8 FL    MCH 31.3 26.1 - 32.9 PG    MCHC 33.0 31.4 - 35.0 g/dL    RDW 12.3 11.9 - 14.6 %    PLATELET 583 969 - 131 K/uL    MPV 9.6 9.4 - 12.3 FL    ABSOLUTE NRBC 0.00 0.0 - 0.2 K/uL   METABOLIC PANEL, BASIC    Collection Time: 03/07/22  5:49 AM   Result Value Ref Range    Sodium 143 136 - 145 mmol/L    Potassium 3.3 (L) 3.5 - 5.1 mmol/L    Chloride 114 (H) 98 - 107 mmol/L    CO2 18 (L) 21 - 32 mmol/L    Anion gap 11 7 - 16 mmol/L    Glucose 162 (H) 65 - 100 mg/dL    BUN 43 (H) 8 - 23 MG/DL    Creatinine 2.40 (H) 0.6 - 1.0 MG/DL    GFR est AA 25 (L) >60 ml/min/1.73m2    GFR est non-AA 21 (L) >60 ml/min/1.73m2 Calcium 8.6 8.3 - 10.4 MG/DL   GLUCOSE, POC    Collection Time: 03/07/22  6:00 AM   Result Value Ref Range    Glucose (POC) 167 (H) 65 - 100 mg/dL    Performed by [facility]    GLUCOSE, POC    Collection Time: 03/07/22  9:23 AM   Result Value Ref Range    Glucose (POC) 185 (H) 65 - 100 mg/dL    Performed by Yue    GLUCOSE, POC    Collection Time: 03/07/22 11:32 AM   Result Value Ref Range    Glucose (POC) 176 (H) 65 - 100 mg/dL    Performed by KarenPCT    GLUCOSE, POC    Collection Time: 03/07/22  4:05 PM   Result Value Ref Range    Glucose (POC) 118 (H) 65 - 100 mg/dL    Performed by [facility]    GLUCOSE, POC    Collection Time: 03/07/22 10:14 PM   Result Value Ref Range    Glucose (POC) 138 (H) 65 - 100 mg/dL    Performed by [facility]    CBC W/O DIFF    Collection Time: 03/08/22  5:44 AM   Result Value Ref Range    WBC 10.6 4.3 - 11.1 K/uL    RBC 3.49 (L) 4.05 - 5.2 M/uL    HGB 10.9 (L) 11.7 - 15.4 g/dL    HCT 33.8 (L) 35.8 - 46.3 %    MCV 96.8 79.6 - 97.8 FL    MCH 31.2 26.1 - 32.9 PG    MCHC 32.2 31.4 - 35.0 g/dL    RDW 13.0 11.9 - 14.6 %    PLATELET 530 981 - 814 K/uL    MPV 9.5 9.4 - 12.3 FL    ABSOLUTE NRBC 0.00 0.0 - 0.2 K/uL   METABOLIC PANEL, BASIC    Collection Time: 03/08/22  5:44 AM   Result Value Ref Range    Sodium 143 136 - 145 mmol/L    Potassium 3.4 (L) 3.5 - 5.1 mmol/L    Chloride 115 (H) 98 - 107 mmol/L    CO2 18 (L) 21 - 32 mmol/L    Anion gap 10 7 - 16 mmol/L    Glucose 138 (H) 65 - 100 mg/dL    BUN 42 (H) 8 - 23 MG/DL    Creatinine 2.20 (H) 0.6 - 1.0 MG/DL    GFR est AA 28 (L) >60 ml/min/1.73m2    GFR est non-AA 23 (L) >60 ml/min/1.73m2    Calcium 8.5 8.3 - 10.4 MG/DL   GLUCOSE, POC    Collection Time: 03/08/22  5:59 AM   Result Value Ref Range    Glucose (POC) 148 (H) 65 - 100 mg/dL    Performed by GiovaniHERB    GLUCOSE, POC    Collection Time: 03/08/22  9:01 AM   Result Value Ref Range    Glucose (POC) 150 (H) 65 - 100 mg/dL Performed by [facility]        All Micro Results     Procedure Component Value Units Date/Time    CULTURE, BLOOD [[id number]] Collected: 03/03/22 1555    Order Status: Completed Specimen: Blood Updated: 03/08/22 0811     Special Requests: --        LEFT  HAND       Culture result: NO GROWTH 5 DAYS       CULTURE, BLOOD [[id number]] Collected: 03/03/22 1547    Order Status: Completed Specimen: Blood Updated: 03/08/22 0811     Special Requests: --        RIGHT  Antecubital       Culture result: NO GROWTH 5 DAYS       CULTURE, URINE [[id number]] Collected: 03/03/22 1345    Order Status: Canceled Specimen: Urine from Clean catch           Other Studies:  XR ABD (KUB)    Result Date: 3/8/2022  KUB: CLINICAL HISTORY:  Follow-up ileus with abdominal distention. COMPARISON:  March 7, 2022 and earlier studies. FINDINGS:  AP supine images demonstrates persistent gaseous distention of the colon with maximum diameter of 11 cm in the proximal sigmoid. No significant small bowel dilatation. No abnormal soft tissue mass or calcific density is noted. Nasogastric tube remains in place. NO SIGNIFICANT INTERVAL CHANGE.       Current Meds:  Current Facility-Administered Medications   Medication Dose Route Frequency    lidocaine 4 % patch 1 Patch  1 Patch TransDERmal Q24H    benzocaine (HURRICANE) 20 % spray   Mucous Membrane QID PRN    traMADoL (ULTRAM) tablet 50 mg  50 mg Oral Q6H PRN    methylnaltrexone (RELISTOR) injection 12 mg  12 mg SubCUTAneous DAILY    [Held by provider] furosemide (LASIX) injection 40 mg  40 mg IntraVENous DAILY    NIFEdipine ER (PROCARDIA XL) tablet 90 mg  90 mg Oral DAILY    sodium chloride (NS) flush 5-40 mL  5-40 mL IntraVENous Q8H    sodium chloride (NS) flush 5-40 mL  5-40 mL IntraVENous PRN    acetaminophen (TYLENOL) tablet 650 mg  650 mg Oral Q6H PRN    Or    acetaminophen (TYLENOL) suppository 650 mg  650 mg Rectal Q6H PRN    ondansetron (ZOFRAN ODT) tablet 4 mg  4 mg Oral Q8H PRN    Or    ondansetron (ZOFRAN) injection 4 mg  4 mg IntraVENous Q6H PRN    enoxaparin (LOVENOX) injection 30 mg  30 mg SubCUTAneous DAILY    0.9% sodium chloride infusion  100 mL/hr IntraVENous CONTINUOUS    insulin lispro (HUMALOG) injection   SubCUTAneous AC&HS    aspirin chewable tablet 81 mg  81 mg Oral 7am    carvediloL (COREG) tablet 3.125 mg  3.125 mg Oral BID WITH MEALS    isosorbide mononitrate ER (IMDUR) tablet 60 mg  60 mg Oral DAILY    rosuvastatin (CRESTOR) tablet 40 mg  40 mg Oral QHS    traZODone (DESYREL) tablet 50 mg  50 mg Oral QHS    polyethylene glycol (MIRALAX) packet 17 g  17 g Oral BID    bisacodyL (DULCOLAX) suppository 10 mg  10 mg Rectal DAILY     Labs, vital signs, diagnostic imaging reviewed. Case discussed with patient, care team, Dr. Devan Morel.     Signed:  Samina Varela NP

## 2022-03-08 NOTE — CONSULTS
H&P/Consult Note/Progress Note/Office Note:   Cari Rivera  MRN: [id number]  :1950  Age:71 y.o.    HPI: Cari Rivera is a 66 y.o. female with a history of chronic constipation, stage III chronic kidney disease, CAD, s/p stent, and HTN,   who was admitted on 3/2/22 by the hospitalist after she presented to the ER with a 1 week history of progressive abdominal pain and distention. She was empirically placed on Cipro and Flagyl in late February for possible diverticulitis her symptoms worsen and she had no bowel movement in several days. She has been diagnosed with stericoral colitis which has been refractory to laxatives and cathartics. She remained with an NG tube in place and is distended. Her abdominal films identifies significant sigmoid colon distention by 3/8/22 as below. GI was consulted to assist with her care and performed decompressive colonoscopy without resolution on 3/6/2022. She denies narcotic use as an outpatient. 3/2/22 CT abd/pelvis with no IV contrast  LIMITED THORAX: Mitral annulus calcifications. Right coronary artery  atherosclerosis. The included portions of the pericardium are unremarkable. The included lung bases are clear.     PERITONEUM/MESENTERY: No evidence of ascites, free gas, or lymphadenopathy.     GI TRACT: There is large volume stool extending from the cecum through the mid descending colon. There is an abrupt marketed transition in colonic caliber in the mid descending colon. There is mild colonic wall thickening and subtle free  fluid in the area of abrupt caliber transition. The distal portions of the colon are decompressed.     The terminal ileum is unremarkable. There is no evidence of upstream small bowel obstruction. Nonvisualized appendix, without pericecal inflammatory change.     The stomach is unremarkable.   SPLEEN: Normal  ADRENALS: Normal  PANCREAS: Normal  LIVER: Normal  GALLBLADDER: Normal     KIDNEYS: Mild bilateral renal cortical thinning. No evidence of hydroureteronephrosis or nephroureterolithiasis. Duplicated left renal collecting system.     BLADDER/: Small posterior bladder wall diverticulum. Prior hysterectomy.     VESSELS: Scattered atherosclerosis.     BONES/SOFT TISSUES: Lumbar spine spondylosis without suspicious lytic or blastic bony lesions.     IMPRESSION     1. There is colonic obstruction consequent to a severe stool burden extending from the cecum through the mid descending colon. There is an abrupt transition in colonic caliber in the mid descending colon. There is subtle bowel wall thickening and surrounding mesenteric stranding of the area of transition in caliber, consistent with a developing stercoral colitis. There is no evidence of associated colonic perforation or upstream small bowel obstruction.     2. Chronic findings otherwise as above.           3/8/22 abd Xray  Hx:  Follow-up ileus with abdominal distention. Persistent gaseous distention of the colon with maximum diameter of 11 cm in the proximal sigmoid. No significant small bowel dilatation. No abnormal soft tissue mass or calcific density is noted. Nasogastric tube remains in place.     IMPRESSION:  NO SIGNIFICANT INTERVAL CHANGE          I saw her previously in 2018 with N/V/RUQ pain    She was lost to followup when she did not show up to her appt on 11/26/18    She is s/p BTL and s/p \"radical\" hysterectomy for cervical dysplasia many yrs ago. She denies any h/o XRT or invasive carcinoma      12/3/14 CT abd/pelvis  IMPRESSION:     1.  No worrisome acute findings seen on this CT scan of the abdomen and pelvis. 12/11/14 s/p EGD ([facility])  Antral scar/mild gastritis  Biopsies-->gastric mucosa with changes of repair; unremarkable small bowel      11/5/18 Abd U/S  IMPRESSION: Negative abdominal ultrasound. 11/21/18 CT neck  Impression: Calcified atherosclerotic disease.     No cervical arterial aneurysm identified.     Right internal carotid artery: 60% stenosis in the distal carotid bulb. Left internal carotid artery: 80-85% stenosis in the distal carotid bulb. Calcified plaque extends from the distal CCA 5.2-9 cm up the LICA. The severe  stenosis is at the distal end of this plaque complex, which corresponds to the C4-C5 level.     Right external carotid artery: 60% stenosis.     Patent right and left vertebral arteries. Past Medical History:   Diagnosis Date    Acute on chronic combined systolic and diastolic congestive heart failure ([address] 2/17/2021    Acute renal failure superimposed on stage 3b chronic kidney disease ([address] 1/12/2021    CAD (coronary artery disease) 3/1/2012    MI, 3 stents    Chest pain     Coronary artery disease involving native coronary artery without angina pectoris 5/21/2015    COVID-19 virus infection 1/12/2021    Depression 3/1/2012    Diabetes mellitus ([address] 3/1/2012    oral agents. . hypo- 80's, Last A1c 6.9 in 6/2018    DM2 (diabetes mellitus, type 2) ([facility] 5/21/2015    Elevated LFTs 1/12/2021    Elevated troponin 3/2/2012    Essential hypertension 5/21/2015    External hemorrhoids without mention of complication 3570    GERD (gastroesophageal reflux disease)     HCAP (healthcare-associated pneumonia) 1/18/2021    History of MI (myocardial infarction) 11/12    x2    Hypercholesterolemia     Hypertension 3/1/2012    Hypoalbuminemia 2/18/2021    Hyponatremia 1/12/2021    Hypoxemia 8/12/2020    Insomnia     Lactic acidosis 1/12/2021    Long QT interval 1/12/2021    Personal history of colonic polyps 2013    hyperplastic    Platelet inhibition due to Plavix     holding for colonoscopy per GI Dr.    [facility] Pleural effusion, bilateral 8/11/2020    Rectocele 2013    Sepsis ([address] 1/12/2021    Tobacco abuse 3/1/2012    quit for 1 year    Tobacco use disorder     Unstable angina ([address] 3/1/2012    ntg as needed.       Past Surgical History: Procedure Laterality Date    FLEXIBLE SIGMOIDOSCOPY N/A 3/6/2022    SIGMOIDOSCOPY FLEXIBLE performed by Keysha Vo [facility] at [facility] ENDOSCOPY    HX CAROTID ENDARTERECTOMY Left 12/12/2018    HX CATARACT REMOVAL Left 09/19/2016    Dr Marsha Jenkins, [id number] [address] Right 11/07/2016    Dr Marsha Jenkins, [facility]    [address]      neck w/plate    HX COLONOSCOPY  12/17/13    Anna Marie--single transverse hyperplastic polyp--7-10 year recall    HX HEMORRHOIDECTOMY      x2    HX ORTHOPAEDIC      left elbow    HX CAL AND BSO      HX WISDOM TEETH EXTRACTION      LA LEFT HEART CATH,PERCUTANEOUS  last stented 11/12 x 2    stent x3 , mi x 2     Current Facility-Administered Medications   Medication Dose Route Frequency    furosemide (LASIX) tablet 40 mg  40 mg Oral DAILY    magic mouthwash (TC) oral suspension 5 mL  5 mL Swish and Spit Q4H    lidocaine 4 % patch 1 Patch  1 Patch TransDERmal Q24H    benzocaine (HURRICANE) 20 % spray   Mucous Membrane QID PRN    traMADoL (ULTRAM) tablet 50 mg  50 mg Oral Q6H PRN    methylnaltrexone (RELISTOR) injection 12 mg  12 mg SubCUTAneous DAILY    NIFEdipine ER (PROCARDIA XL) tablet 90 mg  90 mg Oral DAILY    sodium chloride (NS) flush 5-40 mL  5-40 mL IntraVENous Q8H    sodium chloride (NS) flush 5-40 mL  5-40 mL IntraVENous PRN    acetaminophen (TYLENOL) tablet 650 mg  650 mg Oral Q6H PRN    Or    acetaminophen (TYLENOL) suppository 650 mg  650 mg Rectal Q6H PRN    ondansetron (ZOFRAN ODT) tablet 4 mg  4 mg Oral Q8H PRN    Or    ondansetron (ZOFRAN) injection 4 mg  4 mg IntraVENous Q6H PRN    enoxaparin (LOVENOX) injection 30 mg  30 mg SubCUTAneous DAILY    0.9% sodium chloride infusion  100 mL/hr IntraVENous CONTINUOUS    insulin lispro (HUMALOG) injection   SubCUTAneous AC&HS    aspirin chewable tablet 81 mg  81 mg Oral 7am    carvediloL (COREG) tablet 3.125 mg  3.125 mg Oral BID WITH MEALS    isosorbide mononitrate ER (IMDUR) tablet 60 mg  60 mg Oral DAILY    rosuvastatin (CRESTOR) tablet 40 mg  40 mg Oral QHS    traZODone (DESYREL) tablet 50 mg  50 mg Oral QHS    bisacodyL (DULCOLAX) suppository 10 mg  10 mg Rectal DAILY     Cephalosporins, Hydralazine, Sulfamethoxazole-trimethoprim, Codeine, and Lisinopril  Social History     Socioeconomic History    Marital status:    Tobacco Use    Smoking status: Former Smoker     Packs/day: 1.00     Years: 40.00     Pack years: 40.00     Quit date: 2012     Years since quittin.3    Smokeless tobacco: Never Used    Tobacco comment: quit  . has stopped prior also   Vaping Use    Vaping Use: Never used   Substance and Sexual Activity    Alcohol use: No    Drug use: No     Social History     Tobacco Use   Smoking Status Former Smoker    Packs/day: 1.00    Years: 40.00    Pack years: 40.00    Quit date: 2012    Years since quittin.3   Smokeless Tobacco Never Used   Tobacco Comment    quit  . has stopped prior also     Family History   Problem Relation Age of Onset    Heart Disease Mother     Osteoporosis Mother     Heart Attack Mother 67        mi    Thyroid Disease Mother         Multinodular Goiter    Heart Disease Father     Cancer Father         pancreatic    Heart Attack Father 67        MI    Cancer Sister         Pre-Cancerous dysplasia    Thyroid Cancer Sister     Ulcerative Colitis Brother     Thyroid Cancer Brother     Breast Cancer Neg Hx      ROS: The patient has no difficulty with chest pain or shortness of breath. No fever or chills. Comprehensive review of systems was otherwise unremarkable except as noted above.     Physical Exam:   Visit Vitals  BP (!) 144/79 (BP 1 Location: Right upper arm, BP Patient Position: At rest;Semi fowlers)   Pulse (!) 101   Temp 97.7 °F (36.5 °C)   Resp 16   Ht 5' 4\" (1.626 m)   Wt 180 lb 6.4 oz (81.8 kg)   SpO2 93%   BMI 30.97 kg/m²     Vitals:    22 1139 22 1200 22 1526 22 1600   BP:   (!) 144/79    Pulse:  99 96 (!) 101   Resp:   16    Temp:   97.7 °F (36.5 °C)    SpO2:   93%    Weight:       Height: 5' 4\" (1.626 m)        03/08 0701 - 03/08 1900  In: -   Out: 100   03/06 1901 - 03/08 0700  In: -   Out: 8414 [Urine:700]    Constitutional: Alert, oriented, cooperative patient in no acute distress; appears stated age    Eyes:Sclera are clear. EOMs intact  ENMT: no external lesions gross hearing normal; no obvious neck masses, no ear or lip lesions, nares normal; +NGT  CV: RRR. Normal perfusion  Resp: No JVD. Breathing is  non-labored; no audible wheezing. GI: distended, no peritonitis     Musculoskeletal: unremarkable with normal function. No embolic signs or cyanosis. Neuro:  Oriented; moves all 4; no focal deficits  Psychiatric: normal affect and mood, no memory impairment    Recent vitals (if inpt):  Patient Vitals for the past 24 hrs:   BP Temp Pulse Resp SpO2 Height   03/08/22 1600 -- -- (!) 101 -- -- --   03/08/22 1526 (!) 144/79 97.7 °F (36.5 °C) 96 16 93 % --   03/08/22 1200 -- -- 99 -- -- --   03/08/22 1139 -- -- -- -- -- 5' 4\" (1.626 m)   03/08/22 0845 139/65 98.1 °F (36.7 °C) 98 18 99 % --   03/08/22 0322 136/76 97.7 °F (36.5 °C) (!) 102 18 92 % --   03/07/22 2359 138/74 97.8 °F (36.6 °C) 97 18 94 % --   03/07/22 1931 (!) 145/75 98.1 °F (36.7 °C) 98 20 93 % --       Amount and/or Complexity of Data Reviewed and Analyzed:  I reviewed and analyzed all of the unique labs and radiologic studies that are shown below as well as any that are in the HPI, and any that are in the expanded problem list below  *Each unique test, order, or document contributes to the combination of 2 or combination of 3 in Category 1 below. For this visit I also reviewed old records and prior notes.       Recent Labs     03/08/22  0544   WBC 10.6   HGB 10.9*         K 3.4*   *   CO2 18*   BUN 42*   CREA 2.20*   *     Review of most recent CBC  Lab Results   Component Value Date/Time    WBC 10.6 03/08/2022 05:44 AM    HGB 10.9 (L) 03/08/2022 05:44 AM    HCT 33.8 (L) 03/08/2022 05:44 AM    PLATELET 927 16/91/5265 05:44 AM    MCV 96.8 03/08/2022 05:44 AM       Review of most recent BMP  Lab Results   Component Value Date/Time    Sodium 143 03/08/2022 05:44 AM    Potassium 3.4 (L) 03/08/2022 05:44 AM    Chloride 115 (H) 03/08/2022 05:44 AM    CO2 18 (L) 03/08/2022 05:44 AM    Anion gap 10 03/08/2022 05:44 AM    Glucose 138 (H) 03/08/2022 05:44 AM    BUN 42 (H) 03/08/2022 05:44 AM    Creatinine 2.20 (H) 03/08/2022 05:44 AM    BUN/Creatinine ratio 19 11/03/2021 04:06 PM    GFR est AA 28 (L) 03/08/2022 05:44 AM    GFR est non-AA 23 (L) 03/08/2022 05:44 AM    Calcium 8.5 03/08/2022 05:44 AM       Review of most recent LFTs (and lipase if done)  Lab Results   Component Value Date/Time    ALT (SGPT) 39 03/04/2022 06:13 AM    AST (SGOT) 28 03/04/2022 06:13 AM    Alk. phosphatase 117 03/04/2022 06:13 AM    Bilirubin, direct 0.10 11/03/2021 04:06 PM    Bilirubin, total 0.4 03/04/2022 06:13 AM     Lab Results   Component Value Date/Time    Lipase 157 03/02/2022 09:14 AM       Lab Results   Component Value Date/Time    INR 1.1 01/13/2021 06:21 AM    Bilirubin, direct 0.10 11/03/2021 04:06 PM    Troponin-I, Qt. <0.02 (L) 02/05/2017 09:08 PM       Review of most recent HgbA1c  Lab Results   Component Value Date/Time    Hemoglobin A1c 6.6 (H) 11/03/2021 04:06 PM       Nutritional assessment screen for wound healing issues:  Lab Results   Component Value Date/Time    Protein, total 6.9 03/04/2022 06:13 AM    Albumin 3.1 (L) 03/04/2022 06:13 AM       @lastcovr@  XR Results (most recent):  Results from [place] encounter on 03/02/22    XR ABD (KUB)    Narrative  KUB:    CLINICAL HISTORY:  Follow-up ileus with abdominal distention. COMPARISON:  March 7, 2022 and earlier studies.     FINDINGS:  AP supine images demonstrates persistent gaseous distention of the  colon with maximum diameter of 11 cm in the proximal sigmoid. No significant  small bowel dilatation. No abnormal soft tissue mass or calcific density is  noted. Nasogastric tube remains in place. Impression  NO SIGNIFICANT INTERVAL CHANGE. CT Results (most recent):  Results from Hospital Encounter encounter on 03/02/22    CT ABD PELV WO CONT    Narrative  EXAMINATION: CT ABD PELV WO CONT 3/2/2022 1:54 PM    ACCESSION NUMBER: [id number]    COMPARISON: Abdominal x-rays 8/18/2021 CT abdomen and pelvis 8/17/2021    INDICATION: Left lower quadrant pain. Suspected diverticulitis Patient presents  with complaints of left flank pain into left lower abdomen. Patient was recently  seen in the office and has been treated for diverticulosis. TECHNIQUE: Contiguous axial computed tomographic images were obtained from the  domes of the diaphragm to the symphysis pubis without intravenous contrast.  Coronal and sagittal reformats are provided. Oral contrast was administered. Please note that the detection of solid organ and vascular abnormalities is  limited in the absence of intravenous contrast.    Radiation dose reduction techniques were used for this study. Our CT scanners  use one or all of the following: Automated exposure control, adjustment of the  mA and/or kV according to patient size, iterative reconstruction. FINDINGS:    LIMITED THORAX: Mitral annulus calcifications. Right coronary artery  atherosclerosis. The included portions of the pericardium are unremarkable. The  included lung bases are clear. PERITONEUM/MESENTERY: No evidence of ascites, free gas, or lymphadenopathy. GI TRACT: There is large volume stool extending from the cecum through the mid  descending colon. There is an abrupt marketed transition in colonic caliber in  the mid descending colon. There is mild colonic wall thickening and subtle free  fluid in the area of abrupt caliber transition. The distal portions of the colon  are decompressed.     The terminal ileum is unremarkable. There is no evidence of upstream small bowel  obstruction. Nonvisualized appendix, without pericecal inflammatory change. The stomach is unremarkable. SPLEEN: Normal    ADRENALS: Normal    PANCREAS: Normal    LIVER: Normal    GALLBLADDER: Normal    KIDNEYS: Mild bilateral renal cortical thinning. No evidence of  hydroureteronephrosis or nephroureterolithiasis. Duplicated left renal  collecting system. BLADDER/: Small posterior bladder wall diverticulum. Prior hysterectomy. VESSELS: Scattered atherosclerosis. BONES/SOFT TISSUES: Lumbar spine spondylosis without suspicious lytic or blastic  bony lesions. Impression  1. There is colonic obstruction consequent to a severe stool burden extending  from the cecum through the mid descending colon. There is an abrupt transition  in colonic caliber in the mid descending colon. There is subtle bowel wall  thickening and surrounding mesenteric stranding of the area of transition in  caliber, consistent with a developing stercoral colitis. There is no evidence of  associated colonic perforation or upstream small bowel obstruction. 2.  Chronic findings otherwise as above. US Results (most recent):  Results from [facility] on 10/15/21    US RETROPERITONEUM COMP    Narrative  RENAL ULTRASOUND. INDICATION: Chronic stage V renal failure. COMPARISON: February 2021. TECHNIQUE: Direct skin contact sector 3-5 MHz images of the kidneys are  obtained. FINDINGS: Renal echogenicity grossly unremarkable, the right kidney is  hypoechoic compared to the liver. Right kidney: 9.3 cm in length. No hydronephrosis. Left kidney: 11.8 cm in length. No hydronephrosis. Urinary bladder: Unremarkable. Vasculature: Aorta: Normal caliber. IVC:  Patent. Impression  Unremarkable renal ultrasound. Negative for obstruction.         Admission date (for inpatients): 3/2/2022   2 Days Post-Op Procedure(s):  SIGMOIDOSCOPY FLEXIBLE        ASSESSMENT/PLAN:  Problem List  Date Reviewed: 2/7/2022          Codes Class Noted    * (Principal) Paralytic ileus of large intestine ([facility] ICD-10-CM: K56.0  ICD-9-CM: 560.1  3/6/2022        Hypokalemia ICD-10-CM: E87.6  ICD-9-CM: 276.8  3/4/2022        MIRANDA (acute kidney injury) ([facility] ICD-10-CM: N17.9  ICD-9-CM: 584.9  3/2/2022        Stercoral colitis ICD-10-CM: K52.89  ICD-9-CM: 558.9  3/2/2022        CAD (coronary artery disease) (Chronic) ICD-10-CM: I25.10  ICD-9-CM: 414.00  8/19/2021        Chest pain ICD-10-CM: R07.9  ICD-9-CM: 786.50  8/19/2021        Anemia ICD-10-CM: D64.9  ICD-9-CM: 285.9  8/19/2021        Elevated ALT measurement ICD-10-CM: R74.01  ICD-9-CM: 790.4  7/7/2021        Polyarthritis ICD-10-CM: M13.0  ICD-9-CM: 716.50  7/7/2021        Chronic diastolic congestive heart failure (HCC) ICD-10-CM: I50.32  ICD-9-CM: 428.32, 428.0  4/19/2021    Overview Signed 7/7/2021 11:06 AM by Alba Loyola NP     Heart Failure (HFpEF), EF 75-17%, with diastolic dysfunction, ECHO 2/16/2021               Acute respiratory failure with hypoxia (HCC) ICD-10-CM: J96.01  ICD-9-CM: 518.81  2/17/2021        Normocytic anemia ICD-10-CM: D64.9  ICD-9-CM: 285.9  1/12/2021        Mixed hyperlipidemia ICD-10-CM: I11.3  ICD-9-CM: 272.2  10/21/2020        S/P PTCA (percutaneous transluminal coronary angioplasty) ICD-10-CM: Z98.61  ICD-9-CM: V45.82  9/1/2020        History of left-sided carotid endarterectomy ICD-10-CM: Z98.890  ICD-9-CM: V45.89  3/12/2020        Carotid stenosis, asymptomatic, right ICD-10-CM: I65.21  ICD-9-CM: 433.10  3/12/2020        Carotid artery stenosis ICD-10-CM: I65.29  ICD-9-CM: 433.10  12/12/2018        Right carotid bruit ICD-10-CM: R09.89  ICD-9-CM: 785.9  11/6/2018        Type 2 diabetes mellitus with stage 4 chronic kidney disease, with long-term current use of insulin ([facility] ICD-10-CM: E11.22, N18.4, Z79.4  ICD-9-CM: 250.40, 585.4, V58.67  10/11/2018 Gastroesophageal reflux disease ICD-10-CM: K21.9  ICD-9-CM: 530.81  2/16/2017        Essential hypertension (Chronic) ICD-10-CM: I10  ICD-9-CM: 401.9  9/15/2016        Stage 4 chronic kidney disease ([facility] ICD-10-CM: N18.4  ICD-9-CM: 585.4  9/15/2016        Age-related osteoporosis without current pathological fracture ICD-10-CM: M81.0  ICD-9-CM: 733.01  9/15/2016        Major depressive disorder with single episode, in full remission ([facility] ICD-10-CM: F32.5  ICD-9-CM: 296.26  9/15/2016        Primary insomnia ICD-10-CM: F51.01  ICD-9-CM: 307.42  9/15/2016        Coronary artery disease involving native coronary artery of native heart without angina pectoris ICD-10-CM: I25.10  ICD-9-CM: 414.01  9/4/2013        HLD (hyperlipidemia) ICD-10-CM: E78.5  ICD-9-CM: 272.4  9/4/2013        Hypertension (Chronic) ICD-10-CM: I10  ICD-9-CM: 401.9  3/1/2012            Principal Problem:    Paralytic ileus of large intestine ([facility] (3/6/2022)    Active Problems:    Hypertension (3/1/2012)      HLD (hyperlipidemia) (9/4/2013)      Stage 4 chronic kidney disease ([facility] (9/15/2016)      Gastroesophageal reflux disease (2/16/2017)      Type 2 diabetes mellitus with stage 4 chronic kidney disease, with long-term current use of insulin ([facility] (10/11/2018)      Acute respiratory failure with hypoxia (HCC) (2/17/2021)      Chronic diastolic congestive heart failure ([facility] (4/19/2021)      Overview: Heart Failure (HFpEF), EF 72-00%, with diastolic dysfunction, ECHO       2/16/2021            MIRANDA (acute kidney injury) ([facility] (3/2/2022)      Stercoral colitis (3/2/2022)      Hypokalemia (3/4/2022)           Number and Complexity of Problems addressed and   Risks of complications and/or morbidity of management      Stercoral colitis  She described eating large quantites of vegetables prior to admission and on a regular basis  NGT  NPO  Will likely need PICC/TPN soon    Repeat CT in am without contrast  Possible subtotal colectomy with ileostomy soon if she doesn't open up soon    Advantages/disadvantages and risk/benefits of surgery versus no surgery discussed at length   All questions answered          Level of MDM (2/3 elements below)  Number and Complexity of Problems Addressed Amount and/or Complexity of Data to be Reviewed and Analyzed  *Each unique test, order, or document contributes to the combination of 2 or combination of 3 in Category 1 below.  Risk of Complications and/or Morbidity or Mortality of pt Management     49061  18672 SF Minimal  1self-limited or minor problem Minimal or none Minimal risk of morbidity from additional diagnostic testing or Rx   71726  66081 Low Low  2or more self-limited or minor problems;    or  1stable chronic illness;    or  6CKQGN, uncomplicated illness or injury   Limited  (Must meet the requirements of at least 1 of the 2 categories)  Category 1: Tests and documents   Any combination of 2 from the following:  Review of prior external note(s) from each unique source*;  review of the result(s) of each unique test*;   ordering of each unique test*    or   Category 2: Assessment requiring an independent historian(s)  (For the categories of independent interpretation of tests and discussion of management or test interpretation, see moderate or high) Low risk of morbidity from additional diagnostic testing or treatment     57783  92901 Mod Moderate  1or more chronic illnesses with exacerbation, progression, or side effects of treatment;    or  2or more stable chronic illnesses;    or  1undiagnosed new problem with uncertain prognosis;    or  1acute illness with systemic symptoms;    or  1SLRPX complicated injury   Moderate  (Must meet the requirements of at least 1 out of 3 categories)  Category 1: Tests, documents, or independent historian(s)  Any combination of 3 from the following:   Review of prior external note(s) from each unique source*;  Review of the result(s) of each unique test*;  Ordering of each unique test*;  Assessment requiring an independent historian(s)    or  Category 2: Independent interpretation of tests   Independent interpretation of a test performed by another physician/other qualified health care professional (not separately reported);     or  Category 3: Discussion of management or test interpretation  Discussion of management or test interpretation with external physician/other qualified health care professional/appropriate source (not separately reported)   Moderate risk of morbidity from additional diagnostic testing or treatment  Examples only:  Prescription drug management   Decision regarding minor surgery with identified patient or procedure risk factors  Decision regarding elective major surgery without identified patient or procedure risk factors   Diagnosis or treatment significantly limited by social determinants of health       11970  [id number] High High  1or more chronic illnesses with severe exacerbation, progression, or side effects of treatment;    or  1 acute or chronic illness or injury that poses a threat to life or bodily function   Extensive  (Must meet the requirements of at least 2 out of 3 categories)  Category 1: Tests, documents, or independent historian(s)  Any combination of 3 from the following:   Review of prior external note(s) from each unique source*;  Review of the result(s) of each unique test*;   Ordering of each unique test*;   Assessment requiring an independent historian(s)    or   Category 2: Independent interpretation of tests   Independent interpretation of a test performed by another physician/other qualified health care professional (not separately reported);     or  Category 3: Discussion of management or test interpretation  Discussion of management or test interpretation with external physician/other qualified health care professional/appropriate source (not separately reported)   High risk of morbidity from additional diagnostic testing or treatment  Examples only:  Drug therapy requiring intensive monitoring for toxicity  Decision regarding elective major surgery with identified patient or procedure risk factors  Decision regarding emergency major surgery  Decision regarding hospitalization  Decision not to resuscitate or to de-escalate care because of poor prognosis             I have personally performed a face-to-face diagnostic evaluation and management  service on this patient. I have independently seen the patient. I have independently obtained the above history from the patient/family. I have independently examined the patient with above findings. I have independently reviewed data/labs for this patient and developed the above plan of care (MDM). Signed: Jing Harris MD, FACS

## 2022-03-08 NOTE — PROGRESS NOTES
[facility] Progress Note         Admit Date:  3/2/2022    Today's Date:  3/8/2022    CC:  ileus    Subjective:     Patient has had her NGT clamped periodically and allowed sips of liquids which she states she tolerates without N/V. She remains distended. Reports one BM 3/6 and one BM 3/7 despite Relistor IM, dulcolax suppository daily, decompressive colonoscopy 3/6 and NGT to LIS. KUB this am is unchanged with mention of maximal colon diameter of 11cm.     Medications:   Current Facility-Administered Medications   Medication Dose Route Frequency    lidocaine 4 % patch 1 Patch  1 Patch TransDERmal Q24H    benzocaine (HURRICANE) 20 % spray   Mucous Membrane QID PRN    traMADoL (ULTRAM) tablet 50 mg  50 mg Oral Q6H PRN    methylnaltrexone (RELISTOR) injection 12 mg  12 mg SubCUTAneous DAILY    [Held by provider] furosemide (LASIX) injection 40 mg  40 mg IntraVENous DAILY    NIFEdipine ER (PROCARDIA XL) tablet 90 mg  90 mg Oral DAILY    sodium chloride (NS) flush 5-40 mL  5-40 mL IntraVENous Q8H    sodium chloride (NS) flush 5-40 mL  5-40 mL IntraVENous PRN    acetaminophen (TYLENOL) tablet 650 mg  650 mg Oral Q6H PRN    Or    acetaminophen (TYLENOL) suppository 650 mg  650 mg Rectal Q6H PRN    ondansetron (ZOFRAN ODT) tablet 4 mg  4 mg Oral Q8H PRN    Or    ondansetron (ZOFRAN) injection 4 mg  4 mg IntraVENous Q6H PRN    enoxaparin (LOVENOX) injection 30 mg  30 mg SubCUTAneous DAILY    0.9% sodium chloride infusion  100 mL/hr IntraVENous CONTINUOUS    insulin lispro (HUMALOG) injection   SubCUTAneous AC&HS    aspirin chewable tablet 81 mg  81 mg Oral 7am    carvediloL (COREG) tablet 3.125 mg  3.125 mg Oral BID WITH MEALS    isosorbide mononitrate ER (IMDUR) tablet 60 mg  60 mg Oral DAILY    rosuvastatin (CRESTOR) tablet 40 mg  40 mg Oral QHS    traZODone (DESYREL) tablet 50 mg  50 mg Oral QHS    polyethylene glycol (MIRALAX) packet 17 g  17 g Oral BID    bisacodyL (DULCOLAX) suppository 10 mg  10 mg Rectal DAILY       Review of Systems:  ROS was obtained, with pertinent positives as listed above. No chest pain or SOB. Diet:  NPO    Objective:   Vitals:  Visit Vitals  /65 (BP 1 Location: Right upper arm, BP Patient Position: Sitting)   Pulse 98   Temp 98.1 °F (36.7 °C)   Resp 18   Ht 5' 4\" (1.626 m)   Wt 81.8 kg (180 lb 6.4 oz)   SpO2 99%   BMI 30.97 kg/m²     Intake/Output:  No intake/output data recorded. 03/06 1901 - 03/08 0700  In: -   Out: 1151 [Urine:700]  Exam:  General appearance: alert, cooperative, no distress  Lungs: clear to auscultation bilaterally anteriorly  Heart: regular rate and rhythm  Abdomen: DISTENDED, TYMPANIC, DIMINISHED BOWEL SOUNDS, NGT TO SUCTION. No masses, no organomegaly  Extremities: extremities normal, atraumatic, no cyanosis or edema  Neuro:  alert and oriented    Data Review (Labs):    Recent Labs     03/08/22  0544 03/07/22  0549 03/06/22  0559   WBC 10.6 12.7* 10.9   HGB 10.9* 10.5* 10.8*   HCT 33.8* 31.8* 33.1*    252 231   MCV 96.8 94.9 95.1    143 140   K 3.4* 3.3* 3.2*   * 114* 110*   CO2 18* 18* 22   BUN 42* 43* 38*   CREA 2.20* 2.40* 2.90*   CA 8.5 8.6 8.7   * 162* 143*     EGD 8/5/20 by Dr. Nicolasa Jacobson: reflux esophagitis, acute gastritis, duodenitis. Pathology unremarkable.     Colonoscopy 8/5/20 by Dr. Nicolasa Jacobson: transverse colon polyp, diverticulosis, IH. Pathology c/w TA. Recall 5 years.        CT abdomen and pelvis without contrast 3/1/22  IMPRESSION  1. Donna Pod is colonic obstruction consequent to a severe stool burden extending  from the cecum through the mid descending colon. There is an abrupt transition  in colonic caliber in the mid descending colon. There is subtle bowel wall  thickening and surrounding mesenteric stranding of the area of transition in  caliber, consistent with a developing stercoral colitis. There is no evidence of  associated colonic perforation or upstream small bowel obstruction.   2.  Chronic findings otherwise as above.     KUB 3/4/22: Findings:       Single view of the abdomen demonstrates predominantly gaseous distended loops of  colon however with areas of moderate constipation particularly in the descending  segment.  There is no soft tissue mass or organomegaly.  No gross free  intraperitoneal air is identified.  No acute osseous abnormality is  demonstrated. Advanced degenerative changes seen throughout the hips.     IMPRESSION 1. Predominantly gaseous distention of colon however with areas of constipation  particularly in the descending segment.        Colonoscopy 3/6/22 Dr. Aide Lacey:   Prep: Poor  Findings:   Ileum: Not seen  Colon: Scope advanced to 79 CM, in the transverse colon. The distal colon was fairly clear.  Little or no air was used to minimize risk of over-distension, so visualization was extremely limited.  At 40cm, in the descending colon, there is a large amount of dark green/ brown solid stool. Di Pleas is no evidence of stricture or mass. The scope was advanced along the side of the lumen, to the splenic flexure.  Beyond this, the transverse colon was dilated and filled w/ air and liquid stool.  This was suctioned for decompression.  On withdrawal, the solid stool was irrigated, and seemed to break up into pieces.  A large amount of stool was passed during the procedure.  Air and liquid was suctioned carefully on withdrawal for decompression. Rectum: No retroflex attempted. Recommendations:  No obstructing lesion.  Apparent dysmotility. Continue miralax and relistor  Cont NGT suction intermittently  NPO for now  Repeat KUB in AM     KUB 3/7/22: FINDINGS:      Gaseous distention of the colon is unchanged when compared to yesterday's x-ray. The sigmoid colon measures up to 11 cm in diameter. No clear dilation of small  bowel. No intraperitoneal free air. Nasogastric tube terminates in the stomach.     IMPRESSION  Unchanged severe gaseous distention of the colon.     KUB 3/8/22: FINDINGS:    AP supine images demonstrates persistent gaseous distention of the  colon with maximum diameter of 11 cm in the proximal sigmoid. No significant  small bowel dilatation. No abnormal soft tissue mass or calcific density is  noted. Nasogastric tube remains in place.     IMPRESSION  NO SIGNIFICANT INTERVAL CHANGE. Assessment:     Principal Problem:    Paralytic ileus of large intestine ([address] (3/6/2022)    Active Problems:    Hypertension (3/1/2012)      HLD (hyperlipidemia) (9/4/2013)      Stage 4 chronic kidney disease ([address] (9/15/2016)      Gastroesophageal reflux disease (2/16/2017)      Type 2 diabetes mellitus with stage 4 chronic kidney disease, with long-term current use of insulin ([address] (10/11/2018)      Acute respiratory failure with hypoxia (HCC) (2/17/2021)      Chronic diastolic congestive heart failure ([address] (4/19/2021)      Overview: Heart Failure (HFpEF), EF 44-26%, with diastolic dysfunction, ECHO       2/16/2021            MIRANDA (acute kidney injury) ([address] (3/2/2022)      Stercoral colitis (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 o. female with PMH including but not limited to coronary artery disease status post PCI 2012, diabetes, hypertension, chronic kidney disease stage 3, who is seen in consultation at the request of Dr. Veronica Edwards constipation abdominal pain and stercoral colitis. S/P decompressive colonoscopy with Dr. Idris Crane 3/6/22 with report of passing stool  3/6 and 3/7  on Relistor IM and dulcolax suppository daily and NGT to LIS but no change on KUB or exam. Maximal diameter of sigmoid colon on KUB today measured at 11cm     Plan:     - IP consult to surgery to discuss partial colectomy  - continue Relistor daily and Dulcolax suppository QD   - continue NPO and NGT to [address], [place]        Patient is seen and examined in collaboration with Dr. Kaley Clements. Assessment and plan as per Dr. Kaley Clements.

## 2022-03-09 ENCOUNTER — ANESTHESIA (OUTPATIENT)
Dept: SURGERY | Age: 72
DRG: 344 | End: 2022-03-09
Payer: MEDICARE

## 2022-03-09 ENCOUNTER — APPOINTMENT (OUTPATIENT)
Dept: CT IMAGING | Age: 72
DRG: 344 | End: 2022-03-09
Attending: SURGERY
Payer: MEDICARE

## 2022-03-09 ENCOUNTER — ANESTHESIA EVENT (OUTPATIENT)
Dept: SURGERY | Age: 72
DRG: 344 | End: 2022-03-09
Payer: MEDICARE

## 2022-03-09 PROBLEM — J96.01 ACUTE RESPIRATORY FAILURE WITH HYPOXIA (HCC): Status: RESOLVED | Noted: 2021-02-17 | Resolved: 2022-03-09

## 2022-03-09 PROBLEM — E87.6 HYPOKALEMIA: Status: RESOLVED | Noted: 2022-03-04 | Resolved: 2022-03-09

## 2022-03-09 PROBLEM — K56.0 PARALYTIC ILEUS OF LARGE INTESTINE (HCC): Status: RESOLVED | Noted: 2022-03-06 | Resolved: 2022-03-09

## 2022-03-09 PROBLEM — N17.9 AKI (ACUTE KIDNEY INJURY) (HCC): Status: RESOLVED | Noted: 2022-03-02 | Resolved: 2022-03-09

## 2022-03-09 PROBLEM — K56.609 COLON OBSTRUCTION (HCC): Status: ACTIVE | Noted: 2022-03-09

## 2022-03-09 LAB
ALBUMIN SERPL-MCNC: 3.1 G/DL (ref 3.2–4.6)
ALBUMIN/GLOB SERPL: 0.8 (ref 1.2–3.5)
ALP SERPL-CCNC: 109 U/L (ref 50–136)
ALT SERPL-CCNC: 42 U/L (ref 12–65)
ANION GAP SERPL CALC-SCNC: 9 MMOL/L (ref 7–16)
AST SERPL-CCNC: 48 U/L (ref 15–37)
BILIRUB SERPL-MCNC: 0.4 MG/DL (ref 0.2–1.1)
BUN SERPL-MCNC: 43 MG/DL (ref 8–23)
CALCIUM SERPL-MCNC: 8.5 MG/DL (ref 8.3–10.4)
CHLORIDE SERPL-SCNC: 114 MMOL/L (ref 98–107)
CO2 SERPL-SCNC: 19 MMOL/L (ref 21–32)
CREAT SERPL-MCNC: 2.2 MG/DL (ref 0.6–1)
ERYTHROCYTE [DISTWIDTH] IN BLOOD BY AUTOMATED COUNT: 13.2 % (ref 11.9–14.6)
GLOBULIN SER CALC-MCNC: 3.7 G/DL (ref 2.3–3.5)
GLUCOSE BLD STRIP.AUTO-MCNC: 139 MG/DL (ref 65–100)
GLUCOSE BLD STRIP.AUTO-MCNC: 145 MG/DL (ref 65–100)
GLUCOSE BLD STRIP.AUTO-MCNC: 155 MG/DL (ref 65–100)
GLUCOSE BLD STRIP.AUTO-MCNC: 167 MG/DL (ref 65–100)
GLUCOSE SERPL-MCNC: 143 MG/DL (ref 65–100)
HCT VFR BLD AUTO: 33.7 % (ref 35.8–46.3)
HGB BLD-MCNC: 10.9 G/DL (ref 11.7–15.4)
LACTATE SERPL-SCNC: 0.9 MMOL/L (ref 0.4–2)
MCH RBC QN AUTO: 31 PG (ref 26.1–32.9)
MCHC RBC AUTO-ENTMCNC: 32.3 G/DL (ref 31.4–35)
MCV RBC AUTO: 95.7 FL (ref 79.6–97.8)
NRBC # BLD: 0 K/UL (ref 0–0.2)
PLATELET # BLD AUTO: 274 K/UL (ref 150–450)
PMV BLD AUTO: 9.3 FL (ref 9.4–12.3)
POTASSIUM SERPL-SCNC: 3.7 MMOL/L (ref 3.5–5.1)
PROT SERPL-MCNC: 6.8 G/DL (ref 6.3–8.2)
RBC # BLD AUTO: 3.52 M/UL (ref 4.05–5.2)
SERVICE CMNT-IMP: ABNORMAL
SODIUM SERPL-SCNC: 142 MMOL/L (ref 136–145)
WBC # BLD AUTO: 11 K/UL (ref 4.3–11.1)

## 2022-03-09 PROCEDURE — 96372 THER/PROPH/DIAG INJ SC/IM: CPT

## 2022-03-09 PROCEDURE — 74011250636 HC RX REV CODE- 250/636: Performed by: INTERNAL MEDICINE

## 2022-03-09 PROCEDURE — 99233 SBSQ HOSP IP/OBS HIGH 50: CPT | Performed by: SURGERY

## 2022-03-09 PROCEDURE — 74011000250 HC RX REV CODE- 250: Performed by: INTERNAL MEDICINE

## 2022-03-09 PROCEDURE — 83605 ASSAY OF LACTIC ACID: CPT

## 2022-03-09 PROCEDURE — 74011250637 HC RX REV CODE- 250/637: Performed by: NURSE PRACTITIONER

## 2022-03-09 PROCEDURE — 96375 TX/PRO/DX INJ NEW DRUG ADDON: CPT

## 2022-03-09 PROCEDURE — 65270000029 HC RM PRIVATE

## 2022-03-09 PROCEDURE — 85027 COMPLETE CBC AUTOMATED: CPT

## 2022-03-09 PROCEDURE — 74011250637 HC RX REV CODE- 250/637: Performed by: INTERNAL MEDICINE

## 2022-03-09 PROCEDURE — 36415 COLL VENOUS BLD VENIPUNCTURE: CPT

## 2022-03-09 PROCEDURE — 82962 GLUCOSE BLOOD TEST: CPT

## 2022-03-09 PROCEDURE — 80053 COMPREHEN METABOLIC PANEL: CPT

## 2022-03-09 PROCEDURE — 74011636637 HC RX REV CODE- 636/637: Performed by: INTERNAL MEDICINE

## 2022-03-09 PROCEDURE — 74011250637 HC RX REV CODE- 250/637: Performed by: STUDENT IN AN ORGANIZED HEALTH CARE EDUCATION/TRAINING PROGRAM

## 2022-03-09 PROCEDURE — 74176 CT ABD & PELVIS W/O CONTRAST: CPT

## 2022-03-09 RX ORDER — DIPHENHYDRAMINE HYDROCHLORIDE 50 MG/ML
12.5 INJECTION, SOLUTION INTRAMUSCULAR; INTRAVENOUS
Status: CANCELLED | OUTPATIENT
Start: 2022-03-09 | End: 2022-03-10

## 2022-03-09 RX ORDER — HYDROMORPHONE HYDROCHLORIDE 2 MG/ML
0.5 INJECTION, SOLUTION INTRAMUSCULAR; INTRAVENOUS; SUBCUTANEOUS
Status: CANCELLED | OUTPATIENT
Start: 2022-03-09

## 2022-03-09 RX ORDER — OXYCODONE AND ACETAMINOPHEN 5; 325 MG/1; MG/1
1 TABLET ORAL AS NEEDED
Status: CANCELLED | OUTPATIENT
Start: 2022-03-09

## 2022-03-09 RX ORDER — SODIUM CHLORIDE 0.9 % (FLUSH) 0.9 %
5-40 SYRINGE (ML) INJECTION EVERY 8 HOURS
Status: CANCELLED | OUTPATIENT
Start: 2022-03-09

## 2022-03-09 RX ORDER — SODIUM CHLORIDE, SODIUM LACTATE, POTASSIUM CHLORIDE, CALCIUM CHLORIDE 600; 310; 30; 20 MG/100ML; MG/100ML; MG/100ML; MG/100ML
100 INJECTION, SOLUTION INTRAVENOUS CONTINUOUS
Status: CANCELLED | OUTPATIENT
Start: 2022-03-09

## 2022-03-09 RX ORDER — SODIUM CHLORIDE 0.9 % (FLUSH) 0.9 %
5-40 SYRINGE (ML) INJECTION AS NEEDED
Status: CANCELLED | OUTPATIENT
Start: 2022-03-09

## 2022-03-09 RX ORDER — OXYCODONE HYDROCHLORIDE 5 MG/1
5 TABLET ORAL
Status: CANCELLED | OUTPATIENT
Start: 2022-03-09 | End: 2022-03-10

## 2022-03-09 RX ADMIN — ROSUVASTATIN CALCIUM 40 MG: 20 TABLET, FILM COATED ORAL at 22:20

## 2022-03-09 RX ADMIN — TRAMADOL HYDROCHLORIDE 50 MG: 50 TABLET, COATED ORAL at 03:51

## 2022-03-09 RX ADMIN — NYSTATIN 5 ML: 100000 SUSPENSION ORAL at 08:10

## 2022-03-09 RX ADMIN — TRAMADOL HYDROCHLORIDE 50 MG: 50 TABLET, COATED ORAL at 12:11

## 2022-03-09 RX ADMIN — NYSTATIN 5 ML: 100000 SUSPENSION ORAL at 12:13

## 2022-03-09 RX ADMIN — SODIUM CHLORIDE, PRESERVATIVE FREE 10 ML: 5 INJECTION INTRAVENOUS at 22:03

## 2022-03-09 RX ADMIN — SODIUM CHLORIDE, PRESERVATIVE FREE 10 ML: 5 INJECTION INTRAVENOUS at 06:00

## 2022-03-09 RX ADMIN — TRAZODONE HYDROCHLORIDE 50 MG: 50 TABLET ORAL at 22:00

## 2022-03-09 RX ADMIN — FUROSEMIDE 40 MG: 40 TABLET ORAL at 08:07

## 2022-03-09 RX ADMIN — BISACODYL 10 MG: 10 SUPPOSITORY RECTAL at 08:08

## 2022-03-09 RX ADMIN — TRAMADOL HYDROCHLORIDE 50 MG: 50 TABLET, COATED ORAL at 22:03

## 2022-03-09 RX ADMIN — NYSTATIN 5 ML: 100000 SUSPENSION ORAL at 22:05

## 2022-03-09 RX ADMIN — CALCIUM GLUCONATE: 98 INJECTION, SOLUTION INTRAVENOUS at 21:38

## 2022-03-09 RX ADMIN — CARVEDILOL 3.12 MG: 3.12 TABLET, FILM COATED ORAL at 08:07

## 2022-03-09 RX ADMIN — Medication 2 UNITS: at 22:00

## 2022-03-09 RX ADMIN — SODIUM CHLORIDE, PRESERVATIVE FREE 10 ML: 5 INJECTION INTRAVENOUS at 14:00

## 2022-03-09 RX ADMIN — NYSTATIN 5 ML: 100000 SUSPENSION ORAL at 04:00

## 2022-03-09 RX ADMIN — METHYLNALTREXONE BROMIDE 12 MG: 12 INJECTION, SOLUTION SUBCUTANEOUS at 12:11

## 2022-03-09 RX ADMIN — ISOSORBIDE MONONITRATE 60 MG: 60 TABLET, EXTENDED RELEASE ORAL at 08:08

## 2022-03-09 RX ADMIN — NYSTATIN 5 ML: 100000 SUSPENSION ORAL at 01:00

## 2022-03-09 RX ADMIN — ENOXAPARIN SODIUM 30 MG: 100 INJECTION SUBCUTANEOUS at 12:20

## 2022-03-09 RX ADMIN — CARVEDILOL 3.12 MG: 3.12 TABLET, FILM COATED ORAL at 22:00

## 2022-03-09 RX ADMIN — ACETAMINOPHEN 650 MG: 325 TABLET ORAL at 01:00

## 2022-03-09 RX ADMIN — NIFEDIPINE 90 MG: 90 TABLET, EXTENDED RELEASE ORAL at 08:08

## 2022-03-09 RX ADMIN — ASPIRIN 81 MG 81 MG: 81 TABLET ORAL at 06:00

## 2022-03-09 NOTE — PROGRESS NOTES
Pt discussed during IDR and chart screened by CM for d/c planning. Pt remains NPO with NGT to LIS. Today is the sixth day pt has been NPO. Pt to have PICC line placed and parenteral nutrition started today. Per dietician TPN may be started tomorrow pending labs. Pt received another enema today with no resulting flatus or BM. Pt had a CT of abd/pelv today which showed: 1. What may be a fecal impaction plug causing obstruction of the distal descending colon with distention of the colon but not small bowel. 2. Bilateral pleural effusions and overlying atelectasis or consolidation worse on the right than the left with a small amount of pericardial fluid present. Per gastroenterology awaiting surgical recommendations for possible partial colectomy. Pt currently requiring 2L O2 NC. D/C plan remains undetermined at the present time. PT is recommending HH at d/c.  OT evaluation has not been ordered. CM will continue to follow and remain available if any needs arise.

## 2022-03-09 NOTE — PERIOP NOTES
Report called to yen,rn and pt to be transported back to [facility] via pt transport-remains npo/thogan,rn

## 2022-03-09 NOTE — PROGRESS NOTES
Milk & molasses enema given to patient rectally. Pt tolerated half of mixture only. Patient did have stool smelling, watery BM with some formed particles, one as big as a marble. Patient verbalized feeling better afterwards. Will monitor.

## 2022-03-09 NOTE — PROGRESS NOTES
H&P/Consult Note/Progress Note/Office Note:   Omkar Raza  MRN: [id number]  :1950  Age:71 y.o.    HPI: Omkar Raza is a 66 y.o. female with a history of chronic constipation, stage III chronic kidney disease, CAD, s/p stent, and HTN,   who was admitted on 3/2/22 by the hospitalist after she presented to the ER with a 1 week history of progressive abdominal pain and distention. She was empirically placed on Cipro and Flagyl in late February for possible diverticulitis her symptoms worsen and she had no bowel movement in several days. She has been diagnosed with stericoral colitis which has been refractory to laxatives and cathartics. She remained with an NG tube in place and is distended. Her abdominal films identifies significant sigmoid colon distention by 3/8/22 as below. GI was consulted to assist with her care and performed decompressive colonoscopy without resolution on 3/6/2022. She denies narcotic use as an outpatient. 3/2/22 CT abd/pelvis with no IV contrast  LIMITED THORAX: Mitral annulus calcifications. Right coronary artery  atherosclerosis. The included portions of the pericardium are unremarkable. The included lung bases are clear.     PERITONEUM/MESENTERY: No evidence of ascites, free gas, or lymphadenopathy.     GI TRACT: There is large volume stool extending from the cecum through the mid descending colon. There is an abrupt marketed transition in colonic caliber in the mid descending colon. There is mild colonic wall thickening and subtle free  fluid in the area of abrupt caliber transition. The distal portions of the colon are decompressed.     The terminal ileum is unremarkable. There is no evidence of upstream small bowel obstruction. Nonvisualized appendix, without pericecal inflammatory change.     The stomach is unremarkable.   SPLEEN: Normal  ADRENALS: Normal  PANCREAS: Normal  LIVER: Normal  GALLBLADDER: Normal     KIDNEYS: Mild bilateral renal cortical thinning. No evidence of hydroureteronephrosis or nephroureterolithiasis. Duplicated left renal collecting system.     BLADDER/: Small posterior bladder wall diverticulum. Prior hysterectomy.     VESSELS: Scattered atherosclerosis.     BONES/SOFT TISSUES: Lumbar spine spondylosis without suspicious lytic or blastic bony lesions.     IMPRESSION     1. There is colonic obstruction consequent to a severe stool burden extending from the cecum through the mid descending colon. There is an abrupt transition in colonic caliber in the mid descending colon. There is subtle bowel wall thickening and surrounding mesenteric stranding of the area of transition in caliber, consistent with a developing stercoral colitis. There is no evidence of associated colonic perforation or upstream small bowel obstruction.     2. Chronic findings otherwise as above.           3/8/22 abd Xray  Hx:  Follow-up ileus with abdominal distention. Persistent gaseous distention of the colon with maximum diameter of 11 cm in the proximal sigmoid. No significant small bowel dilatation. No abnormal soft tissue mass or calcific density is noted. Nasogastric tube remains in place.     IMPRESSION:  NO SIGNIFICANT INTERVAL CHANGE          I saw her previously in 2018 with N/V/RUQ pain    She was lost to followup when she did not show up to her appt on 11/26/18    She is s/p BTL and s/p \"radical\" hysterectomy for cervical dysplasia many yrs ago. She denies any h/o XRT or invasive carcinoma      12/3/14 CT abd/pelvis  IMPRESSION:     1.  No worrisome acute findings seen on this CT scan of the abdomen and pelvis. 12/11/14 s/p EGD ([facility])  Antral scar/mild gastritis  Biopsies-->gastric mucosa with changes of repair; unremarkable small bowel      11/5/18 Abd U/S  IMPRESSION: Negative abdominal ultrasound. 11/21/18 CT neck  Impression: Calcified atherosclerotic disease.     No cervical arterial aneurysm identified.     Right internal carotid artery: 60% stenosis in the distal carotid bulb. Left internal carotid artery: 80-85% stenosis in the distal carotid bulb. Calcified plaque extends from the distal CCA 0.2-6 cm up the LICA. The severe  stenosis is at the distal end of this plaque complex, which corresponds to the C4-C5 level.     Right external carotid artery: 60% stenosis.     Patent right and left vertebral arteries. 3/9/22 CT abd/pelvis with no oral or IV contrast  Hx: Colitis     The lung bases show bilateral presumed effusions with some overlying atelectasis or consolidation mostly on the right. Small amount of pericardial fluid is seen. Air and fluid distended large bowel is noted, nasogastric tube is seen   within a decompressed stomach. The gallbladder, liver, spleen, adrenals, kidneys appear normal.   There is no intraperitoneal free air, ascites, nor abnormal adenopathy seen. Large bowel measures up to 10.1 cm. Coronary artery calcifications are seen.     Pelvis: What appears to be a fecal plug is seen in the descending colon above the sigmoid colon with decompression of the sigmoid colon and rectal colon but  with a small amount of oral contrast present. The small bowel is not dilated, there is no intraperitoneal free air, ascites, nor abnormal adenopathy seen, the  bladder appears normal. The uterus is absent.     IMPRESSION  1. What may be a fecal impaction plug causing obstruction of the distal descending colon with distention of the colon but not small bowel. 2. Bilateral pleural effusions and overlying atelectasis or consolidation worse on the right than the left with a small amount of pericardial fluid present.          Additional hx:    3/9/22 c/o severe abd pain and distention; no relief with additional enema this am; GI input noted, CT reviewed. Her distal descending colon is obstructed with marked proximal colon distention.    I offered emergent lap with partial vs subtotal colectomy and either ileostomy or colostomy. Past Medical History:   Diagnosis Date    Acute on chronic combined systolic and diastolic congestive heart failure ([address] 2/17/2021    Acute renal failure superimposed on stage 3b chronic kidney disease ([address] 1/12/2021    CAD (coronary artery disease) 3/1/2012    MI, 3 stents    Chest pain     Coronary artery disease involving native coronary artery without angina pectoris 5/21/2015    COVID-19 virus infection 1/12/2021    Depression 3/1/2012    Diabetes mellitus ([address] 3/1/2012    oral agents. . hypo- 80's, Last A1c 6.9 in 6/2018    DM2 (diabetes mellitus, type 2) ([facility] 5/21/2015    Elevated LFTs 1/12/2021    Elevated troponin 3/2/2012    Essential hypertension 5/21/2015    External hemorrhoids without mention of complication 8138    GERD (gastroesophageal reflux disease)     HCAP (healthcare-associated pneumonia) 1/18/2021    History of MI (myocardial infarction) 11/12    x2    Hypercholesterolemia     Hypertension 3/1/2012    Hypoalbuminemia 2/18/2021    Hyponatremia 1/12/2021    Hypoxemia 8/12/2020    Insomnia     Lactic acidosis 1/12/2021    Long QT interval 1/12/2021    Personal history of colonic polyps 2013    hyperplastic    Platelet inhibition due to Plavix     holding for colonoscopy per GI Dr. Estrada Pleural effusion, bilateral 8/11/2020    Rectocele 2013    Sepsis ([address] 1/12/2021    Tobacco abuse 3/1/2012    quit for 1 year    Tobacco use disorder     Unstable angina ([address] 3/1/2012    ntg as needed.       Past Surgical History:   Procedure Laterality Date    FLEXIBLE SIGMOIDOSCOPY N/A 3/6/2022    SIGMOIDOSCOPY FLEXIBLE performed by Ad Colbert MD at [facility] ENDOSCOPY    HX CAROTID ENDARTERECTOMY Left 12/12/2018    HX CATARACT REMOVAL Left 09/19/2016    Dr Imani Cabral, [address] [place] [address] Right 11/07/2016    Dr Imani Cabral, [facility]    HX CERVICAL FUSION      neck w/plate    HX COLONOSCOPY  12/17/13    Anna Marie--single transverse hyperplastic polyp--7-10 year recall    HX HEMORRHOIDECTOMY      x2    HX ORTHOPAEDIC      left elbow    HX CAL AND BSO      HX WISDOM TEETH EXTRACTION      [place] LEFT HEART CATH,PERCUTANEOUS  last stented 11/12 x 2    stent x3 , mi x 2     Current Facility-Administered Medications   Medication Dose Route Frequency    TPN ADULT - dextrose 5% amino acid 4.25%   IntraVENous QPM    NUTRITIONAL SUPPORT ELECTROLYTE PRN ORDERS   Does Not Apply PRN    furosemide (LASIX) tablet 40 mg  40 mg Oral DAILY    magic mouthwash (TC) oral suspension 5 mL  5 mL Swish and Spit Q4H    lidocaine 4 % patch 1 Patch  1 Patch TransDERmal Q24H    benzocaine (HURRICANE) 20 % spray   Mucous Membrane QID PRN    traMADoL (ULTRAM) tablet 50 mg  50 mg Oral Q6H PRN    methylnaltrexone (RELISTOR) injection 12 mg  12 mg SubCUTAneous DAILY    NIFEdipine ER (PROCARDIA XL) tablet 90 mg  90 mg Oral DAILY    sodium chloride (NS) flush 5-40 mL  5-40 mL IntraVENous Q8H    sodium chloride (NS) flush 5-40 mL  5-40 mL IntraVENous PRN    acetaminophen (TYLENOL) tablet 650 mg  650 mg Oral Q6H PRN    Or    acetaminophen (TYLENOL) suppository 650 mg  650 mg Rectal Q6H PRN    ondansetron (ZOFRAN ODT) tablet 4 mg  4 mg Oral Q8H PRN    Or    ondansetron (ZOFRAN) injection 4 mg  4 mg IntraVENous Q6H PRN    enoxaparin (LOVENOX) injection 30 mg  30 mg SubCUTAneous DAILY    0.9% sodium chloride infusion  100 mL/hr IntraVENous CONTINUOUS    insulin lispro (HUMALOG) injection   SubCUTAneous AC&HS    aspirin chewable tablet 81 mg  81 mg Oral 7am    carvediloL (COREG) tablet 3.125 mg  3.125 mg Oral BID WITH MEALS    isosorbide mononitrate ER (IMDUR) tablet 60 mg  60 mg Oral DAILY    rosuvastatin (CRESTOR) tablet 40 mg  40 mg Oral QHS    traZODone (DESYREL) tablet 50 mg  50 mg Oral QHS    bisacodyL (DULCOLAX) suppository 10 mg  10 mg Rectal DAILY     Cephalosporins, Hydralazine, Sulfamethoxazole-trimethoprim, Codeine, and Lisinopril  Social History     Socioeconomic History    Marital status:    Tobacco Use    Smoking status: Former Smoker     Packs/day: 1.00     Years: 40.00     Pack years: 40.00     Quit date: 2012     Years since quittin.3    Smokeless tobacco: Never Used    Tobacco comment: quit  . has stopped prior also   Vaping Use    Vaping Use: Never used   Substance and Sexual Activity    Alcohol use: No    Drug use: No     Social History     Tobacco Use   Smoking Status Former Smoker    Packs/day: 1.00    Years: 40.00    Pack years: 40.00    Quit date: 2012    Years since quittin.3   Smokeless Tobacco Never Used   Tobacco Comment    quit  . has stopped prior also     Family History   Problem Relation Age of Onset    Heart Disease Mother     Osteoporosis Mother     Heart Attack Mother 67        mi    Thyroid Disease Mother         Multinodular Goiter    Heart Disease Father     Cancer Father         pancreatic    Heart Attack Father 67        MI    Cancer Sister         Pre-Cancerous dysplasia    Thyroid Cancer Sister     Ulcerative Colitis Brother     Thyroid Cancer Brother     Breast Cancer Neg Hx      ROS: The patient has no difficulty with chest pain or shortness of breath. No fever or chills. Comprehensive review of systems was otherwise unremarkable except as noted above.     Physical Exam:   Visit Vitals  BP (!) 151/79 (BP 1 Location: Right upper arm, BP Patient Position: Sitting)   Pulse 100   Temp 97.6 °F (36.4 °C)   Resp 22   Ht 5' 4\" (1.626 m)   Wt 180 lb 6.4 oz (81.8 kg)   SpO2 91%   BMI 30.97 kg/m²     Vitals:    22 0403 22 0821 22 1208 22 1557   BP: (!) 146/65 (!) 155/78 (!) 155/75 (!) 151/79   Pulse: (!) 102 99 97 100   Resp:  16    Temp: 97.2 °F (36.2 °C) 97.5 °F (36.4 °C) 98 °F (36.7 °C) 97.6 °F (36.4 °C)   SpO2: 93% 93% 92% 91%   Weight:       Height:          0701 - 03/09 1900  In: -   Out: 375 [Urine:375]  03/07 1901 - 03/09 0700  In: -   Out: 9433 [Urine:1200]    Constitutional: Alert, oriented, cooperative patient in no acute distress; appears stated age    Eyes:Sclera are clear. EOMs intact  ENMT: no external lesions gross hearing normal; no obvious neck masses, no ear or lip lesions, nares normal; +NGT  CV: RRR. Normal perfusion  Resp: No JVD. Breathing is  non-labored; no audible wheezing. GI: tight and distended, diffusely tender     Musculoskeletal: unremarkable with normal function. No embolic signs or cyanosis. Neuro:  Oriented; moves all 4; no focal deficits  Psychiatric: normal affect and mood, no memory impairment    Recent vitals (if inpt):  Patient Vitals for the past 24 hrs:   BP Temp Pulse Resp SpO2   03/09/22 1557 (!) 151/79 97.6 °F (36.4 °C) 100 22 91 %   03/09/22 1208 (!) 155/75 98 °F (36.7 °C) 97 20 92 %   03/09/22 0821 (!) 155/78 97.5 °F (36.4 °C) 99 16 93 %   03/09/22 0403 (!) 146/65 97.2 °F (36.2 °C) (!) 102 21 93 %   03/08/22 2325 (!) 150/72 97.7 °F (36.5 °C) 99 20 93 %   03/08/22 1938 (!) 142/70 98.2 °F (36.8 °C) 100 18 93 %       Amount and/or Complexity of Data Reviewed and Analyzed:  I reviewed and analyzed all of the unique labs and radiologic studies that are shown below as well as any that are in the HPI, and any that are in the expanded problem list below  *Each unique test, order, or document contributes to the combination of 2 or combination of 3 in Category 1 below. For this visit I also reviewed old records and prior notes.       Recent Labs     03/09/22  0506   WBC 11.0   HGB 10.9*         K 3.7   *   CO2 19*   BUN 43*   CREA 2.20*   *   TBILI 0.4   ALT 42        Review of most recent CBC  Lab Results   Component Value Date/Time    WBC 11.0 03/09/2022 05:06 AM    HGB 10.9 (L) 03/09/2022 05:06 AM    HCT 33.7 (L) 03/09/2022 05:06 AM    PLATELET 786 54/01/3422 05:06 AM    MCV 95.7 03/09/2022 05:06 AM Review of most recent BMP  Lab Results   Component Value Date/Time    Sodium 142 03/09/2022 05:06 AM    Potassium 3.7 03/09/2022 05:06 AM    Chloride 114 (H) 03/09/2022 05:06 AM    CO2 19 (L) 03/09/2022 05:06 AM    Anion gap 9 03/09/2022 05:06 AM    Glucose 143 (H) 03/09/2022 05:06 AM    BUN 43 (H) 03/09/2022 05:06 AM    Creatinine 2.20 (H) 03/09/2022 05:06 AM    BUN/Creatinine ratio 19 11/03/2021 04:06 PM    GFR est AA 28 (L) 03/09/2022 05:06 AM    GFR est non-AA 23 (L) 03/09/2022 05:06 AM    Calcium 8.5 03/09/2022 05:06 AM       Review of most recent LFTs (and lipase if done)  Lab Results   Component Value Date/Time    ALT (SGPT) 42 03/09/2022 05:06 AM    AST (SGOT) 48 (H) 03/09/2022 05:06 AM    Alk. phosphatase 109 03/09/2022 05:06 AM    Bilirubin, direct 0.10 11/03/2021 04:06 PM    Bilirubin, total 0.4 03/09/2022 05:06 AM     Lab Results   Component Value Date/Time    Lipase 157 03/02/2022 09:14 AM       Lab Results   Component Value Date/Time    INR 1.1 01/13/2021 06:21 AM    Bilirubin, direct 0.10 11/03/2021 04:06 PM    Troponin-I, Qt. <0.02 (L) 02/05/2017 09:08 PM       Review of most recent HgbA1c  Lab Results   Component Value Date/Time    Hemoglobin A1c 6.6 (H) 11/03/2021 04:06 PM       Nutritional assessment screen for wound healing issues:  Lab Results   Component Value Date/Time    Protein, total 6.8 03/09/2022 05:06 AM    Albumin 3.1 (L) 03/09/2022 05:06 AM       @lastcovr@  XR Results (most recent):  Results from [place] encounter on 03/02/22    XR ABD (KUB)    Narrative  KUB:    CLINICAL HISTORY:  Follow-up ileus with abdominal distention. COMPARISON:  March 7, 2022 and earlier studies. FINDINGS:  AP supine images demonstrates persistent gaseous distention of the  colon with maximum diameter of 11 cm in the proximal sigmoid. No significant  small bowel dilatation. No abnormal soft tissue mass or calcific density is  noted. Nasogastric tube remains in place.     Impression  NO SIGNIFICANT INTERVAL CHANGE. CT Results (most recent):  Results from Hospital Encounter encounter on 03/02/22    CT ABD PELV WO CONT    Narrative  CT abdomen and pelvis done without oral and IV contrast 9 March, 2022    Reference exam: 8 March, 2022    Indication: Colitis    Technique: Radiation dose reduction techniques were used for this study using  one or more of the following: automated exposure control; adjustment of mA  and/or kV (according to patient size);  iterative reconstruction. 5 mm axial  images were taken through the abdomen and pelvis using no oral nor IV contrast.    Abdomen: The lung bases show bilateral presumed effusions with some overlying  atelectasis or consolidation mostly on the right. Small amount of pericardial  fluid is seen. Air and fluid distended large bowel is noted, nasogastric tube is  seen within a decompressed stomach, the gallbladder, liver, spleen, adrenals,  kidneys appear normal. There is no intraperitoneal free air, ascites, nor  abnormal adenopathy seen. Large bowel measures up to 10.1 cm. Coronary artery  calcifications are seen. Pelvis: What appears to be a fecal plug is seen in the descending colon above  the sigmoid colon with decompression of the sigmoid colon and rectal colon but  with a small amount of oral contrast present. The small bowel is not dilated,  there is no intraperitoneal free air, ascites, nor abnormal adenopathy seen, the  bladder appears normal. The uterus is absent. Impression  1. What may be a fecal impaction plug causing obstruction of the distal  descending colon with distention of the colon but not small bowel. 2. Bilateral pleural effusions and overlying atelectasis or consolidation worse  on the right than the left with a small amount of pericardial fluid present. US Results (most recent):  Results from [place] encounter on 10/15/21    US RETROPERITONEUM COMP    Narrative  RENAL ULTRASOUND.     INDICATION: Chronic stage V renal failure. COMPARISON: February 2021. TECHNIQUE: Direct skin contact sector 3-5 MHz images of the kidneys are  obtained. FINDINGS: Renal echogenicity grossly unremarkable, the right kidney is  hypoechoic compared to the liver. Right kidney: 9.3 cm in length. No hydronephrosis. Left kidney: 11.8 cm in length. No hydronephrosis. Urinary bladder: Unremarkable. Vasculature: Aorta: Normal caliber. IVC:  Patent. Impression  Unremarkable renal ultrasound. Negative for obstruction.         Admission date (for inpatients): 3/2/2022   2 Days Post-Op  Procedure(s):  SIGMOIDOSCOPY FLEXIBLE        ASSESSMENT/PLAN:  Problem List  Date Reviewed: 2/7/2022          Codes Class Noted    * (Principal) Colon obstruction ([facility] ICD-10-CM: D07.800  ICD-9-CM: 560.9  3/9/2022        Stercoral colitis ICD-10-CM: K52.89  ICD-9-CM: 558.9  3/2/2022        CAD (coronary artery disease) (Chronic) ICD-10-CM: I25.10  ICD-9-CM: 414.00  8/19/2021        Chest pain ICD-10-CM: R07.9  ICD-9-CM: 786.50  8/19/2021        Anemia ICD-10-CM: D64.9  ICD-9-CM: 285.9  8/19/2021        Elevated ALT measurement ICD-10-CM: R74.01  ICD-9-CM: 790.4  7/7/2021        Polyarthritis ICD-10-CM: M13.0  ICD-9-CM: 716.50  7/7/2021        Chronic diastolic congestive heart failure (HCC) ICD-10-CM: I50.32  ICD-9-CM: 428.32, 428.0  4/19/2021    Overview Signed 7/7/2021 11:06 AM by Fanny Chou NP     Heart Failure (HFpEF), EF 59-94%, with diastolic dysfunction, ECHO 2/16/2021               Normocytic anemia ICD-10-CM: D64.9  ICD-9-CM: 285.9  1/12/2021        Mixed hyperlipidemia ICD-10-CM: Z90.8  ICD-9-CM: 272.2  10/21/2020        S/P PTCA (percutaneous transluminal coronary angioplasty) ICD-10-CM: Z98.61  ICD-9-CM: V45.82  9/1/2020        History of left-sided carotid endarterectomy ICD-10-CM: Z98.890  ICD-9-CM: V45.89  3/12/2020        Carotid stenosis, asymptomatic, right ICD-10-CM: X58.16  ICD-9-CM: 433.10  3/12/2020        Carotid artery stenosis ICD-10-CM: I65.29  ICD-9-CM: 433.10  12/12/2018        Right carotid bruit ICD-10-CM: R09.89  ICD-9-CM: 785.9  11/6/2018        Type 2 diabetes mellitus with stage 4 chronic kidney disease, with long-term current use of insulin ([facility]) ICD-10-CM: E11.22, N18.4, Z79.4  ICD-9-CM: 250.40, 585.4, V58.67  10/11/2018        Gastroesophageal reflux disease ICD-10-CM: K21.9  ICD-9-CM: 530.81  2/16/2017        Essential hypertension (Chronic) ICD-10-CM: I10  ICD-9-CM: 401.9  9/15/2016        Stage 4 chronic kidney disease ([facility] ICD-10-CM: N18.4  ICD-9-CM: 585.4  9/15/2016        Age-related osteoporosis without current pathological fracture ICD-10-CM: M81.0  ICD-9-CM: 733.01  9/15/2016        Major depressive disorder with single episode, in full remission ([facility] ICD-10-CM: F32.5  ICD-9-CM: 296.26  9/15/2016        Primary insomnia ICD-10-CM: F51.01  ICD-9-CM: 307.42  9/15/2016        Coronary artery disease involving native coronary artery of native heart without angina pectoris ICD-10-CM: I25.10  ICD-9-CM: 414.01  9/4/2013        HLD (hyperlipidemia) ICD-10-CM: E78.5  ICD-9-CM: 272.4  9/4/2013        Hypertension (Chronic) ICD-10-CM: I10  ICD-9-CM: 401.9  3/1/2012            Principal Problem:    Colon obstruction ([facility] (3/9/2022)    Active Problems:    Hypertension (3/1/2012)      HLD (hyperlipidemia) (9/4/2013)      Stage 4 chronic kidney disease ([facility] (9/15/2016)      Gastroesophageal reflux disease (2/16/2017)      Type 2 diabetes mellitus with stage 4 chronic kidney disease, with long-term current use of insulin ([facility] (10/11/2018)      S/P PTCA (percutaneous transluminal coronary angioplasty) (9/1/2020)      Chronic diastolic congestive heart failure ([facility] (4/19/2021)      Overview: Heart Failure (HFpEF), EF 13-26%, with diastolic dysfunction, ECHO       2/16/2021            Stercoral colitis (3/2/2022)           Number and Complexity of Problems addressed and   Risks of complications and/or morbidity of management          Stercoral colitis with distal descending colon obstruction  She described eating large quantites of vegetables prior to admission and on a regular basis prior to admission  She has significant abd pain and marked abd disention  CO2 on BMP is abnormally low possibly related to acidosis and renal insuff  Repeat CT shows her colon obstruction persists  No relief with enemas, NGT decompression, bowel rest, and colonoscopic decompression    I offered emergent partial colectomy vs subtotal colectomy and either ileostomy or colostomy depending on operative finidngs  High operative risks were discussed of morbidity and mortality given her mult medical problems   Advantages/disadvantages and risk/benefits of surgery versus no surgery discussed at length   All questions answered  She is in favor of proceeding tonight  Informed consent obtained  OR notifed      Cont NPO/NGT  Will likely need PICC/TPN soon        Level of MDM (2/3 elements below)  Number and Complexity of Problems Addressed Amount and/or Complexity of Data to be Reviewed and Analyzed  *Each unique test, order, or document contributes to the combination of 2 or combination of 3 in Category 1 below.  Risk of Complications and/or Morbidity or Mortality of pt Management     10631  51659 SF Minimal  1self-limited or minor problem Minimal or none Minimal risk of morbidity from additional diagnostic testing or Rx   74827  80096 Low Low  2or more self-limited or minor problems;    or  1stable chronic illness;    or  [id number], uncomplicated illness or injury   Limited  (Must meet the requirements of at least 1 of the 2 categories)  Category 1: Tests and documents   Any combination of 2 from the following:  Review of prior external note(s) from each unique source*;  review of the result(s) of each unique test*;   ordering of each unique test*    or   Category 2: Assessment requiring an independent historian(s)  (For the categories of independent interpretation of tests and discussion of management or test interpretation, see moderate or high) Low risk of morbidity from additional diagnostic testing or treatment     68857  39483 Mod Moderate  1or more chronic illnesses with exacerbation, progression, or side effects of treatment;    or  2or more stable chronic illnesses;    or  1undiagnosed new problem with uncertain prognosis;    or  1acute illness with systemic symptoms;    or  [id number] complicated injury   Moderate  (Must meet the requirements of at least 1 out of 3 categories)  Category 1: Tests, documents, or independent historian(s)  Any combination of 3 from the following:   Review of prior external note(s) from each unique source*;  Review of the result(s) of each unique test*;  Ordering of each unique test*;  Assessment requiring an independent historian(s)    or  Category 2: Independent interpretation of tests   Independent interpretation of a test performed by another physician/other qualified health care professional (not separately reported);     or  Category 3: Discussion of management or test interpretation  Discussion of management or test interpretation with external physician/other qualified health care professional/appropriate source (not separately reported)   Moderate risk of morbidity from additional diagnostic testing or treatment  Examples only:  Prescription drug management   Decision regarding minor surgery with identified patient or procedure risk factors  Decision regarding elective major surgery without identified patient or procedure risk factors   Diagnosis or treatment significantly limited by social determinants of health       [id number]  73085 High High  1or more chronic illnesses with severe exacerbation, progression, or side effects of treatment;    or  1 acute or chronic illness or injury that poses a threat to life or bodily function   Extensive  (Must meet the requirements of at least 2 out of 3 categories)  Category 1: Tests, documents, or independent historian(s)  Any combination of 3 from the following:   Review of prior external note(s) from each unique source*;  Review of the result(s) of each unique test*;   Ordering of each unique test*;   Assessment requiring an independent historian(s)    or   Category 2: Independent interpretation of tests   Independent interpretation of a test performed by another physician/other qualified health care professional (not separately reported);     or  Category 3: Discussion of management or test interpretation  Discussion of management or test interpretation with external physician/other qualified health care professional/appropriate source (not separately reported)   High risk of morbidity from additional diagnostic testing or treatment  Examples only:  Drug therapy requiring intensive monitoring for toxicity  Decision regarding elective major surgery with identified patient or procedure risk factors  Decision regarding emergency major surgery  Decision regarding hospitalization  Decision not to resuscitate or to de-escalate care because of poor prognosis             I have personally performed a face-to-face diagnostic evaluation and management  service on this patient. I have independently seen the patient. I have independently obtained the above history from the patient/family. I have independently examined the patient with above findings. I have independently reviewed data/labs for this patient and developed the above plan of care (MDM). Signed: Rachael Dolan.  Radha Kearns MD, FACS

## 2022-03-09 NOTE — PROGRESS NOTES
TPN prepared by pharmacy can be given by peripheral infusion pending PICC line insertion to patient after her surgery as verified by Luan Osgood RD to this nurse by phone.

## 2022-03-09 NOTE — PROGRESS NOTES
Hospitalist Progress Note   Admit Date:  3/2/2022 11:11 AM   Name:  Rocky Salas   Age:  66 y.o. Sex:  female  :  1950   MRN:  [id number]   Room:  [facility]/    Presenting Complaint: Abdominal Pain    Reason(s) for Admission: Stercoral colitis [K52.89]  MIRANDA (acute kidney injury) [facility]) [N17.9]     Hospital Course & Interval History:   Please refer to the admission H&P for details of presentation. In summary, Rocky Salas is a 66 y.o. female with medical history significant for chronic constipation, stage IV CKD, chronic diastolic CHF, CAD, HTN who was admitted on 3/2 due to severe abdominal pain. She saw her GI doctor and was place on abx for possible diverticulitis. However, her symptoms became worse, and she had not passed any stool since . ED workup with diagnosis of stercoral colitis based on CT A/P imaging. No noted colonic perforation or upstream small bowel obstruction. Gastroenterology was consulted and she underwent decompressive colonoscopy with Dr. Aide Lacey 3/6. No obstructing lesion but with apparent dysmotility. She has been NPO with NGT to LIS since admission. General Surgery has been consulted now to discuss possible partial colectomy given persistent ileus unresponsive to other GI interventions. Subjective/24 hr Events (22) : Patient is seen and examined at bedside. No acute events reported overnight by nursing staff. NG tube in place currently clamped as patient recently returned from CT abdomen pelvis. Patient reports that she has not had any bowel movement since decompressive colonoscopy. Passing any flatus. Abdominal distention has worsened over the past 24 hours but reports that NG tube for decompression has helped. Trying to stay upbeat and positive given recent news of possible colectomy. Patient denies fever, chills, chest pains, shortness of breath.     Review of Systems: 10 point review of systems is otherwise negative with the exception of the elements mentioned above. Assessment & Plan:     Stercoral colitis now with Paralytic Ileus Large Intestine  - appreciate GI's recommendation. - consulted surgery given unresponsive to other GI interventions. Appreciate recommendations. Possible surgical intervention with colectomy. 03/09/22: CT A/P reviewed. Showed fecal impaction plug causing obstruction of the distal descending colon with distention of the colon. - day 6 of NPO. Will start TPN  - PICC placement ordered  - IVF on hold due to LE edema with abdominal distention  - milk of molasses enema per GI today    Hypertension   -Continue home Nifedipine     HLD (hyperlipidemia)   -Continue Rosuvastatin     MIRANDA on Stage 4 chronic kidney disease ([facility] [address] resolved  Follows Dr. Chelsea Tate, outpatient Nephrology  03/09/22: Cr 2.2, stable at baseline  -on Home Furosemide today     Type 2 diabetes mellitus, with long-term current use of insulin (HCC)   -Home insulin regimen on hold with current NPO status, resume with diet progression  -Continue SSI Humalog      Chronic diastolic congestive heart failure (HCC)  -Continue Imdur, Furosemide  -Daily weights, strict I&O's     Acute respiratory failure with hypoxia secondary to volume overload - resolved     CAD s/p PCI  -Follows Dr. Raman Callahan  -Continue ASA / Lugene Gaudier / Annamary [place] / statin     Chest tightness, intermittent  EKG with NSR with sinus arrhythmia, no ST elevation or T wave inversion  Troponin 36.6 --> 37.7  - Telemetry      Hypokalemia, resolved      Diet:  DIET NPO  DVT PPx: lovenox  Code status: Full Code      I have reviewed and ordered clinical lab tests and independently visualized images, tracing. I spent 40 minutes of time caring for this patient at bedside or nearby, more than 50 percent of which was spent on coordination of care and/or counseling regarding the disease process, treatment options, and treatment plan.         Hospital Problems as of 3/9/2022 Date Reviewed: 2/7/2022 Codes Class Noted - Resolved POA    RESOLVED: Acute respiratory failure with hypoxia [facility]) ICD-10-CM: J96.01  ICD-9-CM: 518.81  2/17/2021 - 3/9/2022 Unknown        * (Principal) Paralytic ileus of large intestine (HCC) ICD-10-CM: K56.0  ICD-9-CM: 560.1  3/6/2022 - Present Yes        Stercoral colitis ICD-10-CM: K52.89  ICD-9-CM: 558.9  3/2/2022 - Present Yes        Chronic diastolic congestive heart failure (HCC) ICD-10-CM: I50.32  ICD-9-CM: 428.32, 428.0  4/19/2021 - Present Yes    Overview Signed 7/7/2021 11:06 AM by Ana Laura Demarco NP     Heart Failure (HFpEF), EF 30-38%, with diastolic dysfunction, ECHO 2/16/2021               S/P PTCA (percutaneous transluminal coronary angioplasty) ICD-10-CM: Z98.61  ICD-9-CM: V45.82  9/1/2020 - Present Yes        Type 2 diabetes mellitus with stage 4 chronic kidney disease, with long-term current use of insulin ([facility]) ICD-10-CM: E11.22, N18.4, Z79.4  ICD-9-CM: 250.40, 585.4, V58.67  10/11/2018 - Present Yes        Gastroesophageal reflux disease ICD-10-CM: K21.9  ICD-9-CM: 530.81  2/16/2017 - Present Yes        Stage 4 chronic kidney disease ([facility] ICD-10-CM: N18.4  ICD-9-CM: 585.4  9/15/2016 - Present Yes        HLD (hyperlipidemia) ICD-10-CM: E78.5  ICD-9-CM: 272.4  9/4/2013 - Present Yes        Hypertension (Chronic) ICD-10-CM: I10  ICD-9-CM: 401.9  3/1/2012 - Present Yes        RESOLVED: Hypokalemia ICD-10-CM: E87.6  ICD-9-CM: 276.8  3/4/2022 - 3/9/2022 Yes        RESOLVED: MIRANDA (acute kidney injury) ([address] ICD-10-CM: N17.9  ICD-9-CM: 584.9  3/2/2022 - 3/9/2022 Yes              Objective:     Patient Vitals for the past 24 hrs:   Temp Pulse Resp BP SpO2   03/09/22 0821 97.5 °F (36.4 °C) 99 16 (!) 155/78 93 %   03/09/22 0403 97.2 °F (36.2 °C) (!) 102 21 (!) 146/65 93 %   03/08/22 2325 97.7 °F (36.5 °C) 99 20 (!) 150/72 93 %   03/08/22 1938 98.2 °F (36.8 °C) 100 18 (!) 142/70 93 %   03/08/22 1600 -- (!) 101 -- -- --   03/08/22 1526 97.7 °F (36.5 °C) 96 16 (!) 144/79 93 % 03/08/22 1200 -- 99 -- -- --     Oxygen Therapy  O2 Sat (%): 93 % (03/09/22 0821)  Pulse via Oximetry: 91 beats per minute (03/06/22 1136)  O2 Device: None (Room air) (03/09/22 5794)  Skin Assessment: Clean, dry, & intact (03/08/22 1144)  Skin Protection for O2 Device: N/A (03/08/22 1144)  O2 Flow Rate (L/min): 2 l/min (03/08/22 1910)    Estimated body mass index is 30.97 kg/m² as calculated from the following:    Height as of this encounter: 5' 4\" (1.626 m). Weight as of this encounter: 81.8 kg (180 lb 6.4 oz). Intake/Output Summary (Last 24 hours) at 3/9/2022 1151  Last data filed at 3/9/2022 0945  Gross per 24 hour   Intake --   Output 1850 ml   Net -1850 ml         Physical Exam:     Blood pressure (!) 155/78, pulse 99, temperature 97.5 °F (36.4 °C), resp. rate 16, height 5' 4\" (1.626 m), weight 81.8 kg (180 lb 6.4 oz), SpO2 93 %. General:    Well nourished. No overt distress  Head:  Normocephalic, atraumatic  Eyes:  Sclerae appear normal.  Pupils equally round. ENT:  Nares appear normal, no drainage. Moist oral mucosa, +NGT  Neck:  No restricted ROM. Trachea midline   CV:   RRR. No m/r/g. No jugular venous distension. Lungs:   CTAB. No wheezing. Respirations even, unlabored  Abdomen: Bowel sounds absent. Tense, distended, slightly tender to palpation. Extremities: No cyanosis or clubbing. Trace edema  Skin:     No rashes and normal coloration. Warm and dry. Neuro:  CN II-XII grossly intact. A&Ox3  Psych:  Normal mood and affect.       I have reviewed ordered lab tests and independently visualized imaging below:    Recent Labs:  Recent Results (from the past 48 hour(s))   GLUCOSE, POC    Collection Time: 03/07/22  4:05 PM   Result Value Ref Range    Glucose (POC) 118 (H) 65 - 100 mg/dL    Performed by Chelly    GLUCOSE, POC    Collection Time: 03/07/22 10:14 PM   Result Value Ref Range    Glucose (POC) 138 (H) 65 - 100 mg/dL    Performed by GiovaniHERB    CBC W/O DIFF    Collection Time: 03/08/22  5:44 AM   Result Value Ref Range    WBC 10.6 4.3 - 11.1 K/uL    RBC 3.49 (L) 4.05 - 5.2 M/uL    HGB 10.9 (L) 11.7 - 15.4 g/dL    HCT 33.8 (L) 35.8 - 46.3 %    MCV 96.8 79.6 - 97.8 FL    MCH 31.2 26.1 - 32.9 PG    MCHC 32.2 31.4 - 35.0 g/dL    RDW 13.0 11.9 - 14.6 %    PLATELET 260 658 - 804 K/uL    MPV 9.5 9.4 - 12.3 FL    ABSOLUTE NRBC 0.00 0.0 - 0.2 K/uL   METABOLIC PANEL, BASIC    Collection Time: 03/08/22  5:44 AM   Result Value Ref Range    Sodium 143 136 - 145 mmol/L    Potassium 3.4 (L) 3.5 - 5.1 mmol/L    Chloride 115 (H) 98 - 107 mmol/L    CO2 18 (L) 21 - 32 mmol/L    Anion gap 10 7 - 16 mmol/L    Glucose 138 (H) 65 - 100 mg/dL    BUN 42 (H) 8 - 23 MG/DL    Creatinine 2.20 (H) 0.6 - 1.0 MG/DL    GFR est AA 28 (L) >60 ml/min/1.73m2    GFR est non-AA 23 (L) >60 ml/min/1.73m2    Calcium 8.5 8.3 - 10.4 MG/DL   GLUCOSE, POC    Collection Time: 03/08/22  5:59 AM   Result Value Ref Range    Glucose (POC) 148 (H) 65 - 100 mg/dL    Performed by [facility]    GLUCOSE, POC    Collection Time: 03/08/22  9:01 AM   Result Value Ref Range    Glucose (POC) 150 (H) 65 - 100 mg/dL    [facility] by [facility]    GLUCOSE, POC    Collection Time: 03/08/22 11:10 AM   Result Value Ref Range    Glucose (POC) 133 (H) 65 - 100 mg/dL    [facility]    EKG, 12 LEAD, INITIAL    Collection Time: 03/08/22 12:13 PM   Result Value Ref Range    Ventricular Rate 95 BPM    Atrial Rate 95 BPM    P-R Interval 138 ms    QRS Duration 90 ms    Q-T Interval 372 ms    QTC Calculation (Bezet) 467 ms    Calculated P Axis 76 degrees    Calculated R Axis 73 degrees    Calculated T Axis -16 degrees    Diagnosis       Normal sinus rhythm  Abnormal QRS-T angle, consider primary T wave abnormality  Abnormal ECG  When compared with ECG of 03-MAR-2022 13:55,  ST elevation now present in Anterior leads  Confirmed by Rose Landis MD (), Ceci Castillo ([id number]) on 3/8/2022 1:51:38 PM     GLUCOSE, POC    Collection Time: 03/08/22  4:10 PM   Result Value Ref Range    Glucose (POC) 148 (H) 65 - 100 mg/dL    Performed by Chelly    GLUCOSE, POC    Collection Time: 03/08/22 10:06 PM   Result Value Ref Range    Glucose (POC) 166 (H) 65 - 100 mg/dL    Performed by Delilah    LACTIC ACID    Collection Time: 03/09/22  5:04 AM   Result Value Ref Range    Lactic acid 0.9 0.4 - 2.0 MMOL/L   CBC W/O DIFF    Collection Time: 03/09/22  5:06 AM   Result Value Ref Range    WBC 11.0 4.3 - 11.1 K/uL    RBC 3.52 (L) 4.05 - 5.2 M/uL    HGB 10.9 (L) 11.7 - 15.4 g/dL    HCT 33.7 (L) 35.8 - 46.3 %    MCV 95.7 79.6 - 97.8 FL    MCH 31.0 26.1 - 32.9 PG    MCHC 32.3 31.4 - 35.0 g/dL    RDW 13.2 11.9 - 14.6 %    PLATELET 251 142 - 330 K/uL    MPV 9.3 (L) 9.4 - 12.3 FL    ABSOLUTE NRBC 0.00 0.0 - 0.2 K/uL   METABOLIC PANEL, COMPREHENSIVE    Collection Time: 03/09/22  5:06 AM   Result Value Ref Range    Sodium 142 136 - 145 mmol/L    Potassium 3.7 3.5 - 5.1 mmol/L    Chloride 114 (H) 98 - 107 mmol/L    CO2 19 (L) 21 - 32 mmol/L    Anion gap 9 7 - 16 mmol/L    Glucose 143 (H) 65 - 100 mg/dL    BUN 43 (H) 8 - 23 MG/DL    Creatinine 2.20 (H) 0.6 - 1.0 MG/DL    GFR est AA 28 (L) >60 ml/min/1.73m2    GFR est non-AA 23 (L) >60 ml/min/1.73m2    Calcium 8.5 8.3 - 10.4 MG/DL    Bilirubin, total 0.4 0.2 - 1.1 MG/DL    ALT (SGPT) 42 12 - 65 U/L    AST (SGOT) 48 (H) 15 - 37 U/L    Alk.  phosphatase 109 50 - 136 U/L    Protein, total 6.8 6.3 - 8.2 g/dL    Albumin 3.1 (L) 3.2 - 4.6 g/dL    Globulin 3.7 (H) 2.3 - 3.5 g/dL    A-G Ratio 0.8 (L) 1.2 - 3.5     GLUCOSE, POC    Collection Time: 03/09/22  6:37 AM   Result Value Ref Range    Glucose (POC) 145 (H) 65 - 100 mg/dL    Performed by Delilah    GLUCOSE, POC    Collection Time: 03/09/22 11:20 AM   Result Value Ref Range    Glucose (POC) 139 (H) 65 - 100 mg/dL    Performed by Jhony Organ        All Micro Results     Procedure Component Value Units Date/Time    CULTURE, BLOOD [[id number]] Collected: 03/03/22 1555    Order Status: Completed Specimen: Blood Updated: 03/08/22 0811     Special Requests: --        LEFT  HAND       Culture result: NO GROWTH 5 DAYS       CULTURE, BLOOD [[id number]] Collected: 03/03/22 1547    Order Status: Completed Specimen: Blood Updated: 03/08/22 0811     Special Requests: --        RIGHT  Antecubital       Culture result: NO GROWTH 5 DAYS       CULTURE, URINE [[id number]] Collected: 03/03/22 1345    Order Status: Canceled Specimen: Urine from Clean catch           Other Studies:  CT ABD PELV WO CONT    Result Date: 3/9/2022  CT abdomen and pelvis done without oral and IV contrast 9 March, 2022 Reference exam: 8 March, 2022 Indication: Colitis Technique: Radiation dose reduction techniques were used for this study using one or more of the following: automated exposure control; adjustment of mA and/or kV (according to patient size);  iterative reconstruction. 5 mm axial images were taken through the abdomen and pelvis using no oral nor IV contrast. Abdomen: The lung bases show bilateral presumed effusions with some overlying atelectasis or consolidation mostly on the right. Small amount of pericardial fluid is seen. Air and fluid distended large bowel is noted, nasogastric tube is seen within a decompressed stomach, the gallbladder, liver, spleen, adrenals, kidneys appear normal. There is no intraperitoneal free air, ascites, nor abnormal adenopathy seen. Large bowel measures up to 10.1 cm. Coronary artery calcifications are seen. Pelvis: What appears to be a fecal plug is seen in the descending colon above the sigmoid colon with decompression of the sigmoid colon and rectal colon but with a small amount of oral contrast present. The small bowel is not dilated, there is no intraperitoneal free air, ascites, nor abnormal adenopathy seen, the bladder appears normal. The uterus is absent.      1. What may be a fecal impaction plug causing obstruction of the distal descending colon with distention of the colon but not small bowel. 2. Bilateral pleural effusions and overlying atelectasis or consolidation worse on the right than the left with a small amount of pericardial fluid present. Current Meds:  Current Facility-Administered Medications   Medication Dose Route Frequency    furosemide (LASIX) tablet 40 mg  40 mg Oral DAILY    magic mouthwash (TC) oral suspension 5 mL  5 mL Swish and Spit Q4H    lidocaine 4 % patch 1 Patch  1 Patch TransDERmal Q24H    benzocaine (HURRICANE) 20 % spray   Mucous Membrane QID PRN    traMADoL (ULTRAM) tablet 50 mg  50 mg Oral Q6H PRN    methylnaltrexone (RELISTOR) injection 12 mg  12 mg SubCUTAneous DAILY    NIFEdipine ER (PROCARDIA XL) tablet 90 mg  90 mg Oral DAILY    sodium chloride (NS) flush 5-40 mL  5-40 mL IntraVENous Q8H    sodium chloride (NS) flush 5-40 mL  5-40 mL IntraVENous PRN    acetaminophen (TYLENOL) tablet 650 mg  650 mg Oral Q6H PRN    Or    acetaminophen (TYLENOL) suppository 650 mg  650 mg Rectal Q6H PRN    ondansetron (ZOFRAN ODT) tablet 4 mg  4 mg Oral Q8H PRN    Or    ondansetron (ZOFRAN) injection 4 mg  4 mg IntraVENous Q6H PRN    enoxaparin (LOVENOX) injection 30 mg  30 mg SubCUTAneous DAILY    0.9% sodium chloride infusion  100 mL/hr IntraVENous CONTINUOUS    insulin lispro (HUMALOG) injection   SubCUTAneous AC&HS    aspirin chewable tablet 81 mg  81 mg Oral 7am    carvediloL (COREG) tablet 3.125 mg  3.125 mg Oral BID WITH MEALS    isosorbide mononitrate ER (IMDUR) tablet 60 mg  60 mg Oral DAILY    rosuvastatin (CRESTOR) tablet 40 mg  40 mg Oral QHS    traZODone (DESYREL) tablet 50 mg  50 mg Oral QHS    bisacodyL (DULCOLAX) suppository 10 mg  10 mg Rectal DAILY       Signed:  Valente Soria MD    Part of this note may have been written by using a voice dictation software.   The note has been proof read but may still contain some grammatical/other typographical errors.

## 2022-03-09 NOTE — PROGRESS NOTES
Pt c/o that she is really swollen from her legs up to her arms. She stated she is very concerned about this due to her hx of CHF. Lung bases sound coarse. Pt is receiving IV fluids. Notified MD. MD gave verbal to stop fluids for the rest of the night. Fluids stopped in [facility]. Pt resting in bed.

## 2022-03-09 NOTE — PERIOP NOTES
Pt had large bowel movement in preop; md aware and into speak with pt./surgery cancelled and pt to be reevaluated per md/thogan,rn

## 2022-03-09 NOTE — PROGRESS NOTES
Dr Nena Mckenzie, informed of PICC consult. Per [employer], he stated he cannot approve the order. Order discontinued.   Durga Butler RN, VAT

## 2022-03-09 NOTE — ANESTHESIA PREPROCEDURE EVALUATION
Relevant Problems   NEUROLOGY   (+) Major depressive disorder with single episode, in full remission ([facility] [address]      CARDIOVASCULAR   (+) CAD (coronary artery disease)   (+) Chronic diastolic congestive heart failure (HCC)   (+) Coronary artery disease involving native coronary artery of native heart without angina pectoris   (+) Essential hypertension   (+) Hypertension      GASTROINTESTINAL   (+) Gastroesophageal reflux disease      RENAL FAILURE   (+) Stage 4 chronic kidney disease (HCC)      ENDOCRINE   (+) Type 2 diabetes mellitus with stage 4 chronic kidney disease, with long-term current use of insulin (HCC)      HEMATOLOGY   (+) Anemia   (+) Normocytic anemia       Anesthetic History               Review of Systems / Medical History  Patient summary reviewed and pertinent labs reviewed    Pulmonary          Smoker      Comments: Covid 1/21--complicated by BIPAP/bilateral pleural effusions and cardiac arrest requiring CPR   Neuro/Psych         Psychiatric history (depression)     Cardiovascular    Hypertension: well controlled          Past MI, CAD, cardiac stents (2008, 2012) and hyperlipidemia    Exercise tolerance: <4 METS  Comments: HFpEF   GI/Hepatic/Renal     GERD: well controlled    Renal disease (stage 4):  CRI       Endo/Other    Diabetes: type 2, using insulin         Other Findings              Physical Exam    Airway  Mallampati: I  TM Distance: 4 - 6 cm  Neck ROM: normal range of motion   Mouth opening: Normal     Cardiovascular  Regular rate and rhythm,  S1 and S2 normal,  no murmur, click, rub, or gallop  Rhythm: regular  Rate: normal         Dental  No notable dental hx       Pulmonary  Breath sounds clear to auscultation               Abdominal  GI exam deferred       Other Findings            Anesthetic Plan    ASA: 3  Anesthesia type: general          Induction: Intravenous and RSI  Anesthetic plan and risks discussed with: Patient      Pt with bowel obstruction had large bowel movement in preop and Dr Eneida Tanner cancelled case.

## 2022-03-09 NOTE — PROGRESS NOTES
She was in pre-op getting ready to go back to the OR and had 2 BMs with a large fecal plug the size of a softball with lots of gas in pre-op and now feels better. She wants to hold off on surgery and try more enemas.

## 2022-03-09 NOTE — PROGRESS NOTES
TRANSFER - OUT REPORT:    Verbal report given to Rip Cat RN(name) on [facility]  being transferred to Preop(unit) for ordered procedure       Report consisted of patients Situation, Background, Assessment and   Recommendations(SBAR). Information from the following report(s) SBAR, Kardex and MAR was reviewed with the receiving nurse. Lines:   Peripheral IV 03/05/22 Left; Inner Forearm (Active)   Site Assessment Clean, dry, & intact 03/08/22 0730   Phlebitis Assessment 0 03/08/22 0730   Infiltration Assessment 0 03/08/22 0730   Dressing Status Clean, dry, & intact 03/08/22 0730   Dressing Type Tape;Transparent 03/08/22 0730   Hub Color/Line Status Infusing 03/08/22 0730   Alcohol Cap Used No 03/06/22 1041        Opportunity for questions and clarification was provided.       Patient transported with:   O2 @ 2 liters   NGT in place

## 2022-03-09 NOTE — CONSULTS
Comprehensive Nutrition Assessment    Type and Reason for Visit: Reassess,Consult  TPN Management (Hospitalist)    Nutrition Recommendations/Plan:   Parenteral Nutrition:  Peripheral parenteral nutrition to begin at 1800 (can be infused via central line if access achieved prior to initiation of infusion). Will be converted to TPN pending labs and central access 3/10. Currently dilute solution preferred despite access type due to electrolyte abnormalities with serial replacements and refeeding risk. Initiate: Dex 5%, 4.25% AA 1.44 L (60ml/hr)   Hold 250 ml 20% lipids daily  To provide: 490 kcal/d (39% of needs), 61 grams of protein/d (110% of needs/1.1 g/kg IBW), 72 grams of CHO/d and 1440 ml of total volume/d. Lytes/L:   Sodium 60 meq (60 meq NaCl), Potassium 20 meq (20 meq KPO4), 5 meq Mg, 4.5 meq Calcium  Other additives: MTE, MVI MWF due to national shortages, Thiamine 100 mg day 1/7 due to refeeding risk  Nutritional Supplement Therapy:   Implement electrolyte replacement per nutrition support protocols  Replacement indicated:  None  Labs:   BMP daily  Mg tomorrow and MWF  Phos tomorrow and MWF    Triglyceride tomorrow  POC Glucoses/SSI Active   Continue NPO     Malnutrition Assessment:  Malnutrition Status: At risk for malnutrition (specify) (NPO x5 days, ? surgery)    Nutrition Assessment:   Nutrition History: Patient reports 2 meals per day at baseline. She states that she was eating normally up until admission. She denies ever having nausea even though she was not having bowel movements. She states that she tried not to eat too much protein as she has CKD. Cultural/Nondenominational/Ethnic Food Preference(s): None   Nutrition Background: Patient with PMH significant for chronic constipation, CKD3, HTN, CHF, MI, CAD, DM, GERD, HLD. She presented with increasing abdominal pain for ~2 weeks. CTAP showed steroral colitis. She was admitted with paralytic ileus.    Nutrition Interval:  GI has been following and thus far patient is not responding to medical management. She had decompression colonoscopy 3/6. Repeat KUB this 3/8 unchanged. Surgery following and potential for \"subtotal colectomy with ileostomy soon if she doesn't open up soon\". Patient seen lying in bed. She reports just being uncomfortable. She asks if we are going to feed her through her NGT. Explained IV nutrition and that it will bypass the gut. She states, \"so nothing else is going in here? \" referencing her stomach. And responds \"good\" when RD states correct. She has no other questions at this time. She states that she needs to know how to eat once she is discharged. Explained that will be based on clinical course. Discussed also possible outpatient nutrition counseling which she would be interested in. NGT remains to LIS with 350 ml output over last 24 hrs. Observed ~600 ml in cannister but unsure last time was emptied. IVF were d/c overnight due to edema. PICC ordered but still pending. Currently 1 PIV. Discussed plan with RN, okay to infuse via central line if placed prior to PN hang time. Discussed with Dr. Umair Frias in IDT rounds. Abdominal Status (last documented): Distended,Semi-soft abdomen with Hypoactive  bowel sounds. Last BM 03/08/22.   Pertinent Medications: Dulcolax, IMDUR, Relistor, Lasix (40 mg daily), MMW   Electrolyte replacements: 3/5 20 meq KCl; 3/6 20 meq KCl: 3/7 20 meq KCl; 3/8 40 meq KCl   Pertinent Labs:   Lab Results   Component Value Date/Time    Sodium 142 03/09/2022 05:06 AM    Potassium 3.7 03/09/2022 05:06 AM    Chloride 114 (H) 03/09/2022 05:06 AM    CO2 19 (L) 03/09/2022 05:06 AM    Anion gap 9 03/09/2022 05:06 AM    Glucose 143 (H) 03/09/2022 05:06 AM    BUN 43 (H) 03/09/2022 05:06 AM    Creatinine 2.20 (H) 03/09/2022 05:06 AM    Calcium 8.5 03/09/2022 05:06 AM    Albumin 3.1 (L) 03/09/2022 05:06 AM    Magnesium 2.2 03/04/2022 06:13 AM    Phosphorus 3.8 (H) 02/21/2021 08:05 AM     Lab Results   Component Value Date/Time Glucose 143 (H) 03/09/2022 05:06 AM    Glucose (POC) 139 (H) 03/09/2022 11:20 AM    Glucose (POC) 145 (H) 03/09/2022 06:37 AM    Glucose (POC) 166 (H) 03/08/2022 10:06 PM    Glucose (POC) 148 (H) 03/08/2022 04:10 PM    Glucose (POC) 133 (H) 03/08/2022 11:10 AM    Glucose (POC) 150 (H) 03/08/2022 09:01 AM       Nutrition Related Findings:   No joey wasting of fat/muscle stores. NGT to LIS. Current Nutrition Therapies:  DIET NPO    Current Intake:   Average Meal Intake: NPO        Anthropometric Measures:  Height: 5' 4\" (162.6 cm)  Current Body Wt: 81.8 kg (180 lb 5.4 oz) (3/7), Weight source: Bed scale  BMI: 30.9, Obese class 1 (BMI 30.0-34. 9)  Admission Body Weight: 168 lb 6.9 oz (3/4)  Ideal Body Weight (lbs) (Calculated): 120 lbs (55 kg), 150.3 %  Usual Body Wt: 77.6 kg (171 lb) (per patient, confirmed by office wts), Percent weight change: 5.5          Edema: Generalized: Non-pitting; 3+ (3/9/2022  3:55 AM)  LLE: 3+ (3/9/2022  3:55 AM)  LUE: 2+ (3/9/2022  3:55 AM)  RLE: 3+ (3/9/2022  3:55 AM)  RUE: 2+ (3/9/2022  3:55 AM)     Estimated Daily Nutrient Needs:  Energy (kcal/day): 3893-0127 (Kcal/kg (23-28), Weight Used: Ideal (55 kg))  Protein (g/day): 50-55 (0.9-1 g/kg) Weight Used: (Ideal)  Fluid (ml/day):   (1 ml/kcal)    Nutrition Diagnosis:   · Inadequate oral intake related to altered GI function as evidenced by  (ileus, NGT to LIS)    Nutrition Interventions:   Food and/or Nutrient Delivery: Continue NPO,Start parenteral nutrition     Coordination of Nutrition Care: Continue to monitor while inpatient    Goals:   Previous Goal Met: Progressing toward goal(s)  Active Goal: Initiate nutrition support vs oral diet by RD follow-up    Nutrition Monitoring and Evaluation:      Food/Nutrient Intake Outcomes: Diet advancement/tolerance,Parenteral nutrition intake/tolerance  Physical Signs/Symptoms Outcomes: Biochemical data,GI status,Fluid status or edema,Weight    Discharge Planning:     Too soon to determine    [address] [facility], SHERYL TINEO on 3/9/2022 at 12:59 PM  Contact: [phone]

## 2022-03-09 NOTE — PROGRESS NOTES
PT Note:  Attempted PT, however, patient getting enema. Will attempt another time/day as schedule permits.   A Ivan Genao, PT, DPT

## 2022-03-09 NOTE — PROGRESS NOTES
[facility] Progress Note         Admit Date:  3/2/2022    Today's Date:  3/9/2022    CC:  Ileus    Subjective:     Patient with persistent distention. CT shows possible fecal obstruction in the sigmoid colon. Surgery is following. Still has NGT to LIS with persistent bilious output. Kevin Jimenez Has not passed stool or flatus in the last 24 hours. Just had dulcolax suppository placed. States that she feels the urge to defecate but cannot pass anything.      Medications:   Current Facility-Administered Medications   Medication Dose Route Frequency    furosemide (LASIX) tablet 40 mg  40 mg Oral DAILY    magic mouthwash (TC) oral suspension 5 mL  5 mL Swish and Spit Q4H    lidocaine 4 % patch 1 Patch  1 Patch TransDERmal ONCE    lidocaine 4 % patch 1 Patch  1 Patch TransDERmal Q24H    benzocaine (HURRICANE) 20 % spray   Mucous Membrane QID PRN    traMADoL (ULTRAM) tablet 50 mg  50 mg Oral Q6H PRN    methylnaltrexone (RELISTOR) injection 12 mg  12 mg SubCUTAneous DAILY    NIFEdipine ER (PROCARDIA XL) tablet 90 mg  90 mg Oral DAILY    sodium chloride (NS) flush 5-40 mL  5-40 mL IntraVENous Q8H    sodium chloride (NS) flush 5-40 mL  5-40 mL IntraVENous PRN    acetaminophen (TYLENOL) tablet 650 mg  650 mg Oral Q6H PRN    Or    acetaminophen (TYLENOL) suppository 650 mg  650 mg Rectal Q6H PRN    ondansetron (ZOFRAN ODT) tablet 4 mg  4 mg Oral Q8H PRN    Or    ondansetron (ZOFRAN) injection 4 mg  4 mg IntraVENous Q6H PRN    enoxaparin (LOVENOX) injection 30 mg  30 mg SubCUTAneous DAILY    0.9% sodium chloride infusion  100 mL/hr IntraVENous CONTINUOUS    insulin lispro (HUMALOG) injection   SubCUTAneous AC&HS    aspirin chewable tablet 81 mg  81 mg Oral 7am    carvediloL (COREG) tablet 3.125 mg  3.125 mg Oral BID WITH MEALS    isosorbide mononitrate ER (IMDUR) tablet 60 mg  60 mg Oral DAILY    rosuvastatin (CRESTOR) tablet 40 mg  40 mg Oral QHS    traZODone (DESYREL) tablet 50 mg  50 mg Oral QHS  bisacodyL (DULCOLAX) suppository 10 mg  10 mg Rectal DAILY       Review of Systems:  ROS was obtained, with pertinent positives as listed above. No chest pain or SOB. Diet:  NPO, NGT to LIS    Objective:   Vitals:  Visit Vitals  BP (!) 155/78 (BP 1 Location: Right upper arm, BP Patient Position: Sitting)   Pulse 99   Temp 97.5 °F (36.4 °C)   Resp 16   Ht 5' 4\" (1.626 m)   Wt 81.8 kg (180 lb 6.4 oz)   SpO2 93%   BMI 30.97 kg/m²     Intake/Output:  No intake/output data recorded. 03/07 1901 - 03/09 0700  In: -   Out: 1550 [Urine:1200]  Exam:  General appearance: alert, cooperative, no distress, NGT to [facility]  Lungs: clear to auscultation bilaterally anteriorly  Heart: regular rate and rhythm  Abdomen: Distended, Tympanic, HYPO-active bowel sounds normal. No masses, no organomegaly  Extremities: extremities normal, atraumatic, no cyanosis or edema  Neuro:  alert and oriented    Data Review (Labs):    Recent Labs     03/09/22  0506 03/08/22  0544 03/07/22  0549   WBC 11.0 10.6 12.7*   HGB 10.9* 10.9* 10.5*   HCT 33.7* 33.8* 31.8*    280 252   MCV 95.7 96.8 94.9    143 143   K 3.7 3.4* 3.3*   * 115* 114*   CO2 19* 18* 18*   BUN 43* 42* 43*   CREA 2.20* 2.20* 2.40*   CA 8.5 8.5 8.6   * 138* 162*     --   --    AST 48*  --   --    ALT 42  --   --    TBILI 0.4  --   --    ALB 3.1*  --   --    TP 6.8  --   --      CT A/P 3/9/22:   Abdomen: The lung bases show bilateral presumed effusions with some overlying  atelectasis or consolidation mostly on the right. Small amount of pericardial  fluid is seen. Air and fluid distended large bowel is noted, nasogastric tube is  seen within a decompressed stomach, the gallbladder, liver, spleen, adrenals,  kidneys appear normal. There is no intraperitoneal free air, ascites, nor  abnormal adenopathy seen. Large bowel measures up to 10.1 cm.  Coronary artery  calcifications are seen.     Pelvis: What appears to be a fecal plug is seen in the descending colon above  the sigmoid colon with decompression of the sigmoid colon and rectal colon but  with a small amount of oral contrast present. The small bowel is not dilated,  there is no intraperitoneal free air, ascites, nor abnormal adenopathy seen, the  bladder appears normal. The uterus is absent.     IMPRESSION  1. What may be a fecal impaction plug causing obstruction of the distal  descending colon with distention of the colon but not small bowel. 2. Bilateral pleural effusions and overlying atelectasis or consolidation worse  on the right than the left with a small amount of pericardial fluid present. Assessment:     Principal Problem:    Paralytic ileus of large intestine ([address] (3/6/2022)    Active Problems:    Hypertension (3/1/2012)      HLD (hyperlipidemia) (9/4/2013)      Stage 4 chronic kidney disease ([address] (9/15/2016)      Gastroesophageal reflux disease (2/16/2017)      Type 2 diabetes mellitus with stage 4 chronic kidney disease, with long-term current use of insulin ([address] (10/11/2018)      Acute respiratory failure with hypoxia (HCC) (2/17/2021)      Chronic diastolic congestive heart failure ([address] (4/19/2021)      Overview: Heart Failure (HFpEF), EF 00-05%, with diastolic dysfunction, ECHO       2/16/2021            MIRANDA (acute kidney injury) ([address] (3/2/2022)      Stercoral colitis (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 o. female with PMH including but not limited to coronary artery disease status post PCI 2012, diabetes, hypertension, chronic kidney disease stage 3, who is seen in consultation at the request of Dr. Vivian Dumont constipation abdominal pain and stercoral colitis.  S/P decompressive colonoscopy with Dr. Jameel Weir 3/6/22 with report of passing stool  3/6 and 3/7  on Relistor IM and dulcolax suppository daily and NGT to LIS but no change on KUB or exam. Maximal diameter of sigmoid colon on KUB today measured at 11cm. CT today (above) suggests possible fecal impaction.  However, colonic decompression and cathartics have failed to improve her situation. Surgery is following.       Plan:     - give Milk of Molasses enema one time this morning  - await surgical recommendations. Salud Wagner      Patient is seen and examined in collaboration with Dr. Leopoldo Wilder. Assessment and plan as per Dr. Luther Oropeza.

## 2022-03-10 ENCOUNTER — APPOINTMENT (OUTPATIENT)
Dept: GENERAL RADIOLOGY | Age: 72
DRG: 344 | End: 2022-03-10
Attending: PHYSICIAN ASSISTANT
Payer: MEDICARE

## 2022-03-10 LAB
ANION GAP SERPL CALC-SCNC: 8 MMOL/L (ref 7–16)
BUN SERPL-MCNC: 34 MG/DL (ref 8–23)
CALCIUM SERPL-MCNC: 8.4 MG/DL (ref 8.3–10.4)
CHLORIDE SERPL-SCNC: 111 MMOL/L (ref 98–107)
CO2 SERPL-SCNC: 24 MMOL/L (ref 21–32)
CREAT SERPL-MCNC: 2 MG/DL (ref 0.6–1)
GLUCOSE BLD STRIP.AUTO-MCNC: 157 MG/DL (ref 65–100)
GLUCOSE BLD STRIP.AUTO-MCNC: 162 MG/DL (ref 65–100)
GLUCOSE BLD STRIP.AUTO-MCNC: 197 MG/DL (ref 65–100)
GLUCOSE BLD STRIP.AUTO-MCNC: 294 MG/DL (ref 65–100)
GLUCOSE SERPL-MCNC: 168 MG/DL (ref 65–100)
MAGNESIUM SERPL-MCNC: 2.1 MG/DL (ref 1.8–2.4)
PHOSPHATE SERPL-MCNC: 2.3 MG/DL (ref 2.3–3.7)
POTASSIUM SERPL-SCNC: 2.5 MMOL/L (ref 3.5–5.1)
POTASSIUM SERPL-SCNC: 2.7 MMOL/L (ref 3.5–5.1)
SERVICE CMNT-IMP: ABNORMAL
SODIUM SERPL-SCNC: 143 MMOL/L (ref 136–145)
TRIGL SERPL-MCNC: 93 MG/DL (ref 35–150)

## 2022-03-10 PROCEDURE — 65270000029 HC RM PRIVATE

## 2022-03-10 PROCEDURE — 74011250636 HC RX REV CODE- 250/636: Performed by: INTERNAL MEDICINE

## 2022-03-10 PROCEDURE — 74011250637 HC RX REV CODE- 250/637: Performed by: INTERNAL MEDICINE

## 2022-03-10 PROCEDURE — 36415 COLL VENOUS BLD VENIPUNCTURE: CPT

## 2022-03-10 PROCEDURE — 74011636637 HC RX REV CODE- 636/637: Performed by: INTERNAL MEDICINE

## 2022-03-10 PROCEDURE — 97530 THERAPEUTIC ACTIVITIES: CPT

## 2022-03-10 PROCEDURE — 84132 ASSAY OF SERUM POTASSIUM: CPT

## 2022-03-10 PROCEDURE — 96375 TX/PRO/DX INJ NEW DRUG ADDON: CPT

## 2022-03-10 PROCEDURE — 82962 GLUCOSE BLOOD TEST: CPT

## 2022-03-10 PROCEDURE — 74011250637 HC RX REV CODE- 250/637: Performed by: SURGERY

## 2022-03-10 PROCEDURE — 74011250636 HC RX REV CODE- 250/636: Performed by: SURGERY

## 2022-03-10 PROCEDURE — 84100 ASSAY OF PHOSPHORUS: CPT

## 2022-03-10 PROCEDURE — 74011250637 HC RX REV CODE- 250/637: Performed by: STUDENT IN AN ORGANIZED HEALTH CARE EDUCATION/TRAINING PROGRAM

## 2022-03-10 PROCEDURE — 74011000250 HC RX REV CODE- 250: Performed by: INTERNAL MEDICINE

## 2022-03-10 PROCEDURE — 84478 ASSAY OF TRIGLYCERIDES: CPT

## 2022-03-10 PROCEDURE — 96372 THER/PROPH/DIAG INJ SC/IM: CPT

## 2022-03-10 PROCEDURE — 99232 SBSQ HOSP IP/OBS MODERATE 35: CPT | Performed by: SURGERY

## 2022-03-10 PROCEDURE — 74018 RADEX ABDOMEN 1 VIEW: CPT

## 2022-03-10 PROCEDURE — 83735 ASSAY OF MAGNESIUM: CPT

## 2022-03-10 PROCEDURE — 80048 BASIC METABOLIC PNL TOTAL CA: CPT

## 2022-03-10 PROCEDURE — 96376 TX/PRO/DX INJ SAME DRUG ADON: CPT

## 2022-03-10 RX ORDER — POTASSIUM CHLORIDE 20 MEQ/1
20 TABLET, EXTENDED RELEASE ORAL
Status: COMPLETED | OUTPATIENT
Start: 2022-03-10 | End: 2022-03-10

## 2022-03-10 RX ORDER — SODIUM CHLORIDE AND POTASSIUM CHLORIDE 150; 450 MG/100ML; MG/100ML
INJECTION, SOLUTION INTRAVENOUS CONTINUOUS
Status: DISCONTINUED | OUTPATIENT
Start: 2022-03-10 | End: 2022-03-10

## 2022-03-10 RX ORDER — POTASSIUM CHLORIDE 20 MEQ/1
40 TABLET, EXTENDED RELEASE ORAL ONCE
Status: COMPLETED | OUTPATIENT
Start: 2022-03-10 | End: 2022-03-10

## 2022-03-10 RX ORDER — POTASSIUM CHLORIDE 20 MEQ/1
40 TABLET, EXTENDED RELEASE ORAL 3 TIMES DAILY
Status: COMPLETED | OUTPATIENT
Start: 2022-03-10 | End: 2022-03-10

## 2022-03-10 RX ADMIN — ASPIRIN 81 MG 81 MG: 81 TABLET ORAL at 07:00

## 2022-03-10 RX ADMIN — I.V. FAT EMULSION 250 ML: 20 EMULSION INTRAVENOUS at 17:38

## 2022-03-10 RX ADMIN — MAGNESIUM SULFATE HEPTAHYDRATE: 500 INJECTION, SOLUTION INTRAMUSCULAR; INTRAVENOUS at 17:37

## 2022-03-10 RX ADMIN — SODIUM CHLORIDE, PRESERVATIVE FREE 10 ML: 5 INJECTION INTRAVENOUS at 14:00

## 2022-03-10 RX ADMIN — ASPIRIN 81 MG 81 MG: 81 TABLET ORAL at 05:44

## 2022-03-10 RX ADMIN — SODIUM CHLORIDE, PRESERVATIVE FREE 10 ML: 5 INJECTION INTRAVENOUS at 21:41

## 2022-03-10 RX ADMIN — Medication 2 UNITS: at 07:30

## 2022-03-10 RX ADMIN — CARVEDILOL 3.12 MG: 3.12 TABLET, FILM COATED ORAL at 08:21

## 2022-03-10 RX ADMIN — ENOXAPARIN SODIUM 30 MG: 100 INJECTION SUBCUTANEOUS at 08:22

## 2022-03-10 RX ADMIN — POTASSIUM CHLORIDE 40 MEQ: 20 TABLET, EXTENDED RELEASE ORAL at 11:43

## 2022-03-10 RX ADMIN — CARVEDILOL 3.12 MG: 3.12 TABLET, FILM COATED ORAL at 16:57

## 2022-03-10 RX ADMIN — POTASSIUM CHLORIDE 20 MEQ: 20 TABLET, EXTENDED RELEASE ORAL at 06:46

## 2022-03-10 RX ADMIN — ROSUVASTATIN CALCIUM 40 MG: 20 TABLET, FILM COATED ORAL at 21:41

## 2022-03-10 RX ADMIN — POTASSIUM CHLORIDE 40 MEQ: 20 TABLET, EXTENDED RELEASE ORAL at 21:41

## 2022-03-10 RX ADMIN — POTASSIUM CHLORIDE 40 MEQ: 20 TABLET, EXTENDED RELEASE ORAL at 17:39

## 2022-03-10 RX ADMIN — Medication 2 UNITS: at 21:43

## 2022-03-10 RX ADMIN — NIFEDIPINE 90 MG: 90 TABLET, EXTENDED RELEASE ORAL at 08:22

## 2022-03-10 RX ADMIN — TRAZODONE HYDROCHLORIDE 50 MG: 50 TABLET ORAL at 21:41

## 2022-03-10 RX ADMIN — FUROSEMIDE 40 MG: 40 TABLET ORAL at 08:22

## 2022-03-10 RX ADMIN — Medication 6 UNITS: at 16:30

## 2022-03-10 RX ADMIN — Medication 2 UNITS: at 11:30

## 2022-03-10 RX ADMIN — ISOSORBIDE MONONITRATE 60 MG: 60 TABLET, EXTENDED RELEASE ORAL at 08:22

## 2022-03-10 RX ADMIN — SODIUM CHLORIDE, PRESERVATIVE FREE 10 ML: 5 INJECTION INTRAVENOUS at 05:44

## 2022-03-10 RX ADMIN — ACETAMINOPHEN 650 MG: 325 TABLET ORAL at 04:06

## 2022-03-10 NOTE — PROGRESS NOTES
Comprehensive Nutrition Assessment    Type and Reason for Visit: Reassess  TPN Management (Hospitalist)    Nutrition Recommendations/Plan:   Parenteral Nutrition:  Peripheral parenteral nutrition to begin at 1800 (can be infused via central line if access achieved prior to initiation of infusion). Continue: Dex 5%, 4.25% AA 1.44 L (60ml/hr)   Initiate 250 ml 20% lipids daily  To provide: 990 kcal/d (78% of needs), 61 grams of protein/d (110% of needs/1.1 g/kg IBW), 72 grams of CHO/d and 1440 ml of total volume/d. Lytes/L:   Sodium 60 meq (60 meq NaCl), Potassium 30 meq (30 meq KPO4), 5 meq Mg, 4.5 meq Calcium  Other additives: MTE, MVI MWF due to national shortages, Thiamine 100 mg day 2/7 due to refeeding risk  Nutritional Supplement Therapy:   Implement electrolyte replacement per nutrition support protocols  Replacement indicated:  Completed  Prior to RD rounding   Labs:   BMP daily  Mg tomorrow  Phos MWF    Triglyceride tomorrow  POC Glucoses/SSI Active   Meals and Snacks:  Continue current diet. Diet progression per GI  Nutrition Supplement Therapy:  Medical food supplement therapy: clear liquid supplement deferred due to CHO content. Malnutrition Assessment:  Malnutrition Status: At risk for malnutrition (specify) (NPO x6 days, ? surgery)    Nutrition Assessment:   Nutrition History: Patient reports 2 meals per day at baseline. She states that she was eating normally up until admission. She denies ever having nausea even though she was not having bowel movements. She states that she tried not to eat too much protein as she has CKD. Cultural/Advent/Ethnic Food Preference(s): None   Nutrition Background: Patient with PMH significant for chronic constipation, CKD3, HTN, CHF, MI, CAD, DM, GERD, HLD. She presented with increasing abdominal pain for ~2 weeks. CTAP showed steroral colitis. She was admitted with paralytic ileus.    Nutrition Interval:  GI has been following and thus far patient is not responding to medical management. She had decompression colonoscopy 3/6. Repeat KUB this 3/8 unchanged. Surgery following and potential for \"subtotal colectomy with ileostomy soon if she doesn't open up soon\". Patient was taken to OR prep last evening and had 2 large BS. She had several other BMs through the night. Repeat KUB this am with significant improvement in colonic distention. NGT clamped this am per GI and CLD initiated. If CLD tolerated plan to removed NGT. PICC not placed 3/9 per nephrology due to renal function. Patient discussed with Dr. Yoly Howard. Surgery order KCl containing IVF. Noted IVF d/c prior to TPN due to swelling. Dr. Yoly Howard to d/c IVF ordered by surgery. PO K replacement given this am per surgery. Additional PO K replacement ordered by Dr. Yoly Howard. Per Dr. Yoly Howard continue PPN tonight. If patient unable to tolerate diet advance/NGT clamping and will require ongoing PN, will refer to IR for central line. Discussed with RN, Janet Reddy. Patient seen lying in bed. She states that she feels so much better following BS. She states that she is not only hungry for the first time in a week, but she is starving. She asks if she will be able to have apple juice and jello and popsicles. Provided her with apple juice. Abdominal Status (last documented): Distended,Soft abdomen with Active  bowel sounds. Last BM 03/09/22.   Pertinent Medications: Dulcolax, IMDUR, Relistor, Lasix (40 mg daily), MMW, SSI (2 units yesterday, 2 thus far today)   Electrolyte replacements: 3/5 20 meq KCl; 3/6 20 meq KCl: 3/7 20 meq KCl; 3/8 40 meq KCl; 3/10 KLOR-CON 60 meq PO   Pertinent Labs:   Lab Results   Component Value Date/Time    Sodium 143 03/10/2022 04:57 AM    Potassium 2.7 (L) 03/10/2022 04:57 AM    Chloride 111 (H) 03/10/2022 04:57 AM    CO2 24 03/10/2022 04:57 AM    Anion gap 8 03/10/2022 04:57 AM    Glucose 168 (H) 03/10/2022 04:57 AM    BUN 34 (H) 03/10/2022 04:57 AM    Creatinine 2.00 (H) 03/10/2022 04:57 AM    Calcium 8.4 03/10/2022 04:57 AM    Albumin 3.1 (L) 03/09/2022 05:06 AM    Magnesium 2.1 03/10/2022 04:57 AM    Phosphorus 2.3 03/10/2022 04:57 AM     Lab Results   Component Value Date/Time    Glucose 168 (H) 03/10/2022 04:57 AM    Glucose (POC) 197 (H) 03/10/2022 06:09 AM    Glucose (POC) 155 (H) 03/09/2022 09:25 PM    Glucose (POC) 167 (H) 03/09/2022 03:55 PM    Glucose (POC) 139 (H) 03/09/2022 11:20 AM    Glucose (POC) 145 (H) 03/09/2022 06:37 AM    Glucose (POC) 166 (H) 03/08/2022 10:06 PM     Lab Results   Component Value Date/Time    Triglyceride 93 03/10/2022 04:57 AM   Labs remarkable for: depleted K, elevated glucose - on SSI, Triglyceride within range to initiate lipids    Nutrition Related Findings:   No joey wasting of fat/muscle stores. NGT to LIS. PPN initiated 3/9 without lipids. Current Nutrition Therapies:  ADULT DIET Clear Liquid    Current Intake:   Average Meal Intake: NPO        Anthropometric Measures:  Height: 5' 4\" (162.6 cm)  Current Body Wt: 82.1 kg (181 lb) (3/9), Weight source: Not specified  BMI: 31.1, Obese class 1 (BMI 30.0-34. 9)  Admission Body Weight: 168 lb 6.9 oz (3/4)  Ideal Body Weight (lbs) (Calculated): 120 lbs (55 kg), 150.3 %  Usual Body Wt: 77.6 kg (171 lb) (per patient, confirmed by office wts), Percent weight change: 5.5          Edema: Generalized: 2+; Non-pitting (3/9/2022  7:59 PM)  LLE: 3+ (3/9/2022  3:55 AM)  LUE: 2+ (3/9/2022  3:55 AM)  RLE: 3+ (3/9/2022  3:55 AM)  RUE: 2+ (3/9/2022  3:55 AM)     Estimated Daily Nutrient Needs:  Energy (kcal/day): 0731-0306 (Kcal/kg (23-28), Weight Used: Ideal (55 kg))  Protein (g/day): 50-55 (0.9-1 g/kg) Weight Used: (Ideal)  Fluid (ml/day):   (1 ml/kcal)    Nutrition Diagnosis:   · Inadequate oral intake related to altered GI function as evidenced by NPO or clear liquid status due to medical condition (paralytic ileus)    Nutrition Interventions:   Food and/or Nutrient Delivery: Continue NPO,Modify parenteral nutrition     Coordination of Nutrition Care: Continue to monitor while inpatient    Goals:   Previous Goal Met: Goal(s) achieved  Active Goal: Tolerate PN at goal within 3 days    Nutrition Monitoring and Evaluation:      Food/Nutrient Intake Outcomes: Diet advancement/tolerance,Parenteral nutrition intake/tolerance  Physical Signs/Symptoms Outcomes: Biochemical data,GI status,Fluid status or edema,Weight    Discharge Planning:     Too soon to determine    [address] SHERYL Kaiser on 3/10/2022 at 12:59 PM  Contact: [phone]

## 2022-03-10 NOTE — PROGRESS NOTES
H&P/Consult Note/Progress Note/Office Note:   Pinky Gasca  MRN: [id number]  :1950  Age:71 y.o.    HPI: Pinky Gasca is a 66 y.o. female with a history of chronic constipation, stage III chronic kidney disease, CAD, s/p stent, and HTN,   who was admitted on 3/2/22 by the hospitalist after she presented to the ER with a 1 week history of progressive abdominal pain and distention. She was empirically placed on Cipro and Flagyl in late February for possible diverticulitis her symptoms worsen and she had no bowel movement in several days. She has been diagnosed with stericoral colitis which has been refractory to laxatives and cathartics. She remained with an NG tube in place and is distended. Her abdominal films identifies significant sigmoid colon distention by 3/8/22 as below. GI was consulted to assist with her care and performed decompressive colonoscopy without resolution on 3/6/2022. She denies narcotic use as an outpatient. 3/2/22 CT abd/pelvis with no IV contrast  LIMITED THORAX: Mitral annulus calcifications. Right coronary artery  atherosclerosis. The included portions of the pericardium are unremarkable. The included lung bases are clear.     PERITONEUM/MESENTERY: No evidence of ascites, free gas, or lymphadenopathy.     GI TRACT: There is large volume stool extending from the cecum through the mid descending colon. There is an abrupt marketed transition in colonic caliber in the mid descending colon. There is mild colonic wall thickening and subtle free  fluid in the area of abrupt caliber transition. The distal portions of the colon are decompressed.     The terminal ileum is unremarkable. There is no evidence of upstream small bowel obstruction. Nonvisualized appendix, without pericecal inflammatory change.     The stomach is unremarkable.   SPLEEN: Normal  ADRENALS: Normal  PANCREAS: Normal  LIVER: Normal  GALLBLADDER: Normal     KIDNEYS: Mild bilateral renal cortical thinning. No evidence of hydroureteronephrosis or nephroureterolithiasis. Duplicated left renal collecting system.     BLADDER/: Small posterior bladder wall diverticulum. Prior hysterectomy.     VESSELS: Scattered atherosclerosis.     BONES/SOFT TISSUES: Lumbar spine spondylosis without suspicious lytic or blastic bony lesions.     IMPRESSION     1. There is colonic obstruction consequent to a severe stool burden extending from the cecum through the mid descending colon. There is an abrupt transition in colonic caliber in the mid descending colon. There is subtle bowel wall thickening and surrounding mesenteric stranding of the area of transition in caliber, consistent with a developing stercoral colitis. There is no evidence of associated colonic perforation or upstream small bowel obstruction.     2. Chronic findings otherwise as above.           3/8/22 abd Xray  Hx:  Follow-up ileus with abdominal distention. Persistent gaseous distention of the colon with maximum diameter of 11 cm in the proximal sigmoid. No significant small bowel dilatation. No abnormal soft tissue mass or calcific density is noted. Nasogastric tube remains in place.     IMPRESSION:  NO SIGNIFICANT INTERVAL CHANGE          I saw her previously in 2018 with N/V/RUQ pain    She was lost to followup when she did not show up to her appt on 11/26/18    She is s/p BTL and s/p \"radical\" hysterectomy for cervical dysplasia many yrs ago. She denies any h/o XRT or invasive carcinoma      12/3/14 CT abd/pelvis  IMPRESSION:     1.  No worrisome acute findings seen on this CT scan of the abdomen and pelvis. 12/11/14 s/p EGD ([facility])  Antral scar/mild gastritis  Biopsies-->gastric mucosa with changes of repair; unremarkable small bowel      11/5/18 Abd U/S  IMPRESSION: Negative abdominal ultrasound. 11/21/18 CT neck  Impression: Calcified atherosclerotic disease.     No cervical arterial aneurysm identified.     Right internal carotid artery: 60% stenosis in the distal carotid bulb. Left internal carotid artery: 80-85% stenosis in the distal carotid bulb. Calcified plaque extends from the distal CCA 6.9-7 cm up the LICA. The severe  stenosis is at the distal end of this plaque complex, which corresponds to the C4-C5 level.     Right external carotid artery: 60% stenosis.     Patent right and left vertebral arteries. 3/9/22 CT abd/pelvis with no oral or IV contrast  Hx: Colitis     The lung bases show bilateral presumed effusions with some overlying atelectasis or consolidation mostly on the right. Small amount of pericardial fluid is seen. Air and fluid distended large bowel is noted, nasogastric tube is seen   within a decompressed stomach. The gallbladder, liver, spleen, adrenals, kidneys appear normal.   There is no intraperitoneal free air, ascites, nor abnormal adenopathy seen. Large bowel measures up to 10.1 cm. Coronary artery calcifications are seen.     Pelvis: What appears to be a fecal plug is seen in the descending colon above the sigmoid colon with decompression of the sigmoid colon and rectal colon but  with a small amount of oral contrast present. The small bowel is not dilated, there is no intraperitoneal free air, ascites, nor abnormal adenopathy seen, the  bladder appears normal. The uterus is absent.     IMPRESSION  1. What may be a fecal impaction plug causing obstruction of the distal descending colon with distention of the colon but not small bowel. 2. Bilateral pleural effusions and overlying atelectasis or consolidation worse on the right than the left with a small amount of pericardial fluid present.          Additional hx:    3/9/22 c/o severe abd pain and distention; no relief with additional enema this am; GI input noted, CT reviewed. Her distal descending colon is obstructed with marked proximal colon distention.    I offered emergent lap with partial vs subtotal colectomy and either ileostomy or colostomy. She was in pre-op going to surgery and had massive BM and felt better so surgery was cancelled  3/10/22 many BMs and lots of flatus overnight; abd much softer; NGT clamped clears started                Past Medical History:   Diagnosis Date    Acute on chronic combined systolic and diastolic congestive heart failure ([address] 2/17/2021    Acute renal failure superimposed on stage 3b chronic kidney disease ([address] 1/12/2021    CAD (coronary artery disease) 3/1/2012    MI, 3 stents    Chest pain     Coronary artery disease involving native coronary artery without angina pectoris 5/21/2015    COVID-19 virus infection 1/12/2021    Depression 3/1/2012    Diabetes mellitus ([address] 3/1/2012    oral agents. . hypo- 80's, Last A1c 6.9 in 6/2018    DM2 (diabetes mellitus, type 2) ([facility] 5/21/2015    Elevated LFTs 1/12/2021    Elevated troponin 3/2/2012    Essential hypertension 5/21/2015    External hemorrhoids without mention of complication 7733    GERD (gastroesophageal reflux disease)     HCAP (healthcare-associated pneumonia) 1/18/2021    History of MI (myocardial infarction) 11/12    x2    Hypercholesterolemia     Hypertension 3/1/2012    Hypoalbuminemia 2/18/2021    Hyponatremia 1/12/2021    Hypoxemia 8/12/2020    Insomnia     Lactic acidosis 1/12/2021    Long QT interval 1/12/2021    Personal history of colonic polyps 2013    hyperplastic    Platelet inhibition due to Plavix     holding for colonoscopy per GI Dr.    [facility] Pleural effusion, bilateral 8/11/2020    Rectocele 2013    Sepsis ([address] 1/12/2021    Tobacco abuse 3/1/2012    quit for 1 year    Tobacco use disorder     Unstable angina ([address] 3/1/2012    ntg as needed.       Past Surgical History:   Procedure Laterality Date    FLEXIBLE SIGMOIDOSCOPY N/A 3/6/2022    SIGMOIDOSCOPY FLEXIBLE performed by Nathalie Galan MD at [facility] HX CAROTID ENDARTERECTOMY Left 12/12/2018    HX CATARACT REMOVAL Left 09/19/2016    Dr Waqar Robles [facility]    HX CATARACT REMOVAL Right 11/07/2016    Dr Waqar Robles [facility]    HX CERVICAL FUSION      neck w/plate    HX COLONOSCOPY  12/17/13    Anna Marie--single transverse hyperplastic polyp--7-10 year recall    HX HEMORRHOIDECTOMY      x2    HX ORTHOPAEDIC      left elbow    HX CAL AND BSO      HX WISDOM TEETH EXTRACTION      IN LEFT HEART CATH,PERCUTANEOUS  last stented 11/12 x 2    stent x3 , mi x 2     Current Facility-Administered Medications   Medication Dose Route Frequency    0.45% sodium chloride with KCl 20 mEq/L infusion   IntraVENous CONTINUOUS    potassium chloride (K-DUR, KLOR-CON M20) SR tablet 20 mEq  20 mEq Oral NOW    TPN ADULT - dextrose 5% amino acid 4.25%   IntraVENous QPM    NUTRITIONAL SUPPORT ELECTROLYTE PRN ORDERS   Does Not Apply PRN    furosemide (LASIX) tablet 40 mg  40 mg Oral DAILY    magic mouthwash (TC) oral suspension 5 mL  5 mL Swish and Spit Q4H    lidocaine 4 % patch 1 Patch  1 Patch TransDERmal Q24H    benzocaine (HURRICANE) 20 % spray   Mucous Membrane QID PRN    traMADoL (ULTRAM) tablet 50 mg  50 mg Oral Q6H PRN    methylnaltrexone (RELISTOR) injection 12 mg  12 mg SubCUTAneous DAILY    NIFEdipine ER (PROCARDIA XL) tablet 90 mg  90 mg Oral DAILY    sodium chloride (NS) flush 5-40 mL  5-40 mL IntraVENous Q8H    sodium chloride (NS) flush 5-40 mL  5-40 mL IntraVENous PRN    acetaminophen (TYLENOL) tablet 650 mg  650 mg Oral Q6H PRN    Or    acetaminophen (TYLENOL) suppository 650 mg  650 mg Rectal Q6H PRN    ondansetron (ZOFRAN ODT) tablet 4 mg  4 mg Oral Q8H PRN    Or    ondansetron (ZOFRAN) injection 4 mg  4 mg IntraVENous Q6H PRN    enoxaparin (LOVENOX) injection 30 mg  30 mg SubCUTAneous DAILY    insulin lispro (HUMALOG) injection   SubCUTAneous AC&HS    aspirin chewable tablet 81 mg  81 mg Oral 7am    carvediloL (COREG) tablet 3.125 mg  3.125 mg Oral BID WITH MEALS    isosorbide mononitrate ER (IMDUR) tablet 60 mg  60 mg Oral DAILY    rosuvastatin (CRESTOR) tablet 40 mg  40 mg Oral QHS    traZODone (DESYREL) tablet 50 mg  50 mg Oral QHS    bisacodyL (DULCOLAX) suppository 10 mg  10 mg Rectal DAILY     Cephalosporins, Hydralazine, Sulfamethoxazole-trimethoprim, Codeine, and Lisinopril  Social History     Socioeconomic History    Marital status:    Tobacco Use    Smoking status: Former Smoker     Packs/day: 1.00     Years: 40.00     Pack years: 40.00     Quit date: 2012     Years since quittin.3    Smokeless tobacco: Never Used    Tobacco comment: quit  . has stopped prior also   Vaping Use    Vaping Use: Never used   Substance and Sexual Activity    Alcohol use: No    Drug use: No     Social History     Tobacco Use   Smoking Status Former Smoker    Packs/day: 1.00    Years: 40.00    Pack years: 40.00    Quit date: 2012    Years since quittin.3   Smokeless Tobacco Never Used   Tobacco Comment    quit  . has stopped prior also     Family History   Problem Relation Age of Onset    Heart Disease Mother     Osteoporosis Mother     Heart Attack Mother 67        mi    Thyroid Disease Mother         Multinodular Goiter    Heart Disease Father     Cancer Father         pancreatic    Heart Attack Father 67        MI    Cancer Sister         Pre-Cancerous dysplasia    Thyroid Cancer Sister     Ulcerative Colitis Brother     Thyroid Cancer Brother     Breast Cancer Neg Hx      ROS: The patient has no difficulty with chest pain or shortness of breath. No fever or chills. Comprehensive review of systems was otherwise unremarkable except as noted above.     Physical Exam:   Visit Vitals  /64 (BP 1 Location: Right upper arm, BP Patient Position: Supine)   Pulse 99   Temp 98 °F (36.7 °C)   Resp 21   Ht 5' 4\" (1.626 m)   Wt 181 lb (82.1 kg)   SpO2 95%   BMI 31.07 kg/m²     Vitals:    22 1734 22 1944 22 2326 03/10/22 0345 BP: (!) 147/67 (!) 146/71 126/65 134/64   Pulse: (!) 105 (!) 103 95 99   Resp: 19 20 20 21   Temp:  98 °F (36.7 °C) 98.7 °F (37.1 °C) 98 °F (36.7 °C)   SpO2: 93% 95% 95% 95%   Weight: 181 lb (82.1 kg)      Height: 5' 4\" (1.626 m)        03/09 1901 - 03/10 0700  In: -   Out: 600   03/08 0701 - 03/09 1900  In: -   Out: 2525 [Urine:2175]    Constitutional: Alert, oriented, cooperative patient in no acute distress; appears stated age    Eyes:Sclera are clear. EOMs intact  ENMT: no external lesions gross hearing normal; no obvious neck masses, no ear or lip lesions, nares normal; +NGT  CV: RRR. Normal perfusion  Resp: No JVD. Breathing is  non-labored; no audible wheezing. GI: soft and non-tender     Musculoskeletal: unremarkable with normal function. No embolic signs or cyanosis. Neuro:  Oriented; moves all 4; no focal deficits  Psychiatric: normal affect and mood, no memory impairment    Recent vitals (if inpt):  Patient Vitals for the past 24 hrs:   BP Temp Pulse Resp SpO2 Height Weight   03/10/22 0345 134/64 98 °F (36.7 °C) 99 21 95 % -- --   03/09/22 2326 126/65 98.7 °F (37.1 °C) 95 20 95 % -- --   03/09/22 1944 (!) 146/71 98 °F (36.7 °C) (!) 103 20 95 % -- --   03/09/22 1734 (!) 147/67 -- (!) 105 19 93 % 5' 4\" (1.626 m) 181 lb (82.1 kg)   03/09/22 1557 (!) 151/79 97.6 °F (36.4 °C) 100 22 91 % -- --   03/09/22 1208 (!) 155/75 98 °F (36.7 °C) 97 20 92 % -- --   03/09/22 0821 (!) 155/78 97.5 °F (36.4 °C) 99 16 93 % -- --       Amount and/or Complexity of Data Reviewed and Analyzed:  I reviewed and analyzed all of the unique labs and radiologic studies that are shown below as well as any that are in the HPI, and any that are in the expanded problem list below  *Each unique test, order, or document contributes to the combination of 2 or combination of 3 in Category 1 below. For this visit I also reviewed old records and prior notes.       Recent Labs     03/10/22  0457 03/09/22  0506 03/09/22  0506   WBC  -- --  11.0   HGB  --   --  10.9*   PLT  --   --  274      < > 142   K 2.7*   < > 3.7   *   < > 114*   CO2 24   < > 19*   BUN 34*   < > 43*   CREA 2.00*   < > 2.20*   *   < > 143*   TBILI  --   --  0.4   ALT  --   --  42   AP  --   --  109    < > = values in this interval not displayed. Review of most recent CBC  Lab Results   Component Value Date/Time    WBC 11.0 03/09/2022 05:06 AM    HGB 10.9 (L) 03/09/2022 05:06 AM    HCT 33.7 (L) 03/09/2022 05:06 AM    PLATELET 401 95/75/9484 05:06 AM    MCV 95.7 03/09/2022 05:06 AM       Review of most recent BMP  Lab Results   Component Value Date/Time    Sodium 143 03/10/2022 04:57 AM    Potassium 2.7 (L) 03/10/2022 04:57 AM    Chloride 111 (H) 03/10/2022 04:57 AM    CO2 24 03/10/2022 04:57 AM    Anion gap 8 03/10/2022 04:57 AM    Glucose 168 (H) 03/10/2022 04:57 AM    BUN 34 (H) 03/10/2022 04:57 AM    Creatinine 2.00 (H) 03/10/2022 04:57 AM    BUN/Creatinine ratio 19 11/03/2021 04:06 PM    GFR est AA 31 (L) 03/10/2022 04:57 AM    GFR est non-AA 26 (L) 03/10/2022 04:57 AM    Calcium 8.4 03/10/2022 04:57 AM       Review of most recent LFTs (and lipase if done)  Lab Results   Component Value Date/Time    ALT (SGPT) 42 03/09/2022 05:06 AM    AST (SGOT) 48 (H) 03/09/2022 05:06 AM    Alk.  phosphatase 109 03/09/2022 05:06 AM    Bilirubin, direct 0.10 11/03/2021 04:06 PM    Bilirubin, total 0.4 03/09/2022 05:06 AM     Lab Results   Component Value Date/Time    Lipase 157 03/02/2022 09:14 AM       Lab Results   Component Value Date/Time    INR 1.1 01/13/2021 06:21 AM    Bilirubin, direct 0.10 11/03/2021 04:06 PM    Troponin-I, Qt. <0.02 (L) 02/05/2017 09:08 PM       Review of most recent HgbA1c  Lab Results   Component Value Date/Time    Hemoglobin A1c 6.6 (H) 11/03/2021 04:06 PM       Nutritional assessment screen for wound healing issues:  Lab Results   Component Value Date/Time    Protein, total 6.8 03/09/2022 05:06 AM    Albumin 3.1 (L) 03/09/2022 05:06 AM @lastcovr@  XR Results (most recent):  Results from Hospital Encounter encounter on 03/02/22    XR ABD (KUB)    Narrative  KUB:    CLINICAL HISTORY:  Follow-up ileus with abdominal distention. COMPARISON:  March 7, 2022 and earlier studies. FINDINGS:  AP supine images demonstrates persistent gaseous distention of the  colon with maximum diameter of 11 cm in the proximal sigmoid. No significant  small bowel dilatation. No abnormal soft tissue mass or calcific density is  noted. Nasogastric tube remains in place. Impression  NO SIGNIFICANT INTERVAL CHANGE. CT Results (most recent):  Results from Hospital Encounter encounter on 03/02/22    CT ABD PELV WO CONT    Narrative  CT abdomen and pelvis done without oral and IV contrast 9 March, 2022    Reference exam: 8 March, 2022    Indication: Colitis    Technique: Radiation dose reduction techniques were used for this study using  one or more of the following: automated exposure control; adjustment of mA  and/or kV (according to patient size);  iterative reconstruction. 5 mm axial  images were taken through the abdomen and pelvis using no oral nor IV contrast.    Abdomen: The lung bases show bilateral presumed effusions with some overlying  atelectasis or consolidation mostly on the right. Small amount of pericardial  fluid is seen. Air and fluid distended large bowel is noted, nasogastric tube is  seen within a decompressed stomach, the gallbladder, liver, spleen, adrenals,  kidneys appear normal. There is no intraperitoneal free air, ascites, nor  abnormal adenopathy seen. Large bowel measures up to 10.1 cm. Coronary artery  calcifications are seen. Pelvis: What appears to be a fecal plug is seen in the descending colon above  the sigmoid colon with decompression of the sigmoid colon and rectal colon but  with a small amount of oral contrast present.  The small bowel is not dilated,  there is no intraperitoneal free air, ascites, nor abnormal adenopathy seen, the  bladder appears normal. The uterus is absent. Impression  1. What may be a fecal impaction plug causing obstruction of the distal  descending colon with distention of the colon but not small bowel. 2. Bilateral pleural effusions and overlying atelectasis or consolidation worse  on the right than the left with a small amount of pericardial fluid present. US Results (most recent):  Results from [place] encounter on 10/15/21    US RETROPERITONEUM COMP    Narrative  RENAL ULTRASOUND. INDICATION: Chronic stage V renal failure. COMPARISON: February 2021. TECHNIQUE: Direct skin contact sector 3-5 MHz images of the kidneys are  obtained. FINDINGS: Renal echogenicity grossly unremarkable, the right kidney is  hypoechoic compared to the liver. Right kidney: 9.3 cm in length. No hydronephrosis. Left kidney: 11.8 cm in length. No hydronephrosis. Urinary bladder: Unremarkable. Vasculature: Aorta: Normal caliber. IVC:  Patent. Impression  Unremarkable renal ultrasound. Negative for obstruction.         Admission date (for inpatients): 3/2/2022   2 Days Post-Op  Procedure(s):  PROCEDURE CANCELLED - NOT PERFORMED        ASSESSMENT/PLAN:  Problem List  Date Reviewed: 2/7/2022          Codes Class Noted    * (Principal) Colon obstruction ([facility] ICD-10-CM: D53.356  ICD-9-CM: 560.9  3/9/2022        Stercoral colitis ICD-10-CM: K52.89  ICD-9-CM: 558.9  3/2/2022        CAD (coronary artery disease) (Chronic) ICD-10-CM: I25.10  ICD-9-CM: 414.00  8/19/2021        Chest pain ICD-10-CM: R07.9  ICD-9-CM: 786.50  8/19/2021        Anemia ICD-10-CM: D64.9  ICD-9-CM: 285.9  8/19/2021        Elevated ALT measurement ICD-10-CM: R74.01  ICD-9-CM: 790.4  7/7/2021        Polyarthritis ICD-10-CM: M13.0  ICD-9-CM: 716.50  7/7/2021        Chronic diastolic congestive heart failure ([facility] ICD-10-CM: I50.32  ICD-9-CM: 428.32, 428.0  4/19/2021    Overview Signed 7/7/2021 11:06 AM by Misha Munson, Justin Alcantara, NP     Heart Failure (HFpEF), EF 31-94%, with diastolic dysfunction, ECHO 2/16/2021               Normocytic anemia ICD-10-CM: D64.9  ICD-9-CM: 285.9  1/12/2021        Mixed hyperlipidemia ICD-10-CM: E78.2  ICD-9-CM: 272.2  10/21/2020        S/P PTCA (percutaneous transluminal coronary angioplasty) ICD-10-CM: Z98.61  ICD-9-CM: V45.82  9/1/2020        History of left-sided carotid endarterectomy ICD-10-CM: Z98.890  ICD-9-CM: V45.89  3/12/2020        Carotid stenosis, asymptomatic, right ICD-10-CM: I65.21  ICD-9-CM: 433.10  3/12/2020        Carotid artery stenosis ICD-10-CM: I65.29  ICD-9-CM: 433.10  12/12/2018        Right carotid bruit ICD-10-CM: R09.89  ICD-9-CM: 785.9  11/6/2018        Type 2 diabetes mellitus with stage 4 chronic kidney disease, with long-term current use of insulin (HCC) ICD-10-CM: E11.22, N18.4, Z79.4  ICD-9-CM: 250.40, 585.4, V58.67  10/11/2018        Gastroesophageal reflux disease ICD-10-CM: K21.9  ICD-9-CM: 530.81  2/16/2017        Essential hypertension (Chronic) ICD-10-CM: I10  ICD-9-CM: 401.9  9/15/2016        Stage 4 chronic kidney disease ([facility] ICD-10-CM: N18.4  ICD-9-CM: 585.4  9/15/2016        Age-related osteoporosis without current pathological fracture ICD-10-CM: M81.0  ICD-9-CM: 733.01  9/15/2016        Major depressive disorder with single episode, in full remission [facility]) ICD-10-CM: F32.5  ICD-9-CM: 296.26  9/15/2016        Primary insomnia ICD-10-CM: F51.01  ICD-9-CM: 307.42  9/15/2016        Coronary artery disease involving native coronary artery of native heart without angina pectoris ICD-10-CM: I25.10  ICD-9-CM: 414.01  9/4/2013        HLD (hyperlipidemia) ICD-10-CM: E78.5  ICD-9-CM: 272.4  9/4/2013        Hypertension (Chronic) ICD-10-CM: I10  ICD-9-CM: 401.9  3/1/2012            Principal Problem:    Colon obstruction ([facility] (3/9/2022)    Active Problems:    Hypertension (3/1/2012)      HLD (hyperlipidemia) (9/4/2013)      Stage 4 chronic kidney disease ([facility] (9/15/2016)      Gastroesophageal reflux disease (2/16/2017)      Type 2 diabetes mellitus with stage 4 chronic kidney disease, with long-term current use of insulin ([facility] (10/11/2018)      S/P PTCA (percutaneous transluminal coronary angioplasty) (9/1/2020)      Chronic diastolic congestive heart failure ([facility] (4/19/2021)      Overview: Heart Failure (HFpEF), EF 65-70%, with diastolic dysfunction, ECHO       2/16/2021            Stercoral colitis (3/2/2022)           Number and Complexity of Problems addressed and   Risks of complications and/or morbidity of management          Stercoral colitis with distal descending colon obstruction  The obstruction looks to be relieved  NGT clamped  Trial of clears today    I encouraged her to eat a low residue diet for a while and avoid vegetables and oatmeal fairly long-term given what happened to her  She described eating large quantites of vegetables prior to admission and on a regular basis prior to admission  She has significant abd pain and marked abd disention  She had no relief with enemas, NGT decompression, bowel rest, and colonoscopic decompression and almost went to surgery. Replacement for hypokalemia ordered          Level of MDM (2/3 elements below)  Number and Complexity of Problems Addressed Amount and/or Complexity of Data to be Reviewed and Analyzed  *Each unique test, order, or document contributes to the combination of 2 or combination of 3 in Category 1 below.  Risk of Complications and/or Morbidity or Mortality of pt Management     61282  52909 SF Minimal  1self-limited or minor problem Minimal or none Minimal risk of morbidity from additional diagnostic testing or Rx   95722  95403 Low Low  2or more self-limited or minor problems;    or  1stable chronic illness;    or  [id number], uncomplicated illness or injury   Limited  (Must meet the requirements of at least 1 of the 2 categories)  Category 1: Tests and documents   Any combination of 2 from the following:  Review of prior external note(s) from each unique source*;  review of the result(s) of each unique test*;   ordering of each unique test*    or   Category 2: Assessment requiring an independent historian(s)  (For the categories of independent interpretation of tests and discussion of management or test interpretation, see moderate or high) Low risk of morbidity from additional diagnostic testing or treatment     72219  35076 Mod Moderate  1or more chronic illnesses with exacerbation, progression, or side effects of treatment;    or  2or more stable chronic illnesses;    or  1undiagnosed new problem with uncertain prognosis;    or  1acute illness with systemic symptoms;    or  3TRRQP complicated injury   Moderate  (Must meet the requirements of at least 1 out of 3 categories)  Category 1: Tests, documents, or independent historian(s)  Any combination of 3 from the following:   Review of prior external note(s) from each unique source*;  Review of the result(s) of each unique test*;  Ordering of each unique test*;  Assessment requiring an independent historian(s)    or  Category 2: Independent interpretation of tests   Independent interpretation of a test performed by another physician/other qualified health care professional (not separately reported);     or  Category 3: Discussion of management or test interpretation  Discussion of management or test interpretation with external physician/other qualified health care professional/appropriate source (not separately reported)   Moderate risk of morbidity from additional diagnostic testing or treatment  Examples only:  Prescription drug management   Decision regarding minor surgery with identified patient or procedure risk factors  Decision regarding elective major surgery without identified patient or procedure risk factors   Diagnosis or treatment significantly limited by social determinants of health       07294  20600 High High  1or more chronic illnesses with severe exacerbation, progression, or side effects of treatment;    or  1 acute or chronic illness or injury that poses a threat to life or bodily function   Extensive  (Must meet the requirements of at least 2 out of 3 categories)  Category 1: Tests, documents, or independent historian(s)  Any combination of 3 from the following:   Review of prior external note(s) from each unique source*;  Review of the result(s) of each unique test*;   Ordering of each unique test*;   Assessment requiring an independent historian(s)    or   Category 2: Independent interpretation of tests   Independent interpretation of a test performed by another physician/other qualified health care professional (not separately reported);     or  Category 3: Discussion of management or test interpretation  Discussion of management or test interpretation with external physician/other qualified health care professional/appropriate source (not separately reported)   High risk of morbidity from additional diagnostic testing or treatment  Examples only:  Drug therapy requiring intensive monitoring for toxicity  Decision regarding elective major surgery with identified patient or procedure risk factors - surgery cancelled  Decision regarding emergency major surgery  Decision regarding hospitalization  Decision not to resuscitate or to de-escalate care because of poor prognosis             I have personally performed a face-to-face diagnostic evaluation and management  service on this patient. I have independently seen the patient. I have independently obtained the above history from the patient/family. I have independently examined the patient with above findings. I have independently reviewed data/labs for this patient and developed the above plan of care (MDM). Signed: Avril Gerber.  Una Cage MD, FACS

## 2022-03-10 NOTE — PROGRESS NOTES
Pt refusing IV fluids to be ran since beginning of shift due to stating her legs were swollen on yesterday during day and she told MD she'd like fluids dc'd due to she not wanting fluid to go to her heart. I told her we still had an order to continue fluids as pt is current just taking sips of water with meds & ice chips and the fluids will help her stay hydrated and pt states she understands the reasoning, but still refuses IVF to be hung during my shift. Dr. Anuradha Sebastian made aware and states she will not dc order as pt requests but that pt can refuse and for me to just make a note.  Pt stable no distress present male

## 2022-03-10 NOTE — PROGRESS NOTES
Hospitalist Progress Note   Admit Date:  3/2/2022 11:11 AM   Name:  Niko Franco   Age:  66 y.o. Sex:  female  :  1950   MRN:  [id number]   Room:  [facility]/    Presenting Complaint: Abdominal Pain    Reason(s) for Admission: Stercoral colitis [K52.89]  MIRANDA (acute kidney injury) [facility]) [N17.9]     Hospital Course & Interval History:   Please refer to the admission H&P for details of presentation. In summary, Niko Franco is a 66 y.o. female with medical history significant for chronic constipation, stage IV CKD, chronic diastolic CHF, CAD, HTN who was admitted on 3/2 due to severe abdominal pain. She saw her GI doctor and was place on abx for possible diverticulitis. However, her symptoms became worse, and she had not passed any stool since . ED workup with diagnosis of stercoral colitis based on CT A/P imaging. No noted colonic perforation or upstream small bowel obstruction. Gastroenterology was consulted and she underwent decompressive colonoscopy with Dr. Levert Gitelman 3/6. No obstructing lesion but with apparent dysmotility. She has been NPO with NGT to LIS since admission. General Surgery has been consulted now to discuss possible partial colectomy given persistent ileus unresponsive to other GI interventions. Repeat CT A/P showed fecal impaction plug causing obstruction of the distal descending colon with distention of the colon. Patient was planned for emergent partial colectomy vs subtotal colectomy and either ileostomy or colostomy depending on operative finidngs on 3/09, however, she had \"2 BMs with a large fecal plug the size of a softball with lots of gas\" while in pre-op holding area. Surgery has been postponed for now. Subjective/24 hr Events (03/10/22) : Patient is seen and examined at bedside. No acute events reported overnight by nursing staff. Currently on NGT to LIS but with very soft abdomen. Reports feeling great after BMs.  Had 3 BMs while in pre-op and 3 after returning to the floor overnight. Patient denies fever, chills, chest pains, shortness of breath. Review of Systems: 10 point review of systems is otherwise negative with the exception of the elements mentioned above. Assessment & Plan:     Stercoral colitis now with Paralytic Ileus Large Intestine  CT A/P on 3/10 with fecal impaction plug causing obstruction of the distal descending colon with distention of the colon. 03/10/22: Planned for emergent partial colectomy vs subtotal colectomy and either ileostomy or colostomy depending on operative finidngs on 3/09, however, she had \"2 BMs with a large fecal plug the size of a softball with lots of gas\" while in pre-op holding area. Surgery has been postponed for now. - appreciate GI's recommendation.    - surgery following.  - on PPN.  - KUB today     Hypertension   -Continue home Nifedipine     HLD (hyperlipidemia)   -Continue Rosuvastatin     MIRANDA on Stage 4 chronic kidney disease ([facility] resolved  Follows Dr. Jose Sifuentes, outpatient Nephrology  03/10/22: Cr 2.0, stable at baseline  -on Home Furosemide      Type 2 diabetes mellitus, with long-term current use of insulin (HCC)   -Home insulin regimen on hold with current NPO status, resume with diet progression  -Continue SSI Humalog      Chronic diastolic congestive heart failure (HCC)  -Continue Imdur, Furosemide  -Daily weights, strict I&O's     Acute respiratory failure with hypoxia secondary to volume overload - resolved     CAD s/p PCI  -Follows Dr. Jannet Ellis  -Continue ASA / Reather Andersen / Gale Horde / statin     Chest tightness, intermittent  EKG with NSR with sinus arrhythmia, no ST elevation or T wave inversion  Troponin 36.6 --> 37.7  - Telemetry      Hypokalemia  K+ of 2.7 today.   - Mg+ checked - 2.0 today  - replete with KCl IV and PO      Diet:  ADULT DIET Clear Liquid  DVT PPx: lovenox  Code status: Full Code      I have reviewed and ordered clinical lab tests and independently visualized images, tracing. Discussed with GI team regarding treatment plan. I spent 36 minutes of time caring for this patient at bedside or nearby, more than 50 percent of which was spent on coordination of care and/or counseling regarding the disease process, treatment options, and treatment plan.         Hospital Problems as of 3/10/2022 Date Reviewed: 2/7/2022          Codes Class Noted - Resolved POA    RESOLVED: Acute respiratory failure with hypoxia ([facility] ICD-10-CM: J96.01  ICD-9-CM: 518.81  2/17/2021 - 3/9/2022 Unknown        * (Principal) Colon obstruction ([facility] ICD-10-CM: Q69.264  ICD-9-CM: 560.9  3/9/2022 - Present Yes        Stercoral colitis ICD-10-CM: K52.89  ICD-9-CM: 558.9  3/2/2022 - Present Yes        Chronic diastolic congestive heart failure ([facility] ICD-10-CM: I50.32  ICD-9-CM: 428.32, 428.0  4/19/2021 - Present Yes    Overview Signed 7/7/2021 11:06 AM by Claudetta Piano, NP     Heart Failure (HFpEF), EF 47-08%, with diastolic dysfunction, ECHO 2/16/2021               S/P PTCA (percutaneous transluminal coronary angioplasty) ICD-10-CM: Z98.61  ICD-9-CM: V45.82  9/1/2020 - Present Yes        Type 2 diabetes mellitus with stage 4 chronic kidney disease, with long-term current use of insulin (HCC) ICD-10-CM: E11.22, N18.4, Z79.4  ICD-9-CM: 250.40, 585.4, V58.67  10/11/2018 - Present Yes        Gastroesophageal reflux disease ICD-10-CM: K21.9  ICD-9-CM: 530.81  2/16/2017 - Present Yes        Stage 4 chronic kidney disease ([facility] ICD-10-CM: N18.4  ICD-9-CM: 585.4  9/15/2016 - Present Yes        HLD (hyperlipidemia) ICD-10-CM: E78.5  ICD-9-CM: 272.4  9/4/2013 - Present Yes        Hypertension (Chronic) ICD-10-CM: I10  ICD-9-CM: 401.9  3/1/2012 - Present Yes        RESOLVED: Paralytic ileus of large intestine ([facility] ICD-10-CM: K56.0  ICD-9-CM: 560.1  3/6/2022 - 3/9/2022 Yes        RESOLVED: Hypokalemia ICD-10-CM: E87.6  ICD-9-CM: 276.8  3/4/2022 - 3/9/2022 Yes        RESOLVED: MIRANDA (acute kidney injury) ([facility] ICD-10-CM: N17.9  ICD-9-CM: 584.9  3/2/2022 - 3/9/2022 Yes              Objective:     Patient Vitals for the past 24 hrs:   Temp Pulse Resp BP SpO2   03/10/22 0920 99.1 °F (37.3 °C) 93 18 (!) 147/69 96 %   03/10/22 0345 98 °F (36.7 °C) 99 21 134/64 95 %   03/09/22 2326 98.7 °F (37.1 °C) 95 20 126/65 95 %   03/09/22 1944 98 °F (36.7 °C) (!) 103 20 (!) 146/71 95 %   03/09/22 1734 -- (!) 105 19 (!) 147/67 93 %   03/09/22 1557 97.6 °F (36.4 °C) 100 22 (!) 151/79 91 %   03/09/22 1208 98 °F (36.7 °C) 97 20 (!) 155/75 92 %     Oxygen Therapy  O2 Sat (%): 96 % (03/10/22 0920)  Pulse via Oximetry: 105 beats per minute (03/09/22 1734)  O2 Device: Nasal cannula (03/10/22 0920)  Skin Assessment: Clean, dry, & intact (03/08/22 1144)  Skin Protection for O2 Device: N/A (03/08/22 1144)  O2 Flow Rate (L/min): 2 l/min (03/09/22 1734)    Estimated body mass index is 31.07 kg/m² as calculated from the following:    Height as of this encounter: 5' 4\" (1.626 m). Weight as of this encounter: 82.1 kg (181 lb). Intake/Output Summary (Last 24 hours) at 3/10/2022 0938  Last data filed at 3/10/2022 0645  Gross per 24 hour   Intake --   Output 1825 ml   Net -1825 ml         Physical Exam:     Blood pressure (!) 147/69, pulse 93, temperature 99.1 °F (37.3 °C), resp. rate 18, height 5' 4\" (1.626 m), weight 82.1 kg (181 lb), SpO2 96 %. General:    Well nourished. No overt distress  Head:  Normocephalic, atraumatic  Eyes:  Sclerae appear normal.  Pupils equally round. ENT:  Nares appear normal, no drainage. Moist oral mucosa, +NGT  Neck:  No restricted ROM. Trachea midline   CV:   RRR. No m/r/g. No jugular venous distension. Lungs:   CTAB. No wheezing. Respirations even, unlabored  Abdomen: Bowel sounds absent. Soft, non distended, non tender  Extremities: No cyanosis or clubbing. Trace edema  Skin:     No rashes and normal coloration. Warm and dry. Neuro:  CN II-XII grossly intact.    A&Ox3  Psych:  Normal mood and affect.       I have reviewed ordered lab tests and independently visualized imaging below:    Recent Labs:  Recent Results (from the past 48 hour(s))   GLUCOSE, POC    Collection Time: 03/08/22 11:10 AM   Result Value Ref Range    Glucose (POC) 133 (H) 65 - 100 mg/dL    Performed by Ivette    EKG, 12 LEAD, INITIAL    Collection Time: 03/08/22 12:13 PM   Result Value Ref Range    Ventricular Rate 95 BPM    Atrial Rate 95 BPM    P-R Interval 138 ms    QRS Duration 90 ms    Q-T Interval 372 ms    QTC Calculation (Bezet) 467 ms    Calculated P Axis 76 degrees    Calculated R Axis 73 degrees    Calculated T Axis -16 degrees    Diagnosis       Normal sinus rhythm  Abnormal QRS-T angle, consider primary T wave abnormality  Abnormal ECG  When compared with ECG of 03-MAR-2022 13:55,  ST elevation now present in Anterior leads  Confirmed by NOLBERTO SHEPARD (), Nito Burks ([id number]) on 3/8/2022 1:51:38 PM     GLUCOSE, POC    Collection Time: 03/08/22  4:10 PM   Result Value Ref Range    Glucose (POC) 148 (H) 65 - 100 mg/dL    Performed by Chelly    GLUCOSE, POC    Collection Time: 03/08/22 10:06 PM   Result Value Ref Range    Glucose (POC) 166 (H) 65 - 100 mg/dL    Performed by Delilah    LACTIC ACID    Collection Time: 03/09/22  5:04 AM   Result Value Ref Range    Lactic acid 0.9 0.4 - 2.0 MMOL/L   CBC W/O DIFF    Collection Time: 03/09/22  5:06 AM   Result Value Ref Range    WBC 11.0 4.3 - 11.1 K/uL    RBC 3.52 (L) 4.05 - 5.2 M/uL    HGB 10.9 (L) 11.7 - 15.4 g/dL    HCT 33.7 (L) 35.8 - 46.3 %    MCV 95.7 79.6 - 97.8 FL    MCH 31.0 26.1 - 32.9 PG    MCHC 32.3 31.4 - 35.0 g/dL    RDW 13.2 11.9 - 14.6 %    PLATELET 148 570 - 644 K/uL    MPV 9.3 (L) 9.4 - 12.3 FL    ABSOLUTE NRBC 0.00 0.0 - 0.2 K/uL   METABOLIC PANEL, COMPREHENSIVE    Collection Time: 03/09/22  5:06 AM   Result Value Ref Range    Sodium 142 136 - 145 mmol/L    Potassium 3.7 3.5 - 5.1 mmol/L    Chloride 114 (H) 98 - 107 mmol/L CO2 19 (L) 21 - 32 mmol/L    Anion gap 9 7 - 16 mmol/L    Glucose 143 (H) 65 - 100 mg/dL    BUN 43 (H) 8 - 23 MG/DL    Creatinine 2.20 (H) 0.6 - 1.0 MG/DL    GFR est AA 28 (L) >60 ml/min/1.73m2    GFR est non-AA 23 (L) >60 ml/min/1.73m2    Calcium 8.5 8.3 - 10.4 MG/DL    Bilirubin, total 0.4 0.2 - 1.1 MG/DL    ALT (SGPT) 42 12 - 65 U/L    AST (SGOT) 48 (H) 15 - 37 U/L    Alk.  phosphatase 109 50 - 136 U/L    Protein, total 6.8 6.3 - 8.2 g/dL    Albumin 3.1 (L) 3.2 - 4.6 g/dL    Globulin 3.7 (H) 2.3 - 3.5 g/dL    A-G Ratio 0.8 (L) 1.2 - 3.5     GLUCOSE, POC    Collection Time: 03/09/22  6:37 AM   Result Value Ref Range    Glucose (POC) 145 (H) 65 - 100 mg/dL    Performed by Joobili    GLUCOSE, POC    Collection Time: 03/09/22 11:20 AM   Result Value Ref Range    Glucose (POC) 139 (H) 65 - 100 mg/dL    Performed by [employer]    GLUCOSE, POC    Collection Time: 03/09/22  3:55 PM   Result Value Ref Range    Glucose (POC) 167 (H) 65 - 100 mg/dL    Performed by [employer]    GLUCOSE, POC    Collection Time: 03/09/22  9:25 PM   Result Value Ref Range    Glucose (POC) 155 (H) 65 - 100 mg/dL    Performed by Joobili    METABOLIC PANEL, BASIC    Collection Time: 03/10/22  4:57 AM   Result Value Ref Range    Sodium 143 136 - 145 mmol/L    Potassium 2.7 (L) 3.5 - 5.1 mmol/L    Chloride 111 (H) 98 - 107 mmol/L    CO2 24 21 - 32 mmol/L    Anion gap 8 7 - 16 mmol/L    Glucose 168 (H) 65 - 100 mg/dL    BUN 34 (H) 8 - 23 MG/DL    Creatinine 2.00 (H) 0.6 - 1.0 MG/DL    GFR est AA 31 (L) >60 ml/min/1.73m2    GFR est non-AA 26 (L) >60 ml/min/1.73m2    Calcium 8.4 8.3 - 10.4 MG/DL   MAGNESIUM    Collection Time: 03/10/22  4:57 AM   Result Value Ref Range    Magnesium 2.1 1.8 - 2.4 mg/dL   PHOSPHORUS    Collection Time: 03/10/22  4:57 AM   Result Value Ref Range    Phosphorus 2.3 2.3 - 3.7 MG/DL   TRIGLYCERIDE    Collection Time: 03/10/22  4:57 AM   Result Value Ref Range    Triglyceride 93 35 - 150 MG/DL   GLUCOSE, POC    Collection Time: 03/10/22  6:09 AM   Result Value Ref Range    Glucose (POC) 197 (H) 65 - 100 mg/dL    Performed by Delilah        All Micro Results     Procedure Component Value Units Date/Time    CULTURE, BLOOD [[id number]] Collected: 03/03/22 1555    Order Status: Completed Specimen: Blood Updated: 03/08/22 0811     Special Requests: --        LEFT  HAND       Culture result: NO GROWTH 5 DAYS       CULTURE, BLOOD [[id number]] Collected: 03/03/22 1547    Order Status: Completed Specimen: Blood Updated: 03/08/22 0811     Special Requests: --        RIGHT  Antecubital       Culture result: NO GROWTH 5 DAYS       CULTURE, URINE [[id number]] Collected: 03/03/22 1345    Order Status: Canceled Specimen: Urine from Clean catch           Other Studies:  No results found.     Current Meds:  Current Facility-Administered Medications   Medication Dose Route Frequency    0.45% sodium chloride with KCl 20 mEq/L infusion   IntraVENous CONTINUOUS    potassium chloride (K-DUR, KLOR-CON M20) SR tablet 40 mEq  40 mEq Oral ONCE    TPN ADULT - dextrose 5% amino acid 4.25%   IntraVENous QPM    NUTRITIONAL SUPPORT ELECTROLYTE PRN ORDERS   Does Not Apply PRN    furosemide (LASIX) tablet 40 mg  40 mg Oral DAILY    magic mouthwash (TC) oral suspension 5 mL  5 mL Swish and Spit Q4H    lidocaine 4 % patch 1 Patch  1 Patch TransDERmal Q24H    benzocaine (HURRICANE) 20 % spray   Mucous Membrane QID PRN    traMADoL (ULTRAM) tablet 50 mg  50 mg Oral Q6H PRN    methylnaltrexone (RELISTOR) injection 12 mg  12 mg SubCUTAneous DAILY    NIFEdipine ER (PROCARDIA XL) tablet 90 mg  90 mg Oral DAILY    sodium chloride (NS) flush 5-40 mL  5-40 mL IntraVENous Q8H    sodium chloride (NS) flush 5-40 mL  5-40 mL IntraVENous PRN    acetaminophen (TYLENOL) tablet 650 mg  650 mg Oral Q6H PRN    Or    acetaminophen (TYLENOL) suppository 650 mg  650 mg Rectal Q6H PRN    ondansetron (ZOFRAN ODT) tablet 4 mg  4 mg Oral Q8H PRN    Or    ondansetron (ZOFRAN) injection 4 mg  4 mg IntraVENous Q6H PRN    enoxaparin (LOVENOX) injection 30 mg  30 mg SubCUTAneous DAILY    insulin lispro (HUMALOG) injection   SubCUTAneous AC&HS    aspirin chewable tablet 81 mg  81 mg Oral 7am    carvediloL (COREG) tablet 3.125 mg  3.125 mg Oral BID WITH MEALS    isosorbide mononitrate ER (IMDUR) tablet 60 mg  60 mg Oral DAILY    rosuvastatin (CRESTOR) tablet 40 mg  40 mg Oral QHS    traZODone (DESYREL) tablet 50 mg  50 mg Oral QHS    [Held by provider] bisacodyL (DULCOLAX) suppository 10 mg  10 mg Rectal DAILY       Signed:  Ky Stroud MD    Part of this note may have been written by using a voice dictation software. The note has been proof read but may still contain some grammatical/other typographical errors.

## 2022-03-10 NOTE — PROGRESS NOTES
[facility] Progress Note         Admit Date:  3/2/2022    Today's Date:  3/10/2022    CC:  ileus    Subjective:     Patient was on her way to surgery when she had an explosive stool with gas and passage of a stool plug afternoon of 3/9. Her abdominal pain is relieved and distention improved.      Medications:   Current Facility-Administered Medications   Medication Dose Route Frequency    0.45% sodium chloride with KCl 20 mEq/L infusion   IntraVENous CONTINUOUS    potassium chloride (K-DUR, KLOR-CON M20) SR tablet 40 mEq  40 mEq Oral ONCE    TPN ADULT - dextrose 5% amino acid 4.25%   IntraVENous QPM    NUTRITIONAL SUPPORT ELECTROLYTE PRN ORDERS   Does Not Apply PRN    furosemide (LASIX) tablet 40 mg  40 mg Oral DAILY    magic mouthwash (TC) oral suspension 5 mL  5 mL Swish and Spit Q4H    lidocaine 4 % patch 1 Patch  1 Patch TransDERmal Q24H    benzocaine (HURRICANE) 20 % spray   Mucous Membrane QID PRN    traMADoL (ULTRAM) tablet 50 mg  50 mg Oral Q6H PRN    methylnaltrexone (RELISTOR) injection 12 mg  12 mg SubCUTAneous DAILY    NIFEdipine ER (PROCARDIA XL) tablet 90 mg  90 mg Oral DAILY    sodium chloride (NS) flush 5-40 mL  5-40 mL IntraVENous Q8H    sodium chloride (NS) flush 5-40 mL  5-40 mL IntraVENous PRN    acetaminophen (TYLENOL) tablet 650 mg  650 mg Oral Q6H PRN    Or    acetaminophen (TYLENOL) suppository 650 mg  650 mg Rectal Q6H PRN    ondansetron (ZOFRAN ODT) tablet 4 mg  4 mg Oral Q8H PRN    Or    ondansetron (ZOFRAN) injection 4 mg  4 mg IntraVENous Q6H PRN    enoxaparin (LOVENOX) injection 30 mg  30 mg SubCUTAneous DAILY    insulin lispro (HUMALOG) injection   SubCUTAneous AC&HS    aspirin chewable tablet 81 mg  81 mg Oral 7am    carvediloL (COREG) tablet 3.125 mg  3.125 mg Oral BID WITH MEALS    isosorbide mononitrate ER (IMDUR) tablet 60 mg  60 mg Oral DAILY    rosuvastatin (CRESTOR) tablet 40 mg  40 mg Oral QHS    traZODone (DESYREL) tablet 50 mg  50 mg Oral QHS    [Held by provider] bisacodyL (DULCOLAX) suppository 10 mg  10 mg Rectal DAILY       Review of Systems:  ROS was obtained, with pertinent positives as listed above. No chest pain or SOB. Diet:  NPO    Objective:   Vitals:  Visit Vitals  BP (!) 147/69 (BP 1 Location: Left upper arm, BP Patient Position: Sitting)   Pulse 93   Temp 99.1 °F (37.3 °C)   Resp 18   Ht 5' 4\" (1.626 m)   Wt 82.1 kg (181 lb)   SpO2 96%   BMI 31.07 kg/m²     Intake/Output:  No intake/output data recorded. 03/08 1901 - 03/10 0700  In: -   Out: [address] [Urine:2175]  Exam:  General appearance: alert, cooperative, no distress, NGT to LIS  Lungs: clear to auscultation bilaterally anteriorly  Heart: regular rate and rhythm  Abdomen: SOFT LESS DISTENDED. Bowel sounds normal. No masses, no organomegaly  Extremities: extremities normal, atraumatic, no cyanosis or edema  Neuro:  alert and oriented    Data Review (Labs):    Recent Labs     03/10/22  0457 03/09/22  0506 03/08/22  0544   WBC  --  11.0 10.6   HGB  --  10.9* 10.9*   HCT  --  33.7* 33.8*   PLT  --  274 280   MCV  --  95.7 96.8    142 143   K 2.7* 3.7 3.4*   * 114* 115*   CO2 24 19* 18*   BUN 34* 43* 42*   CREA 2.00* 2.20* 2.20*   CA 8.4 8.5 8.5   MG 2.1  --   --    * 143* 138*   AP  --  109  --    AST  --  48*  --    ALT  --  42  --    TBILI  --  0.4  --    ALB  --  3.1*  --    TP  --  6.8  --      CT A/P 3/9/22:   Abdomen: The lung bases show bilateral presumed effusions with some overlying  atelectasis or consolidation mostly on the right. Small amount of pericardial  fluid is seen. Air and fluid distended large bowel is noted, nasogastric tube is  seen within a decompressed stomach, the gallbladder, liver, spleen, adrenals,  kidneys appear normal. There is no intraperitoneal free air, ascites, nor  abnormal adenopathy seen. Large bowel measures up to 10.1 cm.  Coronary artery  calcifications are seen.     Pelvis: What appears to be a fecal plug is seen in the descending colon above  the sigmoid colon with decompression of the sigmoid colon and rectal colon but  with a small amount of oral contrast present. The small bowel is not dilated,  there is no intraperitoneal free air, ascites, nor abnormal adenopathy seen, the  bladder appears normal. The uterus is absent.     IMPRESSION  1. What may be a fecal impaction plug causing obstruction of the distal  descending colon with distention of the colon but not small bowel. 2. Bilateral pleural effusions and overlying atelectasis or consolidation worse  on the right than the left with a small amount of pericardial fluid present.       Assessment:     Principal Problem:    Colon obstruction ([address] (3/9/2022)    Active Problems:    Hypertension (3/1/2012)      HLD (hyperlipidemia) (9/4/2013)      Stage 4 chronic kidney disease ([address] (9/15/2016)      Gastroesophageal reflux disease (2/16/2017)      Type 2 diabetes mellitus with stage 4 chronic kidney disease, with long-term current use of insulin ([address] (10/11/2018)      S/P PTCA (percutaneous transluminal coronary angioplasty) (9/1/2020)      Chronic diastolic congestive heart failure ([address] (4/19/2021)      Overview: Heart Failure (HFpEF), EF 86-44%, with diastolic dysfunction, ECHO       2/16/2021            Stercoral colitis (3/2/2022)    66 y. o. female with PMH including but not limited to coronary artery disease status post PCI 2012, diabetes, hypertension, chronic kidney disease stage 3, who is seen in consultation at the request of Dr. Bita Manzo constipation abdominal pain and stercoral colitis.  S/P decompressive colonoscopy with Dr. Lucy Bennett 3/6/22 with report of passing stool  3/6 and 3/7  on Relistor IM and dulcolax suppository daily and NGT to LIS but no change on KUB or exam. Maximal diameter of sigmoid colon on KUB today measured at 11cm. CT today (above) suggests possible fecal impaction.  However, colonic decompression and cathartics have failed to improve her situation. Surgery is following. Plan:     - clamp NGT and start CLD  - KUB today for comparison  - if tolerated, will allow to start Full liquids at lunch    Salud Miller      Patient is seen and examined in collaboration with Dr. Clarissa Randolph. Assessment and plan as per Dr. Iris Sharma.

## 2022-03-10 NOTE — PROGRESS NOTES
Pre-Surgery Prayer/[facility] Follow-up/Family Request. Daughter recognized [address] in hallway. Daughter let [address]  know PT was in Pre-Op and requested a Pre-Surgery Prayer for PT. [address] prayed with Daughter. PT was in bed with  at bedside. PT updated [facility]. PT's Simona Haas had been to visit that day. [address] prayed for PT and offered additional support if needed. RevPedro Bran M.Div.

## 2022-03-10 NOTE — PROGRESS NOTES
CM following for d/c planning. Per PT eval today - recommendation for no further therapy post d/c and no equipment recommended. CM will continue to follow.

## 2022-03-10 NOTE — PROGRESS NOTES
ACUTE PHYSICAL THERAPY GOALS:  (Developed with and agreed upon by patient and/or caregiver.)  1. Ms. Charley Hui will perform all bed mobility independently in 5 days. 2.  Ms. Charley Hui will perform all transfers independently in 5 days. 3.  Ms. Charley Hui will perform gait >200 ft independently in 5 days    PHYSICAL THERAPY: Daily Note and AM Treatment Day # 3    Kenya Reyna is a 66 y.o. female   PRIMARY DIAGNOSIS: Colon obstruction ([facility]  Stercoral colitis [K52.89]  MIRANDA (acute kidney injury) ([facility] [N17.9]  Procedure(s) (LRB):  PROCEDURE CANCELLED - NOT PERFORMED (N/A)  1 Day Post-Op    ASSESSMENT:     REHAB RECOMMENDATIONS: CURRENT LEVEL OF FUNCTION:  (Most Recently Demonstrated)   Recommendation to date pending progress:  Setting:   No further skilled therapy after discharge from hospital  Equipment:    None Bed Mobility:   Supervision  Sit to Stand:   Supervision  Transfers:   Supervision  Gait/Mobility:   Supervision     ASSESSMENT:  Ms. Charley Hui is supine in bed, feels much better except for frequent diarrhea. Worked on ex's and bed mobility, sitting EOB activities. Gait deferred due to diarrhea. No progress today due to limited mobility, but expect patient to meet goals next tx.      SUBJECTIVE:   Ms. Charley Hui states, \"I'm happy\"    SOCIAL HISTORY/ LIVING ENVIRONMENT: lives w/ , independent  Home Environment: Private residence  One/Two Story Residence: One story  Living Alone: No  Support Systems: Spouse/Significant Other,Child(babatunde)  OBJECTIVE:     PAIN: VITAL SIGNS: LINES/DRAINS:   Pre Treatment: Pain Screen  Pain Scale 1: Numeric (0 - 10)  Pain Intensity 1: 0  Post Treatment: 0   Nasogastric Tube  O2 Device: None (Room air)     MOBILITY: I Mod I S SBA CGA Min Mod Max Total  NT x2 Comments:   Bed Mobility    Rolling [] [] [x] [] [] [] [] [] [] [] []    Supine to Sit [] [] [x] [] [] [] [] [] [] [] []    Scooting [] [] [x] [] [] [] [] [] [] [] []    Sit to Supine [] [] [x] [] [] [] [] [] [] [] []    Transfers    Sit to Stand [] [] [] [] [] [] [] [] [] [x] []    Bed to Chair [] [] [] [] [] [] [] [] [] [x] []    Stand to Sit [] [] [] [] [] [] [] [] [] [x] []    I=Independent, Mod I=Modified Independent, S=Supervision, SBA=Standby Assistance, CGA=Contact Guard Assistance,   Min=Minimal Assistance, Mod=Moderate Assistance, Max=Maximal Assistance, Total=Total Assistance, NT=Not Tested    BALANCE: Good Fair+ Fair Fair- Poor NT Comments   Sitting Static [x] [] [] [] [] []    Sitting Dynamic [x] [] [] [] [] []              Standing Static [] [] [] [] [] []    Standing Dynamic [] [] [] [] [] []      GAIT: I Mod I S SBA CGA Min Mod Max Total  NT x2 Comments:   Level of Assistance [] [] [] [] [] [] [] [] [] [x] []    Distance     DME     Gait Quality     Weightbearing  Status N/A     I=Independent, Mod I=Modified Independent, S=Supervision, SBA=Standby Assistance, CGA=Contact Guard Assistance,   Min=Minimal Assistance, Mod=Moderate Assistance, Max=Maximal Assistance, Total=Total Assistance, NT=Not Tested    PLAN:   FREQUENCY/DURATION: PT Plan of Care: 3 times/week for duration of hospital stay or until stated goals are met, whichever comes first.  TREATMENT:     TREATMENT:   ($$ Therapeutic Activity: 23-37 mins    )  Therapeutic Activity (23 Minutes): Therapeutic activity included Supine to Sit, Sit to Supine, Scooting and exercises to improve functional Mobility, Strength and Activity tolerance.     TREATMENT GRID:      DATE: 3/10/22        Straight leg raise         Hip abduct/ adduct         Heel slides          Hip external/ internal rotation         Ankle dorsiflexion/ plantarflexion x20 AB        Hip flexion in sit x20 AB        Long arc quads x20 AB                   Key:  A=active, AA=active assisted, P=passive, B=bilaterally, R=right, L=left        AFTER TREATMENT POSITION/PRECAUTIONS:  Bed, Needs within reach and RN notified    INTERDISCIPLINARY COLLABORATION:  RN/PCT and PT/PTA    TOTAL TREATMENT DURATION:  PT Patient Time In/Time Out  Time In: 1140  Time Out: 6[address] Milind Lam PT, DPT

## 2022-03-11 LAB
ANION GAP SERPL CALC-SCNC: 6 MMOL/L (ref 7–16)
BUN SERPL-MCNC: 21 MG/DL (ref 8–23)
CALCIUM SERPL-MCNC: 8.2 MG/DL (ref 8.3–10.4)
CHLORIDE SERPL-SCNC: 110 MMOL/L (ref 98–107)
CO2 SERPL-SCNC: 25 MMOL/L (ref 21–32)
CREAT SERPL-MCNC: 1.6 MG/DL (ref 0.6–1)
GLUCOSE BLD STRIP.AUTO-MCNC: 149 MG/DL (ref 65–100)
GLUCOSE BLD STRIP.AUTO-MCNC: 159 MG/DL (ref 65–100)
GLUCOSE BLD STRIP.AUTO-MCNC: 200 MG/DL (ref 65–100)
GLUCOSE BLD STRIP.AUTO-MCNC: 333 MG/DL (ref 65–100)
GLUCOSE SERPL-MCNC: 223 MG/DL (ref 65–100)
MAGNESIUM SERPL-MCNC: 2 MG/DL (ref 1.8–2.4)
PHOSPHATE SERPL-MCNC: 2.2 MG/DL (ref 2.3–3.7)
POTASSIUM SERPL-SCNC: 2.4 MMOL/L (ref 3.5–5.1)
POTASSIUM SERPL-SCNC: 2.4 MMOL/L (ref 3.5–5.1)
SERVICE CMNT-IMP: ABNORMAL
SODIUM SERPL-SCNC: 141 MMOL/L (ref 136–145)
TRIGL SERPL-MCNC: 69 MG/DL (ref 35–150)

## 2022-03-11 PROCEDURE — 80048 BASIC METABOLIC PNL TOTAL CA: CPT

## 2022-03-11 PROCEDURE — 99232 SBSQ HOSP IP/OBS MODERATE 35: CPT | Performed by: SURGERY

## 2022-03-11 PROCEDURE — 74011636637 HC RX REV CODE- 636/637: Performed by: INTERNAL MEDICINE

## 2022-03-11 PROCEDURE — 74011000250 HC RX REV CODE- 250: Performed by: INTERNAL MEDICINE

## 2022-03-11 PROCEDURE — 74011250637 HC RX REV CODE- 250/637: Performed by: STUDENT IN AN ORGANIZED HEALTH CARE EDUCATION/TRAINING PROGRAM

## 2022-03-11 PROCEDURE — 83735 ASSAY OF MAGNESIUM: CPT

## 2022-03-11 PROCEDURE — 74011250637 HC RX REV CODE- 250/637: Performed by: INTERNAL MEDICINE

## 2022-03-11 PROCEDURE — 74011250636 HC RX REV CODE- 250/636: Performed by: INTERNAL MEDICINE

## 2022-03-11 PROCEDURE — 84100 ASSAY OF PHOSPHORUS: CPT

## 2022-03-11 PROCEDURE — 84478 ASSAY OF TRIGLYCERIDES: CPT

## 2022-03-11 PROCEDURE — 36415 COLL VENOUS BLD VENIPUNCTURE: CPT

## 2022-03-11 PROCEDURE — 82962 GLUCOSE BLOOD TEST: CPT

## 2022-03-11 PROCEDURE — 96372 THER/PROPH/DIAG INJ SC/IM: CPT

## 2022-03-11 PROCEDURE — 65270000029 HC RM PRIVATE

## 2022-03-11 PROCEDURE — 84132 ASSAY OF SERUM POTASSIUM: CPT

## 2022-03-11 RX ORDER — POTASSIUM CHLORIDE 20 MEQ/1
40 TABLET, EXTENDED RELEASE ORAL 3 TIMES DAILY
Status: COMPLETED | OUTPATIENT
Start: 2022-03-11 | End: 2022-03-11

## 2022-03-11 RX ADMIN — SODIUM CHLORIDE, PRESERVATIVE FREE 10 ML: 5 INJECTION INTRAVENOUS at 06:11

## 2022-03-11 RX ADMIN — Medication 2 UNITS: at 12:11

## 2022-03-11 RX ADMIN — CARVEDILOL 3.12 MG: 3.12 TABLET, FILM COATED ORAL at 17:07

## 2022-03-11 RX ADMIN — CARVEDILOL 3.12 MG: 3.12 TABLET, FILM COATED ORAL at 08:35

## 2022-03-11 RX ADMIN — POTASSIUM CHLORIDE 40 MEQ: 20 TABLET, EXTENDED RELEASE ORAL at 12:12

## 2022-03-11 RX ADMIN — ACETAMINOPHEN 650 MG: 325 TABLET ORAL at 21:49

## 2022-03-11 RX ADMIN — POTASSIUM CHLORIDE 40 MEQ: 20 TABLET, EXTENDED RELEASE ORAL at 08:34

## 2022-03-11 RX ADMIN — TRAZODONE HYDROCHLORIDE 50 MG: 50 TABLET ORAL at 21:33

## 2022-03-11 RX ADMIN — ISOSORBIDE MONONITRATE 60 MG: 60 TABLET, EXTENDED RELEASE ORAL at 08:35

## 2022-03-11 RX ADMIN — POTASSIUM CHLORIDE 40 MEQ: 20 TABLET, EXTENDED RELEASE ORAL at 17:07

## 2022-03-11 RX ADMIN — Medication 4 UNITS: at 07:56

## 2022-03-11 RX ADMIN — NIFEDIPINE 90 MG: 90 TABLET, EXTENDED RELEASE ORAL at 08:35

## 2022-03-11 RX ADMIN — ROSUVASTATIN CALCIUM 40 MG: 20 TABLET, FILM COATED ORAL at 21:33

## 2022-03-11 RX ADMIN — Medication 8 UNITS: at 15:56

## 2022-03-11 RX ADMIN — ENOXAPARIN SODIUM 30 MG: 100 INJECTION SUBCUTANEOUS at 08:35

## 2022-03-11 RX ADMIN — FUROSEMIDE 40 MG: 40 TABLET ORAL at 08:35

## 2022-03-11 RX ADMIN — ASPIRIN 81 MG 81 MG: 81 TABLET ORAL at 06:07

## 2022-03-11 RX ADMIN — ACETAMINOPHEN 650 MG: 325 TABLET ORAL at 03:22

## 2022-03-11 NOTE — PROGRESS NOTES
Hospitalist Progress Note   Admit Date:  3/2/2022 11:11 AM   Name:  Cesar Santiago   Age:  66 y.o. Sex:  female  :  1950   MRN:  [id number]   Room:  [facility]/01    Presenting Complaint: Abdominal Pain    Reason(s) for Admission: Stercoral colitis [K52.89]  MIRANDA (acute kidney injury) [facility]) [N17.9]     Hospital Course & Interval History:   Please refer to the admission H&P for details of presentation. In summary, Cesar Santiago is a 66 y.o. female with medical history significant for chronic constipation, stage IV CKD, chronic diastolic CHF, CAD, HTN who was admitted on 3/2 due to severe abdominal pain. She saw her GI doctor and was place on abx for possible diverticulitis. However, her symptoms became worse, and she had not passed any stool since . ED workup with diagnosis of stercoral colitis based on CT A/P imaging. No noted colonic perforation or upstream small bowel obstruction. Gastroenterology was consulted and she underwent decompressive colonoscopy with Dr. Vu Watson 3/6. No obstructing lesion but with apparent dysmotility. She has been NPO with NGT to LIS since admission. General Surgery has been consulted now to discuss possible partial colectomy given persistent ileus unresponsive to other GI interventions. Repeat CT A/P showed fecal impaction plug causing obstruction of the distal descending colon with distention of the colon. Patient was planned for emergent partial colectomy vs subtotal colectomy and either ileostomy or colostomy depending on operative finidngs on 3/09, however, she had \"2 BMs with a large fecal plug the size of a softball with lots of gas\" while in pre-op holding area. Surgery canceled. Subjective/24 hr Events (22) : Patient is seen and examined at bedside. No acute events reported overnight by nursing staff. Tolerated CLDs yesterday with multiple BMs through out the day and night. Feeling well.  No abdominal pain, n/v. Patient denies fever, chills, chest pains, shortness of breath. Review of Systems: 10 point review of systems is otherwise negative with the exception of the elements mentioned above. Assessment & Plan:     Stercoral colitis now with Paralytic Ileus Large Intestine - resolving  CT A/P on 3/10 with fecal impaction plug causing obstruction of the distal descending colon with distention of the colon. Planned for emergent partial colectomy vs subtotal colectomy and either ileostomy or colostomy depending on operative finidngs on 3/09, however, she had \"2 BMs with a large fecal plug the size of a softball with lots of gas\" while in pre-op holding area. Surgery has been canceled. 03/11/22: NGT removed and started on Full liquid diet today. - appreciate GI and surgery's input.  - FLD today  - DC tomorrow if tolerating diet. Hypertension   -Continue home Nifedipine     HLD (hyperlipidemia)   -Continue Rosuvastatin     MIRANDA on Stage 4 chronic kidney disease ([facility] [address] resolved  Follows Dr. Dulce Lopez, outpatient Nephrology  03/11/22: Cr 1.6, stable at baseline  -on Home Furosemide      Type 2 diabetes mellitus, with long-term current use of insulin (HCC)   -Home insulin regimen on hold with current NPO status, resume with diet progression  -Continue SSI Humalog      Chronic diastolic congestive heart failure (HCC)  -Continue Imdur, Furosemide  -Daily weights, strict I&O's     Acute respiratory failure with hypoxia secondary to volume overload - resolved     CAD s/p PCI  -Follows Dr. Migdalia Antonio  -Continue ASA / Vickye Livers / [facility] / statin     Chest tightness, intermittent  EKG with NSR with sinus arrhythmia, no ST elevation or T wave inversion  Troponin 36.6 --> 37.7  - Telemetry      Hypokalemia  K+ of 2.4 today.   - Mg+ checked - 2.0 today  - replete with KCl PO  - recheck k+ in the evening.       Diet:  ADULT DIET Full Liquid  DVT PPx: lovenox  Code status: Full Code      Hospital Problems as of 3/11/2022 Date Reviewed: 2/7/2022 Codes Class Noted - Resolved POA    RESOLVED: Acute respiratory failure with hypoxia [facility]) ICD-10-CM: J96.01  ICD-9-CM: 518.81  2/17/2021 - 3/9/2022 Unknown        * (Principal) Colon obstruction (HCC) ICD-10-CM: B61.767  ICD-9-CM: 560.9  3/9/2022 - Present Yes        Stercoral colitis ICD-10-CM: K52.89  ICD-9-CM: 558.9  3/2/2022 - Present Yes        Chronic diastolic congestive heart failure (HCC) ICD-10-CM: I50.32  ICD-9-CM: 428.32, 428.0  4/19/2021 - Present Yes    Overview Signed 7/7/2021 11:06 AM by Phoebe Gu NP     Heart Failure (HFpEF), EF 54-39%, with diastolic dysfunction, ECHO 2/16/2021               S/P PTCA (percutaneous transluminal coronary angioplasty) ICD-10-CM: Z98.61  ICD-9-CM: V45.82  9/1/2020 - Present Yes        Type 2 diabetes mellitus with stage 4 chronic kidney disease, with long-term current use of insulin (HCC) ICD-10-CM: E11.22, N18.4, Z79.4  ICD-9-CM: 250.40, 585.4, V58.67  10/11/2018 - Present Yes        Gastroesophageal reflux disease ICD-10-CM: K21.9  ICD-9-CM: 530.81  2/16/2017 - Present Yes        Stage 4 chronic kidney disease ([facility] ICD-10-CM: N18.4  ICD-9-CM: 585.4  9/15/2016 - Present Yes        HLD (hyperlipidemia) ICD-10-CM: E78.5  ICD-9-CM: 272.4  9/4/2013 - Present Yes        Hypertension (Chronic) ICD-10-CM: I10  ICD-9-CM: 401.9  3/1/2012 - Present Yes        RESOLVED: Paralytic ileus of large intestine ([facility] ICD-10-CM: K56.0  ICD-9-CM: 560.1  3/6/2022 - 3/9/2022 Yes        RESOLVED: Hypokalemia ICD-10-CM: E87.6  ICD-9-CM: 276.8  3/4/2022 - 3/9/2022 Yes        RESOLVED: MIRANDA (acute kidney injury) ([facility] [address] ICD-10-CM: N17.9  ICD-9-CM: 584.9  3/2/2022 - 3/9/2022 Yes              Objective:     Patient Vitals for the past 24 hrs:   Temp Pulse Resp BP SpO2   03/11/22 1123 98 °F (36.7 °C) 80 20 138/70 97 %   03/11/22 0809 98.4 °F (36.9 °C) 86 20 (!) 163/68 92 %   03/11/22 0314 98.2 °F (36.8 °C) 92 20 (!) 157/83 91 %   03/10/22 2340 98.2 °F (36.8 °C) 91 18 (!) 148/60 95 % 03/10/22 1953 98.2 °F (36.8 °C) 89 18 135/63 92 %   03/10/22 1714 99.3 °F (37.4 °C) 89 18 (!) 141/58 93 %   03/10/22 1141 98.6 °F (37 °C) 91 16 (!) 136/59 (!) 89 %     Oxygen Therapy  O2 Sat (%): 97 % (03/11/22 1123)  Pulse via Oximetry: 105 beats per minute (03/09/22 1734)  O2 Device: None (Room air) (03/11/22 1123)  Skin Assessment: Clean, dry, & intact (03/08/22 1144)  Skin Protection for O2 Device: N/A (03/08/22 1144)  O2 Flow Rate (L/min): 2 l/min (03/09/22 1734)    Estimated body mass index is 31.07 kg/m² as calculated from the following:    Height as of this encounter: 5' 4\" (1.626 m). Weight as of this encounter: 82.1 kg (181 lb). No intake or output data in the 24 hours ending 03/11/22 1130      Physical Exam:     Blood pressure 138/70, pulse 80, temperature 98 °F (36.7 °C), resp. rate 20, height 5' 4\" (1.626 m), weight 82.1 kg (181 lb), SpO2 97 %. General:    Well nourished. No overt distress  Head:  Normocephalic, atraumatic  Eyes:  Sclerae appear normal.  Pupils equally round. ENT:  Nares appear normal, no drainage. Moist oral mucosa, +NGT  Neck:  No restricted ROM. Trachea midline   CV:   RRR. No m/r/g. No jugular venous distension. Lungs:   CTAB. No wheezing. Respirations even, unlabored  Abdomen: Bowel sounds absent. Soft, non distended, non tender  Extremities: No cyanosis or clubbing. Trace edema  Skin:     No rashes and normal coloration. Warm and dry. Neuro:  CN II-XII grossly intact. A&Ox3  Psych:  Normal mood and affect.       I have reviewed ordered lab tests and independently visualized imaging below:    Recent Labs:  Recent Results (from the past 48 hour(s))   GLUCOSE, POC    Collection Time: 03/09/22  3:55 PM   Result Value Ref Range    Glucose (POC) 167 (H) 65 - 100 mg/dL    Performed by Astrid Palm 308, POC    Collection Time: 03/09/22  9:25 PM   Result Value Ref Range    Glucose (POC) 155 (H) 65 - 100 mg/dL    Performed by Michelle Posadas METABOLIC PANEL, BASIC    Collection Time: 03/10/22  4:57 AM   Result Value Ref Range    Sodium 143 136 - 145 mmol/L    Potassium 2.7 (L) 3.5 - 5.1 mmol/L    Chloride 111 (H) 98 - 107 mmol/L    CO2 24 21 - 32 mmol/L    Anion gap 8 7 - 16 mmol/L    Glucose 168 (H) 65 - 100 mg/dL    BUN 34 (H) 8 - 23 MG/DL    Creatinine 2.00 (H) 0.6 - 1.0 MG/DL    GFR est AA 31 (L) >60 ml/min/1.73m2    GFR est non-AA 26 (L) >60 ml/min/1.73m2    Calcium 8.4 8.3 - 10.4 MG/DL   MAGNESIUM    Collection Time: 03/10/22  4:57 AM   Result Value Ref Range    Magnesium 2.1 1.8 - 2.4 mg/dL   PHOSPHORUS    Collection Time: 03/10/22  4:57 AM   Result Value Ref Range    Phosphorus 2.3 2.3 - 3.7 MG/DL   TRIGLYCERIDE    Collection Time: 03/10/22  4:57 AM   Result Value Ref Range    Triglyceride 93 35 - 150 MG/DL   GLUCOSE, POC    Collection Time: 03/10/22  6:09 AM   Result Value Ref Range    Glucose (POC) 197 (H) 65 - 100 mg/dL    Performed by Delilah    GLUCOSE, POC    Collection Time: 03/10/22 11:29 AM   Result Value Ref Range    Glucose (POC) 157 (H) 65 - 100 mg/dL    Performed by LibertyCT    POTASSIUM    Collection Time: 03/10/22  2:46 PM   Result Value Ref Range    Potassium 2.5 (L) 3.5 - 5.1 mmol/L   GLUCOSE, POC    Collection Time: 03/10/22  5:08 PM   Result Value Ref Range    Glucose (POC) 294 (H) 65 - 100 mg/dL    Performed by FleckaPCT    GLUCOSE, POC    Collection Time: 03/10/22  9:14 PM   Result Value Ref Range    Glucose (POC) 162 (H) 65 - 100 mg/dL    Performed by ArnulfoPCT    GLUCOSE, POC    Collection Time: 03/11/22  6:01 AM   Result Value Ref Range    Glucose (POC) 200 (H) 65 - 100 mg/dL    Performed by RadhaTripsidealandyT    METABOLIC PANEL, BASIC    Collection Time: 03/11/22  6:18 AM   Result Value Ref Range    Sodium 141 136 - 145 mmol/L    Potassium 2.4 (L) 3.5 - 5.1 mmol/L    Chloride 110 (H) 98 - 107 mmol/L    CO2 25 21 - 32 mmol/L    Anion gap 6 (L) 7 - 16 mmol/L    Glucose 223 (H) 65 - 100 mg/dL BUN 21 8 - 23 MG/DL    Creatinine 1.60 (H) 0.6 - 1.0 MG/DL    GFR est AA 41 (L) >60 ml/min/1.73m2    GFR est non-AA 34 (L) >60 ml/min/1.73m2    Calcium 8.2 (L) 8.3 - 10.4 MG/DL   MAGNESIUM    Collection Time: 03/11/22  6:18 AM   Result Value Ref Range    Magnesium 2.0 1.8 - 2.4 mg/dL   PHOSPHORUS    Collection Time: 03/11/22  6:18 AM   Result Value Ref Range    Phosphorus 2.2 (L) 2.3 - 3.7 MG/DL   TRIGLYCERIDE    Collection Time: 03/11/22  6:18 AM   Result Value Ref Range    Triglyceride 69 35 - 150 MG/DL   GLUCOSE, POC    Collection Time: 03/11/22 11:13 AM   Result Value Ref Range    Glucose (POC) 159 (H) 65 - 100 mg/dL    Performed by Chelly        All Micro Results     Procedure Component Value Units Date/Time    CULTURE, BLOOD [[id number]] Collected: 03/03/22 1555    Order Status: Completed Specimen: Blood Updated: 03/08/22 0811     Special Requests: --        LEFT  HAND       Culture result: NO GROWTH 5 DAYS       CULTURE, BLOOD [[id number]] Collected: 03/03/22 1547    Order Status: Completed Specimen: Blood Updated: 03/08/22 0811     Special Requests: --        RIGHT  Antecubital       Culture result: NO GROWTH 5 DAYS       CULTURE, URINE [[id number]] Collected: 03/03/22 1345    Order Status: Canceled Specimen: Urine from Clean catch           Other Studies:  No results found.     Current Meds:  Current Facility-Administered Medications   Medication Dose Route Frequency    potassium chloride (K-DUR, KLOR-CON M20) SR tablet 40 mEq  40 mEq Oral TID    fat emulsion 20% (LIPOSYN, INTRAlipid) infusion 250 mL  250 mL IntraVENous QPM    zinc oxide-cod liver oil (DESITIN) 40 % paste   Topical PRN    NUTRITIONAL SUPPORT ELECTROLYTE PRN ORDERS   Does Not Apply PRN    furosemide (LASIX) tablet 40 mg  40 mg Oral DAILY    magic mouthwash (TC) oral suspension 5 mL  5 mL Swish and Spit Q4H    lidocaine 4 % patch 1 Patch  1 Patch TransDERmal Q24H    benzocaine (HURRICANE) 20 % spray   Mucous Membrane QID PRN    traMADoL (ULTRAM) tablet 50 mg  50 mg Oral Q6H PRN    NIFEdipine ER (PROCARDIA XL) tablet 90 mg  90 mg Oral DAILY    sodium chloride (NS) flush 5-40 mL  5-40 mL IntraVENous Q8H    sodium chloride (NS) flush 5-40 mL  5-40 mL IntraVENous PRN    acetaminophen (TYLENOL) tablet 650 mg  650 mg Oral Q6H PRN    Or    acetaminophen (TYLENOL) suppository 650 mg  650 mg Rectal Q6H PRN    ondansetron (ZOFRAN ODT) tablet 4 mg  4 mg Oral Q8H PRN    Or    ondansetron (ZOFRAN) injection 4 mg  4 mg IntraVENous Q6H PRN    enoxaparin (LOVENOX) injection 30 mg  30 mg SubCUTAneous DAILY    insulin lispro (HUMALOG) injection   SubCUTAneous AC&HS    aspirin chewable tablet 81 mg  81 mg Oral 7am    carvediloL (COREG) tablet 3.125 mg  3.125 mg Oral BID WITH MEALS    isosorbide mononitrate ER (IMDUR) tablet 60 mg  60 mg Oral DAILY    rosuvastatin (CRESTOR) tablet 40 mg  40 mg Oral QHS    traZODone (DESYREL) tablet 50 mg  50 mg Oral QHS    [Held by provider] bisacodyL (DULCOLAX) suppository 10 mg  10 mg Rectal DAILY       Signed:  Timmy Meadows MD    Part of this note may have been written by using a voice dictation software. The note has been proof read but may still contain some grammatical/other typographical errors.

## 2022-03-11 NOTE — CONSULTS
Comprehensive Nutrition Assessment    Type and Reason for Visit: Reassess,Consult  TPN Management (Hospitalist)  Diet education (Surgery)    Nutrition Recommendations/Plan:   Meals and Snacks:  Continue current diet. Diet progression per MD  Nutrition Supplement Therapy:  Medical food supplement therapy:  Initiate Glucerna Shake three times per day (this provides 220 kcal and 10 grams protein per bottle)   Nutrition education: Provided patient with low residue diet to follow until stools become more formed (1-2 days). Then resume normal diet with slow transition to high fiber diet for h/o chronic constipation. Recommend patient pursue nutrition behavior change therapy in outpatient setting. Contact the [facility] appointment line at [phone] for more information, or send referral to [facility] (Fax [phone]). Discontinue PPN and lipids     Malnutrition Assessment:  Malnutrition Status: At risk for malnutrition (specify) (NPO x6 days, ? surgery)    Nutrition Assessment:   Nutrition History: Patient reports 2 meals per day at baseline. She states that she was eating normally up until admission. She denies ever having nausea even though she was not having bowel movements. She states that she tried not to eat too much protein as she has CKD. Cultural/Bahai/Ethnic Food Preference(s): None   Nutrition Background: Patient with PMH significant for chronic constipation, CKD3, HTN, CHF, MI, CAD, DM, GERD, HLD. She presented with increasing abdominal pain for ~2 weeks. CTAP showed steroral colitis. She was admitted with paralytic ileus. Nutrition Interval:  Patient discussed with RN, [facility] and Dr. Giovanny Underwood prior to seeing. RN reports IV lost as asking if a new line needed to be placed. Diet has been advanced to FLD and NGT removed. Okay to leave without PPN as PO intake is good and diet advanced to FLD. Discussed diet education consult with Jill Burkitt, PA with GI.  Long term GI soft diet not appropriate for chronic constipation. Patient seen and she states that she feels much better. Diet recall obtained. B- oatmeal and white toast with butter; L- skips or snack; D- meat (chicken, fish, or pork), vegetable (green beans, broccoli, carrots, corn), starch (potatoes, wild rice); Beverages - water, tea. Per recall patient is not consuming sufficient amounts of insoluble fiber which can contribute to constipation. Patient also reports that she was told she has IBS-C in which a higher fiber diet is indicated. If she in fact has IBS, she may benefit from FODMAP and nutrition counseling which she is interested in. Referral information above will be placed in her discharge instructions. Patient is looking forward to her FLD lunch. She is agreeable to nutrition supplement until diet can be advanced. Dr. Luther Oropeza with GI in room and agrees with increased fiber. Patient and spouse voiced no questions. Abdominal Status (last documented): Soft abdomen with Active  bowel sounds. Last BM 03/10/22.   Pertinent Medications: Dulcolax - held, IMDUR, Relistor - discontinued, Lasix (40 mg daily), MMW- refused, SSI (12 units yesterday, 4 thus far today), KLOR-Con 1 tablet TID  Electrolyte replacements: 3/5 20 meq KCl; 3/6 20 meq KCl: 3/7 20 meq KCl; 3/8 40 meq KCl; 3/10 KLOR-CON 60 meq PO   Pertinent Labs:   Lab Results   Component Value Date/Time    Sodium 141 03/11/2022 06:18 AM    Potassium 2.4 (L) 03/11/2022 06:18 AM    Chloride 110 (H) 03/11/2022 06:18 AM    CO2 25 03/11/2022 06:18 AM    Anion gap 6 (L) 03/11/2022 06:18 AM    Glucose 223 (H) 03/11/2022 06:18 AM    BUN 21 03/11/2022 06:18 AM    Creatinine 1.60 (H) 03/11/2022 06:18 AM    Calcium 8.2 (L) 03/11/2022 06:18 AM    Albumin 3.1 (L) 03/09/2022 05:06 AM    Magnesium 2.0 03/11/2022 06:18 AM    Phosphorus 2.2 (L) 03/11/2022 06:18 AM     Lab Results   Component Value Date/Time    Glucose 223 (H) 03/11/2022 06:18 AM    Glucose (POC) 200 (H) 03/11/2022 06:01 AM Glucose (POC) 162 (H) 03/10/2022 09:14 PM    Glucose (POC) 294 (H) 03/10/2022 05:08 PM    Glucose (POC) 157 (H) 03/10/2022 11:29 AM    Glucose (POC) 197 (H) 03/10/2022 06:09 AM    Glucose (POC) 155 (H) 03/09/2022 09:25 PM     Lab Results   Component Value Date/Time    Triglyceride 69 03/11/2022 06:18 AM   Labs remarkable for: depleted K - now on daily oral replacement, elevated glucose - on SSI, Triglyceride within range     Nutrition Related Findings:   No joey wasting of fat/muscle stores. NGT to LIS. PPN initiated 3/9 without lipids. 3/10 PPN @ 1.44 L continued and lipids initiated, CLD initiated. Current Nutrition Therapies:  ADULT DIET Full Liquid    Current Intake:   Average Meal Intake:  (CLD does not meet needs)        Anthropometric Measures:  Height: 5' 4\" (162.6 cm)  Current Body Wt: 82.1 kg (181 lb) (3/9), Weight source: Not specified  BMI: 31.1, Obese class 1 (BMI 30.0-34. 9)  Admission Body Weight: 168 lb 6.9 oz (3/4)  Ideal Body Weight (lbs) (Calculated): 120 lbs (55 kg), 150.3 %  Usual Body Wt: 77.6 kg (171 lb) (per patient, confirmed by office wts), Percent weight change: 5.5          Edema: Generalized: 2+ (3/10/2022  8:00 AM)  LLE: 3+ (3/10/2022  7:34 PM)  LUE: 2+ (3/10/2022  7:34 PM)  RLE: 3+ (3/10/2022  7:34 PM)  RUE: 2+ (3/10/2022  7:34 PM)     Estimated Daily Nutrient Needs:  Energy (kcal/day): 5188-3350 (Kcal/kg (23-28), Weight Used: Ideal (55 kg))  Protein (g/day): 50-55 (0.9-1 g/kg) Weight Used: (Ideal)  Fluid (ml/day):   (1 ml/kcal)    Nutrition Diagnosis:   · Inadequate oral intake related to altered GI function as evidenced by NPO or clear liquid status due to medical condition (paralytic ileus)    Nutrition Interventions:   Food and/or Nutrient Delivery: Continue current diet,Start oral nutrition supplement,Discontinue parenteral nutrition     Coordination of Nutrition Care: Continue to monitor while inpatient    Goals:   Previous Goal Met: Goal(s) achieved  Active Goal: Tolerate diet + ONS to meet at least 75% nutrition need by RD follow-up    Nutrition Monitoring and Evaluation:      Food/Nutrient Intake Outcomes: Diet advancement/tolerance,Food and nutrient intake,Supplement intake  Physical Signs/Symptoms Outcomes: Biochemical data,GI status,Weight    Discharge Planning:    Recommend pursue outpatient nutrition counseling    [address] [place], LD on 3/11/2022 at 12:59 PM  Contact: [phone]

## 2022-03-11 NOTE — PROGRESS NOTES
MSN, CM:  Patient was planned for surgery for colectomy/ileostomy. Patient had BM and surgery was cancelled. NGT now out and patient started on liquid diet. If patient tolerates diet possible discharge tomorrow. Patient has no discharge needs at this time. Case Management will continue to follow.

## 2022-03-11 NOTE — PROGRESS NOTES
H&P/Consult Note/Progress Note/Office Note:   Omkar Raza  MRN: [id number]  :1950  Age:71 y.o.    HPI: Omkar Raza is a 66 y.o. female with a history of chronic constipation, stage III chronic kidney disease, CAD, s/p stent, and HTN,   who was admitted on 3/2/22 by the hospitalist after she presented to the ER with a 1 week history of progressive abdominal pain and distention. She was empirically placed on Cipro and Flagyl in late February for possible diverticulitis her symptoms worsen and she had no bowel movement in several days. She has been diagnosed with stericoral colitis which has been refractory to laxatives and cathartics. She remained with an NG tube in place and is distended. Her abdominal films identifies significant sigmoid colon distention by 3/8/22 as below. GI was consulted to assist with her care and performed decompressive colonoscopy without resolution on 3/6/2022. She denies narcotic use as an outpatient. 3/2/22 CT abd/pelvis with no IV contrast  LIMITED THORAX: Mitral annulus calcifications. Right coronary artery  atherosclerosis. The included portions of the pericardium are unremarkable. The included lung bases are clear.     PERITONEUM/MESENTERY: No evidence of ascites, free gas, or lymphadenopathy.     GI TRACT: There is large volume stool extending from the cecum through the mid descending colon. There is an abrupt marketed transition in colonic caliber in the mid descending colon. There is mild colonic wall thickening and subtle free  fluid in the area of abrupt caliber transition. The distal portions of the colon are decompressed.     The terminal ileum is unremarkable. There is no evidence of upstream small bowel obstruction. Nonvisualized appendix, without pericecal inflammatory change.     The stomach is unremarkable.   SPLEEN: Normal  ADRENALS: Normal  PANCREAS: Normal  LIVER: Normal  GALLBLADDER: Normal     KIDNEYS: Mild bilateral renal cortical thinning. No evidence of hydroureteronephrosis or nephroureterolithiasis. Duplicated left renal collecting system.     BLADDER/: Small posterior bladder wall diverticulum. Prior hysterectomy.     VESSELS: Scattered atherosclerosis.     BONES/SOFT TISSUES: Lumbar spine spondylosis without suspicious lytic or blastic bony lesions.     IMPRESSION     1. There is colonic obstruction consequent to a severe stool burden extending from the cecum through the mid descending colon. There is an abrupt transition in colonic caliber in the mid descending colon. There is subtle bowel wall thickening and surrounding mesenteric stranding of the area of transition in caliber, consistent with a developing stercoral colitis. There is no evidence of associated colonic perforation or upstream small bowel obstruction.     2. Chronic findings otherwise as above.           3/8/22 abd Xray  Hx:  Follow-up ileus with abdominal distention. Persistent gaseous distention of the colon with maximum diameter of 11 cm in the proximal sigmoid. No significant small bowel dilatation. No abnormal soft tissue mass or calcific density is noted. Nasogastric tube remains in place.     IMPRESSION:  NO SIGNIFICANT INTERVAL CHANGE          I saw her previously in 2018 with N/V/RUQ pain    She was lost to followup when she did not show up to her appt on 11/26/18    She is s/p BTL and s/p \"radical\" hysterectomy for cervical dysplasia many yrs ago. She denies any h/o XRT or invasive carcinoma      12/3/14 CT abd/pelvis  IMPRESSION:     1.  No worrisome acute findings seen on this CT scan of the abdomen and pelvis. 12/11/14 s/p EGD ([facility])  Antral scar/mild gastritis  Biopsies-->gastric mucosa with changes of repair; unremarkable small bowel      11/5/18 Abd U/S  IMPRESSION: Negative abdominal ultrasound. 11/21/18 CT neck  Impression: Calcified atherosclerotic disease.     No cervical arterial aneurysm identified.     Right internal carotid artery: 60% stenosis in the distal carotid bulb. Left internal carotid artery: 80-85% stenosis in the distal carotid bulb. Calcified plaque extends from the distal CCA 2.8-1 cm up the LICA. The severe  stenosis is at the distal end of this plaque complex, which corresponds to the C4-C5 level.     Right external carotid artery: 60% stenosis.     Patent right and left vertebral arteries. 3/9/22 CT abd/pelvis with no oral or IV contrast  Hx: Colitis     The lung bases show bilateral presumed effusions with some overlying atelectasis or consolidation mostly on the right. Small amount of pericardial fluid is seen. Air and fluid distended large bowel is noted, nasogastric tube is seen   within a decompressed stomach. The gallbladder, liver, spleen, adrenals, kidneys appear normal.   There is no intraperitoneal free air, ascites, nor abnormal adenopathy seen. Large bowel measures up to 10.1 cm. Coronary artery calcifications are seen.     Pelvis: What appears to be a fecal plug is seen in the descending colon above the sigmoid colon with decompression of the sigmoid colon and rectal colon but  with a small amount of oral contrast present. The small bowel is not dilated, there is no intraperitoneal free air, ascites, nor abnormal adenopathy seen, the  bladder appears normal. The uterus is absent.     IMPRESSION  1. What may be a fecal impaction plug causing obstruction of the distal descending colon with distention of the colon but not small bowel. 2. Bilateral pleural effusions and overlying atelectasis or consolidation worse on the right than the left with a small amount of pericardial fluid present.          Additional hx:    3/9/22 c/o severe abd pain and distention; no relief with additional enema this am; GI input noted, CT reviewed. Her distal descending colon is obstructed with marked proximal colon distention.    I offered emergent lap with partial vs subtotal colectomy and either ileostomy or colostomy. She was in pre-op going to surgery and had massive BM and felt better so surgery was cancelled  3/10/22 many BMs and lots of flatus overnight; abd much softer; NGT clamped clears started  3/11/22 multiple Bms; No N/V with clears; abd soft an dnon-distended; non-tender;  NGT residual was 0 and removed this am                 Past Medical History:   Diagnosis Date    Acute on chronic combined systolic and diastolic congestive heart failure ([address] 2/17/2021    Acute renal failure superimposed on stage 3b chronic kidney disease ([address] 1/12/2021    CAD (coronary artery disease) 3/1/2012    MI, 3 stents    Chest pain     Coronary artery disease involving native coronary artery without angina pectoris 5/21/2015    COVID-19 virus infection 1/12/2021    Depression 3/1/2012    Diabetes mellitus ([address] 3/1/2012    oral agents. . hypo- 80's, Last A1c 6.9 in 6/2018    DM2 (diabetes mellitus, type 2) ([address] 5/21/2015    Elevated LFTs 1/12/2021    Elevated troponin 3/2/2012    Essential hypertension 5/21/2015    External hemorrhoids without mention of complication 8833    GERD (gastroesophageal reflux disease)     HCAP (healthcare-associated pneumonia) 1/18/2021    History of MI (myocardial infarction) 11/12    x2    Hypercholesterolemia     Hypertension 3/1/2012    Hypoalbuminemia 2/18/2021    Hyponatremia 1/12/2021    Hypoxemia 8/12/2020    Insomnia     Lactic acidosis 1/12/2021    Long QT interval 1/12/2021    Personal history of colonic polyps 2013    hyperplastic    Platelet inhibition due to Plavix     holding for colonoscopy per GI Dr.    [facility] Pleural effusion, bilateral 8/11/2020    Rectocele 2013    Sepsis ([address] 1/12/2021    Tobacco abuse 3/1/2012    quit for 1 year    Tobacco use disorder     Unstable angina ([address] 3/1/2012    ntg as needed.       Past Surgical History:   Procedure Laterality Date    FLEXIBLE SIGMOIDOSCOPY N/A 3/6/2022 SIGMOIDOSCOPY FLEXIBLE performed by Lucio Gilford, MD at [facility] ENDOSCOPY    HX CAROTID ENDARTERECTOMY Left 12/12/2018    HX CATARACT REMOVAL Left 09/19/2016    Dr Sandy Wayne, [id number] [address] Right 11/07/2016    Dr Sandy Wayne, [facility]    HX CERVICAL FUSION      neck w/plate    HX COLONOSCOPY  12/17/13    Anna Marie--single transverse hyperplastic polyp--7-10 year recall    HX HEMORRHOIDECTOMY      x2    HX ORTHOPAEDIC      left elbow    HX CAL AND BSO      HX WISDOM TEETH EXTRACTION      CO LEFT HEART CATH,PERCUTANEOUS  last stented 11/12 x 2    stent x3 , mi x 2     Current Facility-Administered Medications   Medication Dose Route Frequency    TPN ADULT - dextrose 5% amino acid 4.25%   IntraVENous QPM    fat emulsion 20% (LIPOSYN, INTRAlipid) infusion 250 mL  250 mL IntraVENous QPM    zinc oxide-cod liver oil (DESITIN) 40 % paste   Topical PRN    NUTRITIONAL SUPPORT ELECTROLYTE PRN ORDERS   Does Not Apply PRN    furosemide (LASIX) tablet 40 mg  40 mg Oral DAILY    magic mouthwash (TC) oral suspension 5 mL  5 mL Swish and Spit Q4H    lidocaine 4 % patch 1 Patch  1 Patch TransDERmal Q24H    benzocaine (HURRICANE) 20 % spray   Mucous Membrane QID PRN    traMADoL (ULTRAM) tablet 50 mg  50 mg Oral Q6H PRN    methylnaltrexone (RELISTOR) injection 12 mg  12 mg SubCUTAneous DAILY    NIFEdipine ER (PROCARDIA XL) tablet 90 mg  90 mg Oral DAILY    sodium chloride (NS) flush 5-40 mL  5-40 mL IntraVENous Q8H    sodium chloride (NS) flush 5-40 mL  5-40 mL IntraVENous PRN    acetaminophen (TYLENOL) tablet 650 mg  650 mg Oral Q6H PRN    Or    acetaminophen (TYLENOL) suppository 650 mg  650 mg Rectal Q6H PRN    ondansetron (ZOFRAN ODT) tablet 4 mg  4 mg Oral Q8H PRN    Or    ondansetron (ZOFRAN) injection 4 mg  4 mg IntraVENous Q6H PRN    enoxaparin (LOVENOX) injection 30 mg  30 mg SubCUTAneous DAILY    insulin lispro (HUMALOG) injection   SubCUTAneous AC&HS    aspirin chewable tablet 81 mg  81 mg Oral 7am    carvediloL (COREG) tablet 3.125 mg  3.125 mg Oral BID WITH MEALS    isosorbide mononitrate ER (IMDUR) tablet 60 mg  60 mg Oral DAILY    rosuvastatin (CRESTOR) tablet 40 mg  40 mg Oral QHS    traZODone (DESYREL) tablet 50 mg  50 mg Oral QHS    [Held by provider] bisacodyL (DULCOLAX) suppository 10 mg  10 mg Rectal DAILY     Cephalosporins, Hydralazine, Sulfamethoxazole-trimethoprim, Codeine, and Lisinopril  Social History     Socioeconomic History    Marital status:    Tobacco Use    Smoking status: Former Smoker     Packs/day: 1.00     Years: 40.00     Pack years: 40.00     Quit date: 2012     Years since quittin.3    Smokeless tobacco: Never Used    Tobacco comment: quit  . has stopped prior also   Vaping Use    Vaping Use: Never used   Substance and Sexual Activity    Alcohol use: No    Drug use: No     Social History     Tobacco Use   Smoking Status Former Smoker    Packs/day: 1.00    Years: 40.00    Pack years: 40.00    Quit date: 2012    Years since quittin.3   Smokeless Tobacco Never Used   Tobacco Comment    quit  . has stopped prior also     Family History   Problem Relation Age of Onset    Heart Disease Mother     Osteoporosis Mother     Heart Attack Mother 67        mi    Thyroid Disease Mother         Multinodular Goiter    Heart Disease Father     Cancer Father         pancreatic    Heart Attack Father 67        MI    Cancer Sister         Pre-Cancerous dysplasia    Thyroid Cancer Sister     Ulcerative Colitis Brother     Thyroid Cancer Brother     Breast Cancer Neg Hx      ROS: The patient has no difficulty with chest pain or shortness of breath. No fever or chills. Comprehensive review of systems was otherwise unremarkable except as noted above.     Physical Exam:   Visit Vitals  BP (!) 157/83 (BP 1 Location: Right upper arm, BP Patient Position: Supine)   Pulse 92   Temp 98.2 °F (36.8 °C)   Resp 20 Ht 5' 4\" (1.626 m)   Wt 181 lb (82.1 kg)   SpO2 91%   BMI 31.07 kg/m²     Vitals:    03/10/22 1714 03/10/22 1953 03/10/22 2340 03/11/22 0314   BP: (!) 141/58 135/63 (!) 148/60 (!) 157/83   Pulse: 89 89 91 92   Resp: 18 18 18 20   Temp: 99.3 °F (37.4 °C) 98.2 °F (36.8 °C) 98.2 °F (36.8 °C) 98.2 °F (36.8 °C)   SpO2: 93% 92% 95% 91%   Weight:       Height:         No intake/output data recorded. 03/09 0701 - 03/10 1900  In: -   Out: 1825 [Urine:975]    Constitutional: Alert, oriented, cooperative patient in no acute distress; appears stated age    Eyes:Sclera are clear. EOMs intact  ENMT: no external lesions gross hearing normal; no obvious neck masses, no ear or lip lesions, nares normal  CV: RRR. Normal perfusion  Resp: No JVD. Breathing is  non-labored; no audible wheezing. GI: soft and non-tender     Musculoskeletal: unremarkable with normal function. No embolic signs or cyanosis. Neuro:  Oriented; moves all 4; no focal deficits  Psychiatric: normal affect and mood, no memory impairment    Recent vitals (if inpt):  Patient Vitals for the past 24 hrs:   BP Temp Pulse Resp SpO2   03/11/22 0314 (!) 157/83 98.2 °F (36.8 °C) 92 20 91 %   03/10/22 2340 (!) 148/60 98.2 °F (36.8 °C) 91 18 95 %   03/10/22 1953 135/63 98.2 °F (36.8 °C) 89 18 92 %   03/10/22 1714 (!) 141/58 99.3 °F (37.4 °C) 89 18 93 %   03/10/22 1141 (!) 136/59 98.6 °F (37 °C) 91 16 (!) 89 %   03/10/22 0920 (!) 147/69 99.1 °F (37.3 °C) 93 18 96 %       Amount and/or Complexity of Data Reviewed and Analyzed:  I reviewed and analyzed all of the unique labs and radiologic studies that are shown below as well as any that are in the HPI, and any that are in the expanded problem list below  *Each unique test, order, or document contributes to the combination of 2 or combination of 3 in Category 1 below. For this visit I also reviewed old records and prior notes.       Recent Labs     03/10/22  1446 03/10/22  0457 03/10/22  0457 03/09/22  3872 03/09/22  0506   WBC  --   --   --   --  11.0   HGB  --   --   --   --  10.9*   PLT  --   --   --   --  274   NA  --   --  143   < > 142   K 2.5*   < > 2.7*   < > 3.7   CL  --   --  111*   < > 114*   CO2  --   --  24   < > 19*   BUN  --   --  34*   < > 43*   CREA  --   --  2.00*   < > 2.20*   GLU  --   --  168*   < > 143*   TBILI  --   --   --   --  0.4   ALT  --   --   --   --  42   AP  --   --   --   --  109    < > = values in this interval not displayed. Review of most recent CBC  Lab Results   Component Value Date/Time    WBC 11.0 03/09/2022 05:06 AM    HGB 10.9 (L) 03/09/2022 05:06 AM    HCT 33.7 (L) 03/09/2022 05:06 AM    PLATELET 152 99/09/8882 05:06 AM    MCV 95.7 03/09/2022 05:06 AM       Review of most recent BMP  Lab Results   Component Value Date/Time    Sodium 143 03/10/2022 04:57 AM    Potassium 2.5 (L) 03/10/2022 02:46 PM    Chloride 111 (H) 03/10/2022 04:57 AM    CO2 24 03/10/2022 04:57 AM    Anion gap 8 03/10/2022 04:57 AM    Glucose 168 (H) 03/10/2022 04:57 AM    BUN 34 (H) 03/10/2022 04:57 AM    Creatinine 2.00 (H) 03/10/2022 04:57 AM    BUN/Creatinine ratio 19 11/03/2021 04:06 PM    GFR est AA 31 (L) 03/10/2022 04:57 AM    GFR est non-AA 26 (L) 03/10/2022 04:57 AM    Calcium 8.4 03/10/2022 04:57 AM       Review of most recent LFTs (and lipase if done)  Lab Results   Component Value Date/Time    ALT (SGPT) 42 03/09/2022 05:06 AM    AST (SGOT) 48 (H) 03/09/2022 05:06 AM    Alk.  phosphatase 109 03/09/2022 05:06 AM    Bilirubin, direct 0.10 11/03/2021 04:06 PM    Bilirubin, total 0.4 03/09/2022 05:06 AM     Lab Results   Component Value Date/Time    Lipase 157 03/02/2022 09:14 AM       Lab Results   Component Value Date/Time    INR 1.1 01/13/2021 06:21 AM    Bilirubin, direct 0.10 11/03/2021 04:06 PM    Troponin-I, Qt. <0.02 (L) 02/05/2017 09:08 PM       Review of most recent HgbA1c  Lab Results   Component Value Date/Time    Hemoglobin A1c 6.6 (H) 11/03/2021 04:06 PM       Nutritional assessment screen for wound healing issues:  Lab Results   Component Value Date/Time    Protein, total 6.8 03/09/2022 05:06 AM    Albumin 3.1 (L) 03/09/2022 05:06 AM       @lastcovr@  XR Results (most recent):  Results from [place] encounter on 03/02/22    XR ABD (KUB)    Narrative  EXAM: ABDOMINAL RADIOGRAPH, 1 view    INDICATION: Abdominal distention    COMPARISON: CT 3/9/2022 and x-ray 3/8/2022    TECHNIQUE: Frontal abdominal radiography was performed. FINDINGS:    Esophagogastric tube terminates in the antropyloric region of the stomach. Significant interval improvement in gaseous distention of the colon. No  intraperitoneal free air. Impression  Significantly improved colonic distention. CT Results (most recent):  Results from Hospital Encounter encounter on 03/02/22    CT ABD PELV WO CONT    Narrative  CT abdomen and pelvis done without oral and IV contrast 9 March, 2022    Reference exam: 8 March, 2022    Indication: Colitis    Technique: Radiation dose reduction techniques were used for this study using  one or more of the following: automated exposure control; adjustment of mA  and/or kV (according to patient size);  iterative reconstruction. 5 mm axial  images were taken through the abdomen and pelvis using no oral nor IV contrast.    Abdomen: The lung bases show bilateral presumed effusions with some overlying  atelectasis or consolidation mostly on the right. Small amount of pericardial  fluid is seen. Air and fluid distended large bowel is noted, nasogastric tube is  seen within a decompressed stomach, the gallbladder, liver, spleen, adrenals,  kidneys appear normal. There is no intraperitoneal free air, ascites, nor  abnormal adenopathy seen. Large bowel measures up to 10.1 cm. Coronary artery  calcifications are seen.     Pelvis: What appears to be a fecal plug is seen in the descending colon above  the sigmoid colon with decompression of the sigmoid colon and rectal colon but  with a small amount of oral contrast present. The small bowel is not dilated,  there is no intraperitoneal free air, ascites, nor abnormal adenopathy seen, the  bladder appears normal. The uterus is absent. Impression  1. What may be a fecal impaction plug causing obstruction of the distal  descending colon with distention of the colon but not small bowel. 2. Bilateral pleural effusions and overlying atelectasis or consolidation worse  on the right than the left with a small amount of pericardial fluid present. US Results (most recent):  Results from [place] encounter on 10/15/21    US RETROPERITONEUM COMP    Narrative  RENAL ULTRASOUND. INDICATION: Chronic stage V renal failure. COMPARISON: February 2021. TECHNIQUE: Direct skin contact sector 3-5 MHz images of the kidneys are  obtained. FINDINGS: Renal echogenicity grossly unremarkable, the right kidney is  hypoechoic compared to the liver. Right kidney: 9.3 cm in length. No hydronephrosis. Left kidney: 11.8 cm in length. No hydronephrosis. Urinary bladder: Unremarkable. Vasculature: Aorta: Normal caliber. IVC:  Patent. Impression  Unremarkable renal ultrasound. Negative for obstruction.         Admission date (for inpatients): 3/2/2022   2 Days Post-Op  Procedure(s):  PROCEDURE CANCELLED - NOT PERFORMED        ASSESSMENT/PLAN:  Problem List  Date Reviewed: 2/7/2022          Codes Class Noted    * (Principal) Colon obstruction ([facility] [address] ICD-10-CM: J13.494  ICD-9-CM: 560.9  3/9/2022        Stercoral colitis ICD-10-CM: K52.89  ICD-9-CM: 558.9  3/2/2022        CAD (coronary artery disease) (Chronic) ICD-10-CM: I25.10  ICD-9-CM: 414.00  8/19/2021        Chest pain ICD-10-CM: R07.9  ICD-9-CM: 786.50  8/19/2021        Anemia ICD-10-CM: D64.9  ICD-9-CM: 285.9  8/19/2021        Elevated ALT measurement ICD-10-CM: R74.01  ICD-9-CM: 790.4  7/7/2021        Polyarthritis ICD-10-CM: M13.0  ICD-9-CM: 716.50  7/7/2021        Chronic diastolic congestive heart failure (HCC) ICD-10-CM: I50.32  ICD-9-CM: 428.32, 428.0  4/19/2021    Overview Signed 7/7/2021 11:06 AM by Claudetta Piano, NP     Heart Failure (HFpEF), EF 90-57%, with diastolic dysfunction, ECHO 2/16/2021               Normocytic anemia ICD-10-CM: D64.9  ICD-9-CM: 285.9  1/12/2021        Mixed hyperlipidemia ICD-10-CM: E78.2  ICD-9-CM: 272.2  10/21/2020        S/P PTCA (percutaneous transluminal coronary angioplasty) ICD-10-CM: Z98.61  ICD-9-CM: V45.82  9/1/2020        History of left-sided carotid endarterectomy ICD-10-CM: Z98.890  ICD-9-CM: V45.89  3/12/2020        Carotid stenosis, asymptomatic, right ICD-10-CM: I65.21  ICD-9-CM: 433.10  3/12/2020        Carotid artery stenosis ICD-10-CM: I65.29  ICD-9-CM: 433.10  12/12/2018        Right carotid bruit ICD-10-CM: R09.89  ICD-9-CM: 785.9  11/6/2018        Type 2 diabetes mellitus with stage 4 chronic kidney disease, with long-term current use of insulin (HCC) ICD-10-CM: E11.22, N18.4, Z79.4  ICD-9-CM: 250.40, 585.4, V58.67  10/11/2018        Gastroesophageal reflux disease ICD-10-CM: K21.9  ICD-9-CM: 530.81  2/16/2017        Essential hypertension (Chronic) ICD-10-CM: I10  ICD-9-CM: 401.9  9/15/2016        Stage 4 chronic kidney disease ([facility] [address] ICD-10-CM: N18.4  ICD-9-CM: 585.4  9/15/2016        Age-related osteoporosis without current pathological fracture ICD-10-CM: M81.0  ICD-9-CM: 733.01  9/15/2016        Major depressive disorder with single episode, in full remission [facility]) ICD-10-CM: F32.5  ICD-9-CM: 296.26  9/15/2016        Primary insomnia ICD-10-CM: F51.01  ICD-9-CM: 307.42  9/15/2016        Coronary artery disease involving native coronary artery of native heart without angina pectoris ICD-10-CM: I25.10  ICD-9-CM: 414.01  9/4/2013        HLD (hyperlipidemia) ICD-10-CM: E78.5  ICD-9-CM: 272.4  9/4/2013        Hypertension (Chronic) ICD-10-CM: I10  ICD-9-CM: 401.9  3/1/2012            Principal Problem:    Colon obstruction ([facility] [address] (3/9/2022)    Active Problems:    Hypertension (3/1/2012)      HLD (hyperlipidemia) (9/4/2013)      Stage 4 chronic kidney disease ([facility] [address] (9/15/2016)      Gastroesophageal reflux disease (2/16/2017)      Type 2 diabetes mellitus with stage 4 chronic kidney disease, with long-term current use of insulin ([facility] [address] (10/11/2018)      S/P PTCA (percutaneous transluminal coronary angioplasty) (9/1/2020)      Chronic diastolic congestive heart failure ([facility] [address] (4/19/2021)      Overview: Heart Failure (HFpEF), EF 00-23%, with diastolic dysfunction, ECHO       2/16/2021            Stercoral colitis (3/2/2022)           Number and Complexity of Problems addressed and   Risks of complications and/or morbidity of management          Stercoral colitis with distal descending colon obstruction--->resolved  Multiple Bms and flatus  abd soft and non-tender  NGT with no residual this am and was removed      Tolerating clears  I encouraged her to eat a low residue diet for awhile and avoid vegetables and oatmeal fairly long-term given what happened to her  I consulted nutrition to give her teaching in a low residue diet    Home on diet of Soups, Juices, liquids and protein Ensure shakes for 2 days, then soft low residue diet (I wrote this in discharge instructions)  Follow-up with GI for any long term bowel regimen questions    Surgery will sign off  Call if needed            Level of MDM (2/3 elements below)  Number and Complexity of Problems Addressed Amount and/or Complexity of Data to be Reviewed and Analyzed  *Each unique test, order, or document contributes to the combination of 2 or combination of 3 in Category 1 below.  Risk of Complications and/or Morbidity or Mortality of pt Management     [id number]  25738 SF Minimal  1self-limited or minor problem Minimal or none Minimal risk of morbidity from additional diagnostic testing or Rx   49492  22884 Low Low  2or more self-limited or minor problems;    or  1stable chronic illness;    or  1ROOOH, uncomplicated illness or injury   Limited  (Must meet the requirements of at least 1 of the 2 categories)  Category 1: Tests and documents   Any combination of 2 from the following:  Review of prior external note(s) from each unique source*;  review of the result(s) of each unique test*;   ordering of each unique test*    or   Category 2: Assessment requiring an independent historian(s)  (For the categories of independent interpretation of tests and discussion of management or test interpretation, see moderate or high) Low risk of morbidity from additional diagnostic testing or treatment     58579  [id number] Mod Moderate  1or more chronic illnesses with exacerbation, progression, or side effects of treatment;    or  2or more stable chronic illnesses;    or  1undiagnosed new problem with uncertain prognosis;    or  1acute illness with systemic symptoms;    or  4AKYVG complicated injury   Moderate  (Must meet the requirements of at least 1 out of 3 categories)  Category 1: Tests, documents, or independent historian(s)  Any combination of 3 from the following:   Review of prior external note(s) from each unique source*;  Review of the result(s) of each unique test*;  Ordering of each unique test*;  Assessment requiring an independent historian(s)    or  Category 2: Independent interpretation of tests   Independent interpretation of a test performed by another physician/other qualified health care professional (not separately reported);     or  Category 3: Discussion of management or test interpretation  Discussion of management or test interpretation with external physician/other qualified health care professional/appropriate source (not separately reported)   Moderate risk of morbidity from additional diagnostic testing or treatment  Examples only:  Prescription drug management   Decision regarding minor surgery with identified patient or procedure risk factors  Decision regarding elective major surgery without identified patient or procedure risk factors   Diagnosis or treatment significantly limited by social determinants of health       05866  58490 High High  1or more chronic illnesses with severe exacerbation, progression, or side effects of treatment;    or  1 acute or chronic illness or injury that poses a threat to life or bodily function   Extensive  (Must meet the requirements of at least 2 out of 3 categories)  Category 1: Tests, documents, or independent historian(s)  Any combination of 3 from the following:   Review of prior external note(s) from each unique source*;  Review of the result(s) of each unique test*;   Ordering of each unique test*;   Assessment requiring an independent historian(s)    or   Category 2: Independent interpretation of tests   Independent interpretation of a test performed by another physician/other qualified health care professional (not separately reported);     or  Category 3: Discussion of management or test interpretation  Discussion of management or test interpretation with external physician/other qualified health care professional/appropriate source (not separately reported)   High risk of morbidity from additional diagnostic testing or treatment  Examples only:  Drug therapy requiring intensive monitoring for toxicity  Decision regarding elective major surgery with identified patient or procedure risk factors - surgery cancelled  Decision regarding emergency major surgery  Decision regarding hospitalization  Decision not to resuscitate or to de-escalate care because of poor prognosis             I have personally performed a face-to-face diagnostic evaluation and management  service on this patient. I have independently seen the patient. I have independently obtained the above history from the patient/family. I have independently examined the patient with above findings.   I have independently reviewed data/labs for this patient and developed the above plan of care (MDM). Signed: Mickey Becker.  Celso Mayen MD, FACS

## 2022-03-11 NOTE — PROGRESS NOTES
Problem: Acute Renal Failure: Discharge Outcomes  Goal: *Optimal pain control at patient's stated goal  Outcome: Progressing Towards Goal     Problem: Acute Renal Failure: Discharge Outcomes  Goal: *Urinary output within identified parameters  Outcome: Progressing Towards Goal     Problem: Acute Renal Failure: Discharge Outcomes  Goal: *Hemodynamically stable  Outcome: Progressing Towards Goal     Problem: Acute Renal Failure: Discharge Outcomes  Goal: *Tolerating diet  Outcome: Progressing Towards Goal     Problem: Acute Renal Failure: Discharge Outcomes  Goal: *Lab values stabilized  Outcome: Progressing Towards Goal     Problem: Acute Renal Failure: Discharge Outcomes  Goal: *Verbalizes understanding and describes medication purposes and frequencies  Outcome: Progressing Towards Goal

## 2022-03-11 NOTE — PROGRESS NOTES
[facility] Progress Note         Admit Date:  3/2/2022    Today's Date:  3/11/2022    CC:  ileus    Subjective:     Patient with near resolution of abdominal distention. Still having frequent stools. NGT removed and tolerating CLD. TPN started yesterday. creatinine improving.   Medications:   Current Facility-Administered Medications   Medication Dose Route Frequency    potassium chloride (K-DUR, KLOR-CON M20) SR tablet 40 mEq  40 mEq Oral TID    TPN ADULT - dextrose 5% amino acid 4.25%   IntraVENous QPM    fat emulsion 20% (LIPOSYN, INTRAlipid) infusion 250 mL  250 mL IntraVENous QPM    zinc oxide-cod liver oil (DESITIN) 40 % paste   Topical PRN    NUTRITIONAL SUPPORT ELECTROLYTE PRN ORDERS   Does Not Apply PRN    furosemide (LASIX) tablet 40 mg  40 mg Oral DAILY    magic mouthwash (TC) oral suspension 5 mL  5 mL Swish and Spit Q4H    lidocaine 4 % patch 1 Patch  1 Patch TransDERmal Q24H    benzocaine (HURRICANE) 20 % spray   Mucous Membrane QID PRN    traMADoL (ULTRAM) tablet 50 mg  50 mg Oral Q6H PRN    methylnaltrexone (RELISTOR) injection 12 mg  12 mg SubCUTAneous DAILY    NIFEdipine ER (PROCARDIA XL) tablet 90 mg  90 mg Oral DAILY    sodium chloride (NS) flush 5-40 mL  5-40 mL IntraVENous Q8H    sodium chloride (NS) flush 5-40 mL  5-40 mL IntraVENous PRN    acetaminophen (TYLENOL) tablet 650 mg  650 mg Oral Q6H PRN    Or    acetaminophen (TYLENOL) suppository 650 mg  650 mg Rectal Q6H PRN    ondansetron (ZOFRAN ODT) tablet 4 mg  4 mg Oral Q8H PRN    Or    ondansetron (ZOFRAN) injection 4 mg  4 mg IntraVENous Q6H PRN    enoxaparin (LOVENOX) injection 30 mg  30 mg SubCUTAneous DAILY    insulin lispro (HUMALOG) injection   SubCUTAneous AC&HS    aspirin chewable tablet 81 mg  81 mg Oral 7am    carvediloL (COREG) tablet 3.125 mg  3.125 mg Oral BID WITH MEALS    isosorbide mononitrate ER (IMDUR) tablet 60 mg  60 mg Oral DAILY    rosuvastatin (CRESTOR) tablet 40 mg  40 mg Oral QHS    traZODone (DESYREL) tablet 50 mg  50 mg Oral QHS    [Held by provider] bisacodyL (DULCOLAX) suppository 10 mg  10 mg Rectal DAILY       Review of Systems:  ROS was obtained, with pertinent positives as listed above. No chest pain or SOB. Diet:  CLD, TPN    Objective:   Vitals:  Visit Vitals  BP (!) 163/68 (BP 1 Location: Right upper arm, BP Patient Position: Supine)   Pulse 86   Temp 98.4 °F (36.9 °C)   Resp 20   Ht 5' 4\" (1.626 m)   Wt 82.1 kg (181 lb)   SpO2 92%   BMI 31.07 kg/m²     Intake/Output:  No intake/output data recorded. 03/09 1901 - 03/11 0700  In: -   Out: 850   Exam:  General appearance: alert, cooperative, no distress  Lungs: clear to auscultation bilaterally anteriorly  Heart: regular rate and rhythm  Abdomen: soft, non-tender. Bowel sounds normal. No masses, no organomegaly  Extremities: extremities normal, atraumatic, no cyanosis or edema  Neuro:  alert and oriented    Data Review (Labs):    Recent Labs     03/11/22  0618 03/10/22  1446 03/10/22  0457 03/09/22  0506   WBC  --   --   --  11.0   HGB  --   --   --  10.9*   HCT  --   --   --  33.7*   PLT  --   --   --  274   MCV  --   --   --  95.7     --  143 142   K 2.4* 2.5* 2.7* 3.7   *  --  111* 114*   CO2 25  --  24 19*   BUN 21  --  34* 43*   CREA 1.60*  --  2.00* 2.20*   CA 8.2*  --  8.4 8.5   MG 2.0  --  2.1  --    *  --  168* 143*   AP  --   --   --  109   AST  --   --   --  48*   ALT  --   --   --  42   TBILI  --   --   --  0.4   ALB  --   --   --  3.1*   TP  --   --   --  6.8     CT A/P 3/9/22:   Abdomen: The lung bases show bilateral presumed effusions with some overlying  atelectasis or consolidation mostly on the right. Small amount of pericardial  fluid is seen.  Air and fluid distended large bowel is noted, nasogastric tube is  seen within a decompressed stomach, the gallbladder, liver, spleen, adrenals,  kidneys appear normal. There is no intraperitoneal free air, ascites, nor  abnormal adenopathy seen. Large bowel measures up to 10.1 cm. Coronary artery  calcifications are seen.     Pelvis: What appears to be a fecal plug is seen in the descending colon above  the sigmoid colon with decompression of the sigmoid colon and rectal colon but  with a small amount of oral contrast present. The small bowel is not dilated,  there is no intraperitoneal free air, ascites, nor abnormal adenopathy seen, the  bladder appears normal. The uterus is absent.     IMPRESSION  1. What may be a fecal impaction plug causing obstruction of the distal  descending colon with distention of the colon but not small bowel. 2. Bilateral pleural effusions and overlying atelectasis or consolidation worse  on the right than the left with a small amount of pericardial fluid present. KUB 3/10/22: FINDINGS:      Esophagogastric tube terminates in the antropyloric region of the stomach. Significant interval improvement in gaseous distention of the colon. No  intraperitoneal free air.     IMPRESSION  Significantly improved colonic distention.          Assessment:     Principal Problem:    Colon obstruction ([address] (3/9/2022)    Active Problems:    Hypertension (3/1/2012)      HLD (hyperlipidemia) (9/4/2013)      Stage 4 chronic kidney disease ([address] (9/15/2016)      Gastroesophageal reflux disease (2/16/2017)      Type 2 diabetes mellitus with stage 4 chronic kidney disease, with long-term current use of insulin ([address] (10/11/2018)      S/P PTCA (percutaneous transluminal coronary angioplasty) (9/1/2020)      Chronic diastolic congestive heart failure ([address] (4/19/2021)      Overview: Heart Failure (HFpEF), EF 48-03%, with diastolic dysfunction, ECHO       2/16/2021            Stercoral colitis (3/2/2022)      66 y. o. female with PMH including but not limited to coronary artery disease status post PCI 2012, diabetes, hypertension, chronic kidney disease stage 3, who is seen in consultation at the request of Dr. Isidra Conde constipation abdominal pain and stercoral colitis.  S/P decompressive colonoscopy with Dr. Anju Merritt 3/6/22 with report of passing stool  3/6 and 3/7  on Relistor IM and dulcolax suppository daily and NGT to LIS but no change on KUB or exam. Maximal diameter of sigmoid colon on KUB today measured at 11cm. CT today (above) suggests possible fecal impaction. However, colonic decompression and cathartics have failed to improve her situation. Surgery consulted and pt was on her way to the OR when she had an explosive stool with decompression.      Plan:     - stop Relistor as she continues to have frequent loose stools  - advance diet to full liquids  - hopefully home soon. - we will sign off and arrange outpatient follow up    MELINDA Douglas     Addendum: ok to advance diet per surgery. She has a hx of constipation so may benefit from regular or high fiber diet long term. She is currently having loose stools but she may need a long term bowel regimen with Miralax 17g in 8 ounces of water once or twice daily prn. Will address further at her follow up visit in the office. She is being referred to outpatient nutritional consult as well. Salud Douglas        Patient is seen and examined in collaboration with Dr. Jed Hammer. Assessment and plan as per Dr. Jed Hammer.

## 2022-03-12 LAB
ANION GAP SERPL CALC-SCNC: 7 MMOL/L (ref 7–16)
BUN SERPL-MCNC: 16 MG/DL (ref 8–23)
CALCIUM SERPL-MCNC: 8 MG/DL (ref 8.3–10.4)
CHLORIDE SERPL-SCNC: 109 MMOL/L (ref 98–107)
CO2 SERPL-SCNC: 26 MMOL/L (ref 21–32)
CREAT SERPL-MCNC: 1.8 MG/DL (ref 0.6–1)
GLUCOSE BLD STRIP.AUTO-MCNC: 189 MG/DL (ref 65–100)
GLUCOSE BLD STRIP.AUTO-MCNC: 233 MG/DL (ref 65–100)
GLUCOSE BLD STRIP.AUTO-MCNC: 264 MG/DL (ref 65–100)
GLUCOSE BLD STRIP.AUTO-MCNC: 316 MG/DL (ref 65–100)
GLUCOSE SERPL-MCNC: 197 MG/DL (ref 65–100)
POTASSIUM SERPL-SCNC: 2.2 MMOL/L (ref 3.5–5.1)
SERVICE CMNT-IMP: ABNORMAL
SODIUM SERPL-SCNC: 142 MMOL/L (ref 136–145)

## 2022-03-12 PROCEDURE — 74011250636 HC RX REV CODE- 250/636: Performed by: INTERNAL MEDICINE

## 2022-03-12 PROCEDURE — 74011000250 HC RX REV CODE- 250: Performed by: INTERNAL MEDICINE

## 2022-03-12 PROCEDURE — 74011250637 HC RX REV CODE- 250/637: Performed by: INTERNAL MEDICINE

## 2022-03-12 PROCEDURE — 82962 GLUCOSE BLOOD TEST: CPT

## 2022-03-12 PROCEDURE — 36415 COLL VENOUS BLD VENIPUNCTURE: CPT

## 2022-03-12 PROCEDURE — 80048 BASIC METABOLIC PNL TOTAL CA: CPT

## 2022-03-12 PROCEDURE — 96376 TX/PRO/DX INJ SAME DRUG ADON: CPT

## 2022-03-12 PROCEDURE — 74011636637 HC RX REV CODE- 636/637: Performed by: INTERNAL MEDICINE

## 2022-03-12 PROCEDURE — 65270000029 HC RM PRIVATE

## 2022-03-12 PROCEDURE — 74011250637 HC RX REV CODE- 250/637: Performed by: STUDENT IN AN ORGANIZED HEALTH CARE EDUCATION/TRAINING PROGRAM

## 2022-03-12 PROCEDURE — 96372 THER/PROPH/DIAG INJ SC/IM: CPT

## 2022-03-12 RX ORDER — POTASSIUM CHLORIDE 14.9 MG/ML
20 INJECTION INTRAVENOUS
Status: COMPLETED | OUTPATIENT
Start: 2022-03-12 | End: 2022-03-12

## 2022-03-12 RX ORDER — POTASSIUM CHLORIDE 20 MEQ/1
40 TABLET, EXTENDED RELEASE ORAL
Status: COMPLETED | OUTPATIENT
Start: 2022-03-12 | End: 2022-03-12

## 2022-03-12 RX ORDER — SODIUM CHLORIDE, SODIUM LACTATE, POTASSIUM CHLORIDE, CALCIUM CHLORIDE 600; 310; 30; 20 MG/100ML; MG/100ML; MG/100ML; MG/100ML
75 INJECTION, SOLUTION INTRAVENOUS CONTINUOUS
Status: DISPENSED | OUTPATIENT
Start: 2022-03-12 | End: 2022-03-12

## 2022-03-12 RX ORDER — SODIUM CHLORIDE, SODIUM LACTATE, POTASSIUM CHLORIDE, CALCIUM CHLORIDE 600; 310; 30; 20 MG/100ML; MG/100ML; MG/100ML; MG/100ML
75 INJECTION, SOLUTION INTRAVENOUS CONTINUOUS
Status: DISCONTINUED | OUTPATIENT
Start: 2022-03-12 | End: 2022-03-12

## 2022-03-12 RX ADMIN — POTASSIUM CHLORIDE 20 MEQ: 14.9 INJECTION, SOLUTION INTRAVENOUS at 12:46

## 2022-03-12 RX ADMIN — CARVEDILOL 3.12 MG: 3.12 TABLET, FILM COATED ORAL at 08:13

## 2022-03-12 RX ADMIN — NIFEDIPINE 90 MG: 90 TABLET, EXTENDED RELEASE ORAL at 08:13

## 2022-03-12 RX ADMIN — ROSUVASTATIN CALCIUM 40 MG: 20 TABLET, FILM COATED ORAL at 21:33

## 2022-03-12 RX ADMIN — TRAZODONE HYDROCHLORIDE 50 MG: 50 TABLET ORAL at 21:33

## 2022-03-12 RX ADMIN — ASPIRIN 81 MG 81 MG: 81 TABLET ORAL at 06:09

## 2022-03-12 RX ADMIN — ENOXAPARIN SODIUM 30 MG: 100 INJECTION SUBCUTANEOUS at 08:13

## 2022-03-12 RX ADMIN — SODIUM CHLORIDE, PRESERVATIVE FREE 10 ML: 5 INJECTION INTRAVENOUS at 21:33

## 2022-03-12 RX ADMIN — Medication 4 UNITS: at 16:30

## 2022-03-12 RX ADMIN — Medication 8 UNITS: at 21:34

## 2022-03-12 RX ADMIN — CARVEDILOL 3.12 MG: 3.12 TABLET, FILM COATED ORAL at 16:45

## 2022-03-12 RX ADMIN — Medication 6 UNITS: at 12:46

## 2022-03-12 RX ADMIN — ISOSORBIDE MONONITRATE 60 MG: 60 TABLET, EXTENDED RELEASE ORAL at 08:13

## 2022-03-12 RX ADMIN — Medication 2 UNITS: at 08:12

## 2022-03-12 RX ADMIN — POTASSIUM CHLORIDE 20 MEQ: 14.9 INJECTION, SOLUTION INTRAVENOUS at 09:58

## 2022-03-12 RX ADMIN — SODIUM CHLORIDE, SODIUM LACTATE, POTASSIUM CHLORIDE, AND CALCIUM CHLORIDE 75 ML/HR: 600; 310; 30; 20 INJECTION, SOLUTION INTRAVENOUS at 09:58

## 2022-03-12 RX ADMIN — SODIUM CHLORIDE, PRESERVATIVE FREE 10 ML: 5 INJECTION INTRAVENOUS at 14:30

## 2022-03-12 RX ADMIN — POTASSIUM CHLORIDE 40 MEQ: 20 TABLET, EXTENDED RELEASE ORAL at 08:13

## 2022-03-12 NOTE — PROGRESS NOTES
Progress Note    Patient: Pinky Gasca MRN: [id number]  SSN: [id number]    YOB: 1950  Age: 66 y.o. Sex: female      Admit Date: 3/2/2022    LOS: 10 days     Assessment and Plan:   40-year-old female with past medical history of heart failure with preserved ejection fraction, coronary disease, hypertension, CKD stage IV, chronic constipation, that presented in the setting of abdominal pain    1. Distal descending colon obstruction in the setting of fecal impaction  -Resolved  -Advance diet as tolerated  -Symptomatic management  -General surgery recommendations appreciated, no surgical intervention     2. Hypokalemia  -Replete  -Monitor potassium levels    3. CKD stage IV  -Avoid nephrotoxic agents  -Monitor renal function    4. Hypertension  Continue carvedilol, nifedipine, Imdur    5. Heart failure with preserved ejection fraction  -Hold Lasix for today since having multiple episodes of diarrhea to prevent dehydration    6. Coronary artery disease  -Continue aspirin and statin    DVT prophylaxis with Lovenox      Subjective:   40-year-old female with past medical history of heart failure with preserved ejection fraction, coronary disease, hypertension, CKD stage IV, chronic constipation, that presented in the setting of abdominal pain. Patient seen and examined at bedside. This morning the patient states that he is having several episodes of diarrhea. Otherwise denies any nausea or vomiting. Also no chest pain or shortness of breath. Objective:     Vitals:    03/11/22 1926 03/11/22 2317 03/12/22 0318 03/12/22 0757   BP: 138/62 (!) 164/68 (!) 155/70 (!) 166/70   Pulse: 77 81 76 78   Resp: 15 16 15 16   Temp: 98.8 °F (37.1 °C) 98.1 °F (36.7 °C) 98.3 °F (36.8 °C) 98.9 °F (37.2 °C)   SpO2: 93% 96% 97% 97%   Weight:       Height:            Intake and Output:  Current Shift: No intake/output data recorded. Last three shifts: No intake/output data recorded.     ROS  10 ROS negative except from stated on subjective    Physical Exam:   General: Alert, oriented, NAD  HEENT: NC/AT, EOM are intact  Neck: supple, no JVD  Cardiovascular: RRR, S1, S2, no murmurs  Respiratory: Lungs are clear, no wheezes or rales  Abdomen: Soft, NT, ND  Back: No CVA tenderness, no paraspinal tenderness  Extremities: LE without pedal edema, no erythema  Neuro: A&O, CN are intact, no focal deficits  Skin: no rash or ulcers  Psych: good mood and affect    Lab/Data Review:  I have personally reviewed patients laboratory data showing  Recent Results (from the past 24 hour(s))   GLUCOSE, POC    Collection Time: 03/11/22 11:13 AM   Result Value Ref Range    Glucose (POC) 159 (H) 65 - 100 mg/dL    Performed by KarenHERB    POTASSIUM    Collection Time: 03/11/22  2:39 PM   Result Value Ref Range    Potassium 2.4 (L) 3.5 - 5.1 mmol/L   GLUCOSE, POC    Collection Time: 03/11/22  3:48 PM   Result Value Ref Range    Glucose (POC) 333 (H) 65 - 100 mg/dL    Performed by KarenHERB    GLUCOSE, POC    Collection Time: 03/11/22  9:21 PM   Result Value Ref Range    Glucose (POC) 149 (H) 65 - 100 mg/dL    [facility]    GLUCOSE, POC    Collection Time: 03/12/22  6:13 AM   Result Value Ref Range    Glucose (POC) 189 (H) 65 - 100 mg/dL    Performed by ArnlufoHERB    METABOLIC PANEL, BASIC    Collection Time: 03/12/22  6:25 AM   Result Value Ref Range    Sodium 142 136 - 145 mmol/L    Potassium 2.2 (L) 3.5 - 5.1 mmol/L    Chloride 109 (H) 98 - 107 mmol/L    CO2 26 21 - 32 mmol/L    Anion gap 7 7 - 16 mmol/L    Glucose 197 (H) 65 - 100 mg/dL    BUN 16 8 - 23 MG/DL    Creatinine 1.80 (H) 0.6 - 1.0 MG/DL    GFR est AA 35 (L) >60 ml/min/1.73m2    GFR est non-AA 29 (L) >60 ml/min/1.73m2    Calcium 8.0 (L) 8.3 - 10.4 MG/DL      All Micro Results     Procedure Component Value Units Date/Time    CULTURE, BLOOD [[id number]] Collected: 03/03/22 0037    Order Status: Completed Specimen: Blood Updated: 03/08/22 0811     Special Requests: --        LEFT  HAND       Culture result: NO GROWTH 5 DAYS       CULTURE, BLOOD [[id number]] Collected: 03/03/22 1547    Order Status: Completed Specimen: Blood Updated: 03/08/22 0811     Special Requests: --        RIGHT  Antecubital       Culture result: NO GROWTH 5 DAYS       CULTURE, URINE [[id number]] Collected: 03/03/22 1345    Order Status: Canceled Specimen: Urine from Clean catch            Image:  I have personally reviewed patients imaging showing  XR ABD (KUB)   Final Result   Significantly improved colonic distention. CT ABD PELV WO CONT   Final Result   1. What may be a fecal impaction plug causing obstruction of the distal   descending colon with distention of the colon but not small bowel. 2. Bilateral pleural effusions and overlying atelectasis or consolidation worse   on the right than the left with a small amount of pericardial fluid present. XR ABD (KUB)   Final Result   NO SIGNIFICANT INTERVAL CHANGE. XR ABD (KUB)   Final Result   Unchanged severe gaseous distention of the colon. XR ABD (KUB)   Final Result   Stable colon distention, possible megacolon      XR ABD (KUB)   Final Result   Nasogastric tube proximal sidehole is just below the   gastroesophageal junction. Advancement by approximately 10 cm this suggested. XR ABD (KUB)   Final Result      1. Predominantly gaseous distention of colon however with areas of constipation   particularly in the descending segment. CPT code(s) 06871                     XR CHEST SNGL V   Final Result      1. No evidence of pneumonia or pulmonary edema. CT ABD PELV WO CONT   Final Result      1. There is colonic obstruction consequent to a severe stool burden extending   from the cecum through the mid descending colon. There is an abrupt transition   in colonic caliber in the mid descending colon.  There is subtle bowel wall   thickening and surrounding mesenteric stranding of the area of transition in   caliber, consistent with a developing stercoral colitis. There is no evidence of   associated colonic perforation or upstream small bowel obstruction. 2.  Chronic findings otherwise as above. Hospital problems     Patient Active Problem List   Diagnosis Code    Hypertension I10    Coronary artery disease involving native coronary artery of native heart without angina pectoris I25.10    HLD (hyperlipidemia) E78.5    Essential hypertension I10    Stage 4 chronic kidney disease (HCC) N18.4    Age-related osteoporosis without current pathological fracture M81.0    Major depressive disorder with single episode, in full remission ([facility] F32.5    Primary insomnia F51.01    Gastroesophageal reflux disease K21.9    Type 2 diabetes mellitus with stage 4 chronic kidney disease, with long-term current use of insulin ([facility]) E11.22, N18.4, Z79.4    Right carotid bruit R09.89    Carotid artery stenosis I65.29    History of left-sided carotid endarterectomy Z98.890    Carotid stenosis, asymptomatic, right I65.21    S/P PTCA (percutaneous transluminal coronary angioplasty) Z98.61    Mixed hyperlipidemia E78.2    Normocytic anemia D64.9    Chronic diastolic congestive heart failure (HCC) I50.32    Elevated ALT measurement R74.01    Polyarthritis M13.0    CAD (coronary artery disease) I25.10    Chest pain R07.9    Anemia D64.9    Stercoral colitis K52.89    Colon obstruction ([facility] K56.609        I have reviewed, updated, and verified this note's content and spent 38 minutes of my 42 minutes visit performing counseling and coordination of care regarding medical management.        Signed By: Ti Cook MD     March 12, 2022

## 2022-03-12 NOTE — PROGRESS NOTES
Problem: Patient Education: Go to Patient Education Activity  Goal: Patient/Family Education  Outcome: Progressing Towards Goal     Problem: Acute Renal Failure: Day 2  Goal: Off Pathway (Use only if patient is Off Pathway)  Outcome: Progressing Towards Goal  Goal: Activity/Safety  Outcome: Progressing Towards Goal  Goal: Consults, if ordered  Outcome: Progressing Towards Goal  Goal: Diagnostic Test/Procedures  Outcome: Progressing Towards Goal  Goal: Nutrition/Diet  Outcome: Progressing Towards Goal  Goal: Discharge Planning  Outcome: Progressing Towards Goal  Goal: Medications  Outcome: Progressing Towards Goal  Goal: Respiratory  Outcome: Progressing Towards Goal  Goal: Treatments/Interventions/Procedures  Outcome: Progressing Towards Goal  Goal: Psychosocial  Outcome: Progressing Towards Goal  Goal: *Optimal pain control at patient's stated goal  Outcome: Progressing Towards Goal  Goal: *Urinary output within identified parameters  Outcome: Progressing Towards Goal  Goal: *Hemodynamically stable  Outcome: Progressing Towards Goal  Goal: *Tolerating diet  Outcome: Progressing Towards Goal  Goal: *Lab values improving  Outcome: Progressing Towards Goal     Problem: Acute Renal Failure: Day 3  Goal: Off Pathway (Use only if patient is Off Pathway)  Outcome: Progressing Towards Goal  Goal: Activity/Safety  Outcome: Progressing Towards Goal  Goal: Consults, if ordered  Outcome: Progressing Towards Goal  Goal: Diagnostic Test/Procedures  Outcome: Progressing Towards Goal  Goal: Nutrition/Diet  Outcome: Progressing Towards Goal  Goal: Discharge Planning  Outcome: Progressing Towards Goal  Goal: Medications  Outcome: Progressing Towards Goal  Goal: Respiratory  Outcome: Progressing Towards Goal  Goal: Treatments/Interventions/Procedures  Outcome: Progressing Towards Goal  Goal: Psychosocial  Outcome: Progressing Towards Goal  Goal: *Optimal pain control at patient's stated goal  Outcome: Progressing Towards Goal  Goal: *Urinary output within identified parameters  Outcome: Progressing Towards Goal  Goal: *Hemodynamically stable  Outcome: Progressing Towards Goal  Goal: *Tolerating diet  Outcome: Progressing Towards Goal  Goal: *Lab values improving  Outcome: Progressing Towards Goal     Problem: Acute Renal Failure: Day 4  Goal: Off Pathway (Use only if patient is Off Pathway)  Outcome: Progressing Towards Goal  Goal: Activity/Safety  Outcome: Progressing Towards Goal  Goal: Consults, if ordered  Outcome: Progressing Towards Goal  Goal: Diagnostic Test/Procedures  Outcome: Progressing Towards Goal  Goal: Nutrition/Diet  Outcome: Progressing Towards Goal  Goal: Discharge Planning  Outcome: Progressing Towards Goal  Goal: Medications  Outcome: Progressing Towards Goal  Goal: Respiratory  Outcome: Progressing Towards Goal  Goal: Treatments/Interventions/Procedures  Outcome: Progressing Towards Goal  Goal: Psychosocial  Outcome: Progressing Towards Goal  Goal: *Optimal pain control at patient's stated goal  Outcome: Progressing Towards Goal  Goal: *Urinary output within identified parameters  Outcome: Progressing Towards Goal  Goal: *Hemodynamically stable  Outcome: Progressing Towards Goal  Goal: *Tolerating diet  Outcome: Progressing Towards Goal  Goal: *Lab values improving  Outcome: Progressing Towards Goal

## 2022-03-12 NOTE — PROGRESS NOTES
Problem: Patient Education: Go to Patient Education Activity  Goal: Patient/Family Education  Outcome: Progressing Towards Goal     Problem: Pressure Injury - Risk of  Goal: *Prevention of pressure injury  Description: Document Benja Scale and appropriate interventions in the flowsheet.   Outcome: Progressing Towards Goal  Note: Pressure Injury Interventions:       Moisture Interventions: Absorbent underpads,Apply protective barrier, creams and emollients,Check for incontinence Q2 hours and as needed    Activity Interventions: Pressure redistribution bed/mattress(bed type),PT/OT evaluation    Mobility Interventions: Float heels,PT/OT evaluation    Nutrition Interventions: Document food/fluid/supplement intake,Offer support with meals,snacks and hydration

## 2022-03-13 VITALS
HEART RATE: 79 BPM | OXYGEN SATURATION: 96 % | TEMPERATURE: 98.9 F | DIASTOLIC BLOOD PRESSURE: 59 MMHG | HEIGHT: 64 IN | BODY MASS INDEX: 28.32 KG/M2 | WEIGHT: 165.9 LBS | RESPIRATION RATE: 18 BRPM | SYSTOLIC BLOOD PRESSURE: 120 MMHG

## 2022-03-13 PROBLEM — K56.41 FECAL IMPACTION (HCC): Status: ACTIVE | Noted: 2022-03-13

## 2022-03-13 LAB
ANION GAP SERPL CALC-SCNC: 6 MMOL/L (ref 7–16)
BASOPHILS # BLD: 0 K/UL (ref 0–0.2)
BASOPHILS NFR BLD: 0 % (ref 0–2)
BUN SERPL-MCNC: 13 MG/DL (ref 8–23)
CALCIUM SERPL-MCNC: 8.3 MG/DL (ref 8.3–10.4)
CHLORIDE SERPL-SCNC: 113 MMOL/L (ref 98–107)
CO2 SERPL-SCNC: 25 MMOL/L (ref 21–32)
CREAT SERPL-MCNC: 1.6 MG/DL (ref 0.6–1)
DIFFERENTIAL METHOD BLD: ABNORMAL
EOSINOPHIL # BLD: 0.5 K/UL (ref 0–0.8)
EOSINOPHIL NFR BLD: 7 % (ref 0.5–7.8)
ERYTHROCYTE [DISTWIDTH] IN BLOOD BY AUTOMATED COUNT: 13.1 % (ref 11.9–14.6)
GLUCOSE BLD STRIP.AUTO-MCNC: 181 MG/DL (ref 65–100)
GLUCOSE BLD STRIP.AUTO-MCNC: 299 MG/DL (ref 65–100)
GLUCOSE SERPL-MCNC: 196 MG/DL (ref 65–100)
HCT VFR BLD AUTO: 35.6 % (ref 35.8–46.3)
HGB BLD-MCNC: 12.3 G/DL (ref 11.7–15.4)
IMM GRANULOCYTES # BLD AUTO: 0.1 K/UL (ref 0–0.5)
IMM GRANULOCYTES NFR BLD AUTO: 1 % (ref 0–5)
LYMPHOCYTES # BLD: 1.3 K/UL (ref 0.5–4.6)
LYMPHOCYTES NFR BLD: 18 % (ref 13–44)
MCH RBC QN AUTO: 31.9 PG (ref 26.1–32.9)
MCHC RBC AUTO-ENTMCNC: 34.6 G/DL (ref 31.4–35)
MCV RBC AUTO: 92.5 FL (ref 79.6–97.8)
MONOCYTES # BLD: 1 K/UL (ref 0.1–1.3)
MONOCYTES NFR BLD: 14 % (ref 4–12)
NEUTS SEG # BLD: 4.5 K/UL (ref 1.7–8.2)
NEUTS SEG NFR BLD: 61 % (ref 43–78)
NRBC # BLD: 0 K/UL (ref 0–0.2)
PLATELET # BLD AUTO: 297 K/UL (ref 150–450)
PMV BLD AUTO: 9.9 FL (ref 9.4–12.3)
POTASSIUM SERPL-SCNC: 2.4 MMOL/L (ref 3.5–5.1)
RBC # BLD AUTO: 3.85 M/UL (ref 4.05–5.2)
SERVICE CMNT-IMP: ABNORMAL
SERVICE CMNT-IMP: ABNORMAL
SODIUM SERPL-SCNC: 144 MMOL/L (ref 136–145)
WBC # BLD AUTO: 7.4 K/UL (ref 4.3–11.1)

## 2022-03-13 PROCEDURE — 74011000250 HC RX REV CODE- 250: Performed by: INTERNAL MEDICINE

## 2022-03-13 PROCEDURE — 96376 TX/PRO/DX INJ SAME DRUG ADON: CPT

## 2022-03-13 PROCEDURE — 74011250637 HC RX REV CODE- 250/637: Performed by: INTERNAL MEDICINE

## 2022-03-13 PROCEDURE — 96372 THER/PROPH/DIAG INJ SC/IM: CPT

## 2022-03-13 PROCEDURE — 74011250636 HC RX REV CODE- 250/636: Performed by: INTERNAL MEDICINE

## 2022-03-13 PROCEDURE — 74011636637 HC RX REV CODE- 636/637: Performed by: INTERNAL MEDICINE

## 2022-03-13 PROCEDURE — 74011250637 HC RX REV CODE- 250/637: Performed by: STUDENT IN AN ORGANIZED HEALTH CARE EDUCATION/TRAINING PROGRAM

## 2022-03-13 PROCEDURE — 82962 GLUCOSE BLOOD TEST: CPT

## 2022-03-13 PROCEDURE — 85025 COMPLETE CBC W/AUTO DIFF WBC: CPT

## 2022-03-13 PROCEDURE — 36415 COLL VENOUS BLD VENIPUNCTURE: CPT

## 2022-03-13 PROCEDURE — 80048 BASIC METABOLIC PNL TOTAL CA: CPT

## 2022-03-13 RX ORDER — POTASSIUM CHLORIDE 20 MEQ/1
40 TABLET, EXTENDED RELEASE ORAL DAILY
Qty: 7 TABLET | Refills: 0 | Status: SHIPPED | OUTPATIENT
Start: 2022-03-13

## 2022-03-13 RX ORDER — POTASSIUM CHLORIDE 14.9 MG/ML
20 INJECTION INTRAVENOUS
Status: COMPLETED | OUTPATIENT
Start: 2022-03-13 | End: 2022-03-13

## 2022-03-13 RX ORDER — POTASSIUM CHLORIDE 20 MEQ/1
40 TABLET, EXTENDED RELEASE ORAL
Status: COMPLETED | OUTPATIENT
Start: 2022-03-13 | End: 2022-03-13

## 2022-03-13 RX ADMIN — ASPIRIN 81 MG 81 MG: 81 TABLET ORAL at 06:38

## 2022-03-13 RX ADMIN — NIFEDIPINE 90 MG: 90 TABLET, EXTENDED RELEASE ORAL at 08:25

## 2022-03-13 RX ADMIN — Medication 2 UNITS: at 08:25

## 2022-03-13 RX ADMIN — ENOXAPARIN SODIUM 30 MG: 100 INJECTION SUBCUTANEOUS at 08:26

## 2022-03-13 RX ADMIN — POTASSIUM CHLORIDE 40 MEQ: 20 TABLET, EXTENDED RELEASE ORAL at 08:25

## 2022-03-13 RX ADMIN — POTASSIUM CHLORIDE 20 MEQ: 14.9 INJECTION, SOLUTION INTRAVENOUS at 08:26

## 2022-03-13 RX ADMIN — SODIUM CHLORIDE, PRESERVATIVE FREE 10 ML: 5 INJECTION INTRAVENOUS at 14:00

## 2022-03-13 RX ADMIN — ISOSORBIDE MONONITRATE 60 MG: 60 TABLET, EXTENDED RELEASE ORAL at 08:25

## 2022-03-13 RX ADMIN — SODIUM CHLORIDE, PRESERVATIVE FREE 10 ML: 5 INJECTION INTRAVENOUS at 06:38

## 2022-03-13 RX ADMIN — Medication 6 UNITS: at 11:25

## 2022-03-13 RX ADMIN — POTASSIUM CHLORIDE 20 MEQ: 14.9 INJECTION, SOLUTION INTRAVENOUS at 12:12

## 2022-03-13 RX ADMIN — CARVEDILOL 3.12 MG: 3.12 TABLET, FILM COATED ORAL at 08:28

## 2022-03-13 NOTE — DISCHARGE INSTRUCTIONS
Patient Education        Hypokalemia: Care Instructions  Your Care Instructions     Hypokalemia (say \"jo-vf-rtj-ASHIA-brannon-uh\") is a low level of potassium. The heart, muscles, kidneys, and nervous system all need potassium to work well. This problem has many different causes. Kidney problems, diet, and medicines like diuretics and laxatives can cause it. So can vomiting or diarrhea. In some cases, cancer is the cause. Your doctor may do tests to find the cause of your low potassium levels. You may need medicines to bring your potassium levels back to normal. You may also need regular blood tests to check your potassium. If you have very low potassium, you may need intravenous (IV) medicines. You also may need tests to check the electrical activity of your heart. Heart problems caused by low potassium levels can be very serious. Follow-up care is a key part of your treatment and safety. Be sure to make and go to all appointments, and call your doctor if you are having problems. It's also a good idea to know your test results and keep a list of the medicines you take. How can you care for yourself at home? · If your doctor recommends it, eat foods that have a lot of potassium. These include fresh fruits, juices, and vegetables. They also include nuts, beans, and milk. · Be safe with medicines. If your doctor prescribes medicines or potassium supplements, take them exactly as directed. Call your doctor if you have any problems with your medicines. · Get your potassium levels tested as often as your doctor tells you. When should you call for help? Call 911 anytime you think you may need emergency care. For example, call if:    · You feel like your heart is missing beats. Heart problems caused by low potassium can cause death.     · You passed out (lost consciousness).     · You have a seizure.    Call your doctor now or seek immediate medical care if:    · You feel weak or unusually tired.     · You have severe arm or leg cramps.     · You have tingling or numbness.     · You feel sick to your stomach, or you vomit.     · You have belly cramps.     · You feel bloated or constipated.     · You have to urinate a lot.     · You feel very thirsty most of the time.     · You are dizzy or lightheaded, or you feel like you may faint.     · You feel depressed, or you lose touch with reality. Watch closely for changes in your health, and be sure to contact your doctor if:    · You do not get better as expected. Where can you learn more? Go to http://www.gray.com/  Enter G358 in the search box to learn more about \"Hypokalemia: Care Instructions. \"  Current as of: July 28, 2021               Content Version: 13.2  © 7301-8062 [employer]. Care instructions adapted under license by [employer] (which disclaims liability or warranty for this information). If you have questions about a medical condition or this instruction, always ask your healthcare professional. [place] [address] any warranty or liability for your use of this information. Patient Education        Learning About Ileus  What is ileus? Ileus (say \"ILL-ee-us\") is a blockage of the bowel (intestines) that happens when the intestines don't work as they should. Normally, muscles in the intestines squeeze to push food and waste along. When this process stops, the intestines stop digesting food and moving waste out of the body. This may also be called paralytic ileus. Ileus is not the same as a mechanical bowel obstruction. In a mechanical bowel obstruction, something is actually blocking the intestine, like scar tissue or a tumor. That is not true in ileus. Ileus sometimes happens after surgery to the belly. But it can also be caused by other things, such as some medicines, certain diseases or infections, and nerve problems. The doctor may do a number of tests.  These tests may include X-rays, blood tests, and a CT scan. Testing can help the doctor be sure that nothing is blocking the intestines. Most people who have ileus need to be treated in the hospital.  What are the symptoms? Symptoms may include:  · Cramping belly pain. · Bloating. · Nausea or vomiting. · Not passing stool or gas. How is ileus treated? You will need to avoid eating solid food until you are better. Instead, you will get fluids and nutrition through a vein (IV). This helps prevent dehydration. It also lets your bowel rest.  You may have a tube that goes through your nose and into your stomach. This can help ease pain and bloating. You may get other treatments, depending on what caused ileus. For example, a medicine might be stopped if it is affecting your bowel. The intestines will often start working again in a few days. Signs of this include being able to pass gas or have a bowel movement. As your intestines start working, you will switch slowly from a liquid diet back to solid foods. Follow-up care is a key part of your treatment and safety. Be sure to make and go to all appointments, and call your doctor if you are having problems. It's also a good idea to know your test results and keep a list of the medicines you take. Where can you learn more? Go to http://www.gray.com/  Enter M933 in the search box to learn more about \"Learning About Ileus. \"  Current as of: September 8, 2021               Content Version: 13.2  © 0226-9109 [facility]. Care instructions adapted under license by [employer] (which disclaims liability or warranty for this information). If you have questions about a medical condition or this instruction, always ask your healthcare professional. [place] [address] any warranty or liability for your use of this information. Patient Education        Learning About Chronic Kidney Disease  What is chronic kidney disease? Chronic kidney disease means your kidneys have not worked right for a while. It most often happens as a result of damage to your kidneys over many years. But it can also happen quickly. Your kidneys have an important job. They remove waste and extra fluid from your blood. This waste and fluid goes out of your body in your urine. When your kidneys don't work as they should, wastes build up in your blood. This makes you sick. Chronic kidney disease is also called chronic renal failure. Or it may be called chronic renal insufficiency. How is chronic kidney disease diagnosed? Your doctor will ask you about past kidney problems. You will be asked if you have a family history of kidney disease. Your doctor will also want to know what medicines you take. This includes prescription and over-the-counter medicines. Your doctor will do blood and urine tests to check how well your kidneys are working. This will help your doctor see how well your kidneys filter your blood. You may have a test, such as an ultrasound or CT scan. These tests let your doctor look at a picture of your kidneys. This can help your doctor measure the size of your kidneys and see if anything is blocking your urine flow. In some cases, your doctor may take a tiny sample of kidney tissue. This is called a biopsy. It helps the doctor find out what caused the kidney disease. What are the symptoms? Many people with chronic kidney disease don't have symptoms. If your kidneys get worse and start to fail, you may:  · Have swelling and weight gain. This is from the extra fluid in your tissues. It is called edema (say \"ih-RUPERT-muh\"). · Often feel sick to your stomach (nauseated) or vomit. · Have trouble sleeping. · Urinate less than normal.  · Have trouble thinking clearly. · Feel very tired. What happens when you have chronic kidney disease?   In the early stages of the disease, your kidneys are still able to regulate the fluids, salts, and waste products in your body. But if you keep losing kidney function, you may start to have problems, or complications. How long it takes for the kidney disease to get worse depends on your condition. Sometimes it gets worse very slowly over many years. Or it may get worse more quickly. When kidney function falls below a certain point, it is called kidney failure. Kidney failure affects your whole body. It can cause serious heart, bone, and brain problems and make you feel very ill. Untreated kidney failure can cause death. How is it treated? Manage your health problems  · If you have diabetes, it's important to keep your blood sugar level in your target range with diet, exercise, and medicines. If your blood sugar level is too high for too long, it can damage your kidneys. · If you have high blood pressure, it's important to control it with diet, exercise, and any medicines your doctor prescribes. Treat problems caused by kidney disease  · If your doctor put you on a special diet, stay on the diet. · If you have kidney failure, you'll probably have two treatment choices. Your doctor may recommend that you start kidney dialysis to filter wastes and extra fluid from your blood. Or it may be better to get a new kidney (transplant). Both treatments have risks and benefits. Talk with your doctor to decide which is best for you. Practice healthy habits  · Be active. Walking is a good choice. Try for at least 30 minutes on most days of the week. · Follow a diet that is healthy for your kidneys. A dietitian can help make an eating plan with the right amount of salt (sodium), protein, and fluids. · Don't smoke. Smoking can make chronic kidney disease worse. If you need help quitting, talk to your doctor about stop-smoking programs and medicines. · Avoid medicines that can harm your kidneys. These medicines include nonsteroidal anti-inflammatory drugs. Examples of these are ibuprofen and celecoxib.  Let your doctor know all of the prescription and over-the-counter medicines, vitamins, and herbs you take. And talk to your doctor before you take anything new. · Limit your use of alcohol, and avoid illegal drugs. Get follow-up care  Go to all follow-up visits. Your doctor will use blood and urine tests to regularly check how well your kidneys are working and decide if you need changes in your treatment plan. Follow-up care is a key part of your treatment and safety. Be sure to make and go to all appointments, and call your doctor if you are having problems. It's also a good idea to know your test results and keep a list of the medicines you take. Where can you learn more? Go to http://www.gray.com/  Enter V124 in the search box to learn more about \"Learning About Chronic Kidney Disease. \"  Current as of: September 8, 2021               Content Version: 13.2  © 4939-0797 [email or web address]. Care instructions adapted under license by [employer] (which disclaims liability or warranty for this information). If you have questions about a medical condition or this instruction, always ask your healthcare professional. [place] [address] any warranty or liability for your use of this information. Follow-up with [facility]   for any long term bowel regimen or dietary questions      Diet  Soups, Juice, Liquids, and Ensure protein shakes for 2 days  Then Soft, low residue diet  Avoid oatmeal     Recommend patient pursue nutrition behavior change therapy in outpatient setting. Contact the [facility] appointment line at [phone] for more information, or send referral to [facility] (Fax [phone]).

## 2022-03-13 NOTE — PROGRESS NOTES
Discharge education and summary gone over with pt. IV removed. All questions answered.  Andrade will be here at 1600 to

## 2022-03-13 NOTE — PROGRESS NOTES
Patient will be d/c home today. Patient has no needs identified at being d/c home today. Family will transport home. Patient has met all treatment goals / milestones. CM will continue to monitor and remain available for any needs that may occur. Care Management Interventions  PCP Verified by CM: Yes Fawn King MD)  Mode of Transport at Discharge:  Other (see comment) (Family)  Transition of Care Consult (CM Consult): Discharge Planning  Physical Therapy Consult: No  Occupational Therapy Consult: No  Speech Therapy Consult: No  Support Systems: Spouse/Significant Other,Child(babatunde)  Confirm Follow Up Transport: Family  The Patient and/or Patient Representative was Provided with a Choice of Provider and Agrees with the Discharge Plan?: Yes  Name of the Patient Representative Who was Provided with a Choice of Provider and Agrees with the Discharge Plan: Som Mcginnis  Freedom of Choice List was Provided with Basic Dialogue that Supports the Patient's Individualized Plan of Care/Goals, Treatment Preferences and Shares the Quality Data Associated with the Providers?: Yes  Art[employer] Resource Information Provided?: No  Discharge Location  Patient Expects to be Discharged to[de-identified] Home with family assistance

## 2022-03-13 NOTE — PROGRESS NOTES
Problem: Falls - Risk of  Goal: *Absence of Falls  Description: Document Rivera Blades Fall Risk and appropriate interventions in the flowsheet. Outcome: Progressing Towards Goal  Note: Fall Risk Interventions:  Mobility Interventions: Patient to call before getting OOB         Medication Interventions: Patient to call before getting OOB    Elimination Interventions: Call light in reach    History of Falls Interventions: Bed/chair exit alarm         Problem: Patient Education: Go to Patient Education Activity  Goal: Patient/Family Education  Outcome: Progressing Towards Goal     Problem: Pressure Injury - Risk of  Goal: *Prevention of pressure injury  Description: Document Benja Scale and appropriate interventions in the flowsheet.   Outcome: Progressing Towards Goal  Note: Pressure Injury Interventions:       Moisture Interventions: Limit adult briefs    Activity Interventions: Pressure redistribution bed/mattress(bed type)    Mobility Interventions: PT/OT evaluation,Pressure redistribution bed/mattress (bed type)    Nutrition Interventions: Document food/fluid/supplement intake                     Problem: Patient Education: Go to Patient Education Activity  Goal: Patient/Family Education  Outcome: Progressing Towards Goal

## 2022-03-13 NOTE — DISCHARGE SUMMARY
Date of Admission: 3/2/2022  Date of Discharge: 3/13/2022    Discharge Diagnoses: Active Hospital Problems    Diagnosis Date Noted    Fecal impaction ([facility] 03/13/2022    Colon obstruction ([facility] 03/09/2022    Stercoral colitis 03/02/2022    Chronic diastolic congestive heart failure ([facility] 04/19/2021     Heart Failure (HFpEF), EF 75-54%, with diastolic dysfunction, ECHO 2/16/2021        S/P PTCA (percutaneous transluminal coronary angioplasty) 09/01/2020    Type 2 diabetes mellitus with stage 4 chronic kidney disease, with long-term current use of insulin ([facility] 10/11/2018    Gastroesophageal reflux disease 02/16/2017    Stage 4 chronic kidney disease ([facility] 09/15/2016    HLD (hyperlipidemia) 09/04/2013    Hypertension 03/01/2012        Discharge Medications:  Current Discharge Medication List      START taking these medications    Details   potassium chloride (K-DUR, KLOR-CON M20) 20 mEq tablet Take 2 Tablets by mouth daily. Qty: 7 Tablet, Refills: 0  Start date: 3/13/2022         CONTINUE these medications which have NOT CHANGED    Details   insulin degludec Alisrraela Pennyrs FlexTouch U-100) 100 unit/mL (3 mL) inpn 12 Units by abdominal subcutaneous route nightly. Qty: 1 Pen, Refills: 0    Associated Diagnoses: Stage 4 chronic kidney disease (HCC)      isosorbide mononitrate ER (IMDUR) 60 mg CR tablet Take 1 Tablet by mouth daily. Qty: 90 Tablet, Refills: 3    Associated Diagnoses: Essential hypertension; Coronary artery disease involving native coronary artery of native heart without angina pectoris; Leg edema      traZODone (DESYREL) 50 mg tablet Take 1 Tablet by mouth nightly. Qty: 90 Tablet, Refills: 1    Comments: Requesting 1 year supply  Associated Diagnoses: Primary insomnia      rosuvastatin (CRESTOR) 40 mg tablet TAKE 1 TABLET BY MOUTH AT  NIGHT  Qty: 90 Tablet, Refills: 1    Associated Diagnoses: Coronary artery disease involving native coronary artery of native heart without angina pectoris;  Mixed hyperlipidemia      NIFEdipine ER (PROCARDIA XL) 90 mg ER tablet Take 1 Tablet by mouth daily. Qty: 90 Tablet, Refills: 1      Blood-Glucose Meter,Continuous (Dexcom G6 ) misc 1 Each by Does Not Apply route four (4) times daily. Qty: 1 Each, Refills: 5      Blood-Glucose Sensor (Dexcom G6 Sensor) angelic 10 Each by Does Not Apply route four (4) times daily. Qty: 10 Each, Refills: 5      Blood-Glucose Transmitter (Dexcom G6 Transmitter) angelic 1 Each by Does Not Apply route four (4) times daily. Qty: 1 Each, Refills: 5      furosemide (Lasix) 40 mg tablet Take 1 Tablet by mouth daily. Qty: 90 Tablet, Refills: 3    Associated Diagnoses: Essential hypertension; Coronary artery disease involving native coronary artery of native heart without angina pectoris; Leg edema      carvediloL (COREG) 3.125 mg tablet Take 1 Tablet by mouth two (2) times daily (with meals). Qty: 180 Tablet, Refills: 3    Associated Diagnoses: Essential hypertension; Coronary artery disease involving native coronary artery of native heart without angina pectoris; Leg edema      glucose blood VI test strips (OneTouch Verio test strips) strip 1 Each by Does Not Apply route two (2) times daily as needed for PRN Reason (Other) (diabetes monitoring) for up to 180 days. Qty: 100 Strip, Refills: 5    Associated Diagnoses: Uncontrolled type II diabetes mellitus with polyneuropathy (HCC)      ferrous sulfate (IRON) 325 mg (65 mg iron) EC tablet Take 1 Tablet by mouth two (2) times a day. Qty: 180 Tablet, Refills: 1    Associated Diagnoses: Anemia due to stage 3a chronic kidney disease (HCC)      insulin lispro (HUMALOG) 100 unit/mL kwikpen 4 Units SubQ with breakfast, 4 Units SubQ with lunch, 6 Units SubQ with dinner.   Indications: type 2 diabetes mellitus  Qty: 1 Pen, Refills: 0    Associated Diagnoses: Stage 4 chronic kidney disease (HCC)      citalopram (CELEXA) 20 mg tablet TAKE 1 TABLET BY MOUTH IN  THE MORNING  Qty: 90 Tablet, Refills: 1 Comments: Requesting 1 year supply  Associated Diagnoses: Major depressive disorder with single episode, in full remission ([facility])      Insulin Needles, Disposable, (BD Ligia 2nd Gen Pen Needle) 32 gauge x 5/32\" ndle Test blood sugar three times today. Qty: 300 Pen Needle, Refills: 3    Associated Diagnoses: Type 2 diabetes mellitus with stage 3b chronic kidney disease, with long-term current use of insulin ([facility])      lancets (One Touch Delica) 33 gauge misc 1 Lancet by IntraDERMal route two (2) times daily as needed for Other (diabetes monitoring). Finger stick  Qty: 100 Lancet, Refills: 5    Associated Diagnoses: Uncontrolled type II diabetes mellitus with polyneuropathy ([facility])      nitroglycerin (NITROSTAT) 0.4 mg SL tablet 1 Tab by SubLINGual route every five (5) minutes as needed for Chest Pain. Qty: 3 Bottle, Refills: 3      magnesium oxide (MAG-OX) 400 mg tablet TAKE 1 TABLET BY MOUTH TWICE DAILY  Qty: 180 Tab, Refills: 3    Associated Diagnoses: Hypomagnesemia      cholecalciferol, vitamin D3, (VITAMIN D3) 2,000 unit tab Take 2,000 Units by mouth daily. cyanocobalamin (VITAMIN B-12) 1,000 mcg tablet Take 2,500 mcg by mouth daily. aspirin 81 mg chewable tablet Take 81 mg by mouth every morning. Indications: continue per anesthesia guidelines              Pending Labs:  None    Follow-up (including scheduled tests):   Follow-up Information     Follow up With Specialties Details Why Contact Info    Adrian Skinner, [address] [facility], Physician Assistant  4-8 weeks, our office to arrange [address] [place] [address] Dr Elizabeth Meléndez [address]  [phone]      Silvia Palmer MD Internal Medicine   [place] [id number] [phone]             History of Present Illness:  Per Dr Gualberto Jacobo  \"40 y.o. female with medical history significant for chronic constipation, stage III chronic kidney disease and hypertension who was relatively well until 8 days ago (February 23, 2022), when she had a nice large bowel movement. Unfortunately the next day she began experiencing severe abdominal pain on her left side in the lower abdomen. Patient states that the pain was severe. She went to see her gastroenterologist Dr. Christal North 2 days ago on February 28, 2022 she was placed on Cipro and Flagyl for possible diverticulitis. However her symptoms became worse and she she had not passed any stool since February 23, 2022. \"    Past Medical History:  Past Medical History:   Diagnosis Date    Acute on chronic combined systolic and diastolic congestive heart failure ([address] 2/17/2021    Acute renal failure superimposed on stage 3b chronic kidney disease ([address] 1/12/2021    CAD (coronary artery disease) 3/1/2012    MI, 3 stents    Chest pain     Coronary artery disease involving native coronary artery without angina pectoris 5/21/2015    COVID-19 virus infection 1/12/2021    Depression 3/1/2012    Diabetes mellitus ([address] 3/1/2012    oral agents. . hypo- 80's, Last A1c 6.9 in 6/2018    DM2 (diabetes mellitus, type 2) ([address] [facility] 5/21/2015    Elevated LFTs 1/12/2021    Elevated troponin 3/2/2012    Essential hypertension 5/21/2015    External hemorrhoids without mention of complication 6118    GERD (gastroesophageal reflux disease)     HCAP (healthcare-associated pneumonia) 1/18/2021    History of MI (myocardial infarction) 11/12    x2    Hypercholesterolemia     Hypertension 3/1/2012    Hypoalbuminemia 2/18/2021    Hyponatremia 1/12/2021    Hypoxemia 8/12/2020    Insomnia     Lactic acidosis 1/12/2021    Long QT interval 1/12/2021    Personal history of colonic polyps 2013    hyperplastic    Platelet inhibition due to Plavix     holding for colonoscopy per GI Dr.    [facility] Pleural effusion, bilateral 8/11/2020    Rectocele 2013    Sepsis ([address] 1/12/2021    Tobacco abuse 3/1/2012    quit for 1 year    Tobacco use disorder     Unstable angina ([address] 3/1/2012    ntg as needed. Allergies:   Allergies Allergen Reactions    Cephalosporins Hives and Swelling    Hydralazine Unknown (comments)     Chest pain    Sulfamethoxazole-Trimethoprim Other (comments)     Diarrhea     Codeine Other (comments)    Lisinopril Other (comments)     Passing out spells. // pt sts not allergic to med        Hospital Course:  78-year-old female with past medical history of heart failure with preserved ejection fraction, coronary disease, hypertension, CKD stage IV, chronic constipation, that presented in the setting of abdominal pain     1. Distal descending colon obstruction in the setting of fecal impaction  -Patient was initially made n.p.o.  -Surgery was consulted, no surgical intervention  -Obstruction was resolving the patient started to present with some loose stools  -Diet was advanced without issues  -Improved symptomatology  -Hemodynamically stable  -Tolerating diet on discharge     2.   Hypokalemia  -Repleted  -Discharge on oral potassium for a week and to follow-up with primary care doctor next week    Procedures:  None    Discharge Day Information:  Follow with PMD    Discharge Physical Exam:  General: Alert, oriented, NAD  HEENT: NC/AT, EOM are intact  Neck: supple, no JVD  Cardiovascular: RRR, S1, S2, no murmurs  Respiratory: Lungs are clear, no wheezes or rales  Abdomen: Soft, NT, ND  Back: No CVA tenderness, no paraspinal tenderness  Extremities: LE without pedal edema, no erythema  Neuro: A&O, CN are intact, no focal deficits  Skin: no rash or ulcers  Psych: good mood and affect    Recent Results (from the past 24 hour(s))   GLUCOSE, POC    Collection Time: 03/12/22 12:05 PM   Result Value Ref Range    Glucose (POC) 264 (H) 65 - 100 mg/dL    Performed by [facility]    GLUCOSE, POC    Collection Time: 03/12/22  4:02 PM   Result Value Ref Range    Glucose (POC) 233 (H) 65 - 100 mg/dL    Performed by [facility]    GLUCOSE, POC    Collection Time: 03/12/22  9:18 PM   Result Value Ref Range Glucose (POC) 316 (H) 65 - 100 mg/dL    Performed by [facility]    METABOLIC PANEL, BASIC    Collection Time: 03/13/22  6:20 AM   Result Value Ref Range    Sodium 144 136 - 145 mmol/L    Potassium 2.4 (L) 3.5 - 5.1 mmol/L    Chloride 113 (H) 98 - 107 mmol/L    CO2 25 21 - 32 mmol/L    Anion gap 6 (L) 7 - 16 mmol/L    Glucose 196 (H) 65 - 100 mg/dL    BUN 13 8 - 23 MG/DL    Creatinine 1.60 (H) 0.6 - 1.0 MG/DL    GFR est AA 41 (L) >60 ml/min/1.73m2    GFR est non-AA 34 (L) >60 ml/min/1.73m2    Calcium 8.3 8.3 - 10.4 MG/DL   CBC WITH AUTOMATED DIFF    Collection Time: 03/13/22  6:20 AM   Result Value Ref Range    WBC 7.4 4.3 - 11.1 K/uL    RBC 3.85 (L) 4.05 - 5.2 M/uL    HGB 12.3 11.7 - 15.4 g/dL    HCT 35.6 (L) 35.8 - 46.3 %    MCV 92.5 79.6 - 97.8 FL    MCH 31.9 26.1 - 32.9 PG    MCHC 34.6 31.4 - 35.0 g/dL    RDW 13.1 11.9 - 14.6 %    PLATELET 797 578 - 660 K/uL    MPV 9.9 9.4 - 12.3 FL    ABSOLUTE NRBC 0.00 0.0 - 0.2 K/uL    DF AUTOMATED      NEUTROPHILS 61 43 - 78 %    LYMPHOCYTES 18 13 - 44 %    MONOCYTES 14 (H) 4.0 - 12.0 %    EOSINOPHILS 7 0.5 - 7.8 %    BASOPHILS 0 0.0 - 2.0 %    IMMATURE GRANULOCYTES 1 0.0 - 5.0 %    ABS. NEUTROPHILS 4.5 1.7 - 8.2 K/UL    ABS. LYMPHOCYTES 1.3 0.5 - 4.6 K/UL    ABS. MONOCYTES 1.0 0.1 - 1.3 K/UL    ABS. EOSINOPHILS 0.5 0.0 - 0.8 K/UL    ABS. BASOPHILS 0.0 0.0 - 0.2 K/UL    ABS. IMM. GRANS. 0.1 0.0 - 0.5 K/UL   GLUCOSE, POC    Collection Time: 03/13/22  7:33 AM   Result Value Ref Range    Glucose (POC) 181 (H) 65 - 100 mg/dL    Performed by [facility]    GLUCOSE, POC    Collection Time: 03/13/22 11:17 AM   Result Value Ref Range    Glucose (POC) 299 (H) 65 - 100 mg/dL    Performed by [facility]         XR ABD (KUB)   Final Result   Significantly improved colonic distention. CT ABD PELV WO CONT   Final Result   1. What may be a fecal impaction plug causing obstruction of the distal   descending colon with distention of the colon but not small bowel.    2. Bilateral pleural effusions and overlying atelectasis or consolidation worse   on the right than the left with a small amount of pericardial fluid present. XR ABD (KUB)   Final Result   NO SIGNIFICANT INTERVAL CHANGE. XR ABD (KUB)   Final Result   Unchanged severe gaseous distention of the colon. XR ABD (KUB)   Final Result   Stable colon distention, possible megacolon      XR ABD (KUB)   Final Result   Nasogastric tube proximal sidehole is just below the   gastroesophageal junction. Advancement by approximately 10 cm this suggested. XR ABD (KUB)   Final Result      1. Predominantly gaseous distention of colon however with areas of constipation   particularly in the descending segment. CPT code(s) 16478                     XR CHEST SNGL V   Final Result      1. No evidence of pneumonia or pulmonary edema. CT ABD PELV WO CONT   Final Result      1. There is colonic obstruction consequent to a severe stool burden extending   from the cecum through the mid descending colon. There is an abrupt transition   in colonic caliber in the mid descending colon. There is subtle bowel wall   thickening and surrounding mesenteric stranding of the area of transition in   caliber, consistent with a developing stercoral colitis. There is no evidence of   associated colonic perforation or upstream small bowel obstruction. 2.  Chronic findings otherwise as above.               Condition: Improved    Disposition: Home    Consultants During This Hospitalization: Surgery, gastroenterology           Spent 35 minutes on discharge services

## 2022-03-18 PROBLEM — I50.32 CHRONIC DIASTOLIC CONGESTIVE HEART FAILURE (HCC): Status: ACTIVE | Noted: 2021-04-19

## 2022-03-18 PROBLEM — D64.9 ANEMIA: Status: ACTIVE | Noted: 2021-08-19

## 2022-03-18 PROBLEM — I25.10 CAD (CORONARY ARTERY DISEASE): Status: ACTIVE | Noted: 2021-08-19

## 2022-03-18 PROBLEM — K52.89 STERCORAL COLITIS: Status: ACTIVE | Noted: 2022-03-02

## 2022-03-18 PROBLEM — R07.9 CHEST PAIN: Status: ACTIVE | Noted: 2021-08-19

## 2022-03-19 PROBLEM — R74.01 ELEVATED ALT MEASUREMENT: Status: ACTIVE | Noted: 2021-07-07

## 2022-03-19 PROBLEM — K21.9 GASTROESOPHAGEAL REFLUX DISEASE: Status: ACTIVE | Noted: 2017-02-16

## 2022-03-19 PROBLEM — M13.0 POLYARTHRITIS: Status: ACTIVE | Noted: 2021-07-07

## 2022-03-19 PROBLEM — K56.609 COLON OBSTRUCTION (HCC): Status: ACTIVE | Noted: 2022-03-09

## 2022-03-19 PROBLEM — E78.2 MIXED HYPERLIPIDEMIA: Status: ACTIVE | Noted: 2020-10-21

## 2022-03-19 PROBLEM — K56.41 FECAL IMPACTION (HCC): Status: ACTIVE | Noted: 2022-03-13

## 2022-03-19 PROBLEM — Z98.61 S/P PTCA (PERCUTANEOUS TRANSLUMINAL CORONARY ANGIOPLASTY): Status: ACTIVE | Noted: 2020-09-01

## 2022-03-19 PROBLEM — R09.89 RIGHT CAROTID BRUIT: Status: ACTIVE | Noted: 2018-11-06

## 2022-03-19 PROBLEM — I65.29 CAROTID ARTERY STENOSIS: Status: ACTIVE | Noted: 2018-12-12

## 2022-03-19 PROBLEM — D64.9 NORMOCYTIC ANEMIA: Status: ACTIVE | Noted: 2021-01-12

## 2022-03-19 PROBLEM — I65.21 CAROTID STENOSIS, ASYMPTOMATIC, RIGHT: Status: ACTIVE | Noted: 2020-03-12

## 2022-03-20 PROBLEM — E11.22 TYPE 2 DIABETES MELLITUS WITH STAGE 4 CHRONIC KIDNEY DISEASE, WITH LONG-TERM CURRENT USE OF INSULIN (HCC): Status: ACTIVE | Noted: 2018-10-11

## 2022-03-20 PROBLEM — Z79.4 TYPE 2 DIABETES MELLITUS WITH STAGE 4 CHRONIC KIDNEY DISEASE, WITH LONG-TERM CURRENT USE OF INSULIN (HCC): Status: ACTIVE | Noted: 2018-10-11

## 2022-03-20 PROBLEM — Z98.890 HISTORY OF LEFT-SIDED CAROTID ENDARTERECTOMY: Status: ACTIVE | Noted: 2020-03-12

## 2022-03-20 PROBLEM — N18.4 TYPE 2 DIABETES MELLITUS WITH STAGE 4 CHRONIC KIDNEY DISEASE, WITH LONG-TERM CURRENT USE OF INSULIN (HCC): Status: ACTIVE | Noted: 2018-10-11

## 2022-04-04 ENCOUNTER — HOSPITAL ENCOUNTER (OUTPATIENT)
Dept: LAB | Age: 72
Discharge: HOME | End: 2022-04-04
Payer: MEDICARE

## 2022-04-04 DIAGNOSIS — E87.6 HYPOKALEMIA: ICD-10-CM

## 2022-04-04 DIAGNOSIS — N18.31 STAGE 3A CHRONIC KIDNEY DISEASE (HCC): ICD-10-CM

## 2022-04-04 LAB
ALBUMIN SERPL-MCNC: 3.6 G/DL (ref 3.2–4.6)
ALBUMIN/GLOB SERPL: 1 {RATIO} (ref 1.2–3.5)
ALP SERPL-CCNC: 158 U/L (ref 50–136)
ALT SERPL-CCNC: 46 U/L (ref 12–65)
ANION GAP SERPL CALC-SCNC: 6 MMOL/L (ref 7–16)
AST SERPL-CCNC: 40 U/L (ref 15–37)
BILIRUB SERPL-MCNC: 0.3 MG/DL (ref 0.2–1.1)
BUN SERPL-MCNC: 24 MG/DL (ref 8–23)
CALCIUM SERPL-MCNC: 9.1 MG/DL (ref 8.3–10.4)
CHLORIDE SERPL-SCNC: 107 MMOL/L (ref 98–107)
CO2 SERPL-SCNC: 28 MMOL/L (ref 21–32)
CREAT SERPL-MCNC: 1.8 MG/DL (ref 0.6–1)
GLOBULIN SER CALC-MCNC: 3.7 G/DL (ref 2.3–3.5)
GLUCOSE SERPL-MCNC: 159 MG/DL (ref 65–100)
POTASSIUM SERPL-SCNC: 3.7 MMOL/L (ref 3.5–5.1)
PROT SERPL-MCNC: 7.3 G/DL (ref 6.3–8.2)
SODIUM SERPL-SCNC: 141 MMOL/L (ref 136–145)

## 2022-04-04 PROCEDURE — 36415 COLL VENOUS BLD VENIPUNCTURE: CPT

## 2022-04-04 PROCEDURE — 80053 COMPREHEN METABOLIC PANEL: CPT

## 2022-04-05 NOTE — PROGRESS NOTES
Called pt and informed her that per Dr Sammi Kamara Kidneys appear somewhat drier than previously 3 weeks ago. Noel Kimberley have her continue spironolactone and hold lasix/potassium supplements. Can take lasix as needed if she has shortness of breath. Please get repeat BMP in 1 week.

## 2022-04-05 NOTE — PROGRESS NOTES
Please call the patient regarding their result. Kidneys appear somewhat drier than previously 3 weeks ago. Please have her continue spironolactone and hold lasix/potassium supplements. Can take lasix as needed if she has shortness of breath. Please get repeat BMP in 1 week.

## 2022-05-12 DIAGNOSIS — N18.31 STAGE 3A CHRONIC KIDNEY DISEASE (HCC): Primary | ICD-10-CM

## 2022-05-12 DIAGNOSIS — E87.6 HYPOKALEMIA: ICD-10-CM

## 2022-06-28 ENCOUNTER — TELEPHONE (OUTPATIENT)
Dept: PHARMACY | Facility: CLINIC | Age: 72
End: 2022-06-28

## 2022-06-28 NOTE — TELEPHONE ENCOUNTER
[facility] CLINICAL PHARMACY: ADHERENCE REVIEW  Identified care gap per [employer]: fills at [facility]: Statin adherence    Last Visit: 4/4/22 cardiology; 2/7/22 PCP    Patient also appears to be prescribed: DM regimen including insulin     ASSESSMENT  DIABETES ADHERENCE - Joseph Mosqueda n/a since is on insulin    Lab Results   Component Value Date    LABA1C 6.6 (H) 11/03/2021    LABA1C 6.8 (H) 04/12/2021    LABA1C 10.2 (H) 01/13/2021     NOTE A1c not yet completed this calendar year - next PCP 7/26/22    [address] Records claims through 6/19/22 (Prior Year Joseph Mosqueda = n/a%; YTD Joseph Mosqueda = Filled only once; Potential Fail Date: 7/17/22): Rosuvastatin 40 mg tab last filled on 2/13/22 for 90 day supply. Next refill due: 5/14/22    Per Practice Assist 6/28/22: last claim 6/25/22 x 90 ds at [facility]. Joseph Mosqueda 68%    Per Reconciled Dispense Report:  ROSUVASTATIN CALCIUM  40 MG TABS 06[facility]25/2022 90 [facility]   ROSUVASTATIN CALCIUM  40 MG TABS 02/13/2022 90 [facility]   Last Rx 4/18/22 x 90 ds 0 refill - appears may need refill Rx for next fill    Lab Results   Component Value Date    CHOL 124 03/01/2021    TRIG 69 03/11/2022    HDL 37 (L) 03/01/2021    LDLCALC 70 03/01/2021     ALT   Date Value Ref Range Status   04/04/2022 46 12 - 65 U/L Final     AST   Date Value Ref Range Status   04/04/2022 40 (H) 15 - 37 U/L Final     The ASCVD Risk score (Eri Bill., et al., 2013) failed to calculate for the following reasons:     The valid total cholesterol range is 130 to 320 mg/dL     PLAN  The following are interventions that have been identified:   - Patient overdue refilling rosuvastatin per report, has since been refilled through [facility] home delivery  · 2022 Joseph Mosqueda currently 68%  · Will need refill Rx for next fill - next PCP visit 7/26/22 and adequate supply until then  - Patient identified as having no A1c level in the calendar year - upcoming PCP visit 7/26/22    Attempting to reach patient to review.  Left message asking for return call. MyChart message sent.     Future Appointments   Date Time Provider Judy Monique   7/26/2022 11:20 AM Alondra Spencer MD [address]   10/4/2022  1:00 PM Haroon Kumar DO [facility]       Letitia Sandhu, PharmD, [facility]  Department, toll free: [phone] option 2    For Pharmacy Admin Tracking Only     Gap Closed?: Yes    Time Spent (min): 10

## 2022-07-26 ENCOUNTER — OFFICE VISIT (OUTPATIENT)
Dept: INTERNAL MEDICINE CLINIC | Facility: CLINIC | Age: 72
End: 2022-07-26
Payer: MEDICAID

## 2022-07-26 VITALS
SYSTOLIC BLOOD PRESSURE: 137 MMHG | TEMPERATURE: 97.3 F | BODY MASS INDEX: 29.26 KG/M2 | HEIGHT: 64 IN | WEIGHT: 171.4 LBS | DIASTOLIC BLOOD PRESSURE: 57 MMHG | OXYGEN SATURATION: 98 % | HEART RATE: 65 BPM

## 2022-07-26 DIAGNOSIS — D64.9 CHRONIC ANEMIA: ICD-10-CM

## 2022-07-26 DIAGNOSIS — N18.4 STAGE 4 CHRONIC KIDNEY DISEASE (HCC): ICD-10-CM

## 2022-07-26 DIAGNOSIS — E11.22 TYPE 2 DIABETES MELLITUS WITH STAGE 4 CHRONIC KIDNEY DISEASE, WITH LONG-TERM CURRENT USE OF INSULIN (HCC): ICD-10-CM

## 2022-07-26 DIAGNOSIS — N18.4 TYPE 2 DIABETES MELLITUS WITH STAGE 4 CHRONIC KIDNEY DISEASE, WITH LONG-TERM CURRENT USE OF INSULIN (HCC): ICD-10-CM

## 2022-07-26 DIAGNOSIS — F51.01 PRIMARY INSOMNIA: ICD-10-CM

## 2022-07-26 DIAGNOSIS — Z12.31 ENCOUNTER FOR SCREENING MAMMOGRAM FOR MALIGNANT NEOPLASM OF BREAST: ICD-10-CM

## 2022-07-26 DIAGNOSIS — I50.32 CHRONIC DIASTOLIC CONGESTIVE HEART FAILURE (HCC): ICD-10-CM

## 2022-07-26 DIAGNOSIS — E78.2 MIXED HYPERLIPIDEMIA: ICD-10-CM

## 2022-07-26 DIAGNOSIS — Z79.4 TYPE 2 DIABETES MELLITUS WITH STAGE 4 CHRONIC KIDNEY DISEASE, WITH LONG-TERM CURRENT USE OF INSULIN (HCC): ICD-10-CM

## 2022-07-26 DIAGNOSIS — I10 ESSENTIAL HYPERTENSION: ICD-10-CM

## 2022-07-26 DIAGNOSIS — I25.10 CORONARY ARTERY DISEASE INVOLVING NATIVE CORONARY ARTERY OF NATIVE HEART WITHOUT ANGINA PECTORIS: ICD-10-CM

## 2022-07-26 DIAGNOSIS — I25.10 CORONARY ARTERY DISEASE INVOLVING NATIVE CORONARY ARTERY OF NATIVE HEART WITHOUT ANGINA PECTORIS: Primary | ICD-10-CM

## 2022-07-26 DIAGNOSIS — F32.5 MAJOR DEPRESSIVE DISORDER WITH SINGLE EPISODE, IN FULL REMISSION (HCC): ICD-10-CM

## 2022-07-26 PROBLEM — K56.41 FECAL IMPACTION (HCC): Status: RESOLVED | Noted: 2022-03-13 | Resolved: 2022-07-26

## 2022-07-26 PROBLEM — Z98.890 HISTORY OF LEFT-SIDED CAROTID ENDARTERECTOMY: Status: RESOLVED | Noted: 2020-03-12 | Resolved: 2022-07-26

## 2022-07-26 PROBLEM — I65.29 CAROTID ARTERY STENOSIS: Status: RESOLVED | Noted: 2018-12-12 | Resolved: 2022-07-26

## 2022-07-26 PROBLEM — R07.9 CHEST PAIN: Status: RESOLVED | Noted: 2021-08-19 | Resolved: 2022-07-26

## 2022-07-26 PROBLEM — R09.89 RIGHT CAROTID BRUIT: Status: RESOLVED | Noted: 2018-11-06 | Resolved: 2022-07-26

## 2022-07-26 PROBLEM — K56.609 COLON OBSTRUCTION (HCC): Status: RESOLVED | Noted: 2022-03-09 | Resolved: 2022-07-26

## 2022-07-26 PROBLEM — K21.9 GASTROESOPHAGEAL REFLUX DISEASE: Status: RESOLVED | Noted: 2017-02-16 | Resolved: 2022-07-26

## 2022-07-26 PROBLEM — M13.0 POLYARTHRITIS: Status: RESOLVED | Noted: 2021-07-07 | Resolved: 2022-07-26

## 2022-07-26 PROBLEM — I65.21 CAROTID STENOSIS, ASYMPTOMATIC, RIGHT: Status: RESOLVED | Noted: 2020-03-12 | Resolved: 2022-07-26

## 2022-07-26 LAB
ALBUMIN SERPL-MCNC: 3.9 G/DL (ref 3.2–4.6)
ALBUMIN/GLOB SERPL: 1.1 {RATIO} (ref 1.2–3.5)
ALP SERPL-CCNC: 186 U/L (ref 50–136)
ALT SERPL-CCNC: 110 U/L (ref 12–65)
ANION GAP SERPL CALC-SCNC: 7 MMOL/L (ref 7–16)
AST SERPL-CCNC: 76 U/L (ref 15–37)
BASOPHILS # BLD: 0 K/UL (ref 0–0.2)
BASOPHILS NFR BLD: 1 % (ref 0–2)
BILIRUB DIRECT SERPL-MCNC: 0.1 MG/DL
BILIRUB SERPL-MCNC: 0.4 MG/DL (ref 0.2–1.1)
BUN SERPL-MCNC: 34 MG/DL (ref 8–23)
CALCIUM SERPL-MCNC: 9.3 MG/DL (ref 8.3–10.4)
CHLORIDE SERPL-SCNC: 109 MMOL/L (ref 98–107)
CHOLEST SERPL-MCNC: 117 MG/DL
CO2 SERPL-SCNC: 23 MMOL/L (ref 21–32)
CREAT SERPL-MCNC: 2 MG/DL (ref 0.6–1)
DIFFERENTIAL METHOD BLD: ABNORMAL
EOSINOPHIL # BLD: 0.3 K/UL (ref 0–0.8)
EOSINOPHIL NFR BLD: 6 % (ref 0.5–7.8)
ERYTHROCYTE [DISTWIDTH] IN BLOOD BY AUTOMATED COUNT: 12.8 % (ref 11.9–14.6)
GLOBULIN SER CALC-MCNC: 3.7 G/DL (ref 2.3–3.5)
GLUCOSE SERPL-MCNC: 149 MG/DL (ref 65–100)
HCT VFR BLD AUTO: 39.5 % (ref 35.8–46.3)
HDLC SERPL-MCNC: 47 MG/DL (ref 40–60)
HDLC SERPL: 2.5 {RATIO}
HGB BLD-MCNC: 12.6 G/DL (ref 11.7–15.4)
IMM GRANULOCYTES # BLD AUTO: 0 K/UL (ref 0–0.5)
IMM GRANULOCYTES NFR BLD AUTO: 0 % (ref 0–5)
LDLC SERPL CALC-MCNC: 53.4 MG/DL
LYMPHOCYTES # BLD: 1.1 K/UL (ref 0.5–4.6)
LYMPHOCYTES NFR BLD: 20 % (ref 13–44)
MCH RBC QN AUTO: 31.2 PG (ref 26.1–32.9)
MCHC RBC AUTO-ENTMCNC: 31.9 G/DL (ref 31.4–35)
MCV RBC AUTO: 97.8 FL (ref 79.6–97.8)
MONOCYTES # BLD: 0.7 K/UL (ref 0.1–1.3)
MONOCYTES NFR BLD: 13 % (ref 4–12)
NEUTS SEG # BLD: 3.4 K/UL (ref 1.7–8.2)
NEUTS SEG NFR BLD: 60 % (ref 43–78)
NRBC # BLD: 0 K/UL (ref 0–0.2)
PLATELET # BLD AUTO: 246 K/UL (ref 150–450)
PMV BLD AUTO: 10.2 FL (ref 9.4–12.3)
POTASSIUM SERPL-SCNC: 4.3 MMOL/L (ref 3.5–5.1)
PROT SERPL-MCNC: 7.6 G/DL (ref 6.3–8.2)
RBC # BLD AUTO: 4.04 M/UL (ref 4.05–5.2)
SODIUM SERPL-SCNC: 139 MMOL/L (ref 136–145)
TRIGL SERPL-MCNC: 83 MG/DL (ref 35–150)
TSH, 3RD GENERATION: 2.06 UIU/ML (ref 0.36–3.74)
VLDLC SERPL CALC-MCNC: 16.6 MG/DL (ref 6–23)
WBC # BLD AUTO: 5.6 K/UL (ref 4.3–11.1)

## 2022-07-26 PROCEDURE — 1123F ACP DISCUSS/DSCN MKR DOCD: CPT | Performed by: INTERNAL MEDICINE

## 2022-07-26 PROCEDURE — 99214 OFFICE O/P EST MOD 30 MIN: CPT | Performed by: INTERNAL MEDICINE

## 2022-07-26 RX ORDER — ROSUVASTATIN CALCIUM 40 MG/1
40 TABLET, COATED ORAL EVERY EVENING
Qty: 90 TABLET | Refills: 1 | Status: SHIPPED | OUTPATIENT
Start: 2022-07-26 | End: 2022-10-26 | Stop reason: SDUPTHER

## 2022-07-26 RX ORDER — CITALOPRAM 20 MG/1
20 TABLET ORAL DAILY
Qty: 90 TABLET | Refills: 1 | Status: SHIPPED | OUTPATIENT
Start: 2022-07-26

## 2022-07-26 RX ORDER — TRAZODONE HYDROCHLORIDE 50 MG/1
50 TABLET ORAL NIGHTLY
Qty: 90 TABLET | Refills: 1 | Status: SHIPPED | OUTPATIENT
Start: 2022-07-26

## 2022-07-26 RX ORDER — NIFEDIPINE 90 MG/1
90 TABLET, EXTENDED RELEASE ORAL DAILY
Qty: 90 TABLET | Refills: 1 | Status: SHIPPED | OUTPATIENT
Start: 2022-07-26

## 2022-07-26 RX ORDER — LANOLIN ALCOHOL/MO/W.PET/CERES
325 CREAM (GRAM) TOPICAL 2 TIMES DAILY
Qty: 180 TABLET | Refills: 1 | Status: SHIPPED | OUTPATIENT
Start: 2022-07-26 | End: 2022-09-26 | Stop reason: SDUPTHER

## 2022-07-26 ASSESSMENT — ANXIETY QUESTIONNAIRES
4. TROUBLE RELAXING: 0
GAD7 TOTAL SCORE: 0
5. BEING SO RESTLESS THAT IT IS HARD TO SIT STILL: 0
7. FEELING AFRAID AS IF SOMETHING AWFUL MIGHT HAPPEN: 0
1. FEELING NERVOUS, ANXIOUS, OR ON EDGE: 0
6. BECOMING EASILY ANNOYED OR IRRITABLE: 0
3. WORRYING TOO MUCH ABOUT DIFFERENT THINGS: 0
2. NOT BEING ABLE TO STOP OR CONTROL WORRYING: 0
IF YOU CHECKED OFF ANY PROBLEMS ON THIS QUESTIONNAIRE, HOW DIFFICULT HAVE THESE PROBLEMS MADE IT FOR YOU TO DO YOUR WORK, TAKE CARE OF THINGS AT HOME, OR GET ALONG WITH OTHER PEOPLE: NOT DIFFICULT AT ALL

## 2022-07-26 ASSESSMENT — PATIENT HEALTH QUESTIONNAIRE - PHQ9
SUM OF ALL RESPONSES TO PHQ QUESTIONS 1-9: 0
3. TROUBLE FALLING OR STAYING ASLEEP: 0
1. LITTLE INTEREST OR PLEASURE IN DOING THINGS: 0
SUM OF ALL RESPONSES TO PHQ QUESTIONS 1-9: 0
9. THOUGHTS THAT YOU WOULD BE BETTER OFF DEAD, OR OF HURTING YOURSELF: 0
SUM OF ALL RESPONSES TO PHQ QUESTIONS 1-9: 0
6. FEELING BAD ABOUT YOURSELF - OR THAT YOU ARE A FAILURE OR HAVE LET YOURSELF OR YOUR FAMILY DOWN: 0
SUM OF ALL RESPONSES TO PHQ9 QUESTIONS 1 & 2: 0
4. FEELING TIRED OR HAVING LITTLE ENERGY: 0
10. IF YOU CHECKED OFF ANY PROBLEMS, HOW DIFFICULT HAVE THESE PROBLEMS MADE IT FOR YOU TO DO YOUR WORK, TAKE CARE OF THINGS AT HOME, OR GET ALONG WITH OTHER PEOPLE: 0
5. POOR APPETITE OR OVEREATING: 0
7. TROUBLE CONCENTRATING ON THINGS, SUCH AS READING THE NEWSPAPER OR WATCHING TELEVISION: 0
2. FEELING DOWN, DEPRESSED OR HOPELESS: 0
8. MOVING OR SPEAKING SO SLOWLY THAT OTHER PEOPLE COULD HAVE NOTICED. OR THE OPPOSITE, BEING SO FIGETY OR RESTLESS THAT YOU HAVE BEEN MOVING AROUND A LOT MORE THAN USUAL: 0
SUM OF ALL RESPONSES TO PHQ QUESTIONS 1-9: 0

## 2022-07-26 ASSESSMENT — ENCOUNTER SYMPTOMS
BLOOD IN STOOL: 0
SHORTNESS OF BREATH: 0

## 2022-07-27 LAB
EST. AVERAGE GLUCOSE BLD GHB EST-MCNC: 140 MG/DL
HBA1C MFR BLD: 6.5 % (ref 4.8–5.6)

## 2022-08-10 ENCOUNTER — OFFICE VISIT (OUTPATIENT)
Dept: ORTHOPEDIC SURGERY | Age: 72
End: 2022-08-10
Payer: MEDICAID

## 2022-08-10 DIAGNOSIS — M76.62 ACHILLES TENDINITIS, LEFT LEG: ICD-10-CM

## 2022-08-10 DIAGNOSIS — M79.671 BILATERAL FOOT PAIN: Primary | ICD-10-CM

## 2022-08-10 DIAGNOSIS — M79.672 BILATERAL FOOT PAIN: Primary | ICD-10-CM

## 2022-08-10 DIAGNOSIS — M76.61 ACHILLES TENDINITIS, RIGHT LEG: ICD-10-CM

## 2022-08-10 PROCEDURE — 1123F ACP DISCUSS/DSCN MKR DOCD: CPT | Performed by: ORTHOPAEDIC SURGERY

## 2022-08-10 PROCEDURE — 99203 OFFICE O/P NEW LOW 30 MIN: CPT | Performed by: ORTHOPAEDIC SURGERY

## 2022-08-10 NOTE — PROGRESS NOTES
Name: Shay Samuel  YOB: 1950  Gender: female  MRN: [id number]    Summary:   Bilateral none insertional Achilles tendinosis       CC: New Patient (Bilateral feet x-rays taken in office today)       HPI: Shay Samuel is a 74 y.o. female who presents with New Patient (Bilateral feet x-rays taken in office today)  . This patient resents the office today with a 1 month history of increasing bilateral Achilles pain worse on the left than on the right. She denies any previous history of trauma. She is not been on any antibiotics lately. History was obtained by Patient     ROS/Meds/PSH/PMH/FH/SH: I personally reviewed the patients standard intake form. Below are the pertinents    Tobacco:  reports that she quit smoking about 9 years ago. Her smoking use included cigarettes. She smoked an average of 1 pack per day. She has never used smokeless tobacco.  Diabetes: None      Physical Examination:  Exam of the bilateral heels shows swelling and tenderness to palpation with few fusiform swelling just proximal to the insertion of the Achilles. There is no evidence of insufficiency noted. She has palpable pulses and intact sensation. Imaging:   I independently interpreted XR taken today  Bilateral feet XR: AP, Lateral, Oblique views     ICD-10-CM    1. Bilateral foot pain  M79.671 XR Foot Standard Bilateral    M79.672       2. Achilles tendinitis, right leg  M76.61       3.  Achilles tendinitis, left leg  M76.62          Report: AP, lateral, oblique x-ray of the bilateral feet demonstrates no fracture or calcific tendinitis    Impression: No fracture or calcific tendinitis   Satnam Bryant III, MD           Assessment:   Bilateral 9 insertional Achilles tendinosis    Treatment Plan:   4 This is a chronic illness/condition with exacerbation and progression  Treatment at this time: Physical Therapy and Bracing: Placed in: night splint  Studies ordered: NO XR needed @ Next Visit    Weight-bearing status: NWB        Return to work/work restrictions: none  none    We discussed the usually successful nonoperative treatment of none insertional Achilles problems. She will start a night splint as well as a home exercise program we provided her today and return in 6 weeks.

## 2022-08-22 ENCOUNTER — HOSPITAL ENCOUNTER (OUTPATIENT)
Dept: ULTRASOUND IMAGING | Age: 72
Discharge: HOME | End: 2022-08-25
Payer: MEDICARE

## 2022-08-22 ENCOUNTER — OFFICE VISIT (OUTPATIENT)
Dept: ORTHOPEDIC SURGERY | Age: 72
End: 2022-08-22
Payer: MEDICARE

## 2022-08-22 ENCOUNTER — CLINICAL DOCUMENTATION (OUTPATIENT)
Dept: ORTHOPEDIC SURGERY | Age: 72
End: 2022-08-22

## 2022-08-22 VITALS — HEIGHT: 64 IN | BODY MASS INDEX: 28.17 KG/M2 | WEIGHT: 165 LBS

## 2022-08-22 DIAGNOSIS — M25.561 RIGHT KNEE PAIN, UNSPECIFIED CHRONICITY: Primary | ICD-10-CM

## 2022-08-22 DIAGNOSIS — S89.91XA INJURY OF RIGHT KNEE, INITIAL ENCOUNTER: ICD-10-CM

## 2022-08-22 DIAGNOSIS — M79.89 RIGHT LEG SWELLING: ICD-10-CM

## 2022-08-22 DIAGNOSIS — M25.561 RIGHT KNEE PAIN, UNSPECIFIED CHRONICITY: ICD-10-CM

## 2022-08-22 PROCEDURE — G8417 CALC BMI ABV UP PARAM F/U: HCPCS | Performed by: ORTHOPAEDIC SURGERY

## 2022-08-22 PROCEDURE — G8399 PT W/DXA RESULTS DOCUMENT: HCPCS | Performed by: ORTHOPAEDIC SURGERY

## 2022-08-22 PROCEDURE — 1123F ACP DISCUSS/DSCN MKR DOCD: CPT | Performed by: ORTHOPAEDIC SURGERY

## 2022-08-22 PROCEDURE — G8427 DOCREV CUR MEDS BY ELIG CLIN: HCPCS | Performed by: ORTHOPAEDIC SURGERY

## 2022-08-22 PROCEDURE — E0143 WALKER FOLDING WHEELED W/O S: HCPCS | Performed by: ORTHOPAEDIC SURGERY

## 2022-08-22 PROCEDURE — 1090F PRES/ABSN URINE INCON ASSESS: CPT | Performed by: ORTHOPAEDIC SURGERY

## 2022-08-22 PROCEDURE — 93971 EXTREMITY STUDY: CPT

## 2022-08-22 PROCEDURE — 1036F TOBACCO NON-USER: CPT | Performed by: ORTHOPAEDIC SURGERY

## 2022-08-22 PROCEDURE — A9999 DME SUPPLY OR ACCESSORY, NOS: HCPCS | Performed by: ORTHOPAEDIC SURGERY

## 2022-08-22 PROCEDURE — 3017F COLORECTAL CA SCREEN DOC REV: CPT | Performed by: ORTHOPAEDIC SURGERY

## 2022-08-22 PROCEDURE — L1830 KO IMMOB CANVAS LONG PRE OTS: HCPCS | Performed by: ORTHOPAEDIC SURGERY

## 2022-08-22 PROCEDURE — 99214 OFFICE O/P EST MOD 30 MIN: CPT | Performed by: ORTHOPAEDIC SURGERY

## 2022-08-22 NOTE — PROGRESS NOTES
14 inch knee immobilizer for patients right knee. I instructed patient that the strips of support velco should align on the medial and lateral sides of the leg. Patient was informed to tigthen the bottom strap first to anchor the brace, followed by the strap above the knee and below the knee. Finally tighthening the very top strap  Patient was fitted and instructed on use of walker with wheels. I demonstrated the correct way to lock and unlock the walker. The walker was adjusted to the patient's height and arm length. Patient walked around with the walker to insure it was set at a comfortable height. The patient was also prescribed and fitted with a Reparel leg sleeve for the right leg, size medium. Patient read and signed documenting they understand and agree to [facility]'s current DME return policy.

## 2022-08-22 NOTE — PROGRESS NOTES
Name: Sae Che  YOB: 1950  Gender: female  MRN: [id number]    CC: Right knee pain    HPI:   08/21/2022: She reports falling injuring her right knee    ROS/Meds/PSH/PMH/FH/SH: reviewed today    Tobacco:  reports that she quit smoking about 9 years ago. Her smoking use included cigarettes. She smoked an average of 1 pack per day. She has never used smokeless tobacco.   Type 2 diabetes; ESRD stage IV: Chronic diastolic CHF    Physical Examination:  Patient appears to be alert and oriented with acceptable appearance. No obvious distress or SOB  CV: appears to have acceptable vascular color and capillary refill  Neuro: appears to have mostly intact light touch sensation   Skin: Very minimal superior knee effusion; swelling anterior knee; swelling calf to ankle nonpitting; no redness; no lesion  MS: No standing exam: Gait no gait exam  Right = pain isolates mainly to the medial tibial plateau region; no patella pain; no femoral pain  Right = posterior gastroc/calf pain  Right = Limited exam due to pain, but no gross motor loss    XR: Right side: Nonweightbearing AP notch sunrise and lateral views taken today with no acute fracture appreciated;  XR Impression:  As above      Reviewed Test/Records/Documents:   08/10/2022: Dr. Leo Meneses treated bilateral noninsertional Achilles tendinitis  07/27/2021: Dr. Vivien Lazo treated for trigger fingers and trigger thumb    08/22/2022: Right LE Duplex U/S radiology impression:  No evidence of deep venous thrombosis in the right lower extremity    Injection: No indication today for injection or aspiration    Assessment:    Right knee injury; possible patella tendon injury; suspected occult plateau fracture  Right lower extremity edema with questionable DVT    Plan:   The patient and I discussed the above assessment. We explored treatment options.      Her swelling and pain are more in the anterior knee and consistent with a tibial plateau fracture  She reports hearing a pop in her knee; she is too tender to stress test but certainly could have a patella tendon injury    Advanced medical imaging:   Right lower extremity duplex ultrasound: Rule out DVT  Right knee MRI scan: Assess for patella tendon tear and suspected tibial plateau occult fracture    DME: Reparel leg sleeve: Knee immobilizer: Rolling walker  We discussed knee care and walker and knee brace protection  PT: No indication today for PT     Medication - OTC meds prn: Limited medication due to ESRD stage IV and diabetes   She will take Tylenol as needed    Surgical discussion: No indication specifically today for surgery  Follow up: After MRI scan  Work status: Sitting    History and discussion of management with: Her granddaughter    This note was created using Dragon voice recognition software which may result in errors of speech and spelling recognition and word/phrase syntax errors.

## 2022-08-22 NOTE — PROGRESS NOTES
Beatriz from [facility] called to notify us that pt is negative for DVT.

## 2022-08-22 NOTE — LETTER
DME Patient Authorization Form    Name: Frantz Dubon  : [id number]  MRN: [id number]   Age: 74 y.o. Gender: female  Delivery Address: [facility]     Diagnosis:     ICD-10-CM    1. Right knee pain, unspecified chronicity  M25.561 XR KNEE RIGHT (MIN 4 VIEWS)     Walker w/ wheels ()     Knee Immobilizer ()     Vascular duplex lower extremity venous right     MRI KNEE RIGHT WO CONTRAST      2. Injury of right knee, initial encounter  S89. Lolly Lint w/ wheels ()     Knee Immobilizer ()     Vascular duplex lower extremity venous right     MRI KNEE RIGHT WO CONTRAST      3. Right leg swelling  M79.89 Walker w/ wheels ()     Knee Immobilizer ()     Vascular duplex lower extremity venous right     MRI KNEE RIGHT WO CONTRAST           Requested DME:  Knee Immobilizer- ($128.00) X 1 - right  Reparel Leg Sleeve **ALEG ($60) X 1 - right  Walker w/ wheels  ($125) X 1 - bilateral        Clinical Notes:     **Indicates non-covered items by insurance. Payment expected on date of service. Electronically signed by  Provider: Mello Bowden. Oliver Howard MD  _______________________________ Date: 2022                             [facility] Tax ID # [id number]        Durable Medical Equipment and/or Orthotics Patient Consent     I understand that my physician has prescribed this medical supply as part of my treatment plan as a matter of Medical Necessity.  I understand that I have a choice in where I receive my prescribed orthopedic supplies and/or services.  I authorize [facility] to furnish this service/product and to provide my insurance carrier with any information requested in order to process for payment.  I instruct my insurance carrier to pay [facility] directly for these services/products.    I understand that my insurance carrier may deny payment for this supply because it is a non-covered item, deemed not medically necessary or considered experimental.   I understand that any cost not covered by my insurance carrier will be solely my financial responsibility.  I have received the Supplier Standards and have reviewed them.  I have received the prescribed item and have been fully instructed on the proper use of the above services/products.    ______ (Patient Initials) I understand that all DME items are non-returnable after being dispensed. Items still in sealed packaging may be returned up to 14 days after purchasing. [address] will replace items that are defective.    ______ (Patient Initials) I understand that [facility] will not file a claim with my insurance carrier for this service/product and I am waiving my right to file a claim on my own for this service/product with my insurance company as this item is NON-COVERED (Denoted by the **) by my Insurance company/policy. ______ (Patient Initials) I understand that I am responsible to bring my equipment to the hospital for any surgery. ______________________________________________  ________________________  Patient / Renzo Ordaz            Thank you for considering [address] Your physician has prescribed specific medical equipment or devices for your home use. The following describes your rights and responsibilities as our customer. Right to Choose Providers: You have a choice regarding which company supplies your home medical equipment and devices, and to consult your physician in this decision. You may choose a medical supply store, a home medical equipment provider, or a specialist such as [facility].   [facility] will coordinate with your physician to provide the medical equipment or devices prescribed for your home use.    Right to Service:  You have the right to considerate, respectful and nondiscriminatory care. You have the right to receive accurate and easily understood information about your health care. If you speak a foreign language, or don't understand the discussions, assistance will be provided to allow you to make informed health care decisions. You have the right to know your treatment options and to participate in decisions about your care, including the right to accept or refuse treatment. You have the right to expect a reasonable response to your requests for treatment or service. You have the right to talk in confidence with health care providers and to have your health care information protected. You have the right to receive an explanation of your bill. You have the right to complain about the service or product you receive. Patient Responsibilities:  Please provide complete and accurate information about your health insurance benefits and make arrangements for the timely payment of your bill. [facility] will, if possible, assume responsibility for billing your insurance (Medicare, Medicaid and commercial) for the prescribed equipment or devices. If your policy does not cover the prescribed product, or only covers a portion of the bill, you are responsible for any remaining balance. Return and Exchange Policy:  [facility] will honor published  Warranties for products. [facility] will accept returns or exchanges within 14 days from the date of receipt, providin) the product must be in new condition; 2) receipt as required; and 3) used disposable and hygiene products may only be returned due to a defective product. Note: Refunds will be issued in a timely manner, please allow 4-6 weeks for processing. Complaint Procedures and DME Consumer Protection Resources:  [facility] values you as a customer, and is committed to resolving patient concerns.   This commitment includes understanding and documenting your concerns, conducting a review of internal procedures, and providing you with an explanation and resolution to your concerns. Should you have any questions about our services or billing process, please contact our office at (practice phone number). If we are unable to resolve the concern, you have the right to direct comments to the office of Consumer Protection, in the [id number] [place], [place] [address]vd. [address] or the [facility] office, without fear of repercussion. DMEPOS SUPPLIER STANDARDS    A supplier must be in compliance with all applicable Federal and [facility] and regulatory requirements. A supplier must provide complete and accurate information on the DMEPOS supplier application. Any changes to this information must be reported to the [email or web address] within 30 days. An authorized individual (one whose signature is binding) must sign the application for billing privileges. A supplier must fill orders from its own inventory, or must contract with other companies for the purchase of items necessary to fill the order. A supplier may not contract with any entity that is currently excluded from the Medicare program, any [facility] program, or from any other Federal procurement or Nonprocurement programs. A supplier must advise beneficiaries that they may rent or purchase inexpensive or routinely purchased durable medical equipment, and of the purchase option for capped rental equipment. A supplier must notify beneficiaries of warranty coverage and honor all warranties under applicable State Law, and repair or replace free of charge Medicare covered items that are under warranty. A supplier must maintain a physical facility on an appropriate site. A supplier must permit CMS, or its agents to conduct on-site inspections to ascertain the supplier's compliance with these standards.   The supplier location must be accessible to beneficiaries during reasonable business hours, and must maintain a visible sign and posted hours of operation. A supplier must maintain a primary business telephone listed under the name of the business in a [employer] or a toll free number available through directory assistance. The exclusive use of a beeper, answering machine or cell phone is prohibited. A supplier must have comprehensive liability insurance in the amount of at least $300,000 that covers both the supplier's place of business and all customers and employees of the supplier. If the supplier manufactures its own items, this insurance must also cover product liability and completed operations. A supplier must agree not to initiate telephone contact with beneficiaries, with a few exceptions allowed. This standard prohibits suppliers from calling beneficiaries in order to solicit new business. A supplier is responsible for delivery and must instruct beneficiaries on use of Medicare covered items, and maintain proof of delivery. A supplier must answer questions, and respond to complaints of the beneficiaries, and maintain documentation of such contacts. A supplier must maintain and replace at no charge or repair directly, or through a service contract with another company, Medicare covered items it has rented to beneficiaries. A supplier must accept returns of substandard (less than full quality for the particular item) or unsuitable items (inappropriate for the beneficiary at the time it was fitted and rented or sold) from beneficiaries. A supplier must disclose these supplier standards to each beneficiary to whom it supplies a Medicare-covered item. A supplier must disclose to the government any person having ownership, financial, or control interest in the supplier. A supplier must not convey or reassign a supplier number; i.e., the supplier may not sell or allow another entity to use its Medicare billing number.   A supplier must have a complaint resolution protocol established to address beneficiary complaints that relate to these standards. A record of these complaints must be maintained at the physical facility. Complaint records must include: the name, address, telephone number and health insurance claim number of the beneficiary, a summary of the complaint, and any action taken to resolve it. A supplier must agree to furnish CMS any information required by the Medicare statute and implementing regulations. A supplier of DMEPOS and other items and services must be accredited by a CMS-approved accreditation organization in order to receive and retain a supplier billing number. The accreditation must indicate the specific products and services, for which the supplier is accredited in order for the supplier to receive payment for those specific products and services. A DMEPOS supplier must notify their accreditation organization when a new DMEPOS location is opened. The accreditation organization may accredit the new supplier location for three months after it is operational without requiring a new site visit. All DMEPOS supplier locations, whether owned or subcontracted, must meet the [employer] and be separately accredited in order to bill Medicare. An accredited supplier may be denied enrollment or their enrollment may be revoked, if CMS determines that they are not in compliance with the DMEPOS quality standards. A DMEPOS supplier must disclose upon enrollment all products and services, including the addition of new product lines for which they are seeking accreditation. If a new product line is added after enrollment, the DMEPOS supplier will be responsible for notifying the accrediting body of the new product so that the DMEPOS supplier can be re-surveyed and accredited for these new products. Must meet the surety bond requirements specified in 42 C. F.R. 424.57(c). Implementation date- May 4, 2009.   A supplier must obtain oxygen from a state-licensed oxygen supplier. A supplier must maintain ordering and referring documentation consistent with provisions found in 42 C. F.R. 424.516(f). DMEPOS suppliers are prohibited from sharing a practice location with certain other Medicare providers and suppliers. DMEPOS suppliers must remain open to the public for a minimum of 30 hours per week with certain exceptions.

## 2022-08-31 ENCOUNTER — PATIENT MESSAGE (OUTPATIENT)
Dept: INTERNAL MEDICINE CLINIC | Facility: CLINIC | Age: 72
End: 2022-08-31

## 2022-08-31 DIAGNOSIS — Z79.4 TYPE 2 DIABETES MELLITUS WITH STAGE 4 CHRONIC KIDNEY DISEASE, WITH LONG-TERM CURRENT USE OF INSULIN (HCC): Primary | ICD-10-CM

## 2022-08-31 DIAGNOSIS — E11.22 TYPE 2 DIABETES MELLITUS WITH STAGE 4 CHRONIC KIDNEY DISEASE, WITH LONG-TERM CURRENT USE OF INSULIN (HCC): Primary | ICD-10-CM

## 2022-08-31 DIAGNOSIS — N18.4 TYPE 2 DIABETES MELLITUS WITH STAGE 4 CHRONIC KIDNEY DISEASE, WITH LONG-TERM CURRENT USE OF INSULIN (HCC): Primary | ICD-10-CM

## 2022-08-31 RX ORDER — BLOOD-GLUCOSE,RECEIVER,CONT
EACH MISCELLANEOUS
Qty: 10 EACH | Refills: 3 | Status: SHIPPED | OUTPATIENT
Start: 2022-08-31

## 2022-08-31 RX ORDER — BLOOD-GLUCOSE,RECEIVER,CONT
EACH MISCELLANEOUS
Qty: 1 EACH | Refills: 5 | Status: SHIPPED | OUTPATIENT
Start: 2022-08-31 | End: 2022-09-01 | Stop reason: SDUPTHER

## 2022-08-31 RX ORDER — BLOOD-GLUCOSE TRANSMITTER
EACH MISCELLANEOUS
Qty: 1 EACH | Refills: 5 | Status: SHIPPED | OUTPATIENT
Start: 2022-08-31 | End: 2022-09-01 | Stop reason: SDUPTHER

## 2022-08-31 NOTE — TELEPHONE ENCOUNTER
From: Arnoldo Maddox  To: Dr. Cezar Yepez  Sent: 8/31/2022 3:07 PM EDT  Subject: [address]    I'm sorry but this company is now requesting a new prescription for my Dexcom G6. Could you please fax it to [phone]. They won't send more sensors and a transmitter until they get the prescription since it is my first order from them. I'm so sorry but they insist.   Thank you.     Matthew Archuleta

## 2022-09-01 DIAGNOSIS — N18.4 TYPE 2 DIABETES MELLITUS WITH STAGE 4 CHRONIC KIDNEY DISEASE, WITH LONG-TERM CURRENT USE OF INSULIN (HCC): ICD-10-CM

## 2022-09-01 DIAGNOSIS — E11.22 TYPE 2 DIABETES MELLITUS WITH STAGE 4 CHRONIC KIDNEY DISEASE, WITH LONG-TERM CURRENT USE OF INSULIN (HCC): ICD-10-CM

## 2022-09-01 DIAGNOSIS — Z79.4 TYPE 2 DIABETES MELLITUS WITH STAGE 4 CHRONIC KIDNEY DISEASE, WITH LONG-TERM CURRENT USE OF INSULIN (HCC): ICD-10-CM

## 2022-09-01 RX ORDER — BLOOD-GLUCOSE SENSOR
EACH MISCELLANEOUS
Qty: 3 EACH | Refills: 3 | Status: SHIPPED | OUTPATIENT
Start: 2022-09-01

## 2022-09-01 RX ORDER — BLOOD-GLUCOSE,RECEIVER,CONT
EACH MISCELLANEOUS
Qty: 1 EACH | Refills: 5 | Status: SHIPPED | OUTPATIENT
Start: 2022-09-01

## 2022-09-01 RX ORDER — BLOOD-GLUCOSE TRANSMITTER
EACH MISCELLANEOUS
Qty: 1 EACH | Refills: 5 | Status: SHIPPED | OUTPATIENT
Start: 2022-09-01

## 2022-09-01 NOTE — TELEPHONE ENCOUNTER
I'm sorry but this company is now requesting a new prescription for my Dexcom G6. Could you please fax it to [phone]. They won't send more sensors and a transmitter until they get the prescription since it is my first order from them. I'm so sorry but they insist.   Thank you.

## 2022-09-09 ENCOUNTER — OFFICE VISIT (OUTPATIENT)
Dept: ORTHOPEDIC SURGERY | Age: 72
End: 2022-09-09
Payer: MEDICARE

## 2022-09-09 VITALS — WEIGHT: 165 LBS | HEIGHT: 64 IN | BODY MASS INDEX: 28.17 KG/M2

## 2022-09-09 DIAGNOSIS — S89.91XD INJURY OF RIGHT KNEE, SUBSEQUENT ENCOUNTER: Primary | ICD-10-CM

## 2022-09-09 PROCEDURE — G8417 CALC BMI ABV UP PARAM F/U: HCPCS | Performed by: ORTHOPAEDIC SURGERY

## 2022-09-09 PROCEDURE — 1090F PRES/ABSN URINE INCON ASSESS: CPT | Performed by: ORTHOPAEDIC SURGERY

## 2022-09-09 PROCEDURE — 1036F TOBACCO NON-USER: CPT | Performed by: ORTHOPAEDIC SURGERY

## 2022-09-09 PROCEDURE — 3017F COLORECTAL CA SCREEN DOC REV: CPT | Performed by: ORTHOPAEDIC SURGERY

## 2022-09-09 PROCEDURE — G8399 PT W/DXA RESULTS DOCUMENT: HCPCS | Performed by: ORTHOPAEDIC SURGERY

## 2022-09-09 PROCEDURE — 1123F ACP DISCUSS/DSCN MKR DOCD: CPT | Performed by: ORTHOPAEDIC SURGERY

## 2022-09-09 PROCEDURE — 99213 OFFICE O/P EST LOW 20 MIN: CPT | Performed by: ORTHOPAEDIC SURGERY

## 2022-09-09 PROCEDURE — G8427 DOCREV CUR MEDS BY ELIG CLIN: HCPCS | Performed by: ORTHOPAEDIC SURGERY

## 2022-09-09 NOTE — LETTER
[email or web address]  Arthritis oral choices: Individual Turmeric-Curcumin; Cedric Arm; Boswellia                           -or-   Combination Deep Foods Turmeric strength for joints that includes all 3 listed above     Magne topical Sports:   Balm or liquid Spray with frankincense/myrrh      Nerve medication options:   CBD, Alpha Lipoic acid, Lion's jayro and any other recommended neuropathic medication            Immune/healing possibilities:  Echinacea, Elderberry    As Needed: Dead sea bath salts, Essential Oils, scar cream, Gout and cramping medications    The above list of recommended medications is only a starting point. Please allow the experts at [facility] to make the final recommendations. Before using any of these medications, please make sure that you have no concerns, senstivities or allergies to the listed ingredients in each bottle/container. Also, please check with your pharmacist or your primary care physician regarding any and all possible interactions with your other daily medications. [employer] Locations:   [facility]  [address] [place] [address], [address] [place] [address]            Sincerely,      Blank Kenyon MD

## 2022-09-09 NOTE — PROGRESS NOTES
Right knee injury; lateral tibial plateau stress fracture  Right knee lateral compartment arthritis    Plan:   The patient and I discussed the above assessment. We explored treatment options. Since her initial visit with me, she has made significant improvement. She is full weightbearing with only a pullover brace with no pain or limitations  She has excellent range of motion and strength so no indication for PT  Medication - OTC meds prn: Limited medication due to ESRD stage IV and diabetes   She with issued a handout for natural medication which she will clear with her renal doctor    Surgical discussion: No indication specifically today for surgery  Follow up: As needed  Work status: Sitting    History and discussion of management with: Her granddaughter    This note was created using Dragon voice recognition software which may result in errors of speech and spelling recognition and word/phrase syntax errors.

## 2022-09-26 DIAGNOSIS — D64.9 CHRONIC ANEMIA: ICD-10-CM

## 2022-09-26 RX ORDER — LANOLIN ALCOHOL/MO/W.PET/CERES
325 CREAM (GRAM) TOPICAL 2 TIMES DAILY
Qty: 180 TABLET | Refills: 1 | Status: SHIPPED | OUTPATIENT
Start: 2022-09-26

## 2022-10-26 ENCOUNTER — OFFICE VISIT (OUTPATIENT)
Dept: INTERNAL MEDICINE CLINIC | Facility: CLINIC | Age: 72
End: 2022-10-26
Payer: MEDICARE

## 2022-10-26 VITALS
HEART RATE: 55 BPM | BODY MASS INDEX: 29.4 KG/M2 | WEIGHT: 172.2 LBS | HEIGHT: 64 IN | DIASTOLIC BLOOD PRESSURE: 61 MMHG | SYSTOLIC BLOOD PRESSURE: 139 MMHG | OXYGEN SATURATION: 100 % | TEMPERATURE: 97.7 F

## 2022-10-26 DIAGNOSIS — I50.32 CHRONIC DIASTOLIC CONGESTIVE HEART FAILURE (HCC): ICD-10-CM

## 2022-10-26 DIAGNOSIS — J30.9 ALLERGIC RHINITIS, UNSPECIFIED SEASONALITY, UNSPECIFIED TRIGGER: ICD-10-CM

## 2022-10-26 DIAGNOSIS — I25.10 CORONARY ARTERY DISEASE INVOLVING NATIVE CORONARY ARTERY OF NATIVE HEART WITHOUT ANGINA PECTORIS: ICD-10-CM

## 2022-10-26 DIAGNOSIS — F32.5 MAJOR DEPRESSIVE DISORDER WITH SINGLE EPISODE, IN FULL REMISSION (HCC): ICD-10-CM

## 2022-10-26 DIAGNOSIS — Z23 NEEDS FLU SHOT: ICD-10-CM

## 2022-10-26 DIAGNOSIS — E78.2 MIXED HYPERLIPIDEMIA: ICD-10-CM

## 2022-10-26 DIAGNOSIS — Z00.00 MEDICARE ANNUAL WELLNESS VISIT, SUBSEQUENT: Primary | ICD-10-CM

## 2022-10-26 DIAGNOSIS — E11.22 TYPE 2 DIABETES MELLITUS WITH STAGE 4 CHRONIC KIDNEY DISEASE, WITH LONG-TERM CURRENT USE OF INSULIN (HCC): ICD-10-CM

## 2022-10-26 DIAGNOSIS — M81.0 AGE-RELATED OSTEOPOROSIS WITHOUT CURRENT PATHOLOGICAL FRACTURE: ICD-10-CM

## 2022-10-26 DIAGNOSIS — N18.4 TYPE 2 DIABETES MELLITUS WITH STAGE 4 CHRONIC KIDNEY DISEASE, WITH LONG-TERM CURRENT USE OF INSULIN (HCC): ICD-10-CM

## 2022-10-26 DIAGNOSIS — M25.50 ARTHRALGIA, UNSPECIFIED JOINT: ICD-10-CM

## 2022-10-26 DIAGNOSIS — N18.4 STAGE 4 CHRONIC KIDNEY DISEASE (HCC): ICD-10-CM

## 2022-10-26 DIAGNOSIS — I10 ESSENTIAL HYPERTENSION: ICD-10-CM

## 2022-10-26 DIAGNOSIS — Z79.4 TYPE 2 DIABETES MELLITUS WITH STAGE 4 CHRONIC KIDNEY DISEASE, WITH LONG-TERM CURRENT USE OF INSULIN (HCC): ICD-10-CM

## 2022-10-26 PROBLEM — Z98.61 S/P PTCA (PERCUTANEOUS TRANSLUMINAL CORONARY ANGIOPLASTY): Status: RESOLVED | Noted: 2020-09-01 | Resolved: 2022-10-26

## 2022-10-26 PROBLEM — R74.01 ELEVATED ALT MEASUREMENT: Status: RESOLVED | Noted: 2021-07-07 | Resolved: 2022-10-26

## 2022-10-26 PROBLEM — K52.89 STERCORAL COLITIS: Status: RESOLVED | Noted: 2022-03-02 | Resolved: 2022-10-26

## 2022-10-26 PROCEDURE — 1036F TOBACCO NON-USER: CPT | Performed by: INTERNAL MEDICINE

## 2022-10-26 PROCEDURE — 2022F DILAT RTA XM EVC RTNOPTHY: CPT | Performed by: INTERNAL MEDICINE

## 2022-10-26 PROCEDURE — 3078F DIAST BP <80 MM HG: CPT | Performed by: INTERNAL MEDICINE

## 2022-10-26 PROCEDURE — 3074F SYST BP LT 130 MM HG: CPT | Performed by: INTERNAL MEDICINE

## 2022-10-26 PROCEDURE — G0439 PPPS, SUBSEQ VISIT: HCPCS | Performed by: INTERNAL MEDICINE

## 2022-10-26 PROCEDURE — 3044F HG A1C LEVEL LT 7.0%: CPT | Performed by: INTERNAL MEDICINE

## 2022-10-26 PROCEDURE — 1123F ACP DISCUSS/DSCN MKR DOCD: CPT | Performed by: INTERNAL MEDICINE

## 2022-10-26 PROCEDURE — 99213 OFFICE O/P EST LOW 20 MIN: CPT | Performed by: INTERNAL MEDICINE

## 2022-10-26 PROCEDURE — G8399 PT W/DXA RESULTS DOCUMENT: HCPCS | Performed by: INTERNAL MEDICINE

## 2022-10-26 PROCEDURE — G8427 DOCREV CUR MEDS BY ELIG CLIN: HCPCS | Performed by: INTERNAL MEDICINE

## 2022-10-26 PROCEDURE — G0008 ADMIN INFLUENZA VIRUS VAC: HCPCS | Performed by: INTERNAL MEDICINE

## 2022-10-26 PROCEDURE — 3017F COLORECTAL CA SCREEN DOC REV: CPT | Performed by: INTERNAL MEDICINE

## 2022-10-26 PROCEDURE — G8417 CALC BMI ABV UP PARAM F/U: HCPCS | Performed by: INTERNAL MEDICINE

## 2022-10-26 PROCEDURE — G8484 FLU IMMUNIZE NO ADMIN: HCPCS | Performed by: INTERNAL MEDICINE

## 2022-10-26 PROCEDURE — 1090F PRES/ABSN URINE INCON ASSESS: CPT | Performed by: INTERNAL MEDICINE

## 2022-10-26 PROCEDURE — 90694 VACC AIIV4 NO PRSRV 0.5ML IM: CPT | Performed by: INTERNAL MEDICINE

## 2022-10-26 RX ORDER — ROSUVASTATIN CALCIUM 40 MG/1
40 TABLET, COATED ORAL EVERY EVENING
Qty: 90 TABLET | Refills: 1 | Status: SHIPPED | OUTPATIENT
Start: 2022-10-26

## 2022-10-26 RX ORDER — MONTELUKAST SODIUM 10 MG/1
10 TABLET ORAL DAILY
Qty: 90 TABLET | Refills: 0 | Status: SHIPPED | OUTPATIENT
Start: 2022-10-26

## 2022-10-26 ASSESSMENT — ENCOUNTER SYMPTOMS
COUGH: 1
SHORTNESS OF BREATH: 0
BLOOD IN STOOL: 0

## 2022-10-26 NOTE — PATIENT INSTRUCTIONS
Personalized Preventive Plan for Abril Merrill - 10/26/2022  Medicare offers a range of preventive health benefits. Some of the tests and screenings are paid in full while other may be subject to a deductible, co-insurance, and/or copay. Some of these benefits include a comprehensive review of your medical history including lifestyle, illnesses that may run in your family, and various assessments and screenings as appropriate. After reviewing your medical record and screening and assessments performed today your provider may have ordered immunizations, labs, imaging, and/or referrals for you. A list of these orders (if applicable) as well as your Preventive Care list are included within your After Visit Summary for your review. Other Preventive Recommendations:    A preventive eye exam performed by an eye specialist is recommended every 1-2 years to screen for glaucoma; cataracts, macular degeneration, and other eye disorders. A preventive dental visit is recommended every 6 months. Try to get at least 150 minutes of exercise per week or 10,000 steps per day on a pedometer . Order or download the FREE \"Exercise & Physical Activity: Your Everyday Guide\" from [employer]. Call 3-440.671.4670 or search [employer] online. You need 9806-0292 mg of calcium and 7710-7345 IU of vitamin D per day. It is possible to meet your calcium requirement with diet alone, but a vitamin D supplement is usually necessary to meet this goal.  When exposed to the sun, use a sunscreen that protects against both UVA and UVB radiation with an SPF of 30 or greater. Reapply every 2 to 3 hours or after sweating, drying off with a towel, or swimming. Always wear a seat belt when traveling in a car. Always wear a helmet when riding a bicycle or motorcycle.

## 2022-10-26 NOTE — PROGRESS NOTES
Fer Yi M.D. Internal Medicine  [facility]  [address] [facility], [facility] [address]  Phone: [phone]  Fax: [phone]    Diabetes  She presents for her follow-up diabetic visit. She has type 2 diabetes mellitus. The initial diagnosis of diabetes was made 34 years ago. Her disease course has been worsening. There are no hypoglycemic associated symptoms. There are no diabetic associated symptoms. Pertinent negatives for diabetes include no chest pain. There are no hypoglycemic complications. Diabetic complications include heart disease and nephropathy. Risk factors for coronary artery disease include diabetes mellitus, dyslipidemia, hypertension and post-menopausal. Current diabetic treatment includes intensive insulin program.   Joint Pain   The pain is present in the right knee and right hip. This is a new problem. The current episode started more than 1 month ago. There has been no history of extremity trauma. The problem occurs daily. The problem has been unchanged. The quality of the pain is described as aching. The pain is at a severity of 5/10. The pain is moderate. Pertinent negatives include no joint swelling or stiffness. The symptoms are aggravated by activity. She has tried nothing for the symptoms. The treatment provided no relief. Allergic Rhinitis   Presents for follow-up (Chronic problem with recent progression/exacerbation.) visit. She complains of congestion and cough. The problem occurs daily. Timing of symptoms is constant. Symptom severity has been moderate. The treatment provided mild relief. Catherine Victor is a 74 y.o. White (non-) female.      Current Outpatient Medications   Medication Sig Dispense Refill    ferrous sulfate (FE TABS 325) 325 (65 Fe) MG EC tablet Take 1 tablet by mouth 2 times daily 180 tablet 1    Continuous Blood Gluc  (DEXCOM G6 ) MAURICE Test blood sugar 3 times daily 1 each 5    Continuous Blood Gluc Transmit (DEXCOM G6 TRANSMITTER) MISC Test blood sugar 3 times daily 1 each 5    Continuous Blood Gluc Sensor (DEXCOM G6 SENSOR) MISC Test blood sugar 3 times daily. 3 each 3    Continuous Blood Gluc  (DEXCOM G6 ) MAURICE Test blood sugar 3 times daily 10 each 3    citalopram (CELEXA) 20 MG tablet Take 1 tablet by mouth in the morning. 90 tablet 1    traZODone (DESYREL) 50 MG tablet Take 1 tablet by mouth nightly 90 tablet 1    rosuvastatin (CRESTOR) 40 MG tablet Take 1 tablet by mouth every evening 90 tablet 1    NIFEdipine (PROCARDIA XL) 90 MG extended release tablet Take 1 tablet by mouth in the morning. 90 tablet 1    aspirin 81 MG chewable tablet Take 81 mg by mouth      carvedilol (COREG) 3.125 MG tablet Take 3.125 mg by mouth 2 times daily (with meals)      Cholecalciferol 50 MCG (2000 UT) TABS Take 2,000 Units by mouth daily      cyanocobalamin 1000 MCG tablet Take 2,500 mcg by mouth daily      furosemide (LASIX) 40 MG tablet Take 40 mg by mouth daily      Insulin Degludec (TRESIBA FLEXTOUCH) 100 UNIT/ML SOPN Inject 12 Units into the skin      insulin lispro, 1 Unit Dial, 100 UNIT/ML SOPN 4 Units SubQ with breakfast, 4 Units SubQ with lunch, 6 Units SubQ with dinner. Indications: type 2 diabetes mellitus      isosorbide mononitrate (IMDUR) 60 MG extended release tablet Take 60 mg by mouth daily      magnesium oxide (MAG-OX) 400 MG tablet TAKE 1 TABLET BY MOUTH TWICE DAILY      nitroGLYCERIN (NITROSTAT) 0.4 MG SL tablet Place 0.4 mg under the tongue      potassium chloride (KLOR-CON M) 20 MEQ extended release tablet Take 40 mEq by mouth daily       No current facility-administered medications for this visit. Allergies   Allergen Reactions    Cephalosporins Hives and Swelling    Hydralazine Other (See Comments)     Chest pain    Sulfamethoxazole-Trimethoprim Other (See Comments)     Diarrhea     Codeine Other (See Comments)    Lisinopril Other (See Comments)     Passing out spells.  // pt sts not allergic to med      Past Medical History:   Diagnosis Date    Acute on chronic combined systolic and diastolic congestive [facility] 2/17/2021    Acute renal failure superimposed on stage 3b chronic kidney disease ([facility] 1/12/2021    CAD (coronary artery disease) 3/1/2012    MI, 3 stents    Carotid artery stenosis 12/12/2018    Carotid stenosis, asymptomatic, right 3/12/2020    Chest pain     Coronary artery disease involving native coronary artery without angina pectoris 5/21/2015    COVID-19 virus infection 1/12/2021    Depression 3/1/2012    Diabetes mellitus ([facility] 3/1/2012    oral agents. . hypo- 80's, Last A1c 6.9 in 6/2018    DM2 (diabetes mellitus, type 2) ([facility] 5/21/2015    Elevated LFTs 1/12/2021    Elevated troponin 3/2/2012    Essential hypertension 5/21/2015    External hemorrhoids without mention of complication 2751    GERD (gastroesophageal reflux disease)     HCAP (healthcare-associated pneumonia) 1/18/2021    History of left-sided carotid endarterectomy 3/12/2020    History of MI (myocardial infarction) 11/12    x2    Hypercholesterolemia     Hypertension 3/1/2012    Hypoalbuminemia 2/18/2021    Hyponatremia 1/12/2021    Hypoxemia 8/12/2020    Insomnia     Lactic acidosis 1/12/2021    Long QT interval 1/12/2021    Personal history of colonic polyps 2013    hyperplastic    Platelet inhibition due to Plavix     holding for colonoscopy per GI      Pleural effusion, bilateral 8/11/2020    Rectocele 2013    Sepsis ([facility] 1/12/2021    Tobacco abuse 3/1/2012    quit for 1 year    Tobacco use disorder     Unstable angina ([facility] 3/1/2012    ntg as needed.       Past Surgical History:   Procedure Laterality Date    CAROTID ENDARTERECTOMY Left 12/12/2018    CATARACT REMOVAL Left 09/19/2016    Dr Perlie Paget, [address] [place] [address] Right 11/07/2016    Dr Perlie Paget, [address] [facility] w/plate    COLONOSCOPY  12/17/13    Brigitte--single transverse hyperplastic polyp--7-10 year recall    FLEXIBLE SIGMOIDOSCOPY N/A 3/6/2022    SIGMOIDOSCOPY FLEXIBLE performed by Brenda Hudson MD at [facility]      x2    [address]  last stented 11/12 x 2    stent x3 , mi x 2    ORTHOPEDIC SURGERY      left elbow    HORTENSIA AND BSO (CERVIX REMOVED)      WISDOM TOOTH EXTRACTION       Social History     Tobacco Use    Smoking status: Former     Packs/day: 1.00     Years: 20.00     Pack years: 20.00     Types: Cigarettes     Quit date: 2012     Years since quittin.9    Smokeless tobacco: Never    Tobacco comments:     Quit smoking: quit  . has stopped prior also   Substance Use Topics    Alcohol use: Yes     Comment: occ    Drug use: No     Family History   Problem Relation Age of Onset    Heart Disease Father     Cancer Father         pancreatic    Thyroid Disease Mother         Multinodular Goiter    Heart Attack Mother 67        mi    Osteoporosis Mother     Heart Disease Mother     Breast Cancer Neg Hx     Thyroid Cancer Brother     Ulcerative Colitis Brother     Thyroid Cancer Sister     Cancer Sister         Pre-Cancerous dysplasia    Heart Attack Father 67        MI      Review of Systems   HENT:  Positive for congestion. Respiratory:  Positive for cough. Negative for shortness of breath. Cardiovascular:  Negative for chest pain. Gastrointestinal:  Negative for blood in stool. Musculoskeletal:  Positive for arthralgias. Negative for stiffness. Psychiatric/Behavioral:  Negative for self-injury and suicidal ideas. Physical Exam  Vitals and nursing note reviewed. Constitutional:       General: She is not in acute distress. Appearance: Normal appearance. She is not ill-appearing, toxic-appearing or diaphoretic. HENT:      Head: Normocephalic and atraumatic. Right Ear: External ear normal.      Left Ear: External ear normal.   Eyes:      General: No scleral icterus. Right eye: No discharge.          Left eye: Follow up with Dr. Brooklynn Thompson (Also with CHF). DM2: Taking current regimen (Basal, CSI) w/o problems. Well controlled. Continue current regimen. Diagnosed: About age 34. Control:  Fasting BSs: \"Usually around 100\" on Tresiba 12. Daytime BSs: Not taking. Lows: None. She does recognize hypoglycemic symptoms. HgA1C:  9/26/12 = 5.8.  1/13/21 = 10.2.  7/26/22 = 6.5. Complications:  Retinopathy: None. Last eye exam = 7/14/22 Pippa Odell). Neuropathy: None. Last foot exam (7/26/22) = Normal sensation; No ulcers; Calluses present. Nephropathy:  Microalbumin:  9/26/12 =  ==> Already on ACEI. 2/10/22 = . HTN: Taking current regimen (Lasix 40 QDay, Procardia XL 90, Coreg 3.125 BID) w/o problems. Home BPs = Not being checked. Well controlled. Continue current regimen. Asked to monitor BP at home, call me if it runs above 140/80, and bring in a log next time. HLD: Taking current regimen (Crestor 40) w/o problems. Well controlled. Continue current regimen. FLPs:  9/26/12 Untreated = [233/170/43/100] ==> Lipitor 40.  3/1/21 on Crestor 40 = [124/70/37/87].  7/26/22 on Crestor 40 = [117/53.4/47/83]. CKD4: Baseline Cr ? 1.6-2.0 and stable. Follows with Nephrology (Dr. Shelby Gold). Stable. Follow up with Dr. Shelby Gold. Depression: Taking current regimen (Celexa 20) without problems and with good control of symptoms. Well controlled. Continue current regimen. HCM: Recommend staying UTD on Covid vaccinations/boosters. Colonoscopy: 8/5/20 by Dr. Nathaly Gorman = TA; 5 Year Repeat Rec. Flu: 10/26/22. Covid: Recommend staying UTD on Covid vaccinations/boosters. F/u: 3 Months. Non-fasting labs at that visit. Neva Ortega was seen today for diabetes, joint pain, allergic rhinitis  and medicare awv. Diagnoses and all orders for this visit:    Medicare annual wellness visit, subsequent    Allergic rhinitis, unspecified seasonality, unspecified trigger  -     montelukast (SINGULAIR) 10 MG tablet;  Take 1 tablet by mouth daily    Arthralgia, unspecified joint    Coronary artery disease involving native coronary artery of native heart without angina pectoris  -     rosuvastatin (CRESTOR) 40 MG tablet; Take 1 tablet by mouth every evening    Type 2 diabetes mellitus with stage 4 chronic kidney disease, with long-term current use of insulin (HCC)    Essential hypertension    Mixed hyperlipidemia  -     rosuvastatin (CRESTOR) 40 MG tablet; Take 1 tablet by mouth every evening    Stage 4 chronic kidney disease (HCC)    Chronic diastolic congestive heart failure (HCC)    Major depressive disorder with single episode, in full remission ([facility]    Age-related osteoporosis without current pathological fracture    Needs flu shot  -     Influenza, FLUAD, (age 72 y+), IM, PF, 0.5 mL     Medicare Annual Wellness Visit    Grant Fonatine is here for Diabetes, Joint Pain, Allergic Rhinitis , and Medicare AWV    Assessment & Plan   Medicare annual wellness visit, subsequent  Allergic rhinitis, unspecified seasonality, unspecified trigger  -     montelukast (SINGULAIR) 10 MG tablet; Take 1 tablet by mouth daily, Disp-90 tablet, R-0Normal  Arthralgia, unspecified joint  Coronary artery disease involving native coronary artery of native heart without angina pectoris  -     rosuvastatin (CRESTOR) 40 MG tablet; Take 1 tablet by mouth every evening, Disp-90 tablet, R-1Normal  Type 2 diabetes mellitus with stage 4 chronic kidney disease, with long-term current use of insulin (HCC)  Essential hypertension  Mixed hyperlipidemia  -     rosuvastatin (CRESTOR) 40 MG tablet;  Take 1 tablet by mouth every evening, Disp-90 tablet, R-1Normal  Stage 4 chronic kidney disease (HCC)  Chronic diastolic congestive heart failure (HCC)  Major depressive disorder with single episode, in full remission ([facility]  Age-related osteoporosis without current pathological fracture  Needs flu shot  -     Influenza, FLUAD, (age 72 y+), IM, PF, 0.5 mL    Recommendations for Preventive Services Due: see orders and patient instructions/AVS.  Recommended screening schedule for the next 5-10 years is provided to the patient in written form: see Patient Instructions/AVS.     Return for Medicare Annual Wellness Visit in 1 year. Subjective   The following acute and/or chronic problems were also addressed today:  Joint pain. Allergic Rhinitis. Patient's complete Health Risk Assessment and screening values have been reviewed and are found in Flowsheets. The following problems were reviewed today and where indicated follow up appointments were made and/or referrals ordered. Positive Risk Factor Screenings with Interventions:                  Negative to all of the above other than hip/knee pain. Seeing Ortho. Objective   Vitals:    10/26/22 1405 10/26/22 1412   BP: (!) 147/62 139/61   Site: Left Upper Arm Left Upper Arm   Position: Sitting Sitting   Cuff Size: Small Adult Small Adult   Pulse: 55    Temp: 97.7 °F (36.5 °C)    TempSrc: Temporal    SpO2: 100%    Weight: 172 lb 3.2 oz (78.1 kg)    Height: 5' 4\" (1.626 m)       Body mass index is 29.56 kg/m². Allergies   Allergen Reactions    Cephalosporins Hives and Swelling    Hydralazine Other (See Comments)     Chest pain    Sulfamethoxazole-Trimethoprim Other (See Comments)     Diarrhea     Codeine Other (See Comments)    Lisinopril Other (See Comments)     Passing out spells. // pt sts not allergic to med      Prior to Visit Medications    Medication Sig Taking?  Authorizing Provider   rosuvastatin (CRESTOR) 40 MG tablet Take 1 tablet by mouth every evening Yes Mery Garcia MD   montelukast (SINGULAIR) 10 MG tablet Take 1 tablet by mouth daily Yes Mery Garcia MD   ferrous sulfate (FE TABS 325) 325 (65 Fe) MG EC tablet Take 1 tablet by mouth 2 times daily Yes Mery Garcia MD   Continuous Blood Gluc Transmit (DEXCOM G6 TRANSMITTER) MISC Test blood sugar 3 times daily Yes Mery Garcia MD   Continuous Blood Gluc Sensor (DEXCOM G6 SENSOR) MISC Test blood sugar 3 times daily. Yes Alexandre Silverio MD   Continuous Blood Gluc  (DEXCOM G6 ) MAURICE Test blood sugar 3 times daily Yes Alexandre Silverio MD   citalopram (CELEXA) 20 MG tablet Take 1 tablet by mouth in the morning. Yes Alexandre Silverio MD   traZODone (DESYREL) 50 MG tablet Take 1 tablet by mouth nightly Yes Alexandre Silverio MD   NIFEdipine (PROCARDIA XL) 90 MG extended release tablet Take 1 tablet by mouth in the morning. Yes Alexandre Silverio MD   aspirin 81 MG chewable tablet Take 81 mg by mouth Yes Ar Automatic Reconciliation   carvedilol (COREG) 3.125 MG tablet Take 3.125 mg by mouth 2 times daily (with meals) Yes Ar Automatic Reconciliation   Cholecalciferol 50 MCG (2000 UT) TABS Take 2,000 Units by mouth daily Yes Ar Automatic Reconciliation   cyanocobalamin 1000 MCG tablet Take 2,500 mcg by mouth daily Yes Ar Automatic Reconciliation   furosemide (LASIX) 40 MG tablet Take 40 mg by mouth daily Yes Ar Automatic Reconciliation   Insulin Degludec (TRESIBA FLEXTOUCH) 100 UNIT/ML SOPN Inject 12 Units into the skin Yes Ar Automatic Reconciliation   insulin lispro, 1 Unit Dial, 100 UNIT/ML SOPN 4 Units SubQ with breakfast, 4 Units SubQ with lunch, 6 Units SubQ with dinner.  Indications: type 2 diabetes mellitus Yes Ar Automatic Reconciliation   isosorbide mononitrate (IMDUR) 60 MG extended release tablet Take 60 mg by mouth daily Yes Ar Automatic Reconciliation   magnesium oxide (MAG-OX) 400 MG tablet TAKE 1 TABLET BY MOUTH TWICE DAILY Yes Ar Automatic Reconciliation   nitroGLYCERIN (NITROSTAT) 0.4 MG SL tablet Place 0.4 mg under the tongue Yes Ar Automatic Reconciliation   Continuous Blood Gluc  (DEXCOM G6 ) MAURICE Test blood sugar 3 times daily  Alexandre Silverio MD   potassium chloride (KLOR-CON M) 20 MEQ extended release tablet Take 40 mEq by mouth daily  Patient not taking: Reported on 10/26/2022  Ar Automatic Reconciliation       CareTeam (Including outside providers/suppliers regularly involved in providing care):   Patient Care Team:  Omkar Topete MD as PCP - Cole Parks MD as PCP - [facility] Empaneled Provider     Reviewed and updated this visit:  Tobacco  Allergies  Meds  Problems  Med Hx  Surg Hx  Soc Hx  Fam Hx

## 2022-10-27 ENCOUNTER — OFFICE VISIT (OUTPATIENT)
Dept: ORTHOPEDIC SURGERY | Age: 72
End: 2022-10-27
Payer: MEDICARE

## 2022-10-27 DIAGNOSIS — M25.551 RIGHT HIP PAIN: Primary | ICD-10-CM

## 2022-10-27 PROCEDURE — G8399 PT W/DXA RESULTS DOCUMENT: HCPCS | Performed by: ORTHOPAEDIC SURGERY

## 2022-10-27 PROCEDURE — 1090F PRES/ABSN URINE INCON ASSESS: CPT | Performed by: ORTHOPAEDIC SURGERY

## 2022-10-27 PROCEDURE — G8427 DOCREV CUR MEDS BY ELIG CLIN: HCPCS | Performed by: ORTHOPAEDIC SURGERY

## 2022-10-27 PROCEDURE — 1036F TOBACCO NON-USER: CPT | Performed by: ORTHOPAEDIC SURGERY

## 2022-10-27 PROCEDURE — G8484 FLU IMMUNIZE NO ADMIN: HCPCS | Performed by: ORTHOPAEDIC SURGERY

## 2022-10-27 PROCEDURE — 99214 OFFICE O/P EST MOD 30 MIN: CPT | Performed by: ORTHOPAEDIC SURGERY

## 2022-10-27 PROCEDURE — 1123F ACP DISCUSS/DSCN MKR DOCD: CPT | Performed by: ORTHOPAEDIC SURGERY

## 2022-10-27 PROCEDURE — 3017F COLORECTAL CA SCREEN DOC REV: CPT | Performed by: ORTHOPAEDIC SURGERY

## 2022-10-27 PROCEDURE — G8417 CALC BMI ABV UP PARAM F/U: HCPCS | Performed by: ORTHOPAEDIC SURGERY

## 2022-10-27 RX ORDER — TRAMADOL HYDROCHLORIDE 50 MG/1
50 TABLET ORAL EVERY 4 HOURS PRN
Qty: 42 TABLET | Refills: 0 | Status: SHIPPED | OUTPATIENT
Start: 2022-10-27 | End: 2022-11-03

## 2022-10-27 NOTE — PROGRESS NOTES
Patient ID:    Catherine Victor  [id number]  35 y.o.  1950    Today: October 27, 2022          Chief Complaint: Right hip pain        Patient reports longstanding history of right hip pain. The pain is predominately localized to the anterior groin and is described as a  general ache with occasional sharp pain. They rate the pain ranging from 3-8 on the pain scale occurring in a cyclical fashion with periods of acute exacerbation. Symptoms are exacerbated with getting into and out of their vehicle, crossing their legs, and attempting to put on socks/shoes. No significant pain noted with laying on the affected hip. The patient's hip pain cause moderate to severe limitations in the activities of rising from bed, putting on socks/shoes, rising from a sitting position, bending towards the floor and kneeling, twisting and pivoting on the limb and squatting. In addition, at times the patient reports extreme difficulty with these activities. No numbness of tingling going down the extremity. Patient has attempted prior conservative treatment including medications, activity modification, strengthening exercise, weight loss (if applicable), +/-hip injections consisting or either trochanteric bursitis injections or intra-articular injections. Past Medical History:  Past Medical History:   Diagnosis Date    Acute on chronic combined systolic and diastolic congestive heart failure ([address] 2/17/2021    Acute renal failure superimposed on stage 3b chronic kidney disease ([address] 1/12/2021    CAD (coronary artery disease) 3/1/2012    MI, 3 stents    Carotid artery stenosis 12/12/2018    Carotid stenosis, asymptomatic, right 3/12/2020    Chest pain     Coronary artery disease involving native coronary artery without angina pectoris 5/21/2015    COVID-19 virus infection 1/12/2021    Depression 3/1/2012    Diabetes mellitus ([address] 3/1/2012    oral agents. . hypo- 80's, Last A1c 6.9 in 6/2018    DM2 (diabetes mellitus, type 2) ([facility] 5/21/2015    Elevated LFTs 1/12/2021    Elevated troponin 3/2/2012    Essential hypertension 5/21/2015    External hemorrhoids without mention of complication 8627    GERD (gastroesophageal reflux disease)     HCAP (healthcare-associated pneumonia) 1/18/2021    History of left-sided carotid endarterectomy 3/12/2020    History of MI (myocardial infarction) 11/12    x2    Hypercholesterolemia     Hypertension 3/1/2012    Hypoalbuminemia 2/18/2021    Hyponatremia 1/12/2021    Hypoxemia 8/12/2020    Insomnia     Lactic acidosis 1/12/2021    Long QT interval 1/12/2021    Personal history of colonic polyps 2013    hyperplastic    Platelet inhibition due to Plavix     holding for colonoscopy per GI [facility]     Pleural effusion, bilateral 8/11/2020    Rectocele 2013    Sepsis ([facility] 1/12/2021    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 Past Surgical History:  Past Surgical History:   Procedure Laterality Date    CAROTID ENDARTERECTOMY Left 12/12/2018    CATARACT REMOVAL Left 09/19/2016    Dr Claus Nance, [facility] Right 11/07/2016    Dr Claus Nance, [address] [facility] w/plate    COLONOSCOPY  12/17/13    Brigitte--single transverse hyperplastic polyp--7-10 year recall    FLEXIBLE SIGMOIDOSCOPY N/A 3/6/2022    SIGMOIDOSCOPY FLEXIBLE performed by Quinn Phan MD at [facility]      x2    [address]  last stented 11/12 x 2    stent x3 , mi x 2    ORTHOPEDIC SURGERY      left elbow    HORTENSIA AND BSO (CERVIX REMOVED)      WISDOM TOOTH EXTRACTION          Medications:     Prior to Admission medications    Medication Sig Start Date End Date Taking?  Authorizing Provider   rosuvastatin (CRESTOR) 40 MG tablet Take 1 tablet by mouth every evening 10/26/22   Brenton Padilla MD   montelukast (SINGULAIR) 10 MG tablet Take 1 tablet by mouth daily 10/26/22   Ashlyn Gerard MD   ferrous sulfate (FE TABS 325) 325 (65 Fe) MG EC tablet Take 1 tablet by mouth 2 times daily 9/26/22   Ashlyn Gerard MD   Continuous Blood Gluc  (DEXCOM G6 ) MAURICE Test blood sugar 3 times daily 9/1/22   Ashlyn Gerard MD   Continuous Blood Gluc Transmit (DEXCOM G6 TRANSMITTER) MISC Test blood sugar 3 times daily 9/1/22   Ashlyn Gerard MD   Continuous Blood Gluc Sensor (DEXCOM G6 SENSOR) MISC Test blood sugar 3 times daily. 9/1/22   Ashlyn Gerard MD   Continuous Blood Gluc  (DEXCOM G6 ) MAURICE Test blood sugar 3 times daily 8/31/22   Ashlyn Gerard MD   citalopram (CELEXA) 20 MG tablet Take 1 tablet by mouth in the morning. 7/26/22   Ashlyn Gerard MD   traZODone (DESYREL) 50 MG tablet Take 1 tablet by mouth nightly 7/26/22   Ashlyn Gerard MD   NIFEdipine (PROCARDIA XL) 90 MG extended release tablet Take 1 tablet by mouth in the morning. 7/26/22   Ashlyn Gerard MD   aspirin 81 MG chewable tablet Take 81 mg by mouth    Ar Automatic Reconciliation   carvedilol (COREG) 3.125 MG tablet Take 3.125 mg by mouth 2 times daily (with meals) 1/12/22   Ar Automatic Reconciliation   Cholecalciferol 50 MCG (2000 UT) TABS Take 2,000 Units by mouth daily    Ar Automatic Reconciliation   cyanocobalamin 1000 MCG tablet Take 2,500 mcg by mouth daily    Ar Automatic Reconciliation   furosemide (LASIX) 40 MG tablet Take 40 mg by mouth daily 1/12/22   Ar Automatic Reconciliation   Insulin Degludec (TRESIBA FLEXTOUCH) 100 UNIT/ML SOPN Inject 12 Units into the skin 2/7/22   Ar Automatic Reconciliation   insulin lispro, 1 Unit Dial, 100 UNIT/ML SOPN 4 Units SubQ with breakfast, 4 Units SubQ with lunch, 6 Units SubQ with dinner.  Indications: type 2 diabetes mellitus 11/3/21   Ar Automatic Reconciliation   isosorbide mononitrate (IMDUR) 60 MG extended release tablet Take 60 mg by mouth daily 2/22/22   Ar Automatic Reconciliation   magnesium oxide (MAG-OX) 400 MG tablet TAKE 1 TABLET BY MOUTH TWICE DAILY 16   Ar Automatic Reconciliation   nitroGLYCERIN (NITROSTAT) 0.4 MG SL tablet Place 0.4 mg under the tongue 18   Ar Automatic Reconciliation   potassium chloride (KLOR-CON M) 20 MEQ extended release tablet Take 40 mEq by mouth daily  Patient not taking: Reported on 10/26/2022 3/13/22   Ar Automatic Reconciliation       Family History:     Family History   Problem Relation Age of Onset    Heart Disease Father     Cancer Father         pancreatic    Thyroid Disease Mother         Multinodular Goiter    Heart Attack Mother 67        mi    Osteoporosis Mother     Heart Disease Mother     Breast Cancer Neg Hx     Thyroid Cancer Brother     Ulcerative Colitis Brother     Thyroid Cancer Sister     Cancer Sister         Pre-Cancerous dysplasia    Heart Attack Father 67        MI       Social History:      Social History     Tobacco Use    Smoking status: Former     Packs/day: 1.00     Years: 20.00     Pack years: 20.00     Types: Cigarettes     Quit date: 2012     Years since quittin.9    Smokeless tobacco: Never    Tobacco comments:     Quit smoking: quit  . has stopped prior also   Substance Use Topics    Alcohol use: Yes     Comment: occ         Allergies: Allergies   Allergen Reactions    Cephalosporins Hives and Swelling    Hydralazine Other (See Comments)     Chest pain    Sulfamethoxazole-Trimethoprim Other (See Comments)     Diarrhea     Codeine Other (See Comments)    Lisinopril Other (See Comments)     Passing out spells. // pt sts not allergic to med           ROS:  Review of systems has been performed and reviewed. Pertinent positives and negatives pertaining to orthopaedic issues has been noted. Non-orthopaedic issues are deferred to PCP      Objective:     Vitals: There were no vitals taken for this visit. General: Awake and alert. AAOx3. The patient ambulates with an antalgic gait.   Psych: Mood appropriate  HEENT: Normocephalic, atraumatic  Neck: Supple without obvious mass  CV/Pulm: Breathing even and unlabored  Abdomen: Nondistended on gross examination  Circulation: Normal without obvious arterial or venous deficiency. Pulses palpable bilateral lower extremities. Lymphatic: No obvious lymphedema or lymphadenopathy noted. Skin: No obvious lacerations, lesions or rash noted. Musculoskeletal: No obvious deformity or pain with active movement of the upper extremities. Neuro: No obvious deficiency or weakness noted of the upper or lower extremities    Hip Exam:   Exam of the hip reveals anterior groin pain with resisted leg raise and FADIR testing. Internal and external rotation is decreased in the hip. No evidence of arterial of venous insufficiency. DP/PT pulses appreciable. Able to plantar- and dorsi-flex the foot/ankle. Imaging:    Images obtained today or prior    XR HIP RT W OR WO PELVIS 2-3 VWS  Views Obtained: AP pelvis, lateral right hip  Indication: Right Hip Pain  Findings:  Xrays including A/P Pelvis and lateral of the hip(s) were reviewed which demonstrate severe joint space narrowing of the right hip. There is osteophyte formation around the acetabulum and femoral head. Subchondral sclerosis noted. No obvious fracture appreciated. There is no acute bony abnormality such as malignancy appreciated. Impression: Osteoarthritis of the right hip    Bethel Gee MD    Assessment:   Hip Arthritis    Plan:  Differential diagnosis and treatment options have been discussed with the patient. Risks, benefits and alternatives of each were discussed and patient questions answered. At this point the patient would like to proceed with total hip replacement    Treatment:    Total Hip Replacement- The patient has failed previous conservative treatment for this condition. The patient has pain in the hip which causes daily symptoms which affects the patient's activities of daily living and quality of life.  The risks, benefits, alternatives and possible complications of right total hip arthroplasty have been discussed with the patient including but not limited to infection, bleeding, damage to nerves and blood vessels with particular attention given to risk of damage of the femoral, obturator, lateral femoral cutaneous, superior gluteal, inferior gluteal, and sciatic nerve, risk of dislocation, fracture both intra-op and post-op, limb length inequality, polyethylene wear, implant loosening, risk for continued pain, and risk for revision surgery secondary to but not limited to all of these discussed risks. Further we discussed risk of venous thrombo-embolism including deep vein thrombosis and pulmonary embolism despite being on prophylaxis. We have also previously discussed the potential of morbidity and mortality associated with, but not limited to, the actual surgical procedure, anesthesia, prior medical conditions, and/or the administration of medications perioperatively. I have made no guarantees to the patient regarding outcomes and the patient has voiced understanding of that. The patient has been given the opportunity to ask questions all of which have been answered and the patient wishes to proceed.         Signed By: Darryl Urban MD  October 27, 2022

## 2022-10-31 DIAGNOSIS — M16.11 PRIMARY OSTEOARTHRITIS OF RIGHT HIP: Primary | ICD-10-CM

## 2022-11-01 ENCOUNTER — TELEPHONE (OUTPATIENT)
Dept: CARDIOLOGY CLINIC | Age: 72
End: 2022-11-01

## 2022-11-01 NOTE — TELEPHONE ENCOUNTER
Cardiac Clearance        Physician or Practice Requesting: Dr. Inga Wetzel: Kody Browne Phone Number: [phone]  Fax Number: [phone]  Date of Surgery/Procedure: 12/5/2022  Type of Surgery or Procedure: RT ARIES  Type of Anesthesia: Spinal  Type of Clearance Requested: Cardiac Clearance  Medication to Hold: N/a  Days to Hold: N/a

## 2022-11-01 NOTE — TELEPHONE ENCOUNTER
Per Dr. Charli Saucedo: Will address preoperative risk stratification at upcoming visit on 11/14/2022.

## 2022-11-15 ENCOUNTER — PREP FOR PROCEDURE (OUTPATIENT)
Dept: ORTHOPEDIC SURGERY | Age: 72
End: 2022-11-15

## 2022-11-15 DIAGNOSIS — M16.11 PRIMARY OSTEOARTHRITIS OF RIGHT HIP: Primary | ICD-10-CM

## 2022-11-15 RX ORDER — SODIUM CHLORIDE 9 MG/ML
INJECTION, SOLUTION INTRAVENOUS PRN
OUTPATIENT
Start: 2022-11-15

## 2022-11-15 RX ORDER — SODIUM CHLORIDE 0.9 % (FLUSH) 0.9 %
5-40 SYRINGE (ML) INJECTION EVERY 12 HOURS SCHEDULED
OUTPATIENT
Start: 2022-11-15

## 2022-11-15 RX ORDER — SODIUM CHLORIDE 0.9 % (FLUSH) 0.9 %
5-40 SYRINGE (ML) INJECTION PRN
OUTPATIENT
Start: 2022-11-15

## 2022-11-16 ENCOUNTER — OFFICE VISIT (OUTPATIENT)
Dept: CARDIOLOGY CLINIC | Age: 72
End: 2022-11-16
Payer: MEDICARE

## 2022-11-16 VITALS
HEART RATE: 60 BPM | SYSTOLIC BLOOD PRESSURE: 136 MMHG | HEIGHT: 64 IN | BODY MASS INDEX: 29.6 KG/M2 | DIASTOLIC BLOOD PRESSURE: 58 MMHG | WEIGHT: 173.4 LBS

## 2022-11-16 DIAGNOSIS — I25.10 CORONARY ARTERY DISEASE INVOLVING NATIVE CORONARY ARTERY OF NATIVE HEART WITHOUT ANGINA PECTORIS: ICD-10-CM

## 2022-11-16 DIAGNOSIS — I65.21 CAROTID STENOSIS, ASYMPTOMATIC, RIGHT: ICD-10-CM

## 2022-11-16 DIAGNOSIS — Z98.61 S/P PTCA (PERCUTANEOUS TRANSLUMINAL CORONARY ANGIOPLASTY): ICD-10-CM

## 2022-11-16 DIAGNOSIS — I51.89 DIASTOLIC DYSFUNCTION: ICD-10-CM

## 2022-11-16 DIAGNOSIS — E78.2 MIXED HYPERLIPIDEMIA: ICD-10-CM

## 2022-11-16 DIAGNOSIS — I10 ESSENTIAL HYPERTENSION: Primary | ICD-10-CM

## 2022-11-16 PROCEDURE — 1036F TOBACCO NON-USER: CPT | Performed by: INTERNAL MEDICINE

## 2022-11-16 PROCEDURE — 93000 ELECTROCARDIOGRAM COMPLETE: CPT | Performed by: INTERNAL MEDICINE

## 2022-11-16 PROCEDURE — 1090F PRES/ABSN URINE INCON ASSESS: CPT | Performed by: INTERNAL MEDICINE

## 2022-11-16 PROCEDURE — 3017F COLORECTAL CA SCREEN DOC REV: CPT | Performed by: INTERNAL MEDICINE

## 2022-11-16 PROCEDURE — G8399 PT W/DXA RESULTS DOCUMENT: HCPCS | Performed by: INTERNAL MEDICINE

## 2022-11-16 PROCEDURE — G8417 CALC BMI ABV UP PARAM F/U: HCPCS | Performed by: INTERNAL MEDICINE

## 2022-11-16 PROCEDURE — 1123F ACP DISCUSS/DSCN MKR DOCD: CPT | Performed by: INTERNAL MEDICINE

## 2022-11-16 PROCEDURE — 3074F SYST BP LT 130 MM HG: CPT | Performed by: INTERNAL MEDICINE

## 2022-11-16 PROCEDURE — 3078F DIAST BP <80 MM HG: CPT | Performed by: INTERNAL MEDICINE

## 2022-11-16 PROCEDURE — G8484 FLU IMMUNIZE NO ADMIN: HCPCS | Performed by: INTERNAL MEDICINE

## 2022-11-16 PROCEDURE — 99214 OFFICE O/P EST MOD 30 MIN: CPT | Performed by: INTERNAL MEDICINE

## 2022-11-16 PROCEDURE — G8428 CUR MEDS NOT DOCUMENT: HCPCS | Performed by: INTERNAL MEDICINE

## 2022-11-16 RX ORDER — CARVEDILOL 3.12 MG/1
3.12 TABLET ORAL 2 TIMES DAILY WITH MEALS
Qty: 180 TABLET | Refills: 3 | Status: SHIPPED | OUTPATIENT
Start: 2022-11-16

## 2022-11-16 RX ORDER — ISOSORBIDE MONONITRATE 60 MG/1
60 TABLET, EXTENDED RELEASE ORAL DAILY
Qty: 90 TABLET | Refills: 3 | Status: SHIPPED | OUTPATIENT
Start: 2022-11-16

## 2022-11-16 RX ORDER — FUROSEMIDE 40 MG/1
40 TABLET ORAL DAILY
Qty: 90 TABLET | Refills: 3 | Status: SHIPPED | OUTPATIENT
Start: 2022-11-16

## 2022-11-16 ASSESSMENT — ENCOUNTER SYMPTOMS
NAUSEA: 0
SHORTNESS OF BREATH: 0
VOMITING: 0
GASTROINTESTINAL NEGATIVE: 1
ABDOMINAL PAIN: 0
COUGH: 0
WHEEZING: 0

## 2022-11-16 NOTE — TELEPHONE ENCOUNTER
Perioperative Risk Assessment  - patient in preparation for operative management of hip OA   - avoid hypotension, closely monitor vitals in the perioperative period  - at this time patient without chest pain or dyspnea   - low risk for orthopedic surgery   - if required by orthopedics low risk to hold aspirin 7 days prior to OR , then resume ASAP once ok with surgeon. - EKG personally reviewed in office today demonstrates Sinus  Rhythm   RATE 60 BPM, Poor R-wave progression -nonspecific -consider old anterior infarct. Can not rule out anteroseptal MI age undetermined. ABNORMAL ECG.

## 2022-11-16 NOTE — PROGRESS NOTES
[address], [address] [place] [address]      Patient:  Ciro Valenzuela  1950       SUBJECTIVE:  Ciro Valenzuela is a  74 y.o. female seen for a follow up visit regarding the following:     Chief Complaint   Patient presents with    Cardiac Clearance     Right hip replacement December 5     CC: CAD, HFpEF follow up     HPI:   66 y.o. female with a history of CAD with prior stents LAD 2008, 2012, HFpEF, HTN, DM  who is here for follow up. Patient was last seen in office on 4/4/22 , since then reports that she has had not chest pain, dyspnea, leg swelling. Hip pain  worse with activity. Hip replacement planned for Dec 2022. Prior to worsening hip pain was able to ambulate regularly, now limited by hip pain. Takes her medications as directed without missing doses. No other complaints at this time. Cardiovascular Testing:  - Echo 2/16/2021: LVEF 55 to 60%, RV normal, mild/moderate MR, trace TR  - SPECT 9/23/20: normal perfusion, low risk scan, LVEF >70%    Past medical history, past surgical history, family history, social history, and medications were all reviewed with the patient today and updated as necessary. Patient Active Problem List    Diagnosis Date Noted    Primary osteoarthritis of right hip 10/31/2022     Added automatically from request for surgery [id number]      Chronic diastolic congestive heart failure ([address] 04/19/2021     Heart Failure (HFpEF), EF 22-77%, with diastolic dysfunction, ECHO   2/16/2021        Mixed hyperlipidemia 10/21/2020    Type 2 diabetes mellitus with stage 4 chronic kidney disease, with long-term current use of insulin ([address] 10/11/2018    Stage 4 chronic kidney disease ([address] 09/15/2016    Essential hypertension 09/15/2016    Age-related osteoporosis without current pathological fracture 09/15/2016     Declined Tx which I recommended to lower risk for morbidity/mortality.        Primary insomnia 09/15/2016 Major depressive disorder with single episode, in full remission ([facility] 09/15/2016    Coronary artery disease involving native coronary artery of native heart without angina pectoris 09/04/2013       Family History   Problem Relation Age of Onset    Heart Disease Father     Cancer Father         pancreatic    Thyroid Disease Mother         Multinodular Goiter    Heart Attack Mother 67        mi    Osteoporosis Mother     Heart Disease Mother     Breast Cancer Neg Hx     Thyroid Cancer Brother     Ulcerative Colitis Brother     Thyroid Cancer Sister     Cancer Sister         Pre-Cancerous dysplasia    Heart Attack Father 67        MI       Social History     Tobacco Use    Smoking status: Former     Packs/day: 1.00     Years: 20.00     Pack years: 20.00     Types: Cigarettes     Quit date: 11/9/2012     Years since quitting: 10.0    Smokeless tobacco: Never    Tobacco comments:     Quit smoking: quit 2012 . has stopped prior also   Substance Use Topics    Alcohol use: Yes     Comment: occ     Review of Systems   Constitutional: Negative for chills, fever and night sweats. Cardiovascular:  Negative for chest pain, irregular heartbeat, leg swelling, near-syncope and syncope. Respiratory:  Negative for cough, shortness of breath and wheezing. Musculoskeletal:  Positive for arthritis and joint pain (hips). Gastrointestinal: Negative. Negative for abdominal pain, nausea and vomiting. Neurological: Negative. Negative for dizziness and light-headedness. PHYSICAL EXAM:  BP (!) 136/58   Pulse 60   Ht 5' 4\" (1.626 m)   Wt 173 lb 6.4 oz (78.7 kg)   BMI 29.76 kg/m²     Physical Exam  Vitals reviewed. Constitutional:       General: She is not in acute distress. Appearance: She is not toxic-appearing. HENT:      Head: Normocephalic and atraumatic. Cardiovascular:      Rate and Rhythm: Normal rate and regular rhythm. Heart sounds: Murmur (faint apical systolic murmur) heard. No friction rub. No gallop. Pulmonary:      Effort: Pulmonary effort is normal. No respiratory distress. Breath sounds: Normal breath sounds. No stridor. No wheezing or rales. Abdominal:      General: Abdomen is flat. There is no distension. Palpations: Abdomen is soft. Tenderness: There is no abdominal tenderness. Musculoskeletal:         General: No swelling. Right lower leg: No edema. Left lower leg: No edema. Skin:     General: Skin is warm and dry. Coloration: Skin is not jaundiced or pale. Findings: No bruising. Neurological:      General: No focal deficit present. Mental Status: She is alert and oriented to person, place, and time. Psychiatric:         Mood and Affect: Mood normal.         Behavior: Behavior normal.       Medical problems, medical history, and test results were reviewed with the patient today. No results found for this or any previous visit (from the past 168 hour(s)). Current Outpatient Medications:     rosuvastatin (CRESTOR) 40 MG tablet, Take 1 tablet by mouth every evening, Disp: 90 tablet, Rfl: 1    ferrous sulfate (FE TABS 325) 325 (65 Fe) MG EC tablet, Take 1 tablet by mouth 2 times daily, Disp: 180 tablet, Rfl: 1    Continuous Blood Gluc Transmit (DEXCOM G6 TRANSMITTER) MISC, Test blood sugar 3 times daily, Disp: 1 each, Rfl: 5    Continuous Blood Gluc Sensor (DEXCOM G6 SENSOR) MISC, Test blood sugar 3 times daily. , Disp: 3 each, Rfl: 3    Continuous Blood Gluc  (DEXCOM G6 ) MAURICE, Test blood sugar 3 times daily, Disp: 10 each, Rfl: 3    citalopram (CELEXA) 20 MG tablet, Take 1 tablet by mouth in the morning., Disp: 90 tablet, Rfl: 1    traZODone (DESYREL) 50 MG tablet, Take 1 tablet by mouth nightly, Disp: 90 tablet, Rfl: 1    NIFEdipine (PROCARDIA XL) 90 MG extended release tablet, Take 1 tablet by mouth in the morning., Disp: 90 tablet, Rfl: 1    aspirin 81 MG chewable tablet, Take 81 mg by mouth, Disp: , Rfl: carvedilol (COREG) 3.125 MG tablet, Take 3.125 mg by mouth 2 times daily (with meals), Disp: , Rfl:     Cholecalciferol 50 MCG (2000 UT) TABS, Take 2,000 Units by mouth daily, Disp: , Rfl:     cyanocobalamin 1000 MCG tablet, Take 2,500 mcg by mouth daily, Disp: , Rfl:     furosemide (LASIX) 40 MG tablet, Take 40 mg by mouth daily, Disp: , Rfl:     Insulin Degludec (TRESIBA FLEXTOUCH) 100 UNIT/ML SOPN, Inject 12 Units into the skin, Disp: , Rfl:     insulin lispro, 1 Unit Dial, 100 UNIT/ML SOPN, 4 Units SubQ with breakfast, 4 Units SubQ with lunch, 6 Units SubQ with dinner. Indications: type 2 diabetes mellitus, Disp: , Rfl:     isosorbide mononitrate (IMDUR) 60 MG extended release tablet, Take 60 mg by mouth daily, Disp: , Rfl:     magnesium oxide (MAG-OX) 400 MG tablet, TAKE 1 TABLET BY MOUTH TWICE DAILY, Disp: , Rfl:     nitroGLYCERIN (NITROSTAT) 0.4 MG SL tablet, Place 0.4 mg under the tongue, Disp: , Rfl:     montelukast (SINGULAIR) 10 MG tablet, Take 1 tablet by mouth daily (Patient not taking: Reported on 11/16/2022), Disp: 90 tablet, Rfl: 0    potassium chloride (KLOR-CON M) 20 MEQ extended release tablet, Take 40 mEq by mouth daily (Patient not taking: No sig reported), Disp: , Rfl:      ASSESSMENT/PLAN:    Cardiovascular Testing:  - Echo 2/16/2021: LVEF 55 to 60%, RV normal, mild/moderate MR, trace TR  - SPECT 9/23/20: normal perfusion, low risk scan, LVEF >70%     1. Coronary artery disease involving native coronary artery of native heart without angina pectoris  2. S/P PTCA (percutaneous transluminal coronary angioplasty)  - Coreg 3.125 twice daily, imdur  60, rosuvastatin 40  - No recent chest pain, exercising regularly prior to admission. Increase activity as possible to improve cardiopulmonary endurance. 3. Essential hypertension  - controlled at this time , elevated during admit      4.  Mixed hyperlipidemia  - cont rosuvastatin 40  - Labs from PCP dated 7/26/22 personally reivewed, Total chol 117, LDL 53 at goal, HDL 47.      5. Diastolic dysfunction   - appears well compensated at this time   - HR and BP controlled  - PRN lasix, daily spironolactone 25     6. Carotid stenosis, asymptomatic, right  - ASA, rosuvastatin indicated     Mildly elevated LFTs on recent labs, if still elevated at follow up or become greater than 3 x upper limits of normal will need to hold or decrease statin. Perioperative Risk Assessment  - patient in preparation for operative management of hip OA   - avoid hypotension, closely monitor vitals in the perioperative period  - at this time patient without chest pain or dyspnea   - low risk for orthopedic surgery   - if required by orthopedics low risk to hold aspirin 7 days prior to OR , then resume ASAP once ok with surgeon. - EKG personally reviewed in office today demonstrates Sinus  Rhythm   RATE 60 BPM, Poor R-wave progression -nonspecific -consider old anterior infarct. Can not rule out anteroseptal MI age undetermined. ABNORMAL ECG. Problem List Items Addressed This Visit          Circulatory    Essential hypertension - Primary    Relevant Orders    EKG 12 Lead (Completed)    Coronary artery disease involving native coronary artery of native heart without angina pectoris       Other    Mixed hyperlipidemia     Other Visit Diagnoses       S/P PTCA (percutaneous transluminal coronary angioplasty)        Diastolic dysfunction        Carotid stenosis, asymptomatic, right                Instructed patient go to ER or call 911/EMS should symptoms recur or worsen. Patient has been instructed and agrees to call our office with any issues or other concerns related to their cardiac condition(s) and/or complaint(s). No follow-ups on file.     Jeannette Palmer DO  11/16/2022 1:20 PM

## 2022-11-21 ENCOUNTER — HOSPITAL ENCOUNTER (OUTPATIENT)
Dept: REHABILITATION | Age: 72
Discharge: HOME | End: 2022-11-24
Payer: MEDICARE

## 2022-11-21 ENCOUNTER — HOSPITAL ENCOUNTER (OUTPATIENT)
Dept: SURGERY | Age: 72
Discharge: HOME | End: 2022-11-24
Payer: MEDICARE

## 2022-11-21 VITALS
WEIGHT: 170 LBS | BODY MASS INDEX: 29.02 KG/M2 | TEMPERATURE: 98.1 F | DIASTOLIC BLOOD PRESSURE: 64 MMHG | RESPIRATION RATE: 18 BRPM | OXYGEN SATURATION: 96 % | HEIGHT: 64 IN | HEART RATE: 71 BPM | SYSTOLIC BLOOD PRESSURE: 132 MMHG

## 2022-11-21 DIAGNOSIS — M16.11 PRIMARY OSTEOARTHRITIS OF RIGHT HIP: ICD-10-CM

## 2022-11-21 LAB
ALBUMIN SERPL-MCNC: 3.7 G/DL (ref 3.2–4.6)
ANION GAP SERPL CALC-SCNC: 6 MMOL/L (ref 2–11)
APTT PPP: 29.7 SEC (ref 24.5–34.2)
BACTERIA SPEC CULT: NORMAL
BASOPHILS # BLD: 0 K/UL (ref 0–0.2)
BASOPHILS NFR BLD: 0 % (ref 0–2)
BUN SERPL-MCNC: 32 MG/DL (ref 8–23)
CALCIUM SERPL-MCNC: 9 MG/DL (ref 8.3–10.4)
CHLORIDE SERPL-SCNC: 105 MMOL/L (ref 101–110)
CO2 SERPL-SCNC: 27 MMOL/L (ref 21–32)
CREAT SERPL-MCNC: 2.23 MG/DL (ref 0.6–1)
DIFFERENTIAL METHOD BLD: NORMAL
EOSINOPHIL # BLD: 0.3 K/UL (ref 0–0.8)
EOSINOPHIL NFR BLD: 4 % (ref 0.5–7.8)
ERYTHROCYTE [DISTWIDTH] IN BLOOD BY AUTOMATED COUNT: 12 % (ref 11.9–14.6)
EST. AVERAGE GLUCOSE BLD GHB EST-MCNC: 148 MG/DL
GLUCOSE SERPL-MCNC: 223 MG/DL (ref 65–100)
HBA1C MFR BLD: 6.8 % (ref 4.8–5.6)
HCT VFR BLD AUTO: 39.8 % (ref 35.8–46.3)
HGB BLD-MCNC: 13 G/DL (ref 11.7–15.4)
IMM GRANULOCYTES # BLD AUTO: 0 K/UL (ref 0–0.5)
IMM GRANULOCYTES NFR BLD AUTO: 0 % (ref 0–5)
INR PPP: 1.1
LYMPHOCYTES # BLD: 1.2 K/UL (ref 0.5–4.6)
LYMPHOCYTES NFR BLD: 17 % (ref 13–44)
MCH RBC QN AUTO: 31 PG (ref 26.1–32.9)
MCHC RBC AUTO-ENTMCNC: 32.7 G/DL (ref 31.4–35)
MCV RBC AUTO: 95 FL (ref 82–102)
MONOCYTES # BLD: 0.8 K/UL (ref 0.1–1.3)
MONOCYTES NFR BLD: 11 % (ref 4–12)
NEUTS SEG # BLD: 4.9 K/UL (ref 1.7–8.2)
NEUTS SEG NFR BLD: 67 % (ref 43–78)
NRBC # BLD: 0 K/UL (ref 0–0.2)
PLATELET # BLD AUTO: 284 K/UL (ref 150–450)
PMV BLD AUTO: 9.4 FL (ref 9.4–12.3)
POTASSIUM SERPL-SCNC: 3.6 MMOL/L (ref 3.5–5.1)
PROTHROMBIN TIME: 14.5 SEC (ref 12.6–14.3)
RBC # BLD AUTO: 4.19 M/UL (ref 4.05–5.2)
SERVICE CMNT-IMP: NORMAL
SODIUM SERPL-SCNC: 138 MMOL/L (ref 133–143)
WBC # BLD AUTO: 7.3 K/UL (ref 4.3–11.1)

## 2022-11-21 PROCEDURE — 97161 PT EVAL LOW COMPLEX 20 MIN: CPT

## 2022-11-21 PROCEDURE — 85025 COMPLETE CBC W/AUTO DIFF WBC: CPT

## 2022-11-21 PROCEDURE — 85730 THROMBOPLASTIN TIME PARTIAL: CPT

## 2022-11-21 PROCEDURE — 82040 ASSAY OF SERUM ALBUMIN: CPT

## 2022-11-21 PROCEDURE — 94760 N-INVAS EAR/PLS OXIMETRY 1: CPT

## 2022-11-21 PROCEDURE — 83036 HEMOGLOBIN GLYCOSYLATED A1C: CPT

## 2022-11-21 PROCEDURE — 80307 DRUG TEST PRSMV CHEM ANLYZR: CPT

## 2022-11-21 PROCEDURE — 85610 PROTHROMBIN TIME: CPT

## 2022-11-21 PROCEDURE — 87641 MR-STAPH DNA AMP PROBE: CPT

## 2022-11-21 PROCEDURE — 80048 BASIC METABOLIC PNL TOTAL CA: CPT

## 2022-11-21 RX ORDER — TRAMADOL HYDROCHLORIDE 50 MG/1
50 TABLET ORAL EVERY 4 HOURS PRN
COMMUNITY

## 2022-11-21 ASSESSMENT — PAIN DESCRIPTION - LOCATION
LOCATION: HIP
LOCATION: HIP

## 2022-11-21 ASSESSMENT — HOOS JR
HOOS JR TOTAL INTERVAL SCORE: 36.363
BENDING TO THE FLOOR TO PICK UP OBJECT: 4
WALKING ON UNEVEN SURFACE: 3
HOOS JR RAW SCORE: 17
LYING IN BED (TURNING OVER, MAINTAINING HIP POSITION): 2
RISING FROM SITTING: 3
HOOS JR RAW SCORE: 17
GOING UP OR DOWN STAIRS: 3
SITTING: 2

## 2022-11-21 ASSESSMENT — PAIN SCALES - GENERAL
PAINLEVEL_OUTOF10: 5
PAINLEVEL_OUTOF10: 8

## 2022-11-21 ASSESSMENT — PULMONARY FUNCTION TESTS
FEV1 (%PREDICTED): 97
FEV1 (LITERS): 2.08

## 2022-11-21 ASSESSMENT — PAIN DESCRIPTION - DESCRIPTORS: DESCRIPTORS: ACHING;THROBBING;STABBING

## 2022-11-21 ASSESSMENT — PAIN DESCRIPTION - ORIENTATION
ORIENTATION: RIGHT
ORIENTATION: RIGHT

## 2022-11-21 NOTE — PROGRESS NOTES
Michelle Maddox  : 1950  Primary: [facility] Medicare Complete  Secondary:  [facility] at [facility]  [address], [place]  [phone]        Physical Therapy Prehab Evaluation Summary:2022   Time In/Out   PT Charge Capture  Episode     MEDICAL/REFERRING DIAGNOSIS: Unilateral primary osteoarthritis, right hip [M16.11]  REFERRING PHYSICIAN: Valentino Leahy MD    ICD-10: Treatment Diagnosis:   Pain in Right Hip (M25.551)  Stiffness of Right Hip, Not elsewhere classified (M25.651)    DATE OF SURGERY: 22  Assessment:   COMMENTS:  Scheduled for R ARIES. Reviewed hip precautions. Independent with gait and ADLs. Lives alone but son and family live next door and will be supportive. PROBLEM LIST:   (Impacting functional limitations):  Ms. Cristopher Gagnon presents with the following lower extremity(s) problems:  Gait  Strength  Range of Motion  Home Exercise Program  Precautions  Pain INTERVENTIONS PLANNED:   (Benefits and precautions of physical therapy have been discussed with the patient.)  Home Exercise Program  Educational Discussion       GOALS: (Goals have been discussed and agreed upon with patient.)  Discharge Goals: Time Frame: 1 Day  Patient will demonstrate independence with a home exercise program designed to increase functional technique and pain control to minimize functional deficits and optimize patient for total joint replacement.     Subjective:   Past Medical History/Comorbidities:   Ms. Cristopher Gagnon  has a past medical history of Acute on chronic combined systolic and diastolic congestive heart failure ([address] Acute renal failure superimposed on stage 3b chronic kidney disease ([address] CAD (coronary artery disease), Carotid artery stenosis, Carotid stenosis, asymptomatic, right, Chest pain, Childhood asthma, CKD (chronic kidney disease), Coronary artery disease involving native coronary artery without angina pectoris, COVID-19 virus infection, Depression, Diabetes mellitus ([facility] [address] Diastolic dysfunction, DM2 (diabetes mellitus, type 2) ([facility] [address] Elevated LFTs, Elevated troponin, Essential hypertension, External hemorrhoids without mention of complication, GERD (gastroesophageal reflux disease), HCAP (healthcare-associated pneumonia), History of left-sided carotid endarterectomy, History of MI (myocardial infarction), Hypercholesterolemia, Hypertension, Hypoalbuminemia, Hyponatremia, Hypoxemia, VERONICA (iron deficiency anemia), Insomnia, Lactic acidosis, Long QT interval, Murmur, Personal history of colonic polyps, Platelet inhibition due to Plavix, Pleural effusion, bilateral, Rectocele, Sepsis ([facility] [address] Stercoral colitis, Tobacco abuse, Tobacco use disorder, and Unstable angina ([facility] [address] Ms. Noni Kulkarni  has a past surgical history that includes orthopedic surgery; Hemorrhoid surgery; Total abdominal hysterectomy w/ bilateral salpingoophorectomy; cervical fusion; Colonoscopy (12/17/13); [place] tooth extraction; Cataract removal (Left, 09/19/2016); Cataract removal (Right, 11/07/2016); Coronary stent placement; Carotid endarterectomy (Left, 12/12/2018); and flexible sigmoidoscopy (N/A, 3/6/2022).     Allergies:  Cephalosporins, Hydralazine, Sulfamethoxazole-trimethoprim, Lisinopril, and Codeine    Current Medications:  Refer to pre-assessment nursing note    Home Set-Up/Prior Level of Function:  Lives With: Alone  Type of Home: House  Home Layout: One level  Home Access: Ramped entrance  Bathroom Shower/Tub: Walk-in shower  Home Equipment: Walker, rolling, Cane, Reacher, Long-handled shoehorn  ADL Assistance: Independent  Ambulation Assistance: Independent    Dominant Side:  Dominant Hand: : Right    Current Functional Status:   Ambulation:  [x] Independent  [] Walk Indoors Only  [] Walk Outdoors  [x] Use Assistive Device-cane sometimes but did not have it today  [] Use Wheelchair Only Dressing:  [x] [address] from Someone for:  [] Sock/Shoes  [] Pants  [] Everything   Bathing/Showering:   [x] Independent  [] Requires Assistance from Someone  [] Sponge Bath Only Household Activities:  [] Routine house and yard work  [] Light Housework Only  [x] None     Objective:   PAIN:   Pre-Treatment Pain  Pain Assessment: 0-10  Pain Level: 5  Pain Location: Hip  Pain Orientation: Right    Post Treatment: 5    Outcome Measure:   HOOS-JR:  Total Raw Score (0-24 Scale): 17    KOOS-JR:       LOWER EXTREMITY GROSS EVALUATION:  RIGHT LE   Within Functional Limits   Abnormal   Comments   Strength [] [] Strength RLE  R Hip Flexion: 2+/5   Range of Motion [] [] AROM RLE (degrees)  R Hip Flexion 0-125: 90  R Hip ABduction 0-45: 15      LEFT LE Within Functional Limits   Abnormal   Comments   Strength [x] []     Range of Motion [x] []       UPPER EXTREMITY GROSS EVALUATION:     Within Functional Limits   Abnormal   Comments   Strength [] []    Range of Motion [] []      SENSATION  Sensation []       COGNITION/  PERCEPTION: Intact Impaired (Comments):   Orientation [x]     Vision [x]     Hearing [x]     Cognition  [x]       TRANSFERS: I Mod I S SBA CGA Min Mod Max Total  NT x2 Comments:   Sit to Stand [x] [] [] [] [] [] [] [] [] [] []    Stand to Sit [x] [] [] [] [] [] [] [] [] [] []    Stand pivot [x] [] [] [] [] [] [] [] [] [] []     [] [] [] [] [] [] [] [] [] [] []    I=Independent, Mod I=Modified Independent, S=Supervision, SBA=Standby Assistance, CGA=Contact Guard Assistance,   Min=Minimal Assistance, Mod=Moderate Assistance, Max=Maximal Assistance, Total=Total Assistance, NT=Not Tested    BALANCE: Good Fair+ Fair Fair- Poor NT Comments   Sitting Static [x] [] [] [] [] []    Sitting Dynamic [x] [] [] [] [] []              Standing Static [] [x] [] [] [] []    Standing Dynamic [] [x] [x] [] [] []      Postural Assessment:    Forward Head  Rounded Shoulders    GAIT: I Mod I S SBA CGA Min Mod Max Total  NT x2 Comments:   Level of Assistance [x] [] [] [] [] [] [] [] [] [] []    Weightbearing Status       Distance  150 feet    Gait Quality Antalgic, Decreased krys , Decreased step clearance, Decreased step length, and Decreased stance    DME None     Stairs      Ramp     I=Independent, Mod I=Modified Independent, S=Supervision, SBA=Standby Assistance, CGA=Contact Guard Assistance,   Min=Minimal Assistance, Mod=Moderate Assistance, Max=Maximal Assistance, Total=Total Assistance, NT=Not Tested    TREATMENT:   EVALUATION: LOW COMPLEXITY: (Untimed Charge)    TREATMENT PLAN:   Effective Dates: 11/21/2022 TO 11/21/2022. Treatment/Session Assessment:  Patient was instructed in PT- HEP to increase strength and ROM in LEs. Answered all questions. Frequency/Duration: Patient to continue to perform home exercise program at least twice per day up until her surgery. EDUCATION: Education Given To: Patient  Education Provided: Role of Therapy, Plan of Care, Home Exercise Program, Precautions  Education Method: Demonstration, Verbal, Printed Information/Hand-outs  Education Outcome: Verbalized understanding, Demonstrated understanding    MEDICAL NECESSITY: Ms. Angeli Ceballos is expected to optimize herlower extremity strength and ROM in preparation for joint replacement surgery. REASON FOR CONTINUED SERVICES: Achieve baseline assesment of musculoskeletal system, functional mobility and home environment. , educate in PT HEP in preparation for surgery, educate in hospital plan of care. COMPLIANCE WITH PROGRAM/EXERCISE: compliant all of the time, Will assess as treatment progresses. TOTAL TREATMENT DURATION:  Time In: 2872  Time Out: 18  Minutes: 30    Regarding Ita Maddox's therapy, I certify that the treatment plan above will be carried out by a therapist or under their direction.   Thank you for this referral,  Lida Alcantara, PT

## 2022-11-21 NOTE — PERIOP NOTE
PLEASE CONTINUE TAKING ALL PRESCRIPTION MEDICATIONS UP TO THE DAY OF SURGERY UNLESS OTHERWISE DIRECTED BELOW. DISCONTINUE all vitamins, herbals, and supplements 21 days prior to surgery. DISCONTINUE Non-Steriodal Anti-Inflammatory (NSAIDS) such as Advil, Ibuprofen, Motrin, Naproxen, and Aleve 5 days prior to surgery. *Take 10 units of Tresiba the night before on 12/4/22*    Home Medications to take  the day of surgery (12/5/22)      81 mg Aspirin, Carvedilol, Citalopram, Isosorbide           Home Medications   to Hold           Comments   Take 1 capful of Miralax daily starting one week prior to surgery. (12/28/22)   On the day before surgery (12/4/22)  please take Acetaminophen 1000mg in the morning and then again before bed. You may substitute for Tylenol 650 mg.    Bring: Photo ID, Insurance card, Dynahex wash, and Incentive Spirometer with you to hospital on the day of surgery. Please do not bring home medications with you on the day of surgery unless otherwise directed by your nurse. If you are instructed to bring home medications, please give them to your nurse as they will be administered by the nursing staff. If you have any questions, please call Rosangela Ag [phone]. A copy of this note was provided to the patient for reference.

## 2022-11-21 NOTE — CARE COORDINATION
Joint Camp Case Management note:  Patient screened in Prehab for discharge planning needs. Patient scheduled for a future total joint replacement. We discussed Home Health and equipment needed after surgery. List of Home Health providers offered. Patient w/o preference towards provider. We will arrange [facility] on the day of surgery. Has a walker but will need a 3-1 BSC. Lives alone but DIL to stay with her on d/c.

## 2022-11-21 NOTE — PERIOP NOTE
Patient verified name and . Order for consent found in EHR and matches case posting; patient verified. Type 3 surgery, joint camp assessment complete. Labs per surgeon: CBC, BMP, PT/PTT, Hgb-A1c, Albumin, Urine Nicotine ; results pending. Labs per anesthesia protocol: no additional labs needed. EKG: last completed at [facility] on 22; anesthesia to review. Echo (21), Stress test (20), and [facility] note with clearance (22) available in EHR for reference. Cardiac clearance from Dr. Godwin Mcgee reads:   Perioperative Risk Assessment  - patient in preparation for operative management of hip OA   - avoid hypotension, closely monitor vitals in the perioperative period  - at this time patient without chest pain or dyspnea   - low risk for orthopedic surgery   - if required by orthopedics low risk to hold aspirin 7 days prior to OR , then resume ASAP once ok with surgeon. - EKG personally reviewed in office today demonstrates Sinus  Rhythm   RATE 60 BPM, Poor R-wave progression -nonspecific -consider old anterior infarct. Can not rule out anteroseptal MI age undetermined. ABNORMAL ECG    La Sanchez, NP notified of Cephalosporin allergy. MRSA/MSSA swab collected; pharmacy to review and dose antibiotic as appropriate. Hospital approved surgical skin cleanser and instructions to return bottle on DOS given per hospital policy. Patient provided with handouts including Guide to Surgery, Pain Management, Hand Hygiene, Blood Transfusion Education, and [facility] Brochure. Patient answered medical/surgical history questions at their best of ability. All prior to admission medications documented in [facility]. Original medication prescription bottles visualized during patient appointment. Patient instructed to hold all vitamins 3 weeks prior to surgery and NSAIDS 5 days prior to surgery.      Patient teach back successful and patient demonstrates knowledge of instruction.

## 2022-11-21 NOTE — PERIOP NOTE
CBC, BMP, PT/PTT, Hgb-A1c, Albumin; results within anesthesia protocols except for elevated Creatinine=2.23 and elevated PT=14.5; anesthesia to review. Urine nicotine still in process; charge nurse to follow up. Indu Santiago NP notified of elevated Creatinine via staff message.

## 2022-11-22 ENCOUNTER — TELEPHONE (OUTPATIENT)
Dept: ORTHOPEDIC SURGERY | Age: 72
End: 2022-11-22

## 2022-11-22 DIAGNOSIS — M25.551 RIGHT HIP PAIN: Primary | ICD-10-CM

## 2022-11-22 LAB
COMMENT:: NORMAL
COTININE UR QL SCN: NEGATIVE NG/ML

## 2022-11-22 RX ORDER — TRAMADOL HYDROCHLORIDE 50 MG/1
50 TABLET ORAL EVERY 4 HOURS PRN
Qty: 42 TABLET | Refills: 0 | Status: SHIPPED | OUTPATIENT
Start: 2022-11-22 | End: 2022-11-29

## 2022-11-22 NOTE — PROGRESS NOTES
22 1230   Treatment   Treatment Type Bedside spirometry   Oxygen Therapy/Pulse Ox   O2 Therapy Room air   Heart Rate 71   SpO2 96 %   Pulse Oximeter Device Mode Intermittent   $Pulse Oximeter $Spot check (single)   Bedside Spirometry   FEV-1/Actual (Liters) 2.08 Liters   FEV-1/Predicted (Liters) 97 Liters   Initial respiratory Assessment completed with pt. Pt was interviewed and evaluated in Joint camp prior to surgery. Patient ID:  Henry Julien  [id number]  47 y.o.  1950  Surgeon: Dr. Brayden Joseph  Date of Surgery: [email or web address]  Procedure: Total Right Hip Arthroplasty  Primary Care Physician: Rick Jules [phone]  Specialists:     BS:C;EAR      Pt taught proper COUGH technique  IS REVIEWED WITH PT AS WELL AS BENEFITS OF USING IS IN SEDENTARY PTS. DIAPHRAGMATIC BREATHING EXERCISE INSTRUCTIONS GIVEN    History of smokin PPD FOR 25 YEARS                 Quit date:     2012    Secondhand smoke:DENIES    Past procedures with Oxygen desaturation or delayed awakening:DENIES    Past Medical History:   Diagnosis Date    Acute on chronic combined systolic and diastolic congestive heart failure ([address] 2021    Acute renal failure superimposed on stage 3b chronic kidney disease ([address] 2021    CAD (coronary artery disease) 2012    MI, 3 stents    Carotid artery stenosis 2018    Carotid stenosis, asymptomatic, right 2020    Chest pain     denies currently    Childhood asthma     CKD (chronic kidney disease)     stage 3 per Nephrology note    Coronary artery disease involving native coronary artery without angina pectoris 2015    COVID-19 virus infection 2021    Depression 2012    Diabetes mellitus ([facility] [address] 2012    Tresiba nightly; Humalog 1-3 times per day per SSI; Has Dexcom BS monitor;  Average bs-150-175; A1c-6.8 on     Diastolic dysfunction     DM2 (diabetes mellitus, type 2) ([facility] [address] 2015    Elevated LFTs 2021 Elevated troponin 03/02/2012    Essential hypertension 05/21/2015    on med for control    External hemorrhoids without mention of complication 5155    GERD (gastroesophageal reflux disease)     no meds    HCAP (healthcare-associated pneumonia) 01/18/2021    History of left-sided carotid endarterectomy 03/12/2020    History of MI (myocardial infarction) 11/2012    Hypercholesterolemia     Hypertension 03/01/2012    Hypoalbuminemia 02/18/2021    Hyponatremia 01/12/2021    Hypoxemia 08/12/2020    VERONICA (iron deficiency anemia)     oral iron daily    Insomnia     Lactic acidosis 01/12/2021    Long QT interval 01/12/2021    Murmur     per cardio note (11/16/22)= (faint apical systolic murmur) heard; last Echo (2/16/21)    Personal history of colonic polyps 2013    hyperplastic    Platelet inhibition due to Plavix     no longer on Plavix    Pleural effusion, bilateral 08/11/2020    Rectocele 2013    S[address]is [address] [address] 01/12/2021    Stercoral colitis 03/02/2022    Tobacco abuse 03/01/2012    former smoker    Tobacco use disorder     Unstable angina ([facility] [address] 03/01/2012    ntg as needed. HX OF COVID 1/12/2021 BASILAR ATELECTASIS ON CT   PRODUCTIVE COUGH PALE YELLOW SPUTUM  Respiratory history:                                 SOB  ON EXERTION                                    Respiratory meds:  DENIES    FAMILY PRESENT:             NO     PAST SLEEP STUDY:                          DENIES  HX OF LEXUS:                                          DENIES  LEXUS assessment:     DANGERS OF UNTREATED LEXUS EXPLAINED TO PT.                                          SLEEPS ON SIDE         PT HAS CHE DJUSTABLE BED & DOES NOT SNORE WHEN ELEVATED   PHYSICAL EXAM   Body mass index is 29.18 kg/m².    Vitals:    11/21/22 1230   BP:    Pulse: (P) 71   Resp:    Temp:    SpO2: (P) 96%     Neck circumference:  35    cm    Loud snoring:                                                 YES             Witnessed apnea or wakening gasping or choking: DENIES        Awakens with headaches:                                               DENIES  Morning or daytime tiredness/ sleepiness:                      DENIES            Dry mouth or sore throat in morning:                      DENIES                                   [place] stage:  4                                   SACS score:6  Stop Bang                                CS HS  RESPIRATORY ASSESSMENT Q SHIFT   O2 PRN    ALBUTEROL  NEBULIZER Q6 PRN WHEEZING                                               Referrals:  DECLINED  Pt.  Phone Number:

## 2022-11-22 NOTE — PROGRESS NOTES
Total Joint Surgery Preoperative Chart Review      Patient ID:  Néstor Mo  [id number]  14 y.o.  1950  Surgeon: Dr. Giovanni Schwarz  Date of Surgery: 12/5/2022  Procedure: Total Right Hip Arthroplasty  Primary Care Physician: Susy Swanson [phone]  Specialty Physician(s):      Subjective:   Néstor Mo is a 74 y.o. White (non-) female who presents for preoperative evaluation for Total Right Hip arthroplasty. This is a preoperative chart review note based on data collected by the nurse at the surgical Pre-Assessment visit. Past Medical History:   Diagnosis Date    Acute on chronic combined systolic and diastolic congestive heart failure ([facility] 02/17/2021    Acute renal failure superimposed on stage 3b chronic kidney disease ([facility] 01/12/2021    CAD (coronary artery disease) 03/01/2012    MI, 3 stents    Carotid artery stenosis 12/12/2018    Carotid stenosis, asymptomatic, right 03/12/2020    Chest pain     denies currently    Childhood asthma     CKD (chronic kidney disease)     stage 3 per Nephrology note    Coronary artery disease involving native coronary artery without angina pectoris 05/21/2015    COVID-19 virus infection 01/12/2021    Depression 03/01/2012    Diabetes mellitus ([facility] 03/01/2012    Tresiba nightly; Humalog 1-3 times per day per SSI; Has Dexcom BS monitor;  Average bs-150-175; A1c-6.8 on 60/05/27    Diastolic dysfunction     DM2 (diabetes mellitus, type 2) ([facility] 05/21/2015    Elevated LFTs 01/12/2021    Elevated troponin 03/02/2012    Essential hypertension 05/21/2015    on med for control    External hemorrhoids without mention of complication 7943    GERD (gastroesophageal reflux disease)     no meds    HCAP (healthcare-associated pneumonia) 01/18/2021    History of left-sided carotid endarterectomy 03/12/2020    History of MI (myocardial infarction) 11/2012    Hypercholesterolemia     Hypertension 03/01/2012    Hypoalbuminemia 02/18/2021    Hyponatremia 01/12/2021    Hypoxemia 08/12/2020    VERONICA (iron deficiency anemia)     oral iron daily    Insomnia     Lactic acidosis 01/12/2021    Long QT interval 01/12/2021    Murmur     per cardio note (11/16/22)= (faint apical systolic murmur) heard; last Echo (2/16/21)    Personal history of colonic polyps 2013    hyperplastic    Platelet inhibition due to Plavix     no longer on Plavix    Pleural effusion, bilateral 08/11/2020    Rectocele 2013    Sepsis ([address] 01/12/2021    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        Past Surgical History:   Procedure Laterality Date    CAROTID ENDARTERECTOMY Left 12/12/2018    CATARACT REMOVAL Left 09/19/2016    Dr Ian Christian, [address] [facility] Right 11/07/2016    Dr Ian Christian, [address] [facility] [address]      neck w/plate    COLONOSCOPY  12/17/13    rBigitte--single transverse hyperplastic polyp--7-10 year recall    CORONARY STENT PLACEMENT      stent x3; last in 2012    FLEXIBLE SIGMOIDOSCOPY N/A 3/6/2022    SIGMOIDOSCOPY FLEXIBLE performed by James Toure MD at [facility]      x2    [id number] [facility] [address]      left elbow    HORTENSIA AND BSO (CERVIX REMOVED)      WISDOM TOOTH EXTRACTION       Family History   Problem Relation Age of Onset    Heart Disease Father     Cancer Father         pancreatic    Thyroid Disease Mother         Multinodular Goiter    Heart Attack Mother 67        mi    Osteoporosis Mother     Heart Disease Mother     Breast Cancer Neg Hx     Thyroid Cancer Brother     Ulcerative Colitis Brother     Thyroid Cancer Sister     Cancer Sister         Pre-Cancerous dysplasia    Heart Attack Father 67        MI      Social History     Tobacco Use    Smoking status: Former     Packs/day: 1.00     Years: 20.00     Pack years: 20.00     Types: Cigarettes     Quit date: 11/9/2012     Years since quitting: 10.0    Smokeless tobacco: Never    Tobacco comments:     Quit smoking: quit 2012 . has stopped prior also   Substance Use Topics    Alcohol use: Yes     Alcohol/week: 3.0 standard drinks     Types: 3 Glasses of wine per week       Prior to Admission medications    Medication Sig Start Date End Date Taking? Authorizing Provider   vitamin D (CHOLECALCIFEROL) 125 MCG (5000 UT) CAPS capsule Take 10,000 Units by mouth daily   Yes Historical Provider, MD   traMADol (ULTRAM) 50 MG tablet Take 50 mg by mouth every 4 hours as needed for Pain. Yes Historical Provider, MD   carvedilol (COREG) 3.125 MG tablet Take 1 tablet by mouth 2 times daily (with meals) 11/16/22   Tory Torres DO   isosorbide mononitrate (IMDUR) 60 MG extended release tablet Take 1 tablet by mouth daily 11/16/22   Tory Torres DO   furosemide (LASIX) 40 MG tablet Take 1 tablet by mouth daily 11/16/22   Tory Torres DO   rosuvastatin (CRESTOR) 40 MG tablet Take 1 tablet by mouth every evening 10/26/22   Rick Ovalle MD   montelukast (SINGULAIR) 10 MG tablet Take 1 tablet by mouth daily 10/26/22   Rick Ovalle MD   ferrous sulfate (FE TABS 325) 325 (65 Fe) MG EC tablet Take 1 tablet by mouth 2 times daily 9/26/22   Rick Ovalle MD   Continuous Blood Gluc Transmit (DEXCOM G6 TRANSMITTER) MISC Test blood sugar 3 times daily 9/1/22   Rick Ovalle MD   Continuous Blood Gluc Sensor (DEXCOM G6 SENSOR) MISC Test blood sugar 3 times daily. 9/1/22   Rick Ovalle MD   Continuous Blood Gluc  (DEXCOM G6 ) MAURICE Test blood sugar 3 times daily 8/31/22   Rick Ovalle MD   citalopram (CELEXA) 20 MG tablet Take 1 tablet by mouth in the morning. 7/26/22   Rick Ovalle MD   traZODone (DESYREL) 50 MG tablet Take 1 tablet by mouth nightly 7/26/22   Rick Ovalle MD   NIFEdipine (PROCARDIA XL) 90 MG extended release tablet Take 1 tablet by mouth in the morning.  7/26/22   Rick Ovalle MD   aspirin 81 MG chewable tablet Take 81 mg by mouth daily    Ar Automatic Reconciliation   cyanocobalamin 1000 MCG tablet Take 5,000 mcg by mouth daily    Ar Automatic Reconciliation   Insulin Degludec (TRESIBA FLEXTOUCH) 100 UNIT/ML SOPN Inject 12 Units into the skin at bedtime 2/7/22   Ar Automatic Reconciliation   insulin lispro, 1 Unit Dial, 100 UNIT/ML SOPN Inject into the skin 3 times daily (before meals) 4 Units SubQ with breakfast, 4 Units SubQ with lunch, 6 Units SubQ with dinner. Indications: type 2 diabetes mellitus 11/3/21   Ar Automatic Reconciliation   magnesium oxide (MAG-OX) 400 MG tablet Take 400 mg by mouth at bedtime 11/30/16   Ar Automatic Reconciliation   nitroGLYCERIN (NITROSTAT) 0.4 MG SL tablet Place 0.4 mg under the tongue every 5 minutes as needed 11/6/18   Ar Automatic Reconciliation   potassium chloride (KLOR-CON M) 20 MEQ extended release tablet Take 40 mEq by mouth daily 3/13/22   Ar Automatic Reconciliation     Allergies   Allergen Reactions    Cephalosporins Anaphylaxis, Hives and Swelling    Hydralazine Other (See Comments)     Chest pain    Sulfamethoxazole-Trimethoprim Diarrhea    Lisinopril Other (See Comments)     Passing out spells. // pt sts not allergic to med     Codeine Nausea And Vomiting          Objective:     Physical Exam:   No data found. ECG:    No results found for this or any previous visit (from the past 4464 hour(s)). Data Review:   Labs:      Latest Reference Range & Units 11/21/22 12:43   Sodium 133 - 143 mmol/L 138   Potassium 3.5 - 5.1 mmol/L 3.6   Chloride 101 - 110 mmol/L 105   CO2 21 - 32 mmol/L 27   BUN,BUNPL 8 - 23 MG/DL 32 (H)   Creatinine 0.6 - 1.0 MG/DL 2.23 (H)   Anion Gap 2 - 11 mmol/L 6   Est, Glom Filt Rate >60 ml/min/1.73m2 23 (L)   Glucose, Random 65 - 100 mg/dL 223 (H)   CALCIUM, SERUM, 755699 8.3 - 10.4 MG/DL 9.0   (H): Data is abnormally high  (L): Data is abnormally low  A1C 6.8  Creatinine continues to trend upward. She is followed by Nephrology.    Problem List:  )  Patient Active Problem List   Diagnosis Stage 4 chronic kidney disease (HCC)    Essential hypertension    Chronic diastolic congestive heart failure (HCC)    Age-related osteoporosis without current pathological fracture    Primary insomnia    Mixed hyperlipidemia    Coronary artery disease involving native coronary artery of native heart without angina pectoris    Major depressive disorder with single episode, in full remission ([facility]    Type 2 diabetes mellitus with stage 4 chronic kidney disease, with long-term current use of insulin (HCC)    Primary osteoarthritis of right hip       Total Joint Surgery Pre-Assessment Recommendations:           Continuous saturation monitoring during hospitalization. O2 prn per respiratory protocol. Renal protocol initiated. The patient's chart will be flagged renal risk. Renal RisK Alerts:  1. Caution with use of muscle relaxants and sedatives with reduced renal function  2. Caution with total amount of narcotics used   3. Avoid morphine if patient has reduced renal function due to accumulations of the highly active metabolite, morphine-6-glucuronide, which can lead to sedation and respiratory depression  4. Avoid nephrotoxic drugs such as POLLARD inhibitors  5. Consider volume status monitoring in addition to [place]  6.  Ensure hand-off to hospitalist for appropriate perioperative IV fluid management    Signed By: ÓSCAR De Jesus - CNP-C    November 22, 2022

## 2022-11-23 NOTE — PROGRESS NOTES
Nicotine Metabolites, Urine  Order: [id number]  Status: Final result    Visible to patient: Yes (not seen)    Next appt: 12/01/2022 at 10:45 AM in Orthopedic Surgery [facility], APRN - CNP)    Dx: Primary osteoarthritis of right hip    0 Result Notes  Component Ref Range & Units 11/21/22 1259  Resulting Agency   Cotinine Screen, Ur Wwwlci=433 ng/mL Negative  [facility]   Comment:   Comment  [facility] [place]   Comment: (NOTE)   This analysis is performed by immunoassay. Positive findings are   unconfirmed analytical test results; if results do not support   expected clinical finding, confirmation by an alternate methodology   is recommended. Patient metabolic variables, specific drug chemistry,   and specimen characteristics can affect test outcome. Technical consultation is available at [email or web address], or   call toll free [phone].    Per[place] [facility] [place] [address] [id number]   Chad Cruz MD [id number]   Performed At: [facility]   [place] [address] [place] [id number]   Kalin Tovar PhD BC:[id number]               Specimen Collected: 11/21/22 12:59 EST Last Resulted: 11/22/22 22:35 EST

## 2022-11-28 DIAGNOSIS — G89.18 POSTOPERATIVE PAIN: Primary | ICD-10-CM

## 2022-11-28 RX ORDER — OMEPRAZOLE 40 MG/1
40 CAPSULE, DELAYED RELEASE ORAL DAILY
Qty: 30 CAPSULE | Refills: 0 | Status: SHIPPED | OUTPATIENT
Start: 2022-11-28

## 2022-11-28 RX ORDER — ASPIRIN 81 MG/1
81 TABLET ORAL 2 TIMES DAILY
Qty: 60 TABLET | Refills: 0 | Status: SHIPPED | OUTPATIENT
Start: 2022-11-28

## 2022-11-28 RX ORDER — HYDROMORPHONE HYDROCHLORIDE 2 MG/1
2 TABLET ORAL EVERY 4 HOURS PRN
Qty: 40 TABLET | Refills: 0 | Status: SHIPPED | OUTPATIENT
Start: 2022-11-28 | End: 2022-12-05

## 2022-11-28 RX ORDER — SENNA PLUS 8.6 MG/1
1 TABLET ORAL 2 TIMES DAILY
Qty: 30 TABLET | Refills: 2 | Status: SHIPPED | OUTPATIENT
Start: 2022-11-28

## 2022-11-28 RX ORDER — ACETAMINOPHEN 500 MG
1000 TABLET ORAL EVERY 6 HOURS PRN
Qty: 180 TABLET | Refills: 2 | Status: SHIPPED | OUTPATIENT
Start: 2022-11-28

## 2022-11-28 RX ORDER — GABAPENTIN 100 MG/1
100 CAPSULE ORAL 2 TIMES DAILY
Qty: 30 CAPSULE | Refills: 0 | Status: SHIPPED | OUTPATIENT
Start: 2022-11-28 | End: 2022-12-13

## 2022-11-28 RX ORDER — CYCLOBENZAPRINE HCL 5 MG
5 TABLET ORAL 3 TIMES DAILY PRN
Qty: 30 TABLET | Refills: 0 | Status: SHIPPED | OUTPATIENT
Start: 2022-11-28 | End: 2022-12-08

## 2022-11-28 RX ORDER — ONDANSETRON 4 MG/1
4 TABLET, FILM COATED ORAL 3 TIMES DAILY PRN
Qty: 30 TABLET | Refills: 1 | Status: SHIPPED | OUTPATIENT
Start: 2022-11-28

## 2022-11-28 RX ORDER — OMEPRAZOLE 40 MG/1
40 CAPSULE, DELAYED RELEASE ORAL DAILY
Qty: 90 CAPSULE | OUTPATIENT
Start: 2022-11-28

## 2022-12-01 ENCOUNTER — OFFICE VISIT (OUTPATIENT)
Dept: ORTHOPEDIC SURGERY | Age: 72
End: 2022-12-01

## 2022-12-01 DIAGNOSIS — M16.11 PRIMARY OSTEOARTHRITIS OF RIGHT HIP: Primary | ICD-10-CM

## 2022-12-01 NOTE — PROGRESS NOTES
Patient ID:  Néstor Mo  [id number]  4 y.o.  1950    Today: December 1, 2022       CC:  Right hip pain    HPI:   The patient has end stage arthritis of the right hip. The patient was evaluated and examined in the office prior to today and was found to have a history on physical exam consistent with intra-articular pathology of the right hip. Patient complains of anterior groin pain predominately. Pain occurs during activity. Patient has difficulty putting on socks/shoes and has notable pain when getting up from a chair and getting out of a car. Patient does not complain of significant lateral hip pain or radicular pain down the leg. There have been no changes to the patient's orthopedic condition since the last office visit    Past Medical History:  Past Medical History:   Diagnosis Date    Acute on chronic combined systolic and diastolic congestive heart failure ([facility] 02/17/2021    Acute renal failure superimposed on stage 3b chronic kidney disease ([facility] 01/12/2021    CAD (coronary artery disease) 03/01/2012    MI, 3 stents    Carotid artery stenosis 12/12/2018    Carotid stenosis, asymptomatic, right 03/12/2020    Chest pain     denies currently    Childhood asthma     CKD (chronic kidney disease)     stage 3 per Nephrology note    Coronary artery disease involving native coronary artery without angina pectoris 05/21/2015    COVID-19 virus infection 01/12/2021    Depression 03/01/2012    Diabetes mellitus ([facility] 03/01/2012    Tresiba nightly; Humalog 1-3 times per day per SSI; Has Dexcom BS monitor;  Average bs-150-175; A1c-6.8 on 74/17/11    Diastolic dysfunction     DM2 (diabetes mellitus, type 2) ([facility] 05/21/2015    Elevated LFTs 01/12/2021    Elevated troponin 03/02/2012    Essential hypertension 05/21/2015    on med for control    External hemorrhoids without mention of complication 0839    GERD (gastroesophageal reflux disease)     no meds    HCAP (healthcare-associated pneumonia) 01/18/2021    History of left-sided carotid endarterectomy 03/12/2020    History of MI (myocardial infarction) 11/2012    Hypercholesterolemia     Hypertension 03/01/2012    Hypoalbuminemia 02/18/2021    Hyponatremia 01/12/2021    Hypoxemia 08/12/2020    VERONICA (iron deficiency anemia)     oral iron daily    Insomnia     Lactic acidosis 01/12/2021    Long QT interval 01/12/2021    Murmur     per cardio note (11/16/22)= (faint apical systolic murmur) heard; last Echo (2/16/21)    Personal history of colonic polyps 2013    hyperplastic    Platelet inhibition due to Plavix     no longer on Plavix    Pleural effusion, bilateral 08/11/2020    Rectocele 2013    Sepsis ([facility] 01/12/2021    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 Past Surgical History:  Past Surgical History:   Procedure Laterality Date    CAROTID ENDARTERECTOMY Left 12/12/2018    CATARACT REMOVAL Left 09/19/2016    Dr Rosita Lambert, [address] [facility] Right 11/07/2016    Dr Rosita Lambert, [address] [facility] w/plate    COLONOSCOPY  12/17/13    Brigitte--single transverse hyperplastic polyp--7-10 year recall    CORONARY STENT PLACEMENT      stent x3; last in 2012    FLEXIBLE SIGMOIDOSCOPY N/A 3/6/2022    SIGMOIDOSCOPY FLEXIBLE performed by Yeis Ha MD at [facility]      x2    ORTHOPEDIC SURGERY      left elbow    HORTENSIA AND BSO (CERVIX REMOVED)      WISDOM TOOTH EXTRACTION          Medications:     Prior to Admission medications    Medication Sig Start Date End Date Taking?  Authorizing Provider   acetaminophen (TYLENOL) 500 MG tablet Take 2 tablets by mouth every 6 hours as needed for Pain 11/28/22   Donte Estrada MD   aspirin EC 81 MG EC tablet Take 1 tablet by mouth in the morning and at bedtime 11/28/22   Donte Estrada MD   HYDROmorphone (DILAUDID) 2 MG tablet Take 1 tablet by mouth every 4 hours as needed for Pain for up to 7 days. 11/28/22 12/5/22  Fracisco Lamb MD   cyclobenzaprine (FLEXERIL) 5 MG tablet Take 1 tablet by mouth 3 times daily as needed for Muscle spasms 11/28/22 12/8/22  Fracisco Lamb MD   gabapentin (NEURONTIN) 100 MG capsule Take 1 capsule by mouth 2 times daily for 15 days. 11/28/22 12/13/22  Fracisco Lamb MD   omeprazole (PRILOSEC) 40 MG delayed release capsule Take 1 capsule by mouth daily 11/28/22   Fracisco Lamb MD   ondansetron (ZOFRAN) 4 MG tablet Take 1 tablet by mouth 3 times daily as needed for Nausea or Vomiting 11/28/22   Fracisco Lamb MD   senna (SENOKOT) 8.6 MG tablet Take 1 tablet by mouth 2 times daily 11/28/22   Fracisco Lamb MD   vitamin D (CHOLECALCIFEROL) 125 MCG (5000 UT) CAPS capsule Take 10,000 Units by mouth daily    Historical Provider, MD   traMADol (ULTRAM) 50 MG tablet Take 50 mg by mouth every 4 hours as needed for Pain. Historical Provider, MD   carvedilol (COREG) 3.125 MG tablet Take 1 tablet by mouth 2 times daily (with meals) 11/16/22   Miguel Ángel Antunez DO   isosorbide mononitrate (IMDUR) 60 MG extended release tablet Take 1 tablet by mouth daily 11/16/22   Miguel Ángel Antunez DO   furosemide (LASIX) 40 MG tablet Take 1 tablet by mouth daily 11/16/22   Miguel Ángel Antunez DO   rosuvastatin (CRESTOR) 40 MG tablet Take 1 tablet by mouth every evening 10/26/22   Estefanía Lynn MD   montelukast (SINGULAIR) 10 MG tablet Take 1 tablet by mouth daily 10/26/22   Estefanía Lynn MD   ferrous sulfate (FE TABS 325) 325 (65 Fe) MG EC tablet Take 1 tablet by mouth 2 times daily 9/26/22   Estefanía Lynn MD   Continuous Blood Gluc Transmit (DEXCOM G6 TRANSMITTER) MISC Test blood sugar 3 times daily 9/1/22   Estefanía Lynn MD   Continuous Blood Gluc Sensor (DEXCOM G6 SENSOR) MISC Test blood sugar 3 times daily.  9/1/22   Estefanía Lynn MD   Continuous Blood Gluc  (DEXCOM G6 ) MAURICE Test blood sugar 3 times daily 8/31/22   Estefanía Lynn, MD   citalopram (CELEXA) 20 MG tablet Take 1 tablet by mouth in the morning. 7/26/22   Aristeo Liz MD   traZODone (DESYREL) 50 MG tablet Take 1 tablet by mouth nightly 7/26/22   Aristeo Liz MD   NIFEdipine (PROCARDIA XL) 90 MG extended release tablet Take 1 tablet by mouth in the morning. 7/26/22   Aristeo Liz MD   aspirin 81 MG chewable tablet Take 81 mg by mouth daily    Ar Automatic Reconciliation   cyanocobalamin 1000 MCG tablet Take 5,000 mcg by mouth daily    Ar Automatic Reconciliation   Insulin Degludec (TRESIBA FLEXTOUCH) 100 UNIT/ML SOPN Inject 12 Units into the skin at bedtime 2/7/22   Ar Automatic Reconciliation   insulin lispro, 1 Unit Dial, 100 UNIT/ML SOPN Inject into the skin 3 times daily (before meals) 4 Units SubQ with breakfast, 4 Units SubQ with lunch, 6 Units SubQ with dinner.  Indications: type 2 diabetes mellitus 11/3/21   Ar Automatic Reconciliation   magnesium oxide (MAG-OX) 400 MG tablet Take 400 mg by mouth at bedtime 11/30/16   Ar Automatic Reconciliation   nitroGLYCERIN (NITROSTAT) 0.4 MG SL tablet Place 0.4 mg under the tongue every 5 minutes as needed 11/6/18   Ar Automatic Reconciliation   potassium chloride (KLOR-CON M) 20 MEQ extended release tablet Take 40 mEq by mouth daily 3/13/22   Ar Automatic Reconciliation       Family History:     Family History   Problem Relation Age of Onset    Heart Disease Father     Cancer Father         pancreatic    Thyroid Disease Mother         Multinodular Goiter    Heart Attack Mother 67        mi    Osteoporosis Mother     Heart Disease Mother     Breast Cancer Neg Hx     Thyroid Cancer Brother     Ulcerative Colitis Brother     Thyroid Cancer Sister     Cancer Sister         Pre-Cancerous dysplasia    Heart Attack Father 67        MI       Social History:      Social History     Tobacco Use    Smoking status: Former     Packs/day: 1.00     Years: 20.00     Pack years: 20.00     Types: Cigarettes     Quit date: 11/9/2012     Years since quitting: 10.0    Smokeless tobacco: Never    Tobacco comments:     Quit smoking: quit 2012 . has stopped prior also   Substance Use Topics    Alcohol use: Yes     Alcohol/week: 3.0 standard drinks     Types: 3 Glasses of wine per week         Allergies: Allergies   Allergen Reactions    Cephalosporins Anaphylaxis, Hives and Swelling    Hydralazine Other (See Comments)     Chest pain    Sulfamethoxazole-Trimethoprim Diarrhea    Lisinopril Other (See Comments)     Passing out spells. // pt sts not allergic to med     Codeine Nausea And Vomiting        Vitals: There were no vitals taken for this visit. Objective:         General: No Acute distress                   HEENT: Normocephalic/atramatic                   Lungs:  Breathing non-labored                   Heart:   RRR                    Abdomen: soft       Extremities:  Prior exam done in office has been consistent with end-stage hip arthritis. We have noted pain with ROM of the right hip which manifests as anterior groin pain. There is decreased internal and external rotation of the affected hip. No significant pain with palpation over the greater trochanteric bursa. No radicular pain with straight leg raise. Patient walks with and antalgic gait. Distally patient has no neurologic deficit. Patient Active Problem List   Diagnosis    Stage 4 chronic kidney disease ([address]    Essential hypertension    Chronic diastolic congestive heart failure (HCC)    Age-related osteoporosis without current pathological fracture    Primary insomnia    Mixed hyperlipidemia    Coronary artery disease involving native coronary artery of native heart without angina pectoris    Major depressive disorder with single episode, in full remission ([address]    Type 2 diabetes mellitus with stage 4 chronic kidney disease, with long-term current use of insulin (HCC)    Primary osteoarthritis of right hip       Assessment:   1.   Arthritis of the Right hip    Plan:  The patient has failed previous conservative treatment for this condition. The patient has pain in the right hip which causes daily symptoms which affects the patient's activities of daily living and quality of life. The risks, benefits, alternatives and possible complications of right total hip arthroplasty have been discussed with the patient including but not limited to infection, bleeding, damage to nerves and blood vessels with particular attention given to risk of damage of the femoral, obturator, lateral femoral cutaneous, superior gluteal, inferior gluteal, and sciatic nerve, risk of dislocation, fracture both intra-op and post-op, limb length inequality, polyethylene wear, implant loosening, risk for continued pain, and risk for revision surgery secondary to but not limited to all of these discussed risks. Further we discussed risk of venous thrombo-embolism including deep vein thrombosis and pulmonary embolism despite being on prophylaxis. We have also previously discussed the potential of morbidity and mortality associated with, but not limited to, the actual surgical procedure, anesthesia, prior medical conditions, and/or the administration of medications perioperatively. I have made no guarantees to the patient regarding outcomes and the patient has voiced understanding of that. The patient has been given the opportunity to ask questions all of which have been answered and the patient wishes to proceed.              Signed By: ÓSCAR Molina - ROSA  December 1, 2022

## 2022-12-04 ENCOUNTER — ANESTHESIA EVENT (OUTPATIENT)
Dept: SURGERY | Age: 72
DRG: 470 | End: 2022-12-04
Payer: MEDICARE

## 2022-12-05 ENCOUNTER — HOME HEALTH ADMISSION (OUTPATIENT)
Dept: HOME HEALTH SERVICES | Facility: HOME HEALTH | Age: 72
End: 2022-12-05
Payer: MEDICARE

## 2022-12-05 ENCOUNTER — APPOINTMENT (OUTPATIENT)
Dept: GENERAL RADIOLOGY | Age: 72
DRG: 470 | End: 2022-12-05
Attending: ORTHOPAEDIC SURGERY
Payer: MEDICARE

## 2022-12-05 ENCOUNTER — ANESTHESIA (OUTPATIENT)
Dept: SURGERY | Age: 72
DRG: 470 | End: 2022-12-05
Payer: MEDICARE

## 2022-12-05 ENCOUNTER — HOSPITAL ENCOUNTER (INPATIENT)
Age: 72
LOS: 1 days | Discharge: HOME HEALTH | DRG: 470 | End: 2022-12-06
Attending: ORTHOPAEDIC SURGERY | Admitting: ORTHOPAEDIC SURGERY
Payer: MEDICARE

## 2022-12-05 DIAGNOSIS — M16.11 PRIMARY OSTEOARTHRITIS OF RIGHT HIP: ICD-10-CM

## 2022-12-05 LAB
GLUCOSE BLD STRIP.AUTO-MCNC: 128 MG/DL (ref 65–100)
GLUCOSE BLD STRIP.AUTO-MCNC: 144 MG/DL (ref 65–100)
GLUCOSE BLD STRIP.AUTO-MCNC: 196 MG/DL (ref 65–100)
GLUCOSE BLD STRIP.AUTO-MCNC: 224 MG/DL (ref 65–100)
SERVICE CMNT-IMP: ABNORMAL

## 2022-12-05 PROCEDURE — 97161 PT EVAL LOW COMPLEX 20 MIN: CPT

## 2022-12-05 PROCEDURE — 3700000000 HC ANESTHESIA ATTENDED CARE: Performed by: ORTHOPAEDIC SURGERY

## 2022-12-05 PROCEDURE — 2580000003 HC RX 258: Performed by: ANESTHESIOLOGY

## 2022-12-05 PROCEDURE — 2500000003 HC RX 250 WO HCPCS: Performed by: NURSE ANESTHETIST, CERTIFIED REGISTERED

## 2022-12-05 PROCEDURE — 72170 X-RAY EXAM OF PELVIS: CPT

## 2022-12-05 PROCEDURE — 3600000015 HC SURGERY LEVEL 5 ADDTL 15MIN: Performed by: ORTHOPAEDIC SURGERY

## 2022-12-05 PROCEDURE — 6370000000 HC RX 637 (ALT 250 FOR IP): Performed by: ANESTHESIOLOGY

## 2022-12-05 PROCEDURE — 0SR904A REPLACEMENT OF RIGHT HIP JOINT WITH CERAMIC ON POLYETHYLENE SYNTHETIC SUBSTITUTE, UNCEMENTED, OPEN APPROACH: ICD-10-PCS | Performed by: ORTHOPAEDIC SURGERY

## 2022-12-05 PROCEDURE — 94760 N-INVAS EAR/PLS OXIMETRY 1: CPT

## 2022-12-05 PROCEDURE — 3600000005 HC SURGERY LEVEL 5 BASE: Performed by: ORTHOPAEDIC SURGERY

## 2022-12-05 PROCEDURE — 2580000003 HC RX 258: Performed by: NURSE ANESTHETIST, CERTIFIED REGISTERED

## 2022-12-05 PROCEDURE — 6360000002 HC RX W HCPCS: Performed by: ANESTHESIOLOGY

## 2022-12-05 PROCEDURE — 97530 THERAPEUTIC ACTIVITIES: CPT

## 2022-12-05 PROCEDURE — 6360000002 HC RX W HCPCS: Performed by: NURSE ANESTHETIST, CERTIFIED REGISTERED

## 2022-12-05 PROCEDURE — 2709999900 HC NON-CHARGEABLE SUPPLY: Performed by: ORTHOPAEDIC SURGERY

## 2022-12-05 PROCEDURE — 6370000000 HC RX 637 (ALT 250 FOR IP): Performed by: NURSE PRACTITIONER

## 2022-12-05 PROCEDURE — 27130 TOTAL HIP ARTHROPLASTY: CPT | Performed by: ORTHOPAEDIC SURGERY

## 2022-12-05 PROCEDURE — 97110 THERAPEUTIC EXERCISES: CPT

## 2022-12-05 PROCEDURE — C1713 ANCHOR/SCREW BN/BN,TIS/BN: HCPCS | Performed by: ORTHOPAEDIC SURGERY

## 2022-12-05 PROCEDURE — 7100000001 HC PACU RECOVERY - ADDTL 15 MIN: Performed by: ORTHOPAEDIC SURGERY

## 2022-12-05 PROCEDURE — 2720000010 HC SURG SUPPLY STERILE: Performed by: ORTHOPAEDIC SURGERY

## 2022-12-05 PROCEDURE — 6360000002 HC RX W HCPCS: Performed by: ORTHOPAEDIC SURGERY

## 2022-12-05 PROCEDURE — 94761 N-INVAS EAR/PLS OXIMETRY MLT: CPT

## 2022-12-05 PROCEDURE — 7100000000 HC PACU RECOVERY - FIRST 15 MIN: Performed by: ORTHOPAEDIC SURGERY

## 2022-12-05 PROCEDURE — 2500000003 HC RX 250 WO HCPCS: Performed by: NURSE PRACTITIONER

## 2022-12-05 PROCEDURE — 1100000000 HC RM PRIVATE

## 2022-12-05 PROCEDURE — 2580000003 HC RX 258: Performed by: ORTHOPAEDIC SURGERY

## 2022-12-05 PROCEDURE — 82962 GLUCOSE BLOOD TEST: CPT

## 2022-12-05 PROCEDURE — C1776 JOINT DEVICE (IMPLANTABLE): HCPCS | Performed by: ORTHOPAEDIC SURGERY

## 2022-12-05 PROCEDURE — 2580000003 HC RX 258: Performed by: NURSE PRACTITIONER

## 2022-12-05 PROCEDURE — 3700000001 HC ADD 15 MINUTES (ANESTHESIA): Performed by: ORTHOPAEDIC SURGERY

## 2022-12-05 PROCEDURE — 97165 OT EVAL LOW COMPLEX 30 MIN: CPT

## 2022-12-05 DEVICE — SCREW BNE L35MM DIA65MM LO PROF HEX TRIDENT LL: Type: IMPLANTABLE DEVICE | Site: HIP | Status: FUNCTIONAL

## 2022-12-05 DEVICE — 127 DEGREE NECK ANGLE HIP STEM
Type: IMPLANTABLE DEVICE | Site: HIP | Status: FUNCTIONAL
Brand: ACCOLADE

## 2022-12-05 DEVICE — TRIDENT II TRITANIUM CLUSTER 50D
Type: IMPLANTABLE DEVICE | Site: HIP | Status: FUNCTIONAL
Brand: TRIDENT II

## 2022-12-05 DEVICE — CERAMIC V40 FEMORAL HEAD
Type: IMPLANTABLE DEVICE | Site: HIP | Status: FUNCTIONAL
Brand: BIOLOX

## 2022-12-05 DEVICE — COMPONENT TOT HIP CAPPED LNR POLYETH H2STRYKER] STRYKER CORP]: Type: IMPLANTABLE DEVICE | Site: HIP | Status: FUNCTIONAL

## 2022-12-05 DEVICE — TRIDENT X3 10 DEGREE POLYETHYLENE INSERT
Type: IMPLANTABLE DEVICE | Site: HIP | Status: FUNCTIONAL
Brand: TRIDENT X3 INSERT

## 2022-12-05 RX ORDER — INSULIN LISPRO 100 [IU]/ML
0-16 INJECTION, SOLUTION INTRAVENOUS; SUBCUTANEOUS
Status: DISCONTINUED | OUTPATIENT
Start: 2022-12-05 | End: 2022-12-06 | Stop reason: HOSPADM

## 2022-12-05 RX ORDER — FENTANYL CITRATE 50 UG/ML
100 INJECTION, SOLUTION INTRAMUSCULAR; INTRAVENOUS
Status: DISCONTINUED | OUTPATIENT
Start: 2022-12-05 | End: 2022-12-05 | Stop reason: HOSPADM

## 2022-12-05 RX ORDER — LIDOCAINE HYDROCHLORIDE 20 MG/ML
INJECTION, SOLUTION EPIDURAL; INFILTRATION; INTRACAUDAL; PERINEURAL PRN
Status: DISCONTINUED | OUTPATIENT
Start: 2022-12-05 | End: 2022-12-05 | Stop reason: SDUPTHER

## 2022-12-05 RX ORDER — ASPIRIN 81 MG/1
81 TABLET ORAL 2 TIMES DAILY
Status: DISCONTINUED | OUTPATIENT
Start: 2022-12-05 | End: 2022-12-06 | Stop reason: HOSPADM

## 2022-12-05 RX ORDER — SODIUM CHLORIDE 0.9 % (FLUSH) 0.9 %
5-40 SYRINGE (ML) INJECTION PRN
Status: DISCONTINUED | OUTPATIENT
Start: 2022-12-05 | End: 2022-12-05 | Stop reason: HOSPADM

## 2022-12-05 RX ORDER — MIDAZOLAM HYDROCHLORIDE 1 MG/ML
INJECTION INTRAMUSCULAR; INTRAVENOUS PRN
Status: DISCONTINUED | OUTPATIENT
Start: 2022-12-05 | End: 2022-12-05 | Stop reason: SDUPTHER

## 2022-12-05 RX ORDER — PANTOPRAZOLE SODIUM 40 MG/1
40 TABLET, DELAYED RELEASE ORAL
Status: DISCONTINUED | OUTPATIENT
Start: 2022-12-06 | End: 2022-12-06 | Stop reason: HOSPADM

## 2022-12-05 RX ORDER — SENNA AND DOCUSATE SODIUM 50; 8.6 MG/1; MG/1
1 TABLET, FILM COATED ORAL 2 TIMES DAILY
Status: DISCONTINUED | OUTPATIENT
Start: 2022-12-05 | End: 2022-12-06 | Stop reason: HOSPADM

## 2022-12-05 RX ORDER — SODIUM CHLORIDE 9 MG/ML
INJECTION, SOLUTION INTRAVENOUS PRN
Status: DISCONTINUED | OUTPATIENT
Start: 2022-12-05 | End: 2022-12-05 | Stop reason: HOSPADM

## 2022-12-05 RX ORDER — ONDANSETRON 2 MG/ML
INJECTION INTRAMUSCULAR; INTRAVENOUS PRN
Status: DISCONTINUED | OUTPATIENT
Start: 2022-12-05 | End: 2022-12-05 | Stop reason: SDUPTHER

## 2022-12-05 RX ORDER — PROPOFOL 10 MG/ML
INJECTION, EMULSION INTRAVENOUS PRN
Status: DISCONTINUED | OUTPATIENT
Start: 2022-12-05 | End: 2022-12-05 | Stop reason: SDUPTHER

## 2022-12-05 RX ORDER — ONDANSETRON 2 MG/ML
4 INJECTION INTRAMUSCULAR; INTRAVENOUS EVERY 6 HOURS PRN
Status: DISCONTINUED | OUTPATIENT
Start: 2022-12-05 | End: 2022-12-06 | Stop reason: HOSPADM

## 2022-12-05 RX ORDER — ROSUVASTATIN CALCIUM 20 MG/1
40 TABLET, COATED ORAL
Status: DISCONTINUED | OUTPATIENT
Start: 2022-12-05 | End: 2022-12-06 | Stop reason: HOSPADM

## 2022-12-05 RX ORDER — DEXAMETHASONE SODIUM PHOSPHATE 10 MG/ML
INJECTION INTRAMUSCULAR; INTRAVENOUS PRN
Status: DISCONTINUED | OUTPATIENT
Start: 2022-12-05 | End: 2022-12-05 | Stop reason: SDUPTHER

## 2022-12-05 RX ORDER — HYDROMORPHONE HYDROCHLORIDE 2 MG/1
2 TABLET ORAL EVERY 4 HOURS PRN
Status: DISCONTINUED | OUTPATIENT
Start: 2022-12-05 | End: 2022-12-06 | Stop reason: HOSPADM

## 2022-12-05 RX ORDER — GABAPENTIN 100 MG/1
100 CAPSULE ORAL 2 TIMES DAILY
Status: DISCONTINUED | OUTPATIENT
Start: 2022-12-05 | End: 2022-12-06 | Stop reason: HOSPADM

## 2022-12-05 RX ORDER — TRANEXAMIC ACID 100 MG/ML
INJECTION, SOLUTION INTRAVENOUS PRN
Status: DISCONTINUED | OUTPATIENT
Start: 2022-12-05 | End: 2022-12-05 | Stop reason: SDUPTHER

## 2022-12-05 RX ORDER — ONDANSETRON 2 MG/ML
4 INJECTION INTRAMUSCULAR; INTRAVENOUS
Status: DISCONTINUED | OUTPATIENT
Start: 2022-12-05 | End: 2022-12-05 | Stop reason: HOSPADM

## 2022-12-05 RX ORDER — CITALOPRAM 20 MG/1
20 TABLET ORAL DAILY
Status: DISCONTINUED | OUTPATIENT
Start: 2022-12-06 | End: 2022-12-06 | Stop reason: HOSPADM

## 2022-12-05 RX ORDER — HYDROMORPHONE HYDROCHLORIDE 2 MG/1
2 TABLET ORAL EVERY 4 HOURS PRN
Status: DISCONTINUED | OUTPATIENT
Start: 2022-12-05 | End: 2022-12-05 | Stop reason: SDUPTHER

## 2022-12-05 RX ORDER — ISOSORBIDE MONONITRATE 30 MG/1
60 TABLET, EXTENDED RELEASE ORAL DAILY
Status: DISCONTINUED | OUTPATIENT
Start: 2022-12-06 | End: 2022-12-06 | Stop reason: HOSPADM

## 2022-12-05 RX ORDER — ONDANSETRON 4 MG/1
8 TABLET, ORALLY DISINTEGRATING ORAL EVERY 8 HOURS PRN
Status: DISCONTINUED | OUTPATIENT
Start: 2022-12-05 | End: 2022-12-06 | Stop reason: HOSPADM

## 2022-12-05 RX ORDER — SODIUM CHLORIDE 0.9 % (FLUSH) 0.9 %
5-40 SYRINGE (ML) INJECTION PRN
Status: DISCONTINUED | OUTPATIENT
Start: 2022-12-05 | End: 2022-12-06 | Stop reason: HOSPADM

## 2022-12-05 RX ORDER — CLINDAMYCIN PHOSPHATE 900 MG/50ML
900 INJECTION INTRAVENOUS EVERY 8 HOURS
Status: COMPLETED | OUTPATIENT
Start: 2022-12-05 | End: 2022-12-06

## 2022-12-05 RX ORDER — NALOXONE HYDROCHLORIDE 0.4 MG/ML
0.4 INJECTION, SOLUTION INTRAMUSCULAR; INTRAVENOUS; SUBCUTANEOUS PRN
Status: DISCONTINUED | OUTPATIENT
Start: 2022-12-05 | End: 2022-12-06 | Stop reason: HOSPADM

## 2022-12-05 RX ORDER — LIDOCAINE HYDROCHLORIDE 10 MG/ML
1 INJECTION, SOLUTION INFILTRATION; PERINEURAL
Status: DISCONTINUED | OUTPATIENT
Start: 2022-12-05 | End: 2022-12-05 | Stop reason: HOSPADM

## 2022-12-05 RX ORDER — DIPHENHYDRAMINE HCL 25 MG
25 CAPSULE ORAL EVERY 6 HOURS PRN
Status: DISCONTINUED | OUTPATIENT
Start: 2022-12-05 | End: 2022-12-06 | Stop reason: HOSPADM

## 2022-12-05 RX ORDER — NIFEDIPINE 30 MG/1
90 TABLET, EXTENDED RELEASE ORAL DAILY
Status: DISCONTINUED | OUTPATIENT
Start: 2022-12-06 | End: 2022-12-06 | Stop reason: HOSPADM

## 2022-12-05 RX ORDER — HYDROMORPHONE HYDROCHLORIDE 1 MG/ML
0.25 INJECTION, SOLUTION INTRAMUSCULAR; INTRAVENOUS; SUBCUTANEOUS EVERY 5 MIN PRN
Status: DISCONTINUED | OUTPATIENT
Start: 2022-12-05 | End: 2022-12-05 | Stop reason: HOSPADM

## 2022-12-05 RX ORDER — FUROSEMIDE 40 MG/1
40 TABLET ORAL DAILY
Status: DISCONTINUED | OUTPATIENT
Start: 2022-12-06 | End: 2022-12-06 | Stop reason: HOSPADM

## 2022-12-05 RX ORDER — PROCHLORPERAZINE EDISYLATE 5 MG/ML
5 INJECTION INTRAMUSCULAR; INTRAVENOUS
Status: DISCONTINUED | OUTPATIENT
Start: 2022-12-05 | End: 2022-12-05 | Stop reason: HOSPADM

## 2022-12-05 RX ORDER — EPHEDRINE SULFATE/0.9% NACL/PF 50 MG/5 ML
SYRINGE (ML) INTRAVENOUS PRN
Status: DISCONTINUED | OUTPATIENT
Start: 2022-12-05 | End: 2022-12-05 | Stop reason: SDUPTHER

## 2022-12-05 RX ORDER — CARVEDILOL 3.12 MG/1
3.12 TABLET ORAL 2 TIMES DAILY WITH MEALS
Status: DISCONTINUED | OUTPATIENT
Start: 2022-12-05 | End: 2022-12-06 | Stop reason: HOSPADM

## 2022-12-05 RX ORDER — VANCOMYCIN HYDROCHLORIDE 1 G/20ML
INJECTION, POWDER, LYOPHILIZED, FOR SOLUTION INTRAVENOUS PRN
Status: DISCONTINUED | OUTPATIENT
Start: 2022-12-05 | End: 2022-12-05 | Stop reason: HOSPADM

## 2022-12-05 RX ORDER — ACETAMINOPHEN 500 MG
1000 TABLET ORAL EVERY 6 HOURS
Status: DISCONTINUED | OUTPATIENT
Start: 2022-12-05 | End: 2022-12-06 | Stop reason: HOSPADM

## 2022-12-05 RX ORDER — MIDAZOLAM HYDROCHLORIDE 2 MG/2ML
2 INJECTION, SOLUTION INTRAMUSCULAR; INTRAVENOUS
Status: DISCONTINUED | OUTPATIENT
Start: 2022-12-05 | End: 2022-12-05 | Stop reason: HOSPADM

## 2022-12-05 RX ORDER — HYDROMORPHONE HYDROCHLORIDE 2 MG/1
1 TABLET ORAL EVERY 4 HOURS PRN
Status: DISCONTINUED | OUTPATIENT
Start: 2022-12-05 | End: 2022-12-06 | Stop reason: HOSPADM

## 2022-12-05 RX ORDER — OXYCODONE HYDROCHLORIDE 5 MG/1
5 TABLET ORAL
Status: COMPLETED | OUTPATIENT
Start: 2022-12-05 | End: 2022-12-05

## 2022-12-05 RX ORDER — FERROUS SULFATE 325(65) MG
325 TABLET ORAL 2 TIMES DAILY WITH MEALS
Status: DISCONTINUED | OUTPATIENT
Start: 2022-12-05 | End: 2022-12-06 | Stop reason: HOSPADM

## 2022-12-05 RX ORDER — ACETAMINOPHEN 500 MG
1000 TABLET ORAL ONCE
Status: COMPLETED | OUTPATIENT
Start: 2022-12-05 | End: 2022-12-05

## 2022-12-05 RX ORDER — CYCLOBENZAPRINE HCL 5 MG
5 TABLET ORAL 3 TIMES DAILY PRN
Status: DISCONTINUED | OUTPATIENT
Start: 2022-12-05 | End: 2022-12-06 | Stop reason: HOSPADM

## 2022-12-05 RX ORDER — SODIUM CHLORIDE 9 MG/ML
INJECTION, SOLUTION INTRAVENOUS PRN
Status: DISCONTINUED | OUTPATIENT
Start: 2022-12-05 | End: 2022-12-06 | Stop reason: HOSPADM

## 2022-12-05 RX ORDER — ONDANSETRON 4 MG/1
4 TABLET, ORALLY DISINTEGRATING ORAL EVERY 8 HOURS PRN
Status: DISCONTINUED | OUTPATIENT
Start: 2022-12-05 | End: 2022-12-06 | Stop reason: HOSPADM

## 2022-12-05 RX ORDER — DIPHENHYDRAMINE HYDROCHLORIDE 50 MG/ML
25 INJECTION INTRAMUSCULAR; INTRAVENOUS EVERY 6 HOURS PRN
Status: DISCONTINUED | OUTPATIENT
Start: 2022-12-05 | End: 2022-12-06 | Stop reason: HOSPADM

## 2022-12-05 RX ORDER — INSULIN GLARGINE 100 [IU]/ML
12 INJECTION, SOLUTION SUBCUTANEOUS NIGHTLY
Status: DISCONTINUED | OUTPATIENT
Start: 2022-12-05 | End: 2022-12-05 | Stop reason: ALTCHOICE

## 2022-12-05 RX ORDER — GLYCOPYRROLATE 0.2 MG/ML
INJECTION INTRAMUSCULAR; INTRAVENOUS PRN
Status: DISCONTINUED | OUTPATIENT
Start: 2022-12-05 | End: 2022-12-05 | Stop reason: SDUPTHER

## 2022-12-05 RX ORDER — SODIUM CHLORIDE 9 MG/ML
INJECTION, SOLUTION INTRAVENOUS CONTINUOUS
Status: DISCONTINUED | OUTPATIENT
Start: 2022-12-05 | End: 2022-12-06 | Stop reason: HOSPADM

## 2022-12-05 RX ORDER — TRANEXAMIC ACID 650 MG/1
1300 TABLET ORAL
Status: COMPLETED | OUTPATIENT
Start: 2022-12-05 | End: 2022-12-05

## 2022-12-05 RX ORDER — SODIUM CHLORIDE, SODIUM LACTATE, POTASSIUM CHLORIDE, CALCIUM CHLORIDE 600; 310; 30; 20 MG/100ML; MG/100ML; MG/100ML; MG/100ML
INJECTION, SOLUTION INTRAVENOUS CONTINUOUS
Status: DISCONTINUED | OUTPATIENT
Start: 2022-12-05 | End: 2022-12-05 | Stop reason: HOSPADM

## 2022-12-05 RX ORDER — TRAZODONE HYDROCHLORIDE 50 MG/1
50 TABLET ORAL NIGHTLY
Status: DISCONTINUED | OUTPATIENT
Start: 2022-12-05 | End: 2022-12-06 | Stop reason: HOSPADM

## 2022-12-05 RX ORDER — INSULIN GLARGINE 100 [IU]/ML
4 INJECTION, SOLUTION SUBCUTANEOUS
Status: DISCONTINUED | OUTPATIENT
Start: 2022-12-05 | End: 2022-12-05 | Stop reason: CLARIF

## 2022-12-05 RX ORDER — ROPIVACAINE HYDROCHLORIDE 2 MG/ML
INJECTION, SOLUTION EPIDURAL; INFILTRATION; PERINEURAL PRN
Status: DISCONTINUED | OUTPATIENT
Start: 2022-12-05 | End: 2022-12-05 | Stop reason: HOSPADM

## 2022-12-05 RX ORDER — HYDROMORPHONE HYDROCHLORIDE 1 MG/ML
0.5 INJECTION, SOLUTION INTRAMUSCULAR; INTRAVENOUS; SUBCUTANEOUS
Status: DISCONTINUED | OUTPATIENT
Start: 2022-12-05 | End: 2022-12-06 | Stop reason: HOSPADM

## 2022-12-05 RX ORDER — HYDROMORPHONE HYDROCHLORIDE 1 MG/ML
0.5 INJECTION, SOLUTION INTRAMUSCULAR; INTRAVENOUS; SUBCUTANEOUS EVERY 5 MIN PRN
Status: COMPLETED | OUTPATIENT
Start: 2022-12-05 | End: 2022-12-05

## 2022-12-05 RX ORDER — LANOLIN ALCOHOL/MO/W.PET/CERES
400 CREAM (GRAM) TOPICAL NIGHTLY
Status: DISCONTINUED | OUTPATIENT
Start: 2022-12-05 | End: 2022-12-06 | Stop reason: HOSPADM

## 2022-12-05 RX ORDER — TOBRAMYCIN 1.2 G/30ML
INJECTION, POWDER, LYOPHILIZED, FOR SOLUTION INTRAVENOUS PRN
Status: DISCONTINUED | OUTPATIENT
Start: 2022-12-05 | End: 2022-12-05 | Stop reason: HOSPADM

## 2022-12-05 RX ORDER — SODIUM CHLORIDE 0.9 % (FLUSH) 0.9 %
5-40 SYRINGE (ML) INJECTION EVERY 12 HOURS SCHEDULED
Status: DISCONTINUED | OUTPATIENT
Start: 2022-12-05 | End: 2022-12-06 | Stop reason: HOSPADM

## 2022-12-05 RX ORDER — POTASSIUM CHLORIDE 20 MEQ/1
40 TABLET, EXTENDED RELEASE ORAL DAILY
Status: DISCONTINUED | OUTPATIENT
Start: 2022-12-05 | End: 2022-12-05 | Stop reason: CLARIF

## 2022-12-05 RX ORDER — SODIUM CHLORIDE 0.9 % (FLUSH) 0.9 %
5-40 SYRINGE (ML) INJECTION EVERY 12 HOURS SCHEDULED
Status: DISCONTINUED | OUTPATIENT
Start: 2022-12-05 | End: 2022-12-05 | Stop reason: HOSPADM

## 2022-12-05 RX ORDER — DEXTROSE MONOHYDRATE 100 MG/ML
INJECTION, SOLUTION INTRAVENOUS CONTINUOUS PRN
Status: DISCONTINUED | OUTPATIENT
Start: 2022-12-05 | End: 2022-12-06 | Stop reason: HOSPADM

## 2022-12-05 RX ADMIN — SODIUM CHLORIDE, SODIUM LACTATE, POTASSIUM CHLORIDE, AND CALCIUM CHLORIDE: 600; 310; 30; 20 INJECTION, SOLUTION INTRAVENOUS at 11:33

## 2022-12-05 RX ADMIN — TRANEXAMIC ACID 1000 MG: 100 INJECTION, SOLUTION INTRAVENOUS at 11:55

## 2022-12-05 RX ADMIN — Medication 5 MG: at 11:17

## 2022-12-05 RX ADMIN — VANCOMYCIN HYDROCHLORIDE 1250 MG: 10 INJECTION, POWDER, LYOPHILIZED, FOR SOLUTION INTRAVENOUS at 11:15

## 2022-12-05 RX ADMIN — GLYCOPYRROLATE 0.1 MG: 0.2 INJECTION, SOLUTION INTRAMUSCULAR; INTRAVENOUS at 10:59

## 2022-12-05 RX ADMIN — TRANEXAMIC ACID 1300 MG: 650 TABLET ORAL at 19:45

## 2022-12-05 RX ADMIN — PHENYLEPHRINE HYDROCHLORIDE 50 MCG: 0.1 INJECTION, SOLUTION INTRAVENOUS at 11:14

## 2022-12-05 RX ADMIN — HYDROMORPHONE HYDROCHLORIDE 0.5 MG: 1 INJECTION, SOLUTION INTRAMUSCULAR; INTRAVENOUS; SUBCUTANEOUS at 12:38

## 2022-12-05 RX ADMIN — PROPOFOL 75 MCG/KG/MIN: 10 INJECTION, EMULSION INTRAVENOUS at 10:54

## 2022-12-05 RX ADMIN — MIDAZOLAM 2 MG: 1 INJECTION INTRAMUSCULAR; INTRAVENOUS at 10:38

## 2022-12-05 RX ADMIN — Medication 5 MG: at 11:49

## 2022-12-05 RX ADMIN — ACETAMINOPHEN 1000 MG: 500 TABLET, FILM COATED ORAL at 10:29

## 2022-12-05 RX ADMIN — ONDANSETRON 4 MG: 2 INJECTION INTRAMUSCULAR; INTRAVENOUS at 10:47

## 2022-12-05 RX ADMIN — GABAPENTIN 100 MG: 100 CAPSULE ORAL at 19:46

## 2022-12-05 RX ADMIN — Medication 10 MG: at 11:08

## 2022-12-05 RX ADMIN — PHENYLEPHRINE HYDROCHLORIDE 30 MCG/MIN: 10 INJECTION INTRAVENOUS at 11:24

## 2022-12-05 RX ADMIN — MAGNESIUM GLUCONATE 500 MG ORAL TABLET 400 MG: 500 TABLET ORAL at 19:45

## 2022-12-05 RX ADMIN — HYDROMORPHONE HYDROCHLORIDE 0.5 MG: 1 INJECTION, SOLUTION INTRAMUSCULAR; INTRAVENOUS; SUBCUTANEOUS at 12:46

## 2022-12-05 RX ADMIN — FERROUS SULFATE TAB 325 MG (65 MG ELEMENTAL FE) 325 MG: 325 (65 FE) TAB at 18:06

## 2022-12-05 RX ADMIN — SODIUM CHLORIDE, PRESERVATIVE FREE 10 ML: 5 INJECTION INTRAVENOUS at 19:43

## 2022-12-05 RX ADMIN — INSULIN LISPRO 4 UNITS: 100 INJECTION, SOLUTION INTRAVENOUS; SUBCUTANEOUS at 17:36

## 2022-12-05 RX ADMIN — ACETAMINOPHEN 1000 MG: 500 TABLET, FILM COATED ORAL at 22:10

## 2022-12-05 RX ADMIN — LIDOCAINE HYDROCHLORIDE 40 MG: 20 INJECTION, SOLUTION EPIDURAL; INFILTRATION; INTRACAUDAL; PERINEURAL at 10:53

## 2022-12-05 RX ADMIN — TRANEXAMIC ACID 1000 MG: 100 INJECTION, SOLUTION INTRAVENOUS at 12:02

## 2022-12-05 RX ADMIN — DEXAMETHASONE SODIUM PHOSPHATE 10 MG: 10 INJECTION INTRAMUSCULAR; INTRAVENOUS at 10:58

## 2022-12-05 RX ADMIN — TOBRAMYCIN 320 MG: 40 INJECTION INTRAMUSCULAR; INTRAVENOUS at 10:48

## 2022-12-05 RX ADMIN — MEPIVACAINE HYDROCHLORIDE 60 MG: 20 INJECTION, SOLUTION EPIDURAL; INFILTRATION at 10:51

## 2022-12-05 RX ADMIN — PHENYLEPHRINE HYDROCHLORIDE 50 MCG: 0.1 INJECTION, SOLUTION INTRAVENOUS at 11:11

## 2022-12-05 RX ADMIN — SODIUM CHLORIDE, SODIUM LACTATE, POTASSIUM CHLORIDE, AND CALCIUM CHLORIDE: 600; 310; 30; 20 INJECTION, SOLUTION INTRAVENOUS at 10:30

## 2022-12-05 RX ADMIN — CARVEDILOL 3.12 MG: 3.12 TABLET, FILM COATED ORAL at 19:45

## 2022-12-05 RX ADMIN — Medication 10 MG: at 11:10

## 2022-12-05 RX ADMIN — ACETAMINOPHEN 1000 MG: 500 TABLET, FILM COATED ORAL at 17:32

## 2022-12-05 RX ADMIN — SENNOSIDES AND DOCUSATE SODIUM 1 TABLET: 50; 8.6 TABLET ORAL at 19:45

## 2022-12-05 RX ADMIN — Medication 5 MG: at 11:35

## 2022-12-05 RX ADMIN — Medication 15 MG: at 10:57

## 2022-12-05 RX ADMIN — TRANEXAMIC ACID 1300 MG: 650 TABLET ORAL at 17:33

## 2022-12-05 RX ADMIN — HYDROMORPHONE HYDROCHLORIDE 2 MG: 2 TABLET ORAL at 22:10

## 2022-12-05 RX ADMIN — GLYCOPYRROLATE 0.1 MG: 0.2 INJECTION, SOLUTION INTRAMUSCULAR; INTRAVENOUS at 10:54

## 2022-12-05 RX ADMIN — PROPOFOL 30 MG: 10 INJECTION, EMULSION INTRAVENOUS at 10:53

## 2022-12-05 RX ADMIN — OXYCODONE 5 MG: 5 TABLET ORAL at 12:55

## 2022-12-05 RX ADMIN — TRANEXAMIC ACID 1000 MG: 100 INJECTION, SOLUTION INTRAVENOUS at 11:00

## 2022-12-05 RX ADMIN — ASPIRIN 81 MG: 81 TABLET, COATED ORAL at 19:49

## 2022-12-05 RX ADMIN — CLINDAMYCIN PHOSPHATE 900 MG: 900 INJECTION, SOLUTION INTRAVENOUS at 19:44

## 2022-12-05 RX ADMIN — TRAZODONE HYDROCHLORIDE 50 MG: 50 TABLET ORAL at 21:38

## 2022-12-05 RX ADMIN — ROSUVASTATIN 40 MG: 20 TABLET, FILM COATED ORAL at 20:03

## 2022-12-05 RX ADMIN — HYDROMORPHONE HYDROCHLORIDE 0.5 MG: 1 INJECTION, SOLUTION INTRAMUSCULAR; INTRAVENOUS; SUBCUTANEOUS at 13:03

## 2022-12-05 RX ADMIN — HYDROMORPHONE HYDROCHLORIDE 0.5 MG: 1 INJECTION, SOLUTION INTRAMUSCULAR; INTRAVENOUS; SUBCUTANEOUS at 12:55

## 2022-12-05 RX ADMIN — PHENYLEPHRINE HYDROCHLORIDE 50 MCG: 0.1 INJECTION, SOLUTION INTRAVENOUS at 11:17

## 2022-12-05 ASSESSMENT — PAIN SCALES - GENERAL
PAINLEVEL_OUTOF10: 2
PAINLEVEL_OUTOF10: 7
PAINLEVEL_OUTOF10: 8

## 2022-12-05 ASSESSMENT — PAIN DESCRIPTION - DESCRIPTORS: DESCRIPTORS: ACHING

## 2022-12-05 ASSESSMENT — HOOS JR
SITTING: 2
HOOS JR TOTAL INTERVAL SCORE: 36.363
WALKING ON UNEVEN SURFACE: 3
GOING UP OR DOWN STAIRS: 3
RISING FROM SITTING: 3
LYING IN BED (TURNING OVER, MAINTAINING HIP POSITION): 3
BENDING TO THE FLOOR TO PICK UP OBJECT: 3
HOOS JR RAW SCORE: 17
HOOS JR RAW SCORE: 17

## 2022-12-05 ASSESSMENT — PAIN - FUNCTIONAL ASSESSMENT: PAIN_FUNCTIONAL_ASSESSMENT: 0-10

## 2022-12-05 ASSESSMENT — PAIN DESCRIPTION - ORIENTATION: ORIENTATION: RIGHT

## 2022-12-05 ASSESSMENT — PAIN DESCRIPTION - LOCATION
LOCATION: HIP
LOCATION: HIP

## 2022-12-05 NOTE — PERIOP NOTE
TRANSFER - OUT REPORT:    Verbal report given to Angely Ho RN on [place]  being transferred to [address] [place] [address] for routine progression of patient care       Report consisted of patient's Situation, Background, Assessment and   Recommendations(SBAR). Information from the following report(s) Nurse Handoff Report, Adult Overview, Surgery Report, MAR, and Cardiac Rhythm SR  was reviewed with the receiving nurse. [place] Assessment: No data recorded  Lines:   Peripheral IV 12/05/22 Right Hand (Active)   Site Assessment Clean, dry & intact 12/05/22 1227   Line Status Infusing 12/05/22 [address] Connections checked and tightened 12/05/22 1227   Phlebitis Assessment No symptoms 12/05/22 1227   Infiltration Assessment 0 12/05/22 1227   Alcohol Cap Used No 12/05/22 1227   Dressing Status Clean, dry & intact 12/05/22 1227   Dressing Type Transparent 12/05/22 1227        Opportunity for questions and clarification was provided.       Patient transported with:  O2 @ 2lpm

## 2022-12-05 NOTE — CARE COORDINATION
Medical record reviewed. Pt is a 73 y/o female admitted for a RTHA. CM met with patient to introduce self and explain role in planning. Prior to admission, pt was living independently in her home alone. Pt plans to discharge home when medically stable, likely tomorrow. Dtr, Karl Hare will be available to assist with care while recovering. Pt in agreement with plan for home health services. No preference in providers. CM initiated referral to [facility]. No DME or no additional discharge planning needs anticipated. 12/05/22 2837   Service Assessment   Patient Orientation Alert and Oriented;Person;Place;Situation;Self   Cognition Alert   History Provided By Patient   Primary Caregiver Self   Accompanied By/Relationship grant, [address] Family Members; Yulia Velázquez [phone] is: Legal Next of Kin   PCP Verified by CM Yes   Last Visit to PCP Within last 3 months   Prior Functional Level Independent in ADLs/IADLs   Current Functional Level Assistance with the following:;Cooking;Housework; Shopping;Mobility   Can patient return to prior living arrangement Yes   Ability to make needs known: Good   Family able to assist with home care needs: Yes   Would you like for me to discuss the discharge plan with any other family members/significant others, and if so, who?  No   Financial Resources [employer] Other (Comment)  (none)

## 2022-12-05 NOTE — ANESTHESIA PROCEDURE NOTES
Spinal Block    Patient location during procedure: OR  End time: 12/5/2022 10:51 AM  Reason for block: primary anesthetic and at surgeon's request  Staffing  Performed: anesthesiologist   Anesthesiologist: Ari De Guzman, DO  Spinal Block  Patient position: sitting  Prep: ChloraPrep  Patient monitoring: cardiac monitor, continuous pulse ox, continuous capnometry, frequent blood pressure checks and oxygen  Approach: midline  Provider prep: mask and sterile gloves  Local infiltration: lidocaine  Needle  Needle type: Liban   Needle gauge: 25 G  Needle length: 3.5 in  Assessment  Swirl obtained: Yes  CSF: clear  Attempts: 1  Hemodynamics: stable  Additional Notes  Attempted at L3-4 with sig bony interference. Up to L2-3 with ease of spinal placement.   Tolerated well  Preanesthetic Checklist  Completed: patient identified, IV checked, site marked, risks and benefits discussed, surgical/procedural consents, equipment checked, pre-op evaluation, timeout performed, anesthesia consent given, oxygen available and monitors applied/VS acknowledged

## 2022-12-05 NOTE — PROGRESS NOTES
ACUTE PHYSICAL THERAPY GOALS:   (Developed with and agreed upon by patient and/or caregiver.)  GOALS (1-4 days):  (1.)Ms. Monie Haywood will move from supine to sit and sit to supine  in bed with CONTACT GUARD ASSIST.    (2.)Ms. Monie Haywood will transfer from bed to chair and chair to bed with CONTACT GUARD ASSIST using the least restrictive device. (3.)Ms. Monie Haywood will ambulate with CONTACT GUARD ASSIST for 200 feet with the least restrictive device. (4.)Ms. Monie Haywood will ambulate up/down ramp with RW with CONTACT GUARD ASSIST.  (5.)Ms. Monie Haywood will state/observe ARIES precautions with 0 verbal cues.   ________________________________________________________________________________________________     PHYSICAL THERAPY JOINT CAMP: TOTAL HIP ARTHROPLASTY Initial Assessment  (Link to Caseload Tracking: PT Visit Days : 1  Acknowledge Orders  Time In/Out  PT Charge Capture  Rehab Caseload Tracker  Episode   Chirag Taveras is a 74 y.o. female   PRIMARY DIAGNOSIS: Primary osteoarthritis of right hip  Primary osteoarthritis of right hip [M16.11]  Osteoarthritis of right hip, unspecified osteoarthritis type [M16.11]  Procedure(s) (LRB):  RIGHT HIP TOTAL ARTHROPLASTY, [place] (Right)  Day of Surgery  Reason for Referral: Pain in left hip (M25.552)  Stiffness of Left Hip, Not elsewhere classified (M25.652)  Difficulty in walking, Not elsewhere classified (R26.2)  Other abnormalities of gait and mobility (R26.89)  Inpatient: Payor: Kemal Rutherford / Plan: [place] / Product Type: *No Product type* /     REHAB RECOMMENDATIONS:   Recommendation to date pending progress:  Setting:  Home Health Therapy    Equipment:    None     GAIT: I Mod I S SBA CGA Min Mod Max Total  NT x2 Comments:   Level of Assistance [] [] [] [] [x] [x] [] [] [] [] []    Weightbearing Status  Right Lower Extremity Weight Bearing: Weight Bearing As Tolerated    Distance  40 feet    Gait Quality Antalgic, Decreased krys , Decreased step clearance, Decreased step length, and Decreased stance    DME Rolling Walker     Stairs      Ramp     I=Independent, Mod I=Modified Independent, S=Supervision, SBA=Standby Assistance, CGA=Contact Guard Assistance,   Min=Minimal Assistance, Mod=Moderate Assistance, Max=Maximal Assistance, Total=Total Assistance, NT=Not Tested      ASSESSMENT:   ASSESSMENT:  Ms. Virgilio Dennis presents with decreased strength and range of motion right lower extremity and with decreased independence with functional mobility s/p right total hip arthroplasty. Pt will benefit from skilled PT interventions to maximize independence with functional mobility and ARIES management. Pt did fairly well with assessment and treatment session. Today's treatment focused on transfer and gait training. Worked on gait training in room. Patient was lethargic and lightheaded; pulled recliner behind her during gait training. She needed to sit after 40' due to lightheadedness. Has not been able to urinate yet. Encouraged her to drink fluids. Notified RN about lightheadedness. Pt practiced ARIES exercises as below with verbal cues while reviewing HEP. Reviewed hip precautions; needs further review. Reviewed use of cold packs as needed for pain and swelling. Pt instructed not to get up without assist. Pt plans to discharge to home with support of daughter with continued therapy for follow up. Patient is hopeful to spend the night to better prepare for safe discharge home. Outcome Measure:   HOOS-JR:  Total Raw Score (0-24 Scale): 17    SUBJECTIVE:   Ms. Virgilio Dennis states, \"I'm really sleepy. \"     Home Environment/Prior Level of Function Lives With: Alone  Type of Home: House  Home Layout: One level  Home Access: Ramped entrance  Bathroom Shower/Tub: Walk-in shower  Home Equipment: Tonye [place], rolling, Cane, Reacher, Long-handled shoehorn  ADL Assistance: Independent  Ambulation Assistance: Independent    OBJECTIVE:     PAIN: VITAL SIGNS: LINES/DRAINS:   Pre Treatment:  Pain Assessment: 0-10  Pain Level: 2  Pain Location: Hip  Pain Orientation: Right      Post Treatment: 2 Vitals        Oxygen        Wound Vac    RESTRICTIONS/PRECAUTIONS:  Restrictions/Precautions: Fall Risk, Weight Bearing  Right Lower Extremity Weight Bearing: Weight Bearing As Tolerated     Hip Precautions: Posterior hip precautions  Restrictions/Precautions: Fall Risk, Weight Bearing        LOWER EXTREMITY GROSS EVALUATION:  RIGHT LE   Within Functional Limits   Abnormal   Comments   Strength [] []  Decreased-post op   Range of Motion [] []  Decreased -S/P R ARIES; Hip precautions;      LEFT LE Within Functional Limits   Abnormal   Comments   Strength [x] []     Range of Motion [x] []       UPPER EXTREMITY GROSS EVALUATION:     Within Functional Limits   Abnormal   Comments   Strength [x] []    Range of Motion [x] []      SENSATION  Sensation [x]       COGNITION/  PERCEPTION: Intact Impaired (Comments):   Orientation [x]     Vision [x]     Hearing [x]     Cognition  [x]       MOBILITY: I Mod I S SBA CGA Min Mod Max Total  NT x2 Comments:   Bed Mobility    Rolling [] [] [] [] [] [] [] [] [] [] []    Supine to Sit [] [] [] [] [] [] [] [] [] [] []    Scooting [] [] [] [] [] [] [] [] [] [] []    Sit to Supine [] [] [] [] [] [] [] [] [] [] []    Transfers    Sit to Stand [] [] [] [] [x] [x] [] [] [] [] []    Bed to Chair [] [] [] [] [x] [x] [] [] [] [] []    Stand to Sit [] [] [] [] [x] [x] [] [] [] [] []    Stand Pivot [] [] [] [] [] [] [] [] [] [] []    Toilet [] [] [] [] [] [] [] [] [] [] []     [] [] [] [] [] [] [] [] [] [] []    I=Independent, Mod I=Modified Independent, S=Supervision, SBA=Standby Assistance, CGA=Contact Guard Assistance,   Min=Minimal Assistance, Mod=Moderate Assistance, Max=Maximal Assistance, Total=Total Assistance, NT=Not Tested    BALANCE: Good Fair+ Fair Fair- Poor NT Comments   Sitting Static [x] [] [] [] [] []    Sitting Dynamic [x] [] [] [] [] [] Standing Static [] [x] [] [] [] [] With RW   Standing Dynamic [] [x] [x] [] [] [] With RW     PLAN:   FREQUENCY AND DURATION: BID for duration of hospital stay or until stated goals are met, whichever comes first.    THERAPY PROGNOSIS: Good    PROBLEM LIST:   (Skilled intervention is medically necessary to address:)  Decreased ADL/Functional Activities, Decreased Activity Tolerance, Decreased AROM/PROM, Decreased Gait Ability, Decreased Strength, Decreased Transfer Abilities, and Increased Pain   INTERVENTIONS PLANNED:   (Benefits and precautions of physical therapy have been discussed with the patient.)  Therapeutic Activity, Therapeutic Exercise/HEP, Gait Training, Modalities, and Education       TREATMENT:   EVALUATION: LOW COMPLEXITY: (Untimed Charge)    TREATMENT:   Therapeutic Activity (15 Minutes): Therapeutic activity included Transfer Training, Ambulation on level ground, Sitting balance , Standing balance, and patient education: role of therapy, safety, hip precautions to improve functional Activity tolerance, Balance, Coordination, Mobility, and Strength. Therapeutic Exercise (10 Minutes): Therapeutic exercises noted below to improve functional AROM and strength.      TREATMENT GRID:  THERAPEUTIC  EXERCISES: DATE:  12/5/22 DATE:   DATE:      AM PM AM PM AM PM    [] [] [] [] [] []   Ankle Pumps  10       Quad Sets  10       Gluteal Sets  10       Hip Abd/ADduction  10       Knee Slides  10       Short Arc Quads  10       Long Arc Quads                                    B = bilateral; AA = active assistive; A = active; P = passive      EDUCATION: Education Given To: Patient  Education Provided: Role of Therapy, Plan of Care, Home Exercise Program, Precautions, Transfer Training, Fall Prevention Strategies  Education Method: Demonstration, Verbal  Education Outcome: Verbalized understanding, Demonstrated understanding  EDUCATION:  [x] Home Exercises  [x] Fall Precautions  [x] Hip Precautions  [] D/C Instruction Review [] Hip Prosthesis Review  [x] Walker Management/Safety  [] Adaptive Equipment as Needed     AFTER TREATMENT PRECAUTIONS: Bed/Chair Locked, Call light within reach, Chair, Needs within reach, and RN notified    INTERDISCIPLINARY COLLABORATION:  RN/ PCT and OT/ ASCENCIO    Compliance with Program/Exercises: compliant all of the time, Will assess as treatment progresses. Recommendations/Intent for next treatment session:  Treatment next visit will focus on increasing Ms. Maddox's independence with bed mobility, transfers, gait training, strength/ROM exercises, modalities for pain, and patient education.      TIME IN/OUT:  Time In: 9881  Time Out: [facility]  Minutes: [facility],

## 2022-12-05 NOTE — ANESTHESIA POSTPROCEDURE EVALUATION
Department of Anesthesiology  Postprocedure Note    Patient: Catherine Victor  MRN: [id number]  YOB: 1950  Date of evaluation: 12/5/2022      Procedure Summary     Date: 12/05/22 [place] [facility] [place] [facility] [place]    Anesthesia Start: 1021 Anesthesia Stop: 8508    Procedure: RIGHT HIP TOTAL ARTHROPLASTY, [place], PREVENA (Right: Hip) Diagnosis:       Primary osteoarthritis of right hip      (Primary osteoarthritis of right hip [M16.11])    Surgeons: Lyssa Patel MD Responsible Provider: Eliza Iraheta DO    Anesthesia Type: Spinal ASA Status: 3          Anesthesia Type: Spinal    Theron Phase I: Theron Score: 10    Theron Phase II:        Anesthesia Post Evaluation    Patient location during evaluation: PACU  Level of consciousness: awake and alert  Airway patency: patent  Nausea & Vomiting: no nausea  Complications: no  Cardiovascular status: hemodynamically stable  Respiratory status: acceptable  Hydration status: euvolemic

## 2022-12-05 NOTE — OP NOTE
Total Hip Procedure Note    Latrell Fleming,  [id number],  1950    Pre-operative Diagnosis: Primary osteoarthritis of right hip [M16.11]    Post-operative Diagnosis: Same    Procedure: Right Total Hip Arthroplasty    BMI: Body mass index is 29.52 kg/m². Ferdinand Pruitt Location: [address] [place] [address]    Surgeon: Servando Cameron MD    Assistant: Parag Blank CFA and Yordy Bryan CST    Anesthesia: Spinal     Complications: None    EBL: 200cc    Drains: None    Intra-op Findings: Pre-operatively leg lengths were assessed using preop Xrays and with the patient in the lateral decubitus position and operative leg was felt to be similar in length compared to the contralateral leg. The operative hip showed notable cartilage loss of both the femoral head and acetabulum. No intra-operative periprosthetic fracture was encountered. Sciatic nerve was noted to be intact at the end of the procedure. We measured the distance of our resected femoral head/neck from the cut surface to the center of the femoral head to be approximately 35mm. The overall length replaced with the implanted head/neck was 35mm. Intra-operatively we felt that we restored the patients leg lengths to equal lengths using the method described later. Postop with the patient supine we assessed leg lengths and felt they were similar. Patient condition at completion of Procedure: Stable    Implants:   Implant Name Type Inv.  Item Serial No.  Lot No. LRB No. Used Action   SHELL ACET SZ D [id number] 3 CLUS H TRITANIUM PRESSFIT MARLIN - [id number]  SHELL ACET SZ D [id number] 3 CLUS H TRITANIUM PRESSFIT [facility]  [employer] [facility] [id number] Right 1 Implanted   SCREW BNE L35MM [id number] LO PROF HEX TRIDENT LL - [id number]  SCREW BNE L35MM [id number] LO PROF HEX TRIDENT LL  [employer] [facility] [id number] Right 1 Implanted   INSERT ACET D 10 DEG 32 MM HIP X3 TRIDENT - [id number]  INSERT ACET D 10 DEG 32 MM HIP X3 TRIDENT  [employer] [facility] [id number] Right 1 Implanted   STEM FEM SZ 5 L108MM NK L35MM 44MM OFFSET 127DEG HIP TI - [id number]  STEM FEM SZ 5 L108MM NK L35MM 44MM OFFSET 127DEG HIP TI  [employer] [facility] [id number] Right 1 Implanted   HEAD FEM [id number] +0MM OFFSET HIP BIOLOX DELT CERAMIC TAPR - [id number]  HEAD FEM [id number] +0MM OFFSET HIP BIOLOX DELT CERAMIC TAPR  [employer] [employer] [id number] Right 1 Implanted       Description of Procedure    The complexity of the total joint surgery requires the use of a first assistant. The above individual assisted with positioning, prepping, draping of the patient before the procedure and instrument manipulation, extremity repositioning and retraction during the procedure as well as wound closure, dressing application and brace application after the procedure (if applicable). No intern, resident, or other hospital staff was available to assist during the surgery. Griselda Gutierrez was brought to the operating room. Patient was transferred from the stretcher to OR bed. Anesthesia was induced. IV antibiotics were administered per protocol. Griselda Gutierrez was positioned in the lateral decubitus position and the pelvis/torso stabilized with posts. The right leg was prepped and draped in the usual sterile fashion. A time out identifying the patient, procedure, operative side and surgeon was administered and charted by the OR Nurse. Prior to incision one gram of TXA was given intravenously. A standard posterior approach was utilized to expose the right hip. The incision was carried through the subcutaneous tissue and underlying fascia with hemostasis obtained using the bovie cauterization and Aquamantys. A Charmley retractor was inserted. We resected any redundent bursal tissue off the external rotators. We were able to identify the piriformis tendon.  The short external rotators and capsule were dissected off the posterior femur as a single layer just superior to the piriformis tendon. The sciatic nerve was palpated and protected during dissection. The femoral head was dislocated. The articular surface revealed loss of cartilage, exposed bone and bone spurring. The neck was osteotomized approximately 1cm above the top of the lesser trochanter. We were careful to protect the greater trochanter during osteotomy and protect it from iatrogenic injury. Acetabular retractors were placed both anterior and posterior just superficial to the acetabular labrum. Remaining acetabular labrum was resected and any soft tissue was removed from the acetabulum including any tissue within the codyloid fossa. The acetabular surface revealed loss of cartilage with exposed bone. The acetabulum was sequentially reamed noting proper anteversion and inclination during reaming. The transverse acetabular ligament was used as the primary anatomic landmark to determine version and inclination. The acetabulum was sized to a 50 mm acetabular component. The prepared acetabulum was irrigated of any residual reamings and soft tissue. The permanent acetabular component was impacted into place to achieve appropriate horizontal tilt, anteversion, bone coverage and stability. We visualize that the acetabular component was fully seated. A trial liner was impacted into position. We did not have to excise overhanging osteophytes anterior and posterior  in order to minimize the risk of impingement. We then turned our attention to the proximal femur. A 2-prong proximal femoral retractor was placed. We gained access to the proximal femur using a  and femoral canal finder. The canal was prepared with appropriate lateralization in which we used the initial smaller broaches to remove lateral bone to avoid varus placement of the femoral component. The canal was then broached progressively. The broach was positioned with the appropriate anatomic femoral anteversion. We broached up to a size 5 Accolade II stem.  5A calcar planar was used. A trial reduction with a size #5 127 degree Accolade II stem with a +0 neck length and 32 mm head was performed. This was found to be the most stable with maximum hip flexion and with internal/external rotation testing. We did not appreciate any evidence of component or bony impingement. Limb lengths were assessed using three techniques. First, the position of the tip of the operative greater trochanter was compared to the center of the femoral head component and was felt to match this same anatomic relationship as the non-operative hip based on preoperative X-rays. Next, we measured the length of our resected femoral head neck and felt that the trial components matched this resected length as closely as possible when accounting for the length provided by the femoral neck/head. Finally, we compared leg lengths by comparing the possible of the patella with the patients heels together with the patient in the lateral decubitus position. Using these methods it was felt that the patients leg lengths were equilibrated and with appropriate stability as mentioned above. All trial components were removed. Prior to final polyethylene insertion one screw was placed to further stabilize the cup. The final liner was impacted into place which was a 10 degree elevated liner. A cementless size 5 127 degree Accolade II stem was impacted into place carefully. We were careful to observe the calcar region for any iatrogenic fractures during insertion. The permanent femoral head was impacted into place which was a +0mm 32mm ceramic head. Paul Maddox's hip was reduced and stability was as mentioned above. Dilute Betadine solution was allowed to sit in the surgical site for a 3 minute period and copious saline was used to irrigate the surgical site. The sciatic nerve was palpated and noted to be intact.  A periarticular of ropivicaine, toradol, and morphine was injected about the surgical site with care being taken not to inject in the vicinity of neurovascular structures. Prior to capsular closure one gram of vancomycin powder and 1.2 grams of tobramycin powder was placed within the capsular layer. Prior to wound closure one additional gram of TXA was given for a total of 2 grams. The capsule was repaired with a three #2 Fiberwires secured through drill holes placed in the posterior aspect of the trochanter. No drain was placed. The fascia was closed with a #0 Bidirectional Stratafix barbed suture. The sub-Q layer was closed with 2-0 monocril suture. A 3-0 moncril stratafix suture in a running subcuticular fashion was used to close the skin. The incision site was thoroughly cleaned with alcohol and a Prevena incisional wound vacuum dressing was placed. Drapes were then broken down and patient was transferred carefully back to the OR stretcher. The sponge and needle counts were correct. The patient tolerated the procedure without difficulty and left the operating room in satisfactory condition.     Signed By: Vanita Reed MD

## 2022-12-05 NOTE — PROGRESS NOTES
TRANSFER - IN REPORT:    Verbal report received from [address] on [employer] [employer]  being received from PACU for routine post-op      Report consisted of patient's Situation, Background, Assessment and   Recommendations(SBAR). Information from the following report(s) Surgery Report, Intake/Output, and MAR was reviewed with the receiving nurse. Opportunity for questions and clarification was provided.

## 2022-12-05 NOTE — PROGRESS NOTES
12/05/22 1634   Treatment   Treatment Type IS   Oxygen Therapy/Pulse Ox   O2 Therapy Room air   O2 Device None (Room air)   O2 Flow Rate (L/min) 0 L/min   Heart Rate 70   Resp 17   SpO2 92 %   $Pulse Oximeter $Spot check (single)   Incentive Spirometry Tx   Treatment Effort Assisted by RT   Achieved Volume (mL) 1750 mL   Patient achieved 1750 Ml/sec on IS. Patient encouraged to do every hour while awake-patient agreed and demonstrated. No shortness of breath or distress noted.

## 2022-12-05 NOTE — PROGRESS NOTES
OCCUPATIONAL THERAPY Initial Assessment, Daily Note, and PM      (Link to Caseload Tracking: OT Visit Days: 1  OT Orders   Time  OT Charge Capture  Rehab Caseload Tracker  Episode     James Edward is a 74 y.o. female   PRIMARY DIAGNOSIS: Primary osteoarthritis of right hip  Primary osteoarthritis of right hip [M16.11]  Osteoarthritis of right hip, unspecified osteoarthritis type [M16.11]  Procedure(s) (LRB):  RIGHT HIP TOTAL ARTHROPLASTY, [place], PREVENA (Right)  Day of Surgery  Reason for Referral: Pain in Right Hip (M25.551)  Stiffness of Right Hip, Not elsewhere classified (M25.651)  Inpatient: Payor: [employer] / Plan: [place] / Product Type: *No Product type* /     ASSESSMENT:     REHAB RECOMMENDATIONS:   Recommendation to date pending progress:  Setting:  Home Health Therapy    Equipment:    None     ASSESSMENT:  Ms. Juan Renteria is s/p right ARIES and presents with decreased independence with functional mobility and activities of daily living as compared to baseline level of function and safety. Patient would benefit from skilled Occupational Therapy to maximize independence and safety with self-care task and functional mobility. Patient declined need to use the bathroom or desire to get dressed this afternoon. She was educated on hip precautions and performed bed mobility and functional transfers in prep for OOB ADLs. She reports feeling \"loopy\" from medication. She was left sitting up in recliner with sandwich tray and daughter at bedside. Patient is hopeful to spend the night to better prepare for safe discharge home. She plans to have support from family near by.  Should progress well with ADL session in AM.       Nathan Daily Activity Inpatient Short Form:    AM-PAC Daily Activity Inpatient   How much help for putting on and taking off regular lower body clothing?: A Lot  How much help for Bathing?: A Lot  How much help for Toileting?: A Lot  How much help for putting on and taking off regular upper body clothing?: None  How much help for taking care of personal grooming?: None  How much help for eating meals?: None  AM-PAC Inpatient Daily Activity Raw Score: 18  AM-PAC Inpatient ADL T-Scale Score : 38.66  ADL Inpatient CMS 0-100% Score: 46.65  ADL Inpatient CMS G-Code Modifier : CK     SUBJECTIVE:     Ms. France Botello states that it will be hard for her not to cross her legs      Social/Functional   Lives With: Alone  Type of Home: House  Home Layout: One level  Home Access: Ramped entrance  Bathroom Shower/Tub: Walk-in shower  Home Equipment: Antonio Alstrom, rolling, Cane, Reacher, Long-handled shoehorn  ADL Assistance: Independent  Ambulation Assistance: Independent    OBJECTIVE:     Latisha Duenas / Frank Leahy / Hilario Snider: None    RESTRICTIONS/PRECAUTIONS:  Restrictions/Precautions: Weight Bearing, Fall Risk  Right Lower Extremity Weight Bearing: Weight Bearing As Tolerated  Position Activity Restriction  Hip Precautions: Posterior hip precautions    PAIN: VITALS / O2:   Pre Treatment:          Post Treatment: no c/o pain during session Vitals          Oxygen        GROSS EVALUATION: INTACT IMPAIRED   (See Comments)   UE AROM [x] []   UE PROM [x] []   Strength [x]       Posture / Balance []  Cues for safe use of RW during mobility and daily activities    Sensation []     Coordination []       Tone []       Edema []    Activity Tolerance []  Decreased from baseline     Hand Dominance R [] L []      COGNITION/  PERCEPTION: INTACT IMPAIRED   (See Comments)   Orientation [x]     Vision [x]     Hearing [x]     Cognition  [x]     Perception [x]       MOBILITY: I Mod I S SBA CGA Min Mod Max Total  NT x2 Comments:   Bed Mobility    Rolling [] [] [] [] [] [] [] [] [] [] []    Supine to Sit [] [] [] [] [x] [] [] [] [] [] []    Scooting [] [] [] [] [x] [] [] [] [] [] []    Sit to Supine [] [] [] [] [] [] [] [] [] [] []    Transfers    Sit to Stand [] [] [] [] [x] [] [] [] [] [] []    Bed to Chair [] [] [] [] [x] [x] [] [] [] [] []    Stand to Sit [] [] [] [] [x] [] [] [] [] [] []    Tub/Shower [] [] [] [] [] [] [] [] [] [] []       Toilet [] [] [] [] [] [] [] [] [] [] []        [] [] [] [] [] [] [] [] [] [] []    I=Independent, Mod I=Modified Independent, S=Supervision/Setup, SBA=Standby Assistance, CGA=Contact Guard Assistance, Min=Minimal Assistance, Mod=Moderate Assistance, Max=Maximal Assistance, Total=Total Assistance, NT=Not Tested    ACTIVITIES OF DAILY LIVING: I Mod I S SBA CGA Min Mod Max Total NT Comments   BASIC ADLs:              Upper Body Bathing [] [] [] [] [x] [] [] [] [] []    Lower Body Bathing [] [] [] [] [] [] [x] [] [] []    Toileting [] [] [] [] [] [x] [] [] [] []    Upper Body Dressing [] [] [x] [] [] [] [] [] [] []    Lower Body Dressing [] [] [] [] [] [] [x] [] [] []    Feeding [x] [] [] [] [] [] [] [] [] []    Grooming [] [] [x] [] [] [] [] [] [] []    Personal Device Care [] [] [] [] [x] [] [] [] [] []    Functional Mobility [] [] [] [] [x] [x] [] [] [] []    I=Independent, Mod I=Modified Independent, S=Supervision/Setup, SBA=Standby Assistance, CGA=Contact Guard Assistance, Min=Minimal Assistance, Mod=Moderate Assistance, Max=Maximal Assistance, Total=Total Assistance, NT=Not Tested    PLAN:     FREQUENCY/DURATION     for duration of hospital stay or until stated goals are met, whichever comes first.    ACUTE OCCUPATIONAL THERAPY GOALS:   (Developed with and agreed upon by patient and/or caregiver.)    GOALS:   DISCHARGE GOALS (in preparation for going home/rehab):  3 days  1. Ms. Jorge Pang will perform lower body dressing activity with minimal assist required to demonstrate improved functional mobility and safety. 2.  Ms. Jorge Pang will perform bathing activity with minimal assist required to demonstrate improved functional mobility and safety.   3.  Ms. Jorge Pang will perform toileting activity with  contact guard assist to demonstrate improved functional mobility and safety. 4.  Ms. Mary Jane Thompson will perform all functional transfers transfer with contact guard assist to demonstrate improved functional mobility and safety. PROBLEM LIST:   (Skilled intervention is medically necessary to address:)  Decreased ADL/Functional Activities  Decreased Activity Tolerance  Decreased Balance  Decreased Coordination  Decreased Gait Ability  Decreased Safety Awareness  Decreased Strength  Decreased Transfer Abilities   INTERVENTIONS PLANNED:   (Benefits and precautions of occupational therapy have been discussed with the patient.)  Self Care Training  Therapeutic Activity  Therapeutic Exercise/HEP  Neuromuscular Re-education  Manual Therapy  Education         TREATMENT:     EVALUATION: LOW COMPLEXITY: (Untimed Charge)    TREATMENT:   Therapeutic Activity (25 Minutes): Patient participated in therapeutic activities including bed mobility training, functional transfer training, adaptive equipment training, functional mobility of household distances, sitting tolerance activity, and standing tolerance activity with minimal verbal cues, tactile cues, and adaptive equipment in order to increase safety awareness, increase activity tolerance, decrease assistance required, prepare for functional activity, and prepare for discharge home.      AFTER TREATMENT PRECAUTIONS: Bed/Chair Locked, Call light within reach, Chair, Needs within reach, RN notified, and Visitors at bedside    INTERDISCIPLINARY COLLABORATION:  RN/ PCT and PT/ PTA    EDUCATION:  Education Given To: Patient, Family  Education Provided: Role of Therapy, Plan of Care, Precautions, ADL Adaptive Strategies, Transfer Training, Energy Conservation, Equipment, Fall Prevention Strategies  Education Method: Demonstration, Verbal  Barriers to Learning: None  Education Outcome: Verbalized understanding, Demonstrated understanding, Continued education needed  [] Safe And Effective Hygiene  [x] Fall Precautions  [x] Hip Precautions  [] D/C Instruction Review [] Prosthesis Review  [x] Walker Management/Safety  [x] Adaptive Equipment as Needed  [x] Therapeutic Resting Position of Joint       TOTAL TREATMENT DURATION AND TIME:  Time In: 1772  Time Out: [place]  Minutes: 230 Nela Nunez

## 2022-12-05 NOTE — ANESTHESIA PRE PROCEDURE
Department of Anesthesiology  Preprocedure Note       Name:  Brett Castillo   Age:  74 y.o.  :  1950                                          MRN:  [id number]         Date:  2022      Surgeon: Harini Leonard):  Mona House MD    Procedure: Procedure(s):  RIGHT HIP TOTAL ARTHROPLASTY, [place], PREVENA    Medications prior to admission:   Prior to Admission medications    Medication Sig Start Date End Date Taking? Authorizing Provider   acetaminophen (TYLENOL) 500 MG tablet Take 2 tablets by mouth every 6 hours as needed for Pain 22   Mona House MD   aspirin EC 81 MG EC tablet Take 1 tablet by mouth in the morning and at bedtime 22   Mona House MD   HYDROmorphone (DILAUDID) 2 MG tablet Take 1 tablet by mouth every 4 hours as needed for Pain for up to 7 days. 22  Mona House MD   cyclobenzaprine (FLEXERIL) 5 MG tablet Take 1 tablet by mouth 3 times daily as needed for Muscle spasms 22  Mona House MD   gabapentin (NEURONTIN) 100 MG capsule Take 1 capsule by mouth 2 times daily for 15 days. 22  Mona House MD   omeprazole (PRILOSEC) 40 MG delayed release capsule Take 1 capsule by mouth daily 22   Mona House MD   ondansetron (ZOFRAN) 4 MG tablet Take 1 tablet by mouth 3 times daily as needed for Nausea or Vomiting 22   Mona House MD   senna (SENOKOT) 8.6 MG tablet Take 1 tablet by mouth 2 times daily 22   Mona House MD   vitamin D (CHOLECALCIFEROL) 125 MCG (5000 UT) CAPS capsule Take 10,000 Units by mouth daily    Historical Provider, MD   traMADol (ULTRAM) 50 MG tablet Take 50 mg by mouth every 4 hours as needed for Pain.     Historical Provider, MD   carvedilol (COREG) 3.125 MG tablet Take 1 tablet by mouth 2 times daily (with meals) 22   Wade Martinez DO   isosorbide mononitrate (IMDUR) 60 MG extended release tablet Take 1 tablet by mouth daily 22   Wade Martinez DO furosemide (LASIX) 40 MG tablet Take 1 tablet by mouth daily 11/16/22   Saundra Chew DO   rosuvastatin (CRESTOR) 40 MG tablet Take 1 tablet by mouth every evening 10/26/22   Marcus Ibarra MD   montelukast (SINGULAIR) 10 MG tablet Take 1 tablet by mouth daily 10/26/22   Marcus Ibarra MD   ferrous sulfate (FE TABS 325) 325 (65 Fe) MG EC tablet Take 1 tablet by mouth 2 times daily 9/26/22   Marcus Ibarra MD   Continuous Blood Gluc Transmit (DEXCOM G6 TRANSMITTER) MISC Test blood sugar 3 times daily 9/1/22   Marcus Ibarra MD   Continuous Blood Gluc Sensor (DEXCOM G6 SENSOR) MISC Test blood sugar 3 times daily. 9/1/22   Marcus Ibarra MD   Continuous Blood Gluc  (DEXCOM G6 ) MAURICE Test blood sugar 3 times daily 8/31/22   Marcus Ibarra MD   citalopram (CELEXA) 20 MG tablet Take 1 tablet by mouth in the morning. 7/26/22   Marcus Ibarra MD   traZODone (DESYREL) 50 MG tablet Take 1 tablet by mouth nightly 7/26/22   Marcus Ibarra MD   NIFEdipine (PROCARDIA XL) 90 MG extended release tablet Take 1 tablet by mouth in the morning. 7/26/22   Marcus Ibarra MD   aspirin 81 MG chewable tablet Take 81 mg by mouth daily    Ar Automatic Reconciliation   cyanocobalamin 1000 MCG tablet Take 5,000 mcg by mouth daily    Ar Automatic Reconciliation   Insulin Degludec (TRESIBA FLEXTOUCH) 100 UNIT/ML SOPN Inject 12 Units into the skin at bedtime 2/7/22   Ar Automatic Reconciliation   insulin lispro, 1 Unit Dial, 100 UNIT/ML SOPN Inject into the skin 3 times daily (before meals) 4 Units SubQ with breakfast, 4 Units SubQ with lunch, 6 Units SubQ with dinner.  Indications: type 2 diabetes mellitus 11/3/21   Ar Automatic Reconciliation   magnesium oxide (MAG-OX) 400 MG tablet Take 400 mg by mouth at bedtime 11/30/16   Ar Automatic Reconciliation   nitroGLYCERIN (NITROSTAT) 0.4 MG SL tablet Place 0.4 mg under the tongue every 5 minutes as needed  Patient not taking: Reported on 12/5/2022 11/6/18   Ar Automatic Reconciliation   potassium chloride (KLOR-CON M) 20 MEQ extended release tablet Take 40 mEq by mouth daily 3/13/22   Ar Automatic Reconciliation       Current medications:    Current Facility-Administered Medications   Medication Dose Route Frequency Provider Last Rate Last Admin    lidocaine 1 % injection 1 mL  1 mL IntraDERmal Once PRN Ermelinda Omalley, DO        fentaNYL (SUBLIMAZE) injection 100 mcg  100 mcg IntraVENous Once PRN Ermelinda Omalley, DO        lactated ringers infusion   IntraVENous Continuous Ermelinda Whalen,  mL/hr at 12/05/22 1030 New Bag at 12/05/22 1030    midazolam PF (VERSED) injection 2 mg  2 mg IntraVENous Once PRN Ermelinda Omalley, DO        sodium chloride flush 0.9 % injection 5-40 mL  5-40 mL IntraVENous 2 times per day Luis Pipes, APRN - CNP        sodium chloride flush 0.9 % injection 5-40 mL  5-40 mL IntraVENous PRN Luis Pipes, APRN - CNP        0.9 % sodium chloride infusion   IntraVENous PRN Luis Pipes, APRN - CNP        vancomycin (VANCOCIN) 1250 mg in sodium chloride 0.9% 250 mL IVPB  1,250 mg IntraVENous Once Lyssa Patel MD        tobramycin (NEBCIN) 320 mg in sodium chloride 0.9 % 100 mL IVPB  320 mg IntraVENous Once Lyssa Patel MD           Allergies: Allergies   Allergen Reactions    Cephalosporins Anaphylaxis, Hives and Swelling    Hydralazine Other (See Comments)     Chest pain    Sulfamethoxazole-Trimethoprim Diarrhea    Lisinopril Other (See Comments)     Passing out spells.  // pt sts not allergic to med     Codeine Nausea And Vomiting       Problem List:    Patient Active Problem List   Diagnosis Code    Stage 4 chronic kidney disease ([facility] [address] N18.4    Essential hypertension I10    Chronic diastolic congestive heart failure (HCC) I50.32    Age-related osteoporosis without current pathological fracture M81.0    Primary insomnia F51.01    Mixed hyperlipidemia E78.2    Coronary artery disease involving native coronary artery of native heart without angina pectoris I25.10    Major depressive disorder with single episode, in full remission ([facility] F32.5    Type 2 diabetes mellitus with stage 4 chronic kidney disease, with long-term current use of insulin ([facility]) E11.22, N18.4, Z79.4    Primary osteoarthritis of right hip M16.11    Osteoarthritis of right hip, unspecified osteoarthritis type M16.11       Past Medical History:        Diagnosis Date    Acute on chronic combined systolic and diastolic congestive heart failure ([facility] 02/17/2021    Acute renal failure superimposed on stage 3b chronic kidney disease ([facility] 01/12/2021    CAD (coronary artery disease) 03/01/2012    MI, 3 stents    Carotid artery stenosis 12/12/2018    Carotid stenosis, asymptomatic, right 03/12/2020    Chest pain     denies currently    Childhood asthma     CKD (chronic kidney disease)     stage 3 per Nephrology note    Coronary artery disease involving native coronary artery without angina pectoris 05/21/2015    COVID-19 virus infection 01/12/2021    Depression 03/01/2012    Diabetes mellitus ([facility] 03/01/2012    Tresiba nightly; Humalog 1-3 times per day per SSI; Has Dexcom BS monitor;  Average bs-150-175; A1c-6.8 on 14/22/40    Diastolic dysfunction     DM2 (diabetes mellitus, type 2) ([facility] 05/21/2015    Elevated LFTs 01/12/2021    Elevated troponin 03/02/2012    Essential hypertension 05/21/2015    on med for control    External hemorrhoids without mention of complication 7906    GERD (gastroesophageal reflux disease)     no meds    HCAP (healthcare-associated pneumonia) 01/18/2021    History of left-sided carotid endarterectomy 03/12/2020    History of MI (myocardial infarction) 11/2012    Hypercholesterolemia     Hypertension 03/01/2012    Hypoalbuminemia 02/18/2021    Hyponatremia 01/12/2021    Hypoxemia 08/12/2020    VERONICA (iron deficiency anemia)     oral iron daily    Insomnia     Lactic acidosis 01/12/2021    Long QT interval 01/12/2021  Murmur     per cardio note (11/16/22)= (faint apical systolic murmur) heard; last Echo (2/16/21)    Personal history of colonic polyps 2013    hyperplastic    Platelet inhibition due to Plavix     no longer on Plavix    Pleural effusion, bilateral 08/11/2020    Rectocele 2013    Sepsis ([facility] 01/12/2021    Stercoral colitis 03/02/2022    Tobacco abuse 03/01/2012    former smoker    Tobacco use disorder     Unstable angina ([facility] [address] 03/01/2012    ntg as needed. Past Surgical History:        Procedure Laterality Date    CAROTID ENDARTERECTOMY Left 12/12/2018    CATARACT REMOVAL Left 09/19/2016    Dr Audelia Perkins, [id number] [facility] Right 11/07/2016    Dr Audelia Perkins, [address]      neck w/plate    COLONOSCOPY  12/17/13    Brigitte--single transverse hyperplastic polyp--7-10 year recall    CORONARY STENT PLACEMENT      stent x3; last in 2012    FLEXIBLE SIGMOIDOSCOPY N/A 3/6/2022    SIGMOIDOSCOPY FLEXIBLE performed by Lamont Luciano MD at [facility]      x2    ORTHOPEDIC SURGERY      left elbow    HORTENSIA AND BSO (CERVIX REMOVED)      WISDOM TOOTH EXTRACTION         Social History:    Social History     Tobacco Use    Smoking status: Former     Packs/day: 1.00     Years: 20.00     Pack years: 20.00     Types: Cigarettes     Quit date: 11/9/2012     Years since quitting: 10.0    Smokeless tobacco: Never    Tobacco comments:     Quit smoking: quit 2012 . has stopped prior also   Substance Use Topics    Alcohol use:  Yes     Alcohol/week: 3.0 standard drinks     Types: 3 Glasses of wine per week                                Counseling given: Not Answered  Tobacco comments: Quit smoking: quit 2012 . has stopped prior also      Vital Signs (Current):   Vitals:    12/05/22 1000   BP: 139/73   Pulse: 65   Resp: 18   Temp: 97.8 °F (36.6 °C)   TempSrc: Oral   SpO2: 96%   Weight: 172 lb (78 kg)   Height: 5' 4\" (1.626 m) BP Readings from Last 3 Encounters:   12/05/22 139/73   11/21/22 132/64   11/16/22 (!) 136/58       NPO Status: Time of last liquid consumption: 0845 (sips with meds of water )                        Time of last solid consumption: 2300                        Date of last liquid consumption: 12/05/22                        Date of last solid food consumption: 12/04/22    BMI:   Wt Readings from Last 3 Encounters:   12/05/22 172 lb (78 kg)   11/21/22 170 lb (77.1 kg)   11/16/22 173 lb 6.4 oz (78.7 kg)     Body mass index is 29.52 kg/m². CBC:   Lab Results   Component Value Date/Time    WBC 7.3 11/21/2022 12:43 PM    RBC 4.19 11/21/2022 12:43 PM    HGB 13.0 11/21/2022 12:43 PM    HCT 39.8 11/21/2022 12:43 PM    MCV 95.0 11/21/2022 12:43 PM    RDW 12.0 11/21/2022 12:43 PM     11/21/2022 12:43 PM       CMP:   Lab Results   Component Value Date/Time     11/21/2022 12:43 PM    K 3.6 11/21/2022 12:43 PM     11/21/2022 12:43 PM    CO2 27 11/21/2022 12:43 PM    BUN 32 11/21/2022 12:43 PM    CREATININE 2.23 11/21/2022 12:43 PM    GFRAA 31 07/26/2022 11:34 AM    AGRATIO 1.0 04/04/2022 02:50 PM    LABGLOM 23 11/21/2022 12:43 PM    GLUCOSE 223 11/21/2022 12:43 PM    PROT 7.6 07/26/2022 11:34 AM    CALCIUM 9.0 11/21/2022 12:43 PM    BILITOT 0.4 07/26/2022 11:34 AM    ALKPHOS 186 07/26/2022 11:34 AM    ALKPHOS 158 04/04/2022 02:50 PM    AST 76 07/26/2022 11:34 AM     07/26/2022 11:34 AM       POC Tests: No results for input(s): POCGLU, POCNA, POCK, POCCL, POCBUN, POCHEMO, POCHCT in the last 72 hours.     Coags:   Lab Results   Component Value Date/Time    PROTIME 14.5 11/21/2022 12:43 PM    INR 1.1 11/21/2022 12:43 PM    APTT 29.7 11/21/2022 12:43 PM       HCG (If Applicable): No results found for: PREGTESTUR, PREGSERUM, HCG, HCGQUANT     ABGs:   Lab Results   Component Value Date/Time    PHART 7.18 01/13/2021 06:22 AM    PO2ART 157 01/13/2021 06:22 AM    ACB3IFQ 28.5 01/13/2021 06:22 AM    JDY8AMK 10.6 01/13/2021 06:22 AM        Type & Screen (If Applicable):  No results found for: LABABO, LABRH    Drug/Infectious Status (If Applicable):  No results found for: HIV, HEPCAB    COVID-19 Screening (If Applicable):   Lab Results   Component Value Date/Time    COVID19 DETECTED 01/12/2021 12:00 AM           Anesthesia Evaluation  Patient summary reviewed and Nursing notes reviewed  Airway: Mallampati: II          Dental: normal exam         Pulmonary:normal exam  breath sounds clear to auscultation  (+) pneumonia: resolved,  asthma:                            Cardiovascular:    (+) hypertension:, CAD:, CABG/stent: no interval change, CHF: diastolic,       ECG reviewed  Rhythm: regular  Rate: normal  Echocardiogram reviewed    Cleared by cardiology     Beta Blocker:  Dose within 24 Hrs         Neuro/Psych:   (+) psychiatric history: stable with treatmentdepression/anxiety             GI/Hepatic/Renal:   (+) GERD: well controlled,           Endo/Other:    (+) DiabetesType II DM, , .                 Abdominal:             Vascular: Other Findings:           Anesthesia Plan      spinal     ASA 3     (R/b/i of Spinal anesthetic discussed at length with pt. Expresses understanding, questions answered)  Induction: intravenous. MIPS: Postoperative opioids intended and Prophylactic antiemetics administered. Anesthetic plan and risks discussed with patient and child/children.                         Vicente Anguiano DO   12/5/2022

## 2022-12-05 NOTE — H&P
Patient ID:  Bertha Knutson  [id number]  66 y.o.  1950    Today: December 5, 2022       CC:  Right hip pain    HPI:   The patient has end stage arthritis of the right hip. The patient was evaluated and examined in the office prior to today and was found to have a history on physical exam consistent with intra-articular pathology of the right hip. Patient complains of anterior groin pain predominately. Pain occurs during activity. Patient has difficulty putting on socks/shoes and has notable pain when getting up from a chair and getting out of a car. Patient does not complain of significant lateral hip pain or radicular pain down the leg. There have been no changes to the patient's orthopedic condition since the last office visit    Past Medical History:  Past Medical History:   Diagnosis Date    Acute on chronic combined systolic and diastolic congestive heart failure ([facility] 02/17/2021    Acute renal failure superimposed on stage 3b chronic kidney disease ([facility] 01/12/2021    CAD (coronary artery disease) 03/01/2012    MI, 3 stents    Carotid artery stenosis 12/12/2018    Carotid stenosis, asymptomatic, right 03/12/2020    Chest pain     denies currently    Childhood asthma     CKD (chronic kidney disease)     stage 3 per Nephrology note    Coronary artery disease involving native coronary artery without angina pectoris 05/21/2015    COVID-19 virus infection 01/12/2021    Depression 03/01/2012    Diabetes mellitus ([facility] 03/01/2012    Tresiba nightly; Humalog 1-3 times per day per SSI; Has Dexcom BS monitor;  Average bs-150-175; A1c-6.8 on 46/57/43    Diastolic dysfunction     DM2 (diabetes mellitus, type 2) ([facility] 05/21/2015    Elevated LFTs 01/12/2021    Elevated troponin 03/02/2012    Essential hypertension 05/21/2015    on med for control    External hemorrhoids without mention of complication 1994    GERD (gastroesophageal reflux disease)     no meds    HCAP (healthcare-associated pneumonia) 01/18/2021    History of left-sided carotid endarterectomy 03/12/2020    History of MI (myocardial infarction) 11/2012    Hypercholesterolemia     Hypertension 03/01/2012    Hypoalbuminemia 02/18/2021    Hyponatremia 01/12/2021    Hypoxemia 08/12/2020    VERONICA (iron deficiency anemia)     oral iron daily    Insomnia     Lactic acidosis 01/12/2021    Long QT interval 01/12/2021    Murmur     per cardio note (11/16/22)= (faint apical systolic murmur) heard; last Echo (2/16/21)    Personal history of colonic polyps 2013    hyperplastic    Platelet inhibition due to Plavix     no longer on Plavix    Pleural effusion, bilateral 08/11/2020    Rectocele 2013    Sepsis ([facility] 01/12/2021    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 Past Surgical History:  Past Surgical History:   Procedure Laterality Date    CAROTID ENDARTERECTOMY Left 12/12/2018    CATARACT REMOVAL Left 09/19/2016    Dr Perlie Paget, [facility] Right 11/07/2016    Dr Perlie Paget, [facility] w/plate    COLONOSCOPY  12/17/13    Brigitte--single transverse hyperplastic polyp--7-10 year recall    CORONARY STENT PLACEMENT      stent x3; last in 2012    FLEXIBLE SIGMOIDOSCOPY N/A 3/6/2022    SIGMOIDOSCOPY FLEXIBLE performed by Ebonie Pradhan MD at [facility]      x2    ORTHOPEDIC SURGERY      left elbow    HORTENSIA AND BSO (CERVIX REMOVED)      WISDOM TOOTH EXTRACTION          Medications:     Prior to Admission medications    Medication Sig Start Date End Date Taking?  Authorizing Provider   acetaminophen (TYLENOL) 500 MG tablet Take 2 tablets by mouth every 6 hours as needed for Pain 11/28/22   Orlando Araiza MD   aspirin EC 81 MG EC tablet Take 1 tablet by mouth in the morning and at bedtime 11/28/22   Orlando Araiza MD   HYDROmorphone (DILAUDID) 2 MG tablet Take 1 tablet by mouth every 4 hours as needed for Pain for up to 7 days. 11/28/22 12/5/22  Darryl Urban MD   cyclobenzaprine (FLEXERIL) 5 MG tablet Take 1 tablet by mouth 3 times daily as needed for Muscle spasms 11/28/22 12/8/22  Darryl Urban MD   gabapentin (NEURONTIN) 100 MG capsule Take 1 capsule by mouth 2 times daily for 15 days. 11/28/22 12/13/22  Darryl Urban MD   omeprazole (PRILOSEC) 40 MG delayed release capsule Take 1 capsule by mouth daily 11/28/22   Darryl Urban MD   ondansetron (ZOFRAN) 4 MG tablet Take 1 tablet by mouth 3 times daily as needed for Nausea or Vomiting 11/28/22   Darryl Urban MD   senna (SENOKOT) 8.6 MG tablet Take 1 tablet by mouth 2 times daily 11/28/22   Darryl Urban MD   vitamin D (CHOLECALCIFEROL) 125 MCG (5000 UT) CAPS capsule Take 10,000 Units by mouth daily    Historical Provider, MD   traMADol (ULTRAM) 50 MG tablet Take 50 mg by mouth every 4 hours as needed for Pain. Historical Provider, MD   carvedilol (COREG) 3.125 MG tablet Take 1 tablet by mouth 2 times daily (with meals) 11/16/22   Paulo Reyna DO   isosorbide mononitrate (IMDUR) 60 MG extended release tablet Take 1 tablet by mouth daily 11/16/22   Paulo Reyna DO   furosemide (LASIX) 40 MG tablet Take 1 tablet by mouth daily 11/16/22   Paulo Reyna DO   rosuvastatin (CRESTOR) 40 MG tablet Take 1 tablet by mouth every evening 10/26/22   Timothy Panchal MD   montelukast (SINGULAIR) 10 MG tablet Take 1 tablet by mouth daily 10/26/22   Timothy Panchal MD   ferrous sulfate (FE TABS 325) 325 (65 Fe) MG EC tablet Take 1 tablet by mouth 2 times daily 9/26/22   Timothy Panchal MD   Continuous Blood Gluc Transmit (DEXCOM G6 TRANSMITTER) MISC Test blood sugar 3 times daily 9/1/22   Timothy Panchal MD   Continuous Blood Gluc Sensor (DEXCOM G6 SENSOR) MISC Test blood sugar 3 times daily.  9/1/22   Timothy Panchal MD   Continuous Blood Gluc  (DEXCOM G6 ) MAURICE Test blood sugar 3 times daily 8/31/22   Timothy Panchal, MD   citalopram (CELEXA) 20 MG tablet Take 1 tablet by mouth in the morning. 7/26/22   Mookie Villalobos MD   traZODone (DESYREL) 50 MG tablet Take 1 tablet by mouth nightly 7/26/22   Mookie Villalobos MD   NIFEdipine (PROCARDIA XL) 90 MG extended release tablet Take 1 tablet by mouth in the morning. 7/26/22   Mookie Villalobos MD   aspirin 81 MG chewable tablet Take 81 mg by mouth daily    Ar Automatic Reconciliation   cyanocobalamin 1000 MCG tablet Take 5,000 mcg by mouth daily    Ar Automatic Reconciliation   Insulin Degludec (TRESIBA FLEXTOUCH) 100 UNIT/ML SOPN Inject 12 Units into the skin at bedtime 2/7/22   Ar Automatic Reconciliation   insulin lispro, 1 Unit Dial, 100 UNIT/ML SOPN Inject into the skin 3 times daily (before meals) 4 Units SubQ with breakfast, 4 Units SubQ with lunch, 6 Units SubQ with dinner.  Indications: type 2 diabetes mellitus 11/3/21   Ar Automatic Reconciliation   magnesium oxide (MAG-OX) 400 MG tablet Take 400 mg by mouth at bedtime 11/30/16   Ar Automatic Reconciliation   nitroGLYCERIN (NITROSTAT) 0.4 MG SL tablet Place 0.4 mg under the tongue every 5 minutes as needed 11/6/18   Ar Automatic Reconciliation   potassium chloride (KLOR-CON M) 20 MEQ extended release tablet Take 40 mEq by mouth daily 3/13/22   Ar Automatic Reconciliation       Family History:     Family History   Problem Relation Age of Onset    Heart Disease Father     Cancer Father         pancreatic    Thyroid Disease Mother         Multinodular Goiter    Heart Attack Mother 67        mi    Osteoporosis Mother     Heart Disease Mother     Breast Cancer Neg Hx     Thyroid Cancer Brother     Ulcerative Colitis Brother     Thyroid Cancer Sister     Cancer Sister         Pre-Cancerous dysplasia    Heart Attack Father 67        MI       Social History:      Social History     Tobacco Use    Smoking status: Former     Packs/day: 1.00     Years: 20.00     Pack years: 20.00     Types: Cigarettes     Quit date: 11/9/2012     Years since quitting: 10.0    Smokeless tobacco: Never    Tobacco comments:     Quit smoking: quit 2012 . has stopped prior also   Substance Use Topics    Alcohol use: Yes     Alcohol/week: 3.0 standard drinks     Types: 3 Glasses of wine per week         Allergies: Allergies   Allergen Reactions    Cephalosporins Anaphylaxis, Hives and Swelling    Hydralazine Other (See Comments)     Chest pain    Sulfamethoxazole-Trimethoprim Diarrhea    Lisinopril Other (See Comments)     Passing out spells. // pt sts not allergic to med     Codeine Nausea And Vomiting        Vitals: There were no vitals taken for this visit. Objective:         General: No Acute distress                   HEENT: Normocephalic/atramatic                   Lungs:  Breathing non-labored                   Heart:   RRR                    Abdomen: soft       Extremities:  Prior exam done in office has been consistent with end-stage hip arthritis. We have noted pain with ROM of the right hip which manifests as anterior groin pain. There is decreased internal and external rotation of the affected hip. No significant pain with palpation over the greater trochanteric bursa. No radicular pain with straight leg raise. Patient walks with and antalgic gait. Distally patient has no neurologic deficit. Patient Active Problem List   Diagnosis    Stage 4 chronic kidney disease ([address]    Essential hypertension    Chronic diastolic congestive heart failure (HCC)    Age-related osteoporosis without current pathological fracture    Primary insomnia    Mixed hyperlipidemia    Coronary artery disease involving native coronary artery of native heart without angina pectoris    Major depressive disorder with single episode, in full remission ([address]    Type 2 diabetes mellitus with stage 4 chronic kidney disease, with long-term current use of insulin (HCC)    Primary osteoarthritis of right hip       Assessment:   1.  Arthritis of the Right hip    Plan:   The patient has failed previous conservative treatment for this condition. The patient has pain in the right hip which causes daily symptoms which affects the patient's activities of daily living and quality of life. The risks, benefits, alternatives and possible complications of right total hip arthroplasty have been discussed with the patient including but not limited to infection, bleeding, damage to nerves and blood vessels with particular attention given to risk of damage of the femoral, obturator, lateral femoral cutaneous, superior gluteal, inferior gluteal, and sciatic nerve, risk of dislocation, fracture both intra-op and post-op, limb length inequality, polyethylene wear, implant loosening, risk for continued pain, and risk for revision surgery secondary to but not limited to all of these discussed risks. Further we discussed risk of venous thrombo-embolism including deep vein thrombosis and pulmonary embolism despite being on prophylaxis. We have also previously discussed the potential of morbidity and mortality associated with, but not limited to, the actual surgical procedure, anesthesia, prior medical conditions, and/or the administration of medications perioperatively. I have made no guarantees to the patient regarding outcomes and the patient has voiced understanding of that. The patient has been given the opportunity to ask questions all of which have been answered and the patient wishes to proceed. The patient was counseled at length about the risks of adan Covid-19 during their perioperative period and any recovery window from their procedure. The patient was made aware that adan Covid-19  may worsen their prognosis for recovering from their procedure and lend to a higher morbidity and/or mortality risk. All material risks, benefits, and reasonable alternatives including postponing the procedure were discussed. The patient does  wish to proceed with the procedure at this time.         Signed By: Paulino Prather MD  December 5, 2022

## 2022-12-05 NOTE — PERIOP NOTE
MD cartagena at bedside, verbal OK to send to floor when pacu cares are completed. Nurse to speak with MD regarding pt bradycardia. MD cartagena to state patient was bradycardic preop and intraoperatively and is ok with patient receiving more pain medications IV> Plan of care still in progress.

## 2022-12-06 VITALS
TEMPERATURE: 97.9 F | OXYGEN SATURATION: 95 % | BODY MASS INDEX: 29.37 KG/M2 | SYSTOLIC BLOOD PRESSURE: 173 MMHG | DIASTOLIC BLOOD PRESSURE: 69 MMHG | WEIGHT: 172 LBS | HEIGHT: 64 IN | RESPIRATION RATE: 18 BRPM | HEART RATE: 75 BPM

## 2022-12-06 LAB
GLUCOSE BLD STRIP.AUTO-MCNC: 250 MG/DL (ref 65–100)
HCT VFR BLD AUTO: 33.6 % (ref 35.8–46.3)
HGB BLD-MCNC: 11.2 G/DL (ref 11.7–15.4)
SERVICE CMNT-IMP: ABNORMAL

## 2022-12-06 PROCEDURE — 85014 HEMATOCRIT: CPT

## 2022-12-06 PROCEDURE — 82962 GLUCOSE BLOOD TEST: CPT

## 2022-12-06 PROCEDURE — 97530 THERAPEUTIC ACTIVITIES: CPT

## 2022-12-06 PROCEDURE — 2500000003 HC RX 250 WO HCPCS: Performed by: NURSE PRACTITIONER

## 2022-12-06 PROCEDURE — 36415 COLL VENOUS BLD VENIPUNCTURE: CPT

## 2022-12-06 PROCEDURE — 97535 SELF CARE MNGMENT TRAINING: CPT

## 2022-12-06 PROCEDURE — 6370000000 HC RX 637 (ALT 250 FOR IP): Performed by: NURSE PRACTITIONER

## 2022-12-06 RX ADMIN — HYDROMORPHONE HYDROCHLORIDE 2 MG: 2 TABLET ORAL at 09:07

## 2022-12-06 RX ADMIN — HYDROMORPHONE HYDROCHLORIDE 2 MG: 2 TABLET ORAL at 01:46

## 2022-12-06 RX ADMIN — CITALOPRAM HYDROBROMIDE 20 MG: 20 TABLET ORAL at 08:35

## 2022-12-06 RX ADMIN — CARVEDILOL 3.12 MG: 3.12 TABLET, FILM COATED ORAL at 08:36

## 2022-12-06 RX ADMIN — ISOSORBIDE MONONITRATE 60 MG: 30 TABLET, EXTENDED RELEASE ORAL at 08:35

## 2022-12-06 RX ADMIN — HYDROMORPHONE HYDROCHLORIDE 2 MG: 2 TABLET ORAL at 05:34

## 2022-12-06 RX ADMIN — ACETAMINOPHEN 1000 MG: 500 TABLET, FILM COATED ORAL at 05:36

## 2022-12-06 RX ADMIN — SENNOSIDES AND DOCUSATE SODIUM 1 TABLET: 50; 8.6 TABLET ORAL at 08:35

## 2022-12-06 RX ADMIN — CLINDAMYCIN PHOSPHATE 900 MG: 900 INJECTION, SOLUTION INTRAVENOUS at 03:56

## 2022-12-06 RX ADMIN — ASPIRIN 81 MG: 81 TABLET, COATED ORAL at 08:35

## 2022-12-06 RX ADMIN — GABAPENTIN 100 MG: 100 CAPSULE ORAL at 08:36

## 2022-12-06 RX ADMIN — PANTOPRAZOLE SODIUM 40 MG: 40 TABLET, DELAYED RELEASE ORAL at 05:34

## 2022-12-06 RX ADMIN — FUROSEMIDE 40 MG: 40 TABLET ORAL at 08:35

## 2022-12-06 ASSESSMENT — PAIN SCALES - GENERAL
PAINLEVEL_OUTOF10: 5
PAINLEVEL_OUTOF10: 7
PAINLEVEL_OUTOF10: 8

## 2022-12-06 ASSESSMENT — PAIN DESCRIPTION - LOCATION: LOCATION: HIP

## 2022-12-06 NOTE — PROGRESS NOTES
2022         Post Op day: 1 Day Post-Op     Admit Date: 2022    Admit Diagnosis: Primary osteoarthritis of right hip [M16.11]  Osteoarthritis of right hip, unspecified osteoarthritis type [M16.11]        Subjective: Patient stable. No acute events.       Objective:     Vitals:    22 0302   BP: (!) 164/69   Pulse: 73   Resp:    Temp: 97.9 °F (36.6 °C)   SpO2: 98%    Temp (24hrs), Av.7 °F (36.5 °C), Min:97.3 °F (36.3 °C), Max:98.2 °F (36.8 °C)      Lab Results   Component Value Date/Time    HGB 11.2 2022 04:15 AM       Patient Active Problem List   Diagnosis    Stage 4 chronic kidney disease (HCC)    Essential hypertension    Chronic diastolic congestive heart failure (HCC)    Age-related osteoporosis without current pathological fracture    Primary insomnia    Mixed hyperlipidemia    Coronary artery disease involving native coronary artery of native heart without angina pectoris    Major depressive disorder with single episode, in full remission ([facility]    Type 2 diabetes mellitus with stage 4 chronic kidney disease, with long-term current use of insulin (HCC)    Primary osteoarthritis of right hip    Osteoarthritis of right hip, unspecified osteoarthritis type       Current Facility-Administered Medications   Medication Dose Route Frequency    carvedilol (COREG) tablet 3.125 mg  3.125 mg Oral BID WC    citalopram (CELEXA) tablet 20 mg  20 mg Oral Daily    ferrous sulfate (IRON 325) tablet 325 mg  325 mg Oral BID with meals    furosemide (LASIX) tablet 40 mg  40 mg Oral Daily    gabapentin (NEURONTIN) capsule 100 mg  100 mg Oral BID    isosorbide mononitrate (IMDUR) extended release tablet 60 mg  60 mg Oral Daily    magnesium oxide (MAG-OX) tablet 400 mg  400 mg Oral Nightly    NIFEdipine (PROCARDIA XL) extended release tablet 90 mg  90 mg Oral Daily    rosuvastatin (CRESTOR) tablet 40 mg  40 mg Oral QHS    traZODone (DESYREL) tablet 50 mg  50 mg Oral Nightly    0.9 % sodium chloride infusion   IntraVENous Continuous    sodium chloride flush 0.9 % injection 5-40 mL  5-40 mL IntraVENous 2 times per day    sodium chloride flush 0.9 % injection 5-40 mL  5-40 mL IntraVENous PRN    0.9 % sodium chloride infusion   IntraVENous PRN    acetaminophen (TYLENOL) tablet 1,000 mg  1,000 mg Oral Q6H    HYDROmorphone (DILAUDID) tablet 1 mg  1 mg Oral Q4H PRN    Or    HYDROmorphone (DILAUDID) tablet 2 mg  2 mg Oral Q4H PRN    HYDROmorphone HCl PF (DILAUDID) injection 0.5 mg  0.5 mg IntraVENous Q3H PRN    sennosides-docusate sodium (SENOKOT-S) 8.6-50 MG tablet 1 tablet  1 tablet Oral BID    ondansetron (ZOFRAN-ODT) disintegrating tablet 4 mg  4 mg Oral Q8H PRN    Or    ondansetron (ZOFRAN) injection 4 mg  4 mg IntraVENous Q6H PRN    diphenhydrAMINE (BENADRYL) capsule 25 mg  25 mg Oral Q6H PRN    Or    diphenhydrAMINE (BENADRYL) injection 25 mg  25 mg IntraVENous Q6H PRN    aspirin EC tablet 81 mg  81 mg Oral BID    cyclobenzaprine (FLEXERIL) tablet 5 mg  5 mg Oral TID PRN    naloxone (NARCAN) injection 0.4 mg  0.4 mg IntraVENous PRN    ondansetron (ZOFRAN-ODT) disintegrating tablet 8 mg  8 mg Oral Q8H PRN    pantoprazole (PROTONIX) tablet 40 mg  40 mg Oral QAM AC    insulin lispro (HUMALOG) injection vial 0-16 Units  0-16 Units SubCUTAneous TID WC    glucose chewable tablet 16 g  4 tablet Oral PRN    dextrose bolus 10% 125 mL  125 mL IntraVENous PRN    Or    dextrose bolus 10% 250 mL  250 mL IntraVENous PRN    glucagon (rDNA) injection 1 mg  1 mg SubCUTAneous PRN    dextrose 10 % infusion   IntraVENous Continuous PRN    Insulin Degludec (TRESIBA) SOLN 12 Units (Patient Supplied)  12 Units SubCUTAneous Nightly       Extremity Exam  Dressing clean and dry   Tibialis Anterior and Gastroc-Soleus functioning normally right lower extremity  Sensation intact to light touch on operative limb  Extremity perfused  TEDS/SCDS in place  No sign of DVT     Assessment / Plan :  WBAT RLE  Hip precautions  Continue PT/OT  Continue current DVT prophylaxis in house. Discharge on ASA BID  DIspo-HH          Signed By: Vanita Reed MD     I have reviewed the patients controlled substance prescription history, as maintained in the [place] prescription monitoring program, so that the prescription(s) for a  controlled substance can be given.

## 2022-12-06 NOTE — DISCHARGE INSTRUCTIONS
[facility]      Patient Discharge Instructions    Versa Grad / [id number] : 1950    Admitted 2022 Discharged: 2022     IF YOU HAVE ANY PROBLEMS ONCE YOU ARE AT HOME CALL THE FOLLOWING NUMBERS:   Main office number: [phone]      Medications    The medications you are to continue on are listed on the medication reconciliation sheet. Narcotic pain medications as well as supplemental iron can cause constipation. If this occurs try stopping the narcotic pain medication and/or the iron. It is important that you take the medication exactly as they are prescribed. Medications which increase your risk of blood clots are listed to stop for 5 weeks after surgery as well as medications or supplements which increase your risk of bleeding complications. Keep your medication in the bottles provided by the pharmacist and keep a list of the medication names, dosages, and times to be taken in your wallet. Do not take other medications without consulting your doctor. Important Information    Do NOT smoke as this will greatly increase your risk of infection! Resume your prehospital diet. If you have excessive nausea or vomitting call your doctor's office     Leg swelling and warmth is normal for 6 months after surgery. If you experience swelling in your leg elevate you leg while laying down with your toes above your heart. If you have sudden onset severe swelling with leg pain call our office. The stitches deep inside take approximately 6 months to dissolve. There will be sharp shooting, stinging and burning pain. This is normal and will resolve between 3-6 months after surgery. Difficulty sleeping is normal following total Knee and Hip replacement. You may try melatonin, an over-the-counter sleep aid or benadryl to help with sleep. Most patients will resume sleeping through the night 8 weeks after surgery. Home Physical Therapy is arranged.  Home Health will contact you within 48 hrs of discharge that you have chosen. If you have not received a call within this time frame please contact that provider you chose. You should be given this information before you leave the hospital.     You are at a risk for falls. Use the rolling walker when walking. Patients who have had a joint replacement should not drive if they are still taking narcotic pain mediation during the daytime hours. Most patients wean themselves off of pain medication within 2-5 weeks after surgery. When to Call the office    - If you have a temperature greater then 101  - Uncontrolled vomiting   - Loose control of your bladder or bowel function  - Are unable to bear any weight   - Need a pain medication refill          Hip Replacement Surgery (Posterior): What to Expect at Home  Your Recovery  Hip replacement surgery replaces the worn parts of your hip joint. When you leave the hospital, you will probably be walking with crutches or a walker. You may be able to climb a few stairs and get in and out of bed and chairs. But you will need someone to help you at home until you have more energy and can move around better. You will go home with a bandage and stitches, staples, skin glue, or tape strips. You can remove the bandage when your doctor tells you to. If you have stitches or staples, your doctor will remove them about 2 weeks after your surgery. Glue or tape strips will fall off on their own over time. You may still have some mild pain, and the area may be swollen for 3 to 4 months after surgery. Your doctor may give you medicine for the pain. You will continue the rehabilitation program (rehab) you started in the hospital. The better you do with your rehab exercises, the sooner you will get your strength and movement back. Most people are able to return to work 4 weeks to 4 months after surgery. This care sheet gives you a general idea about how long it will take for you to recover.  But each person recovers at a different pace. Follow the steps below to get better as quickly as possible. How can you care for yourself at home? Activity    Your doctor may not want your affected leg to cross the center of your body toward the other leg. If so, your therapist may suggest these ideas:  Do not cross your legs. Be very careful as you get in or out of bed or a car so your leg does not cross the imaginary line in the middle of your body. Go slowly when you climb stairs. Make sure the lights are on. Have someone watch you, if you can. When you climb stairs:  Step up first with your unaffected leg. Then bring the affected leg up to the same step. Bring your crutches or cane up. To go down stairs, reverse the order. First, put your crutches or cane on the lower step. Then bring the affected leg down to that step. Finally, step down with the unaffected leg. You can ride in a car, but stop at least once every hour to get out and walk around. You may want to sleep on your back. Don't reach down too far to pull up blankets when you lie in bed. If your doctor recommends exercises, do them as directed. You can cut back on your exercises if your muscles start to ache, but don't stop doing them. Rest when you feel tired. You may take a nap, but don't stay in bed all day. Work with your physical therapist to learn the best way to exercise. You will probably have to use a walker, crutches, or a cane for at least 4 to 6 weeks. Your doctor may advise you to stay away from activities that put stress on the joint. This includes sports such as tennis, football, and jogging. Try not to sit for too long at one time. You will feel less stiff if you take a short walk about every hour. When you sit, use chairs with arms, and don't sit in low chairs. Do not bend over more than 90 degrees (like the angle in a letter \"L\").      Sleep on your back with your legs slightly apart or on your side with a pillow between your knees for about 6 weeks or as your doctor tells you. Do not sleep on your stomach or affected leg. Ask your doctor when you can drive again. Most people are able to return to work 4 weeks to 4 months after surgery. Ask your doctor when it is okay for you to have sex. Diet    By the time you leave the hospital, you will probably be eating your normal diet. Your doctor may recommend that you take iron and vitamin supplements. Drink plenty of fluids (unless your doctor tells you not to). Eat healthy foods, and watch your portion sizes. Try to stay at your ideal weight. Too much weight puts more stress on your new hip joint. You may notice that your bowel movements are not regular right after your surgery. This is common. Try to avoid constipation and straining with bowel movements. You may want to take a fiber supplement every day. If you have not had a bowel movement after a couple of days, ask your doctor about taking a mild laxative. Medicines    Your doctor will tell you if and when you can restart your medicines. You will also get instructions about taking any new medicines. If you stopped taking aspirin or some other blood thinner, your doctor will tell you when to start taking it again. Your doctor may give you a blood-thinning medicine to prevent blood clots. If you take a blood thinner, be sure you get instructions about how to take your medicine safely. Blood thinners can cause serious bleeding problems. This medicine could be in pill form or as a shot (injection). If a shot is necessary, your doctor will tell you how to do this. Be safe with medicines. Take pain medicines exactly as directed. If the doctor gave you a prescription medicine for pain, take it as prescribed. If you are not taking a prescription pain medicine, ask your doctor if you can take an over-the-counter medicine.      If you think your pain medicine is making you sick to your stomach: Take your medicine after meals (unless your doctor has told you not to). Ask your doctor for a different pain medicine. If your doctor prescribed antibiotics, take them as directed. Do not stop taking them just because you feel better. You need to take the full course of antibiotics. Incision care    If your doctor told you how to care for your cut (incision), follow your doctor's instructions. You will have a dressing over the cut. A dressing helps the incision heal and protects it. Your doctor will tell you how to take care of this. If you did not get instructions, follow this general advice: If you have strips of tape on the cut the doctor made, leave the tape on for a week or until it falls off. If you have stitches or staples, your doctor will tell you when to come back to have them removed. If you have skin glue on the cut, leave it on until it falls off. Skin glue is also called skin adhesive or liquid stitches. Change the bandage every day. Wash the area daily with warm water, and pat it dry. Don't use hydrogen peroxide or alcohol. They can slow healing. You may cover the area with a gauze bandage if it oozes fluid or rubs against clothing. You may shower 24 to 48 hours after surgery. Pat the incision dry. Don't swim or take a bath for the first 2 weeks, or until your doctor tells you it is okay. Exercise    Your physical therapist will teach you exercises to do at home. Always do them as your therapist tells you. Avoid activities where you might fall. Ice and elevation    For pain, put ice or a cold pack on the area for 10 to 20 minutes at a time. Put a thin cloth between the ice and your skin. If your doctor recommended cold therapy using a portable machine, follow the instructions that came with the machine. Your ankle may swell for about 3 months. Prop up your ankle when you ice it or anytime you sit or lie down. Try to keep it above the level of your heart.  This will help reduce swelling. Other instructions    Wear compression stockings if your doctor told you to. These may help to prevent blood clots. Your doctor will tell you how long you need to keep wearing the compression stockings. Try to prevent falls. To avoid falling:  Arrange furniture so that you will not trip on it. Get rid of throw rugs, and move electrical cords out of the way. Walk only in areas with plenty of light. Put grab bars in showers and bathtubs. Try to avoid icy or snowy sidewalks. Choose shoes with good traction, or consider using traction devices that attach to your shoes. Wear shoes with sturdy, flat soles. Follow-up care is a key part of your treatment and safety. Be sure to make and go to all appointments, and call your doctor if you are having problems. It's also a good idea to know your test results and keep a list of the medicines you take. When should you call for help? Call 911 anytime you think you may need emergency care. For example, call if:    You passed out (lost consciousness). You have severe trouble breathing. You have sudden chest pain and shortness of breath, or you cough up blood. Call your doctor now or seek immediate medical care if:    You have signs that your hip may be dislocated, including:  Severe pain and not being able to stand. A crooked leg that looks like your hip is out of position. Not being able to bend or straighten your leg. Your leg or foot is cool or pale or changes color. You cannot feel or move your leg. You have signs of a blood clot, such as:  Pain in your calf, back of the knee, thigh, or groin. Redness and swelling in your leg or groin. Your incision comes open and begins to bleed, or the bleeding increases. You feel like your heart is racing or beating irregularly. You have signs of infection, such as: Increased pain, swelling, warmth, or redness. Red streaks leading from the incision.   Pus draining from the incision. A fever. Watch closely for changes in your health, and be sure to contact your doctor if:    You do not have a bowel movement after taking a laxative. You do not get better as expected. Where can you learn more? Go to [email or web address] org and sign in to your [employer] account. Enter U407 in the [facility] box to learn more about \"Hip Replacement Surgery (Posterior): What to Expect at Home. \"     If you do not have an account, please click on the \"Sign Up Now\" link. Current as of: March 9, 2022               Content Version: 13.4  © 7174-0325 Healthwise, [employer] ([place]). Care instructions adapted under license by [facility] ([facility]). If you have questions about a medical condition or this instruction, always ask your healthcare professional. [place] [address] any warranty or liability for your use of this information. Information obtained by :  I understand that if any problems occur once I am at home I am to contact my physician. I understand and acknowledge receipt of the instructions indicated above.                                                                                                                                            Physician's or R.N.'s Signature                                                                  Date/Time                                                                                                                                              Patient or Representative Signature                                                          Date/Time

## 2022-12-06 NOTE — PROGRESS NOTES
ACUTE PHYSICAL THERAPY GOALS:   (Developed with and agreed upon by patient and/or caregiver.)  GOALS (1-4 days):  (1.)Ms. Khushi Schroeder will move from supine to sit and sit to supine  in bed with CONTACT GUARD ASSIST.    (2.)Ms. Khushi Schroeder will transfer from bed to chair and chair to bed with CONTACT GUARD ASSIST using the least restrictive device. (3.)Ms. Khushi Schroeder will ambulate with CONTACT GUARD ASSIST for 200 feet with the least restrictive device. (4.)Ms. Khushi Schroeder will ambulate up/down ramp with RW with CONTACT GUARD ASSIST.  (5.)Ms. Khushi Schroeder will state/observe ARIES precautions with 0 verbal cues.   ________________________________________________________________________________________________     PHYSICAL THERAPY JOINT CAMP: TOTAL HIP ARTHROPLASTY Daily Note and AM  (Link to Caseload Tracking: PT Visit Days : 2  Acknowledge Orders  Time In/Out  PT Charge Capture  Rehab Caseload Tracker  Episode   Alka Jones is a 74 y.o. female   PRIMARY DIAGNOSIS: Primary osteoarthritis of right hip  Primary osteoarthritis of right hip [M16.11]  Osteoarthritis of right hip, unspecified osteoarthritis type [M16.11]  Procedure(s) (LRB):  RIGHT HIP TOTAL ARTHROPLASTY, [place], PREVENA (Right)  1 Day Post-Op  Reason for Referral: Pain in left hip (M25.552)  Stiffness of Left Hip, Not elsewhere classified (M25.652)  Difficulty in walking, Not elsewhere classified (R26.2)  Other abnormalities of gait and mobility (R26.89)  Inpatient: Payor: Nuvia Payan / Plan: Tello / Product Type: *No Product type* /     REHAB RECOMMENDATIONS:   Recommendation to date pending progress:  Setting:  Home Health Therapy    Equipment:    None     GAIT: I Mod I S SBA CGA Min Mod Max Total  NT x2 Comments:   Level of Assistance [] [] [] [x] [] [] [] [] [] [] []    Weightbearing Status  Right Lower Extremity Weight Bearing: Weight Bearing As Tolerated    Distance  150 (+ 150) feet    Gait Quality Antalgic, Decreased krys , Decreased step clearance, Decreased step length, and Decreased stance    DME Rolling Walker     Stairs      Ramp     I=Independent, Mod I=Modified Independent, S=Supervision, SBA=Standby Assistance, CGA=Contact Guard Assistance,   Min=Minimal Assistance, Mod=Moderate Assistance, Max=Maximal Assistance, Total=Total Assistance, NT=Not Tested      ASSESSMENT:   ASSESSMENT:  Ms. Angeli Ceballos presents with decreased strength and range of motion right lower extremity and with decreased independence with functional mobility s/p right total hip arthroplasty. Pt will benefit from skilled PT interventions to maximize independence with functional mobility and ARIES management. Pt did fairly well with assessment and treatment session. 12/6 supine upon arrival. Performs and review R hip HEP with verbal cues to stay within hip precaution. Review hip precaution. Work on bed mobility as follows:supine>eob with SBA and verbal cues for hand placement. Ambulated 150 ft down the baker, while working on reciprocal gait pattern. Then rested few sec and ambulated another 150 ft back to the room. Return to the bed  with SBA, needs in reach and instructed to call for assists, before getting up. Therapist review ice for swell and pain as needed. All question answer and ready for D/C.      Outcome Measure:   HOOS-JR:  Total Raw Score (0-24 Scale): 17    SUBJECTIVE:   Ms. Angeli Ceballos states, \"I'm ready\"    Home Environment/Prior Level of Function Lives With: Alone  Type of Home: House  Home Layout: One level  Home Access: Ramped entrance  Bathroom Shower/Tub: Walk-in shower  Home Equipment: Walker, rolling, Cane, Reacher, Long-handled shoehorn  ADL Assistance: Independent  Ambulation Assistance: Independent    OBJECTIVE:     PAIN: VITAL SIGNS: LINES/DRAINS:   Pre Treatment:         Post Treatment: sore Vitals        Oxygen        Wound Vac    RESTRICTIONS/PRECAUTIONS:  Restrictions/Precautions: Fall Risk, Weight Bearing  Right Lower Extremity Weight Bearing: Weight Bearing As Tolerated     Hip Precautions: Posterior hip precautions  Restrictions/Precautions: Fall Risk, Weight Bearing        LOWER EXTREMITY GROSS EVALUATION:  RIGHT LE   Within Functional Limits   Abnormal   Comments   Strength [] []  Decreased-post op   Range of Motion [] []  Decreased -S/P R ARIES; Hip precautions;      LEFT LE Within Functional Limits   Abnormal   Comments   Strength [x] []     Range of Motion [x] []       UPPER EXTREMITY GROSS EVALUATION:     Within Functional Limits   Abnormal   Comments   Strength [x] []    Range of Motion [x] []      SENSATION  Sensation [x]       COGNITION/  PERCEPTION: Intact Impaired (Comments):   Orientation [x]     Vision [x]     Hearing [x]     Cognition  [x]       MOBILITY: I Mod I S SBA CGA Min Mod Max Total  NT x2 Comments:   Bed Mobility    Rolling [] [] [] [x] [] [] [] [] [] [] []    Supine to Sit [] [] [] [x] [] [] [] [] [] [] []    Scooting [] [] [] [x] [] [] [] [] [] [] []    Sit to Supine [] [] [] [] [] [] [] [] [] [] []    Transfers    Sit to Stand [] [] [] [x] [] [] [] [] [] [] []    Bed to Chair [] [] [] [x] [] [] [] [] [] [] []    Stand to Sit [] [] [] [x] [] [] [] [] [] [] []    Stand Pivot [] [] [] [] [] [] [] [] [] [] []    Toilet [] [] [] [] [] [] [] [] [] [] []     [] [] [] [] [] [] [] [] [] [] []    I=Independent, Mod I=Modified Independent, S=Supervision, SBA=Standby Assistance, CGA=Contact Guard Assistance,   Min=Minimal Assistance, Mod=Moderate Assistance, Max=Maximal Assistance, Total=Total Assistance, NT=Not Tested    BALANCE: Good Fair+ Fair Fair- Poor NT Comments   Sitting Static [x] [] [] [] [] []    Sitting Dynamic [x] [] [] [] [] []              Standing Static [] [x] [] [] [] [] With RW   Standing Dynamic [] [x] [x] [] [] [] With RW     PLAN:   FREQUENCY AND DURATION: BID for duration of hospital stay or until stated goals are met, whichever comes first.    THERAPY PROGNOSIS: Good    PROBLEM LIST: (Skilled intervention is medically necessary to address:)  Decreased ADL/Functional Activities, Decreased Activity Tolerance, Decreased AROM/PROM, Decreased Gait Ability, Decreased Strength, Decreased Transfer Abilities, and Increased Pain   INTERVENTIONS PLANNED:   (Benefits and precautions of physical therapy have been discussed with the patient.)  Therapeutic Activity, Therapeutic Exercise/HEP, Gait Training, Modalities, and Education       TREATMENT:   EVALUATION: LOW COMPLEXITY: (Untimed Charge)    TREATMENT:   Therapeutic Activity (40 Minutes): Therapeutic activity included Supine to Sit, Sit to Supine, Transfer Training, Ambulation on level ground, Sitting balance , and Standing balance to improve functional Activity tolerance, Balance, Coordination, Mobility, and Strength. TREATMENT GRID:  THERAPEUTIC  EXERCISES: DATE:  12/5/22 DATE:    12/6 DATE:      AM PM AM PM AM PM    [] [] [] [] [] []   Ankle Pumps  10 15      Quad Sets  10 15      Gluteal Sets  10 15      Hip Abd/ADduction  10 15      Knee Slides  10 15      Short Arc Quads  10 15      Long Arc Quads   15                                 B = bilateral; AA = active assistive; A = active; P = passive      EDUCATION:    EDUCATION:  [x] Home Exercises  [x] Fall Precautions  [x] Hip Precautions  [] D/C Instruction Review [] Hip Prosthesis Review  [x] Walker Management/Safety  [] Adaptive Equipment as Needed     AFTER TREATMENT PRECAUTIONS: Bed/Chair Locked, Call light within reach, Chair, Needs within reach, and RN notified    INTERDISCIPLINARY COLLABORATION:  RN/ PCT    Compliance with Program/Exercises: compliant all of the time, Will assess as treatment progresses. Recommendations/Intent for next treatment session:  Treatment next visit will focus on increasing Ms. Maddox's independence with bed mobility, transfers, gait training, strength/ROM exercises, modalities for pain, and patient education.      TIME IN/OUT:  Time In: 0815  Time Out: 3088  Minutes: [address], PTA

## 2022-12-06 NOTE — PROGRESS NOTES
OCCUPATIONAL THERAPY Daily Note, Discharge, and AM      (Link to Caseload Tracking: OT Visit Days: 1  OT Orders   Time  OT Charge Capture  Rehab Caseload Tracker  Episode     Joyce Ovalle is a 74 y.o. female   PRIMARY DIAGNOSIS: Primary osteoarthritis of right hip  Primary osteoarthritis of right hip [M16.11]  Osteoarthritis of right hip, unspecified osteoarthritis type [M16.11]  Procedure(s) (LRB):  RIGHT HIP TOTAL ARTHROPLASTY, [place], PREVENA (Right)  1 Day Post-Op  Reason for Referral: Pain in Right Hip (M25.551)  Stiffness of Right Hip, Not elsewhere classified (M25.651)  Inpatient: Payor: Beckie Meeks / Plan: Genevieve Drain / Product Type: *No Product type* /     ASSESSMENT:     REHAB RECOMMENDATIONS:   Recommendation to date pending progress:  Setting:  Home Health Therapy    Equipment:    None     ASSESSMENT:  Ms. Ozzy Schulz is s/p right RAIES and presents with decreased independence with functional mobility and activities of daily living as compared to baseline level of function and safety. Patient would benefit from skilled Occupational Therapy to maximize independence and safety with self-care task and functional mobility. Patient declined need to use the bathroom or desire to get dressed this afternoon. She was educated on hip precautions and performed bed mobility and functional transfers in prep for OOB ADLs. She reports feeling \"loopy\" from medication. She was left sitting up in recliner with sandwich tray and daughter at bedside. Patient is hopeful to spend the night to better prepare for safe discharge home. She plans to have support from family near by. Should progress well with ADL session in AM.    12/6/2022  Ms. Ozzy Schulz is s/p right ARIES. Patient declined shower but completed dressing as charted below in ADL grid and is ambulating with rolling walker and stand by assist.  Patient has met 4/4 goals and plans to return home with good family support.   Family able to provide patient with appropriate level of assistance at this time. OT reviewed hip precautions throughout session. Patient instructed to call for assistance when needing to get up from recliner and all needs in reach. Patient verbalized understanding of call light.             [address] [facility] [address] [address] AM-PAC 6 Clicks Daily Activity Inpatient Short Form:    AM-PAC Daily Activity Inpatient   How much help for putting on and taking off regular lower body clothing?: A Lot  How much help for Bathing?: A Lot  How much help for Toileting?: A Lot  How much help for putting on and taking off regular upper body clothing?: None  How much help for taking care of personal grooming?: None  How much help for eating meals?: None  AM-PAC Inpatient Daily Activity Raw Score: 18  AM-PAC Inpatient ADL T-Scale Score : 38.66  ADL Inpatient CMS 0-100% Score: 46.65  ADL Inpatient CMS G-Code Modifier : CK     SUBJECTIVE:     Ms. Joy Beltrán states that she does not want to shower       Social/Functional   Lives With: Alone  Type of Home: House  Home Layout: One level  Home Access: Ramped entrance  Bathroom Shower/Tub: Walk-in shower  Home Equipment: [address], rolling, Cane, Reacher, Long-handled shoehorn  ADL Assistance: Independent  Ambulation Assistance: Independent    OBJECTIVE:     Antonietta Che / Owen Orellana / Grace Jain: None    RESTRICTIONS/PRECAUTIONS:  Restrictions/Precautions: Fall Risk, Weight Bearing  Right Lower Extremity Weight Bearing: Weight Bearing As Tolerated  Position Activity Restriction  Hip Precautions: Posterior hip precautions    PAIN: VITALS / O2:   Pre Treatment:          Post Treatment: no c/o pain during session Vitals          Oxygen        GROSS EVALUATION: INTACT IMPAIRED   (See Comments)   UE AROM [x] []   UE PROM [x] []   Strength [x]       Posture / Balance []  Cues for safe use of RW during mobility and daily activities    Sensation []     Coordination []       Tone []       Edema []    Activity Tolerance []  Decreased from baseline Hand Dominance R [] L []      COGNITION/  PERCEPTION: INTACT IMPAIRED   (See Comments)   Orientation [x]     Vision [x]     Hearing [x]     Cognition  [x]     Perception [x]       MOBILITY: I Mod I S SBA CGA Min Mod Max Total  NT x2 Comments:   Bed Mobility    Rolling [] [] [] [] [] [] [] [] [] [] []    Supine to Sit [] [] [] [x] [] [] [] [] [] [] []    Scooting [] [] [] [x] [] [] [] [] [] [] []    Sit to Supine [] [] [] [x] [] [] [] [] [] [] []    Transfers    Sit to Stand [] [] [] [x] [x] [] [] [] [] [] []    Bed to Chair [] [] [] [x] [x] [x] [] [] [] [] []    Stand to Sit [] [] [] [x] [x] [] [] [] [] [] []    Tub/Shower [] [] [] [] [] [] [] [] [] [] []  SBA simulated      Toilet [] [] [] [x] [] [] [] [] [] [] []        [] [] [] [] [] [] [] [] [] [] []    I=Independent, Mod I=Modified Independent, S=Supervision/Setup, SBA=Standby Assistance, CGA=Contact Guard Assistance, Min=Minimal Assistance, Mod=Moderate Assistance, Max=Maximal Assistance, Total=Total Assistance, NT=Not Tested    ACTIVITIES OF DAILY LIVING: I Mod I S SBA CGA Min Mod Max Total NT Comments   BASIC ADLs:              Upper Body Bathing [] [] [] [] [] [] [] [] [] []    Lower Body Bathing [] [] [] [] [] [] [] [] [] []    Toileting [] [] [] [x] [] [] [] [] [] []    Upper Body Dressing [] [] [x] [] [] [] [] [] [] []    Lower Body Dressing [] [] [] [x] [] [] [] [] [] []    Feeding [x] [] [] [] [] [] [] [] [] []    Grooming [] [] [x] [] [] [] [] [] [] []    Personal Device Care [] [] [] [] [] [] [] [] [] []    Functional Mobility [] [] [] [x] [] [] [] [] [] []    I=Independent, Mod I=Modified Independent, S=Supervision/Setup, SBA=Standby Assistance, CGA=Contact Guard Assistance, Min=Minimal Assistance, Mod=Moderate Assistance, Max=Maximal Assistance, Total=Total Assistance, NT=Not Tested    PLAN:     FREQUENCY/DURATION     for duration of hospital stay or until stated goals are met, whichever comes first.    ACUTE OCCUPATIONAL THERAPY GOALS: (Developed with and agreed upon by patient and/or caregiver.)    GOALS:   DISCHARGE GOALS (in preparation for going home/rehab):  3 days  1. Ms. Mary Jane Thompson will perform lower body dressing activity with minimal assist required to demonstrate improved functional mobility and safety. 2.  Ms. Mary Jane Thompson will perform bathing activity with minimal assist required to demonstrate improved functional mobility and safety. 3.  Ms. Mary Jane Thompson will perform toileting activity with  contact guard assist to demonstrate improved functional mobility and safety. 4.  Ms. Mary Jane Thompson will perform all functional transfers transfer with contact guard assist to demonstrate improved functional mobility and safety. PROBLEM LIST:   (Skilled intervention is medically necessary to address:)  Decreased ADL/Functional Activities  Decreased Activity Tolerance  Decreased Balance  Decreased Coordination  Decreased Gait Ability  Decreased Safety Awareness  Decreased Strength  Decreased Transfer Abilities   INTERVENTIONS PLANNED:   (Benefits and precautions of occupational therapy have been discussed with the patient.)  Self Care Training  Therapeutic Activity  Therapeutic Exercise/HEP  Neuromuscular Re-education  Manual Therapy  Education         TREATMENT:     EVALUATION: LOW COMPLEXITY: (Untimed Charge)    TREATMENT:   Self Care: (30 min): Procedure(s) (per grid) utilized to improve and/or restore self-care/home management as related to dressing, toileting, grooming, and functional mobility . Required minimal verbal cueing to facilitate activities of daily living skills and compensatory activities.     AFTER TREATMENT PRECAUTIONS: Bed/Chair Locked, Call light within reach, Chair, Needs within reach, RN notified, and Visitors at bedside    INTERDISCIPLINARY COLLABORATION:  RN/ PCT and PT/ PTA    EDUCATION:  Education Given To: Patient, Family  Education Provided: Role of Therapy, Plan of Care, Precautions, ADL Adaptive Strategies, Transfer Training, Energy Conservation, Equipment, Fall Prevention Strategies  Education Method: Demonstration, Verbal  Barriers to Learning: None  Education Outcome: Verbalized understanding, Demonstrated understanding  [x] Safe And Effective Hygiene  [x] Fall Precautions  [x] Hip Precautions  [x] D/C Instruction Review [] Prosthesis Review  [x] Walker Management/Safety  [x] Adaptive Equipment as Needed  [x] Therapeutic Resting Position of Joint       TOTAL TREATMENT DURATION AND TIME:  Time In: 0900  Time Out: 0930  Minutes: [address] Ciara Lomeli, OT

## 2022-12-06 NOTE — PROGRESS NOTES
12/05/22 2206   Oxygen Therapy/Pulse Ox   O2 Therapy Room air   Heart Rate 74   Resp 18   SpO2 95 %   Pulse Oximeter Device Mode Continuous   Pulse Oximeter Device Location Left;Finger   $Pulse Oximeter $Spot check (multiple/continuous)   Patient placed on continuous sat monitor. Data cleared and alarms set. No distress noted at this time.

## 2022-12-07 ENCOUNTER — HOME CARE VISIT (OUTPATIENT)
Dept: SCHEDULING | Facility: HOME HEALTH | Age: 72
End: 2022-12-07
Payer: MEDICARE

## 2022-12-07 ENCOUNTER — TELEPHONE (OUTPATIENT)
Dept: INTERNAL MEDICINE CLINIC | Facility: CLINIC | Age: 72
End: 2022-12-07

## 2022-12-07 ENCOUNTER — CARE COORDINATION (OUTPATIENT)
Dept: OTHER | Facility: CLINIC | Age: 72
End: 2022-12-07

## 2022-12-07 VITALS
TEMPERATURE: 97.9 F | SYSTOLIC BLOOD PRESSURE: 128 MMHG | OXYGEN SATURATION: 96 % | HEART RATE: 62 BPM | RESPIRATION RATE: 18 BRPM | DIASTOLIC BLOOD PRESSURE: 62 MMHG

## 2022-12-07 DIAGNOSIS — F51.01 PRIMARY INSOMNIA: ICD-10-CM

## 2022-12-07 DIAGNOSIS — I10 ESSENTIAL HYPERTENSION: ICD-10-CM

## 2022-12-07 PROCEDURE — G0151 HHCP-SERV OF PT,EA 15 MIN: HCPCS

## 2022-12-07 ASSESSMENT — ENCOUNTER SYMPTOMS
DYSPNEA ACTIVITY LEVEL: AFTER AMBULATING 10 - 20 FT
PAIN LOCATION - PAIN QUALITY: SHARP
CONSTIPATION: 1

## 2022-12-07 NOTE — TELEPHONE ENCOUNTER
[facility] called on behalf of the pt; home health nurse states that while evaluating the pt today on a referral from ortho a severe medication interaction was uncovered. It may be caused by either the pt's CITALOPRAM or TRAZODONE medications. A call back is not required but the pt should be contacted if any changes are needed.

## 2022-12-07 NOTE — CARE COORDINATION
[facility] Care Transitions Initial Follow Up Call    Call within 2 business days of discharge: Yes    Care Transition Nurse contacted the patient by telephone to perform post hospital discharge assessment. Verified name and  with patient as identifiers. Provided introduction to self, and explanation of the Care Transition Nurse role. Patient: Ronnie Maddox Patient : 1950   MRN: [id number]  Reason for Admission: RIGHT HIP TOTAL ARTHROPLASTY  Discharge Date: 22 RARS: Readmission Risk Score: 9.1      Last Discharge  [address]       Date Complaint Diagnosis Description Type Department Provider    22  Primary osteoarthritis of right hip Admission (Discharged) Dougie Guillen MD            Was this an external facility discharge? No Discharge Facility: N/A    Challenges to be reviewed by the provider   Additional needs identified to be addressed with provider: No  none               Method of communication with provider: none. Patient reports that she is feeling ok, she is using pain medication sparingly. PT has visited her today. Discussed home health therapy. Encouraged patient to review handbook from home health if needed to guide her in services, she voices understanding. Care Transition Nurse reviewed discharge instructions, medical action plan, and red flags with patient who verbalized understanding. The patient was given an opportunity to ask questions and does not have any further questions or concerns at this time. Were discharge instructions available to patient? Yes. Reviewed appropriate site of care based on symptoms and resources available to patient including: PCP  Specialist  Urgent care clinics  Home health. The patient agrees to contact the PCP office for questions related to their healthcare. Advance Care Planning:   Does patient have an Advance Directive: reviewed and current.     Medication reconciliation was performed with patient, who verbalizes understanding of administration of home medications. Medications reviewed: yes    Was patient discharged with a pulse oximeter? no    Non-face-to-face services provided:  Obtained and reviewed discharge summary and/or continuity of care documents  Education of patient/family/caregiver/guardian to support self-management-voices understanding  Assessment and support for treatment adherence and medication management-voices understanding    Offered patient enrollment in the Remote Patient Monitoring (RPM) program for in-home monitoring: NA.    Care Transitions 24 Hour Call    Care Transitions Interventions         Follow Up  Future Appointments   Date Time Provider Judy Amaya   12/22/2022  9:05 AM Fracisco Lamb MD [facility]   1/30/2023  2:40 PM Dylan Castro MD [address] [facility] AMB   5/31/2023  2:00 PM Miguel Ángel Antunez DO [facility]       Care Transition Nurse provided contact information. Plan for follow-up call in 5-7 days based on severity of symptoms and risk factors.   Plan for next call:  discuss home health progress, pain, follow up appointment    Kvng Steel RN

## 2022-12-08 RX ORDER — NIFEDIPINE 90 MG/1
90 TABLET, EXTENDED RELEASE ORAL DAILY
Qty: 90 TABLET | Refills: 0 | Status: SHIPPED | OUTPATIENT
Start: 2022-12-08

## 2022-12-08 RX ORDER — TRAZODONE HYDROCHLORIDE 50 MG/1
TABLET ORAL
Qty: 90 TABLET | Refills: 0 | Status: SHIPPED | OUTPATIENT
Start: 2022-12-08

## 2022-12-09 ENCOUNTER — HOME CARE VISIT (OUTPATIENT)
Dept: SCHEDULING | Facility: HOME HEALTH | Age: 72
End: 2022-12-09
Payer: MEDICARE

## 2022-12-09 VITALS
DIASTOLIC BLOOD PRESSURE: 68 MMHG | OXYGEN SATURATION: 98 % | HEART RATE: 84 BPM | RESPIRATION RATE: 17 BRPM | SYSTOLIC BLOOD PRESSURE: 136 MMHG | TEMPERATURE: 97 F

## 2022-12-09 PROCEDURE — G0157 HHC PT ASSISTANT EA 15: HCPCS

## 2022-12-09 ASSESSMENT — ENCOUNTER SYMPTOMS: PAIN LOCATION - PAIN QUALITY: ACHING

## 2022-12-12 ENCOUNTER — HOME CARE VISIT (OUTPATIENT)
Dept: SCHEDULING | Facility: HOME HEALTH | Age: 72
End: 2022-12-12
Payer: MEDICARE

## 2022-12-12 VITALS
DIASTOLIC BLOOD PRESSURE: 60 MMHG | TEMPERATURE: 97.7 F | SYSTOLIC BLOOD PRESSURE: 134 MMHG | HEART RATE: 72 BPM | OXYGEN SATURATION: 97 % | RESPIRATION RATE: 14 BRPM

## 2022-12-12 PROCEDURE — G0151 HHCP-SERV OF PT,EA 15 MIN: HCPCS

## 2022-12-12 ASSESSMENT — ENCOUNTER SYMPTOMS: PAIN LOCATION - PAIN QUALITY: ACHES, SORE

## 2022-12-12 NOTE — TELEPHONE ENCOUNTER
Spoke with patient she stated that she has been taking those medications for years and isn't having any problems.

## 2022-12-13 ENCOUNTER — TELEPHONE (OUTPATIENT)
Dept: ORTHOPEDIC SURGERY | Age: 72
End: 2022-12-13

## 2022-12-13 NOTE — TELEPHONE ENCOUNTER
She had surgery Dec 5 and she has a big knot on the \"right side of her butt\". It is just a little tender. She is wondering if her hip has come out of joint. She says it just doesn't feel right. Please give her a call back.

## 2022-12-14 ENCOUNTER — HOME CARE VISIT (OUTPATIENT)
Dept: SCHEDULING | Facility: HOME HEALTH | Age: 72
End: 2022-12-14
Payer: MEDICARE

## 2022-12-14 VITALS
RESPIRATION RATE: 17 BRPM | DIASTOLIC BLOOD PRESSURE: 62 MMHG | TEMPERATURE: 97.5 F | HEART RATE: 76 BPM | OXYGEN SATURATION: 96 % | SYSTOLIC BLOOD PRESSURE: 130 MMHG

## 2022-12-14 PROCEDURE — G0157 HHC PT ASSISTANT EA 15: HCPCS

## 2022-12-14 ASSESSMENT — ENCOUNTER SYMPTOMS: PAIN LOCATION - PAIN QUALITY: ACHE

## 2022-12-15 ENCOUNTER — CARE COORDINATION (OUTPATIENT)
Dept: OTHER | Facility: CLINIC | Age: 72
End: 2022-12-15

## 2022-12-15 NOTE — CARE COORDINATION
[facility] Care Transitions Follow Up Call    Care Transition Nurse contacted the patient by telephone to follow up after admission on 2022. Verified name and  with patient as identifiers. Patient: Yuliana Maddox  Patient : 1950   MRN: [id number]  Reason for Admission: RIGHT HIP TOTAL ARTHROPLASTY  Discharge Date: 22 RARS: Readmission Risk Score: 9.1      Needs to be reviewed by the provider   Additional needs identified to be addressed with provider: No  none             Method of communication with provider: none. Patient reports that she is feeling fine, no concerns/questions. Pain is minimal and controlled, physical therapy sessions have been effective. Addressed changes since last contact:  none  Discussed follow-up appointments. If no appointment was previously scheduled, appointment scheduling offered: Yes. Is follow up appointment scheduled within 7 days of discharge? Yes. Post op with Dr. Zhang Cheng is scheduled for 2022. Follow Up  Future Appointments   Date Time Provider Judy Amaya   2022 11:30 AM Jeanne Reason, PTA [address]   2022 To Be Determined Jeanne Reason, PTA [address]   2022  9:05 AM Bethel Gee MD [facility] AMB   2022 To Be Determined Jeanne Reason, PTA [address]   2022 To Be Determined Gustavo Bautista, DRE [facility] HOME HEAL   2023  2:40 PM Lizet Phoenix MD [address] [facility] AMB   2023  2:00 PM Amado Cuellar DO [facility]     Non-[facility] follow up appointment(s): none noted    Care Transition Nurse reviewed discharge instructions, medical action plan, and red flags with patient and discussed any barriers to care and/or understanding of plan of care after discharge. Discussed appropriate site of care based on symptoms and resources available to patient including: PCP  Specialist  Urgent care clinics.  The patient agrees to contact the PCP office for questions related to their healthcare. Advance Care Planning:   reviewed and current. Patients top risk factors for readmission: medical condition-recent surgical procedure  Interventions to address risk factors: Obtained and reviewed discharge summary and/or continuity of care documents, Education of patient/family/caregiver/guardian to support self-management-voices understanding, and Assessment and support for treatment adherence and medication management-voices understanding. Offered patient enrollment in the Remote Patient Monitoring (RPM) program for in-home monitoring: NA.     Care Transitions Subsequent and Final Call    Subsequent and Final Calls  Care Transitions Interventions  Other Interventions:      CTN telephonic support. Care Transition Nurse provided contact information for future needs. Plan for follow-up call in 10-14 days based on severity of symptoms and risk factors.   Plan for next call: symptom management-self monitor pain  self management-continues to follow discharge instructions  follow-up appointment-discuss appointment outcome    Shauna Ryan RN

## 2022-12-16 ENCOUNTER — HOME CARE VISIT (OUTPATIENT)
Dept: SCHEDULING | Facility: HOME HEALTH | Age: 72
End: 2022-12-16
Payer: MEDICARE

## 2022-12-16 VITALS
RESPIRATION RATE: 17 BRPM | HEART RATE: 80 BPM | SYSTOLIC BLOOD PRESSURE: 136 MMHG | OXYGEN SATURATION: 96 % | TEMPERATURE: 98 F | DIASTOLIC BLOOD PRESSURE: 70 MMHG

## 2022-12-16 PROCEDURE — G0157 HHC PT ASSISTANT EA 15: HCPCS

## 2022-12-16 ASSESSMENT — ENCOUNTER SYMPTOMS: PAIN LOCATION - PAIN QUALITY: SORENESS

## 2022-12-19 ENCOUNTER — HOME CARE VISIT (OUTPATIENT)
Dept: SCHEDULING | Facility: HOME HEALTH | Age: 72
End: 2022-12-19
Payer: MEDICARE

## 2022-12-19 PROCEDURE — G0157 HHC PT ASSISTANT EA 15: HCPCS

## 2022-12-21 ENCOUNTER — HOME CARE VISIT (OUTPATIENT)
Dept: SCHEDULING | Facility: HOME HEALTH | Age: 72
End: 2022-12-21
Payer: MEDICARE

## 2022-12-21 VITALS
TEMPERATURE: 97.8 F | HEART RATE: 80 BPM | OXYGEN SATURATION: 98 % | SYSTOLIC BLOOD PRESSURE: 138 MMHG | RESPIRATION RATE: 17 BRPM | DIASTOLIC BLOOD PRESSURE: 64 MMHG

## 2022-12-21 PROCEDURE — G0157 HHC PT ASSISTANT EA 15: HCPCS

## 2022-12-21 ASSESSMENT — ENCOUNTER SYMPTOMS: PAIN LOCATION - PAIN QUALITY: SORE

## 2022-12-22 ENCOUNTER — OFFICE VISIT (OUTPATIENT)
Dept: ORTHOPEDIC SURGERY | Age: 72
End: 2022-12-22

## 2022-12-22 DIAGNOSIS — Z96.641 STATUS POST RIGHT HIP REPLACEMENT: Primary | ICD-10-CM

## 2022-12-22 PROCEDURE — 99024 POSTOP FOLLOW-UP VISIT: CPT | Performed by: ORTHOPAEDIC SURGERY

## 2022-12-22 NOTE — PROGRESS NOTES
Patient ID:  Steven Brand  [id number]  20 y.o.  1950    Today: December 22, 2022       CHIEF COMPLAINT:  Follow-up of right total hip replacement    HISTORY:  The patient presents today for 3-week follow-up after total hip replacement. The patient continues to improve gradually. Working with physical therapy on strengthening and stretching. They are on aspirin for DVT prophylaxis. Using assistive walking device appropriately. Continues to take medication appropriately. PE:  Incision is examined which is healing well. No erythema, induration or drainage. No significant fluid accumulation around the surgical site distally. Distally able to grossly plantar and dorsiflex foot and ankle. Sensation intact. Limb is perfused. No sign of DVT. X-RAYS:    XR Pelvis 2/3 Views  Views Obtained: AP Pelvis and Frog leg lateral of right hip  Indication: Postop Right Total Hip Replacement  Findings:   X-rays are viewed which show total hip replacement in place on the right side. All hardware appears to be stable compared to immediate postoperative films. The acetabular component appears to be in good position, no evidence of loosening. Anteversion and inclination angle appear to be within acceptable criteria. The stem appears to be appropriately sized without any evidence of subsidence. No evidence of proximal femoral periprosthetic fracture. Hip is reduced. Impression: Normal Xray after total hip replacement    Vanita Reed MD    ASSESSMENT:  3 Weeks S/P Right Total Hip Replacement    PLAN:  Continue activity and current weight bearing status. Continue posterior hip precautions if applicable. Continue to take the aspirin for another week for a full month of DVT prophylaxis. They are given a refill on their pain medication if they feel they are going to run out. The patient is given a script for antibiotics to be taken for dental prophylaxis.   I have asked the patient not to submerge the incision under water or place any creams or oils over this for another week or two. I will plan to check them back in 3 months for follow-up or sooner if they have any issues, questions or concerns.        Signed By: Fracisco Lamb MD  December 22, 2022

## 2022-12-26 ENCOUNTER — HOME CARE VISIT (OUTPATIENT)
Dept: HOME HEALTH SERVICES | Facility: HOME HEALTH | Age: 72
End: 2022-12-26
Payer: MEDICARE

## 2022-12-27 ENCOUNTER — HOME CARE VISIT (OUTPATIENT)
Dept: SCHEDULING | Facility: HOME HEALTH | Age: 72
End: 2022-12-27
Payer: MEDICARE

## 2022-12-27 VITALS
SYSTOLIC BLOOD PRESSURE: 138 MMHG | DIASTOLIC BLOOD PRESSURE: 70 MMHG | HEART RATE: 82 BPM | OXYGEN SATURATION: 96 % | RESPIRATION RATE: 16 BRPM | TEMPERATURE: 98.4 F

## 2022-12-27 DIAGNOSIS — J30.9 ALLERGIC RHINITIS, UNSPECIFIED SEASONALITY, UNSPECIFIED TRIGGER: ICD-10-CM

## 2022-12-27 PROCEDURE — G0151 HHCP-SERV OF PT,EA 15 MIN: HCPCS

## 2022-12-27 RX ORDER — MONTELUKAST SODIUM 10 MG/1
10 TABLET ORAL DAILY
Qty: 90 TABLET | Refills: 3 | OUTPATIENT
Start: 2022-12-27

## 2022-12-27 ASSESSMENT — ENCOUNTER SYMPTOMS: PAIN LOCATION - PAIN QUALITY: TWINGE

## 2022-12-28 ENCOUNTER — CARE COORDINATION (OUTPATIENT)
Dept: OTHER | Facility: CLINIC | Age: 72
End: 2022-12-28

## 2022-12-28 RX ORDER — GABAPENTIN 100 MG/1
100 CAPSULE ORAL 2 TIMES DAILY
Qty: 30 CAPSULE | Refills: 0 | Status: SHIPPED | OUTPATIENT
Start: 2022-12-28 | End: 2023-01-12

## 2022-12-28 NOTE — CARE COORDINATION
Care Transitions Outreach Attempt    Call within 2 business days of discharge: Yes   Attempted to reach patient for transitions of care follow up. Unable to reach patient. Patient: Lonkumar Ly Patient : 1950 MRN: [id number]    Last Discharge  Street       Date Complaint Diagnosis Description Type Department Provider    22  Primary osteoarthritis of right hip Admission (Discharged) Julia Ervin MD              Was this an external facility discharge?  No Discharge Facility: N/A    Noted following upcoming appointments from discharge chart review:   [facility]):   Future Appointments   Date Time Provider Judy Amaya   2023  2:40 PM Brady Bal MD [address] [facility] AMB   3/23/2023  1:20 PM Lloyd Clayton MD [facility] AM[facility]   2023  2:00 PM Joie Larose DO [facility]     Non-[facility] follow up appointment(s): none noted

## 2023-01-05 ENCOUNTER — PATIENT MESSAGE (OUTPATIENT)
Dept: INTERNAL MEDICINE CLINIC | Facility: CLINIC | Age: 73
End: 2023-01-05

## 2023-01-05 ENCOUNTER — CARE COORDINATION (OUTPATIENT)
Dept: OTHER | Facility: CLINIC | Age: 73
End: 2023-01-05

## 2023-01-05 DIAGNOSIS — Z79.4 TYPE 2 DIABETES MELLITUS WITH STAGE 4 CHRONIC KIDNEY DISEASE, WITH LONG-TERM CURRENT USE OF INSULIN (HCC): ICD-10-CM

## 2023-01-05 DIAGNOSIS — E11.22 TYPE 2 DIABETES MELLITUS WITH STAGE 4 CHRONIC KIDNEY DISEASE, WITH LONG-TERM CURRENT USE OF INSULIN (HCC): ICD-10-CM

## 2023-01-05 DIAGNOSIS — N18.4 TYPE 2 DIABETES MELLITUS WITH STAGE 4 CHRONIC KIDNEY DISEASE, WITH LONG-TERM CURRENT USE OF INSULIN (HCC): ICD-10-CM

## 2023-01-05 NOTE — CARE COORDINATION
[facility] Care Transitions Follow Up Call    Care Transition Nurse contacted the patient by telephone to follow up after admission on 2022. Verified name and  with patient as identifiers. Patient: Isaías Maddox  Patient : 1950   MRN: [id number]  Reason for Admission: Right hip replacement  Discharge Date: 22 RARS: Readmission Risk Score: 9.1      Needs to be reviewed by the provider   Additional needs identified to be addressed with provider: No  none             Method of communication with provider: none. Patient reports that she is feeling fine. She does not have any concern or questions. Addressed changes since last contact:  none  Discussed follow-up appointments. If no appointment was previously scheduled, appointment scheduling offered: Yes. Is follow up appointment scheduled within 7 days of discharge? Yes. Follow Up  Future Appointments   Date Time Provider Department Center   2023  2:40 PM Harini Lo MD [address] [facility] AM[facility]   3/23/2023  1:20 PM Benedict Rice MD [facility] AM[facility]   2023  2:00 PM Evy Saunders DO [facility]     Non-[facility] follow up appointment(s): none noted    Care Transition Nurse reviewed discharge instructions, medical action plan, and red flags with patient and discussed any barriers to care and/or understanding of plan of care after discharge. Discussed appropriate site of care based on symptoms and resources available to patient including: PCP  Specialist  Urgent care clinics  [facility]  When to call [address] The patient agrees to contact the PCP office for questions related to their healthcare. Advance Care Planning:   reviewed and current.      Patients top risk factors for readmission: medical condition-HF, kidney disease  Interventions to address risk factors: Obtained and reviewed discharge summary and/or continuity of care documents, Education of patient/family/caregiver/guardian to support self-management-voices understanding re: ortho surgery, follow up, and Assessment and support for treatment adherence and medication management-home health, taking medications as prescribed, no pain at this time. Offered patient enrollment in the Remote Patient Monitoring (RPM) program for in-home monitoring: NA.     Care Transitions Subsequent and Final Call    Subsequent and Final Calls  Care Transitions Interventions  Other Interventions:             Care Transition Nurse provided contact information for future needs. No further follow-up call indicated based on severity of symptoms and risk factors. Plan for next call:  Final call on 1/5/2023. No further calls, plan to close after goal met date on 1/6/2023.   Mohan Cook RN

## 2023-01-06 RX ORDER — BLOOD-GLUCOSE TRANSMITTER
EACH MISCELLANEOUS
Qty: 1 EACH | Refills: 5 | Status: SHIPPED | OUTPATIENT
Start: 2023-01-06

## 2023-01-06 RX ORDER — BLOOD-GLUCOSE SENSOR
EACH MISCELLANEOUS
Qty: 3 EACH | Refills: 3 | Status: SHIPPED | OUTPATIENT
Start: 2023-01-06

## 2023-01-06 RX ORDER — BLOOD-GLUCOSE,RECEIVER,CONT
EACH MISCELLANEOUS
Qty: 10 EACH | Refills: 3 | Status: SHIPPED | OUTPATIENT
Start: 2023-01-06

## 2023-01-06 NOTE — TELEPHONE ENCOUNTER
From: Mirta Maddox  To: Dr. Yulisa Higgins  Sent: 1/5/2023 6:50 PM EST  Subject: Dexcom G6 New Prescription     Please fax a new Dexcom G6 prescription to [facility] at fax no. [phone]. Also please fax most recent chart notes. Please reference my name Mejia Duran and birthdate 1950 on the fax header. Please fax as soon as possible. Please let me know when this has been done. Thank you.     Lashawn Cardenas

## 2023-01-16 DIAGNOSIS — F32.5 MAJOR DEPRESSIVE DISORDER WITH SINGLE EPISODE, IN FULL REMISSION (HCC): ICD-10-CM

## 2023-01-17 RX ORDER — CITALOPRAM 20 MG/1
20 TABLET ORAL DAILY
Qty: 30 TABLET | Refills: 0 | Status: SHIPPED | OUTPATIENT
Start: 2023-01-17 | End: 2023-01-30 | Stop reason: SDUPTHER

## 2023-01-30 ENCOUNTER — OFFICE VISIT (OUTPATIENT)
Dept: INTERNAL MEDICINE CLINIC | Facility: CLINIC | Age: 73
End: 2023-01-30
Payer: MEDICARE

## 2023-01-30 VITALS
HEART RATE: 60 BPM | HEIGHT: 64 IN | TEMPERATURE: 97.5 F | OXYGEN SATURATION: 100 % | DIASTOLIC BLOOD PRESSURE: 66 MMHG | SYSTOLIC BLOOD PRESSURE: 132 MMHG | BODY MASS INDEX: 28.51 KG/M2 | WEIGHT: 167 LBS

## 2023-01-30 DIAGNOSIS — F32.5 MAJOR DEPRESSIVE DISORDER WITH SINGLE EPISODE, IN FULL REMISSION (HCC): ICD-10-CM

## 2023-01-30 DIAGNOSIS — E78.2 MIXED HYPERLIPIDEMIA: ICD-10-CM

## 2023-01-30 DIAGNOSIS — I25.10 CORONARY ARTERY DISEASE INVOLVING NATIVE CORONARY ARTERY OF NATIVE HEART WITHOUT ANGINA PECTORIS: Primary | ICD-10-CM

## 2023-01-30 DIAGNOSIS — N18.4 TYPE 2 DIABETES MELLITUS WITH STAGE 4 CHRONIC KIDNEY DISEASE, WITH LONG-TERM CURRENT USE OF INSULIN (HCC): ICD-10-CM

## 2023-01-30 DIAGNOSIS — F51.01 PRIMARY INSOMNIA: ICD-10-CM

## 2023-01-30 DIAGNOSIS — E11.22 TYPE 2 DIABETES MELLITUS WITH STAGE 4 CHRONIC KIDNEY DISEASE, WITH LONG-TERM CURRENT USE OF INSULIN (HCC): ICD-10-CM

## 2023-01-30 DIAGNOSIS — Z79.4 TYPE 2 DIABETES MELLITUS WITH STAGE 4 CHRONIC KIDNEY DISEASE, WITH LONG-TERM CURRENT USE OF INSULIN (HCC): ICD-10-CM

## 2023-01-30 DIAGNOSIS — I25.10 CORONARY ARTERY DISEASE INVOLVING NATIVE CORONARY ARTERY OF NATIVE HEART WITHOUT ANGINA PECTORIS: ICD-10-CM

## 2023-01-30 DIAGNOSIS — I50.32 CHRONIC DIASTOLIC CONGESTIVE HEART FAILURE (HCC): ICD-10-CM

## 2023-01-30 DIAGNOSIS — N18.4 STAGE 4 CHRONIC KIDNEY DISEASE (HCC): ICD-10-CM

## 2023-01-30 DIAGNOSIS — I10 ESSENTIAL HYPERTENSION: ICD-10-CM

## 2023-01-30 PROBLEM — M16.11 PRIMARY OSTEOARTHRITIS OF RIGHT HIP: Status: RESOLVED | Noted: 2022-10-31 | Resolved: 2023-01-30

## 2023-01-30 PROCEDURE — 3075F SYST BP GE 130 - 139MM HG: CPT | Performed by: INTERNAL MEDICINE

## 2023-01-30 PROCEDURE — 99214 OFFICE O/P EST MOD 30 MIN: CPT | Performed by: INTERNAL MEDICINE

## 2023-01-30 PROCEDURE — 1090F PRES/ABSN URINE INCON ASSESS: CPT | Performed by: INTERNAL MEDICINE

## 2023-01-30 PROCEDURE — 3017F COLORECTAL CA SCREEN DOC REV: CPT | Performed by: INTERNAL MEDICINE

## 2023-01-30 PROCEDURE — 3046F HEMOGLOBIN A1C LEVEL >9.0%: CPT | Performed by: INTERNAL MEDICINE

## 2023-01-30 PROCEDURE — 1123F ACP DISCUSS/DSCN MKR DOCD: CPT | Performed by: INTERNAL MEDICINE

## 2023-01-30 PROCEDURE — 2022F DILAT RTA XM EVC RTNOPTHY: CPT | Performed by: INTERNAL MEDICINE

## 2023-01-30 PROCEDURE — G8399 PT W/DXA RESULTS DOCUMENT: HCPCS | Performed by: INTERNAL MEDICINE

## 2023-01-30 PROCEDURE — G8417 CALC BMI ABV UP PARAM F/U: HCPCS | Performed by: INTERNAL MEDICINE

## 2023-01-30 PROCEDURE — G8427 DOCREV CUR MEDS BY ELIG CLIN: HCPCS | Performed by: INTERNAL MEDICINE

## 2023-01-30 PROCEDURE — G8484 FLU IMMUNIZE NO ADMIN: HCPCS | Performed by: INTERNAL MEDICINE

## 2023-01-30 PROCEDURE — 3078F DIAST BP <80 MM HG: CPT | Performed by: INTERNAL MEDICINE

## 2023-01-30 PROCEDURE — 1036F TOBACCO NON-USER: CPT | Performed by: INTERNAL MEDICINE

## 2023-01-30 RX ORDER — CITALOPRAM 20 MG/1
20 TABLET ORAL DAILY
Qty: 90 TABLET | Refills: 1 | Status: SHIPPED | OUTPATIENT
Start: 2023-01-30

## 2023-01-30 RX ORDER — TRAZODONE HYDROCHLORIDE 50 MG/1
TABLET ORAL
Qty: 90 TABLET | Refills: 1 | Status: SHIPPED | OUTPATIENT
Start: 2023-01-30

## 2023-01-30 RX ORDER — NIFEDIPINE 90 MG/1
90 TABLET, EXTENDED RELEASE ORAL DAILY
Qty: 90 TABLET | Refills: 1 | Status: SHIPPED | OUTPATIENT
Start: 2023-01-30

## 2023-01-30 RX ORDER — PEN NEEDLE, DIABETIC 32GX 5/32"
NEEDLE, DISPOSABLE MISCELLANEOUS
COMMUNITY
Start: 2023-01-03

## 2023-01-30 RX ORDER — ROSUVASTATIN CALCIUM 40 MG/1
40 TABLET, COATED ORAL EVERY EVENING
Qty: 90 TABLET | Refills: 1 | Status: SHIPPED | OUTPATIENT
Start: 2023-01-30

## 2023-01-30 RX ORDER — ASPIRIN 81 MG/1
81 TABLET ORAL DAILY
Qty: 1 TABLET | Refills: 0 | Status: SHIPPED
Start: 2023-01-30

## 2023-01-30 ASSESSMENT — PATIENT HEALTH QUESTIONNAIRE - PHQ9
7. TROUBLE CONCENTRATING ON THINGS, SUCH AS READING THE NEWSPAPER OR WATCHING TELEVISION: 0
1. LITTLE INTEREST OR PLEASURE IN DOING THINGS: 0
SUM OF ALL RESPONSES TO PHQ QUESTIONS 1-9: 0
8. MOVING OR SPEAKING SO SLOWLY THAT OTHER PEOPLE COULD HAVE NOTICED. OR THE OPPOSITE, BEING SO FIGETY OR RESTLESS THAT YOU HAVE BEEN MOVING AROUND A LOT MORE THAN USUAL: 0
4. FEELING TIRED OR HAVING LITTLE ENERGY: 0
5. POOR APPETITE OR OVEREATING: 0
2. FEELING DOWN, DEPRESSED OR HOPELESS: 0
SUM OF ALL RESPONSES TO PHQ QUESTIONS 1-9: 0
9. THOUGHTS THAT YOU WOULD BE BETTER OFF DEAD, OR OF HURTING YOURSELF: 0
3. TROUBLE FALLING OR STAYING ASLEEP: 0
6. FEELING BAD ABOUT YOURSELF - OR THAT YOU ARE A FAILURE OR HAVE LET YOURSELF OR YOUR FAMILY DOWN: 0
10. IF YOU CHECKED OFF ANY PROBLEMS, HOW DIFFICULT HAVE THESE PROBLEMS MADE IT FOR YOU TO DO YOUR WORK, TAKE CARE OF THINGS AT HOME, OR GET ALONG WITH OTHER PEOPLE: 0
SUM OF ALL RESPONSES TO PHQ9 QUESTIONS 1 & 2: 0

## 2023-01-30 ASSESSMENT — ANXIETY QUESTIONNAIRES
3. WORRYING TOO MUCH ABOUT DIFFERENT THINGS: 0
2. NOT BEING ABLE TO STOP OR CONTROL WORRYING: 0
6. BECOMING EASILY ANNOYED OR IRRITABLE: 0
5. BEING SO RESTLESS THAT IT IS HARD TO SIT STILL: 0
1. FEELING NERVOUS, ANXIOUS, OR ON EDGE: 0
IF YOU CHECKED OFF ANY PROBLEMS ON THIS QUESTIONNAIRE, HOW DIFFICULT HAVE THESE PROBLEMS MADE IT FOR YOU TO DO YOUR WORK, TAKE CARE OF THINGS AT HOME, OR GET ALONG WITH OTHER PEOPLE: NOT DIFFICULT AT ALL
GAD7 TOTAL SCORE: 0
7. FEELING AFRAID AS IF SOMETHING AWFUL MIGHT HAPPEN: 0
4. TROUBLE RELAXING: 0

## 2023-01-30 ASSESSMENT — ENCOUNTER SYMPTOMS
SHORTNESS OF BREATH: 0
BLOOD IN STOOL: 0

## 2023-01-30 NOTE — PROGRESS NOTES
Rick Jules M.D. Internal Medicine  [facility]  [facility], [facility] [address]  Phone: [phone]  Fax: [phone]    Diabetes  She presents for her follow-up diabetic visit. She has type 2 diabetes mellitus. The initial diagnosis of diabetes was made 34 years ago. Her disease course has been worsening. There are no hypoglycemic associated symptoms. There are no diabetic associated symptoms. Pertinent negatives for diabetes include no chest pain. There are no hypoglycemic complications. Diabetic complications include heart disease and nephropathy. Risk factors for coronary artery disease include diabetes mellitus, dyslipidemia, hypertension and post-menopausal. Current diabetic treatment includes insulin injections. Henry Julien is a 74 y.o. White (non-) female. Current Outpatient Medications   Medication Sig Dispense Refill    citalopram (CELEXA) 20 MG tablet TAKE 1 TABLET BY MOUTH IN  THE MORNING 30 tablet 0    Continuous Blood Gluc  (DEXCOM G6 ) MAURICE Test blood sugar 3 times daily 10 each 3    Continuous Blood Gluc Sensor (DEXCOM G6 SENSOR) MISC Test blood sugar 3 times daily. 3 each 3    Continuous Blood Gluc Transmit (DEXCOM G6 TRANSMITTER) MISC Test blood sugar 3 times daily 1 each 5    gabapentin (NEURONTIN) 100 MG capsule Take 1 capsule by mouth 2 times daily for 15 days. 30 capsule 0    traZODone (DESYREL) 50 MG tablet TAKE 1 TABLET BY MOUTH AT  NIGHT 90 tablet 0    NIFEdipine (PROCARDIA XL) 90 MG extended release tablet TAKE 1 TABLET BY MOUTH IN  THE MORNING 90 tablet 0    acetaminophen (TYLENOL) 500 MG tablet Take 2 tablets by mouth every 6 hours as needed for Pain (Patient taking differently: Take 2 tablets by mouth every 6 hours as needed for Pain (breakthrough pain). ) 180 tablet 2    aspirin EC 81 MG EC tablet Take 1 tablet by mouth in the morning and at bedtime 60 tablet 0    omeprazole (PRILOSEC) 40 MG delayed release capsule Take 1 capsule by mouth daily 30 capsule 0    ondansetron (ZOFRAN) 4 MG tablet Take 1 tablet by mouth 3 times daily as needed for Nausea or Vomiting 30 tablet 1    senna (SENOKOT) 8.6 MG tablet Take 1 tablet by mouth 2 times daily 30 tablet 2    vitamin D (CHOLECALCIFEROL) 125 MCG (5000 UT) CAPS capsule Take 10,000 Units by mouth daily      traMADol (ULTRAM) 50 MG tablet Take 50 mg by mouth every 4 hours as needed for Pain. carvedilol (COREG) 3.125 MG tablet Take 1 tablet by mouth 2 times daily (with meals) 180 tablet 3    isosorbide mononitrate (IMDUR) 60 MG extended release tablet Take 1 tablet by mouth daily 90 tablet 3    furosemide (LASIX) 40 MG tablet Take 1 tablet by mouth daily 90 tablet 3    rosuvastatin (CRESTOR) 40 MG tablet Take 1 tablet by mouth every evening 90 tablet 1    montelukast (SINGULAIR) 10 MG tablet Take 1 tablet by mouth daily 90 tablet 0    ferrous sulfate (FE TABS 325) 325 (65 Fe) MG EC tablet Take 1 tablet by mouth 2 times daily 180 tablet 1    cyanocobalamin 1000 MCG tablet Take 5,000 mcg by mouth daily      Insulin Degludec (TRESIBA FLEXTOUCH) 100 UNIT/ML SOPN Inject 12 Units into the skin at bedtime      insulin lispro, 1 Unit Dial, 100 UNIT/ML SOPN Inject into the skin 3 times daily (before meals) 4 Units SubQ with breakfast, 4 Units SubQ with lunch, 6 Units SubQ with dinner. Indications: type 2 diabetes mellitus      magnesium oxide (MAG-OX) 400 MG tablet Take 400 mg by mouth at bedtime      nitroGLYCERIN (NITROSTAT) 0.4 MG SL tablet Place 0.4 mg under the tongue every 5 minutes as needed for Chest pain Call 911 if no relief after 3 doses       potassium chloride (KLOR-CON M) 20 MEQ extended release tablet Take 40 mEq by mouth daily       No current facility-administered medications for this visit.      Allergies   Allergen Reactions    Cephalosporins Anaphylaxis, Hives and Swelling    Hydralazine Other (See Comments)     Chest pain    Sulfamethoxazole-Trimethoprim Diarrhea Lisinopril Other (See Comments)     Passing out spells. // pt sts not allergic to med     Codeine Nausea And Vomiting     Past Medical History:   Diagnosis Date    Acute on chronic combined systolic and diastolic congestive heart failure ([facility] 02/17/2021    Acute renal failure superimposed on stage 3b chronic kidney disease ([facility] 01/12/2021    CAD (coronary artery disease) 03/01/2012    MI, 3 stents    Carotid artery stenosis 12/12/2018    Carotid stenosis, asymptomatic, right 03/12/2020    Chest pain     denies currently    Childhood asthma     CKD (chronic kidney disease)     stage 3 per Nephrology note    Coronary artery disease involving native coronary artery without angina pectoris 05/21/2015    COVID-19 virus infection 01/12/2021    Depression 03/01/2012    Diabetes mellitus ([facility] 03/01/2012    Tresiba nightly; Humalog 1-3 times per day per SSI; Has Dexcom BS monitor;  Average bs-150-175; A1c-6.8 on 73/28/17    Diastolic dysfunction     DM2 (diabetes mellitus, type 2) ([facility] 05/21/2015    Elevated LFTs 01/12/2021    Elevated troponin 03/02/2012    Essential hypertension 05/21/2015    on med for control    External hemorrhoids without mention of complication 6451    GERD (gastroesophageal reflux disease)     no meds    HCAP (healthcare-associated pneumonia) 01/18/2021    History of left-sided carotid endarterectomy 03/12/2020    History of MI (myocardial infarction) 11/2012    Hypercholesterolemia     Hypertension 03/01/2012    Hypoalbuminemia 02/18/2021    Hyponatremia 01/12/2021    Hypoxemia 08/12/2020    VERONICA (iron deficiency anemia)     oral iron daily    Insomnia     Lactic acidosis 01/12/2021    Long QT interval 01/12/2021    Murmur     per cardio note (11/16/22)= (faint apical systolic murmur) heard; last Echo (2/16/21)    Personal history of colonic polyps 2013    hyperplastic    Platelet inhibition due to Plavix     no longer on Plavix    Pleural effusion, bilateral 08/11/2020    Rectocele 2013    Sepsis ([facility] 01/12/2021    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 Past Surgical History:   Procedure Laterality Date    CAROTID ENDARTERECTOMY Left 12/12/2018    CATARACT REMOVAL Left 09/19/2016    Dr Ian Christian, [address] Right 11/07/2016    Dr Ian Christian, [address]      neck w/plate    COLONOSCOPY  12/17/13    Brigitte--single transverse hyperplastic polyp--7-10 year recall    CORONARY STENT PLACEMENT      stent x3; last in 2012    FLEXIBLE SIGMOIDOSCOPY N/A 3/6/2022    SIGMOIDOSCOPY FLEXIBLE [facility]      x2    ORTHOPEDIC SURGERY      left elbow    HORTENSIA AND BSO (CERVIX REMOVED)      TOTAL HIP ARTHROPLASTY Right 12/5/2022    RIGHT HIP TOTAL ARTHROPLASTY, [place], PREVENA performed by Mona House MD at [facility] EXTRACTION       Social History     Tobacco Use    Smoking status: Former     Packs/day: 1.00     Years: 20.00     Pack years: 20.00     Types: Cigarettes     Quit date: 11/9/2012     Years since quitting: 10.2    Smokeless tobacco: Never    Tobacco comments:     Quit smoking: quit 2012 . has stopped prior also   Vaping Use    Vaping Use: Never used   Substance Use Topics    Alcohol use:  Yes     Alcohol/week: 3.0 standard drinks     Types: 3 Glasses of wine per week    Drug use: No     Family History   Problem Relation Age of Onset    Heart Disease Father     Cancer Father         pancreatic    Thyroid Disease Mother         Multinodular Goiter    Heart Attack Mother 67        mi    Osteoporosis Mother     Heart Disease Mother     Breast Cancer Neg Hx     Thyroid Cancer Brother     Ulcerative Colitis Brother     Thyroid Cancer Sister     Cancer Sister         Pre-Cancerous dysplasia    Heart Attack Father 67        MI      Review of Systems   Respiratory:  Negative for shortness of breath. Cardiovascular:  Negative for chest pain. Gastrointestinal:  Negative for blood in stool. Physical Exam  Vitals and nursing note reviewed. Constitutional:       General: She is not in acute distress. Appearance: Normal appearance. She is not ill-appearing, toxic-appearing or diaphoretic. HENT:      Head: Normocephalic and atraumatic. Right Ear: External ear normal.      Left Ear: External ear normal.   Eyes:      General: No scleral icterus. Right eye: No discharge. Left eye: No discharge. Conjunctiva/sclera: Conjunctivae normal.   Cardiovascular:      Rate and Rhythm: Regular rhythm. Heart sounds: Normal heart sounds. No murmur heard. No friction rub. No gallop. Pulmonary:      Effort: Pulmonary effort is normal. No respiratory distress. Breath sounds: Normal breath sounds. No stridor. No wheezing, rhonchi or rales. Abdominal:      General: Abdomen is flat. Bowel sounds are normal. There is no distension. Palpations: Abdomen is soft. There is no mass. Tenderness: There is no abdominal tenderness. There is no guarding or rebound. Musculoskeletal:      Right lower leg: No edema. Left lower leg: No edema. Skin:     General: Skin is warm and dry. Coloration: Skin is not jaundiced or pale. Findings: No erythema. Neurological:      General: No focal deficit present. Mental Status: She is alert and oriented to person, place, and time. Mental status is at baseline. Psychiatric:         Mood and Affect: Mood normal.         Behavior: Behavior normal.         Thought Content: Thought content normal.         Judgment: Judgment normal.   I have reviewed/discussed the above normal BMI with the patient. I have recommended the following interventions: dietary management education, guidance, and counseling and encourage exercise . ASSESSMENT AND PLAN:    CAD: MALIK to LAD 4/2008.   MALIK to LAD and Diagonal 11/2012. [facility] 2/20/17 = Non-obstructive CAD with patent stents. Follows with Cardiology (Dr. Chandan Ferrera). Maintained on Asa. Risk factors medically modified per below. Taking medications w/o problems. Well controlled w/o angina or HINTON. Continue Asa (No bleeding or falls). Continue risk factor modification per below. Follow up with Dr. Chandan Ferrera (Also with CHF). DM2: Taking current regimen (Basal, CSI) w/o problems. Well controlled. Continue current regimen. Diagnosed: About age 34. Control:  Fasting BSs: \"Usually around 100\" on Tresiba 12. Daytime BSs: Not taking. Lows: None. She does recognize hypoglycemic symptoms. HgA1C:  9/26/12 = 5.8.  1/13/21 = 10.2.   11/21/22 = 6.8. Complications:  Retinopathy: None. Last eye exam = 7/14/22 Behzad Bush. Neuropathy: None. Last foot exam (7/26/22) = Normal sensation; No ulcers; Calluses present. Nephropathy:  Microalbumin:  9/26/12 = .   2/10/22 = . HTN: Taking current regimen (Lasix 40 QDay, Procardia XL 90, Coreg 3.125 BID) w/o problems. Home BPs = Not being checked. Well controlled. Continue current regimen. Asked to monitor BP at home, call me if it runs above 140/80, and bring in a log next time. HLD: Taking current regimen (Crestor 40) w/o problems. Well controlled. Continue current regimen. FLPs:  9/26/12 Untreated = [233/170/43/100] ==> Lipitor 40.  3/1/21 on Crestor 40 = [124/70/37/87].  7/26/22 on Crestor 40 = [117/53.4/47/83]. CKD4: Baseline Cr ? 1.6-2.0 and stable. Follows with Nephrology (Dr. Callie Pimentel). Stable. Follow up with Dr. Callie Pimentel. Depression: Taking current regimen (Celexa 20) without problems and with good control of symptoms. Well controlled. Continue current regimen. HCM: Recommend staying UTD on Covid vaccinations/boosters. Colonoscopy: 8/5/20 by Dr. Fairy Margaux = TA; 5 Year Repeat Rec. Flu: 10/26/22. Covid: Recommend staying UTD on Covid vaccinations/boosters. F/u: 3 Months.   Fasting labs at that visit.    Ioana Reyna was seen today for diabetes, hypertension and cholesterol problem. Diagnoses and all orders for this visit:    Coronary artery disease involving native coronary artery of native heart without angina pectoris  -     rosuvastatin (CRESTOR) 40 MG tablet; Take 1 tablet by mouth every evening  -     Basic Metabolic Panel; Future  -     aspirin EC 81 MG EC tablet; Take 1 tablet by mouth daily    Type 2 diabetes mellitus with stage 4 chronic kidney disease, with long-term current use of insulin ([facility])  -     Basic Metabolic Panel; Future    Essential hypertension  -     NIFEdipine (PROCARDIA XL) 90 MG extended release tablet; Take 1 tablet by mouth daily  -     Basic Metabolic Panel; Future    Mixed hyperlipidemia  -     rosuvastatin (CRESTOR) 40 MG tablet; Take 1 tablet by mouth every evening  -     Basic Metabolic Panel; Future    Stage 4 chronic kidney disease ([address]  -     Basic Metabolic Panel; Future    Chronic diastolic congestive heart failure ([address]  -     Basic Metabolic Panel; Future    Primary insomnia  -     traZODone (DESYREL) 50 MG tablet; TAKE 1 TABLET BY MOUTH AT  NIGHT    Major depressive disorder with single episode, in full remission ([facility])  -     citalopram (CELEXA) 20 MG tablet; Take 1 tablet by mouth daily  -     Basic Metabolic Panel; Future    Return in about 3 months (around 4/30/2023).

## 2023-01-31 LAB
ANION GAP SERPL CALC-SCNC: 10 MMOL/L (ref 2–11)
BUN SERPL-MCNC: 21 MG/DL (ref 8–23)
CALCIUM SERPL-MCNC: 9.7 MG/DL (ref 8.3–10.4)
CHLORIDE SERPL-SCNC: 104 MMOL/L (ref 101–110)
CO2 SERPL-SCNC: 23 MMOL/L (ref 21–32)
CREAT SERPL-MCNC: 2 MG/DL (ref 0.6–1)
GLUCOSE SERPL-MCNC: 214 MG/DL (ref 65–100)
POTASSIUM SERPL-SCNC: 4 MMOL/L (ref 3.5–5.1)
SODIUM SERPL-SCNC: 137 MMOL/L (ref 133–143)

## 2023-02-23 ENCOUNTER — OFFICE VISIT (OUTPATIENT)
Dept: ORTHOPEDIC SURGERY | Age: 73
End: 2023-02-23

## 2023-02-23 DIAGNOSIS — M25.551 RIGHT HIP PAIN: Primary | ICD-10-CM

## 2023-02-23 DIAGNOSIS — M70.61 TROCHANTERIC BURSITIS OF RIGHT HIP: ICD-10-CM

## 2023-02-23 RX ORDER — METHYLPREDNISOLONE ACETATE 40 MG/ML
40 INJECTION, SUSPENSION INTRA-ARTICULAR; INTRALESIONAL; INTRAMUSCULAR; SOFT TISSUE ONCE
Status: COMPLETED | OUTPATIENT
Start: 2023-02-23 | End: 2023-02-23

## 2023-02-23 RX ADMIN — METHYLPREDNISOLONE ACETATE 40 MG: 40 INJECTION, SUSPENSION INTRA-ARTICULAR; INTRALESIONAL; INTRAMUSCULAR; SOFT TISSUE at 14:37

## 2023-02-23 NOTE — PROGRESS NOTES
Patient ID:    Magalis Maddox  [id number]  72 y.o.  1950    Today: February 23, 2023      Chief Complaint:  Right hip pain        Patient reports longstanding history of hip pain.  The patient is status post hip replacement. The patient is approximately 1 year postop. The pain is predominately localized to the lateral hip and is characterized as a general ache with occasional sharp pain.  They rate the pain ranging from 3-8 on the pain scale occurring in a cyclical fashion with periods of acute exacerbation. Symptoms are exacerbated with attempting to sleep on the hip, attempting to ascend stairs, and sitting for long periods of time which results in lateral hip pain. Patient denies significant anterior groin pain and denies significant issues with attempting to put on socks and shoes or when attempting to end into or out or a vehicle. No numbness of tingling going down the extremity.  Patient has attempted prior conservative treatment including ice, heat, Tylenol, and NSAID's.       Past Medical History:  Past Medical History:   Diagnosis Date    Acute on chronic combined systolic and diastolic congestive heart failure (HCC) 02/17/2021    Acute renal failure superimposed on stage 3b chronic kidney disease ([facility]) 01/12/2021    CAD (coronary artery disease) 03/01/2012    MI, 3 stents    Carotid artery stenosis 12/12/2018    Carotid stenosis, asymptomatic, right 03/12/2020    Chest pain     denies currently    Childhood asthma     CKD (chronic kidney disease)     stage 3 per Nephrology note    Coronary artery disease involving native coronary artery without angina pectoris 05/21/2015    COVID-19 virus infection 01/12/2021    Depression 03/01/2012    Diabetes mellitus ([facility]) 03/01/2012    Tresiba nightly; Humalog 1-3 times per day per SSI; Has Dexcom BS monitor; Average bs-150-175; A1c-6.8 on 11/21/22    Diastolic dysfunction     DM2 (diabetes mellitus, type 2) ([facility]) 05/21/2015    Elevated LFTs  01/12/2021    Elevated troponin 03/02/2012    Essential hypertension 05/21/2015    on med for control    External hemorrhoids without mention of complication 8166    GERD (gastroesophageal reflux disease)     no meds    HCAP (healthcare-associated pneumonia) 01/18/2021    History of left-sided carotid endarterectomy 03/12/2020    History of MI (myocardial infarction) 11/2012    Hypercholesterolemia     Hypertension 03/01/2012    Hypoalbuminemia 02/18/2021    Hyponatremia 01/12/2021    Hypoxemia 08/12/2020    VERONICA (iron deficiency anemia)     oral iron daily    Insomnia     Lactic acidosis 01/12/2021    Long QT interval 01/12/2021    Murmur     per cardio note (11/16/22)= (faint apical systolic murmur) heard; last Echo (2/16/21)    Personal history of colonic polyps 2013    hyperplastic    Platelet inhibition due to Plavix     no longer on Plavix    Pleural effusion, bilateral 08/11/2020    Rectocele 2013    Sepsis ([facility] 01/12/2021    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         Past Surgical History:  Past Surgical History:   Procedure Laterality Date    CAROTID ENDARTERECTOMY Left 12/12/2018    CATARACT REMOVAL Left 09/19/2016    Dr Riya Harding, [facility] Right 11/07/2016    Dr Riya Harding, [facility] w/plate    COLONOSCOPY  12/17/13    Brigitte--single transverse hyperplastic polyp--7-10 year recall    CORONARY STENT PLACEMENT      stent x3; last in 2012    FLEXIBLE SIGMOIDOSCOPY N/A 3/6/2022    SIGMOIDOSCOPY FLEXIBLE performed by London Chatterjee MD at [address]      x2    ORTHOPEDIC SURGERY      left elbow    HORTENSIA AND BSO (CERVIX REMOVED)      TOTAL HIP ARTHROPLASTY Right 12/5/2022    RIGHT HIP TOTAL ARTHROPLASTY, [place], PREVENA performed by Renetta Boudreaux MD at [address] [place] [address] Medications:     Prior to Admission medications    Medication Sig Start Date End Date Taking? Authorizing Provider   BD PEN NEEDLE MILVIA 2ND GEN 32G X 4 MM MISC USE THREE TIMES DAILY 1/3/23   Historical Provider, MD   citalopram (CELEXA) 20 MG tablet Take 1 tablet by mouth daily 1/30/23   Hermila Gonzalez MD   traZODone (DESYREL) 50 MG tablet TAKE 1 TABLET BY MOUTH AT  NIGHT 1/30/23   Hermila Gonzalez MD   rosuvastatin (CRESTOR) 40 MG tablet Take 1 tablet by mouth every evening 1/30/23   Hermila Gonzalez MD   NIFEdipine (PROCARDIA XL) 90 MG extended release tablet Take 1 tablet by mouth daily 1/30/23   Hermila Gnozalez MD   aspirin EC 81 MG EC tablet Take 1 tablet by mouth daily 1/30/23   Hermila Gonzalez MD   Continuous Blood Gluc  (DEXCOM G6 ) MAURICE Test blood sugar 3 times daily 1/6/23   Hermila Gonzalez MD   Continuous Blood Gluc Sensor (DEXCOM G6 SENSOR) MISC Test blood sugar 3 times daily.  1/6/23   Hermila Gonzalez MD   Continuous Blood Gluc Transmit (DEXCOM G6 TRANSMITTER) MISC Test blood sugar 3 times daily 1/6/23   Hermila Gonzalez MD   vitamin D (CHOLECALCIFEROL) 125 MCG (5000 UT) CAPS capsule Take 10,000 Units by mouth daily    Historical Provider, MD   carvedilol (COREG) 3.125 MG tablet Take 1 tablet by mouth 2 times daily (with meals) 11/16/22   Diandra Juarez DO   isosorbide mononitrate (IMDUR) 60 MG extended release tablet Take 1 tablet by mouth daily 11/16/22   Diandra Juarez DO   furosemide (LASIX) 40 MG tablet Take 1 tablet by mouth daily 11/16/22   Diandra Juarez DO   ferrous sulfate (FE TABS 325) 325 (65 Fe) MG EC tablet Take 1 tablet by mouth 2 times daily 9/26/22   Hermila Gonzalez MD   cyanocobalamin 1000 MCG tablet Take 5,000 mcg by mouth daily    Ar Automatic Reconciliation   Insulin Degludec (TRESIBA FLEXTOUCH) 100 UNIT/ML SOPN Inject 12 Units into the skin at bedtime 2/7/22   Ar Automatic Reconciliation   magnesium oxide (MAG-OX) 400 MG tablet Take 400 mg by mouth at bedtime 11/30/16   Ar Automatic Reconciliation   nitroGLYCERIN (NITROSTAT) 0.4 MG SL tablet Place 0.4 mg under the tongue every 5 minutes as needed for Chest pain Call 911 if no relief after 3 doses  11/6/18   Ar Automatic Reconciliation   potassium chloride (KLOR-CON M) 20 MEQ extended release tablet Take 40 mEq by mouth daily 3/13/22   Ar Automatic Reconciliation       Family History:     Family History   Problem Relation Age of Onset    Heart Disease Father     Cancer Father         pancreatic    Thyroid Disease Mother         Multinodular Goiter    Heart Attack Mother 67        mi    Osteoporosis Mother     Heart Disease Mother     Breast Cancer Neg Hx     Thyroid Cancer Brother     Ulcerative Colitis Brother     Thyroid Cancer Sister     Cancer Sister         Pre-Cancerous dysplasia    Heart Attack Father 67        MI       Social History:      Social History     Tobacco Use    Smoking status: Former     Packs/day: 1.00     Years: 20.00     Pack years: 20.00     Types: Cigarettes     Quit date: 11/9/2012     Years since quitting: 10.2    Smokeless tobacco: Never    Tobacco comments:     Quit smoking: quit 2012 . has stopped prior also   Substance Use Topics    Alcohol use: Yes     Alcohol/week: 3.0 standard drinks     Types: 3 Glasses of wine per week           Allergies: Allergies   Allergen Reactions    Cephalosporins Anaphylaxis, Hives and Swelling    Hydralazine Other (See Comments)     Chest pain    Sulfamethoxazole-Trimethoprim Diarrhea    Lisinopril Other (See Comments)     Passing out spells. // pt sts not allergic to med     Codeine Nausea And Vomiting            ROS:  Patient Health Form has been filled out, reviewed, signed and included in the chart. ROS findings related to musculoskeletal problems were discussed with the patient. Patient advised to discuss non-orthopedic complaints with primary care physician. Objective:     Vitals: There were no vitals taken for this visit. General: Awake and alert. AAOx3.    The patient ambulates with an antalgic gait. Psych: Mood appropriate  HEENT: Normocephalic, atraumatic  Neck: Supple  CV/Pulm: Breathing even and unlabored  Abdomen: Nondistended  Circulation: Normal without obvious arterial or venous deficiency. Pulses palpable bilateral lower extremities. Lymphatic: No obvious lymphedema or lymphadenopathy noted. Skin: No obvious lacerations or rashes noted. Musculoskeletal: No obvious deformity or pain with active movement of the upper extremities. Neuro: No obvious deficiency or weakness noted of the upper or lower extremities    Hip Exam:   Exam of the hip reveals tenderness to palpation over the trochanter. Minimal groin pain with resisted leg raise and fadir testing. Motion in preserved in the involved hip. No evidence of arterial of venous insufficiency. DP/PT pulses appreciable. Able to plantar- and dorsi-flex the foot/ankle. Imaging:    Images obtained today or prior    XR HIP RT W OR WO PELVIS 2-3 VWS  Views Obtained: AP pelvis, lateral right hip  Indication: Right Hip Pain  Findings:  Xrays including A/P Pelvis and lateral of the hip(s) were reviewed which demonstrate presence of right total hip replacement in place. No obvious signs of hardware loosening. No fracture   Impression: S/p hip replacement without appreciable abnormality    Renetta Boudreaux MD      Assessment:   Trochanteric Bursitis of the hip after total hip replacement    Plan:  Differential diagnosis and treatment options have been discussed with the patient. Risks, benefits and alternatives of each were discussed and patient questions answered. We discussed oral anti-inflammatory medications, IT band stretching, physical therapy and corticosteroid injections. We discussed that this generally is not a surgically managed condition. At this point the patient would like to proceed with Trochanteric bursal injection.  We discussed potential risks of steroid injection including but not limited to steroid flare with an associated increase in pain, fat necrosis, skin discoloration around the injection site, temporary increase in blood sugars in diabetic patients and possible facial flushing after injection. Treatment Rendered/ Procedure Note:    After discussion of risks, benefits and alternatives the patient wished to proceed with trochanteric injection. After prepping the area overlying the trochanteric bursal region of the right hip the area was injected with 1cc of 40mg DepoMedrol along with 2-3cc of 0.25% marcaine. The patient tolerated the procedure without significant problem. RECOMMENDATIONS:    We discussed the importance of IT band stretching. Stretching needs to be done 2-3 times daily for a period of 6 weeks followed by maintenance stretching to be done 1-2 times daily for the foreseeable future. A handout discussing trochanteric bursitis along with stretching maneuvers is provided to the patient today. If the patient is still having significant issues/pain after 4-6 weeks can call for followup. If the patient fails to see any relief from treatment we may opt to evaluate the patient's hip and/or lumbar spine more closely. Otherwise the patient call followup as needed.        Signed By: Renetta Boudreaux MD  February 23, 2023

## 2023-02-23 NOTE — PATIENT INSTRUCTIONS
Bursitis: Care Instructions  Overview     A bursa is a small sac of fluid that helps the tissues around a joint slide over one another easily. Injury or overuse of a joint can cause pain, redness, and inflammation in the bursa (bursitis). Bursitis usually gets better if you avoid the activity that caused it. You can help prevent bursitis from coming back by doing stretching and strengthening exercises. You may also need to change the way you do some activities. Follow-up care is a key part of your treatment and safety. Be sure to make and go to all appointments, and call your doctor if you are having problems. It's also a good idea to know your test results and keep a list of the medicines you take. How can you care for yourself at home? Put ice or a cold pack on the area for 10 to 20 minutes at a time. Try to do this every 1 to 2 hours for the next 3 days (when you are awake) or until the swelling goes down. Put a thin cloth between the ice and your skin. After the 3 days of using ice, you may use heat on the area. You can use a hot water bottle; a warm, moist towel; or a heating pad set on low. You can also try alternating heat and ice. Rest the area where you have pain. Stop any activities that cause pain. Switch to activities that do not stress the area. Take pain medicines exactly as directed. If the doctor gave you a prescription medicine for pain, take it as prescribed. If you are not taking a prescription pain medicine, ask your doctor if you can take an over-the-counter medicine. Do not take two or more pain medicines at the same time unless the doctor told you to. Many pain medicines have acetaminophen, which is Tylenol. Too much acetaminophen (Tylenol) can be harmful. To prevent stiffness, gently move the joint as much as you can without pain every day. As the pain gets better, keep doing range-of-motion exercises.  Ask your doctor for exercises that will make the muscles around the joint stronger. Do these as directed.  You can slowly return to the activity that caused the pain, but do it with less effort until you can do it without pain or swelling. Be sure to warm up before and stretch after you do the activity.  When should you call for help?   Call your doctor now or seek immediate medical care if:    You have new or worse symptoms of infection, such as:  Increased pain, swelling, warmth, or redness.  Red streaks leading from the area.  Pus draining from the area.  A fever.   Watch closely for changes in your health, and be sure to contact your doctor if:    You do not get better as expected.   Where can you learn more?  Go to https://www.[employer].net/patientEd and enter Q970 to learn more about \"Bursitis: Care Instructions.\"  Current as of: March 9, 2022               Content Version: 13.5  © 6394-2703 [email or web address].   Care instructions adapted under license by [employer]. If you have questions about a medical condition or this instruction, always ask your healthcare professional. [email or web address] disclaims any warranty or liability for your use of this information.

## 2023-02-24 ENCOUNTER — PATIENT MESSAGE (OUTPATIENT)
Dept: INTERNAL MEDICINE CLINIC | Facility: CLINIC | Age: 73
End: 2023-02-24

## 2023-02-24 DIAGNOSIS — N18.4 TYPE 2 DIABETES MELLITUS WITH STAGE 4 CHRONIC KIDNEY DISEASE, WITH LONG-TERM CURRENT USE OF INSULIN (HCC): ICD-10-CM

## 2023-02-24 DIAGNOSIS — E11.22 TYPE 2 DIABETES MELLITUS WITH STAGE 4 CHRONIC KIDNEY DISEASE, WITH LONG-TERM CURRENT USE OF INSULIN (HCC): ICD-10-CM

## 2023-02-24 DIAGNOSIS — Z79.4 TYPE 2 DIABETES MELLITUS WITH STAGE 4 CHRONIC KIDNEY DISEASE, WITH LONG-TERM CURRENT USE OF INSULIN (HCC): ICD-10-CM

## 2023-02-27 RX ORDER — BLOOD-GLUCOSE,RECEIVER,CONT
EACH MISCELLANEOUS
Qty: 10 EACH | Refills: 3 | Status: SHIPPED | OUTPATIENT
Start: 2023-02-27

## 2023-02-27 RX ORDER — BLOOD-GLUCOSE TRANSMITTER
EACH MISCELLANEOUS
Qty: 1 EACH | Refills: 5 | Status: SHIPPED | OUTPATIENT
Start: 2023-02-27

## 2023-02-27 RX ORDER — BLOOD-GLUCOSE SENSOR
EACH MISCELLANEOUS
Qty: 3 EACH | Refills: 3 | Status: SHIPPED | OUTPATIENT
Start: 2023-02-27

## 2023-02-27 NOTE — TELEPHONE ENCOUNTER
From: Terra Maddox  To: Dr. Eloisa Whaley  Sent: 2/24/2023 12:23 PM EST  Subject: Prabhu Resendiz, I need a new prescription for a Dexcom G6 Transmitter. Mine expires soon. It should be faxed to [facility] at this fax number. [phone]. Also include notes from my last visit with you. Thank you.

## 2023-04-28 SDOH — ECONOMIC STABILITY: TRANSPORTATION INSECURITY
IN THE PAST 12 MONTHS, HAS LACK OF TRANSPORTATION KEPT YOU FROM MEETINGS, WORK, OR FROM GETTING THINGS NEEDED FOR DAILY LIVING?: NO

## 2023-04-28 SDOH — ECONOMIC STABILITY: FOOD INSECURITY: WITHIN THE PAST 12 MONTHS, YOU WORRIED THAT YOUR FOOD WOULD RUN OUT BEFORE YOU GOT MONEY TO BUY MORE.: SOMETIMES TRUE

## 2023-04-28 SDOH — ECONOMIC STABILITY: INCOME INSECURITY: HOW HARD IS IT FOR YOU TO PAY FOR THE VERY BASICS LIKE FOOD, HOUSING, MEDICAL CARE, AND HEATING?: SOMEWHAT HARD

## 2023-04-28 SDOH — ECONOMIC STABILITY: HOUSING INSECURITY
IN THE LAST 12 MONTHS, WAS THERE A TIME WHEN YOU DID NOT HAVE A STEADY PLACE TO SLEEP OR SLEPT IN A SHELTER (INCLUDING NOW)?: NO

## 2023-04-28 SDOH — ECONOMIC STABILITY: FOOD INSECURITY: WITHIN THE PAST 12 MONTHS, THE FOOD YOU BOUGHT JUST DIDN'T LAST AND YOU DIDN'T HAVE MONEY TO GET MORE.: SOMETIMES TRUE

## 2023-05-01 ENCOUNTER — OFFICE VISIT (OUTPATIENT)
Dept: INTERNAL MEDICINE CLINIC | Facility: CLINIC | Age: 73
End: 2023-05-01
Payer: MEDICARE

## 2023-05-01 VITALS
BODY MASS INDEX: 28.51 KG/M2 | DIASTOLIC BLOOD PRESSURE: 60 MMHG | HEIGHT: 64 IN | TEMPERATURE: 97.3 F | WEIGHT: 167 LBS | OXYGEN SATURATION: 98 % | HEART RATE: 61 BPM | SYSTOLIC BLOOD PRESSURE: 137 MMHG

## 2023-05-01 DIAGNOSIS — I25.10 CORONARY ARTERY DISEASE INVOLVING NATIVE CORONARY ARTERY OF NATIVE HEART WITHOUT ANGINA PECTORIS: Primary | ICD-10-CM

## 2023-05-01 DIAGNOSIS — N18.4 TYPE 2 DIABETES MELLITUS WITH STAGE 4 CHRONIC KIDNEY DISEASE, WITH LONG-TERM CURRENT USE OF INSULIN (HCC): ICD-10-CM

## 2023-05-01 DIAGNOSIS — E11.22 TYPE 2 DIABETES MELLITUS WITH STAGE 4 CHRONIC KIDNEY DISEASE, WITH LONG-TERM CURRENT USE OF INSULIN (HCC): ICD-10-CM

## 2023-05-01 DIAGNOSIS — M81.0 AGE-RELATED OSTEOPOROSIS WITHOUT CURRENT PATHOLOGICAL FRACTURE: ICD-10-CM

## 2023-05-01 DIAGNOSIS — N18.4 STAGE 4 CHRONIC KIDNEY DISEASE (HCC): ICD-10-CM

## 2023-05-01 DIAGNOSIS — E78.2 MIXED HYPERLIPIDEMIA: ICD-10-CM

## 2023-05-01 DIAGNOSIS — F32.5 MAJOR DEPRESSIVE DISORDER WITH SINGLE EPISODE, IN FULL REMISSION (HCC): ICD-10-CM

## 2023-05-01 DIAGNOSIS — I10 ESSENTIAL HYPERTENSION: ICD-10-CM

## 2023-05-01 DIAGNOSIS — I25.10 CORONARY ARTERY DISEASE INVOLVING NATIVE CORONARY ARTERY OF NATIVE HEART WITHOUT ANGINA PECTORIS: ICD-10-CM

## 2023-05-01 DIAGNOSIS — Z79.4 TYPE 2 DIABETES MELLITUS WITH STAGE 4 CHRONIC KIDNEY DISEASE, WITH LONG-TERM CURRENT USE OF INSULIN (HCC): ICD-10-CM

## 2023-05-01 DIAGNOSIS — Z87.891 PERSONAL HISTORY OF TOBACCO USE: ICD-10-CM

## 2023-05-01 DIAGNOSIS — I50.32 CHRONIC DIASTOLIC CONGESTIVE HEART FAILURE (HCC): ICD-10-CM

## 2023-05-01 LAB
APPEARANCE UR: CLEAR
BASOPHILS # BLD: 0 K/UL (ref 0–0.2)
BASOPHILS NFR BLD: 0 % (ref 0–2)
BILIRUB UR QL: NEGATIVE
COLOR UR: ABNORMAL
CREAT UR-MCNC: 131 MG/DL
DIFFERENTIAL METHOD BLD: NORMAL
EOSINOPHIL # BLD: 0.3 K/UL (ref 0–0.8)
EOSINOPHIL NFR BLD: 4 % (ref 0.5–7.8)
ERYTHROCYTE [DISTWIDTH] IN BLOOD BY AUTOMATED COUNT: 13.3 % (ref 11.9–14.6)
GLUCOSE UR STRIP.AUTO-MCNC: NEGATIVE MG/DL
HCT VFR BLD AUTO: 41.1 % (ref 35.8–46.3)
HGB BLD-MCNC: 13.6 G/DL (ref 11.7–15.4)
HGB UR QL STRIP: NEGATIVE
IMM GRANULOCYTES # BLD AUTO: 0 K/UL (ref 0–0.5)
IMM GRANULOCYTES NFR BLD AUTO: 0 % (ref 0–5)
KETONES UR QL STRIP.AUTO: NEGATIVE MG/DL
LEUKOCYTE ESTERASE UR QL STRIP.AUTO: ABNORMAL
LYMPHOCYTES # BLD: 1.2 K/UL (ref 0.5–4.6)
LYMPHOCYTES NFR BLD: 14 % (ref 13–44)
MCH RBC QN AUTO: 31.9 PG (ref 26.1–32.9)
MCHC RBC AUTO-ENTMCNC: 33.1 G/DL (ref 31.4–35)
MCV RBC AUTO: 96.5 FL (ref 82–102)
MICROALBUMIN UR-MCNC: 28.9 MG/DL
MICROALBUMIN/CREAT UR-RTO: 221 MG/G (ref 0–30)
MONOCYTES # BLD: 0.8 K/UL (ref 0.1–1.3)
MONOCYTES NFR BLD: 10 % (ref 4–12)
NEUTS SEG # BLD: 5.9 K/UL (ref 1.7–8.2)
NEUTS SEG NFR BLD: 72 % (ref 43–78)
NITRITE UR QL STRIP.AUTO: NEGATIVE
NRBC # BLD: 0 K/UL (ref 0–0.2)
PH UR STRIP: 5 (ref 5–9)
PLATELET # BLD AUTO: 301 K/UL (ref 150–450)
PMV BLD AUTO: 9.8 FL (ref 9.4–12.3)
PROT UR STRIP-MCNC: 30 MG/DL
RBC # BLD AUTO: 4.26 M/UL (ref 4.05–5.2)
SP GR UR REFRACTOMETRY: 1.01 (ref 1–1.02)
UROBILINOGEN UR QL STRIP.AUTO: 0.2 EU/DL (ref 0.2–1)
WBC # BLD AUTO: 8.2 K/UL (ref 4.3–11.1)

## 2023-05-01 PROCEDURE — G8417 CALC BMI ABV UP PARAM F/U: HCPCS | Performed by: INTERNAL MEDICINE

## 2023-05-01 PROCEDURE — 3078F DIAST BP <80 MM HG: CPT | Performed by: INTERNAL MEDICINE

## 2023-05-01 PROCEDURE — 1123F ACP DISCUSS/DSCN MKR DOCD: CPT | Performed by: INTERNAL MEDICINE

## 2023-05-01 PROCEDURE — 2022F DILAT RTA XM EVC RTNOPTHY: CPT | Performed by: INTERNAL MEDICINE

## 2023-05-01 PROCEDURE — 1036F TOBACCO NON-USER: CPT | Performed by: INTERNAL MEDICINE

## 2023-05-01 PROCEDURE — G0296 VISIT TO DETERM LDCT ELIG: HCPCS | Performed by: INTERNAL MEDICINE

## 2023-05-01 PROCEDURE — 99214 OFFICE O/P EST MOD 30 MIN: CPT | Performed by: INTERNAL MEDICINE

## 2023-05-01 PROCEDURE — 1090F PRES/ABSN URINE INCON ASSESS: CPT | Performed by: INTERNAL MEDICINE

## 2023-05-01 PROCEDURE — G8427 DOCREV CUR MEDS BY ELIG CLIN: HCPCS | Performed by: INTERNAL MEDICINE

## 2023-05-01 PROCEDURE — 3046F HEMOGLOBIN A1C LEVEL >9.0%: CPT | Performed by: INTERNAL MEDICINE

## 2023-05-01 PROCEDURE — 3017F COLORECTAL CA SCREEN DOC REV: CPT | Performed by: INTERNAL MEDICINE

## 2023-05-01 PROCEDURE — G8399 PT W/DXA RESULTS DOCUMENT: HCPCS | Performed by: INTERNAL MEDICINE

## 2023-05-01 PROCEDURE — 3075F SYST BP GE 130 - 139MM HG: CPT | Performed by: INTERNAL MEDICINE

## 2023-05-01 RX ORDER — INSULIN DEGLUDEC INJECTION 100 U/ML
14 INJECTION, SOLUTION SUBCUTANEOUS NIGHTLY
Qty: 1 ADJUSTABLE DOSE PRE-FILLED PEN SYRINGE | Refills: 0
Start: 2023-05-01

## 2023-05-01 ASSESSMENT — PATIENT HEALTH QUESTIONNAIRE - PHQ9
8. MOVING OR SPEAKING SO SLOWLY THAT OTHER PEOPLE COULD HAVE NOTICED. OR THE OPPOSITE, BEING SO FIGETY OR RESTLESS THAT YOU HAVE BEEN MOVING AROUND A LOT MORE THAN USUAL: 0
3. TROUBLE FALLING OR STAYING ASLEEP: 0
7. TROUBLE CONCENTRATING ON THINGS, SUCH AS READING THE NEWSPAPER OR WATCHING TELEVISION: 0
2. FEELING DOWN, DEPRESSED OR HOPELESS: 0
SUM OF ALL RESPONSES TO PHQ QUESTIONS 1-9: 0
5. POOR APPETITE OR OVEREATING: 0
SUM OF ALL RESPONSES TO PHQ QUESTIONS 1-9: 0
10. IF YOU CHECKED OFF ANY PROBLEMS, HOW DIFFICULT HAVE THESE PROBLEMS MADE IT FOR YOU TO DO YOUR WORK, TAKE CARE OF THINGS AT HOME, OR GET ALONG WITH OTHER PEOPLE: 0
SUM OF ALL RESPONSES TO PHQ9 QUESTIONS 1 & 2: 0
1. LITTLE INTEREST OR PLEASURE IN DOING THINGS: 0
6. FEELING BAD ABOUT YOURSELF - OR THAT YOU ARE A FAILURE OR HAVE LET YOURSELF OR YOUR FAMILY DOWN: 0
9. THOUGHTS THAT YOU WOULD BE BETTER OFF DEAD, OR OF HURTING YOURSELF: 0
4. FEELING TIRED OR HAVING LITTLE ENERGY: 0
SUM OF ALL RESPONSES TO PHQ QUESTIONS 1-9: 0
SUM OF ALL RESPONSES TO PHQ QUESTIONS 1-9: 0

## 2023-05-01 ASSESSMENT — ENCOUNTER SYMPTOMS
SHORTNESS OF BREATH: 0
BLOOD IN STOOL: 0

## 2023-05-01 ASSESSMENT — ANXIETY QUESTIONNAIRES
4. TROUBLE RELAXING: 0
2. NOT BEING ABLE TO STOP OR CONTROL WORRYING: 0
IF YOU CHECKED OFF ANY PROBLEMS ON THIS QUESTIONNAIRE, HOW DIFFICULT HAVE THESE PROBLEMS MADE IT FOR YOU TO DO YOUR WORK, TAKE CARE OF THINGS AT HOME, OR GET ALONG WITH OTHER PEOPLE: NOT DIFFICULT AT ALL
1. FEELING NERVOUS, ANXIOUS, OR ON EDGE: 0
6. BECOMING EASILY ANNOYED OR IRRITABLE: 0
7. FEELING AFRAID AS IF SOMETHING AWFUL MIGHT HAPPEN: 0
3. WORRYING TOO MUCH ABOUT DIFFERENT THINGS: 0
5. BEING SO RESTLESS THAT IT IS HARD TO SIT STILL: 0
GAD7 TOTAL SCORE: 0

## 2023-05-01 NOTE — PROGRESS NOTES
Lyndsay Lawson M.D. Internal Medicine  [facility]  [facility] [address]  Phone: [phone]  Fax: [phone]    Diabetes  She presents for her follow-up diabetic visit. She has type 2 diabetes mellitus. The initial diagnosis of diabetes was made 34 years ago. Her disease course has been worsening. There are no hypoglycemic associated symptoms. There are no diabetic associated symptoms. Pertinent negatives for diabetes include no chest pain. There are no hypoglycemic complications. Diabetic complications include heart disease and nephropathy. Risk factors for coronary artery disease include diabetes mellitus, dyslipidemia, hypertension and post-menopausal. Current diabetic treatment includes insulin injections. Alexey Bartholomew is a 74 y.o. White (non-) female. Current Outpatient Medications   Medication Sig Dispense Refill    Continuous Blood Gluc Transmit (DEXCOM G6 TRANSMITTER) MISC Test blood sugar 3 times daily 1 each 5    Continuous Blood Gluc Sensor (DEXCOM G6 SENSOR) MISC Test blood sugar 3 times daily.  3 each 3    Continuous Blood Gluc  (DEXCOM G6 ) MAURICE Test blood sugar 3 times daily 10 each 3    BD PEN NEEDLE MILVIA 2ND GEN 32G X 4 MM MISC USE THREE TIMES DAILY      citalopram (CELEXA) 20 MG tablet Take 1 tablet by mouth daily 90 tablet 1    traZODone (DESYREL) 50 MG tablet TAKE 1 TABLET BY MOUTH AT  NIGHT 90 tablet 1    rosuvastatin (CRESTOR) 40 MG tablet Take 1 tablet by mouth every evening 90 tablet 1    NIFEdipine (PROCARDIA XL) 90 MG extended release tablet Take 1 tablet by mouth daily 90 tablet 1    aspirin EC 81 MG EC tablet Take 1 tablet by mouth daily 1 tablet 0    vitamin D (CHOLECALCIFEROL) 125 MCG (5000 UT) CAPS capsule Take 2 capsules by mouth daily      carvedilol (COREG) 3.125 MG tablet Take 1 tablet by mouth 2 times daily (with meals) 180 tablet 3    isosorbide mononitrate (IMDUR) 60 MG extended release tablet Take

## 2023-05-01 NOTE — PATIENT INSTRUCTIONS
I recommend all standard healthcare maintenance and cancer screenings (Colon cancer screening, Mammogram and Bone Density if female and appropriate, etc) and standard immunizations (Flu, Pneumonia, Covid-19, Tetanus boosters, etc) as appropriate and due to lower your risk for potentially preventable morbidity/mortality from these diseases. Recommend yearly Mammogram to lower your risk for potentially preventable morbidity/mortality from breast cancer. Learning About Lung Cancer Screening  What is screening for lung cancer? Lung cancer screening is a way to find some lung cancers early, before a person has any symptoms of the cancer. Lung cancer screening may help those who have the highest risk for lung cancer--people age 48 and older who are or were heavy smokers. For most people, who aren't at increased risk, screening for lung cancer probably isn't helpful. Screening won't prevent cancer. And it may not find all lung cancers. Lung cancer screening may lower the risk of dying from lung cancer in a small number of people. How is it done? Lung cancer screening is done with a low-dose CT (computed tomography) scan. A CT scan uses X-rays, or radiation, to make detailed pictures of your body. Experts recommend that screening be done in medical centers that focus on finding and treating lung cancer. Who is screening recommended for? Lung cancer screening is recommended for people age 48 and older who are or were heavy smokers. That means people with a smoking history of at least 20 pack years. A pack year is a way to measure how heavy a smoker you are or were. To figure out your pack years, multiply how many packs a day on average (assuming 20 cigarettes per pack) you have smoked by how many years you have smoked. For example: If you smoked 1 pack a day for 20 years, that's 1 times 20. So you have a smoking history of 20 pack years. If you smoked 2 packs a day for 10 years, that's 2 times 10.

## 2023-05-02 LAB
25(OH)D3 SERPL-MCNC: 60.4 NG/ML (ref 30–100)
ALBUMIN SERPL-MCNC: 4.2 G/DL (ref 3.2–4.6)
ALBUMIN/GLOB SERPL: 1.1 (ref 0.4–1.6)
ALP SERPL-CCNC: 134 U/L (ref 50–136)
ALT SERPL-CCNC: 55 U/L (ref 12–65)
ANION GAP SERPL CALC-SCNC: 5 MMOL/L (ref 2–11)
AST SERPL-CCNC: 46 U/L (ref 15–37)
BILIRUB DIRECT SERPL-MCNC: 0.1 MG/DL
BILIRUB SERPL-MCNC: 0.6 MG/DL (ref 0.2–1.1)
BUN SERPL-MCNC: 33 MG/DL (ref 8–23)
CALCIUM SERPL-MCNC: 9.3 MG/DL (ref 8.3–10.4)
CHLORIDE SERPL-SCNC: 105 MMOL/L (ref 101–110)
CHOLEST SERPL-MCNC: 144 MG/DL
CO2 SERPL-SCNC: 25 MMOL/L (ref 21–32)
CREAT SERPL-MCNC: 2.2 MG/DL (ref 0.6–1)
EST. AVERAGE GLUCOSE BLD GHB EST-MCNC: 146 MG/DL
GLOBULIN SER CALC-MCNC: 3.8 G/DL (ref 2.8–4.5)
GLUCOSE SERPL-MCNC: 216 MG/DL (ref 65–100)
HBA1C MFR BLD: 6.7 % (ref 4.8–5.6)
HDLC SERPL-MCNC: 55 MG/DL (ref 40–60)
HDLC SERPL: 2.6
LDLC SERPL CALC-MCNC: 66.6 MG/DL
POTASSIUM SERPL-SCNC: 3.8 MMOL/L (ref 3.5–5.1)
PROT SERPL-MCNC: 8 G/DL (ref 6.3–8.2)
SODIUM SERPL-SCNC: 135 MMOL/L (ref 133–143)
TRIGL SERPL-MCNC: 112 MG/DL (ref 35–150)
TSH, 3RD GENERATION: 3.27 UIU/ML (ref 0.36–3.74)
VLDLC SERPL CALC-MCNC: 22.4 MG/DL (ref 6–23)

## 2023-05-15 ENCOUNTER — HOSPITAL ENCOUNTER (OUTPATIENT)
Dept: CT IMAGING | Age: 73
Discharge: HOME | End: 2023-05-18
Payer: MEDICARE

## 2023-05-15 VITALS — WEIGHT: 167 LBS | BODY MASS INDEX: 28.51 KG/M2 | HEIGHT: 64 IN

## 2023-05-15 DIAGNOSIS — Z87.891 PERSONAL HISTORY OF TOBACCO USE: ICD-10-CM

## 2023-05-15 PROCEDURE — 71271 CT THORAX LUNG CANCER SCR C-: CPT

## 2023-05-31 ENCOUNTER — OFFICE VISIT (OUTPATIENT)
Age: 73
End: 2023-05-31
Payer: MEDICARE

## 2023-05-31 VITALS
SYSTOLIC BLOOD PRESSURE: 130 MMHG | HEART RATE: 60 BPM | DIASTOLIC BLOOD PRESSURE: 70 MMHG | BODY MASS INDEX: 28.51 KG/M2 | HEIGHT: 64 IN | WEIGHT: 167 LBS

## 2023-05-31 DIAGNOSIS — I65.21 CAROTID STENOSIS, ASYMPTOMATIC, RIGHT: ICD-10-CM

## 2023-05-31 DIAGNOSIS — Z98.61 S/P PTCA (PERCUTANEOUS TRANSLUMINAL CORONARY ANGIOPLASTY): ICD-10-CM

## 2023-05-31 DIAGNOSIS — R55 NEAR SYNCOPE: ICD-10-CM

## 2023-05-31 DIAGNOSIS — I25.10 CORONARY ARTERY DISEASE INVOLVING NATIVE CORONARY ARTERY OF NATIVE HEART WITHOUT ANGINA PECTORIS: Primary | ICD-10-CM

## 2023-05-31 DIAGNOSIS — N18.31 CHRONIC KIDNEY DISEASE, STAGE 3A (HCC): ICD-10-CM

## 2023-05-31 DIAGNOSIS — I10 ESSENTIAL HYPERTENSION: ICD-10-CM

## 2023-05-31 DIAGNOSIS — I51.89 DIASTOLIC DYSFUNCTION: ICD-10-CM

## 2023-05-31 DIAGNOSIS — R00.2 HEART PALPITATIONS: ICD-10-CM

## 2023-05-31 DIAGNOSIS — E78.2 MIXED HYPERLIPIDEMIA: ICD-10-CM

## 2023-05-31 PROCEDURE — G8428 CUR MEDS NOT DOCUMENT: HCPCS | Performed by: INTERNAL MEDICINE

## 2023-05-31 PROCEDURE — 3078F DIAST BP <80 MM HG: CPT | Performed by: INTERNAL MEDICINE

## 2023-05-31 PROCEDURE — G8417 CALC BMI ABV UP PARAM F/U: HCPCS | Performed by: INTERNAL MEDICINE

## 2023-05-31 PROCEDURE — 99214 OFFICE O/P EST MOD 30 MIN: CPT | Performed by: INTERNAL MEDICINE

## 2023-05-31 PROCEDURE — 3017F COLORECTAL CA SCREEN DOC REV: CPT | Performed by: INTERNAL MEDICINE

## 2023-05-31 PROCEDURE — 1090F PRES/ABSN URINE INCON ASSESS: CPT | Performed by: INTERNAL MEDICINE

## 2023-05-31 PROCEDURE — G8399 PT W/DXA RESULTS DOCUMENT: HCPCS | Performed by: INTERNAL MEDICINE

## 2023-05-31 PROCEDURE — 1123F ACP DISCUSS/DSCN MKR DOCD: CPT | Performed by: INTERNAL MEDICINE

## 2023-05-31 PROCEDURE — 3075F SYST BP GE 130 - 139MM HG: CPT | Performed by: INTERNAL MEDICINE

## 2023-05-31 PROCEDURE — 1036F TOBACCO NON-USER: CPT | Performed by: INTERNAL MEDICINE

## 2023-05-31 ASSESSMENT — ENCOUNTER SYMPTOMS
WHEEZING: 0
GASTROINTESTINAL NEGATIVE: 1
SHORTNESS OF BREATH: 0
COUGH: 0

## 2023-05-31 NOTE — PROGRESS NOTES
[facility], [address] [place] [address], [address], [address] [place] [address]      Patient:  Erma Halsted Pritchett  1950         SUBJECTIVE:  Grant Bill is a  74 y.o. female seen for a follow up visit regarding the following:     Chief Complaint   Patient presents with    Hyperlipidemia    Hypertension   . CC: CAD, HFpEF follow up     HPI:   66 y.o. female with a history of CAD with prior stents LAD 2008, 2012, HFpEF, HTN, DM  who is here for follow up. Patient was last seen in office on 11/16/22 , since then reports that she had 2 episodes of palpitations and dizziness/near syncope. Most recent was 2 weeks ago, severe, sudden onset as she was finishing her shopping at [address] [place], [place] [address]. Had to sit down and rest to get symptoms resolved. Previous episodes a few weeks prior. No obvious aggravating factor for her presentation. Denies chest pain, chest pressure, exertional dyspnea, abdominal pain, leg swelling or other complaints. Taking her medications as directed without missing doses. No other complaints at this time. Cardiovascular Testing:   - Echo 2/16/2021: LVEF 55 to 60%, RV normal, mild/moderate MR, trace TR  - SPECT 9/23/20: normal perfusion, low risk scan, LVEF >70%     Past medical history, past surgical history, family history, social history, and medications were all reviewed with the patient today and updated as necessary.          Patient Active Problem List    Diagnosis Date Noted    Osteoarthritis of right hip, unspecified osteoarthritis type 12/05/2022     Priority: Medium    Chronic diastolic congestive heart failure ([address] 04/19/2021     Heart Failure (HFpEF), EF 70-01%, with diastolic dysfunction, ECHO   2/16/2021        Mixed hyperlipidemia 10/21/2020    Type 2 diabetes mellitus with stage 4 chronic kidney disease, with long-term current use of insulin ([address] 10/11/2018    Stage 4 chronic kidney disease ([address] 09/15/2016    Essential hypertension 09/15/2016

## 2023-06-05 ENCOUNTER — TELEPHONE (OUTPATIENT)
Age: 73
End: 2023-06-05

## 2023-06-05 NOTE — TELEPHONE ENCOUNTER
Reviewed the strips, we have called [employer] today to increase the gain on the EKG tracings and refax it to better assess the rhyhthm. When it arrives continue let me know so I can take a look. Thank you.

## 2023-06-05 NOTE — TELEPHONE ENCOUNTER
Received FAX'd [facility] [employer] report from 6/3/23 at 6:45 am CST, showing auto-detected atrial fibrillation sustained, sinus rhythm with lead loss and HR-60.0. Patient states she was sleeping at time of  recording and does not remember any unusual symptoms. Di Flores at [facility] states she will print this report at [facility] and give to Dr. Bossman Patel. Advised patient that I will call with any new recommendations. Patient verbalized understanding.

## 2023-06-06 NOTE — TELEPHONE ENCOUNTER
Darius Cortes, DO Jaylene Brown RN  Caller: Unspecified (Yesterday,  8:50 AM)  OK, can you please print out the available strips from [employer] so I can take a look when I'm back in the office tomorrow? Thank you.

## 2023-06-06 NOTE — TELEPHONE ENCOUNTER
Per Michael Brenner in lab at [facility], gain of 32 mm/mV is as high as [employer] program will go. Current report available on [employer] web site is best available.

## 2023-06-07 NOTE — TELEPHONE ENCOUNTER
Reviewed the strips, regular appearing rhythm without definitive atrial fibrillation. At this time would not change medications, and continue monitor at this time.

## 2023-06-07 NOTE — PROGRESS NOTES
Shift assessment completed. No acute distress noted at the time of assessment. Will continue to monitor. no known allergies

## 2023-06-20 ENCOUNTER — PATIENT MESSAGE (OUTPATIENT)
Age: 73
End: 2023-06-20

## 2023-06-21 DIAGNOSIS — Z79.01 LONG TERM CURRENT USE OF ANTICOAGULANT: ICD-10-CM

## 2023-06-21 DIAGNOSIS — I48.91 ATRIAL FIBRILLATION (HCC): Primary | ICD-10-CM

## 2023-06-21 NOTE — TELEPHONE ENCOUNTER
States she has felt horrible with increased chest tightness and feeling like she will pass out in shower since starting Xarelto 15 mg qd on 6/19/23. Did not take Xarelto, today, and feels much better. Has not been feeling any palpitations or irregular HR. States she cannot take Xarelto. Does not understand why she must take blood thinner when she does not feel a fib. Per [employer] web site, monitor has shown a fib on 6/3/23 and 6/13/23. Patient is scheduled to wear monitor until 6/29/23. Patient is taking ASA 81 mg qd, carvedilol 6.25 mg BID, Procardia XL 90 mg qd, Imdur 60 mg qd, lasix 40 mg qd, Mag-ox 400 mg qd, and Crestor 40 mg qd. Next scheduled appointment with Dr. Jimenes Session is 8/31/23. Patient asks for Dr. Delmi Lynn recommendations for replacement of Xarelto. She asks if she really must take blood thinnner.

## 2023-06-21 NOTE — TELEPHONE ENCOUNTER
Please let the patient know blood thinner is recommended as the patient is at increased risk of stroke due to atrial fibrillation heart rhythm. Blood thinner medication can decrease the risk of stroke. As she has not been able to tolerate Eliquis or Xarelto please let her know that Coumadin (warfarin) would likely be the best alternative. Please get her an appointment in Coumadin clinic if she would like to start this medicine instead.

## 2023-06-21 NOTE — TELEPHONE ENCOUNTER
----- Message from Ana Traore Kira Galicia sent at 6/21/2023  8:12 AM EDT -----  Regarding: FW: Xarelto and increased carvedilol   Contact: [phone]    ----- Message -----  From: Dia Lazaro MA  Sent: 6/21/2023   8:03 AM EDT  To: Karolyn Watson MA  Subject: FW: Xarelto and increased carvedilol               ----- Message -----  From: Saran Lindsey  Sent: 6/20/2023   5:34 PM EDT  To: [facility] Clinical Staff  Subject: Xarelto and increased carvedilol                 Dr. Keith Riley, I am feeling so bad since I started the new medications. I'm having more chest tightness and I feel like I'm going to pass out at times. I've not had anymore of the erratic heartbeats. I don't want to continue this Xarelto. Please advise. I just started a new job and I don't want to feel like this. Please advise. I feel a lot worse than I did when I last saw you.

## 2023-06-22 PROBLEM — Z79.01 LONG TERM CURRENT USE OF ANTICOAGULANT: Status: ACTIVE | Noted: 2023-06-22

## 2023-06-22 PROBLEM — I48.91 ATRIAL FIBRILLATION (HCC): Status: ACTIVE | Noted: 2023-06-22

## 2023-06-22 RX ORDER — WARFARIN SODIUM 5 MG/1
5 TABLET ORAL
COMMUNITY
End: 2023-06-22 | Stop reason: SDUPTHER

## 2023-06-22 RX ORDER — WARFARIN SODIUM 5 MG/1
5 TABLET ORAL DAILY
Qty: 90 TABLET | Refills: 3 | Status: SHIPPED | OUTPATIENT
Start: 2023-06-22

## 2023-06-22 NOTE — TELEPHONE ENCOUNTER
Advised patient of Dr. Yoel Lindsey response. Patient verbalized understanding, and states she is willing to try coumadin. Advised patient that someone from [facility] will be calling to schedule appointment. Patient verbalized understanding. Notified Hany in [facility] of above. Routed this triage note to coumadin pool.

## 2023-06-29 ENCOUNTER — ANTI-COAG VISIT (OUTPATIENT)
Age: 73
End: 2023-06-29

## 2023-06-29 DIAGNOSIS — I48.91 ATRIAL FIBRILLATION (HCC): Primary | ICD-10-CM

## 2023-06-29 DIAGNOSIS — Z79.01 LONG TERM CURRENT USE OF ANTICOAGULANT: ICD-10-CM

## 2023-06-29 LAB
POC INR: 2.2
PROTHROMBIN TIME, POC: NORMAL

## 2023-07-06 ENCOUNTER — ANTI-COAG VISIT (OUTPATIENT)
Age: 73
End: 2023-07-06

## 2023-07-06 DIAGNOSIS — I48.91 ATRIAL FIBRILLATION (HCC): Primary | ICD-10-CM

## 2023-07-06 DIAGNOSIS — Z79.01 LONG TERM CURRENT USE OF ANTICOAGULANT: ICD-10-CM

## 2023-07-06 LAB
POC INR: 3.3
PROTHROMBIN TIME, POC: NORMAL

## 2023-07-06 NOTE — PATIENT INSTRUCTIONS
Reminder: Please contact the [facility]madin Clinic at [phone]  when you have medication changes. Examples, new medications, antibiotics, discontinued medications, new supplements, missed doses of warfarin or if you took extra doses of warfarin. This also includes OTC medications. Notifying us helps reduce the possibility of high and low INR's. In addition, if warfarin needs to be held for any procedures, please have surgeon or physician's office contact us before holding anticoagulant. Thanks, [facility] Coumadin Clinic.

## 2023-07-06 NOTE — PROGRESS NOTES
Anticoagulation Summary  As of 7/6/2023      INR goal:  2.0-3.0   TTR:  58.0 % (4 d)   INR used for dosing:  3.3 (7/6/2023)   Warfarin maintenance plan:  2.5 mg (5 mg x 0.5) every Mon, Fri; 5 mg (5 mg x 1) all other days   Weekly warfarin total:  30 mg   Plan last modified:  Myesha Gordillo MA (7/6/2023)   Next INR check:  7/13/2023   Target end date:   Indefinite    Indications    Atrial fibrillation ([address]) [I48.91]  Long term current use of anticoagulant [Z79.01]                 Anticoagulation Episode Summary       INR check location:  Anticoagulation Clinic    Preferred lab:      Send INR reminders to:  Roberto ERICKSON    Comments:            Anticoagulation Care Providers       Provider Role Specialty Phone number    Kiana James DO [facility] Cardiology [phone]

## 2023-07-13 ENCOUNTER — ANTI-COAG VISIT (OUTPATIENT)
Age: 73
End: 2023-07-13
Payer: MEDICARE

## 2023-07-13 DIAGNOSIS — I48.91 ATRIAL FIBRILLATION (HCC): Primary | ICD-10-CM

## 2023-07-13 DIAGNOSIS — Z79.01 LONG TERM CURRENT USE OF ANTICOAGULANT: ICD-10-CM

## 2023-07-13 LAB
POC INR: 3.2
PROTHROMBIN TIME, POC: NORMAL

## 2023-07-13 PROCEDURE — 93793 ANTICOAG MGMT PT WARFARIN: CPT | Performed by: INTERNAL MEDICINE

## 2023-07-13 PROCEDURE — 85610 PROTHROMBIN TIME: CPT | Performed by: INTERNAL MEDICINE

## 2023-07-13 NOTE — PATIENT INSTRUCTIONS
Reminder: Please contact the Coumadin Clinic at [phone]  when you have medication changes. Examples, new medications, antibiotics, discontinued medications, new supplements, missed doses of warfarin or if you took extra doses of warfarin. This also includes OTC medications. Notifying us helps reduce the possibility of high and low INR's. In addition, if warfarin needs to be held for any procedures, please have surgeon or physician's office contact us before holding anticoagulant. Thanks, [facility]iology Coumadin Clinic.

## 2023-07-13 NOTE — PROGRESS NOTES
Anticoagulation Summary  As of 7/13/2023      INR goal:  2.0-3.0   TTR:  22.4 % (1.6 wk)   INR used for dosing:  3.2 (7/13/2023)   Warfarin maintenance plan:  2.5 mg (5 mg x 0.5) every Mon, Fri; 5 mg (5 mg x 1) all other days   Weekly warfarin total:  30 mg   Plan last modified:  Jamila Martins MA (7/6/2023)   Next INR check:  7/20/2023   Target end date:   Indefinite    Indications    Atrial fibrillation ([address]) [I48.91]  Long term current use of anticoagulant [Z79.01]                 Anticoagulation Episode Summary       INR check location:  Anticoagulation Clinic    Preferred lab:      Send INR reminders to:  Roberto ERICKSON    Comments:            Anticoagulation Care Providers       Provider Role Specialty Phone number    Radha Trevino DO [facility] Cardiology [phone]

## 2023-07-24 ENCOUNTER — ANTI-COAG VISIT (OUTPATIENT)
Age: 73
End: 2023-07-24
Payer: MEDICARE

## 2023-07-24 DIAGNOSIS — Z79.01 LONG TERM CURRENT USE OF ANTICOAGULANT: ICD-10-CM

## 2023-07-24 DIAGNOSIS — I48.91 ATRIAL FIBRILLATION (HCC): Primary | ICD-10-CM

## 2023-07-24 LAB
POC INR: 4
PROTHROMBIN TIME, POC: YES

## 2023-07-24 PROCEDURE — 85610 PROTHROMBIN TIME: CPT | Performed by: INTERNAL MEDICINE

## 2023-07-24 PROCEDURE — 93793 ANTICOAG MGMT PT WARFARIN: CPT | Performed by: INTERNAL MEDICINE

## 2023-07-24 NOTE — PATIENT INSTRUCTIONS
Reminder: Please contact the Coumadin Clinic at [phone]  when you have medication changes. Examples, new medications, antibiotics, discontinued medications, new supplements, missed doses of warfarin or if you took extra doses of warfarin. This also includes OTC medications. Notifying us helps reduce the possibility of high and low INR's. In addition, if warfarin needs to be held for any procedures, please have surgeon or physician's office contact us before holding anticoagulant. Thanks, [facility] Coumadin Clinic.

## 2023-07-24 NOTE — PROGRESS NOTES
Pt denies any changes to diet or medications. Decreasing dosage 8%. Will recheck in 1 week. Anticoagulation Summary  As of 2023      INR goal:  2.0-3.0   TTR:  11.5 % (3.1 wk)   INR used for dosin.0 (2023)   Warfarin maintenance plan:  2.5 mg (5 mg x 0.5) every Mon, Wed, Fri; 5 mg (5 mg x 1) all other days   Weekly warfarin total:  27.5 mg   Plan last modified:  Barbara Carballo MA (2023)   Next INR check:  2023   Target end date:   Indefinite    Indications    Atrial fibrillation ([address]) [I48.91]  Long term current use of anticoagulant [Z79.01]                 Anticoagulation Episode Summary       INR check location:  Anticoagulation Clinic    Preferred lab:      Send INR reminders to:  Roberto PT    Comments:            Anticoagulation Care Providers       Provider Role Specialty Phone number    Willie Canada DO [facility] Cardiology [phone]

## 2023-07-29 ASSESSMENT — PATIENT HEALTH QUESTIONNAIRE - PHQ9
SUM OF ALL RESPONSES TO PHQ QUESTIONS 1-9: 0
7. TROUBLE CONCENTRATING ON THINGS, SUCH AS READING THE NEWSPAPER OR WATCHING TELEVISION: NOT AT ALL
1. LITTLE INTEREST OR PLEASURE IN DOING THINGS: 0
10. IF YOU CHECKED OFF ANY PROBLEMS, HOW DIFFICULT HAVE THESE PROBLEMS MADE IT FOR YOU TO DO YOUR WORK, TAKE CARE OF THINGS AT HOME, OR GET ALONG WITH OTHER PEOPLE: 0
2. FEELING DOWN, DEPRESSED OR HOPELESS: NOT AT ALL
9. THOUGHTS THAT YOU WOULD BE BETTER OFF DEAD, OR OF HURTING YOURSELF: NOT AT ALL
7. TROUBLE CONCENTRATING ON THINGS, SUCH AS READING THE NEWSPAPER OR WATCHING TELEVISION: 0
SUM OF ALL RESPONSES TO PHQ QUESTIONS 1-9: 0
10. IF YOU CHECKED OFF ANY PROBLEMS, HOW DIFFICULT HAVE THESE PROBLEMS MADE IT FOR YOU TO DO YOUR WORK, TAKE CARE OF THINGS AT HOME, OR GET ALONG WITH OTHER PEOPLE: NOT DIFFICULT AT ALL
1. LITTLE INTEREST OR PLEASURE IN DOING THINGS: NOT AT ALL
4. FEELING TIRED OR HAVING LITTLE ENERGY: NOT AT ALL
5. POOR APPETITE OR OVEREATING: NOT AT ALL
8. MOVING OR SPEAKING SO SLOWLY THAT OTHER PEOPLE COULD HAVE NOTICED. OR THE OPPOSITE - BEING SO FIDGETY OR RESTLESS THAT YOU HAVE BEEN MOVING AROUND A LOT MORE THAN USUAL: NOT AT ALL
SUM OF ALL RESPONSES TO PHQ9 QUESTIONS 1 & 2: 0
6. FEELING BAD ABOUT YOURSELF - OR THAT YOU ARE A FAILURE OR HAVE LET YOURSELF OR YOUR FAMILY DOWN: NOT AT ALL
SUM OF ALL RESPONSES TO PHQ QUESTIONS 1-9: 0
6. FEELING BAD ABOUT YOURSELF - OR THAT YOU ARE A FAILURE OR HAVE LET YOURSELF OR YOUR FAMILY DOWN: 0
SUM OF ALL RESPONSES TO PHQ QUESTIONS 1-9: 0
3. TROUBLE FALLING OR STAYING ASLEEP: NOT AT ALL
9. THOUGHTS THAT YOU WOULD BE BETTER OFF DEAD, OR OF HURTING YOURSELF: 0
3. TROUBLE FALLING OR STAYING ASLEEP: 0
8. MOVING OR SPEAKING SO SLOWLY THAT OTHER PEOPLE COULD HAVE NOTICED. OR THE OPPOSITE, BEING SO FIGETY OR RESTLESS THAT YOU HAVE BEEN MOVING AROUND A LOT MORE THAN USUAL: 0
5. POOR APPETITE OR OVEREATING: 0
4. FEELING TIRED OR HAVING LITTLE ENERGY: 0
2. FEELING DOWN, DEPRESSED OR HOPELESS: 0
SUM OF ALL RESPONSES TO PHQ QUESTIONS 1-9: 0

## 2023-07-31 ENCOUNTER — ANTI-COAG VISIT (OUTPATIENT)
Age: 73
End: 2023-07-31
Payer: MEDICARE

## 2023-07-31 DIAGNOSIS — I48.91 ATRIAL FIBRILLATION (HCC): Primary | ICD-10-CM

## 2023-07-31 DIAGNOSIS — Z79.01 LONG TERM CURRENT USE OF ANTICOAGULANT: ICD-10-CM

## 2023-07-31 LAB
POC INR: 2
PROTHROMBIN TIME, POC: YES

## 2023-07-31 PROCEDURE — 93793 ANTICOAG MGMT PT WARFARIN: CPT | Performed by: INTERNAL MEDICINE

## 2023-07-31 PROCEDURE — 85610 PROTHROMBIN TIME: CPT | Performed by: INTERNAL MEDICINE

## 2023-07-31 NOTE — PATIENT INSTRUCTIONS
Reminder: Please contact the Coumadin Clinic at [phone]  when you have medication changes. Examples, new medications, antibiotics, discontinued medications, new supplements, missed doses of warfarin or if you took extra doses of warfarin. This also includes OTC medications. Notifying us helps reduce the possibility of high and low INR's. In addition, if warfarin needs to be held for any procedures, please have surgeon or physician's office contact us before holding anticoagulant. Thanks, [facility] Cardiology Coumadin Clinic.

## 2023-07-31 NOTE — PROGRESS NOTES
Anticoagulation Summary  As of 2023      INR goal:  2.0-3.0   TTR:  20.5 % (4.1 wk)   INR used for dosin.0 (2023)   Warfarin maintenance plan:  2.5 mg (5 mg x 0.5) every Mon, Wed, Fri; 5 mg (5 mg x 1) all other days   Weekly warfarin total:  27.5 mg   Plan last modified:  Tayla Francis MA (2023)   Next INR check:  2023   Target end date:   Indefinite    Indications    Atrial fibrillation ([address]) [I48.91]  Long term current use of anticoagulant [Z79.01]                 Anticoagulation Episode Summary       INR check location:  Anticoagulation Clinic    Preferred lab:      Send INR reminders to:  Roberto PT    Comments:            Anticoagulation Care Providers       Provider Role Specialty Phone number    Yesenia Escobar DO [facility] Cardiology [phone]

## 2023-08-01 ENCOUNTER — OFFICE VISIT (OUTPATIENT)
Dept: INTERNAL MEDICINE CLINIC | Facility: CLINIC | Age: 73
End: 2023-08-01
Payer: MEDICARE

## 2023-08-01 VITALS
TEMPERATURE: 97.3 F | HEIGHT: 64 IN | OXYGEN SATURATION: 97 % | SYSTOLIC BLOOD PRESSURE: 131 MMHG | HEART RATE: 60 BPM | BODY MASS INDEX: 28.68 KG/M2 | DIASTOLIC BLOOD PRESSURE: 66 MMHG | WEIGHT: 168 LBS

## 2023-08-01 DIAGNOSIS — N18.4 STAGE 4 CHRONIC KIDNEY DISEASE (HCC): ICD-10-CM

## 2023-08-01 DIAGNOSIS — Z79.4 TYPE 2 DIABETES MELLITUS WITH STAGE 4 CHRONIC KIDNEY DISEASE, WITH LONG-TERM CURRENT USE OF INSULIN (HCC): ICD-10-CM

## 2023-08-01 DIAGNOSIS — I50.42 CHRONIC COMBINED SYSTOLIC AND DIASTOLIC CONGESTIVE HEART FAILURE (HCC): Primary | ICD-10-CM

## 2023-08-01 DIAGNOSIS — N18.4 TYPE 2 DIABETES MELLITUS WITH STAGE 4 CHRONIC KIDNEY DISEASE, WITH LONG-TERM CURRENT USE OF INSULIN (HCC): ICD-10-CM

## 2023-08-01 DIAGNOSIS — I25.10 CORONARY ARTERY DISEASE INVOLVING NATIVE CORONARY ARTERY OF NATIVE HEART WITHOUT ANGINA PECTORIS: ICD-10-CM

## 2023-08-01 DIAGNOSIS — I10 ESSENTIAL HYPERTENSION: ICD-10-CM

## 2023-08-01 DIAGNOSIS — F32.5 MAJOR DEPRESSIVE DISORDER WITH SINGLE EPISODE, IN FULL REMISSION (HCC): ICD-10-CM

## 2023-08-01 DIAGNOSIS — I48.0 PAROXYSMAL ATRIAL FIBRILLATION (HCC): ICD-10-CM

## 2023-08-01 DIAGNOSIS — E78.2 MIXED HYPERLIPIDEMIA: ICD-10-CM

## 2023-08-01 DIAGNOSIS — F51.01 PRIMARY INSOMNIA: ICD-10-CM

## 2023-08-01 DIAGNOSIS — E11.22 TYPE 2 DIABETES MELLITUS WITH STAGE 4 CHRONIC KIDNEY DISEASE, WITH LONG-TERM CURRENT USE OF INSULIN (HCC): ICD-10-CM

## 2023-08-01 PROCEDURE — G8417 CALC BMI ABV UP PARAM F/U: HCPCS | Performed by: INTERNAL MEDICINE

## 2023-08-01 PROCEDURE — 3044F HG A1C LEVEL LT 7.0%: CPT | Performed by: INTERNAL MEDICINE

## 2023-08-01 PROCEDURE — 1123F ACP DISCUSS/DSCN MKR DOCD: CPT | Performed by: INTERNAL MEDICINE

## 2023-08-01 PROCEDURE — 1036F TOBACCO NON-USER: CPT | Performed by: INTERNAL MEDICINE

## 2023-08-01 PROCEDURE — G8399 PT W/DXA RESULTS DOCUMENT: HCPCS | Performed by: INTERNAL MEDICINE

## 2023-08-01 PROCEDURE — G8428 CUR MEDS NOT DOCUMENT: HCPCS | Performed by: INTERNAL MEDICINE

## 2023-08-01 PROCEDURE — 3075F SYST BP GE 130 - 139MM HG: CPT | Performed by: INTERNAL MEDICINE

## 2023-08-01 PROCEDURE — 3078F DIAST BP <80 MM HG: CPT | Performed by: INTERNAL MEDICINE

## 2023-08-01 PROCEDURE — 2022F DILAT RTA XM EVC RTNOPTHY: CPT | Performed by: INTERNAL MEDICINE

## 2023-08-01 PROCEDURE — 99214 OFFICE O/P EST MOD 30 MIN: CPT | Performed by: INTERNAL MEDICINE

## 2023-08-01 PROCEDURE — 1090F PRES/ABSN URINE INCON ASSESS: CPT | Performed by: INTERNAL MEDICINE

## 2023-08-01 PROCEDURE — 3017F COLORECTAL CA SCREEN DOC REV: CPT | Performed by: INTERNAL MEDICINE

## 2023-08-01 RX ORDER — CITALOPRAM 20 MG/1
20 TABLET ORAL DAILY
Qty: 90 TABLET | Refills: 1 | Status: SHIPPED | OUTPATIENT
Start: 2023-08-01

## 2023-08-01 RX ORDER — ROSUVASTATIN CALCIUM 40 MG/1
40 TABLET, COATED ORAL EVERY EVENING
Qty: 90 TABLET | Refills: 1 | Status: SHIPPED | OUTPATIENT
Start: 2023-08-01

## 2023-08-01 RX ORDER — TRAZODONE HYDROCHLORIDE 50 MG/1
50 TABLET ORAL NIGHTLY
Qty: 90 TABLET | Refills: 1 | Status: SHIPPED | OUTPATIENT
Start: 2023-08-01

## 2023-08-01 RX ORDER — SEMAGLUTIDE 1.34 MG/ML
INJECTION, SOLUTION SUBCUTANEOUS
Qty: 5 ADJUSTABLE DOSE PRE-FILLED PEN SYRINGE | Refills: 0 | Status: SHIPPED | OUTPATIENT
Start: 2023-08-01

## 2023-08-01 SDOH — ECONOMIC STABILITY: FOOD INSECURITY: WITHIN THE PAST 12 MONTHS, THE FOOD YOU BOUGHT JUST DIDN'T LAST AND YOU DIDN'T HAVE MONEY TO GET MORE.: NEVER TRUE

## 2023-08-01 SDOH — ECONOMIC STABILITY: FOOD INSECURITY: WITHIN THE PAST 12 MONTHS, YOU WORRIED THAT YOUR FOOD WOULD RUN OUT BEFORE YOU GOT MONEY TO BUY MORE.: NEVER TRUE

## 2023-08-01 SDOH — ECONOMIC STABILITY: INCOME INSECURITY: HOW HARD IS IT FOR YOU TO PAY FOR THE VERY BASICS LIKE FOOD, HOUSING, MEDICAL CARE, AND HEATING?: NOT HARD AT ALL

## 2023-08-01 ASSESSMENT — ANXIETY QUESTIONNAIRES
4. TROUBLE RELAXING: 0
6. BECOMING EASILY ANNOYED OR IRRITABLE: 0
1. FEELING NERVOUS, ANXIOUS, OR ON EDGE: 0
2. NOT BEING ABLE TO STOP OR CONTROL WORRYING: 0
GAD7 TOTAL SCORE: 0
7. FEELING AFRAID AS IF SOMETHING AWFUL MIGHT HAPPEN: 0
3. WORRYING TOO MUCH ABOUT DIFFERENT THINGS: 0
5. BEING SO RESTLESS THAT IT IS HARD TO SIT STILL: 0
IF YOU CHECKED OFF ANY PROBLEMS ON THIS QUESTIONNAIRE, HOW DIFFICULT HAVE THESE PROBLEMS MADE IT FOR YOU TO DO YOUR WORK, TAKE CARE OF THINGS AT HOME, OR GET ALONG WITH OTHER PEOPLE: NOT DIFFICULT AT ALL

## 2023-08-01 ASSESSMENT — ENCOUNTER SYMPTOMS
BLOOD IN STOOL: 0
SHORTNESS OF BREATH: 0

## 2023-08-01 NOTE — PROGRESS NOTES
Rocael Downing M.D. Internal Medicine  [facility], [address] [place] [address]  Phone: [phone]  Fax: [phone]    Diabetes  She presents for her follow-up diabetic visit. She has type 2 diabetes mellitus. The initial diagnosis of diabetes was made 34 years ago. Her disease course has been worsening. There are no hypoglycemic associated symptoms. There are no diabetic associated symptoms. Pertinent negatives for diabetes include no chest pain. There are no hypoglycemic complications. Diabetic complications include heart disease and nephropathy. Risk factors for coronary artery disease include diabetes mellitus, dyslipidemia, hypertension and post-menopausal. Current diabetic treatment includes insulin injections. Floresita Zarate is a 74 y.o. White (non-) female. Current Outpatient Medications   Medication Sig Dispense Refill    warfarin (COUMADIN) 5 MG tablet Take 1 tablet by mouth daily Replaces Xarelto 90 tablet 3    carvedilol (COREG) 6.25 MG tablet Take 1 tablet by mouth 2 times daily 60 tablet 5    Insulin Degludec (TRESIBA FLEXTOUCH) 100 UNIT/ML SOPN Inject 14 Units into the skin at bedtime 1 Adjustable Dose Pre-filled Pen Syringe 0    Continuous Blood Gluc Transmit (DEXCOM G6 TRANSMITTER) MISC Test blood sugar 3 times daily 1 each 5    Continuous Blood Gluc Sensor (DEXCOM G6 SENSOR) MISC Test blood sugar 3 times daily.  3 each 3    Continuous Blood Gluc  (DEXCOM G6 ) MAURICE Test blood sugar 3 times daily 10 each 3    BD PEN NEEDLE MILVIA 2ND GEN 32G X 4 MM MISC USE THREE TIMES DAILY      citalopram (CELEXA) 20 MG tablet Take 1 tablet by mouth daily 90 tablet 1    traZODone (DESYREL) 50 MG tablet TAKE 1 TABLET BY MOUTH AT  NIGHT 90 tablet 1    rosuvastatin (CRESTOR) 40 MG tablet Take 1 tablet by mouth every evening 90 tablet 1    NIFEdipine (PROCARDIA XL) 90 MG extended release tablet Take 1 tablet by mouth daily 90 tablet 1    aspirin EC

## 2023-08-07 ENCOUNTER — ANTI-COAG VISIT (OUTPATIENT)
Age: 73
End: 2023-08-07
Payer: MEDICARE

## 2023-08-07 DIAGNOSIS — Z79.01 LONG TERM CURRENT USE OF ANTICOAGULANT: ICD-10-CM

## 2023-08-07 DIAGNOSIS — I48.91 ATRIAL FIBRILLATION (HCC): ICD-10-CM

## 2023-08-07 DIAGNOSIS — I48.0 PAROXYSMAL ATRIAL FIBRILLATION (HCC): Primary | ICD-10-CM

## 2023-08-07 LAB
POC INR: 2.4
PROTHROMBIN TIME, POC: YES

## 2023-08-07 PROCEDURE — 93793 ANTICOAG MGMT PT WARFARIN: CPT | Performed by: INTERNAL MEDICINE

## 2023-08-07 PROCEDURE — 85610 PROTHROMBIN TIME: CPT | Performed by: INTERNAL MEDICINE

## 2023-08-07 NOTE — PATIENT INSTRUCTIONS
Reminder: Please contact the Coumadin Clinic at [phone]  when you have medication changes. Examples, new medications, antibiotics, discontinued medications, new supplements, missed doses of warfarin or if you took extra doses of warfarin. This also includes OTC medications. Notifying us helps reduce the possibility of high and low INR's. In addition, if warfarin needs to be held for any procedures, please have surgeon or physician's office contact us before holding anticoagulant. Thanks, [facility] Cardiology Coumadin Clinic.

## 2023-08-07 NOTE — PROGRESS NOTES
Anticoagulation Summary  As of 2023      INR goal:  2.0-3.0   TTR:  35.9 % (1.2 mo)   INR used for dosin.4 (2023)   Warfarin maintenance plan:  2.5 mg (5 mg x 0.5) every Mon, Wed, Fri; 5 mg (5 mg x 1) all other days   Weekly warfarin total:  27.5 mg   Plan last modified:  Elmer Schneider MA (2023)   Next INR check:  2023   Target end date:   Indefinite    Indications    Paroxysmal atrial fibrillation ([address]) [I48.0]  Long term current use of anticoagulant [Z79.01]                 Anticoagulation Episode Summary       INR check location:  Anticoagulation Clinic    Preferred lab:      Send INR reminders to:  Roberto PT    Comments:            Anticoagulation Care Providers       Provider Role Specialty Phone number    Samira Wilkins DO [facility] Cardiology [phone]

## 2023-08-15 ENCOUNTER — APPOINTMENT (OUTPATIENT)
Dept: CT IMAGING | Age: 73
End: 2023-08-15
Payer: MEDICARE

## 2023-08-15 ENCOUNTER — APPOINTMENT (OUTPATIENT)
Dept: GENERAL RADIOLOGY | Age: 73
End: 2023-08-15
Payer: MEDICARE

## 2023-08-15 ENCOUNTER — TELEPHONE (OUTPATIENT)
Age: 73
End: 2023-08-15

## 2023-08-15 ENCOUNTER — HOSPITAL ENCOUNTER (EMERGENCY)
Age: 73
Discharge: HOME | End: 2023-08-15
Attending: EMERGENCY MEDICINE
Payer: MEDICARE

## 2023-08-15 ENCOUNTER — PATIENT MESSAGE (OUTPATIENT)
Age: 73
End: 2023-08-15

## 2023-08-15 VITALS
RESPIRATION RATE: 17 BRPM | TEMPERATURE: 98.8 F | BODY MASS INDEX: 29.37 KG/M2 | HEART RATE: 57 BPM | DIASTOLIC BLOOD PRESSURE: 61 MMHG | OXYGEN SATURATION: 97 % | SYSTOLIC BLOOD PRESSURE: 131 MMHG | WEIGHT: 172 LBS | HEIGHT: 64 IN

## 2023-08-15 DIAGNOSIS — K76.9 LIVER DISEASE, CHRONIC, WITH CIRRHOSIS (HCC): ICD-10-CM

## 2023-08-15 DIAGNOSIS — K74.60 LIVER DISEASE, CHRONIC, WITH CIRRHOSIS (HCC): ICD-10-CM

## 2023-08-15 DIAGNOSIS — R06.02 SHORTNESS OF BREATH: Primary | ICD-10-CM

## 2023-08-15 DIAGNOSIS — J90 PLEURAL EFFUSION: ICD-10-CM

## 2023-08-15 LAB
ALBUMIN SERPL-MCNC: 4.4 G/DL (ref 3.2–4.6)
ALBUMIN/GLOB SERPL: 0.8 (ref 0.4–1.6)
ALP SERPL-CCNC: 115 U/L (ref 50–136)
ALT SERPL-CCNC: 41 U/L (ref 12–65)
ANION GAP SERPL CALC-SCNC: 6 MMOL/L (ref 2–11)
AST SERPL-CCNC: 36 U/L (ref 15–37)
BASOPHILS # BLD: 0 K/UL (ref 0–0.2)
BASOPHILS NFR BLD: 0 % (ref 0–2)
BILIRUB SERPL-MCNC: 1 MG/DL (ref 0.2–1.1)
BUN SERPL-MCNC: 29 MG/DL (ref 8–23)
CALCIUM SERPL-MCNC: 10.3 MG/DL (ref 8.3–10.4)
CHLORIDE SERPL-SCNC: 108 MMOL/L (ref 101–110)
CO2 SERPL-SCNC: 23 MMOL/L (ref 21–32)
CREAT SERPL-MCNC: 1.8 MG/DL (ref 0.6–1)
DIFFERENTIAL METHOD BLD: ABNORMAL
EKG ATRIAL RATE: 62 BPM
EKG DIAGNOSIS: NORMAL
EKG P AXIS: 65 DEGREES
EKG P-R INTERVAL: 148 MS
EKG Q-T INTERVAL: 458 MS
EKG QRS DURATION: 94 MS
EKG QTC CALCULATION (BAZETT): 464 MS
EKG R AXIS: 82 DEGREES
EKG T AXIS: 53 DEGREES
EKG VENTRICULAR RATE: 62 BPM
EOSINOPHIL # BLD: 0.4 K/UL (ref 0–0.8)
EOSINOPHIL NFR BLD: 4 % (ref 0.5–7.8)
ERYTHROCYTE [DISTWIDTH] IN BLOOD BY AUTOMATED COUNT: 12.6 % (ref 11.9–14.6)
GLOBULIN SER CALC-MCNC: 5.4 G/DL (ref 2.8–4.5)
GLUCOSE SERPL-MCNC: 112 MG/DL (ref 65–100)
HCT VFR BLD AUTO: 42.1 % (ref 35.8–46.3)
HGB BLD-MCNC: 14 G/DL (ref 11.7–15.4)
IMM GRANULOCYTES # BLD AUTO: 0 K/UL (ref 0–0.5)
IMM GRANULOCYTES NFR BLD AUTO: 0 % (ref 0–5)
LYMPHOCYTES # BLD: 1.4 K/UL (ref 0.5–4.6)
LYMPHOCYTES NFR BLD: 15 % (ref 13–44)
MCH RBC QN AUTO: 32.4 PG (ref 26.1–32.9)
MCHC RBC AUTO-ENTMCNC: 33.3 G/DL (ref 31.4–35)
MCV RBC AUTO: 97.5 FL (ref 82–102)
MONOCYTES # BLD: 1.3 K/UL (ref 0.1–1.3)
MONOCYTES NFR BLD: 14 % (ref 4–12)
NEUTS SEG # BLD: 6.4 K/UL (ref 1.7–8.2)
NEUTS SEG NFR BLD: 67 % (ref 43–78)
NRBC # BLD: 0 K/UL (ref 0–0.2)
NT PRO BNP: 2243 PG/ML
PLATELET # BLD AUTO: 250 K/UL (ref 150–450)
PMV BLD AUTO: 9.4 FL (ref 9.4–12.3)
POTASSIUM SERPL-SCNC: 3.8 MMOL/L (ref 3.5–5.1)
PROT SERPL-MCNC: 9.8 G/DL (ref 6.3–8.2)
RBC # BLD AUTO: 4.32 M/UL (ref 4.05–5.2)
SODIUM SERPL-SCNC: 137 MMOL/L (ref 133–143)
TROPONIN I SERPL HS-MCNC: 19.8 PG/ML (ref 0–14)
TROPONIN I SERPL HS-MCNC: 20.6 PG/ML (ref 0–14)
WBC # BLD AUTO: 9.6 K/UL (ref 4.3–11.1)

## 2023-08-15 PROCEDURE — 80053 COMPREHEN METABOLIC PANEL: CPT

## 2023-08-15 PROCEDURE — 99285 EMERGENCY DEPT VISIT HI MDM: CPT

## 2023-08-15 PROCEDURE — 93005 ELECTROCARDIOGRAM TRACING: CPT

## 2023-08-15 PROCEDURE — 74176 CT ABD & PELVIS W/O CONTRAST: CPT

## 2023-08-15 PROCEDURE — 71045 X-RAY EXAM CHEST 1 VIEW: CPT

## 2023-08-15 PROCEDURE — 84484 ASSAY OF TROPONIN QUANT: CPT

## 2023-08-15 PROCEDURE — 96374 THER/PROPH/DIAG INJ IV PUSH: CPT

## 2023-08-15 PROCEDURE — 93010 ELECTROCARDIOGRAM REPORT: CPT | Performed by: INTERNAL MEDICINE

## 2023-08-15 PROCEDURE — 83880 ASSAY OF NATRIURETIC PEPTIDE: CPT

## 2023-08-15 PROCEDURE — 85025 COMPLETE CBC W/AUTO DIFF WBC: CPT

## 2023-08-15 PROCEDURE — 6360000002 HC RX W HCPCS

## 2023-08-15 RX ORDER — FUROSEMIDE 10 MG/ML
60 INJECTION INTRAMUSCULAR; INTRAVENOUS ONCE
Status: COMPLETED | OUTPATIENT
Start: 2023-08-15 | End: 2023-08-15

## 2023-08-15 RX ADMIN — FUROSEMIDE 60 MG: 10 INJECTION, SOLUTION INTRAMUSCULAR; INTRAVENOUS at 15:46

## 2023-08-15 ASSESSMENT — PAIN - FUNCTIONAL ASSESSMENT: PAIN_FUNCTIONAL_ASSESSMENT: 0-10

## 2023-08-15 ASSESSMENT — PAIN DESCRIPTION - LOCATION: LOCATION: CHEST

## 2023-08-15 ASSESSMENT — PAIN SCALES - GENERAL: PAINLEVEL_OUTOF10: 8

## 2023-08-15 NOTE — TELEPHONE ENCOUNTER
----- Message from Floyd Lind, [address] sent at 8/15/2023  8:21 AM EDT -----  Regarding: FW: Fluid Retention   Contact: [phone]    ----- Message -----  From: Jaun Tracy MA  Sent: 8/15/2023   7:42 AM EDT  To: Floyd Lnid MA  Subject: FW: Fluid Retention                                ----- Message -----  From: Rhina Juarez  Sent: 8/15/2023   1:40 AM EDT  To: [facility] Clinical Staff  Subject: Fluid Retention                                  Hi Dr. Adeel Blanca, I have gained 5 lbs in the last 4 days in fluid in my midsection. I'm finding hard to breathe when I'm lying down in the bed and when I'm sitting in my recliner. I'm very uncomfortable. What should I do to help this fluid go away? Also, is this ascites from congestive heart failure? Do I need to have paracentesis? I'm very worried. Please let me know what to do.

## 2023-08-15 NOTE — TELEPHONE ENCOUNTER
Advised patient of Dr. Chelita Carrillo response. Advised patient that she  will be evaluated by ER doctor, who will bring in cardiologist and any other specialists, as needed. Patient verbalized understanding.

## 2023-08-15 NOTE — ED NOTES
Attempted to get labs and was unsuccessful.  Patient will need ultrasound IV     Casey Navarro LPN  15/10/75 [id number]

## 2023-08-15 NOTE — ED PROVIDER NOTES
Emergency Department Provider Note       PCP: Corie Small MD   Age: 74 y.o. Sex: female     DISPOSITION Decision To Discharge 08/15/2023 06:56:38 PM       ICD-10-CM    1. Shortness of breath  R06.02       2. Pleural effusion  J90       3. Liver disease, chronic, with cirrhosis ([address])  K74.60 External Referral To Gastroenterology    K76.9           Medical Decision Making     Complexity of Problems Addressed:  1 acute problem    Data Reviewed and Analyzed:   I independently ordered and reviewed each unique test.         I independently ordered and interpreted the ED EKG in the absence of a Cardiologist.    Rate: 62  EKG Interpretation: EKG Interpretation: sinus rhythm, no evidence of arrhythmia  ST Segments: Normal ST segments - NO STEMI  I independently interpreted the cardiac monitor rhythm strip 62 bpm normal sinus rhythm. I interpreted the X-rays no acute. I interpreted the CT Scan no evidence of acute abnormality agree with radiologist.  I interpreted the labs. Discussion of management or test interpretation. Patient with past medical history of CHF and cirrhosis of the liver presented with complaint of 3-day history of worsening shortness of breath especially with lying supine and with exertion. Additional complaint of abdominal swelling over the past few days. Absence of chest pain. Has been compliant with her Lasix at home. Will obtain lab x-ray and EKG work-up. Serial troponins x2 mildly elevated 19 and 20 respectively however she has history of severe CKD. Given her absence of chest pain my suspicion is that troponin elevation is secondary to this. Chest x-ray imaging shows no acute abnormality. 60 mg IV Lasix given in ED course. Given abdominal swelling will obtain CT imaging to rule out acute obstructive process. Unable to obtain scan with contrast given patient's severe CKD. CT demonstrates small right pleural effusion with lobulated liver unchanged with cirrhosis appearance.

## 2023-08-15 NOTE — TELEPHONE ENCOUNTER
Please let the patient know since she is having significant trouble breathing and abdominal swelling she should have someone take her to the ER or call 911. Thank you.

## 2023-08-15 NOTE — DISCHARGE INSTRUCTIONS
There are no concerning findings on your CT scan today. Your scan shows that your cirrhosis is unchanged. There is a lot of stool in your colon but there is no evidence of obstruction. There is also a small amount of fluid in your right lung but this has been improved with the Lasix. Increase your Lasix over the next 5 days taking 1 tablet in the morning and half of a tablet in the evening. I have also provided an outpatient referral for you to have close follow-up with Dr. Davion Rivera with gastroenterology, scheduling will contact you tomorrow to help you set up an appointment. If you experience any worsening symptoms return to the emergency department.

## 2023-08-15 NOTE — TELEPHONE ENCOUNTER
Advised daughter, Severino Mccoy, that patient sent message requesting advice, but I have not been able to speak with patient after leaving 2 messages on voicemail. Severino Mccoy agrees to call and check on patient and ask patient to call me.

## 2023-08-15 NOTE — TELEPHONE ENCOUNTER
Left message on voicemail for patient to call this triage nurse. 2nd home number is not a working number.

## 2023-08-15 NOTE — TELEPHONE ENCOUNTER
Gained 5 lbs in 4 days. Extreme swelling in abdomen x 2 days. Clothes are very tight. Slight swelling in feet and ankles, last night. No LE edema. Very bad SOB x 2 days. Unable to rest flat in bed due to very bad SOB and is still very SOB when resting in recliner. Very bad pressure in chest for several hours, during the night, last night. No chest pressure, now. Aching pain in legs and shoulders since yesterday morning. Frequent cough productive of small amount of yellowish sputum. Afebrile. Patient states that Dr. Isabel Ulloa prescribed spironolactone for 2 weeks, which helped in the past.   Taking lasix 40 mg qd, carvedilol 6.25 mg BID, nifedipine 90 mg qd, Imdur 60 mg qd, Mag-ox 400 mg qd, warfarin, and Crestor 40 mg qd. Patient asks if she should increase lasix dose for a few days or add another diuretic. She asks if she might need paracentesis, and asks for any other recommendations from Dr. Isha Vincent. Advised patient that I will notify Dr. Isha Vincent of above and call with his response. Scheduled MA appointment with Dr. Isha Vincent on 8/16/23 at 3:00 pm. Patient verbalized understanding.

## 2023-08-15 NOTE — ED TRIAGE NOTES
Patient ambulatory to triage with CO swelling in abdomen x 2 days. Hx CHF, compliant with furosemide. Also reports several episodes of AF, hx of AF. Reports cough.

## 2023-08-23 ENCOUNTER — ANTI-COAG VISIT (OUTPATIENT)
Age: 73
End: 2023-08-23

## 2023-08-23 DIAGNOSIS — I48.0 PAROXYSMAL ATRIAL FIBRILLATION (HCC): Primary | ICD-10-CM

## 2023-08-23 DIAGNOSIS — I48.91 ATRIAL FIBRILLATION (HCC): ICD-10-CM

## 2023-08-23 DIAGNOSIS — Z79.01 LONG TERM CURRENT USE OF ANTICOAGULANT: ICD-10-CM

## 2023-08-23 LAB
POC INR: 2.7
PROTHROMBIN TIME, POC: YES

## 2023-08-23 NOTE — PROGRESS NOTES
Anticoagulation Summary  As of 2023      INR goal:  2.0-3.0   TTR:  55.4 % (1.7 mo)   INR used for dosin.7 (2023)   Warfarin maintenance plan:  2.5 mg (5 mg x 0.5) every Mon, Wed, Fri; 5 mg (5 mg x 1) all other days   Weekly warfarin total:  27.5 mg   Plan last modified:  Stephan Norton MA (2023)   Next INR check:  2023   Target end date:   Indefinite    Indications    Paroxysmal atrial fibrillation ([address]) [I48.0]  Long term current use of anticoagulant [Z79.01]                 Anticoagulation Episode Summary       INR check location:  Anticoagulation Clinic    Preferred lab:      Send INR reminders to:  Roberto ERICKSON    Comments:            Anticoagulation Care Providers       Provider Role Specialty Phone number    Yuridia Gonzales DO [facility] Cardiology [phone]

## 2023-08-23 NOTE — PATIENT INSTRUCTIONS
Reminder: Please contact the Coumadin Clinic at [phone]  when you have medication changes. Examples, new medications, antibiotics, discontinued medications, new supplements, missed doses of warfarin or if you took extra doses of warfarin. This also includes OTC medications. Notifying us helps reduce the possibility of high and low INR's. In addition, if warfarin needs to be held for any procedures, please have surgeon or physician's office contact us before holding anticoagulant. Thanks, [facility] Cardiology Coumadin Clinic.

## 2023-08-26 ENCOUNTER — PATIENT MESSAGE (OUTPATIENT)
Age: 73
End: 2023-08-26

## 2023-08-29 ENCOUNTER — TELEPHONE (OUTPATIENT)
Age: 73
End: 2023-08-29

## 2023-08-29 NOTE — TELEPHONE ENCOUNTER
----- Message from Adela Haywood, [address] sent at 8/29/2023  8:55 AM EDT -----  Regarding: FW: Carvedilol 3.125 Prescription   Contact: [phone]    ----- Message -----  From: Andres Pantoja MA  Sent: 8/28/2023   8:06 AM EDT  To: Adela Haywood MA  Subject: FW: Carvedilol 3.125 Prescription                  ----- Message -----  From: Alejandro Olmstead  Sent: 8/26/2023   5:55 PM EDT  To: [facility] Clinical Staff  Subject: Carvedilol 3.125 Prescription                    Since Dr. Sherry Smith increased my Carvedilol from 3.125 to 6.25 my pills are running out earlier because I'm taking 4/day instead of 2/day. I need Dr. Sherry Smith to send a new prescription to [facility] for 6.25 mg pills. I am completely out of Carvedilol now and [facility] won't  refill it. I am having angina this weekend because of that.     Thanks, Vicky Leon

## 2023-08-30 RX ORDER — CARVEDILOL 6.25 MG/1
6.25 TABLET ORAL 2 TIMES DAILY
Qty: 180 TABLET | Refills: 3 | Status: SHIPPED | OUTPATIENT
Start: 2023-08-30

## 2023-08-30 NOTE — TELEPHONE ENCOUNTER
I called pt.she said that she has CP off/on and that is why she is taking Coreg. She said she just needs her Coreg refilled. I refilled as below and advised she call w/more CP and she v/u.   Requested Prescriptions     Signed Prescriptions Disp Refills    carvedilol (COREG) 6.25 MG tablet 180 tablet 3     Sig: Take 1 tablet by mouth 2 times daily     Authorizing Provider: Korin Gama     Ordering User: Lowell Castleman

## 2023-09-06 ENCOUNTER — OFFICE VISIT (OUTPATIENT)
Age: 73
End: 2023-09-06
Payer: MEDICARE

## 2023-09-06 VITALS
BODY MASS INDEX: 29.19 KG/M2 | WEIGHT: 171 LBS | DIASTOLIC BLOOD PRESSURE: 62 MMHG | HEART RATE: 64 BPM | SYSTOLIC BLOOD PRESSURE: 128 MMHG | HEIGHT: 64 IN

## 2023-09-06 DIAGNOSIS — I10 ESSENTIAL HYPERTENSION: Primary | ICD-10-CM

## 2023-09-06 DIAGNOSIS — I48.0 PAROXYSMAL ATRIAL FIBRILLATION (HCC): ICD-10-CM

## 2023-09-06 DIAGNOSIS — I65.29 CAROTID ATHEROSCLEROSIS, UNSPECIFIED LATERALITY: ICD-10-CM

## 2023-09-06 DIAGNOSIS — E78.2 MIXED HYPERLIPIDEMIA: ICD-10-CM

## 2023-09-06 DIAGNOSIS — I51.89 DIASTOLIC DYSFUNCTION: ICD-10-CM

## 2023-09-06 DIAGNOSIS — I25.10 CORONARY ARTERY DISEASE INVOLVING NATIVE CORONARY ARTERY OF NATIVE HEART WITHOUT ANGINA PECTORIS: ICD-10-CM

## 2023-09-06 DIAGNOSIS — Z79.01 LONG TERM CURRENT USE OF ANTICOAGULANT: ICD-10-CM

## 2023-09-06 DIAGNOSIS — Z98.61 S/P PTCA (PERCUTANEOUS TRANSLUMINAL CORONARY ANGIOPLASTY): ICD-10-CM

## 2023-09-06 PROCEDURE — 1036F TOBACCO NON-USER: CPT | Performed by: INTERNAL MEDICINE

## 2023-09-06 PROCEDURE — 1123F ACP DISCUSS/DSCN MKR DOCD: CPT | Performed by: INTERNAL MEDICINE

## 2023-09-06 PROCEDURE — 1090F PRES/ABSN URINE INCON ASSESS: CPT | Performed by: INTERNAL MEDICINE

## 2023-09-06 PROCEDURE — 3074F SYST BP LT 130 MM HG: CPT | Performed by: INTERNAL MEDICINE

## 2023-09-06 PROCEDURE — 3078F DIAST BP <80 MM HG: CPT | Performed by: INTERNAL MEDICINE

## 2023-09-06 PROCEDURE — 99214 OFFICE O/P EST MOD 30 MIN: CPT | Performed by: INTERNAL MEDICINE

## 2023-09-06 PROCEDURE — 3017F COLORECTAL CA SCREEN DOC REV: CPT | Performed by: INTERNAL MEDICINE

## 2023-09-06 PROCEDURE — G8399 PT W/DXA RESULTS DOCUMENT: HCPCS | Performed by: INTERNAL MEDICINE

## 2023-09-06 PROCEDURE — G8427 DOCREV CUR MEDS BY ELIG CLIN: HCPCS | Performed by: INTERNAL MEDICINE

## 2023-09-06 PROCEDURE — G8417 CALC BMI ABV UP PARAM F/U: HCPCS | Performed by: INTERNAL MEDICINE

## 2023-09-06 RX ORDER — CARVEDILOL 6.25 MG/1
6.25 TABLET ORAL 2 TIMES DAILY
Qty: 200 TABLET | Refills: 3 | Status: SHIPPED | OUTPATIENT
Start: 2023-09-06

## 2023-09-06 RX ORDER — WARFARIN SODIUM 5 MG/1
5 TABLET ORAL DAILY
Qty: 100 TABLET | Refills: 3 | Status: SHIPPED | OUTPATIENT
Start: 2023-09-06

## 2023-09-06 RX ORDER — ISOSORBIDE MONONITRATE 60 MG/1
60 TABLET, EXTENDED RELEASE ORAL DAILY
Qty: 100 TABLET | Refills: 3 | Status: SHIPPED | OUTPATIENT
Start: 2023-09-06

## 2023-09-06 RX ORDER — FUROSEMIDE 40 MG/1
40 TABLET ORAL DAILY
Qty: 100 TABLET | Refills: 3 | Status: SHIPPED | OUTPATIENT
Start: 2023-09-06

## 2023-09-06 ASSESSMENT — ENCOUNTER SYMPTOMS
COUGH: 0
BLOATING: 1

## 2023-09-06 NOTE — PROGRESS NOTES
Lili Enriquezr, PA     [address], [address]      Patient:  Gabriel Kirby  1950         SUBJECTIVE:  Gabriel Kirby is a  74 y.o. female seen for a follow up visit regarding the following:     Chief Complaint   Patient presents with    3 Month Follow-Up    Coronary Artery Disease    Hypertension    Hyperlipidemia     CC: CAD, HFpEF follow up     HPI:   74 y.o. female with a history of CAD with prior stents LAD 2008, normal 2012, paroxysmal atrial fibrillation, HFpEF, HTN, DM  who is here for follow up. Patient was last seen in office on 5/31/23 , since then reports that she has been doing ok, had some ascites requiring paracentesis. Reports that she has been having dyspnea with exertion, no chest pain or pressure. Abdominal swelling is resolved. No nausea or vomiting. Reports easy bruising, no significant bleeding events, no GI or  bleeding. Taking her medications as directed. No other complaints. Reports that she has been been hesitant to go back to GI. Cardiovascular Testing:  - 30 Day Event monitor 05/31/2023 - 06/29/2023: Paroxysmal atrial fibrillation events. Majority sinus rhythm. Nocturnal sinus bradycardia. Patient previously contacted and on anticoagulation.   - Echo 2/16/2021: LVEF 55 to 60%, RV normal, mild/moderate MR, trace TR  - SPECT 9/23/20: normal perfusion, low risk scan, LVEF >70%     Past medical history, past surgical history, family history, social history, and medications were all reviewed with the patient today and updated as necessary.          Patient Active Problem List    Diagnosis Date Noted    Osteoarthritis of right hip, unspecified osteoarthritis type 12/05/2022     Priority: Medium    Paroxysmal atrial fibrillation ([address]) 06/22/2023     Priority: Low    Long term current use of anticoagulant 06/22/2023     Priority: Low    Chronic combined systolic and diastolic congestive heart failure ([address]) 04/19/2021

## 2023-09-14 DIAGNOSIS — M25.559 HIP PAIN, UNSPECIFIED LATERALITY: Primary | ICD-10-CM

## 2023-09-14 RX ORDER — TRAMADOL HYDROCHLORIDE 50 MG/1
50 TABLET ORAL EVERY 4 HOURS PRN
Qty: 42 TABLET | Refills: 0 | Status: SHIPPED | OUTPATIENT
Start: 2023-09-14 | End: 2023-09-21

## 2023-09-20 ENCOUNTER — ANTI-COAG VISIT (OUTPATIENT)
Age: 73
End: 2023-09-20

## 2023-09-20 DIAGNOSIS — I48.0 PAROXYSMAL ATRIAL FIBRILLATION (HCC): Primary | ICD-10-CM

## 2023-09-20 DIAGNOSIS — Z79.01 LONG TERM CURRENT USE OF ANTICOAGULANT: ICD-10-CM

## 2023-09-20 DIAGNOSIS — I48.91 ATRIAL FIBRILLATION (HCC): ICD-10-CM

## 2023-09-20 LAB
POC INR: 2.9
PROTHROMBIN TIME, POC: NORMAL

## 2023-09-20 NOTE — PATIENT INSTRUCTIONS
Reminder: Please contact the Coumadin Clinic at [phone]  when you have medication changes. Examples, new medications, antibiotics, discontinued medications, new supplements, missed doses of warfarin or if you took extra doses of warfarin. This also includes OTC medications. Notifying us helps reduce the possibility of high and low INR's. In addition, if warfarin needs to be held for any procedures, please have surgeon or physician's office contact us before holding anticoagulant. Thanks, [facility] Cardiology Coumadin Clinic.

## 2023-09-20 NOTE — PROGRESS NOTES
Anticoagulation Summary  As of 2023      INR goal:  2.0-3.0   TTR:  70.9 % (2.7 mo)   INR used for dosin.9 (2023)   Warfarin maintenance plan:  2.5 mg (5 mg x 0.5) every Mon, Wed, Fri; 5 mg (5 mg x 1) all other days   Weekly warfarin total:  27.5 mg   Plan last modified:  Lance Vitale MA (2023)   Next INR check:  10/18/2023   Target end date:   Indefinite    Indications    Paroxysmal atrial fibrillation ([address]) [I48.0]  Long term current use of anticoagulant [Z79.01]                 Anticoagulation Episode Summary       INR check location:  Anticoagulation Clinic    Preferred lab:      Send INR reminders to:  Roberto ERICKSON    Comments:            Anticoagulation Care Providers       Provider Role Specialty Phone number    Kentrell Irwin DO Responsible Cardiology [phone]

## 2023-09-21 ENCOUNTER — OFFICE VISIT (OUTPATIENT)
Dept: ORTHOPEDIC SURGERY | Age: 73
End: 2023-09-21

## 2023-09-21 DIAGNOSIS — M25.552 LEFT HIP PAIN: Primary | ICD-10-CM

## 2023-09-21 RX ORDER — METHYLPREDNISOLONE ACETATE 40 MG/ML
40 INJECTION, SUSPENSION INTRA-ARTICULAR; INTRALESIONAL; INTRAMUSCULAR; SOFT TISSUE ONCE
Status: COMPLETED | OUTPATIENT
Start: 2023-09-21 | End: 2023-09-21

## 2023-09-21 RX ADMIN — METHYLPREDNISOLONE ACETATE 40 MG: 40 INJECTION, SUSPENSION INTRA-ARTICULAR; INTRALESIONAL; INTRAMUSCULAR; SOFT TISSUE at 11:31

## 2023-09-21 NOTE — PROGRESS NOTES
Patient ID:    Lorena Mcclelland  [id number]  4 y.o.  1950    Today: September 21, 2023          Chief Complaint: Left hip pain      Patient returns to office today with chief complaint of left hip pain. The pain is predominately localized to the anterior groin and is described as a general ache with occasional sharp pain. They rate the pain ranging from 3-8 on the pain scale occurring in a cyclical fashion with periods of acute exacerbation. Symptoms are exacerbated with getting into and out of their vehicle, crossing their legs, and attempting to put on socks/shoes. No significant pain noted with laying on the affected hip. No numbness of tingling going down the extremity. Patient has attempted prior conservative treatment including Activity modification, NSAIDS, and Tylenol. Previously patient has had minimal with OTC anti-inflammatories        Past Medical History:  Past Medical History:   Diagnosis Date    Acute on chronic combined systolic and diastolic congestive heart failure ([facility]) 02/17/2021    Acute renal failure superimposed on stage 3b chronic kidney disease ([address] [place] [address]) 01/12/2021    CAD (coronary artery disease) 03/01/2012    MI, 3 stents    Carotid artery stenosis 12/12/2018    Carotid stenosis, asymptomatic, right 03/12/2020    Chest pain     denies currently    Childhood asthma     CKD (chronic kidney disease)     stage 3 per Nephrology note    Coronary artery disease involving native coronary artery without angina pectoris 05/21/2015    COVID-19 virus infection 01/12/2021    Depression 03/01/2012    Diabetes mellitus ([facility]) 03/01/2012    Tresiba nightly; Humalog 1-3 times per day per SSI; Has Dexcom BS monitor;  Average bs-150-175; A1c-6.8 on 61/18/60    Diastolic dysfunction     DM2 (diabetes mellitus, type 2) ([facility]) 05/21/2015    Elevated LFTs 01/12/2021    Elevated troponin 03/02/2012    Essential hypertension 05/21/2015    on med for control    External hemorrhoids without mention of

## 2023-09-22 ENCOUNTER — PATIENT MESSAGE (OUTPATIENT)
Dept: INTERNAL MEDICINE CLINIC | Facility: CLINIC | Age: 73
End: 2023-09-22

## 2023-09-22 DIAGNOSIS — Z79.4 TYPE 2 DIABETES MELLITUS WITH STAGE 4 CHRONIC KIDNEY DISEASE, WITH LONG-TERM CURRENT USE OF INSULIN (HCC): ICD-10-CM

## 2023-09-22 DIAGNOSIS — E11.22 TYPE 2 DIABETES MELLITUS WITH STAGE 4 CHRONIC KIDNEY DISEASE, WITH LONG-TERM CURRENT USE OF INSULIN (HCC): ICD-10-CM

## 2023-09-22 DIAGNOSIS — N18.4 TYPE 2 DIABETES MELLITUS WITH STAGE 4 CHRONIC KIDNEY DISEASE, WITH LONG-TERM CURRENT USE OF INSULIN (HCC): ICD-10-CM

## 2023-09-25 RX ORDER — INSULIN DEGLUDEC INJECTION 100 U/ML
14 INJECTION, SOLUTION SUBCUTANEOUS NIGHTLY
Qty: 6 ADJUSTABLE DOSE PRE-FILLED PEN SYRINGE | Refills: 0 | Status: SHIPPED | OUTPATIENT
Start: 2023-09-25

## 2023-09-25 NOTE — TELEPHONE ENCOUNTER
From: Alexandra Maddox  To: Dr. Lucas Edward  Sent: 9/22/2023 2:47 PM EDT  Subject: Need Nabil Cobia Prescription     Dr. Isabelle Mauricio, I just tried to reorder my Tresiba from [facility] and they need a new prescription asap. I am down to one pen so could you please send them a new prescription. They just told me they would reach out to you today but I wanted to follow up with you too because I know how these things can get lost in the system. 1 pen lasts me for 1 week.

## 2023-09-28 ENCOUNTER — PATIENT MESSAGE (OUTPATIENT)
Dept: ORTHOPEDIC SURGERY | Age: 73
End: 2023-09-28

## 2023-09-28 DIAGNOSIS — M25.552 LEFT HIP PAIN: Primary | ICD-10-CM

## 2023-09-29 RX ORDER — TRAMADOL HYDROCHLORIDE 50 MG/1
50 TABLET ORAL EVERY 4 HOURS PRN
Qty: 42 TABLET | Refills: 0 | Status: SHIPPED | OUTPATIENT
Start: 2023-09-29 | End: 2023-10-06

## 2023-10-03 ENCOUNTER — ANTI-COAG VISIT (OUTPATIENT)
Age: 73
End: 2023-10-03
Payer: MEDICARE

## 2023-10-03 DIAGNOSIS — Z79.01 LONG TERM CURRENT USE OF ANTICOAGULANT: ICD-10-CM

## 2023-10-03 DIAGNOSIS — I48.91 ATRIAL FIBRILLATION (HCC): ICD-10-CM

## 2023-10-03 DIAGNOSIS — I48.0 PAROXYSMAL ATRIAL FIBRILLATION (HCC): Primary | ICD-10-CM

## 2023-10-03 LAB
POC INR: 3.4
PROTHROMBIN TIME, POC: NORMAL

## 2023-10-03 PROCEDURE — 85610 PROTHROMBIN TIME: CPT | Performed by: INTERNAL MEDICINE

## 2023-10-03 PROCEDURE — 93793 ANTICOAG MGMT PT WARFARIN: CPT | Performed by: INTERNAL MEDICINE

## 2023-10-03 NOTE — PROGRESS NOTES
Pt is above range, this is likely due to cortisone injections, will decrease weekly dose and recheck in 2 weeks//KM    Anticoagulation Summary  As of 10/3/2023      INR goal:  2.0-3.0   TTR:  63.8 % (3.1 mo)   INR used for dosing:  3.4 (10/3/2023)   Warfarin maintenance plan:  2.5 mg (5 mg x 0.5) every Mon, Wed, Fri; 5 mg (5 mg x 1) all other days   Weekly warfarin total:  27.5 mg   Plan last modified:  Bradley Villanueva MA (7/24/2023)   Next INR check:     Target end date:   Indefinite    Indications    Paroxysmal atrial fibrillation ([address]) [I48.0]  Long term current use of anticoagulant [Z79.01]                 Anticoagulation Episode Summary       INR check location:  Anticoagulation Clinic    Preferred lab:      Send INR reminders to:  [facility]    Comments:            Anticoagulation Care Providers       Provider Role Specialty Phone number    Korin Gama DO [facility] [phone]

## 2023-10-03 NOTE — PATIENT INSTRUCTIONS
Reminder: Please contact the Coumadin Clinic at [phone]  when you have medication changes. Examples, new medications, antibiotics, discontinued medications, new supplements, missed doses of warfarin or if you took extra doses of warfarin. This also includes OTC medications. Notifying us helps reduce the possibility of high and low INR's. In addition, if warfarin needs to be held for any procedures, please have surgeon or physician's office contact us before holding anticoagulant. Thanks, [facility] Coumadin Clinic.

## 2023-10-05 ENCOUNTER — PATIENT MESSAGE (OUTPATIENT)
Age: 73
End: 2023-10-05

## 2023-10-05 DIAGNOSIS — I65.29 CAROTID ATHEROSCLEROSIS, UNSPECIFIED LATERALITY: Primary | ICD-10-CM

## 2023-10-05 NOTE — TELEPHONE ENCOUNTER
Called patient and informed her of response. Stated she does not have a vascular physician and asked for a referral to be placed for someone within [facility]. Referral placed.

## 2023-10-05 NOTE — TELEPHONE ENCOUNTER
Please let her know the neck ultrasound showed moderate right internal carotid narrowing and mild left internal carotid narrowing. There is severe appearing stenosis of the external carotid arteries, generally this is not treated with stents or surgery. External carotid artery plaque is not related to heart valve function. Given her heart history we can get an echocardiogram to check her heart as it has been a few years, I will place order. I would recommend she stay on her current medications especially her cholesterol lowering medication and call to make an appointment with her vascular physician (she followed at [facility] previously) as they are experts on neck artery treatments. I can provide a referral if she needs a new referral.     Please have her call back with further questions. Thanks.

## 2023-10-06 ENCOUNTER — TELEPHONE (OUTPATIENT)
Dept: ORTHOPEDIC SURGERY | Age: 73
End: 2023-10-06

## 2023-10-06 NOTE — TELEPHONE ENCOUNTER
Appointment Request  (Newest Message First)  [address] [facility] Desk Staff 1 hour ago (12:28 PM)       Appointment Request From: Floresita Zarate     With Provider: Geneva Le MD [facility] [address] [place] [address] Avenue     Preferred Date Range: 10/9/2023 - 10/13/2023     Preferred Times: Any Time     Reason for visit: Request an Appointment     Comments: For a cortisone shot for my left hip.

## 2023-10-09 ENCOUNTER — OFFICE VISIT (OUTPATIENT)
Dept: ORTHOPEDIC SURGERY | Age: 73
End: 2023-10-09

## 2023-10-09 ENCOUNTER — OFFICE VISIT (OUTPATIENT)
Dept: ORTHOPEDIC SURGERY | Age: 73
End: 2023-10-09
Payer: MEDICARE

## 2023-10-09 VITALS — WEIGHT: 171 LBS | HEIGHT: 64 IN | BODY MASS INDEX: 29.19 KG/M2

## 2023-10-09 DIAGNOSIS — M47.816 LUMBAR SPONDYLOSIS: Primary | ICD-10-CM

## 2023-10-09 PROCEDURE — 64493 INJ PARAVERT F JNT L/S 1 LEV: CPT | Performed by: PHYSICAL MEDICINE & REHABILITATION

## 2023-10-09 PROCEDURE — 64494 INJ PARAVERT F JNT L/S 2 LEV: CPT | Performed by: PHYSICAL MEDICINE & REHABILITATION

## 2023-10-09 RX ORDER — CARVEDILOL 3.12 MG/1
TABLET ORAL
COMMUNITY
Start: 2023-08-27 | End: 2023-10-10 | Stop reason: DRUGHIGH

## 2023-10-09 RX ORDER — TRIAMCINOLONE ACETONIDE 40 MG/ML
200 INJECTION, SUSPENSION INTRA-ARTICULAR; INTRAMUSCULAR ONCE
Status: COMPLETED | OUTPATIENT
Start: 2023-10-09 | End: 2023-10-09

## 2023-10-09 RX ADMIN — TRIAMCINOLONE ACETONIDE 200 MG: 40 INJECTION, SUSPENSION INTRA-ARTICULAR; INTRAMUSCULAR at 15:05

## 2023-10-09 NOTE — PROGRESS NOTES
Date: 10/09/23   Name: Julio Setting    Pre-Procedural Diagnosis:    Diagnosis Orders   1. Lumbar spondylosis  FL INJ LUMB/SAC FACET SINGLE LEVEL    XR INJ FACET LUMB SACRAL 2ND LVL    triamcinolone acetonide (KENALOG-40) injection 200 mg          Procedure: Bilateral Facet Joint Injections (2 levels)    Precautions:  LBH Precautions spine injections: None. Patient denies any prior sensitivity to steroid, local anesthetic, contrast dye, iodine or shellfish. The procedure was discussed at length with the patient and informed consent was signed. The patient was placed in a prone position on the fluoroscopy table and the skin was prepped and draped in a routine sterile fashion. The areas to be injected were each anesthetized with approximately 2-3 cc of 1% Lidocaine. Initially a 22-gauge 3.5 inch spinal needle was carefully advanced under fluoroscopic guidance to the bilateral L4-L5 and L5-S1 facet joint spaces. 1 cc of 0.25% Marcaine and 50 mg of Kenalog were injected through the spinal needle at each site. Fluoroscopic guidance was used intermittently over a 10-minute period to insure proper needle placement and patient safety. A hard copy of the fluoroscopic  images has been placed in the patient's chart. The patient was monitored after the procedure and discharged home without complication. A total of 5 cc of Kenalog were administered during this procedure. Resume Meds:     N/A Remains on coumadin and asa.     Hannah Dunlap MD  10/09/23

## 2023-10-09 NOTE — PROGRESS NOTES
Nondisplaced fracture of the lateral tibial plateau. 2. Horizontal cleavage tear involving the posterior horn of the lateral  meniscus. 3. OA change of the lateral compartment. AP lateral spot views of the lumbar spine reveal lower lumbar spondylosis with severe facet arthropathy. There is mild to moderate degenerative disc disease noted. Fair alignment of the lumbar spine. ASSESSMENT AND PLAN:     The patient has bilateral lower back pain and some pain in the left hip and groin. I think she has multiple factors affecting her pain right now. Some of her pain is due to her OA of the left hip which will improve with hip replacement. However she does have significant facet arthropathy which may have been exacerbated and causing her low back pain. She has been unable to tolerate her home exercises, she is not a good candidate for NSAID therapy. I recommend bilateral L4-5 and L5-S1 facet joint injections with Dr. Sushila Thompson. I will plan to follow-up with patient in 2 to 3 weeks after her injection to review her progress. If she fails to improve I would recommend a lumbar MRI for further evaluation.           4--this is a chronic illness/condition with exacerbation

## 2023-10-10 ENCOUNTER — OFFICE VISIT (OUTPATIENT)
Dept: VASCULAR SURGERY | Age: 73
End: 2023-10-10
Payer: MEDICARE

## 2023-10-10 VITALS
OXYGEN SATURATION: 95 % | HEIGHT: 64 IN | BODY MASS INDEX: 29.19 KG/M2 | WEIGHT: 171 LBS | DIASTOLIC BLOOD PRESSURE: 78 MMHG | SYSTOLIC BLOOD PRESSURE: 152 MMHG | HEART RATE: 81 BPM

## 2023-10-10 DIAGNOSIS — I73.9 PVD (PERIPHERAL VASCULAR DISEASE) WITH CLAUDICATION (HCC): Primary | ICD-10-CM

## 2023-10-10 PROCEDURE — G8399 PT W/DXA RESULTS DOCUMENT: HCPCS | Performed by: SURGERY

## 2023-10-10 PROCEDURE — G8484 FLU IMMUNIZE NO ADMIN: HCPCS | Performed by: SURGERY

## 2023-10-10 PROCEDURE — 1036F TOBACCO NON-USER: CPT | Performed by: SURGERY

## 2023-10-10 PROCEDURE — 3017F COLORECTAL CA SCREEN DOC REV: CPT | Performed by: SURGERY

## 2023-10-10 PROCEDURE — G8417 CALC BMI ABV UP PARAM F/U: HCPCS | Performed by: SURGERY

## 2023-10-10 PROCEDURE — 1090F PRES/ABSN URINE INCON ASSESS: CPT | Performed by: SURGERY

## 2023-10-10 PROCEDURE — 99204 OFFICE O/P NEW MOD 45 MIN: CPT | Performed by: SURGERY

## 2023-10-10 PROCEDURE — 1123F ACP DISCUSS/DSCN MKR DOCD: CPT | Performed by: SURGERY

## 2023-10-10 PROCEDURE — 3077F SYST BP >= 140 MM HG: CPT | Performed by: SURGERY

## 2023-10-10 PROCEDURE — 3078F DIAST BP <80 MM HG: CPT | Performed by: SURGERY

## 2023-10-10 PROCEDURE — G8427 DOCREV CUR MEDS BY ELIG CLIN: HCPCS | Performed by: SURGERY

## 2023-10-10 NOTE — PROGRESS NOTES
[address]   [address] [facility]. [address] [place] [address]  FAX: [place]  1950    Chief Complaint   Patient presents with    Results    New Patient     U/s-carotid           HPI   Ms. Marta Christopher is a 74y.o. year old female who status post left carotid endarterectomy several years back. She denies any new neurological symptoms. Current Outpatient Medications   Medication Sig Dispense Refill    Insulin Degludec (TRESIBA FLEXTOUCH) 100 UNIT/ML SOPN Inject 14 Units into the skin at bedtime 6 Adjustable Dose Pre-filled Pen Syringe 0    warfarin (COUMADIN) 5 MG tablet Take 1 tablet by mouth daily Replaces Xarelto 100 tablet 3    isosorbide mononitrate (IMDUR) 60 MG extended release tablet Take 1 tablet by mouth daily 100 tablet 3    furosemide (LASIX) 40 MG tablet Take 1 tablet by mouth daily 100 tablet 3    traZODone (DESYREL) 50 MG tablet Take 1 tablet by mouth nightly 90 tablet 1    rosuvastatin (CRESTOR) 40 MG tablet Take 1 tablet by mouth every evening 90 tablet 1    citalopram (CELEXA) 20 MG tablet Take 1 tablet by mouth daily 90 tablet 1    Continuous Blood Gluc Transmit (DEXCOM G6 TRANSMITTER) MISC Test blood sugar 3 times daily 1 each 5    Continuous Blood Gluc Sensor (DEXCOM G6 SENSOR) MISC Test blood sugar 3 times daily.  3 each 3    Continuous Blood Gluc  (DEXCOM G6 ) MAURICE Test blood sugar 3 times daily 10 each 3    NIFEdipine (PROCARDIA XL) 90 MG extended release tablet Take 1 tablet by mouth daily 90 tablet 1    aspirin EC 81 MG EC tablet Take 1 tablet by mouth daily 1 tablet 0    vitamin D (CHOLECALCIFEROL) 125 MCG (5000 UT) CAPS capsule Take 2 capsules by mouth daily      ferrous sulfate (FE TABS 325) 325 (65 Fe) MG EC tablet Take 1 tablet by mouth 2 times daily 180 tablet 1    cyanocobalamin 1000 MCG tablet Take 5 tablets by mouth daily      magnesium oxide (MAG-OX) 400 MG tablet Take 1 tablet by mouth at bedtime

## 2023-10-11 ENCOUNTER — PATIENT MESSAGE (OUTPATIENT)
Dept: INTERNAL MEDICINE CLINIC | Facility: CLINIC | Age: 73
End: 2023-10-11

## 2023-10-11 DIAGNOSIS — N18.4 TYPE 2 DIABETES MELLITUS WITH STAGE 4 CHRONIC KIDNEY DISEASE, WITH LONG-TERM CURRENT USE OF INSULIN (HCC): ICD-10-CM

## 2023-10-11 DIAGNOSIS — E11.22 TYPE 2 DIABETES MELLITUS WITH STAGE 4 CHRONIC KIDNEY DISEASE, WITH LONG-TERM CURRENT USE OF INSULIN (HCC): ICD-10-CM

## 2023-10-11 DIAGNOSIS — Z79.4 TYPE 2 DIABETES MELLITUS WITH STAGE 4 CHRONIC KIDNEY DISEASE, WITH LONG-TERM CURRENT USE OF INSULIN (HCC): ICD-10-CM

## 2023-10-12 RX ORDER — PROCHLORPERAZINE 25 MG/1
SUPPOSITORY RECTAL
Qty: 3 EACH | Refills: 5 | Status: SHIPPED | OUTPATIENT
Start: 2023-10-12

## 2023-10-12 RX ORDER — PROCHLORPERAZINE 25 MG/1
SUPPOSITORY RECTAL
Qty: 1 EACH | Refills: 5 | Status: SHIPPED | OUTPATIENT
Start: 2023-10-12

## 2023-10-12 NOTE — TELEPHONE ENCOUNTER
From: Ratna Maddox  To: Dr. Marvin Damon  Sent: 10/11/2023 6:06 PM EDT  Subject: Monica Spaulding, I need you to send a new prescription for my Dexcom G6 sensors and transmitters as soon as possible. Also please include the last visit notes of my last visit with you. The prescriptions and visit notes should be addressed to [facility] at fax no. [phone]. Please let me know when it is sent. I just ordered sensors today 10/11 and will need to stay on top of them because they are SLOW in sending orders. Thanks so much.     Mayur Najera

## 2023-10-17 ENCOUNTER — ANTI-COAG VISIT (OUTPATIENT)
Age: 73
End: 2023-10-17
Payer: MEDICARE

## 2023-10-17 DIAGNOSIS — I48.91 ATRIAL FIBRILLATION (HCC): ICD-10-CM

## 2023-10-17 DIAGNOSIS — I48.0 PAROXYSMAL ATRIAL FIBRILLATION (HCC): Primary | ICD-10-CM

## 2023-10-17 DIAGNOSIS — Z79.01 LONG TERM CURRENT USE OF ANTICOAGULANT: ICD-10-CM

## 2023-10-17 LAB
POC INR: 1.6
PROTHROMBIN TIME, POC: NORMAL

## 2023-10-17 PROCEDURE — 93793 ANTICOAG MGMT PT WARFARIN: CPT | Performed by: INTERNAL MEDICINE

## 2023-10-17 PROCEDURE — 85610 PROTHROMBIN TIME: CPT | Performed by: INTERNAL MEDICINE

## 2023-10-17 NOTE — PROGRESS NOTES
Pt is below range. Pt was above range at last INR check. Weekly dosage was decrease, however pt is currently taking pain medication and had for cortisone injections. This usually makes INR increase.  Will increase and recheck in 2 weeks//KM

## 2023-10-17 NOTE — PATIENT INSTRUCTIONS
Reminder: Please contact the Coumadin Clinic at [phone]  when you have medication changes. Examples, new medications, antibiotics, discontinued medications, new supplements, missed doses of warfarin or if you took extra doses of warfarin. This also includes OTC medications. Notifying us helps reduce the possibility of high and low INR's. In addition, if warfarin needs to be held for any procedures, please have surgeon or physician's office contact us before holding anticoagulant. Thanks, [facility] Coumadin Clinic.

## 2023-10-25 DIAGNOSIS — M25.552 LEFT HIP PAIN: ICD-10-CM

## 2023-10-25 RX ORDER — TRAMADOL HYDROCHLORIDE 50 MG/1
TABLET ORAL
Qty: 42 TABLET | OUTPATIENT
Start: 2023-10-25

## 2023-10-30 ENCOUNTER — OFFICE VISIT (OUTPATIENT)
Dept: ORTHOPEDIC SURGERY | Age: 73
End: 2023-10-30
Payer: MEDICARE

## 2023-10-30 VITALS — HEIGHT: 64 IN | BODY MASS INDEX: 29.19 KG/M2 | WEIGHT: 171 LBS

## 2023-10-30 DIAGNOSIS — M47.816 LUMBAR SPONDYLOSIS: Primary | ICD-10-CM

## 2023-10-30 DIAGNOSIS — M25.551 RIGHT HIP PAIN: Primary | ICD-10-CM

## 2023-10-30 PROCEDURE — 1090F PRES/ABSN URINE INCON ASSESS: CPT | Performed by: PHYSICIAN ASSISTANT

## 2023-10-30 PROCEDURE — G8427 DOCREV CUR MEDS BY ELIG CLIN: HCPCS | Performed by: PHYSICIAN ASSISTANT

## 2023-10-30 PROCEDURE — G8399 PT W/DXA RESULTS DOCUMENT: HCPCS | Performed by: PHYSICIAN ASSISTANT

## 2023-10-30 PROCEDURE — 3017F COLORECTAL CA SCREEN DOC REV: CPT | Performed by: PHYSICIAN ASSISTANT

## 2023-10-30 PROCEDURE — 1036F TOBACCO NON-USER: CPT | Performed by: PHYSICIAN ASSISTANT

## 2023-10-30 PROCEDURE — G8417 CALC BMI ABV UP PARAM F/U: HCPCS | Performed by: PHYSICIAN ASSISTANT

## 2023-10-30 PROCEDURE — 99213 OFFICE O/P EST LOW 20 MIN: CPT | Performed by: PHYSICIAN ASSISTANT

## 2023-10-30 PROCEDURE — 1123F ACP DISCUSS/DSCN MKR DOCD: CPT | Performed by: PHYSICIAN ASSISTANT

## 2023-10-30 PROCEDURE — G8484 FLU IMMUNIZE NO ADMIN: HCPCS | Performed by: PHYSICIAN ASSISTANT

## 2023-10-30 RX ORDER — TRAMADOL HYDROCHLORIDE 50 MG/1
50 TABLET ORAL EVERY 4 HOURS PRN
Qty: 42 TABLET | Refills: 0 | Status: SHIPPED | OUTPATIENT
Start: 2023-10-30 | End: 2023-11-06

## 2023-10-30 NOTE — PROGRESS NOTES
10/30/23        Name: Debora Orellana  YOB: 1950  Gender: female  MRN: [id number]    CC: Follow-up       HPI: Debora Orellana is a 74 y.o. female who returns for Follow-up         Patient returns the office today after injection. Patient underwent bilateral L4-5 and L5-S1 facet joint injections with Dr. Sana Moss 10/9/2023. They report greater than 80% improvement in their pain since the injection, and this continues thru today. They feel improvement in their functional abilities and greater comfort with their ADL's. Meds/PSH/PMH/FH/SH: This information has been reviewed. ALLERGIES:   Allergies   Allergen Reactions    Cephalosporins Anaphylaxis, Hives and Swelling    Hydralazine Other (See Comments)     Chest pain    Sulfamethoxazole-Trimethoprim Diarrhea    Lisinopril Other (See Comments)     Passing out spells. // pt sts not allergic to med     Codeine Nausea And Vomiting              Physical Examination:            Imaging:         MRI Result (most recent): MRI KNEE RIGHT WO CONTRAST 08/31/2022    Narrative  History: Right knee pain and swelling status post fall 2-3 weeks ago. No  surgery. Exam: MRI right Knee    Technique: Multiplanar multisequence imaging is performed as per institution  protocol. Comparison: No comparison    Findings:    Medial Compartment: There is normal signal within the medial meniscus. The  cartilage overlying medial compartment is normal. The MCL is intact. Lateral Compartment: There is a nondisplaced fracture of the lateral tibial  plateau with surrounding marrow edema. There is irregularity of the lateral  compartment cartilage with marginal osteophyte formation. There is a horizontal  cleavage tear involving the posterior horn of the lateral meniscus with  parameniscal cyst formation. Intercondylar Notch: The ACL and PCL are intact. Patellofemoral Compartment: The quadriceps and patellar tendons are intact.

## 2023-10-31 ENCOUNTER — ANTI-COAG VISIT (OUTPATIENT)
Age: 73
End: 2023-10-31
Payer: MEDICARE

## 2023-10-31 DIAGNOSIS — I48.91 ATRIAL FIBRILLATION (HCC): ICD-10-CM

## 2023-10-31 DIAGNOSIS — I48.0 PAROXYSMAL ATRIAL FIBRILLATION (HCC): Primary | ICD-10-CM

## 2023-10-31 DIAGNOSIS — Z79.01 LONG TERM CURRENT USE OF ANTICOAGULANT: ICD-10-CM

## 2023-10-31 LAB
POC INR: 1.9
PROTHROMBIN TIME, POC: NORMAL

## 2023-10-31 PROCEDURE — 85610 PROTHROMBIN TIME: CPT | Performed by: INTERNAL MEDICINE

## 2023-10-31 PROCEDURE — 93793 ANTICOAG MGMT PT WARFARIN: CPT | Performed by: INTERNAL MEDICINE

## 2023-10-31 NOTE — PROGRESS NOTES
Pt is slightly below range, Pt denies any diet or med changes.  Will increase and recheck in 2 weeks//KM

## 2023-10-31 NOTE — PATIENT INSTRUCTIONS
Reminder: Please contact the Coumadin Clinic at [phone]  when you have medication changes. Examples, new medications, antibiotics, discontinued medications, new supplements, missed doses of warfarin or if you took extra doses of warfarin. This also includes OTC medications. Notifying us helps reduce the possibility of high and low INR's. In addition, if warfarin needs to be held for any procedures, please have surgeon or physician's office contact us before holding anticoagulant. Thanks, [facility] Coumadin Clinic.

## 2023-11-02 ENCOUNTER — OFFICE VISIT (OUTPATIENT)
Dept: INTERNAL MEDICINE CLINIC | Facility: CLINIC | Age: 73
End: 2023-11-02

## 2023-11-02 VITALS
WEIGHT: 169 LBS | HEIGHT: 64 IN | DIASTOLIC BLOOD PRESSURE: 67 MMHG | SYSTOLIC BLOOD PRESSURE: 137 MMHG | TEMPERATURE: 97.2 F | HEART RATE: 68 BPM | BODY MASS INDEX: 28.85 KG/M2 | OXYGEN SATURATION: 98 %

## 2023-11-02 DIAGNOSIS — Z79.4 TYPE 2 DIABETES MELLITUS WITH STAGE 4 CHRONIC KIDNEY DISEASE, WITH LONG-TERM CURRENT USE OF INSULIN (HCC): ICD-10-CM

## 2023-11-02 DIAGNOSIS — E78.2 MIXED HYPERLIPIDEMIA: ICD-10-CM

## 2023-11-02 DIAGNOSIS — N18.4 TYPE 2 DIABETES MELLITUS WITH STAGE 4 CHRONIC KIDNEY DISEASE, WITH LONG-TERM CURRENT USE OF INSULIN (HCC): ICD-10-CM

## 2023-11-02 DIAGNOSIS — I25.10 CORONARY ARTERY DISEASE INVOLVING NATIVE CORONARY ARTERY OF NATIVE HEART WITHOUT ANGINA PECTORIS: ICD-10-CM

## 2023-11-02 DIAGNOSIS — I10 ESSENTIAL HYPERTENSION: ICD-10-CM

## 2023-11-02 DIAGNOSIS — Z23 ENCOUNTER FOR IMMUNIZATION: ICD-10-CM

## 2023-11-02 DIAGNOSIS — N18.4 STAGE 4 CHRONIC KIDNEY DISEASE (HCC): ICD-10-CM

## 2023-11-02 DIAGNOSIS — E11.22 TYPE 2 DIABETES MELLITUS WITH STAGE 4 CHRONIC KIDNEY DISEASE, WITH LONG-TERM CURRENT USE OF INSULIN (HCC): ICD-10-CM

## 2023-11-02 DIAGNOSIS — I48.0 PAROXYSMAL ATRIAL FIBRILLATION (HCC): ICD-10-CM

## 2023-11-02 DIAGNOSIS — F32.5 MAJOR DEPRESSIVE DISORDER WITH SINGLE EPISODE, IN FULL REMISSION (HCC): ICD-10-CM

## 2023-11-02 DIAGNOSIS — I50.42 CHRONIC COMBINED SYSTOLIC AND DIASTOLIC CONGESTIVE HEART FAILURE (HCC): ICD-10-CM

## 2023-11-02 DIAGNOSIS — Z00.00 MEDICARE ANNUAL WELLNESS VISIT, SUBSEQUENT: Primary | ICD-10-CM

## 2023-11-02 LAB
ALBUMIN SERPL-MCNC: 4 G/DL (ref 3.2–4.6)
ALBUMIN/GLOB SERPL: 1 (ref 0.4–1.6)
ALP SERPL-CCNC: 118 U/L (ref 50–136)
ALT SERPL-CCNC: 66 U/L (ref 12–65)
ANION GAP SERPL CALC-SCNC: 10 MMOL/L (ref 2–11)
AST SERPL-CCNC: 38 U/L (ref 15–37)
BASOPHILS # BLD: 0 K/UL (ref 0–0.2)
BASOPHILS NFR BLD: 0 % (ref 0–2)
BILIRUB DIRECT SERPL-MCNC: <0.1 MG/DL
BILIRUB SERPL-MCNC: 0.4 MG/DL (ref 0.2–1.1)
BUN SERPL-MCNC: 29 MG/DL (ref 8–23)
CALCIUM SERPL-MCNC: 9.3 MG/DL (ref 8.3–10.4)
CHLORIDE SERPL-SCNC: 107 MMOL/L (ref 101–110)
CO2 SERPL-SCNC: 21 MMOL/L (ref 21–32)
CREAT SERPL-MCNC: 1.8 MG/DL (ref 0.6–1)
DIFFERENTIAL METHOD BLD: NORMAL
EOSINOPHIL # BLD: 0.1 K/UL (ref 0–0.8)
EOSINOPHIL NFR BLD: 1 % (ref 0.5–7.8)
ERYTHROCYTE [DISTWIDTH] IN BLOOD BY AUTOMATED COUNT: 12.7 % (ref 11.9–14.6)
EST. AVERAGE GLUCOSE BLD GHB EST-MCNC: 146 MG/DL
GLOBULIN SER CALC-MCNC: 4 G/DL (ref 2.8–4.5)
GLUCOSE SERPL-MCNC: 218 MG/DL (ref 65–100)
HBA1C MFR BLD: 6.7 % (ref 4.8–5.6)
HCT VFR BLD AUTO: 42.1 % (ref 35.8–46.3)
HGB BLD-MCNC: 13.6 G/DL (ref 11.7–15.4)
IMM GRANULOCYTES # BLD AUTO: 0 K/UL (ref 0–0.5)
IMM GRANULOCYTES NFR BLD AUTO: 0 % (ref 0–5)
LYMPHOCYTES # BLD: 1.3 K/UL (ref 0.5–4.6)
LYMPHOCYTES NFR BLD: 14 % (ref 13–44)
MCH RBC QN AUTO: 31.3 PG (ref 26.1–32.9)
MCHC RBC AUTO-ENTMCNC: 32.3 G/DL (ref 31.4–35)
MCV RBC AUTO: 97 FL (ref 82–102)
MONOCYTES # BLD: 0.9 K/UL (ref 0.1–1.3)
MONOCYTES NFR BLD: 10 % (ref 4–12)
NEUTS SEG # BLD: 6.8 K/UL (ref 1.7–8.2)
NEUTS SEG NFR BLD: 75 % (ref 43–78)
NRBC # BLD: 0 K/UL (ref 0–0.2)
PLATELET # BLD AUTO: 244 K/UL (ref 150–450)
PMV BLD AUTO: 9.9 FL (ref 9.4–12.3)
POTASSIUM SERPL-SCNC: 3.5 MMOL/L (ref 3.5–5.1)
PROT SERPL-MCNC: 8 G/DL (ref 6.3–8.2)
RBC # BLD AUTO: 4.34 M/UL (ref 4.05–5.2)
SODIUM SERPL-SCNC: 138 MMOL/L (ref 133–143)
WBC # BLD AUTO: 9.1 K/UL (ref 4.3–11.1)

## 2023-11-02 RX ORDER — NIFEDIPINE 90 MG/1
90 TABLET, EXTENDED RELEASE ORAL DAILY
Qty: 90 TABLET | Refills: 1 | Status: SHIPPED | OUTPATIENT
Start: 2023-11-02

## 2023-11-02 SDOH — ECONOMIC STABILITY: FOOD INSECURITY: WITHIN THE PAST 12 MONTHS, THE FOOD YOU BOUGHT JUST DIDN'T LAST AND YOU DIDN'T HAVE MONEY TO GET MORE.: NEVER TRUE

## 2023-11-02 SDOH — ECONOMIC STABILITY: INCOME INSECURITY: HOW HARD IS IT FOR YOU TO PAY FOR THE VERY BASICS LIKE FOOD, HOUSING, MEDICAL CARE, AND HEATING?: NOT HARD AT ALL

## 2023-11-02 SDOH — ECONOMIC STABILITY: FOOD INSECURITY: WITHIN THE PAST 12 MONTHS, YOU WORRIED THAT YOUR FOOD WOULD RUN OUT BEFORE YOU GOT MONEY TO BUY MORE.: NEVER TRUE

## 2023-11-02 ASSESSMENT — LIFESTYLE VARIABLES
HOW OFTEN DURING THE LAST YEAR HAVE YOU HAD A FEELING OF GUILT OR REMORSE AFTER DRINKING: 0
HOW OFTEN DURING THE LAST YEAR HAVE YOU FAILED TO DO WHAT WAS NORMALLY EXPECTED FROM YOU BECAUSE OF DRINKING: 0
HOW OFTEN DURING THE LAST YEAR HAVE YOU FOUND THAT YOU WERE NOT ABLE TO STOP DRINKING ONCE YOU HAD STARTED: 0
HOW MANY STANDARD DRINKS CONTAINING ALCOHOL DO YOU HAVE ON A TYPICAL DAY: 1 OR 2
HOW OFTEN DURING THE LAST YEAR HAVE YOU BEEN UNABLE TO REMEMBER WHAT HAPPENED THE NIGHT BEFORE BECAUSE YOU HAD BEEN DRINKING: 0
HOW OFTEN DURING THE LAST YEAR HAVE YOU NEEDED AN ALCOHOLIC DRINK FIRST THING IN THE MORNING TO GET YOURSELF GOING AFTER A NIGHT OF HEAVY DRINKING: 0
HOW OFTEN DO YOU HAVE A DRINK CONTAINING ALCOHOL: 4 OR MORE TIMES A WEEK
HAVE YOU OR SOMEONE ELSE BEEN INJURED AS A RESULT OF YOUR DRINKING: 0
HAS A RELATIVE, FRIEND, DOCTOR, OR ANOTHER HEALTH PROFESSIONAL EXPRESSED CONCERN ABOUT YOUR DRINKING OR SUGGESTED YOU CUT DOWN: 0

## 2023-11-02 ASSESSMENT — ANXIETY QUESTIONNAIRES
5. BEING SO RESTLESS THAT IT IS HARD TO SIT STILL: 0
GAD7 TOTAL SCORE: 0
7. FEELING AFRAID AS IF SOMETHING AWFUL MIGHT HAPPEN: 0
4. TROUBLE RELAXING: 0
IF YOU CHECKED OFF ANY PROBLEMS ON THIS QUESTIONNAIRE, HOW DIFFICULT HAVE THESE PROBLEMS MADE IT FOR YOU TO DO YOUR WORK, TAKE CARE OF THINGS AT HOME, OR GET ALONG WITH OTHER PEOPLE: NOT DIFFICULT AT ALL
1. FEELING NERVOUS, ANXIOUS, OR ON EDGE: 0
2. NOT BEING ABLE TO STOP OR CONTROL WORRYING: 0
6. BECOMING EASILY ANNOYED OR IRRITABLE: 0
3. WORRYING TOO MUCH ABOUT DIFFERENT THINGS: 0

## 2023-11-02 ASSESSMENT — PATIENT HEALTH QUESTIONNAIRE - PHQ9
1. LITTLE INTEREST OR PLEASURE IN DOING THINGS: 0
SUM OF ALL RESPONSES TO PHQ QUESTIONS 1-9: 0
7. TROUBLE CONCENTRATING ON THINGS, SUCH AS READING THE NEWSPAPER OR WATCHING TELEVISION: 0
9. THOUGHTS THAT YOU WOULD BE BETTER OFF DEAD, OR OF HURTING YOURSELF: 0
5. POOR APPETITE OR OVEREATING: 0
SUM OF ALL RESPONSES TO PHQ QUESTIONS 1-9: 0
6. FEELING BAD ABOUT YOURSELF - OR THAT YOU ARE A FAILURE OR HAVE LET YOURSELF OR YOUR FAMILY DOWN: 0
4. FEELING TIRED OR HAVING LITTLE ENERGY: 0
SUM OF ALL RESPONSES TO PHQ QUESTIONS 1-9: 0
SUM OF ALL RESPONSES TO PHQ QUESTIONS 1-9: 0
2. FEELING DOWN, DEPRESSED OR HOPELESS: 0
SUM OF ALL RESPONSES TO PHQ9 QUESTIONS 1 & 2: 0
3. TROUBLE FALLING OR STAYING ASLEEP: 0
8. MOVING OR SPEAKING SO SLOWLY THAT OTHER PEOPLE COULD HAVE NOTICED. OR THE OPPOSITE, BEING SO FIGETY OR RESTLESS THAT YOU HAVE BEEN MOVING AROUND A LOT MORE THAN USUAL: 0
10. IF YOU CHECKED OFF ANY PROBLEMS, HOW DIFFICULT HAVE THESE PROBLEMS MADE IT FOR YOU TO DO YOUR WORK, TAKE CARE OF THINGS AT HOME, OR GET ALONG WITH OTHER PEOPLE: 0

## 2023-11-02 ASSESSMENT — ENCOUNTER SYMPTOMS
SHORTNESS OF BREATH: 0
BLOOD IN STOOL: 0

## 2023-11-02 NOTE — PROGRESS NOTES
involved in providing care):   Patient Care Team:  Ariane Madrigal MD as PCP - General  Ariane Madrigal MD as PCP - Empaneled Provider  Liang Stauffer DO as Consulting Physician (Cardiology)     Reviewed and updated this visit:  Tobacco  Allergies  Meds  Problems  Med Hx  Surg Hx  Soc Hx  Fam Hx

## 2023-11-02 NOTE — PATIENT INSTRUCTIONS
ordered immunizations, labs, imaging, and/or referrals for you. A list of these orders (if applicable) as well as your Preventive Care list are included within your After Visit Summary for your review. Other Preventive Recommendations:    A preventive eye exam performed by an eye specialist is recommended every 1-2 years to screen for glaucoma; cataracts, macular degeneration, and other eye disorders. A preventive dental visit is recommended every 6 months. Try to get at least 150 minutes of exercise per week or 10,000 steps per day on a pedometer . Order or download the FREE \"Exercise & Physical Activity: Your Everyday Guide\" from [employer]. Call 7-232.367.5252 or search [employer] online. You need 5256-5800 mg of calcium and 7539-6479 IU of vitamin D per day. It is possible to meet your calcium requirement with diet alone, but a vitamin D supplement is usually necessary to meet this goal.  When exposed to the sun, use a sunscreen that protects against both UVA and UVB radiation with an SPF of 30 or greater. Reapply every 2 to 3 hours or after sweating, drying off with a towel, or swimming. Always wear a seat belt when traveling in a car. Always wear a helmet when riding a bicycle or motorcycle.

## 2023-11-14 ENCOUNTER — ANTI-COAG VISIT (OUTPATIENT)
Age: 73
End: 2023-11-14
Payer: MEDICARE

## 2023-11-14 DIAGNOSIS — Z79.01 LONG TERM CURRENT USE OF ANTICOAGULANT: ICD-10-CM

## 2023-11-14 DIAGNOSIS — I48.0 PAROXYSMAL ATRIAL FIBRILLATION (HCC): Primary | ICD-10-CM

## 2023-11-14 DIAGNOSIS — I48.91 ATRIAL FIBRILLATION (HCC): ICD-10-CM

## 2023-11-14 LAB
POC INR: 2.5
PROTHROMBIN TIME, POC: NORMAL

## 2023-11-14 PROCEDURE — 85610 PROTHROMBIN TIME: CPT | Performed by: INTERNAL MEDICINE

## 2023-11-14 PROCEDURE — 93793 ANTICOAG MGMT PT WARFARIN: CPT | Performed by: INTERNAL MEDICINE

## 2023-11-14 NOTE — PATIENT INSTRUCTIONS
Reminder: Please contact the Coumadin Clinic at [phone]  when you have medication changes. Examples, new medications, antibiotics, discontinued medications, new supplements, missed doses of warfarin or if you took extra doses of warfarin. This also includes OTC medications. Notifying us helps reduce the possibility of high and low INR's. In addition, if warfarin needs to be held for any procedures, please have surgeon or physician's office contact us before holding anticoagulant. Thanks, [facility] Coumadin Clinic.

## 2023-11-15 ENCOUNTER — TELEPHONE (OUTPATIENT)
Age: 73
End: 2023-11-15

## 2023-11-15 DIAGNOSIS — M16.12 PRIMARY OSTEOARTHRITIS OF LEFT HIP: Primary | ICD-10-CM

## 2023-11-15 NOTE — TELEPHONE ENCOUNTER
Cardiac Clearance        Physician or Practice Requesting:[facility]  : Ez Faria Phone Number: [phone]  Fax Number: [phone]  Date of Surgery/Procedure: 01/17/2024  Type of Surgery or Procedure: Left total hip  Type of Anesthesia: Spinal  Type of Clearance Requested: Cardiac Clearance and Medication Hold  Medication to Hold:Coumadin  Days to Hold: 5 days

## 2023-11-16 NOTE — TELEPHONE ENCOUNTER
Please let the patient and orthopedic surgery know it is generally considered low risk to hold Warfarin for 5 days prior to hip surgery if required by surgeon; and the patient would like to undergo procedure/operation. Closely monitor hemodynamics perioperatively. Resume Warfarin as soon as possible once okay with surgeon as the patient is at increased risk for CVA while off anticoagulation. She will need to be scheduled shortly after surgery in coumadin clinic for INR check to get INR back to therapeutic range (INR 2-3).

## 2023-11-17 DIAGNOSIS — I10 ESSENTIAL HYPERTENSION: ICD-10-CM

## 2023-11-17 RX ORDER — NIFEDIPINE 90 MG/1
90 TABLET, EXTENDED RELEASE ORAL DAILY
Qty: 100 TABLET | Refills: 0 | Status: SHIPPED | OUTPATIENT
Start: 2023-11-17

## 2023-11-17 NOTE — TELEPHONE ENCOUNTER
[facility] sent a med request per Insurance they will only cover a 100 day supply for the patient not 90.  IS it ok to send 100 for 90 days

## 2023-11-28 DIAGNOSIS — M25.552 LEFT HIP PAIN: Primary | ICD-10-CM

## 2023-11-28 RX ORDER — TRAMADOL HYDROCHLORIDE 50 MG/1
50 TABLET ORAL EVERY 4 HOURS PRN
Qty: 42 TABLET | Refills: 0 | Status: SHIPPED | OUTPATIENT
Start: 2023-11-28 | End: 2023-12-05

## 2023-12-09 DIAGNOSIS — E11.22 TYPE 2 DIABETES MELLITUS WITH STAGE 4 CHRONIC KIDNEY DISEASE, WITH LONG-TERM CURRENT USE OF INSULIN (HCC): ICD-10-CM

## 2023-12-09 DIAGNOSIS — Z79.4 TYPE 2 DIABETES MELLITUS WITH STAGE 4 CHRONIC KIDNEY DISEASE, WITH LONG-TERM CURRENT USE OF INSULIN (HCC): ICD-10-CM

## 2023-12-09 DIAGNOSIS — N18.4 TYPE 2 DIABETES MELLITUS WITH STAGE 4 CHRONIC KIDNEY DISEASE, WITH LONG-TERM CURRENT USE OF INSULIN (HCC): ICD-10-CM

## 2023-12-11 RX ORDER — INSULIN DEGLUDEC INJECTION 100 U/ML
14 INJECTION, SOLUTION SUBCUTANEOUS NIGHTLY
Qty: 15 ML | Refills: 2 | Status: SHIPPED | OUTPATIENT
Start: 2023-12-11

## 2023-12-12 ENCOUNTER — ANTI-COAG VISIT (OUTPATIENT)
Age: 73
End: 2023-12-12
Payer: MEDICARE

## 2023-12-12 DIAGNOSIS — I48.0 PAROXYSMAL ATRIAL FIBRILLATION (HCC): Primary | ICD-10-CM

## 2023-12-12 DIAGNOSIS — I48.91 ATRIAL FIBRILLATION (HCC): ICD-10-CM

## 2023-12-12 DIAGNOSIS — Z79.01 LONG TERM CURRENT USE OF ANTICOAGULANT: ICD-10-CM

## 2023-12-12 LAB
POC INR: 2.9
PROTHROMBIN TIME, POC: NORMAL

## 2023-12-12 PROCEDURE — 85610 PROTHROMBIN TIME: CPT | Performed by: INTERNAL MEDICINE

## 2023-12-12 PROCEDURE — 93793 ANTICOAG MGMT PT WARFARIN: CPT | Performed by: INTERNAL MEDICINE

## 2023-12-12 NOTE — PATIENT INSTRUCTIONS
Reminder: Please contact the Coumadin Clinic at [phone]  when you have medication changes. Examples, new medications, antibiotics, discontinued medications, new supplements, missed doses of warfarin or if you took extra doses of warfarin. This also includes OTC medications. Notifying us helps reduce the possibility of high and low INR's. In addition, if warfarin needs to be held for any procedures, please have surgeon or physician's office contact us before holding anticoagulant. Thanks, [facility] Coumadin Clinic.

## 2023-12-13 ENCOUNTER — OFFICE VISIT (OUTPATIENT)
Dept: ORTHOPEDIC SURGERY | Age: 73
End: 2023-12-13
Payer: MEDICARE

## 2023-12-13 VITALS — WEIGHT: 169 LBS | HEIGHT: 64 IN | BODY MASS INDEX: 28.85 KG/M2

## 2023-12-13 DIAGNOSIS — M54.16 LUMBAR RADICULOPATHY: Primary | ICD-10-CM

## 2023-12-13 PROCEDURE — G8399 PT W/DXA RESULTS DOCUMENT: HCPCS | Performed by: PHYSICIAN ASSISTANT

## 2023-12-13 PROCEDURE — G8427 DOCREV CUR MEDS BY ELIG CLIN: HCPCS | Performed by: PHYSICIAN ASSISTANT

## 2023-12-13 PROCEDURE — G8417 CALC BMI ABV UP PARAM F/U: HCPCS | Performed by: PHYSICIAN ASSISTANT

## 2023-12-13 PROCEDURE — 1036F TOBACCO NON-USER: CPT | Performed by: PHYSICIAN ASSISTANT

## 2023-12-13 PROCEDURE — G8484 FLU IMMUNIZE NO ADMIN: HCPCS | Performed by: PHYSICIAN ASSISTANT

## 2023-12-13 PROCEDURE — 1123F ACP DISCUSS/DSCN MKR DOCD: CPT | Performed by: PHYSICIAN ASSISTANT

## 2023-12-13 PROCEDURE — 1090F PRES/ABSN URINE INCON ASSESS: CPT | Performed by: PHYSICIAN ASSISTANT

## 2023-12-13 PROCEDURE — 3017F COLORECTAL CA SCREEN DOC REV: CPT | Performed by: PHYSICIAN ASSISTANT

## 2023-12-13 PROCEDURE — 99214 OFFICE O/P EST MOD 30 MIN: CPT | Performed by: PHYSICIAN ASSISTANT

## 2023-12-13 RX ORDER — GABAPENTIN 300 MG/1
300 CAPSULE ORAL 3 TIMES DAILY
Qty: 90 CAPSULE | Refills: 0 | Status: CANCELLED | OUTPATIENT
Start: 2023-12-13 | End: 2024-01-12

## 2023-12-13 RX ORDER — GABAPENTIN 300 MG/1
300 CAPSULE ORAL 3 TIMES DAILY
Qty: 90 CAPSULE | Refills: 0 | Status: SHIPPED | OUTPATIENT
Start: 2023-12-13 | End: 2024-01-12

## 2023-12-13 NOTE — PROGRESS NOTES
12/13/23        Name: Angela Mosley  YOB: 1950  Gender: female  MRN: [id number]    CC: Follow-up       HPI: Angela Mosley is a 74 y.o. female who returns for Follow-up         Patient returns the office today for follow-up. She last underwent bilateral L4-5 and L5-S1 facet joint injection with Dr. Aaliyah Faye on 10/9/2023. She has had greater than 80% improvement in her back pain since the injection. However for the last 3 weeks she has had gradually increasingly severe rating pain to the left buttock thigh and lower leg and foot. She is scheduled to undergo a total hip replacement on the left with Dr. Radha Nayak on 1/17/2024. She denies any injury or recent trauma in the last 3 weeks to worsen her leg pain. This is new compared to the pain she was experiencing a few months ago before her last injection. She is having difficulty walking, caring for her self. Her pain is up to a 10 out of 10 at times. She is having difficulty performing basic household tasks. She has significant weakness in the left hip over the last week or so. History was obtained by patient      Meds/PSH/PMH/FH/SH: This information has been reviewed. ALLERGIES:   Allergies   Allergen Reactions    Cephalosporins Anaphylaxis, Hives and Swelling    Hydralazine Other (See Comments)     Chest pain    Sulfamethoxazole-Trimethoprim Diarrhea    Lisinopril Other (See Comments)     Passing out spells. // pt sts not allergic to med     Codeine Nausea And Vomiting              Physical Examination:    Ambulates with a significant limp and a cane    Sensation intact to light touch L3-S1    Full motor with hip flexion on the right, -2/5 motor with hip flexion on the left.   Also appears pain limited in part  4+/5-5/5 motor with knee extension bilaterally  Full motor with ankle dorsiflexion to great toe extension bilaterally      Imaging:             Assessment and Plan    The patient has developed severe rating pain to

## 2023-12-18 DIAGNOSIS — M54.16 LUMBAR RADICULOPATHY: ICD-10-CM

## 2023-12-22 ENCOUNTER — PATIENT MESSAGE (OUTPATIENT)
Dept: INTERNAL MEDICINE CLINIC | Facility: CLINIC | Age: 73
End: 2023-12-22

## 2023-12-28 ENCOUNTER — OFFICE VISIT (OUTPATIENT)
Dept: ORTHOPEDIC SURGERY | Age: 73
End: 2023-12-28
Payer: MEDICARE

## 2023-12-28 DIAGNOSIS — M54.16 LUMBAR RADICULOPATHY: Primary | ICD-10-CM

## 2023-12-28 PROCEDURE — 64483 NJX AA&/STRD TFRM EPI L/S 1: CPT | Performed by: PHYSICAL MEDICINE & REHABILITATION

## 2023-12-28 PROCEDURE — 64484 NJX AA&/STRD TFRM EPI L/S EA: CPT | Performed by: PHYSICAL MEDICINE & REHABILITATION

## 2023-12-28 RX ORDER — TRIAMCINOLONE ACETONIDE 40 MG/ML
160 INJECTION, SUSPENSION INTRA-ARTICULAR; INTRAMUSCULAR ONCE
Status: COMPLETED | OUTPATIENT
Start: 2023-12-28 | End: 2023-12-28

## 2023-12-28 RX ADMIN — TRIAMCINOLONE ACETONIDE 160 MG: 40 INJECTION, SUSPENSION INTRA-ARTICULAR; INTRAMUSCULAR at 13:35

## 2023-12-28 NOTE — PROGRESS NOTES
Date: 12/28/23   Name: Glenna Khan    Pre-Procedural Diagnosis:    Diagnosis Orders   1. Lumbar radiculopathy  FL NERVE BLOCK LUMBOSACRAL 1ST    FL NERVE BLOCK LUMBOSACRAL EACH ADD    triamcinolone acetonide (KENALOG-40) injection 160 mg          Procedure: Selective Nerve Root Blocks (Transforaminal) - Multiple Level    Precautions:  LBH Precautions spine injections: None. Patient denies any prior sensitivity to steroid, local anesthetic, contrast dye, iodine or shellfish. The procedure was discussed at length with the patient and informed consent was signed. The patient was placed in a prone position on the fluoroscopy table and the skin was prepped and draped in a routine sterile fashion. The areas to be injected were each anesthetized with approximately 5 cc of 1% Lidocaine. A 22-gauge 3.5 inch spinal needle was carefully advanced under fluoroscopic guidance to the left L5 transforaminal space and subsequently the left S1 transforaminal space. At this time 0.25 cc of omnipaque administered. . Once proper placement was confirmed, 2 cc of 0.25% Marcaine and 80 mg of Kenalog were injected through the spinal needle at each site. Fluoroscopic guidance was used intermittently over a 10-minute period to insure proper needle placement and patient safety. A hard copy of the fluoroscopic  images has been placed in the patient's chart. The patient was monitored after the procedure and discharged home in stable fashion. A total of 4 cc of Kenalog were administered during this procedure. Resume Meds:  N/A Remains on coumadin and asa.     Hebert Mccoy MD  12/28/23

## 2024-01-03 ENCOUNTER — HOSPITAL ENCOUNTER (OUTPATIENT)
Dept: SURGERY | Age: 74
Discharge: HOME | End: 2024-01-06
Payer: MEDICARE

## 2024-01-03 ENCOUNTER — PREP FOR PROCEDURE (OUTPATIENT)
Dept: ORTHOPEDIC SURGERY | Age: 74
End: 2024-01-03

## 2024-01-03 VITALS
WEIGHT: 165.34 LBS | HEIGHT: 64 IN | BODY MASS INDEX: 28.23 KG/M2 | TEMPERATURE: 97.3 F | SYSTOLIC BLOOD PRESSURE: 137 MMHG | DIASTOLIC BLOOD PRESSURE: 68 MMHG | HEART RATE: 78 BPM | OXYGEN SATURATION: 97 % | RESPIRATION RATE: 18 BRPM

## 2024-01-03 DIAGNOSIS — Z01.818 PRE-OP EVALUATION: ICD-10-CM

## 2024-01-03 DIAGNOSIS — Z01.818 PRE-OP EVALUATION: Primary | ICD-10-CM

## 2024-01-03 LAB
ALBUMIN SERPL-MCNC: 3.4 G/DL (ref 3.2–4.6)
ANION GAP SERPL CALC-SCNC: 7 MMOL/L (ref 2–11)
BASOPHILS # BLD: 0.1 K/UL (ref 0–0.2)
BASOPHILS NFR BLD: 0 % (ref 0–2)
BUN SERPL-MCNC: 34 MG/DL (ref 8–23)
CALCIUM SERPL-MCNC: 8.9 MG/DL (ref 8.3–10.4)
CHLORIDE SERPL-SCNC: 104 MMOL/L (ref 103–113)
CO2 SERPL-SCNC: 26 MMOL/L (ref 21–32)
CREAT SERPL-MCNC: 2.08 MG/DL (ref 0.6–1)
DIFFERENTIAL METHOD BLD: ABNORMAL
EKG ATRIAL RATE: 67 BPM
EKG DIAGNOSIS: NORMAL
EKG P AXIS: 58 DEGREES
EKG P-R INTERVAL: 130 MS
EKG Q-T INTERVAL: 434 MS
EKG QRS DURATION: 102 MS
EKG QTC CALCULATION (BAZETT): 458 MS
EKG R AXIS: 113 DEGREES
EKG T AXIS: 4 DEGREES
EKG VENTRICULAR RATE: 67 BPM
EOSINOPHIL # BLD: 0.1 K/UL (ref 0–0.8)
EOSINOPHIL NFR BLD: 1 % (ref 0.5–7.8)
ERYTHROCYTE [DISTWIDTH] IN BLOOD BY AUTOMATED COUNT: 13.5 % (ref 11.9–14.6)
EST. AVERAGE GLUCOSE BLD GHB EST-MCNC: 143 MG/DL
GLUCOSE SERPL-MCNC: 353 MG/DL (ref 65–100)
HBA1C MFR BLD: 6.6 % (ref 4.8–5.6)
HCT VFR BLD AUTO: 41.4 % (ref 35.8–46.3)
HGB BLD-MCNC: 13.8 G/DL (ref 11.7–15.4)
IMM GRANULOCYTES # BLD AUTO: 0.2 K/UL (ref 0–0.5)
IMM GRANULOCYTES NFR BLD AUTO: 1 % (ref 0–5)
INR PPP: 3.5
LYMPHOCYTES # BLD: 1.1 K/UL (ref 0.5–4.6)
LYMPHOCYTES NFR BLD: 9 % (ref 13–44)
MCH RBC QN AUTO: 30.9 PG (ref 26.1–32.9)
MCHC RBC AUTO-ENTMCNC: 33.3 G/DL (ref 31.4–35)
MCV RBC AUTO: 92.6 FL (ref 82–102)
MONOCYTES # BLD: 1.2 K/UL (ref 0.1–1.3)
MONOCYTES NFR BLD: 10 % (ref 4–12)
NEUTS SEG # BLD: 9.5 K/UL (ref 1.7–8.2)
NEUTS SEG NFR BLD: 78 % (ref 43–78)
NRBC # BLD: 0 K/UL (ref 0–0.2)
PLATELET # BLD AUTO: 373 K/UL (ref 150–450)
PMV BLD AUTO: 9.5 FL (ref 9.4–12.3)
POTASSIUM SERPL-SCNC: 3.1 MMOL/L (ref 3.5–5.1)
PROTHROMBIN TIME: 34.7 SEC (ref 11.3–14.9)
RBC # BLD AUTO: 4.47 M/UL (ref 4.05–5.2)
SODIUM SERPL-SCNC: 137 MMOL/L (ref 136–146)
WBC # BLD AUTO: 12.1 K/UL (ref 4.3–11.1)

## 2024-01-03 PROCEDURE — 83036 HEMOGLOBIN GLYCOSYLATED A1C: CPT

## 2024-01-03 PROCEDURE — 82040 ASSAY OF SERUM ALBUMIN: CPT

## 2024-01-03 PROCEDURE — 80307 DRUG TEST PRSMV CHEM ANLYZR: CPT

## 2024-01-03 PROCEDURE — 93010 ELECTROCARDIOGRAM REPORT: CPT | Performed by: INTERNAL MEDICINE

## 2024-01-03 PROCEDURE — 85610 PROTHROMBIN TIME: CPT

## 2024-01-03 PROCEDURE — 80048 BASIC METABOLIC PNL TOTAL CA: CPT

## 2024-01-03 PROCEDURE — 93005 ELECTROCARDIOGRAM TRACING: CPT

## 2024-01-03 PROCEDURE — 87641 MR-STAPH DNA AMP PROBE: CPT

## 2024-01-03 PROCEDURE — 85025 COMPLETE CBC W/AUTO DIFF WBC: CPT

## 2024-01-03 RX ORDER — SODIUM CHLORIDE 0.9 % (FLUSH) 0.9 %
5-40 SYRINGE (ML) INJECTION PRN
Status: CANCELLED | OUTPATIENT
Start: 2024-01-03

## 2024-01-03 RX ORDER — SODIUM CHLORIDE 0.9 % (FLUSH) 0.9 %
5-40 SYRINGE (ML) INJECTION EVERY 12 HOURS SCHEDULED
Status: CANCELLED | OUTPATIENT
Start: 2024-01-03

## 2024-01-03 RX ORDER — SODIUM CHLORIDE 9 MG/ML
INJECTION, SOLUTION INTRAVENOUS PRN
Status: CANCELLED | OUTPATIENT
Start: 2024-01-03

## 2024-01-03 ASSESSMENT — PAIN SCALES - GENERAL: PAINLEVEL_OUTOF10: 2

## 2024-01-03 ASSESSMENT — PAIN DESCRIPTION - ORIENTATION: ORIENTATION: LEFT

## 2024-01-03 ASSESSMENT — PAIN DESCRIPTION - LOCATION: LOCATION: HIP

## 2024-01-03 ASSESSMENT — PAIN DESCRIPTION - DESCRIPTORS: DESCRIPTORS: ACHING

## 2024-01-03 NOTE — PERIOP NOTE
PLEASE CONTINUE TAKING ALL PRESCRIPTION MEDICATIONS UP TO THE DAY OF SURGERY UNLESS OTHERWISE DIRECTED BELOW.    DISCONTINUE all vitamins, herbals and supplements 3 weeks prior to surgery. DISCONTINUE Non-Steroidal Anti-Inflammatory (NSAIDS) such as Advil, Ibuprofen, Motrin, Naproxen and Aleve 5 days prior to surgery.       Home Medications to take  the day of surgery   Aspirin  Carvedilol  Citalopram   Isosorbide  Nifedipine   Night prior to surgery only take Tresiba 11 units     Home Medications to Hold- please continue all other medications except these.    Hold the Coumadin x 5 days prior to surgery  (Last dose is 01/11/2023)        Comments   On the day before surgery please take Acetaminophen 1000mg in the morning and then again before bed. You may substitute for Tylenol 650 mg.      Bring Dynahex wash and Incentive Spirometer with you to hospital on the day of surgery.            Please do not bring home medications with you on the day of surgery unless otherwise directed by your nurse.  If you are instructed to bring home medications, please give them to your nurse as they will be administered by the nursing staff.    If you have any questions, please call [facility] [phone].    A copy of this note was provided to the patient for reference.

## 2024-01-03 NOTE — PERIOP NOTE
Patient verified name and .    Order for consent was found in EHR and matches case posting; patient verified.   left total hip arthroplasty     Type 3 surgery, PAT walk in assessment complete.    Labs per surgeon: CBC,BMP, PT/INR, HgbA1c, Albumin, Nicotine and MRSA swab  ; results pending  Labs per anesthesia protocol: no additional  EKG:Done today and within anesthesia guidelines    Pt is followed by Dr. Handy Brower at [facility]. Latest office note  2023, latest ECHO 02/15/2021 with EVEF 55-60%    Cardiac clearance and Coumadin hold found in EHR:  Handy rBower, DO   to Jennifer Keen MA       23  8:25 AM  Note     Please let the patient and orthopedic surgery know it is generally considered low risk to hold Warfarin for 5 days prior to hip surgery if required by surgeon; and the patient would like to undergo procedure/operation. Closely monitor hemodynamics perioperatively.     Resume Warfarin as soon as possible once okay with surgeon as the patient is at increased risk for CVA while off anticoagulation.     She will need to be scheduled shortly after surgery in coumadin clinic for INR check to get INR back to therapeutic range (INR 2-3).                 MRSA/MSSA swab collected; pharmacy to review and dose antibiotic as appropriate.     Hospital approved surgical skin cleanser and instructions to return bottle on DOS given per hospital policy.    Patient provided with handouts including Guide to Surgery, Pain Management, Hand Hygiene, Blood Transfusion Education, and [facility] Brochure.    Patient answered medical/surgical history questions at their best of ability. All prior to admission medications documented in [facility]. Original medication prescription bottle was visualized during patient appointment.     Patient instructed to hold all vitamins 3 weeks prior to surgery and NSAIDS 5 days prior to surgery.     Patient teach back successful and patient

## 2024-01-04 LAB
MRSA DNA SPEC QL NAA+PROBE: DETECTED
S AUREUS CPE NOSE QL NAA+PROBE: DETECTED

## 2024-01-05 ENCOUNTER — PATIENT MESSAGE (OUTPATIENT)
Dept: INTERNAL MEDICINE CLINIC | Facility: CLINIC | Age: 74
End: 2024-01-05

## 2024-01-05 ENCOUNTER — PATIENT MESSAGE (OUTPATIENT)
Age: 74
End: 2024-01-05

## 2024-01-05 DIAGNOSIS — Z79.4 TYPE 2 DIABETES MELLITUS WITH STAGE 4 CHRONIC KIDNEY DISEASE, WITH LONG-TERM CURRENT USE OF INSULIN (HCC): Primary | ICD-10-CM

## 2024-01-05 DIAGNOSIS — E11.22 TYPE 2 DIABETES MELLITUS WITH STAGE 4 CHRONIC KIDNEY DISEASE, WITH LONG-TERM CURRENT USE OF INSULIN (HCC): Primary | ICD-10-CM

## 2024-01-05 DIAGNOSIS — N18.4 TYPE 2 DIABETES MELLITUS WITH STAGE 4 CHRONIC KIDNEY DISEASE, WITH LONG-TERM CURRENT USE OF INSULIN (HCC): Primary | ICD-10-CM

## 2024-01-05 LAB
COMMENT:: NORMAL
COTININE UR QL SCN: NEGATIVE NG/ML

## 2024-01-05 RX ORDER — TIRZEPATIDE 2.5 MG/.5ML
2.5 INJECTION, SOLUTION SUBCUTANEOUS WEEKLY
Qty: 6 ML | Refills: 0 | Status: SHIPPED | OUTPATIENT
Start: 2024-01-05

## 2024-01-05 NOTE — TELEPHONE ENCOUNTER
From: Magalis Maddox  To: Dr. Jamison Riggs  Sent: 1/5/2024 9:13 AM EST  Subject: Eliquis and Mounjaro    Hi Dr. Kay, just found out my new insurance will cover both Eliquis tablets and Mounjaro pens. I would rather take a shot than a pill. Please let me know what we are going to do. A new prescription for Mounjaro should be sent to [facility].    Thanks, Magalis Maddox

## 2024-01-07 DIAGNOSIS — G89.18 POSTOPERATIVE PAIN: Primary | ICD-10-CM

## 2024-01-07 RX ORDER — ACETAMINOPHEN 500 MG
1000 TABLET ORAL EVERY 6 HOURS PRN
Qty: 180 TABLET | Refills: 2 | Status: SHIPPED | OUTPATIENT
Start: 2024-01-07

## 2024-01-07 RX ORDER — ONDANSETRON 4 MG/1
4 TABLET, FILM COATED ORAL 3 TIMES DAILY PRN
Qty: 30 TABLET | Refills: 1 | Status: SHIPPED | OUTPATIENT
Start: 2024-01-07

## 2024-01-07 RX ORDER — SENNOSIDES A AND B 8.6 MG/1
1 TABLET, FILM COATED ORAL 2 TIMES DAILY
Qty: 30 TABLET | Refills: 2 | Status: SHIPPED | OUTPATIENT
Start: 2024-01-07

## 2024-01-07 RX ORDER — OXYCODONE HYDROCHLORIDE 5 MG/1
10 TABLET ORAL EVERY 4 HOURS PRN
Qty: 60 TABLET | Refills: 0 | Status: SHIPPED | OUTPATIENT
Start: 2024-01-07 | End: 2024-01-14

## 2024-01-07 RX ORDER — OMEPRAZOLE 40 MG/1
40 CAPSULE, DELAYED RELEASE ORAL DAILY
Qty: 30 CAPSULE | Refills: 0 | Status: SHIPPED | OUTPATIENT
Start: 2024-01-07

## 2024-01-08 ENCOUNTER — TELEPHONE (OUTPATIENT)
Dept: INTERNAL MEDICINE CLINIC | Facility: CLINIC | Age: 74
End: 2024-01-08

## 2024-01-08 DIAGNOSIS — N18.4 TYPE 2 DIABETES MELLITUS WITH STAGE 4 CHRONIC KIDNEY DISEASE, WITH LONG-TERM CURRENT USE OF INSULIN (HCC): ICD-10-CM

## 2024-01-08 DIAGNOSIS — E11.22 TYPE 2 DIABETES MELLITUS WITH STAGE 4 CHRONIC KIDNEY DISEASE, WITH LONG-TERM CURRENT USE OF INSULIN (HCC): ICD-10-CM

## 2024-01-08 DIAGNOSIS — Z79.4 TYPE 2 DIABETES MELLITUS WITH STAGE 4 CHRONIC KIDNEY DISEASE, WITH LONG-TERM CURRENT USE OF INSULIN (HCC): ICD-10-CM

## 2024-01-08 RX ORDER — OMEPRAZOLE 40 MG/1
40 CAPSULE, DELAYED RELEASE ORAL DAILY
Qty: 90 CAPSULE | OUTPATIENT
Start: 2024-01-08

## 2024-01-09 ENCOUNTER — TELEPHONE (OUTPATIENT)
Age: 74
End: 2024-01-09

## 2024-01-09 NOTE — TELEPHONE ENCOUNTER
Per Dr. Coles, in Dr. Brower's absence, pt can start on Eliquis 5 mg after med hold for procedure.

## 2024-01-09 NOTE — TELEPHONE ENCOUNTER
Requested Prescriptions     Pending Prescriptions Disp Refills    apixaban (ELIQUIS) 5 MG TABS tablet 180 tablet 3     Sig: Take 1 tablet by mouth 2 times daily    apixaban (ELIQUIS) 5 MG TABS tablet 60 tablet 0     Sig: Take 1 tablet by mouth 2 times daily

## 2024-01-09 NOTE — TELEPHONE ENCOUNTER
Patient sent a message on [employer] and is wanting to speak with  nurse since she has not heard back.

## 2024-01-10 DIAGNOSIS — M25.551 RIGHT HIP PAIN: ICD-10-CM

## 2024-01-10 RX ORDER — TRAMADOL HYDROCHLORIDE 50 MG/1
TABLET ORAL
Qty: 42 TABLET | OUTPATIENT
Start: 2024-01-10

## 2024-01-12 ENCOUNTER — OFFICE VISIT (OUTPATIENT)
Dept: ORTHOPEDIC SURGERY | Age: 74
End: 2024-01-12

## 2024-01-12 DIAGNOSIS — M16.12 PRIMARY OSTEOARTHRITIS OF LEFT HIP: Primary | ICD-10-CM

## 2024-01-12 DIAGNOSIS — F51.01 PRIMARY INSOMNIA: ICD-10-CM

## 2024-01-12 NOTE — PROGRESS NOTES
J, DO   furosemide (LASIX) 40 MG tablet Take 1 tablet by mouth daily 9/6/23   Handy Brower, DO   traZODone (DESYREL) 50 MG tablet Take 1 tablet by mouth nightly 8/1/23   Jamison Riggs MD   rosuvastatin (CRESTOR) 40 MG tablet Take 1 tablet by mouth every evening 8/1/23   Jamison Riggs MD   citalopram (CELEXA) 20 MG tablet Take 1 tablet by mouth daily 8/1/23   Jamison Riggs MD   Continuous Blood Gluc  (DEXCOM G6 ) MAURICE Test blood sugar 3 times daily 2/27/23   Jamison Riggs MD   BD PEN NEEDLE MILVIA 2ND GEN 32G X 4 MM MISC USE THREE TIMES DAILY 1/3/23   ProviderDeon MD   aspirin EC 81 MG EC tablet Take 1 tablet by mouth daily 1/30/23   Jamison Riggs MD   Cholecalciferol 50 MCG (2000 UT) TABS Take 1 tablet by mouth daily    ProviderDeon MD   ferrous sulfate (FE TABS 325) 325 (65 Fe) MG EC tablet Take 1 tablet by mouth 2 times daily 9/26/22   Jamison Riggs MD   cyanocobalamin 1000 MCG tablet Take 5 tablets by mouth daily    Automatic Reconciliation, Ar   magnesium oxide (MAG-OX) 400 MG tablet Take 1 tablet by mouth at bedtime 11/30/16   Automatic Reconciliation, Ar   nitroGLYCERIN (NITROSTAT) 0.4 MG SL tablet Place 1 tablet under the tongue every 5 minutes as needed for Chest pain Call 911 if no relief after 3 doses 11/6/18   Automatic Reconciliation, Ar   potassium chloride (KLOR-CON M) 20 MEQ extended release tablet Take 2 tablets by mouth daily 3/13/22   Automatic Reconciliation, Ar       Family History:     Family History   Problem Relation Age of Onset    Heart Disease Father     Cancer Father         pancreatic    Thyroid Disease Mother         Multinodular Goiter    Heart Attack Mother 72        mi    Osteoporosis Mother     Heart Disease Mother     Breast Cancer Neg Hx     Thyroid Cancer Brother     Ulcerative Colitis Brother     Thyroid Cancer Sister     Cancer Sister         Pre-Cancerous dysplasia    Heart Attack Father 72        MI       Social History:      Social History

## 2024-01-15 RX ORDER — TRAZODONE HYDROCHLORIDE 50 MG/1
50 TABLET ORAL NIGHTLY
Qty: 90 TABLET | Refills: 0 | Status: SHIPPED | OUTPATIENT
Start: 2024-01-15

## 2024-01-15 NOTE — PERIOP NOTE
NICOTINE AND METABOLITES, URINE  Order: [id number]  Status: Final result       Visible to patient: Yes (seen)       Next appt: 02/08/2024 at 02:40 PM in Internal Medicine (Jamison Riggs MD)       Dx: Pre-op evaluation    0 Result Notes       Component  Ref Range & Units 1/3/24 1250 11/21/22 1259   Cotinine Screen, Ur  Lhhklg=967 ng/mL Negative [1] Negative [1]   Comment:    Comment [2] Comment CM[2]   Comment: (NOTE)  This analysis is performed by immunoassay. Positive findings are  unconfirmed analytical test results; if results do not support  expected clinical finding, confirmation by an alternate methodology  is recommended. Patient metabolic variables, specific drug chemistry,  and specimen characteristics can affect test outcome.  Technical consultation is available at [email or web address], or  call toll free [phone].  Performed At: [facility]  [address], [place] [id number]  Brian Chapa MD Ph:[phone]  Performed At: [facility]  [address], [place] [id number]  Lizbet Conway PhD Ph:[phone]   Resulting Agency [facility]              Specimen Collected: 01/03/24 12:50 EST Last Resulted: 01/05/24 16:36 EST

## 2024-01-16 ENCOUNTER — ANESTHESIA EVENT (OUTPATIENT)
Dept: SURGERY | Age: 74
End: 2024-01-16
Payer: MEDICARE

## 2024-01-17 ENCOUNTER — APPOINTMENT (OUTPATIENT)
Dept: GENERAL RADIOLOGY | Age: 74
End: 2024-01-17
Attending: ORTHOPAEDIC SURGERY
Payer: MEDICARE

## 2024-01-17 ENCOUNTER — HOME HEALTH ADMISSION (OUTPATIENT)
Dept: HOME HEALTH SERVICES | Facility: HOME HEALTH | Age: 74
End: 2024-01-17
Payer: MEDICARE

## 2024-01-17 ENCOUNTER — ANESTHESIA (OUTPATIENT)
Dept: SURGERY | Age: 74
End: 2024-01-17
Payer: MEDICARE

## 2024-01-17 ENCOUNTER — HOSPITAL ENCOUNTER (OUTPATIENT)
Age: 74
LOS: 1 days | Discharge: HOME | End: 2024-01-18
Attending: ORTHOPAEDIC SURGERY | Admitting: ORTHOPAEDIC SURGERY
Payer: MEDICARE

## 2024-01-17 PROBLEM — M16.12 OSTEOARTHRITIS OF LEFT HIP, UNSPECIFIED OSTEOARTHRITIS TYPE: Status: ACTIVE | Noted: 2024-01-17

## 2024-01-17 LAB
GLUCOSE BLD STRIP.AUTO-MCNC: 139 MG/DL (ref 65–100)
GLUCOSE BLD STRIP.AUTO-MCNC: 168 MG/DL (ref 65–100)
GLUCOSE BLD STRIP.AUTO-MCNC: 391 MG/DL (ref 65–100)
GLUCOSE BLD STRIP.AUTO-MCNC: 411 MG/DL (ref 65–100)
INR BLD: 1.1 (ref 0.9–1.2)
POTASSIUM BLD-SCNC: 3.3 MMOL/L (ref 3.5–5.1)
PT BLD: 13.2 SECS (ref 9.6–11.6)
SERVICE CMNT-IMP: ABNORMAL

## 2024-01-17 PROCEDURE — 6360000002 HC RX W HCPCS: Performed by: ORTHOPAEDIC SURGERY

## 2024-01-17 PROCEDURE — 3600000015 HC SURGERY LEVEL 5 ADDTL 15MIN: Performed by: ORTHOPAEDIC SURGERY

## 2024-01-17 PROCEDURE — 97161 PT EVAL LOW COMPLEX 20 MIN: CPT

## 2024-01-17 PROCEDURE — 6370000000 HC RX 637 (ALT 250 FOR IP): Performed by: ORTHOPAEDIC SURGERY

## 2024-01-17 PROCEDURE — 7100000001 HC PACU RECOVERY - ADDTL 15 MIN: Performed by: ORTHOPAEDIC SURGERY

## 2024-01-17 PROCEDURE — 3700000000 HC ANESTHESIA ATTENDED CARE: Performed by: ORTHOPAEDIC SURGERY

## 2024-01-17 PROCEDURE — C1776 JOINT DEVICE (IMPLANTABLE): HCPCS | Performed by: ORTHOPAEDIC SURGERY

## 2024-01-17 PROCEDURE — 84132 ASSAY OF SERUM POTASSIUM: CPT

## 2024-01-17 PROCEDURE — 7100000000 HC PACU RECOVERY - FIRST 15 MIN: Performed by: ORTHOPAEDIC SURGERY

## 2024-01-17 PROCEDURE — 6360000002 HC RX W HCPCS

## 2024-01-17 PROCEDURE — 6370000000 HC RX 637 (ALT 250 FOR IP): Performed by: STUDENT IN AN ORGANIZED HEALTH CARE EDUCATION/TRAINING PROGRAM

## 2024-01-17 PROCEDURE — 3600000005 HC SURGERY LEVEL 5 BASE: Performed by: ORTHOPAEDIC SURGERY

## 2024-01-17 PROCEDURE — 2580000003 HC RX 258: Performed by: STUDENT IN AN ORGANIZED HEALTH CARE EDUCATION/TRAINING PROGRAM

## 2024-01-17 PROCEDURE — 2709999900 HC NON-CHARGEABLE SUPPLY: Performed by: ORTHOPAEDIC SURGERY

## 2024-01-17 PROCEDURE — 85610 PROTHROMBIN TIME: CPT

## 2024-01-17 PROCEDURE — 82962 GLUCOSE BLOOD TEST: CPT

## 2024-01-17 PROCEDURE — 94761 N-INVAS EAR/PLS OXIMETRY MLT: CPT

## 2024-01-17 PROCEDURE — 6370000000 HC RX 637 (ALT 250 FOR IP): Performed by: NURSE PRACTITIONER

## 2024-01-17 PROCEDURE — 97530 THERAPEUTIC ACTIVITIES: CPT

## 2024-01-17 PROCEDURE — 6370000000 HC RX 637 (ALT 250 FOR IP): Performed by: HOSPITALIST

## 2024-01-17 PROCEDURE — C1713 ANCHOR/SCREW BN/BN,TIS/BN: HCPCS | Performed by: ORTHOPAEDIC SURGERY

## 2024-01-17 PROCEDURE — 3700000001 HC ADD 15 MINUTES (ANESTHESIA): Performed by: ORTHOPAEDIC SURGERY

## 2024-01-17 PROCEDURE — 2500000003 HC RX 250 WO HCPCS

## 2024-01-17 PROCEDURE — 2720000010 HC SURG SUPPLY STERILE: Performed by: ORTHOPAEDIC SURGERY

## 2024-01-17 PROCEDURE — 2580000003 HC RX 258: Performed by: NURSE PRACTITIONER

## 2024-01-17 PROCEDURE — 2580000003 HC RX 258: Performed by: ORTHOPAEDIC SURGERY

## 2024-01-17 PROCEDURE — 6360000002 HC RX W HCPCS: Performed by: NURSE PRACTITIONER

## 2024-01-17 PROCEDURE — 6360000002 HC RX W HCPCS: Performed by: STUDENT IN AN ORGANIZED HEALTH CARE EDUCATION/TRAINING PROGRAM

## 2024-01-17 PROCEDURE — 72170 X-RAY EXAM OF PELVIS: CPT

## 2024-01-17 DEVICE — SCREW BNE L35MM DIA65MM LO PROF HEX TRIDENT LL: Type: IMPLANTABLE DEVICE | Site: HIP | Status: FUNCTIONAL

## 2024-01-17 DEVICE — COMPONENT TOT HIP CAPPED LNR POLYETH H2STRYKER] STRYKER CORP]: Type: IMPLANTABLE DEVICE | Site: HIP | Status: FUNCTIONAL

## 2024-01-17 DEVICE — TRIDENT II TRITANIUM CLUSTER 50D
Type: IMPLANTABLE DEVICE | Site: HIP | Status: FUNCTIONAL
Brand: TRIDENT II

## 2024-01-17 DEVICE — TRIDENT X3 10 DEGREE POLYETHYLENE INSERT
Type: IMPLANTABLE DEVICE | Site: HIP | Status: FUNCTIONAL
Brand: TRIDENT X3 INSERT

## 2024-01-17 DEVICE — STEM FEM SZ 5 L108MM NK L35MM 44MM OFFSET 127DEG HIP TI: Type: IMPLANTABLE DEVICE | Site: HIP | Status: FUNCTIONAL

## 2024-01-17 DEVICE — HEAD FEM DIA32MM +0MM OFFSET HIP BIOLOX DELT CERAMIC TAPR: Type: IMPLANTABLE DEVICE | Site: HIP | Status: FUNCTIONAL

## 2024-01-17 RX ORDER — DIPHENHYDRAMINE HYDROCHLORIDE 50 MG/ML
25 INJECTION INTRAMUSCULAR; INTRAVENOUS EVERY 6 HOURS PRN
Status: DISCONTINUED | OUTPATIENT
Start: 2024-01-17 | End: 2024-01-18 | Stop reason: HOSPADM

## 2024-01-17 RX ORDER — ONDANSETRON 2 MG/ML
4 INJECTION INTRAMUSCULAR; INTRAVENOUS EVERY 6 HOURS PRN
Status: DISCONTINUED | OUTPATIENT
Start: 2024-01-17 | End: 2024-01-18 | Stop reason: HOSPADM

## 2024-01-17 RX ORDER — OXYCODONE HYDROCHLORIDE 5 MG/1
5 TABLET ORAL EVERY 4 HOURS PRN
Status: DISCONTINUED | OUTPATIENT
Start: 2024-01-17 | End: 2024-01-18 | Stop reason: HOSPADM

## 2024-01-17 RX ORDER — WARFARIN SODIUM 1 MG/1
5 TABLET ORAL DAILY
Status: ON HOLD | COMMUNITY
End: 2024-01-21 | Stop reason: HOSPADM

## 2024-01-17 RX ORDER — NALOXONE HYDROCHLORIDE 0.4 MG/ML
0.4 INJECTION, SOLUTION INTRAMUSCULAR; INTRAVENOUS; SUBCUTANEOUS PRN
Status: DISCONTINUED | OUTPATIENT
Start: 2024-01-17 | End: 2024-01-18 | Stop reason: HOSPADM

## 2024-01-17 RX ORDER — LIDOCAINE HYDROCHLORIDE 10 MG/ML
1 INJECTION, SOLUTION INFILTRATION; PERINEURAL
Status: DISCONTINUED | OUTPATIENT
Start: 2024-01-17 | End: 2024-01-17 | Stop reason: HOSPADM

## 2024-01-17 RX ORDER — EPHEDRINE SULFATE/0.9% NACL/PF 50 MG/5 ML
SYRINGE (ML) INTRAVENOUS PRN
Status: DISCONTINUED | OUTPATIENT
Start: 2024-01-17 | End: 2024-01-17 | Stop reason: SDUPTHER

## 2024-01-17 RX ORDER — DEXAMETHASONE SODIUM PHOSPHATE 10 MG/ML
INJECTION INTRAMUSCULAR; INTRAVENOUS PRN
Status: DISCONTINUED | OUTPATIENT
Start: 2024-01-17 | End: 2024-01-17 | Stop reason: SDUPTHER

## 2024-01-17 RX ORDER — SODIUM CHLORIDE 9 MG/ML
INJECTION, SOLUTION INTRAVENOUS PRN
Status: DISCONTINUED | OUTPATIENT
Start: 2024-01-17 | End: 2024-01-17 | Stop reason: HOSPADM

## 2024-01-17 RX ORDER — LIDOCAINE HYDROCHLORIDE 20 MG/ML
INJECTION, SOLUTION EPIDURAL; INFILTRATION; INTRACAUDAL; PERINEURAL PRN
Status: DISCONTINUED | OUTPATIENT
Start: 2024-01-17 | End: 2024-01-17 | Stop reason: SDUPTHER

## 2024-01-17 RX ORDER — ISOSORBIDE MONONITRATE 30 MG/1
60 TABLET, EXTENDED RELEASE ORAL DAILY
Status: DISCONTINUED | OUTPATIENT
Start: 2024-01-18 | End: 2024-01-18 | Stop reason: HOSPADM

## 2024-01-17 RX ORDER — PROCHLORPERAZINE EDISYLATE 5 MG/ML
5 INJECTION INTRAMUSCULAR; INTRAVENOUS
Status: DISCONTINUED | OUTPATIENT
Start: 2024-01-17 | End: 2024-01-17 | Stop reason: HOSPADM

## 2024-01-17 RX ORDER — ROSUVASTATIN CALCIUM 20 MG/1
40 TABLET, COATED ORAL EVERY EVENING
Status: DISCONTINUED | OUTPATIENT
Start: 2024-01-17 | End: 2024-01-18 | Stop reason: HOSPADM

## 2024-01-17 RX ORDER — ASPIRIN 81 MG/1
81 TABLET ORAL 2 TIMES DAILY
Status: COMPLETED | OUTPATIENT
Start: 2024-01-17 | End: 2024-01-18

## 2024-01-17 RX ORDER — MIDAZOLAM HYDROCHLORIDE 1 MG/ML
INJECTION INTRAMUSCULAR; INTRAVENOUS PRN
Status: DISCONTINUED | OUTPATIENT
Start: 2024-01-17 | End: 2024-01-17 | Stop reason: SDUPTHER

## 2024-01-17 RX ORDER — TRANEXAMIC ACID 100 MG/ML
INJECTION, SOLUTION INTRAVENOUS PRN
Status: DISCONTINUED | OUTPATIENT
Start: 2024-01-17 | End: 2024-01-17 | Stop reason: SDUPTHER

## 2024-01-17 RX ORDER — ROPIVACAINE HYDROCHLORIDE 2 MG/ML
INJECTION, SOLUTION EPIDURAL; INFILTRATION; PERINEURAL PRN
Status: DISCONTINUED | OUTPATIENT
Start: 2024-01-17 | End: 2024-01-17 | Stop reason: ALTCHOICE

## 2024-01-17 RX ORDER — HYDROMORPHONE HYDROCHLORIDE 1 MG/ML
0.5 INJECTION, SOLUTION INTRAMUSCULAR; INTRAVENOUS; SUBCUTANEOUS
Status: DISCONTINUED | OUTPATIENT
Start: 2024-01-17 | End: 2024-01-18 | Stop reason: HOSPADM

## 2024-01-17 RX ORDER — CARVEDILOL 6.25 MG/1
6.25 TABLET ORAL 2 TIMES DAILY
Status: DISCONTINUED | OUTPATIENT
Start: 2024-01-17 | End: 2024-01-18 | Stop reason: HOSPADM

## 2024-01-17 RX ORDER — ONDANSETRON 4 MG/1
4 TABLET, ORALLY DISINTEGRATING ORAL EVERY 8 HOURS PRN
Status: DISCONTINUED | OUTPATIENT
Start: 2024-01-17 | End: 2024-01-18 | Stop reason: HOSPADM

## 2024-01-17 RX ORDER — SODIUM CHLORIDE 0.9 % (FLUSH) 0.9 %
5-40 SYRINGE (ML) INJECTION EVERY 12 HOURS SCHEDULED
Status: DISCONTINUED | OUTPATIENT
Start: 2024-01-17 | End: 2024-01-17 | Stop reason: HOSPADM

## 2024-01-17 RX ORDER — SENNA AND DOCUSATE SODIUM 50; 8.6 MG/1; MG/1
1 TABLET, FILM COATED ORAL 2 TIMES DAILY
Status: DISCONTINUED | OUTPATIENT
Start: 2024-01-17 | End: 2024-01-18 | Stop reason: HOSPADM

## 2024-01-17 RX ORDER — SODIUM CHLORIDE, SODIUM LACTATE, POTASSIUM CHLORIDE, CALCIUM CHLORIDE 600; 310; 30; 20 MG/100ML; MG/100ML; MG/100ML; MG/100ML
INJECTION, SOLUTION INTRAVENOUS CONTINUOUS
Status: DISCONTINUED | OUTPATIENT
Start: 2024-01-17 | End: 2024-01-17 | Stop reason: HOSPADM

## 2024-01-17 RX ORDER — SODIUM CHLORIDE 0.9 % (FLUSH) 0.9 %
5-40 SYRINGE (ML) INJECTION EVERY 12 HOURS SCHEDULED
Status: DISCONTINUED | OUTPATIENT
Start: 2024-01-17 | End: 2024-01-18 | Stop reason: HOSPADM

## 2024-01-17 RX ORDER — INSULIN LISPRO 100 [IU]/ML
6 INJECTION, SOLUTION INTRAVENOUS; SUBCUTANEOUS ONCE
Status: COMPLETED | OUTPATIENT
Start: 2024-01-17 | End: 2024-01-17

## 2024-01-17 RX ORDER — HYDROMORPHONE HYDROCHLORIDE 1 MG/ML
0.5 INJECTION, SOLUTION INTRAMUSCULAR; INTRAVENOUS; SUBCUTANEOUS EVERY 5 MIN PRN
Status: DISCONTINUED | OUTPATIENT
Start: 2024-01-17 | End: 2024-01-17 | Stop reason: HOSPADM

## 2024-01-17 RX ORDER — SODIUM CHLORIDE 9 MG/ML
INJECTION, SOLUTION INTRAVENOUS PRN
Status: DISCONTINUED | OUTPATIENT
Start: 2024-01-17 | End: 2024-01-18 | Stop reason: HOSPADM

## 2024-01-17 RX ORDER — NIFEDIPINE 30 MG/1
90 TABLET, EXTENDED RELEASE ORAL DAILY
Status: DISCONTINUED | OUTPATIENT
Start: 2024-01-18 | End: 2024-01-18 | Stop reason: HOSPADM

## 2024-01-17 RX ORDER — ONDANSETRON 2 MG/ML
INJECTION INTRAMUSCULAR; INTRAVENOUS PRN
Status: DISCONTINUED | OUTPATIENT
Start: 2024-01-17 | End: 2024-01-17 | Stop reason: SDUPTHER

## 2024-01-17 RX ORDER — TOBRAMYCIN 1.2 G/30ML
INJECTION, POWDER, LYOPHILIZED, FOR SOLUTION INTRAVENOUS PRN
Status: DISCONTINUED | OUTPATIENT
Start: 2024-01-17 | End: 2024-01-17 | Stop reason: ALTCHOICE

## 2024-01-17 RX ORDER — ONDANSETRON 4 MG/1
8 TABLET, ORALLY DISINTEGRATING ORAL EVERY 8 HOURS PRN
Status: DISCONTINUED | OUTPATIENT
Start: 2024-01-17 | End: 2024-01-18 | Stop reason: HOSPADM

## 2024-01-17 RX ORDER — DIPHENHYDRAMINE HCL 25 MG
25 CAPSULE ORAL EVERY 6 HOURS PRN
Status: DISCONTINUED | OUTPATIENT
Start: 2024-01-17 | End: 2024-01-18 | Stop reason: HOSPADM

## 2024-01-17 RX ORDER — CITALOPRAM 20 MG/1
20 TABLET ORAL DAILY
Status: DISCONTINUED | OUTPATIENT
Start: 2024-01-18 | End: 2024-01-18 | Stop reason: HOSPADM

## 2024-01-17 RX ORDER — DEXTROSE MONOHYDRATE 100 MG/ML
INJECTION, SOLUTION INTRAVENOUS CONTINUOUS PRN
Status: DISCONTINUED | OUTPATIENT
Start: 2024-01-17 | End: 2024-01-18 | Stop reason: HOSPADM

## 2024-01-17 RX ORDER — SODIUM CHLORIDE 9 MG/ML
INJECTION, SOLUTION INTRAVENOUS CONTINUOUS
Status: DISCONTINUED | OUTPATIENT
Start: 2024-01-17 | End: 2024-01-18 | Stop reason: HOSPADM

## 2024-01-17 RX ORDER — FENTANYL CITRATE 50 UG/ML
100 INJECTION, SOLUTION INTRAMUSCULAR; INTRAVENOUS
Status: DISCONTINUED | OUTPATIENT
Start: 2024-01-17 | End: 2024-01-17 | Stop reason: HOSPADM

## 2024-01-17 RX ORDER — INSULIN GLARGINE 100 [IU]/ML
11 INJECTION, SOLUTION SUBCUTANEOUS NIGHTLY
Status: DISCONTINUED | OUTPATIENT
Start: 2024-01-17 | End: 2024-01-18 | Stop reason: HOSPADM

## 2024-01-17 RX ORDER — INSULIN LISPRO 100 [IU]/ML
0-4 INJECTION, SOLUTION INTRAVENOUS; SUBCUTANEOUS NIGHTLY
Status: DISCONTINUED | OUTPATIENT
Start: 2024-01-17 | End: 2024-01-18

## 2024-01-17 RX ORDER — ALBUTEROL SULFATE 2.5 MG/3ML
2.5 SOLUTION RESPIRATORY (INHALATION) EVERY 6 HOURS PRN
Status: DISCONTINUED | OUTPATIENT
Start: 2024-01-17 | End: 2024-01-18 | Stop reason: HOSPADM

## 2024-01-17 RX ORDER — OXYCODONE HYDROCHLORIDE 5 MG/1
10 TABLET ORAL EVERY 4 HOURS PRN
Status: DISCONTINUED | OUTPATIENT
Start: 2024-01-17 | End: 2024-01-18 | Stop reason: HOSPADM

## 2024-01-17 RX ORDER — FERROUS SULFATE 325(65) MG
325 TABLET ORAL 2 TIMES DAILY
Status: DISCONTINUED | OUTPATIENT
Start: 2024-01-17 | End: 2024-01-18 | Stop reason: HOSPADM

## 2024-01-17 RX ORDER — GABAPENTIN 300 MG/1
300 CAPSULE ORAL 3 TIMES DAILY
Status: DISCONTINUED | OUTPATIENT
Start: 2024-01-17 | End: 2024-01-18 | Stop reason: HOSPADM

## 2024-01-17 RX ORDER — TRAZODONE HYDROCHLORIDE 50 MG/1
50 TABLET ORAL NIGHTLY
Status: DISCONTINUED | OUTPATIENT
Start: 2024-01-17 | End: 2024-01-18 | Stop reason: HOSPADM

## 2024-01-17 RX ORDER — PANTOPRAZOLE SODIUM 40 MG/1
40 TABLET, DELAYED RELEASE ORAL
Status: DISCONTINUED | OUTPATIENT
Start: 2024-01-18 | End: 2024-01-18 | Stop reason: HOSPADM

## 2024-01-17 RX ORDER — SODIUM CHLORIDE 0.9 % (FLUSH) 0.9 %
5-40 SYRINGE (ML) INJECTION PRN
Status: DISCONTINUED | OUTPATIENT
Start: 2024-01-17 | End: 2024-01-17

## 2024-01-17 RX ORDER — KETOROLAC TROMETHAMINE 30 MG/ML
INJECTION, SOLUTION INTRAMUSCULAR; INTRAVENOUS PRN
Status: DISCONTINUED | OUTPATIENT
Start: 2024-01-17 | End: 2024-01-17 | Stop reason: ALTCHOICE

## 2024-01-17 RX ORDER — MIDAZOLAM HYDROCHLORIDE 2 MG/2ML
2 INJECTION, SOLUTION INTRAMUSCULAR; INTRAVENOUS
Status: DISCONTINUED | OUTPATIENT
Start: 2024-01-17 | End: 2024-01-17 | Stop reason: HOSPADM

## 2024-01-17 RX ORDER — VANCOMYCIN HYDROCHLORIDE 1 G/20ML
INJECTION, POWDER, LYOPHILIZED, FOR SOLUTION INTRAVENOUS PRN
Status: DISCONTINUED | OUTPATIENT
Start: 2024-01-17 | End: 2024-01-17 | Stop reason: ALTCHOICE

## 2024-01-17 RX ORDER — INSULIN LISPRO 100 [IU]/ML
0-8 INJECTION, SOLUTION INTRAVENOUS; SUBCUTANEOUS
Status: DISCONTINUED | OUTPATIENT
Start: 2024-01-18 | End: 2024-01-18

## 2024-01-17 RX ORDER — SODIUM CHLORIDE 0.9 % (FLUSH) 0.9 %
5-40 SYRINGE (ML) INJECTION PRN
Status: DISCONTINUED | OUTPATIENT
Start: 2024-01-17 | End: 2024-01-17 | Stop reason: HOSPADM

## 2024-01-17 RX ORDER — ACETAMINOPHEN 500 MG
1000 TABLET ORAL EVERY 6 HOURS
Status: DISCONTINUED | OUTPATIENT
Start: 2024-01-17 | End: 2024-01-18 | Stop reason: HOSPADM

## 2024-01-17 RX ORDER — PROPOFOL 10 MG/ML
INJECTION, EMULSION INTRAVENOUS CONTINUOUS PRN
Status: DISCONTINUED | OUTPATIENT
Start: 2024-01-17 | End: 2024-01-17 | Stop reason: SDUPTHER

## 2024-01-17 RX ORDER — ONDANSETRON 2 MG/ML
4 INJECTION INTRAMUSCULAR; INTRAVENOUS
Status: DISCONTINUED | OUTPATIENT
Start: 2024-01-17 | End: 2024-01-17 | Stop reason: HOSPADM

## 2024-01-17 RX ORDER — FUROSEMIDE 40 MG/1
40 TABLET ORAL DAILY
Status: DISCONTINUED | OUTPATIENT
Start: 2024-01-18 | End: 2024-01-18 | Stop reason: HOSPADM

## 2024-01-17 RX ORDER — SODIUM CHLORIDE 0.9 % (FLUSH) 0.9 %
5-40 SYRINGE (ML) INJECTION EVERY 12 HOURS SCHEDULED
Status: DISCONTINUED | OUTPATIENT
Start: 2024-01-17 | End: 2024-01-17

## 2024-01-17 RX ORDER — OXYCODONE HYDROCHLORIDE 5 MG/1
5 TABLET ORAL
Status: COMPLETED | OUTPATIENT
Start: 2024-01-17 | End: 2024-01-17

## 2024-01-17 RX ORDER — SODIUM CHLORIDE 9 MG/ML
INJECTION, SOLUTION INTRAVENOUS PRN
Status: DISCONTINUED | OUTPATIENT
Start: 2024-01-17 | End: 2024-01-17

## 2024-01-17 RX ORDER — TRANEXAMIC ACID 650 MG/1
1300 TABLET ORAL
Status: DISPENSED | OUTPATIENT
Start: 2024-01-17 | End: 2024-01-17

## 2024-01-17 RX ORDER — SODIUM CHLORIDE 0.9 % (FLUSH) 0.9 %
5-40 SYRINGE (ML) INJECTION PRN
Status: DISCONTINUED | OUTPATIENT
Start: 2024-01-17 | End: 2024-01-18 | Stop reason: HOSPADM

## 2024-01-17 RX ORDER — NITROGLYCERIN 0.4 MG/1
0.4 TABLET SUBLINGUAL EVERY 5 MIN PRN
Status: DISCONTINUED | OUTPATIENT
Start: 2024-01-17 | End: 2024-01-18 | Stop reason: HOSPADM

## 2024-01-17 RX ADMIN — VANCOMYCIN HYDROCHLORIDE 1000 MG: 1 INJECTION, POWDER, LYOPHILIZED, FOR SOLUTION INTRAVENOUS at 22:30

## 2024-01-17 RX ADMIN — ASPIRIN 81 MG: 81 TABLET, COATED ORAL at 21:56

## 2024-01-17 RX ADMIN — FERROUS SULFATE TAB 325 MG (65 MG ELEMENTAL FE) 325 MG: 325 (65 FE) TAB at 21:55

## 2024-01-17 RX ADMIN — PHENYLEPHRINE HYDROCHLORIDE 50 MCG: 0.1 INJECTION, SOLUTION INTRAVENOUS at 11:25

## 2024-01-17 RX ADMIN — INSULIN LISPRO 4 UNITS: 100 INJECTION, SOLUTION INTRAVENOUS; SUBCUTANEOUS at 22:23

## 2024-01-17 RX ADMIN — TRANEXAMIC ACID 1300 MG: 650 TABLET ORAL at 14:46

## 2024-01-17 RX ADMIN — MIDAZOLAM 1 MG: 1 INJECTION INTRAMUSCULAR; INTRAVENOUS at 10:23

## 2024-01-17 RX ADMIN — SODIUM CHLORIDE, PRESERVATIVE FREE 10 ML: 5 INJECTION INTRAVENOUS at 22:28

## 2024-01-17 RX ADMIN — CARVEDILOL 6.25 MG: 6.25 TABLET, FILM COATED ORAL at 21:55

## 2024-01-17 RX ADMIN — SODIUM CHLORIDE, SODIUM LACTATE, POTASSIUM CHLORIDE, AND CALCIUM CHLORIDE: 600; 310; 30; 20 INJECTION, SOLUTION INTRAVENOUS at 11:02

## 2024-01-17 RX ADMIN — DEXAMETHASONE SODIUM PHOSPHATE 8 MG: 10 INJECTION INTRAMUSCULAR; INTRAVENOUS at 10:29

## 2024-01-17 RX ADMIN — Medication 20 MG: at 10:58

## 2024-01-17 RX ADMIN — INSULIN GLARGINE 11 UNITS: 100 INJECTION, SOLUTION SUBCUTANEOUS at 21:54

## 2024-01-17 RX ADMIN — MIDAZOLAM 1 MG: 1 INJECTION INTRAMUSCULAR; INTRAVENOUS at 10:18

## 2024-01-17 RX ADMIN — PHENYLEPHRINE HYDROCHLORIDE 100 MCG: 0.1 INJECTION, SOLUTION INTRAVENOUS at 11:30

## 2024-01-17 RX ADMIN — SODIUM CHLORIDE, SODIUM LACTATE, POTASSIUM CHLORIDE, AND CALCIUM CHLORIDE 100 ML/HR: 600; 310; 30; 20 INJECTION, SOLUTION INTRAVENOUS at 10:00

## 2024-01-17 RX ADMIN — ONDANSETRON 4 MG: 2 INJECTION INTRAMUSCULAR; INTRAVENOUS at 10:29

## 2024-01-17 RX ADMIN — GABAPENTIN 300 MG: 300 CAPSULE ORAL at 21:55

## 2024-01-17 RX ADMIN — Medication 10 MG: at 10:55

## 2024-01-17 RX ADMIN — ROSUVASTATIN 40 MG: 20 TABLET, FILM COATED ORAL at 22:52

## 2024-01-17 RX ADMIN — LIDOCAINE HYDROCHLORIDE 50 MG: 20 INJECTION, SOLUTION EPIDURAL; INFILTRATION; INTRACAUDAL; PERINEURAL at 10:29

## 2024-01-17 RX ADMIN — GABAPENTIN 300 MG: 300 CAPSULE ORAL at 14:47

## 2024-01-17 RX ADMIN — ACETAMINOPHEN 1000 MG: 500 TABLET, FILM COATED ORAL at 23:35

## 2024-01-17 RX ADMIN — INSULIN LISPRO 6 UNITS: 100 INJECTION, SOLUTION INTRAVENOUS; SUBCUTANEOUS at 22:22

## 2024-01-17 RX ADMIN — Medication 3 AMPULE: at 10:01

## 2024-01-17 RX ADMIN — MEPIVACAINE HYDROCHLORIDE 60 MG: 20 INJECTION, SOLUTION EPIDURAL; INFILTRATION at 10:21

## 2024-01-17 RX ADMIN — DOCUSATE SODIUM 50MG AND SENNOSIDES 8.6MG 1 TABLET: 8.6; 5 TABLET, FILM COATED ORAL at 21:55

## 2024-01-17 RX ADMIN — PHENYLEPHRINE HYDROCHLORIDE 50 MCG: 0.1 INJECTION, SOLUTION INTRAVENOUS at 11:10

## 2024-01-17 RX ADMIN — OXYCODONE 10 MG: 5 TABLET ORAL at 21:55

## 2024-01-17 RX ADMIN — Medication 10 MG: at 11:10

## 2024-01-17 RX ADMIN — TRANEXAMIC ACID 1000 MG: 100 INJECTION, SOLUTION INTRAVENOUS at 10:28

## 2024-01-17 RX ADMIN — VANCOMYCIN HYDROCHLORIDE 1250 MG: 10 INJECTION, POWDER, LYOPHILIZED, FOR SOLUTION INTRAVENOUS at 10:09

## 2024-01-17 RX ADMIN — OXYCODONE 5 MG: 5 TABLET ORAL at 12:24

## 2024-01-17 RX ADMIN — GENTAMICIN SULFATE 320 MG: 40 INJECTION, SOLUTION INTRAMUSCULAR; INTRAVENOUS at 10:30

## 2024-01-17 RX ADMIN — Medication 10 MG: at 11:04

## 2024-01-17 RX ADMIN — PHENYLEPHRINE HYDROCHLORIDE 50 MCG: 0.1 INJECTION, SOLUTION INTRAVENOUS at 11:04

## 2024-01-17 RX ADMIN — OXYCODONE 10 MG: 5 TABLET ORAL at 14:47

## 2024-01-17 RX ADMIN — PROPOFOL 75 MCG/KG/MIN: 10 INJECTION, EMULSION INTRAVENOUS at 10:31

## 2024-01-17 ASSESSMENT — HOOS JR
BENDING TO THE FLOOR TO PICK UP OBJECT: 2
LYING IN BED (TURNING OVER, MAINTAINING HIP POSITION): 2
HOOS JR RAW SCORE: 12
SITTING: 2
HOOS JR RAW SCORE: 12
GOING UP OR DOWN STAIRS: 2
RISING FROM SITTING: 2
HOOS JR TOTAL INTERVAL SCORE: 52.965
WALKING ON UNEVEN SURFACE: 2

## 2024-01-17 ASSESSMENT — PAIN DESCRIPTION - PAIN TYPE: TYPE: ACUTE PAIN;SURGICAL PAIN

## 2024-01-17 ASSESSMENT — PAIN SCALES - GENERAL
PAINLEVEL_OUTOF10: 3
PAINLEVEL_OUTOF10: 0
PAINLEVEL_OUTOF10: 7
PAINLEVEL_OUTOF10: 4
PAINLEVEL_OUTOF10: 0
PAINLEVEL_OUTOF10: 0

## 2024-01-17 ASSESSMENT — PAIN DESCRIPTION - LOCATION: LOCATION: HIP

## 2024-01-17 ASSESSMENT — PAIN DESCRIPTION - ORIENTATION: ORIENTATION: LEFT

## 2024-01-17 ASSESSMENT — PAIN DESCRIPTION - DESCRIPTORS
DESCRIPTORS: SORE
DESCRIPTORS: ACHING

## 2024-01-17 ASSESSMENT — PAIN - FUNCTIONAL ASSESSMENT: PAIN_FUNCTIONAL_ASSESSMENT: 0-10

## 2024-01-17 NOTE — PERIOP NOTE
PT'S GRANDDAUGHTER REESE HAS A FEW ERRANDS TO RUN AND HAS ALL OF PT'S BELONGINGS AND MAY BE REACHED [phone].

## 2024-01-17 NOTE — CARE COORDINATION
Patient is a 73 y.o. year old female admitted for Left ARIES . Patient plans to return home on discharge. Order received to arrange home health. Patient without preference towards agency. Referral sent to [facility]. Patient denies any equipment needs as patient has a walker. Will follow until discharge.      01/17/24 7471   Service Assessment   Patient Orientation Alert and Oriented   Cognition Alert   History Provided By Patient   Services At/After Discharge   Transition of Care Consult (CM Consult) Home Health   Internal Home Health Yes   Services At/After Discharge Home Health;PT   Mode of Transport at Discharge Self   Confirm Follow Up Transport Self   Condition of Participation: Discharge Planning   The Plan for Transition of Care is related to the following treatment goals: improve mobility   The Patient and/or Patient Representative was provided with a Choice of Provider? Patient   The Patient and/Or Patient Representative agree with the Discharge Plan? Yes   Freedom of Choice list was provided with basic dialogue that supports the patient's individualized plan of care/goals, treatment preferences, and shares the quality data associated with the providers?  Yes

## 2024-01-17 NOTE — H&P
osteoarthritis of left hip       Assessment:   1. Arthritis of the Left hip    Plan:   The patient has failed previous conservative treatment for this condition. The patient has pain in the left hip which causes daily symptoms which affects the patient's activities of daily living and quality of life. The risks, benefits, alternatives and possible complications of left total hip arthroplasty have been discussed with the patient including but not limited to infection, bleeding, damage to nerves and blood vessels with particular attention given to risk of damage of the femoral, obturator, lateral femoral cutaneous, superior gluteal, inferior gluteal, and sciatic nerve, risk of dislocation, fracture both intra-op and post-op, limb length inequality, polyethylene wear, implant loosening, risk for continued pain, and risk for revision surgery secondary to but not limited to all of these discussed risks. Further we discussed risk of venous thrombo-embolism including deep vein thrombosis and pulmonary embolism despite being on prophylaxis. We have also previously discussed the potential of morbidity and mortality associated with, but not limited to, the actual surgical procedure, anesthesia, prior medical conditions, and/or the administration of medications perioperatively. I have made no guarantees to the patient regarding outcomes and the patient has voiced understanding of that. The patient has been given the opportunity to ask questions all of which have been answered and the patient wishes to proceed.    The patient was counseled at length about the risks of adan Covid-19 during their perioperative period and any recovery window from their procedure.  The patient was made aware that adan Covid-19  may worsen their prognosis for recovering from their procedure and lend to a higher morbidity and/or mortality risk.  All material risks, benefits, and reasonable alternatives including postponing the procedure

## 2024-01-17 NOTE — ANESTHESIA PRE PROCEDURE
angina (HCC) 2012    ntg as needed.        Past Surgical History:        Procedure Laterality Date    CAROTID ENDARTERECTOMY Left 2018    CATARACT REMOVAL Left 2016    Dr Sevilla, [facility]    CATARACT REMOVAL Right 2016    Dr Sevilla, [facility]    CERVICAL FUSION      neck w/plate    COLONOSCOPY  13    Brigitte--single transverse hyperplastic polyp--7-10 year recall    CORONARY STENT PLACEMENT      stent x3; last in     FLEXIBLE SIGMOIDOSCOPY N/A 3/6/2022    SIGMOIDOSCOPY FLEXIBLE performed by Leland Lim MD at [facility] ENDOSCOPY    HEMORRHOID SURGERY      x2    ORTHOPEDIC SURGERY      left elbow    HORTENSIA AND BSO (CERVIX REMOVED)      TOTAL HIP ARTHROPLASTY Right 2022    RIGHT HIP TOTAL ARTHROPLASTY, [place], PREVENA performed by Jesus Duran MD at [facility] MAIN OR    WISDOM TOOTH EXTRACTION         Social History:    Social History     Tobacco Use    Smoking status: Former     Current packs/day: 0.00     Average packs/day: 1 pack/day for 20.0 years (20.0 ttl pk-yrs)     Types: Cigarettes     Start date: 1992     Quit date: 2012     Years since quittin.1    Smokeless tobacco: Never    Tobacco comments:     Quit smoking: quit  . has stopped prior also   Substance Use Topics    Alcohol use: Yes     Alcohol/week: 7.0 standard drinks of alcohol     Types: 7 Glasses of wine per week                                Counseling given: Not Answered  Tobacco comments: Quit smoking: quit  . has stopped prior also      Vital Signs (Current):   Vitals:    24 0851 24 0909   BP:  132/88   Pulse:  65   Resp:  18   Temp:  97.3 °F (36.3 °C)   TempSrc:  Temporal   SpO2:  98%   Weight: 76.9 kg (169 lb 9.6 oz)    Height: 1.626 m (5' 4\")                                               BP Readings from Last 3 Encounters:   24 132/88   24 137/68   23 137/67       NPO Status:

## 2024-01-17 NOTE — ANESTHESIA POSTPROCEDURE EVALUATION
Department of Anesthesiology  Postprocedure Note    Patient: Magalis Maddox  MRN: [id number]  YOB: 1950  Date of evaluation: 1/17/2024    Procedure Summary     Date: 01/17/24 [place] [facility]  [place] [facility]    Anesthesia Start: 1008 Anesthesia Stop: 1159    Procedure: LEFT HIP TOTAL ARTHROPLASTY, [place]/ prevena, vanc/tobra (Left: Hip) Diagnosis:       Primary osteoarthritis of left hip      (Primary osteoarthritis of left hip [M16.12])    Surgeons: Jesus Duran MD Responsible Provider: Sriram Ochoa MD    Anesthesia Type: spinal ASA Status: 3          Anesthesia Type: No value filed.    Theron Phase I:      Theron Phase II:      Anesthesia Post Evaluation    Patient location during evaluation: PACU  Patient participation: complete - patient participated  Level of consciousness: awake  Airway patency: patent  Nausea & Vomiting: no nausea and no vomiting  Cardiovascular status: blood pressure returned to baseline  Respiratory status: acceptable  Hydration status: euvolemic  Pain management: adequate        No notable events documented.

## 2024-01-17 NOTE — OP NOTE
used to irrigate the surgical site.  The sciatic nerve was palpated and noted to be intact. A periarticular of ropivicaine, toradol, and morphine was injected about the surgical site with care being taken not to inject in the vicinity of neurovascular structures.    Prior to capsular closure one gram of vancomycin powder and 1.2 grams of tobramycin powder was placed within the capsular layer. Prior to wound closure one additional gram of TXA was given for a total of 2 grams. The capsule was repaired with a three #2 Fiberwires secured through drill holes placed in the posterior aspect of the trochanter. No drain was placed. The fascia was closed with a #0 Bidirectional Stratafix barbed suture. One batch of Cellerate collagen powder was placed between the capsular layer closure and the subcutaneous layer closure.  The sub-Q layer was closed with 2-0 monocril suture. A 3-0 moncril stratafix suture in a running subcuticular fashion was used to close the skin. The incision site was thoroughly cleaned with alcohol and a Prevena incisional wound vacuum dressing was placed. Drapes were then broken down and patient was transferred carefully back to the OR stretcher.  The sponge and needle counts were correct.  The patient tolerated the procedure without difficulty and left the operating room in satisfactory condition.    Signed By: Jesus Duran MD

## 2024-01-17 NOTE — PERIOP NOTE
TRANSFER - OUT REPORT:    Verbal report given to KRALY Laura on Magalis Maddox  being transferred to Room 322 for routine progression of patient care       Report consisted of patient's Situation, Background, Assessment and   Recommendations(SBAR).     Information from the following report(s) Nurse Handoff Report, Surgery Report, Intake/Output, MAR, Recent Results, and Cardiac Rhythm SR  was reviewed with the receiving nurse.           Lines:   Peripheral IV 01/17/24 Right;Anterior Forearm (Active)   Site Assessment Clean, dry & intact 01/17/24 1228   Line Status Infusing 01/17/24 1228   Line Care Connections checked and tightened 01/17/24 1228   Phlebitis Assessment No symptoms 01/17/24 1228   Infiltration Assessment 0 01/17/24 1228   Alcohol Cap Used No 01/17/24 1228   Dressing Status Clean, dry & intact 01/17/24 1228   Dressing Type Transparent 01/17/24 1228        Opportunity for questions and clarification was provided.      Patient transported with:  O2 @ room air lpm

## 2024-01-17 NOTE — ANESTHESIA PROCEDURE NOTES
Spinal Block    Patient location during procedure: OR  End time: 1/17/2024 10:25 AM  Reason for block: primary anesthetic  Staffing  Performed: anesthesiologist   Anesthesiologist: Sriram Ochoa MD  Performed by: Sriram Ochoa MD  Authorized by: Sriram Ochoa MD    Spinal Block  Patient position: sitting  Prep: ChloraPrep  Patient monitoring: cardiac monitor, continuous pulse ox and frequent blood pressure checks  Approach: midline  Location: L3/L4  Provider prep: mask and sterile gloves  Local infiltration: lidocaine  Needle  Needle type: pencil-tip   Needle gauge: 25 G  Needle length: 3.5 in  Assessment  Events: SAB placement uncomplicated.  No paresthesia.  Swirl obtained: Yes  CSF: clear  Attempts: 1  Hemodynamics: stable  Additional Notes  3 cc 1% lidocaine local injected at needle insertion site.  Risks discussed including damage to muscle or nerve.  Preanesthetic Checklist  Completed: patient identified, IV checked, risks and benefits discussed, equipment checked, pre-op evaluation, timeout performed, anesthesia consent given, oxygen available and monitors applied/VS acknowledged

## 2024-01-18 VITALS
SYSTOLIC BLOOD PRESSURE: 157 MMHG | OXYGEN SATURATION: 97 % | HEIGHT: 64 IN | RESPIRATION RATE: 16 BRPM | WEIGHT: 169.6 LBS | BODY MASS INDEX: 28.95 KG/M2 | DIASTOLIC BLOOD PRESSURE: 73 MMHG | TEMPERATURE: 98.2 F | HEART RATE: 88 BPM

## 2024-01-18 LAB
GLUCOSE BLD STRIP.AUTO-MCNC: 133 MG/DL (ref 65–100)
GLUCOSE BLD STRIP.AUTO-MCNC: 285 MG/DL (ref 65–100)
GLUCOSE BLD STRIP.AUTO-MCNC: 295 MG/DL (ref 65–100)
HCT VFR BLD AUTO: 32.4 % (ref 35.8–46.3)
HGB BLD-MCNC: 10.5 G/DL (ref 11.7–15.4)
SERVICE CMNT-IMP: ABNORMAL

## 2024-01-18 PROCEDURE — 97535 SELF CARE MNGMENT TRAINING: CPT

## 2024-01-18 PROCEDURE — 85014 HEMATOCRIT: CPT

## 2024-01-18 PROCEDURE — 82962 GLUCOSE BLOOD TEST: CPT

## 2024-01-18 PROCEDURE — 99024 POSTOP FOLLOW-UP VISIT: CPT | Performed by: NURSE PRACTITIONER

## 2024-01-18 PROCEDURE — 85018 HEMOGLOBIN: CPT

## 2024-01-18 PROCEDURE — 6370000000 HC RX 637 (ALT 250 FOR IP): Performed by: HOSPITALIST

## 2024-01-18 PROCEDURE — 6370000000 HC RX 637 (ALT 250 FOR IP): Performed by: NURSE PRACTITIONER

## 2024-01-18 PROCEDURE — 97530 THERAPEUTIC ACTIVITIES: CPT

## 2024-01-18 PROCEDURE — 97165 OT EVAL LOW COMPLEX 30 MIN: CPT

## 2024-01-18 PROCEDURE — 36415 COLL VENOUS BLD VENIPUNCTURE: CPT

## 2024-01-18 RX ORDER — INSULIN LISPRO 100 [IU]/ML
0-4 INJECTION, SOLUTION INTRAVENOUS; SUBCUTANEOUS NIGHTLY
Status: DISCONTINUED | OUTPATIENT
Start: 2024-01-18 | End: 2024-01-18 | Stop reason: HOSPADM

## 2024-01-18 RX ORDER — INSULIN LISPRO 100 [IU]/ML
0-16 INJECTION, SOLUTION INTRAVENOUS; SUBCUTANEOUS
Status: DISCONTINUED | OUTPATIENT
Start: 2024-01-18 | End: 2024-01-18 | Stop reason: HOSPADM

## 2024-01-18 RX ADMIN — ASPIRIN 81 MG: 81 TABLET, COATED ORAL at 08:59

## 2024-01-18 RX ADMIN — APIXABAN 5 MG: 5 TABLET, FILM COATED ORAL at 08:59

## 2024-01-18 RX ADMIN — DOCUSATE SODIUM 50MG AND SENNOSIDES 8.6MG 1 TABLET: 8.6; 5 TABLET, FILM COATED ORAL at 08:59

## 2024-01-18 RX ADMIN — OXYCODONE 10 MG: 5 TABLET ORAL at 06:06

## 2024-01-18 RX ADMIN — INSULIN LISPRO 8 UNITS: 100 INJECTION, SOLUTION INTRAVENOUS; SUBCUTANEOUS at 08:57

## 2024-01-18 RX ADMIN — GABAPENTIN 300 MG: 300 CAPSULE ORAL at 08:59

## 2024-01-18 RX ADMIN — FERROUS SULFATE TAB 325 MG (65 MG ELEMENTAL FE) 325 MG: 325 (65 FE) TAB at 08:59

## 2024-01-18 RX ADMIN — ACETAMINOPHEN 1000 MG: 500 TABLET, FILM COATED ORAL at 06:06

## 2024-01-18 RX ADMIN — FUROSEMIDE 40 MG: 40 TABLET ORAL at 08:59

## 2024-01-18 RX ADMIN — ISOSORBIDE MONONITRATE 60 MG: 30 TABLET, EXTENDED RELEASE ORAL at 08:59

## 2024-01-18 RX ADMIN — CITALOPRAM HYDROBROMIDE 20 MG: 20 TABLET ORAL at 08:59

## 2024-01-18 RX ADMIN — CARVEDILOL 6.25 MG: 6.25 TABLET, FILM COATED ORAL at 08:59

## 2024-01-18 ASSESSMENT — PAIN SCALES - GENERAL: PAINLEVEL_OUTOF10: 7

## 2024-01-18 NOTE — DISCHARGE INSTRUCTIONS
Hip Replacement Surgery (Posterior): What to Expect at Home  Your Recovery  Hip replacement surgery replaces the worn parts of your hip joint. When you leave the hospital, you will probably be walking with crutches or a walker. You may be able to climb a few stairs and get in and out of bed and chairs. But you will need someone to help you at home until you have more energy and can move around better.  You will go home with a bandage and stitches, staples, skin glue, or tape strips. You can remove the bandage when your doctor tells you to. If you have stitches or staples, your doctor will remove them about 2 weeks after your surgery. Glue or tape strips will fall off on their own over time. You may still have some mild pain, and the area may be swollen for 3 to 4 months after surgery. Your doctor may give you medicine for the pain.  You will continue the rehabilitation program (rehab) you started in the hospital. The better you do with your rehab exercises, the sooner you will get your strength and movement back. Most people are able to return to work 4 weeks to 4 months after surgery.  This care sheet gives you a general idea about how long it will take for you to recover. But each person recovers at a different pace. Follow the steps below to get better as quickly as possible.  How can you care for yourself at home?  Activity    Your doctor may not want your affected leg to cross the center of your body toward the other leg. If so, your therapist may suggest these ideas:  Do not cross your legs.  Be very careful as you get in or out of bed or a car so your leg does not cross the imaginary line in the middle of your body.     Go slowly when you climb stairs. Make sure the lights are on. Have someone watch you, if you can. When you climb stairs:  Step up first with your unaffected leg. Then bring the affected leg up to the same step. Bring your crutches or cane up.  To go down stairs, reverse the order. First,

## 2024-01-18 NOTE — CONSULTS
08/24/2015    Influenza, FLUAD, (age 65 y+), Adjuvanted, 0.5mL 10/21/2020, 10/26/2022, 11/02/2023    Influenza, FLUARIX, FLULAVAL, FLUZONE (age 6 mo+) AND AFLURIA, (age 3 y+), PF, 0.5mL 12/15/2016    Influenza, High Dose (Fluzone 65 yrs and older) 10/04/2017, 10/11/2018, 11/03/2021    Influenza, Triv, inactivated, subunit, adjuvanted, IM (Fluad 65 yrs and older) 09/03/2019    Pneumococcal, PCV-13, PREVNAR 13, (age 6w+), IM, 0.5mL 08/24/2015    Pneumococcal, PPSV23, PNEUMOVAX 23, (age 2y+), SC/IM, 0.5mL 09/04/2013     Allergies   Allergen Reactions    Cephalosporins Anaphylaxis, Hives and Swelling    Hydralazine Other (See Comments)     Chest pain    Sulfamethoxazole-Trimethoprim Diarrhea    Lisinopril Other (See Comments)     Passing out spells. // pt sts not allergic to med     Codeine Nausea And Vomiting      Current Facility-Administered Medications   Medication Dose Route Frequency    albuterol (PROVENTIL) (2.5 MG/3ML) 0.083% nebulizer solution 2.5 mg  2.5 mg Nebulization Q6H PRN    apixaban (ELIQUIS) tablet 5 mg  5 mg Oral Q12H    carvedilol (COREG) tablet 6.25 mg  6.25 mg Oral BID    citalopram (CELEXA) tablet 20 mg  20 mg Oral Daily    ferrous sulfate (IRON 325) tablet 325 mg  325 mg Oral BID    furosemide (LASIX) tablet 40 mg  40 mg Oral Daily    isosorbide mononitrate (IMDUR) extended release tablet 60 mg  60 mg Oral Daily    NIFEdipine (PROCARDIA XL) extended release tablet 90 mg  90 mg Oral Daily    rosuvastatin (CRESTOR) tablet 40 mg  40 mg Oral QPM    traZODone (DESYREL) tablet 50 mg  50 mg Oral Nightly    0.9 % sodium chloride infusion   IntraVENous Continuous    sodium chloride flush 0.9 % injection 5-40 mL  5-40 mL IntraVENous 2 times per day    sodium chloride flush 0.9 % injection 5-40 mL  5-40 mL IntraVENous PRN    0.9 % sodium chloride infusion   IntraVENous PRN    acetaminophen (TYLENOL) tablet 1,000 mg  1,000 mg Oral Q6H    oxyCODONE (ROXICODONE) immediate release tablet 5 mg  5 mg Oral Q4H PRN

## 2024-01-18 NOTE — PROGRESS NOTES
2024         Post Op day: 1 Day Post-Op     Admit Date: 2024    Admit Diagnosis: Primary osteoarthritis of left hip [M16.12]  Osteoarthritis of left hip, unspecified osteoarthritis type [M16.12]        Subjective: Patient stable.No acute events.      Objective:     Vitals:    24 0134   BP: (!) 143/66   Pulse: 83   Resp: 15   Temp: 98.6 °F (37 °C)   SpO2: 98%    Temp (24hrs), Av °F (36.7 °C), Min:97.3 °F (36.3 °C), Max:98.8 °F (37.1 °C)      Lab Results   Component Value Date/Time    HGB 10.5 2024 03:57 AM       Patient Active Problem List   Diagnosis    Stage 4 chronic kidney disease (HCC)    Essential hypertension    Chronic combined systolic and diastolic congestive heart failure (HCC)    Age-related osteoporosis without current pathological fracture    Primary insomnia    Mixed hyperlipidemia    Coronary artery disease involving native coronary artery of native heart without angina pectoris    Major depressive disorder with single episode, in full remission (HCC)    Type 2 diabetes mellitus with stage 4 chronic kidney disease, with long-term current use of insulin (HCC)    Osteoarthritis of right hip, unspecified osteoarthritis type    Paroxysmal atrial fibrillation (HCC)    Long term current use of anticoagulant    Primary osteoarthritis of left hip    Osteoarthritis of left hip, unspecified osteoarthritis type       Current Facility-Administered Medications   Medication Dose Route Frequency    albuterol (PROVENTIL) (2.5 MG/3ML) 0.083% nebulizer solution 2.5 mg  2.5 mg Nebulization Q6H PRN    apixaban (ELIQUIS) tablet 5 mg  5 mg Oral Q12H    carvedilol (COREG) tablet 6.25 mg  6.25 mg Oral BID    citalopram (CELEXA) tablet 20 mg  20 mg Oral Daily    ferrous sulfate (IRON 325) tablet 325 mg  325 mg Oral BID    furosemide (LASIX) tablet 40 mg  40 mg Oral Daily    isosorbide mononitrate (IMDUR) extended release tablet 60 mg  60 mg Oral Daily    NIFEdipine (PROCARDIA XL) extended 
   01/17/24 2145   Oxygen Therapy/Pulse Ox   O2 Therapy Room air   Pulse 84   SpO2 96 %   Pulse Oximeter Device Mode Continuous   Pulse Oximeter Device Location Left;Finger   $Pulse Oximeter $Spot check (multiple/continuous)     Pt on continuous monitor for HS. Alarm limits set. Pt working on IS.  
ACUTE PHYSICAL THERAPY GOALS:   (Developed with and agreed upon by patient and/or caregiver.)  GOALS (1-4 days):  (1.)Ms. Maddox will move from supine to sit and sit to supine  in bed with MODIFIED INDEPENDENCE.    (2.)Ms. Maddox will transfer from bed to chair and chair to bed with SUPERVISION using the least restrictive device.    (3.)Ms. Maddox will ambulate with SUPERVISION for 200 feet with the least restrictive device.   (4.)Ms. Maddox will ambulate up/down ramp with no railing with STAND BY ASSIST using a RW.  (5.)Ms. Maddox will state/observe ARIES precautions with 0 verbal cues.  ________________________________________________________________________________________________     PHYSICAL THERAPY: TOTAL HIP ARTHROPLASTY Daily Note and AM  (Link to Caseload Tracking: PT Visit Days : 2  Acknowledge Orders  Time In/Out  PT Charge Capture  Rehab Caseload Tracker  Episode   Magalis Maddox is a 73 y.o. female   PRIMARY DIAGNOSIS: Primary osteoarthritis of left hip  Primary osteoarthritis of left hip [M16.12]  Osteoarthritis of left hip, unspecified osteoarthritis type [M16.12]  Procedure(s) (LRB):  LEFT HIP TOTAL ARTHROPLASTY, Asuncion/ prevena, vanc/tobra (Left)  1 Day Post-Op  Reason for Referral: Pain in left hip (M25.552)  Stiffness of Left Hip, Not elsewhere classified (M25.652)  Difficulty in walking, Not elsewhere classified (R26.2)  Outpatient in a bed: Payor: [facility] MEDICARE / Plan: [facility] MEDICARE COMPLETE / Product Type: *No Product type* /     REHAB RECOMMENDATIONS:   Recommendation to date pending progress:  Setting:  Home Health Therapy    Equipment:    None     GAIT: I Mod I S SBA CGA Min Mod Max Total  NT x2 Comments:   Level of Assistance [] [] [] [] [x] [] [] [] [] [] []    Weightbearing Status  Left Lower Extremity Weight Bearing: Weight Bearing As Tolerated    Distance  60 feet    Gait Quality Antalgic, Decreased krsy , Decreased step clearance, Decreased step length, 
Brief hospitalist note.  The patient has been discharged by orthopedic surgery.  The night hospitalist consulted on the patient this morning.  I did not see her today.  
Had therapy. Home health arranged for PT. Has follow up appt scheduled with Dr Duran. Reminded of hip precautions. Has medications for home. Discharge instructions given. Going to car via wheelchair.   
OCCUPATIONAL THERAPY Initial Assessment, Daily Note, Discharge, and AM      (Link to Caseload Tracking: OT Visit Days: 1  OT Orders   Time  OT Charge Capture  Rehab Caseload Tracker  Episode     Magalis Maddox is a 73 y.o. female   PRIMARY DIAGNOSIS: Primary osteoarthritis of left hip  Primary osteoarthritis of left hip [M16.12]  Osteoarthritis of left hip, unspecified osteoarthritis type [M16.12]  Procedure(s) (LRB):  LEFT HIP TOTAL ARTHROPLASTY, Asuncion/ prevena, vanc/tobra (Left)  1 Day Post-Op  Reason for Referral: Pain in left hip (M25.552)  Stiffness of Left Hip, Not elsewhere classified (M25.652)  Outpatient in a bed: Payor: [facility] MEDICARE / Plan: [facility] MEDICARE COMPLETE / Product Type: *No Product type* /     ASSESSMENT:     REHAB RECOMMENDATIONS:   Recommendation to date pending progress:  Setting:  No further skilled occupational therapy after discharge from hospital    Equipment:     Pt has a RW     ASSESSMENT:  Ms. Maddox is s/p left ARIES.  Patient declined shower but completed dressing as charted below in ADL grid and is ambulating with rolling walker and stand by assist.  Pt educated on safety with all self care and functional mobility at home, as well as education on Hibiclens to reduce risk of an infection.   Patient has met 4/4 goals and plans to return home with good family support.  Family able to provide patient with appropriate level of assistance at this time.  OT reviewed hip precautions throughout session. Patient instructed to call for assistance when needing to get up from recliner and all needs in reach.  Patient verbalized understanding of call light.          [facility] [employer] “6 Clicks” Daily Activity Inpatient Short Form:           SUBJECTIVE:     Ms. Maddox states, \"I would like shower at home, I have a walk in shower\"      Social/Functional   Lives With: Alone  Type of Home: House  Home Layout: One level  Home Access: Ramped entrance  ADL Assistance: 
TRANSFER - IN REPORT:    Verbal report received from Harinder Bacon RN on Magalis Maddox  being received from PACU for routine post-op      Report consisted of patient's Situation, Background, Assessment and   Recommendations(SBAR).     Information from the following report(s) Nurse Handoff Report was reviewed with the receiving nurse.    Opportunity for questions and clarification was provided.      Assessment completed upon patient's arrival to unit and care assumed.   Oriented to room and bed controls. Yellow gripper socks to feet. Moving feet and has sensation. C/o hip aching. Prevena to L hip. Family member in room.   
length, Decreased stance, Step-to, and Toe walking    DME Rolling Walker     Stairs      Ramp     I=Independent, Mod I=Modified Independent, S=Supervision, SBA=Standby Assistance, CGA=Contact Guard Assistance,   Min=Minimal Assistance, Mod=Moderate Assistance, Max=Maximal Assistance, Total=Total Assistance, NT=Not Tested      ASSESSMENT:   ASSESSMENT:  Ms. Maddox presents with expected decreased strength and range of motion left lower extremity and with decreased independence with functional mobility s/p left total hip arthroplasty. Pt will benefit from skilled PT interventions to maximize independence with functional mobility and ARIES management. Pt did well with assessment and treatment.   Today's treatment focused on transfer and gait training. She was supine on contact and stating she needed to toilet. Supine to sit with SBA and sit to stand with SBA. Ambulated 20 ft to the toilet and toileted with SBA then ambulated another 100 ft with RW and SBA with verbal cuing for heel toe gait. Pt practiced ARIES exercises as below with verbal cues while reviewing HEP. Reviewed use of cold packs as needed for pain and swelling.  Pt instructed not to get up without assist. Pt plans to discharge to her home from the hospital with continued therapy for follow up. Patient is hopeful to spend the night to better prepare for safe discharge home.      Outcome Measure:   HOOS-JR:  Total Raw Score (0-24 Scale): 12    SUBJECTIVE:   Ms. Maddox states, \"It is really sore right now\"     Home Environment/Prior Level of Function Lives With: Alone  Type of Home: House  Home Layout: One level  Home Access: Ramped entrance  ADL Assistance: Independent  Ambulation Assistance: Independent  Transfer Assistance: Independent    OBJECTIVE:     PAIN: VITAL SIGNS: LINES/DRAINS:   Pre Treatment:         Post Treatment: 4 Vitals        Oxygen        Wound Vac    RESTRICTIONS/PRECAUTIONS:  Restrictions/Precautions: Weight Bearing  Left Lower

## 2024-01-19 ENCOUNTER — HOME CARE VISIT (OUTPATIENT)
Dept: SCHEDULING | Facility: HOME HEALTH | Age: 74
End: 2024-01-19
Payer: MEDICARE

## 2024-01-19 ENCOUNTER — HOSPITAL ENCOUNTER (OUTPATIENT)
Age: 74
Setting detail: OBSERVATION
Discharge: HOME HEALTH | End: 2024-01-21
Attending: EMERGENCY MEDICINE | Admitting: HOSPITALIST
Payer: MEDICARE

## 2024-01-19 ENCOUNTER — APPOINTMENT (OUTPATIENT)
Dept: GENERAL RADIOLOGY | Age: 74
End: 2024-01-19
Payer: MEDICARE

## 2024-01-19 VITALS
RESPIRATION RATE: 14 BRPM | HEART RATE: 64 BPM | SYSTOLIC BLOOD PRESSURE: 134 MMHG | DIASTOLIC BLOOD PRESSURE: 78 MMHG | OXYGEN SATURATION: 95 % | TEMPERATURE: 97.2 F

## 2024-01-19 DIAGNOSIS — R09.02 HYPOXEMIA: ICD-10-CM

## 2024-01-19 DIAGNOSIS — I95.1 ORTHOSTATIC HYPOTENSION: Primary | ICD-10-CM

## 2024-01-19 LAB
ALBUMIN SERPL-MCNC: 2.9 G/DL (ref 3.2–4.6)
ALBUMIN/GLOB SERPL: 0.8 (ref 0.4–1.6)
ALP SERPL-CCNC: 120 U/L (ref 50–136)
ALT SERPL-CCNC: 99 U/L (ref 12–65)
ANION GAP SERPL CALC-SCNC: 7 MMOL/L (ref 2–11)
APPEARANCE UR: CLEAR
AST SERPL-CCNC: 114 U/L (ref 15–37)
BACTERIA URNS QL MICRO: ABNORMAL /HPF
BASOPHILS # BLD: 0 K/UL (ref 0–0.2)
BASOPHILS NFR BLD: 0 % (ref 0–2)
BILIRUB SERPL-MCNC: 0.7 MG/DL (ref 0.2–1.1)
BILIRUB UR QL: NEGATIVE
BUN SERPL-MCNC: 31 MG/DL (ref 8–23)
CALCIUM SERPL-MCNC: 8.8 MG/DL (ref 8.3–10.4)
CASTS URNS QL MICRO: ABNORMAL /LPF
CHLORIDE SERPL-SCNC: 102 MMOL/L (ref 103–113)
CO2 SERPL-SCNC: 26 MMOL/L (ref 21–32)
COLOR UR: ABNORMAL
CREAT SERPL-MCNC: 2.17 MG/DL (ref 0.6–1)
DIFFERENTIAL METHOD BLD: ABNORMAL
EOSINOPHIL # BLD: 0.4 K/UL (ref 0–0.8)
EOSINOPHIL NFR BLD: 3 % (ref 0.5–7.8)
EPI CELLS #/AREA URNS HPF: ABNORMAL /HPF
ERYTHROCYTE [DISTWIDTH] IN BLOOD BY AUTOMATED COUNT: 13.7 % (ref 11.9–14.6)
GLOBULIN SER CALC-MCNC: 3.7 G/DL (ref 2.8–4.5)
GLUCOSE BLD STRIP.AUTO-MCNC: 309 MG/DL (ref 65–100)
GLUCOSE SERPL-MCNC: 231 MG/DL (ref 65–100)
GLUCOSE UR STRIP.AUTO-MCNC: NEGATIVE MG/DL
HCT VFR BLD AUTO: 31.4 % (ref 35.8–46.3)
HGB BLD-MCNC: 10.3 G/DL (ref 11.7–15.4)
HGB UR QL STRIP: ABNORMAL
IMM GRANULOCYTES # BLD AUTO: 0.1 K/UL (ref 0–0.5)
IMM GRANULOCYTES NFR BLD AUTO: 1 % (ref 0–5)
KETONES UR QL STRIP.AUTO: NEGATIVE MG/DL
LACTATE SERPL-SCNC: 1 MMOL/L (ref 0.4–2)
LEUKOCYTE ESTERASE UR QL STRIP.AUTO: NEGATIVE
LYMPHOCYTES # BLD: 1.3 K/UL (ref 0.5–4.6)
LYMPHOCYTES NFR BLD: 11 % (ref 13–44)
MAGNESIUM SERPL-MCNC: 1.8 MG/DL (ref 1.8–2.4)
MCH RBC QN AUTO: 31.5 PG (ref 26.1–32.9)
MCHC RBC AUTO-ENTMCNC: 32.8 G/DL (ref 31.4–35)
MCV RBC AUTO: 96 FL (ref 82–102)
MONOCYTES # BLD: 1.7 K/UL (ref 0.1–1.3)
MONOCYTES NFR BLD: 15 % (ref 4–12)
NEUTS SEG # BLD: 8 K/UL (ref 1.7–8.2)
NEUTS SEG NFR BLD: 70 % (ref 43–78)
NITRITE UR QL STRIP.AUTO: NEGATIVE
NRBC # BLD: 0 K/UL (ref 0–0.2)
NT PRO BNP: 1814 PG/ML
OTHER OBSERVATIONS: ABNORMAL
PH UR STRIP: 5.5 (ref 5–9)
PLATELET # BLD AUTO: 185 K/UL (ref 150–450)
PMV BLD AUTO: 9.6 FL (ref 9.4–12.3)
POTASSIUM SERPL-SCNC: 3 MMOL/L (ref 3.5–5.1)
PROCALCITONIN SERPL-MCNC: 0.33 NG/ML (ref 0–0.49)
PROT SERPL-MCNC: 6.6 G/DL (ref 6.3–8.2)
PROT UR STRIP-MCNC: 30 MG/DL
RBC # BLD AUTO: 3.27 M/UL (ref 4.05–5.2)
RBC #/AREA URNS HPF: ABNORMAL /HPF
SERVICE CMNT-IMP: ABNORMAL
SODIUM SERPL-SCNC: 135 MMOL/L (ref 136–146)
SP GR UR REFRACTOMETRY: 1.01 (ref 1–1.02)
TROPONIN I SERPL HS-MCNC: 32 PG/ML (ref 0–14)
TROPONIN I SERPL HS-MCNC: 37.1 PG/ML (ref 0–14)
UROBILINOGEN UR QL STRIP.AUTO: 0.2 EU/DL (ref 0.2–1)
WBC # BLD AUTO: 11.5 K/UL (ref 4.3–11.1)
WBC URNS QL MICRO: ABNORMAL /HPF

## 2024-01-19 PROCEDURE — G0378 HOSPITAL OBSERVATION PER HR: HCPCS

## 2024-01-19 PROCEDURE — 93005 ELECTROCARDIOGRAM TRACING: CPT | Performed by: EMERGENCY MEDICINE

## 2024-01-19 PROCEDURE — 83605 ASSAY OF LACTIC ACID: CPT

## 2024-01-19 PROCEDURE — 82962 GLUCOSE BLOOD TEST: CPT

## 2024-01-19 PROCEDURE — 84145 PROCALCITONIN (PCT): CPT

## 2024-01-19 PROCEDURE — 71045 X-RAY EXAM CHEST 1 VIEW: CPT

## 2024-01-19 PROCEDURE — 87040 BLOOD CULTURE FOR BACTERIA: CPT

## 2024-01-19 PROCEDURE — 80053 COMPREHEN METABOLIC PANEL: CPT

## 2024-01-19 PROCEDURE — 83880 ASSAY OF NATRIURETIC PEPTIDE: CPT

## 2024-01-19 PROCEDURE — 96361 HYDRATE IV INFUSION ADD-ON: CPT

## 2024-01-19 PROCEDURE — 96360 HYDRATION IV INFUSION INIT: CPT

## 2024-01-19 PROCEDURE — G0151 HHCP-SERV OF PT,EA 15 MIN: HCPCS

## 2024-01-19 PROCEDURE — 6370000000 HC RX 637 (ALT 250 FOR IP)

## 2024-01-19 PROCEDURE — 2580000003 HC RX 258

## 2024-01-19 PROCEDURE — 81001 URINALYSIS AUTO W/SCOPE: CPT

## 2024-01-19 PROCEDURE — 83735 ASSAY OF MAGNESIUM: CPT

## 2024-01-19 PROCEDURE — 99285 EMERGENCY DEPT VISIT HI MDM: CPT

## 2024-01-19 PROCEDURE — 85025 COMPLETE CBC W/AUTO DIFF WBC: CPT

## 2024-01-19 PROCEDURE — 84484 ASSAY OF TROPONIN QUANT: CPT

## 2024-01-19 RX ORDER — ONDANSETRON 2 MG/ML
4 INJECTION INTRAMUSCULAR; INTRAVENOUS EVERY 6 HOURS PRN
Status: DISCONTINUED | OUTPATIENT
Start: 2024-01-19 | End: 2024-01-21 | Stop reason: HOSPADM

## 2024-01-19 RX ORDER — HEPARIN SODIUM 5000 [USP'U]/ML
5000 INJECTION, SOLUTION INTRAVENOUS; SUBCUTANEOUS EVERY 8 HOURS SCHEDULED
Status: DISCONTINUED | OUTPATIENT
Start: 2024-01-20 | End: 2024-01-20

## 2024-01-19 RX ORDER — ACETAMINOPHEN 325 MG/1
650 TABLET ORAL EVERY 6 HOURS PRN
Status: DISCONTINUED | OUTPATIENT
Start: 2024-01-19 | End: 2024-01-21 | Stop reason: HOSPADM

## 2024-01-19 RX ORDER — SODIUM CHLORIDE 0.9 % (FLUSH) 0.9 %
5-40 SYRINGE (ML) INJECTION PRN
Status: DISCONTINUED | OUTPATIENT
Start: 2024-01-19 | End: 2024-01-21 | Stop reason: HOSPADM

## 2024-01-19 RX ORDER — SODIUM CHLORIDE 0.9 % (FLUSH) 0.9 %
5-40 SYRINGE (ML) INJECTION EVERY 12 HOURS SCHEDULED
Status: DISCONTINUED | OUTPATIENT
Start: 2024-01-19 | End: 2024-01-21 | Stop reason: HOSPADM

## 2024-01-19 RX ORDER — ENOXAPARIN SODIUM 100 MG/ML
30 INJECTION SUBCUTANEOUS DAILY
Status: DISCONTINUED | OUTPATIENT
Start: 2024-01-20 | End: 2024-01-19 | Stop reason: ALTCHOICE

## 2024-01-19 RX ORDER — ACETAMINOPHEN 650 MG/1
650 SUPPOSITORY RECTAL EVERY 6 HOURS PRN
Status: DISCONTINUED | OUTPATIENT
Start: 2024-01-19 | End: 2024-01-21 | Stop reason: HOSPADM

## 2024-01-19 RX ORDER — POLYETHYLENE GLYCOL 3350 17 G/17G
17 POWDER, FOR SOLUTION ORAL DAILY PRN
Status: DISCONTINUED | OUTPATIENT
Start: 2024-01-19 | End: 2024-01-21 | Stop reason: HOSPADM

## 2024-01-19 RX ORDER — SODIUM CHLORIDE 9 MG/ML
INJECTION, SOLUTION INTRAVENOUS PRN
Status: DISCONTINUED | OUTPATIENT
Start: 2024-01-19 | End: 2024-01-21 | Stop reason: HOSPADM

## 2024-01-19 RX ORDER — POTASSIUM CHLORIDE 7.45 MG/ML
10 INJECTION INTRAVENOUS PRN
Status: DISCONTINUED | OUTPATIENT
Start: 2024-01-19 | End: 2024-01-19

## 2024-01-19 RX ORDER — 0.9 % SODIUM CHLORIDE 0.9 %
1000 INTRAVENOUS SOLUTION INTRAVENOUS
Status: COMPLETED | OUTPATIENT
Start: 2024-01-19 | End: 2024-01-19

## 2024-01-19 RX ORDER — ONDANSETRON 4 MG/1
4 TABLET, ORALLY DISINTEGRATING ORAL EVERY 8 HOURS PRN
Status: DISCONTINUED | OUTPATIENT
Start: 2024-01-19 | End: 2024-01-21 | Stop reason: HOSPADM

## 2024-01-19 RX ADMIN — POTASSIUM BICARBONATE 40 MEQ: 782 TABLET, EFFERVESCENT ORAL at 21:45

## 2024-01-19 RX ADMIN — SODIUM CHLORIDE 1000 ML: 9 INJECTION, SOLUTION INTRAVENOUS at 21:04

## 2024-01-19 ASSESSMENT — LIFESTYLE VARIABLES
HOW MANY STANDARD DRINKS CONTAINING ALCOHOL DO YOU HAVE ON A TYPICAL DAY: 1 OR 2
HOW OFTEN DO YOU HAVE A DRINK CONTAINING ALCOHOL: 4 OR MORE TIMES A WEEK

## 2024-01-19 ASSESSMENT — PAIN - FUNCTIONAL ASSESSMENT: PAIN_FUNCTIONAL_ASSESSMENT: 0-10

## 2024-01-19 ASSESSMENT — ENCOUNTER SYMPTOMS
PAIN LOCATION - PAIN QUALITY: ACHES, SORE
CONSTIPATION: 1

## 2024-01-19 ASSESSMENT — PAIN SCALES - GENERAL: PAINLEVEL_OUTOF10: 3

## 2024-01-19 NOTE — HOME HEALTH
SITUATION. 73 year old female patient s/p L ARIES 1/17/24 per Dr Duran . Hospitalized 1/17/24 through 1/18/24 .  History of acute on chronic combined systolic and diastolic congestive heart failure, acute renal failure superimposed on stage 3b chronic kidney disease, arthritis, CAD, carotid stenosis, coronary artery disease involving native coronary artery without angina pectoris, COVID, depression, DM, GERD, HCAP, L sided carotid endarterectomy, MI, hypercholesterolemia, HTN, iron deficiency anemia, insomnia, murmur, colonic polyps, B pleural effusion, rectocele, stercoral colitis, tobacco use disorder, unstable angina   BACKGROUND..Lives alone; granddaughter staying with her. Home is single level with no steps to enter. Patient has a RW.   ASSESSMENT...Patient alert and oriented with good participation during visit until gait training when she became unresponsive due to orthostatic hypotension. Pt presents with L ARIES with wound vac dressing in place and intact with no drainage saturating through and no signs or symptoms of infection.   Treatment: Instructed patient in infection control, taking medication as directed, use and application of CP, and signs and symptoms of DVT. Instructed supine and seated ARIES HEP protocol 20xeach of AP, QS, GS, hip abd/add, HS, SAQ  Frequency: 1w1, 2w3  Follow up: ARIES protocol

## 2024-01-19 NOTE — ED TRIAGE NOTES
EMS reports pt was positive for orthostatic hypotension. Pt states physical therapist stood pt up and she had a witnessed near syncopal episode. Pt states having two episodes of near-syncopal episodes. Pt states she was discharged from the hospital for left hip replacement surgery on Wednesday 1/17.  Pt states feel \"woozy and sleepy.\" EMS reports they gave 500 mL of NS.

## 2024-01-20 PROBLEM — E87.6 HYPOKALEMIA: Status: ACTIVE | Noted: 2024-01-20

## 2024-01-20 LAB
ALBUMIN SERPL-MCNC: 2.7 G/DL (ref 3.2–4.6)
ALBUMIN/GLOB SERPL: 0.7 (ref 0.4–1.6)
ALP SERPL-CCNC: 133 U/L (ref 50–136)
ALT SERPL-CCNC: 173 U/L (ref 12–65)
ANION GAP SERPL CALC-SCNC: 3 MMOL/L (ref 2–11)
AST SERPL-CCNC: 214 U/L (ref 15–37)
BASOPHILS # BLD: 0 K/UL (ref 0–0.2)
BASOPHILS NFR BLD: 0 % (ref 0–2)
BILIRUB SERPL-MCNC: 0.6 MG/DL (ref 0.2–1.1)
BUN SERPL-MCNC: 26 MG/DL (ref 8–23)
CALCIUM SERPL-MCNC: 8.8 MG/DL (ref 8.3–10.4)
CHLORIDE SERPL-SCNC: 106 MMOL/L (ref 103–113)
CO2 SERPL-SCNC: 28 MMOL/L (ref 21–32)
CREAT SERPL-MCNC: 1.88 MG/DL (ref 0.6–1)
DIFFERENTIAL METHOD BLD: ABNORMAL
EKG ATRIAL RATE: 85 BPM
EKG DIAGNOSIS: NORMAL
EKG P AXIS: 77 DEGREES
EKG P-R INTERVAL: 136 MS
EKG Q-T INTERVAL: 394 MS
EKG QRS DURATION: 93 MS
EKG QTC CALCULATION (BAZETT): 469 MS
EKG R AXIS: 65 DEGREES
EKG T AXIS: 39 DEGREES
EKG VENTRICULAR RATE: 85 BPM
EOSINOPHIL # BLD: 0.3 K/UL (ref 0–0.8)
EOSINOPHIL NFR BLD: 4 % (ref 0.5–7.8)
ERYTHROCYTE [DISTWIDTH] IN BLOOD BY AUTOMATED COUNT: 13.6 % (ref 11.9–14.6)
EST. AVERAGE GLUCOSE BLD GHB EST-MCNC: 85 MG/DL
GLOBULIN SER CALC-MCNC: 3.7 G/DL (ref 2.8–4.5)
GLUCOSE BLD STRIP.AUTO-MCNC: 230 MG/DL (ref 65–100)
GLUCOSE BLD STRIP.AUTO-MCNC: 242 MG/DL (ref 65–100)
GLUCOSE BLD STRIP.AUTO-MCNC: 267 MG/DL (ref 65–100)
GLUCOSE BLD STRIP.AUTO-MCNC: 271 MG/DL (ref 65–100)
GLUCOSE BLD STRIP.AUTO-MCNC: 300 MG/DL (ref 65–100)
GLUCOSE SERPL-MCNC: 322 MG/DL (ref 65–100)
HBA1C MFR BLD: 4.6 % (ref 4.8–5.6)
HCT VFR BLD AUTO: 29.3 % (ref 35.8–46.3)
HGB BLD-MCNC: 9.7 G/DL (ref 11.7–15.4)
IMM GRANULOCYTES # BLD AUTO: 0.1 K/UL (ref 0–0.5)
IMM GRANULOCYTES NFR BLD AUTO: 1 % (ref 0–5)
LYMPHOCYTES # BLD: 1.1 K/UL (ref 0.5–4.6)
LYMPHOCYTES NFR BLD: 13 % (ref 13–44)
MCH RBC QN AUTO: 32 PG (ref 26.1–32.9)
MCHC RBC AUTO-ENTMCNC: 33.1 G/DL (ref 31.4–35)
MCV RBC AUTO: 96.7 FL (ref 82–102)
MONOCYTES # BLD: 1.1 K/UL (ref 0.1–1.3)
MONOCYTES NFR BLD: 13 % (ref 4–12)
NEUTS SEG # BLD: 5.6 K/UL (ref 1.7–8.2)
NEUTS SEG NFR BLD: 69 % (ref 43–78)
NRBC # BLD: 0 K/UL (ref 0–0.2)
PLATELET # BLD AUTO: 150 K/UL (ref 150–450)
PMV BLD AUTO: 9.4 FL (ref 9.4–12.3)
POTASSIUM SERPL-SCNC: 3.6 MMOL/L (ref 3.5–5.1)
PROT SERPL-MCNC: 6.4 G/DL (ref 6.3–8.2)
RBC # BLD AUTO: 3.03 M/UL (ref 4.05–5.2)
SERVICE CMNT-IMP: ABNORMAL
SODIUM SERPL-SCNC: 137 MMOL/L (ref 136–146)
WBC # BLD AUTO: 8.1 K/UL (ref 4.3–11.1)

## 2024-01-20 PROCEDURE — 2700000000 HC OXYGEN THERAPY PER DAY

## 2024-01-20 PROCEDURE — 82962 GLUCOSE BLOOD TEST: CPT

## 2024-01-20 PROCEDURE — 2580000003 HC RX 258: Performed by: HOSPITALIST

## 2024-01-20 PROCEDURE — G0378 HOSPITAL OBSERVATION PER HR: HCPCS

## 2024-01-20 PROCEDURE — 85025 COMPLETE CBC W/AUTO DIFF WBC: CPT

## 2024-01-20 PROCEDURE — 97161 PT EVAL LOW COMPLEX 20 MIN: CPT

## 2024-01-20 PROCEDURE — 93010 ELECTROCARDIOGRAM REPORT: CPT | Performed by: INTERNAL MEDICINE

## 2024-01-20 PROCEDURE — 6370000000 HC RX 637 (ALT 250 FOR IP): Performed by: NURSE PRACTITIONER

## 2024-01-20 PROCEDURE — 6370000000 HC RX 637 (ALT 250 FOR IP): Performed by: HOSPITALIST

## 2024-01-20 PROCEDURE — 83036 HEMOGLOBIN GLYCOSYLATED A1C: CPT

## 2024-01-20 PROCEDURE — 96374 THER/PROPH/DIAG INJ IV PUSH: CPT

## 2024-01-20 PROCEDURE — 94761 N-INVAS EAR/PLS OXIMETRY MLT: CPT

## 2024-01-20 PROCEDURE — 6370000000 HC RX 637 (ALT 250 FOR IP): Performed by: EMERGENCY MEDICINE

## 2024-01-20 PROCEDURE — 36415 COLL VENOUS BLD VENIPUNCTURE: CPT

## 2024-01-20 PROCEDURE — 80053 COMPREHEN METABOLIC PANEL: CPT

## 2024-01-20 PROCEDURE — 97530 THERAPEUTIC ACTIVITIES: CPT

## 2024-01-20 RX ORDER — CARVEDILOL 6.25 MG/1
6.25 TABLET ORAL 2 TIMES DAILY WITH MEALS
Status: DISCONTINUED | OUTPATIENT
Start: 2024-01-20 | End: 2024-01-21 | Stop reason: HOSPADM

## 2024-01-20 RX ORDER — INSULIN LISPRO 100 [IU]/ML
5 INJECTION, SOLUTION INTRAVENOUS; SUBCUTANEOUS
Status: DISCONTINUED | OUTPATIENT
Start: 2024-01-20 | End: 2024-01-21 | Stop reason: HOSPADM

## 2024-01-20 RX ORDER — ASPIRIN 81 MG/1
81 TABLET ORAL DAILY
Status: DISCONTINUED | OUTPATIENT
Start: 2024-01-20 | End: 2024-01-21 | Stop reason: HOSPADM

## 2024-01-20 RX ORDER — CITALOPRAM 20 MG/1
20 TABLET ORAL DAILY
Status: DISCONTINUED | OUTPATIENT
Start: 2024-01-20 | End: 2024-01-21 | Stop reason: HOSPADM

## 2024-01-20 RX ORDER — DEXTROSE MONOHYDRATE 100 MG/ML
INJECTION, SOLUTION INTRAVENOUS CONTINUOUS PRN
Status: DISCONTINUED | OUTPATIENT
Start: 2024-01-20 | End: 2024-01-21 | Stop reason: HOSPADM

## 2024-01-20 RX ORDER — INSULIN GLARGINE 100 [IU]/ML
10 INJECTION, SOLUTION SUBCUTANEOUS NIGHTLY
Status: DISCONTINUED | OUTPATIENT
Start: 2024-01-20 | End: 2024-01-21 | Stop reason: HOSPADM

## 2024-01-20 RX ORDER — INSULIN LISPRO 100 [IU]/ML
0-8 INJECTION, SOLUTION INTRAVENOUS; SUBCUTANEOUS
Status: DISCONTINUED | OUTPATIENT
Start: 2024-01-20 | End: 2024-01-21 | Stop reason: HOSPADM

## 2024-01-20 RX ORDER — INSULIN LISPRO 100 [IU]/ML
3 INJECTION, SOLUTION INTRAVENOUS; SUBCUTANEOUS
Status: DISCONTINUED | OUTPATIENT
Start: 2024-01-20 | End: 2024-01-20

## 2024-01-20 RX ORDER — ROSUVASTATIN CALCIUM 5 MG/1
10 TABLET, COATED ORAL EVERY EVENING
Status: DISCONTINUED | OUTPATIENT
Start: 2024-01-20 | End: 2024-01-21

## 2024-01-20 RX ORDER — ISOSORBIDE MONONITRATE 30 MG/1
60 TABLET, EXTENDED RELEASE ORAL DAILY
Status: DISCONTINUED | OUTPATIENT
Start: 2024-01-20 | End: 2024-01-21 | Stop reason: HOSPADM

## 2024-01-20 RX ORDER — ROSUVASTATIN CALCIUM 20 MG/1
40 TABLET, COATED ORAL EVERY EVENING
Status: DISCONTINUED | OUTPATIENT
Start: 2024-01-20 | End: 2024-01-20

## 2024-01-20 RX ORDER — INSULIN LISPRO 100 [IU]/ML
0-4 INJECTION, SOLUTION INTRAVENOUS; SUBCUTANEOUS NIGHTLY
Status: DISCONTINUED | OUTPATIENT
Start: 2024-01-20 | End: 2024-01-21 | Stop reason: HOSPADM

## 2024-01-20 RX ORDER — NIFEDIPINE 30 MG/1
90 TABLET, EXTENDED RELEASE ORAL DAILY
Status: DISCONTINUED | OUTPATIENT
Start: 2024-01-21 | End: 2024-01-21 | Stop reason: HOSPADM

## 2024-01-20 RX ADMIN — ASPIRIN 81 MG: 81 TABLET, COATED ORAL at 09:50

## 2024-01-20 RX ADMIN — CARVEDILOL 6.25 MG: 6.25 TABLET, FILM COATED ORAL at 18:10

## 2024-01-20 RX ADMIN — CITALOPRAM 20 MG: 20 TABLET, FILM COATED ORAL at 09:50

## 2024-01-20 RX ADMIN — NIFEDIPINE 90 MG: 30 TABLET, EXTENDED RELEASE ORAL at 23:58

## 2024-01-20 RX ADMIN — SODIUM CHLORIDE, PRESERVATIVE FREE 10 ML: 5 INJECTION INTRAVENOUS at 00:37

## 2024-01-20 RX ADMIN — SODIUM CHLORIDE, PRESERVATIVE FREE 10 ML: 5 INJECTION INTRAVENOUS at 00:39

## 2024-01-20 RX ADMIN — INSULIN LISPRO 5 UNITS: 100 INJECTION, SOLUTION INTRAVENOUS; SUBCUTANEOUS at 18:09

## 2024-01-20 RX ADMIN — INSULIN LISPRO 3 UNITS: 100 INJECTION, SOLUTION INTRAVENOUS; SUBCUTANEOUS at 12:23

## 2024-01-20 RX ADMIN — INSULIN LISPRO 6 UNITS: 100 INJECTION, SOLUTION INTRAVENOUS; SUBCUTANEOUS at 12:23

## 2024-01-20 RX ADMIN — INSULIN LISPRO 4 UNITS: 100 INJECTION, SOLUTION INTRAVENOUS; SUBCUTANEOUS at 09:49

## 2024-01-20 RX ADMIN — INSULIN GLARGINE 10 UNITS: 100 INJECTION, SOLUTION SUBCUTANEOUS at 20:58

## 2024-01-20 RX ADMIN — APIXABAN 5 MG: 5 TABLET, FILM COATED ORAL at 20:58

## 2024-01-20 RX ADMIN — ROSUVASTATIN CALCIUM 10 MG: 5 TABLET, COATED ORAL at 18:10

## 2024-01-20 RX ADMIN — INSULIN HUMAN 5 UNITS: 100 INJECTION, SOLUTION PARENTERAL at 00:35

## 2024-01-20 RX ADMIN — INSULIN LISPRO 3 UNITS: 100 INJECTION, SOLUTION INTRAVENOUS; SUBCUTANEOUS at 09:49

## 2024-01-20 RX ADMIN — CARVEDILOL 6.25 MG: 6.25 TABLET, FILM COATED ORAL at 09:50

## 2024-01-20 RX ADMIN — APIXABAN 5 MG: 5 TABLET, FILM COATED ORAL at 09:50

## 2024-01-20 RX ADMIN — POTASSIUM BICARBONATE 40 MEQ: 782 TABLET, EFFERVESCENT ORAL at 13:35

## 2024-01-20 RX ADMIN — POLYETHYLENE GLYCOL 3350 17 G: 17 POWDER, FOR SOLUTION ORAL at 12:26

## 2024-01-20 RX ADMIN — INSULIN LISPRO 2 UNITS: 100 INJECTION, SOLUTION INTRAVENOUS; SUBCUTANEOUS at 18:10

## 2024-01-20 RX ADMIN — ISOSORBIDE MONONITRATE 60 MG: 30 TABLET, EXTENDED RELEASE ORAL at 09:49

## 2024-01-20 RX ADMIN — SODIUM CHLORIDE, PRESERVATIVE FREE 10 ML: 5 INJECTION INTRAVENOUS at 09:50

## 2024-01-20 RX ADMIN — SODIUM CHLORIDE, PRESERVATIVE FREE 10 ML: 5 INJECTION INTRAVENOUS at 20:59

## 2024-01-20 ASSESSMENT — PAIN SCALES - GENERAL: PAINLEVEL_OUTOF10: 0

## 2024-01-20 NOTE — CARE COORDINATION
73 yr-old F with hypotension.  Recent ARIES on 1/17 and went home with [facility].  Lives alone however she may be going to her daughter's.  Referral made to [facility] and order in to resume  services.      01/20/24 1122   Service Assessment   Patient Orientation Alert and Oriented   Cognition Alert   History Provided By Patient   Primary Caregiver Self   Support Systems Children   Patient's Healthcare Decision Maker is: Named in Scanned ACP Document   PCP Verified by CM Yes   Last Visit to PCP Within last 3 months   Prior Functional Level Assistance with the following:;Mobility   Current Functional Level Assistance with the following:;Mobility   Can patient return to prior living arrangement Yes   Ability to make needs known: Good   Family able to assist with home care needs: Yes   Would you like for me to discuss the discharge plan with any other family members/significant others, and if so, who? No   Financial Resources Medicare   Community Resources Other (Comment)  (n/a)   Services At/After Discharge   Transition of Care Consult (CM Consult) Home Health   Internal Home Health Yes   Condition of Participation: Discharge Planning   The Plan for Transition of Care is related to the following treatment goals: Increase independence   The Patient and/or Patient Representative was provided with a Choice of Provider? Patient   The Patient and/Or Patient Representative agree with the Discharge Plan? Yes

## 2024-01-20 NOTE — H&P
Hospitalist History and Physical   Admit Date:  2024  6:34 PM   Name:  Magalis Maddox   Age:  73 y.o.  Sex:  female  :  1950   MRN:  [id number]   Room:  [facility]/    Presenting/Chief Complaint: Hypotension (Near-syncopal episode)     Reason(s) for Admission: Orthostatic hypotension [I95.1]  Hypoxemia [R09.02]     History of Present Illness:     32 years old female with history of chronic diastolic congestive heart failure, peripheral vascular disease, coronary artery disease, CKD stage III, diabetes type 2, hypertension, dyslipidemia, history of recent left hip arthroplasty presented to emergency room with feeling lightheaded.  Patient reports she was not eating very well since surgery her orthostatics were positive, patient was given 2 L of normal saline in emergency room, patient reports her symptoms improved.  Emergency room physician requested observation of this patient for deconditioning.  Patient denies any fever, chills, nausea, vomiting, abdominal pain.              Assessment & Plan:     Orthostatic hypotension: Repeat orthostatics in the morning, will request PT to evaluate.  Patient did not have any fall but very symptomatic when she was standing.    Hypokalemia: Replace potassium.  Will recheck potassium in a.m.    Chronic A-fib: Continue on carvedilol, apixaban.    Chronic diastolic congestive heart failure: Patient continues to take Lasix which could have contributed to patient's orthostatic hypotension.  Hold Lasix for now.    Diabetes type 2: Placed on sliding scale insulin.  Will restart back on home medications.    CKD stage III: Creatinine is around baseline.            Diet: ADULT DIET; Regular  VTE prophylaxis: Already on anticoagulation  Code status: Full Code      Non-peripheral Lines and Tubes (if present):     Negative Pressure Wound Therapy Hip Left (Active)            Hospital Problems:  Principal Problem:    Orthostatic hypotension  Resolved Problems:    * No

## 2024-01-20 NOTE — FLOWSHEET NOTE
01/19/24 2246 01/19/24 2251 01/19/24 2253   Vital Signs   Orthostatic B/P and Pulse? Yes Yes Yes   Blood Pressure Lying 148/64  --   --    Pulse Lying 96 PER MINUTE  --   --    Blood Pressure Sitting  --  137/68  --    Pulse Sitting  --  92 PER MINUTE  --    Blood Pressure Standing  --   --  116/59   Pulse Standing  --   --  89 PER MINUTE     Pt denies dizziness but states feeling funny.

## 2024-01-20 NOTE — ED PROVIDER NOTES
I saw the patient.  Lightheadedness.  2 days status post hip arthroplasty.  No fever.  Has a wound VAC in place.  Denies to me any shortness of breath.  Not on oxygen at home.  Lungs are clear on exam.  No redness at incision site.  She is alert and conversant.  Had an O2 sat of 88 when she arrived.  Has been on oxygen.  I placed her back on room air.  She has maintained a sat in the 90s thus far.  She has been hydrated.  The plan is to hydrate with 2 L.  Patient has been on Lasix.  Plan is to recheck orthostatics and see how where she gets around after being hydrated and also monitor for any hypoxemia.  She did start Eliquis yesterday.    ED ATTENDING SHARED SERVICES NOTE:     I have seen and evaluated the patient and performed an independent history and physical exam, and I agree with the assessment and plan as documented in the advanced provider note.  I performed the history of present illness and physical exam in its entirety.  With the following updates: see above    1/19/24 9:51 PM MD Ladarius Luciano William F, MD  01/19/24 0651

## 2024-01-20 NOTE — PROGRESS NOTES
THIS IS A NON-BILLABLE NOTE; pt was admitted/examined by the nocturnist after midnight.    HPI: Magalis Maddox is a 72 yo female with with history of chronic diastolic congestive heart failure, peripheral vascular disease, coronary artery disease, CKD stage III, diabetes type 2, hypertension, dyslipidemia, history of recent left hip arthroplasty on 1/17/24 w Dr Duran, who discharged home w HH / PT services on 1/18/24. HH/PT was due to go out for first appt at her house on 1/19/24 but pt states feeling unwell and lightheaded. Patient reports she was not eating very well since surgery but still taking her meds, such as lasix for heart failure. Pt states it is a careful balance w her fld intake and either getting to much or to little and the surgery got her off course. She came to the ED due to these sx and her orthostatics were positive, as much as a 30-40 pt drop systolically.   Her last echo in 2/2021 was reviewed, pt had a EF at 55% then. She was given 2 L of normal saline in emergency room, patient reports her symptoms improved, although her ortho VS remained positive for large drops. Her glucose was up as well, near 300 although her A1c was 4.6, and she felt dehydrated. Her cr was up to 2.17, baseline is near 1.8. Her potassium was low at 3.0 on arrival.   The Emergency room physician requested observation of this patient for deconditioning, need of IV flds, reducing in glucose and correction of orthostatic BP drops. Patient denies any fever, chills, nausea, vomiting, abdominal pain. No recent illness. Hospital team to admit.     ROS 1/20 Rounds: Pt states feeling better overall. No blurry vision, HA or dizziness, even when standing but her VS are still positive during position changes. We are holding her po lasix and she does not want more IV flds but will try to drink more oral flds today. No F/C, N/V/D. No chest or abdominal pain. No SOB and not on o2.     PE: .No family at bedside for am rounds / exam 
are met, whichever comes first.    THERAPY PROGNOSIS: Good    PROBLEM LIST:   (Skilled intervention is medically necessary to address:)  Decreased ADL/Functional Activities  Decreased Activity Tolerance  Decreased AROM/PROM  Decreased Balance  Decreased Gait Ability  Decreased Safety Awareness  Decreased Strength  Decreased Transfer Abilities  Increased Pain INTERVENTIONS PLANNED:   (Benefits and precautions of physical therapy have been discussed with the patient.)  Self Care Training  Therapeutic Activity  Therapeutic Exercise/HEP  Neuromuscular Re-education  Gait Training  Modalities  Education       TREATMENT:   EVALUATION: LOW COMPLEXITY: (Untimed Charge): 10 minutes    TREATMENT:   Therapeutic Activity (14 Minutes): Therapeutic activity included Rolling, Supine to Sit, Sit to Supine, Scooting, Transfer Training, Ambulation on level ground, Sitting balance , Standing balance, and L ARIES exercises to improve functional Activity tolerance, Balance, Coordination, Mobility, Strength, and ROM.    TREATMENT GRID:  ARIES Exercises   Date:  1/20/24 Date:   Date:     ACTIVITY/EXERCISE AM PM AM PM AM PM     [x]  []  []  []  []  []   Ankle Pumps 15        Quad Sets 15        Gluteal Sets 15        Hip ABd/ADduction 15        Knee Slides 15        Short Arc Quads 15        Long Arc Quads                                    B = bilateral; AA = active assistive; A = active; P = passive      AFTER TREATMENT PRECAUTIONS: Bed, Bed/Chair Locked, Call light within reach, Needs within reach, and RN notified    INTERDISCIPLINARY COLLABORATION:  RN/ PCT    EDUCATION: Education Given To: Patient;Family  Education Provided: Role of Therapy;Plan of Care;Home Exercise Program;Transfer Training;Equipment;Fall Prevention Strategies;Energy Conservation  Education Method: Verbal  Education Outcome: Verbalized understanding;Demonstrated understanding;Continued education needed    TIME IN/OUT:  Time In: 0842  Time Out: 0906  Minutes:

## 2024-01-20 NOTE — PLAN OF CARE
01/20/24 0100   Dual Clinician Skin Assessment   Dual Skin Assessment (4 Eyes) WDL   Second Clinical  (First and Last Name) KARLY Dumont   Skin Integumentary    Skin Integumentary (WDL) X   Skin Color Ecchymosis (comment);Red;Other (comment)  (scatterd scars and reddness)   Skin Integrity Scars (comment);Redness       Problem: Safety - Adult  Goal: Free from fall injury  Outcome: Progressing     Problem: Discharge Planning  Goal: Discharge to home or other facility with appropriate resources  Outcome: Progressing     Problem: Pain  Goal: Verbalizes/displays adequate comfort level or baseline comfort level  Outcome: Progressing   TRANSFER - IN REPORT:    Verbal report received from KARLY Hsu on Magalis Maddox  being received from ED for routine progression of patient care      Report consisted of patient's Situation, Background, Assessment and   Recommendations(SBAR).     Information from the following report(s) Nurse Handoff Report was reviewed with the receiving nurse.    Opportunity for questions and clarification was provided.      Assessment completed upon patient's arrival to unit and care assumed.

## 2024-01-20 NOTE — FLOWSHEET NOTE
01/19/24 2049 01/19/24 2052 01/19/24 2057   Vital Signs   Orthostatic B/P and Pulse? Yes Yes Yes   Blood Pressure Lying 141/60  --   --    Pulse Lying 88 PER MINUTE  --   --    Blood Pressure Sitting  --  112/57  --    Pulse Sitting  --  88 PER MINUTE  --    Blood Pressure Standing  --   --  88/73   Pulse Standing  --   --  86 PER MINUTE     Pt orthostatics complete. Pt had to sit down. Pt states feels dizzy and needing to sit down.

## 2024-01-20 NOTE — ED NOTES
TRANSFER - OUT REPORT:    Verbal report given to Belkis on Magalis Maddox  being transferred to [facility] for routine progression of patient care       Report consisted of patient's Situation, Background, Assessment and   Recommendations(SBAR).     Information from the following report(s) Nurse Handoff Report was reviewed with the receiving nurse.    [facility] Fall Assessment:    Presents to emergency department  because of falls (Syncope, seizure, or loss of consciousness): No  Age > 70: Yes  Altered Mental Status, Intoxication with alcohol or substance confusion (Disorientation, impaired judgment, poor safety awaremess, or inability to follow instructions): No  Impaired Mobility: Ambulates or transfers with assistive devices or assistance; Unable to ambulate or transer.: Yes (Pt uses walker.)  Nursing Judgement: Yes          Lines:   Peripheral IV 01/19/24 Right;Anterior Hand (Active)       Peripheral IV 01/19/24 Left;Ventral Forearm (Active)       Peripheral IV 01/20/24 Right Antecubital (Active)        Opportunity for questions and clarification was provided.      Patient transported with:  O2 @ 1lpm

## 2024-01-20 NOTE — ED PROVIDER NOTES
Emergency Department Provider Note       PCP: Jamison Riggs MD   Age: 73 y.o.   Sex: female     DISPOSITION Admitted 01/19/2024 11:23:40 PM       ICD-10-CM    1. Orthostatic hypotension  I95.1       2. Hypoxemia  R09.02           Medical Decision Making     Complexity of Problems Addressed:  1 or more acute illnesses that pose a threat to life or bodily function.     Data Reviewed and Analyzed:   I independently ordered and reviewed each unique test.  I reviewed external records: provider visit note from outside specialist.   The patients assessment required an independent historian: the patient's daughter.  The reason they were needed is important historical information not provided by the patient.    I independently ordered and interpreted the ED EKG in the absence of a Cardiologist.    Rate: 85  EKG Interpretation: EKG Interpretation: sinus rhythm, no evidence of arrhythmia  ST Segments: Normal ST segments - NO STEMI      I interpreted the X-rays no acute changes noted on CXR.    Discussion of management or test interpretation.  This patient is a 73-year-old female who is 3 days status post left hip replacement who presents today with her daughter and granddaughter due to orthostatic hypotension and presyncope that started today.  Orthostatic hypotension was noted upon initial evaluation with the most significant drop going from sitting to standing.  This was 112/57 mmHg to 88/73 mmHg.  Sepsis labs were ordered and blood cultures were initiated due to the patient's hypotension.  Physical exam of the patient is remarkable for bandage to her left hip covering her surgical scar from her left hip replacement.  No surrounding erythema or edema noted.  Her CBC shows slight leukocytosis with a WBC of 11.5.  CMP is consistent with hypokalemia and her established CKD.  Urinalysis is not significant for urinary tract infection.  Chest x-ray shows no signs of infection or consolidation.  Her procalcitonin and lactic acid

## 2024-01-21 VITALS
HEART RATE: 71 BPM | WEIGHT: 165 LBS | DIASTOLIC BLOOD PRESSURE: 69 MMHG | RESPIRATION RATE: 17 BRPM | SYSTOLIC BLOOD PRESSURE: 149 MMHG | TEMPERATURE: 98.8 F | BODY MASS INDEX: 28.17 KG/M2 | HEIGHT: 64 IN | OXYGEN SATURATION: 96 %

## 2024-01-21 PROBLEM — R79.89 ELEVATED LFTS: Status: ACTIVE | Noted: 2024-01-21

## 2024-01-21 LAB
ANION GAP SERPL CALC-SCNC: 4 MMOL/L (ref 2–11)
BUN SERPL-MCNC: 23 MG/DL (ref 8–23)
CALCIUM SERPL-MCNC: 9.2 MG/DL (ref 8.3–10.4)
CHLORIDE SERPL-SCNC: 106 MMOL/L (ref 103–113)
CO2 SERPL-SCNC: 28 MMOL/L (ref 21–32)
CREAT SERPL-MCNC: 1.64 MG/DL (ref 0.6–1)
GLUCOSE BLD STRIP.AUTO-MCNC: 218 MG/DL (ref 65–100)
GLUCOSE SERPL-MCNC: 259 MG/DL (ref 65–100)
MAGNESIUM SERPL-MCNC: 2 MG/DL (ref 1.8–2.4)
POTASSIUM SERPL-SCNC: 3.5 MMOL/L (ref 3.5–5.1)
SERVICE CMNT-IMP: ABNORMAL
SODIUM SERPL-SCNC: 138 MMOL/L (ref 136–146)

## 2024-01-21 PROCEDURE — 6370000000 HC RX 637 (ALT 250 FOR IP): Performed by: HOSPITALIST

## 2024-01-21 PROCEDURE — 82962 GLUCOSE BLOOD TEST: CPT

## 2024-01-21 PROCEDURE — 6370000000 HC RX 637 (ALT 250 FOR IP): Performed by: NURSE PRACTITIONER

## 2024-01-21 PROCEDURE — G0378 HOSPITAL OBSERVATION PER HR: HCPCS

## 2024-01-21 PROCEDURE — 83735 ASSAY OF MAGNESIUM: CPT

## 2024-01-21 PROCEDURE — 36415 COLL VENOUS BLD VENIPUNCTURE: CPT

## 2024-01-21 PROCEDURE — 80048 BASIC METABOLIC PNL TOTAL CA: CPT

## 2024-01-21 RX ORDER — ROSUVASTATIN CALCIUM 5 MG/1
5 TABLET, COATED ORAL EVERY EVENING
Qty: 30 TABLET | Refills: 2 | Status: SHIPPED | OUTPATIENT
Start: 2024-01-21 | End: 2024-04-20

## 2024-01-21 RX ORDER — ROSUVASTATIN CALCIUM 5 MG/1
5 TABLET, COATED ORAL EVERY EVENING
Status: DISCONTINUED | OUTPATIENT
Start: 2024-01-21 | End: 2024-01-21 | Stop reason: HOSPADM

## 2024-01-21 RX ADMIN — INSULIN LISPRO 5 UNITS: 100 INJECTION, SOLUTION INTRAVENOUS; SUBCUTANEOUS at 08:48

## 2024-01-21 RX ADMIN — APIXABAN 5 MG: 5 TABLET, FILM COATED ORAL at 08:48

## 2024-01-21 RX ADMIN — INSULIN LISPRO 2 UNITS: 100 INJECTION, SOLUTION INTRAVENOUS; SUBCUTANEOUS at 08:49

## 2024-01-21 RX ADMIN — ACETAMINOPHEN 650 MG: 325 TABLET, FILM COATED ORAL at 01:38

## 2024-01-21 RX ADMIN — CARVEDILOL 6.25 MG: 6.25 TABLET, FILM COATED ORAL at 08:47

## 2024-01-21 RX ADMIN — ISOSORBIDE MONONITRATE 60 MG: 30 TABLET, EXTENDED RELEASE ORAL at 08:47

## 2024-01-21 RX ADMIN — ASPIRIN 81 MG: 81 TABLET, COATED ORAL at 08:47

## 2024-01-21 RX ADMIN — CITALOPRAM 20 MG: 20 TABLET, FILM COATED ORAL at 08:48

## 2024-01-21 NOTE — FLOWSHEET NOTE
01/21/24 1200   AVS Reviewed   AVS & discharge instructions reviewed with patient and/or representative? Yes   Reviewed instructions with Patient   Level of Understanding Questions answered     Discharge instructions reviewed and copy provided for pt. Opportunity provided for questions. Iv was removed without difficulty.

## 2024-01-21 NOTE — DISCHARGE INSTRUCTIONS
Decrease your crestor from 10mg to only 5mg daily and have PCP recheck liver tests in 1 month - I sent u in the new dose to chip  Decrease your lasix (furosemide) from 40mg to 20mg for the next 3 days, and stay hydrated (you can cut what you have in half for these 3 days)  I have ordered you 2 more days of potassium to take at home - sent RX to [facility]   is resuming your [facility] health  I would like you to see your PCP in a week to check your labs (CMP, MAG level) and yr vital signs laying / standing and sitting again- normal when you left here after fluids and holding lasix during your stay. They may have to adjust your meds again out pt if your BP starts to drop again once you get home.

## 2024-01-21 NOTE — DISCHARGE SUMMARY
10/11/2018, 11/03/2021    Influenza, Triv, inactivated, subunit, adjuvanted, IM (Fluad 65 yrs and older) 09/03/2019    Pneumococcal, PCV-13, PREVNAR 13, (age 6w+), IM, 0.5mL 08/24/2015    Pneumococcal, PPSV23, PNEUMOVAX 23, (age 2y+), SC/IM, 0.5mL 09/04/2013       Recent Vital Data:  Patient Vitals for the past 24 hrs:   Temp Pulse Resp BP SpO2   01/21/24 0737 97.9 °F (36.6 °C) 86 17 138/70 --   01/21/24 0409 98.1 °F (36.7 °C) 84 18 139/66 98 %   01/20/24 2330 99 °F (37.2 °C) 98 18 (!) 170/78 97 %   01/20/24 1957 99 °F (37.2 °C) 93 16 (!) 155/69 94 %   01/20/24 1216 98.4 °F (36.9 °C) 77 17 (!) 144/65 98 %       Oxygen Therapy  SpO2: 98 %  Pulse via Oximetry: 92 beats per minute  Pulse Oximeter Device Mode: Intermittent  Pulse Oximeter Device Location: Right, Finger  O2 Device: None (Room air)  O2 Flow Rate (L/min): 0 L/min    Estimated body mass index is 28.32 kg/m² as calculated from the following:    Height as of this encounter: 1.626 m (5' 4\").    Weight as of this encounter: 74.8 kg (165 lb).    Intake/Output Summary (Last 24 hours) at 1/21/2024 1038  Last data filed at 1/21/2024 0641  Gross per 24 hour   Intake 360 ml   Output 1800 ml   Net -1440 ml         Physical Exam:  .No family at bedside for am rounds / exam today.  General:    Well nourished.  No overt distress  Head:  Normocephalic, atraumatic  Eyes:  Sclerae appear normal.  Pupils equally round.    HENT:  Nares appear normal, no drainage.  Moist mucous membranes  Neck:  No restricted ROM.  Trachea midline  CV:   RRR.  No m/r/g.  No JVD  Lungs:   CTAB.  No wheezing, rhonchi, not on o2. Respirations even, unlabored  Abdomen:   Soft, nontender, nondistended.  +Bowel sounds throughout  Extremities: Warm and dry.  No cyanosis or clubbing.  No edema.    Skin:     No rashes.  Normal coloration  Neuro:  CN II-XII grossly intact. A&O x4, moves all extremities, no deficits on exam, no further orthostatic VS or sx w position changes  Psych:  Normal mood and

## 2024-01-22 ENCOUNTER — HOME CARE VISIT (OUTPATIENT)
Dept: SCHEDULING | Facility: HOME HEALTH | Age: 74
End: 2024-01-22
Payer: MEDICARE

## 2024-01-22 ENCOUNTER — CARE COORDINATION (OUTPATIENT)
Dept: CARE COORDINATION | Facility: CLINIC | Age: 74
End: 2024-01-22

## 2024-01-22 VITALS
HEART RATE: 68 BPM | RESPIRATION RATE: 14 BRPM | SYSTOLIC BLOOD PRESSURE: 140 MMHG | OXYGEN SATURATION: 98 % | TEMPERATURE: 97.4 F | DIASTOLIC BLOOD PRESSURE: 72 MMHG

## 2024-01-22 PROCEDURE — G0151 HHCP-SERV OF PT,EA 15 MIN: HCPCS

## 2024-01-22 ASSESSMENT — ENCOUNTER SYMPTOMS: PAIN LOCATION - PAIN QUALITY: ACHES, SORE

## 2024-01-22 NOTE — CARE COORDINATION
Care Transitions Initial Follow Up Call    Call within 2 business days of discharge: Yes    Patient Current Location:  Home: [address] [place] [address]  [place] [address]    Care Transition Nurse contacted the patient by telephone to perform post hospital discharge assessment. Verified name and  with patient as identifiers. Provided introduction to self, and explanation of the Care Transition Nurse role.     Patient: Magalis Maddox Patient : 1950   MRN: [id number]  Reason for Admission: hypotension  Discharge Date: 24 RARS: Readmission Risk Score: 9.3      Last Discharge Facility       Date Complaint Diagnosis Description Type Department Provider    24 Hypotension Orthostatic hypotension ... ED to Hosp-Admission (Discharged) (ADMITTED) [id number] Faraz García MD; Marcelino Durand...            Was this an external facility discharge? No Discharge Facility: [facility]    Challenges to be reviewed by the provider   Additional needs identified to be addressed with provider: Yes  none               Method of communication with provider: none.    Patient home after recent surgery and subsequent readmission for hypotension and hyperglycemia. Patient indicated her blood pressure has been doing well and her blood sugar has been more concerning. Patient recently stopped Tresiba prior to surgery and has just restarted as of last night. Patient indicated her blood sugar was over 400 last night and overnight ended in the mid 200's. This is very out of the ordinary for patient and she stated she started her Tresiba for the first time last night. CTN to attempt to move patient PCP appt closer to d/c and follow up in coming days to see if Tresiba has assisted in lowering blood sugar closer to baseline for patient. Patient currently heading to PT at this time and CTN to follow up Wednesday for blood sugar discussion.     Care Transition Nurse reviewed discharge instructions, medical action plan, and red flags with

## 2024-01-23 ENCOUNTER — TELEPHONE (OUTPATIENT)
Dept: INTERNAL MEDICINE CLINIC | Facility: CLINIC | Age: 74
End: 2024-01-23

## 2024-01-23 NOTE — TELEPHONE ENCOUNTER
[facility] form, office visit notes, and insurance card has been faxed to [phone].

## 2024-01-24 ENCOUNTER — CARE COORDINATION (OUTPATIENT)
Dept: CARE COORDINATION | Facility: CLINIC | Age: 74
End: 2024-01-24

## 2024-01-24 ENCOUNTER — HOME CARE VISIT (OUTPATIENT)
Dept: SCHEDULING | Facility: HOME HEALTH | Age: 74
End: 2024-01-24
Payer: MEDICARE

## 2024-01-24 VITALS
RESPIRATION RATE: 16 BRPM | SYSTOLIC BLOOD PRESSURE: 138 MMHG | TEMPERATURE: 97 F | DIASTOLIC BLOOD PRESSURE: 72 MMHG | HEART RATE: 80 BPM | OXYGEN SATURATION: 97 %

## 2024-01-24 LAB
BACTERIA SPEC CULT: NORMAL
BACTERIA SPEC CULT: NORMAL
SERVICE CMNT-IMP: NORMAL
SERVICE CMNT-IMP: NORMAL

## 2024-01-24 PROCEDURE — G0157 HHC PT ASSISTANT EA 15: HCPCS

## 2024-01-24 ASSESSMENT — ENCOUNTER SYMPTOMS: PAIN LOCATION - PAIN QUALITY: SORE

## 2024-01-29 ENCOUNTER — HOME CARE VISIT (OUTPATIENT)
Dept: SCHEDULING | Facility: HOME HEALTH | Age: 74
End: 2024-01-29
Payer: MEDICARE

## 2024-01-29 VITALS
DIASTOLIC BLOOD PRESSURE: 70 MMHG | RESPIRATION RATE: 16 BRPM | TEMPERATURE: 97.3 F | SYSTOLIC BLOOD PRESSURE: 136 MMHG | OXYGEN SATURATION: 97 % | HEART RATE: 80 BPM

## 2024-01-29 PROCEDURE — G0157 HHC PT ASSISTANT EA 15: HCPCS

## 2024-01-29 ASSESSMENT — ENCOUNTER SYMPTOMS: PAIN LOCATION - PAIN QUALITY: SORE

## 2024-01-31 ENCOUNTER — HOME CARE VISIT (OUTPATIENT)
Dept: SCHEDULING | Facility: HOME HEALTH | Age: 74
End: 2024-01-31
Payer: MEDICARE

## 2024-01-31 VITALS
HEART RATE: 74 BPM | SYSTOLIC BLOOD PRESSURE: 122 MMHG | TEMPERATURE: 97.3 F | DIASTOLIC BLOOD PRESSURE: 62 MMHG | RESPIRATION RATE: 16 BRPM | OXYGEN SATURATION: 96 %

## 2024-01-31 PROCEDURE — G0157 HHC PT ASSISTANT EA 15: HCPCS

## 2024-01-31 ASSESSMENT — ENCOUNTER SYMPTOMS: PAIN LOCATION - PAIN QUALITY: SORE

## 2024-02-01 ENCOUNTER — ANTI-COAG VISIT (OUTPATIENT)
Age: 74
End: 2024-02-01

## 2024-02-01 DIAGNOSIS — I48.0 PAROXYSMAL ATRIAL FIBRILLATION (HCC): Primary | ICD-10-CM

## 2024-02-01 DIAGNOSIS — Z79.01 LONG TERM CURRENT USE OF ANTICOAGULANT: ICD-10-CM

## 2024-02-05 ENCOUNTER — HOME CARE VISIT (OUTPATIENT)
Dept: SCHEDULING | Facility: HOME HEALTH | Age: 74
End: 2024-02-05
Payer: MEDICARE

## 2024-02-05 VITALS
RESPIRATION RATE: 14 BRPM | TEMPERATURE: 97.4 F | OXYGEN SATURATION: 97 % | DIASTOLIC BLOOD PRESSURE: 64 MMHG | HEART RATE: 68 BPM | SYSTOLIC BLOOD PRESSURE: 136 MMHG

## 2024-02-05 PROCEDURE — G0151 HHCP-SERV OF PT,EA 15 MIN: HCPCS

## 2024-02-05 ASSESSMENT — ENCOUNTER SYMPTOMS: PAIN LOCATION - PAIN QUALITY: ACHES, SORE

## 2024-02-06 ENCOUNTER — CARE COORDINATION (OUTPATIENT)
Dept: CARE COORDINATION | Facility: CLINIC | Age: 74
End: 2024-02-06

## 2024-02-06 NOTE — CARE COORDINATION
CTN with unsuccessful attempt at follow up after recent RICK call and subsequent follow up. Patient compliant with appts and medications at that time and no acute needs expressed. CTN to attempt again at a later time.

## 2024-02-07 ENCOUNTER — CARE COORDINATION (OUTPATIENT)
Dept: CARE COORDINATION | Facility: CLINIC | Age: 74
End: 2024-02-07

## 2024-02-07 NOTE — CARE COORDINATION
Patient has graduated from the Care Transitions program on 2/7.  Patient/family has the ability to self-manage at this time. Patient has no further care management needs, no referral to the ACM team for further management. Patient endorses doing well since d/c. Blood sugar and blood pressure are within normal range and patient does not have any acute needs and or concerns at this time. CTN to follow along chart review PRN until end of 30 day from d/c.     Patient has Care Transition Nurse's contact information for any further questions, concerns, or needs.  Patients upcoming visits:    Future Appointments   Date Time Provider Department Center   2/8/2024 To Be Determined Didier Sellers, PT [facility] HOME HEAL   2/8/2024  2:40 PM Jamison Riggs MD [facility] AMB   2/9/2024  9:45 AM Jesus Duran MD [facility] AMB   3/11/2024  1:00 PM Handy Brower DO [facility] AMB   4/12/2024 11:00 AM [facility]TRASOUND 1 [facility] AMB   4/12/2024 11:30 AM Rob Bhatia MD [facility] AMB

## 2024-02-08 ENCOUNTER — HOME CARE VISIT (OUTPATIENT)
Dept: SCHEDULING | Facility: HOME HEALTH | Age: 74
End: 2024-02-08
Payer: MEDICARE

## 2024-02-08 ENCOUNTER — OFFICE VISIT (OUTPATIENT)
Dept: INTERNAL MEDICINE CLINIC | Facility: CLINIC | Age: 74
End: 2024-02-08

## 2024-02-08 VITALS
HEIGHT: 64 IN | BODY MASS INDEX: 28 KG/M2 | OXYGEN SATURATION: 95 % | HEART RATE: 62 BPM | DIASTOLIC BLOOD PRESSURE: 56 MMHG | TEMPERATURE: 97.3 F | SYSTOLIC BLOOD PRESSURE: 126 MMHG | WEIGHT: 164 LBS | RESPIRATION RATE: 16 BRPM

## 2024-02-08 VITALS
SYSTOLIC BLOOD PRESSURE: 138 MMHG | HEART RATE: 74 BPM | RESPIRATION RATE: 14 BRPM | TEMPERATURE: 96.8 F | OXYGEN SATURATION: 94 % | DIASTOLIC BLOOD PRESSURE: 64 MMHG

## 2024-02-08 DIAGNOSIS — E11.22 TYPE 2 DIABETES MELLITUS WITH STAGE 4 CHRONIC KIDNEY DISEASE, WITH LONG-TERM CURRENT USE OF INSULIN (HCC): ICD-10-CM

## 2024-02-08 DIAGNOSIS — F51.01 PRIMARY INSOMNIA: ICD-10-CM

## 2024-02-08 DIAGNOSIS — I25.10 CORONARY ARTERY DISEASE INVOLVING NATIVE CORONARY ARTERY OF NATIVE HEART WITHOUT ANGINA PECTORIS: Primary | ICD-10-CM

## 2024-02-08 DIAGNOSIS — Z79.4 TYPE 2 DIABETES MELLITUS WITH STAGE 4 CHRONIC KIDNEY DISEASE, WITH LONG-TERM CURRENT USE OF INSULIN (HCC): ICD-10-CM

## 2024-02-08 DIAGNOSIS — I48.0 PAROXYSMAL ATRIAL FIBRILLATION (HCC): ICD-10-CM

## 2024-02-08 DIAGNOSIS — I10 ESSENTIAL HYPERTENSION: ICD-10-CM

## 2024-02-08 DIAGNOSIS — K74.60 LIVER DISEASE, CHRONIC, WITH CIRRHOSIS (HCC): ICD-10-CM

## 2024-02-08 DIAGNOSIS — N18.4 STAGE 4 CHRONIC KIDNEY DISEASE (HCC): ICD-10-CM

## 2024-02-08 DIAGNOSIS — K76.9 LIVER DISEASE, CHRONIC, WITH CIRRHOSIS (HCC): ICD-10-CM

## 2024-02-08 DIAGNOSIS — E78.2 MIXED HYPERLIPIDEMIA: ICD-10-CM

## 2024-02-08 DIAGNOSIS — I50.42 CHRONIC COMBINED SYSTOLIC AND DIASTOLIC CONGESTIVE HEART FAILURE (HCC): ICD-10-CM

## 2024-02-08 DIAGNOSIS — I25.10 CORONARY ARTERY DISEASE INVOLVING NATIVE CORONARY ARTERY OF NATIVE HEART WITHOUT ANGINA PECTORIS: ICD-10-CM

## 2024-02-08 DIAGNOSIS — R79.89 ELEVATED LFTS: ICD-10-CM

## 2024-02-08 DIAGNOSIS — F32.5 MAJOR DEPRESSIVE DISORDER WITH SINGLE EPISODE, IN FULL REMISSION (HCC): ICD-10-CM

## 2024-02-08 DIAGNOSIS — D64.9 ANEMIA, UNSPECIFIED TYPE: ICD-10-CM

## 2024-02-08 DIAGNOSIS — N18.4 TYPE 2 DIABETES MELLITUS WITH STAGE 4 CHRONIC KIDNEY DISEASE, WITH LONG-TERM CURRENT USE OF INSULIN (HCC): ICD-10-CM

## 2024-02-08 DIAGNOSIS — I73.9 PVD (PERIPHERAL VASCULAR DISEASE) WITH CLAUDICATION (HCC): ICD-10-CM

## 2024-02-08 LAB
ALBUMIN SERPL-MCNC: 3.3 G/DL (ref 3.2–4.6)
ALBUMIN/GLOB SERPL: 0.9 (ref 0.4–1.6)
ALP SERPL-CCNC: 181 U/L (ref 50–136)
ALT SERPL-CCNC: 54 U/L (ref 12–65)
ANION GAP SERPL CALC-SCNC: 5 MMOL/L (ref 2–11)
AST SERPL-CCNC: 50 U/L (ref 15–37)
BASOPHILS # BLD: 0 K/UL (ref 0–0.2)
BASOPHILS NFR BLD: 0 % (ref 0–2)
BILIRUB DIRECT SERPL-MCNC: 0.2 MG/DL
BILIRUB SERPL-MCNC: 0.3 MG/DL (ref 0.2–1.1)
BUN SERPL-MCNC: 24 MG/DL (ref 8–23)
CALCIUM SERPL-MCNC: 8.7 MG/DL (ref 8.3–10.4)
CHLORIDE SERPL-SCNC: 105 MMOL/L (ref 103–113)
CO2 SERPL-SCNC: 30 MMOL/L (ref 21–32)
CREAT SERPL-MCNC: 1.9 MG/DL (ref 0.6–1)
DIFFERENTIAL METHOD BLD: ABNORMAL
EOSINOPHIL # BLD: 0.3 K/UL (ref 0–0.8)
EOSINOPHIL NFR BLD: 4 % (ref 0.5–7.8)
ERYTHROCYTE [DISTWIDTH] IN BLOOD BY AUTOMATED COUNT: 13.5 % (ref 11.9–14.6)
FERRITIN SERPL-MCNC: 218 NG/ML (ref 8–388)
GLOBULIN SER CALC-MCNC: 3.6 G/DL (ref 2.8–4.5)
GLUCOSE SERPL-MCNC: 258 MG/DL (ref 65–100)
HAV IGM SER QL: NONREACTIVE
HBV CORE IGM SER QL: NONREACTIVE
HBV SURFACE AG SER QL: NONREACTIVE
HCT VFR BLD AUTO: 31.6 % (ref 35.8–46.3)
HCV AB SER QL: NONREACTIVE
HGB BLD-MCNC: 10.1 G/DL (ref 11.7–15.4)
IMM GRANULOCYTES # BLD AUTO: 0 K/UL (ref 0–0.5)
IMM GRANULOCYTES NFR BLD AUTO: 1 % (ref 0–5)
IRON SATN MFR SERPL: 17 %
IRON SERPL-MCNC: 49 UG/DL (ref 35–150)
LYMPHOCYTES # BLD: 1.1 K/UL (ref 0.5–4.6)
LYMPHOCYTES NFR BLD: 14 % (ref 13–44)
MCH RBC QN AUTO: 32.6 PG (ref 26.1–32.9)
MCHC RBC AUTO-ENTMCNC: 32 G/DL (ref 31.4–35)
MCV RBC AUTO: 101.9 FL (ref 82–102)
MONOCYTES # BLD: 1.2 K/UL (ref 0.1–1.3)
MONOCYTES NFR BLD: 15 % (ref 4–12)
NEUTS SEG # BLD: 5.4 K/UL (ref 1.7–8.2)
NEUTS SEG NFR BLD: 66 % (ref 43–78)
NRBC # BLD: 0 K/UL (ref 0–0.2)
PLATELET # BLD AUTO: 385 K/UL (ref 150–450)
PMV BLD AUTO: 9.3 FL (ref 9.4–12.3)
POTASSIUM SERPL-SCNC: 3.1 MMOL/L (ref 3.5–5.1)
PROT SERPL-MCNC: 6.9 G/DL (ref 6.3–8.2)
RBC # BLD AUTO: 3.1 M/UL (ref 4.05–5.2)
SODIUM SERPL-SCNC: 140 MMOL/L (ref 136–146)
TIBC SERPL-MCNC: 281 UG/DL (ref 250–450)
TSH, 3RD GENERATION: 0.92 UIU/ML (ref 0.36–3.74)
WBC # BLD AUTO: 8.1 K/UL (ref 4.3–11.1)

## 2024-02-08 PROCEDURE — G0151 HHCP-SERV OF PT,EA 15 MIN: HCPCS

## 2024-02-08 RX ORDER — ROSUVASTATIN CALCIUM 40 MG/1
40 TABLET, COATED ORAL DAILY
Qty: 1 TABLET | Refills: 0
Start: 2024-02-08

## 2024-02-08 RX ORDER — TRAZODONE HYDROCHLORIDE 50 MG/1
50 TABLET ORAL NIGHTLY
Qty: 90 TABLET | Refills: 0 | Status: SHIPPED | OUTPATIENT
Start: 2024-02-08

## 2024-02-08 RX ORDER — PROCHLORPERAZINE 25 MG/1
SUPPOSITORY RECTAL
Qty: 10 EACH | Refills: 3 | Status: SHIPPED | OUTPATIENT
Start: 2024-02-08

## 2024-02-08 RX ORDER — NIFEDIPINE 90 MG/1
90 TABLET, EXTENDED RELEASE ORAL DAILY
Qty: 100 TABLET | Refills: 0 | Status: SHIPPED | OUTPATIENT
Start: 2024-02-08

## 2024-02-08 RX ORDER — CITALOPRAM 20 MG/1
20 TABLET ORAL DAILY
Qty: 90 TABLET | Refills: 1 | Status: SHIPPED | OUTPATIENT
Start: 2024-02-08

## 2024-02-08 RX ORDER — PROCHLORPERAZINE 25 MG/1
SUPPOSITORY RECTAL
Qty: 3 EACH | Refills: 5 | Status: SHIPPED | OUTPATIENT
Start: 2024-02-08

## 2024-02-08 ASSESSMENT — ENCOUNTER SYMPTOMS
PAIN LOCATION - PAIN QUALITY: ACHES, SORE
SHORTNESS OF BREATH: 0
BLOOD IN STOOL: 0

## 2024-02-08 NOTE — PROGRESS NOTES
Neurological:      General: No focal deficit present.      Mental Status: She is alert and oriented to person, place, and time. Mental status is at baseline.   Psychiatric:         Mood and Affect: Mood normal.         Behavior: Behavior normal.         Thought Content: Thought content normal.         Judgment: Judgment normal.     I have reviewed/discussed the above normal BMI with the patient.  I have recommended the following interventions: dietary management education, guidance, and counseling and encourage exercise .     ASSESSMENT AND PLAN:    CAD: MALIK to LAD 4/2008.  MALIK to LAD and Diagonal 11/2012.  C 2/20/17 = Non-obstructive CAD with patent stents.  Follows with Cardiology (Dr. Brower).  Maintained on Asa.  Risk factors medically modified per below.  Taking meds w/o probs.  Asymptomatic w/o angina or HINTON.  Well controlled.  Continue Asa (No bleeding or falls).  Continue risk factor modification per below.  Follow up with Dr. Brower (Also with CHF, Afib on Eliquis).   DM2: Taking current regimen (Basal, CSI) w/o probs.  Well controlled.  Continue current regimen.   Diagnosed: About age 38.  Control:  Fasting BS’s: 100-120 on Tresiba 14.   Daytime BS’s: Not checking.   Lows: None.  She does recognize hypoglycemic symptoms.  HgA1C:  9/26/12 = 5.8.  1/13/21 = 10.2.   11/2/23 = 6.7.   Complications:  Retinopathy: None.  Last eye exam = 7/14/22 (Devin).  Neuropathy: None.  Last foot exam (8/1/23) = Normal sensation; No ulcers; Calluses present.  Nephropathy:  Microalbumin:  9/26/12 = .   5/1/23 = .   HTN: Taking current regimen (Lasix 40 QDay, Procardia XL 90, Coreg 6.25 BID) w/o problems.  Home BPs = Not checking.  Well controlled.  Continue current regimen.  Asked to monitor BP at home, call me if it runs above 140/80, and bring in a log next time.  HLD: Statin on hold due to elevated LFTs.   FLPs:  9/26/12 Untreated = [233/170/43/100] ==> Lipitor 40.  5/1/23 on Crestor 40 = [144/66.6/55/112].

## 2024-02-09 ENCOUNTER — TELEPHONE (OUTPATIENT)
Dept: INTERNAL MEDICINE CLINIC | Facility: CLINIC | Age: 74
End: 2024-02-09

## 2024-02-09 ENCOUNTER — OFFICE VISIT (OUTPATIENT)
Dept: ORTHOPEDIC SURGERY | Age: 74
End: 2024-02-09

## 2024-02-09 DIAGNOSIS — Z96.642 STATUS POST LEFT HIP REPLACEMENT: Primary | ICD-10-CM

## 2024-02-09 DIAGNOSIS — R79.89 ELEVATED LFTS: Primary | ICD-10-CM

## 2024-02-09 NOTE — TELEPHONE ENCOUNTER
----- Message from Jamison Riggs MD sent at 2/9/2024  7:49 AM EST -----  Liver tests improved.  Let's start back a lower dose of Crestor, 10 mg, recheck LFTs (for elevated LFTs) in a month, and f/u with GI as discussed yesterday.

## 2024-02-09 NOTE — TELEPHONE ENCOUNTER
Spoke with patient advised per  Liver tests improved.  Let's start back a lower dose of Crestor, 10 mg, recheck LFTs (for elevated LFTs) in a month, and f/u with GI as discussed yesterday. Pt expressed understanding. She stated that she would cut the Crestor she has on hand in half, gave lab appt for 3/12/24 at 2 pm and will also follow-up with MARLO

## 2024-02-09 NOTE — PROGRESS NOTES
Patient ID:  Magalis Maddox  [id number]  73 y.o.  1950    Today: February 9, 2024       CHIEF COMPLAINT:  Follow-up of left total hip replacement    HISTORY:  The patient presents today for 3-week follow-up after total hip replacement.  The patient continues to improve gradually.  Working with physical therapy on strengthening and stretching.  They are on aspirin for DVT prophylaxis.  Using assistive walking device appropriately.  Continues to take medication appropriately.      PE:  Incision is examined which is healing well.  No erythema, induration or drainage.  No significant fluid accumulation around the surgical site distally.   Distally able to grossly plantar and dorsiflex foot and ankle.  Sensation intact.  Limb is perfused.  No sign of DVT.    X-RAYS:    XR Pelvis 2/3 Views  Views Obtained: AP Pelvis and Frog leg lateral of left hip  Indication: Postop Left Total Hip Replacement  Findings:   X-rays are viewed which show total hip replacement in place on the left side.  All hardware appears to be stable compared to immediate postoperative films.  The acetabular component appears to be in good position, no evidence of loosening.  Anteversion and inclination angle appear to be within acceptable criteria.  The stem appears to be appropriately sized without any evidence of subsidence.  No evidence of proximal femoral periprosthetic fracture.  Hip is reduced.   Impression: Normal Xray after total hip replacement    Jesus Duran MD    ASSESSMENT:  3 Weeks S/P Left Total Hip Replacement    PLAN:  Continue activity and current weight bearing status.  Continue posterior hip precautions if applicable.  Continue to take the aspirin for another week for a full month of DVT prophylaxis.  They are given a refill on their pain medication if they feel they are going to run out.  The patient is given a script for antibiotics to be taken for dental prophylaxis.  I have asked the patient not to submerge

## 2024-02-10 LAB
ANA SER QL: POSITIVE
CENTROMERE B AB SER-ACNC: <0.2 AI (ref 0–0.9)
CHROMATIN AB SERPL-ACNC: <0.2 AI (ref 0–0.9)
DSDNA AB SER-ACNC: 16 IU/ML (ref 0–9)
ENA JO1 AB SER-ACNC: <0.2 AI (ref 0–0.9)
ENA RNP AB SER-ACNC: <0.2 AI (ref 0–0.9)
ENA SCL70 AB SER-ACNC: <0.2 AI (ref 0–0.9)
ENA SM AB SER-ACNC: <0.2 AI (ref 0–0.9)
ENA SS-A AB SER-ACNC: <0.2 AI (ref 0–0.9)
ENA SS-B AB SER-ACNC: <0.2 AI (ref 0–0.9)
Lab: ABNORMAL

## 2024-02-14 ENCOUNTER — PATIENT MESSAGE (OUTPATIENT)
Dept: INTERNAL MEDICINE CLINIC | Facility: CLINIC | Age: 74
End: 2024-02-14

## 2024-02-15 RX ORDER — OMEPRAZOLE 40 MG/1
40 CAPSULE, DELAYED RELEASE ORAL DAILY
Qty: 30 CAPSULE | Refills: 0 | OUTPATIENT
Start: 2024-02-15

## 2024-02-15 RX ORDER — ROSUVASTATIN CALCIUM 5 MG/1
5 TABLET, COATED ORAL DAILY
Qty: 90 TABLET | Refills: 0 | Status: SHIPPED | OUTPATIENT
Start: 2024-02-15 | End: 2024-05-15

## 2024-02-15 NOTE — TELEPHONE ENCOUNTER
From: Magalis Maddox  To: Dr. Jamison Riggs  Sent: 2/14/2024 11:49 AM EST  Subject: Rosuvastatin Question     Dr. Kay, didn't you want me to start taking a lower dose of Rosuvastatin? If so please send a prescription for it to [facility]. I am currently out of Rosuvastatin. [facility] said they tried to contact you about it.     Thanks Magalis Maddox

## 2024-02-16 LAB
ALBUMIN SERPL ELPH-MCNC: 3.3 G/DL (ref 2.9–4.4)
ALBUMIN/GLOB SERPL: 1.1 (ref 0.7–1.7)
ALPHA1 GLOB SERPL ELPH-MCNC: 0.3 G/DL (ref 0–0.4)
ALPHA2 GLOB SERPL ELPH-MCNC: 0.8 G/DL (ref 0.4–1)
B-GLOBULIN SERPL ELPH-MCNC: 0.9 G/DL (ref 0.7–1.3)
GAMMA GLOB SERPL ELPH-MCNC: 1.1 G/DL (ref 0.4–1.8)
GLOBULIN SER-MCNC: 3.1 G/DL (ref 2.2–3.9)
IGA SERPL-MCNC: 622 MG/DL (ref 64–422)
IGG SERPL-MCNC: 945 MG/DL (ref 586–1602)
IGM SERPL-MCNC: 115 MG/DL (ref 26–217)
INTERPRETATION SERPL IEP-IMP: ABNORMAL
M PROTEIN SERPL ELPH-MCNC: ABNORMAL G/DL
PROT SERPL-MCNC: 6.4 G/DL (ref 6–8.5)

## 2024-02-21 ENCOUNTER — CARE COORDINATION (OUTPATIENT)
Dept: CARE COORDINATION | Facility: CLINIC | Age: 74
End: 2024-02-21

## 2024-03-04 ENCOUNTER — PATIENT MESSAGE (OUTPATIENT)
Age: 74
End: 2024-03-04

## 2024-03-05 NOTE — TELEPHONE ENCOUNTER
Called and advised patient that we would get some samples set out for her at the [place] office. Voiced understanding.

## 2024-03-11 ENCOUNTER — OFFICE VISIT (OUTPATIENT)
Age: 74
End: 2024-03-11
Payer: MEDICARE

## 2024-03-11 VITALS
DIASTOLIC BLOOD PRESSURE: 64 MMHG | BODY MASS INDEX: 28.34 KG/M2 | WEIGHT: 166 LBS | HEART RATE: 72 BPM | HEIGHT: 64 IN | SYSTOLIC BLOOD PRESSURE: 138 MMHG

## 2024-03-11 DIAGNOSIS — E78.2 MIXED HYPERLIPIDEMIA: ICD-10-CM

## 2024-03-11 DIAGNOSIS — I25.10 CORONARY ARTERY DISEASE INVOLVING NATIVE CORONARY ARTERY OF NATIVE HEART WITHOUT ANGINA PECTORIS: ICD-10-CM

## 2024-03-11 DIAGNOSIS — Z98.61 S/P PTCA (PERCUTANEOUS TRANSLUMINAL CORONARY ANGIOPLASTY): ICD-10-CM

## 2024-03-11 DIAGNOSIS — I48.0 PAROXYSMAL ATRIAL FIBRILLATION (HCC): Primary | ICD-10-CM

## 2024-03-11 DIAGNOSIS — I51.89 DIASTOLIC DYSFUNCTION: ICD-10-CM

## 2024-03-11 DIAGNOSIS — Z79.01 LONG TERM CURRENT USE OF ANTICOAGULANT: ICD-10-CM

## 2024-03-11 DIAGNOSIS — I10 ESSENTIAL HYPERTENSION: ICD-10-CM

## 2024-03-11 PROCEDURE — 1090F PRES/ABSN URINE INCON ASSESS: CPT | Performed by: INTERNAL MEDICINE

## 2024-03-11 PROCEDURE — 3075F SYST BP GE 130 - 139MM HG: CPT | Performed by: INTERNAL MEDICINE

## 2024-03-11 PROCEDURE — G8399 PT W/DXA RESULTS DOCUMENT: HCPCS | Performed by: INTERNAL MEDICINE

## 2024-03-11 PROCEDURE — G8484 FLU IMMUNIZE NO ADMIN: HCPCS | Performed by: INTERNAL MEDICINE

## 2024-03-11 PROCEDURE — 3078F DIAST BP <80 MM HG: CPT | Performed by: INTERNAL MEDICINE

## 2024-03-11 PROCEDURE — 1036F TOBACCO NON-USER: CPT | Performed by: INTERNAL MEDICINE

## 2024-03-11 PROCEDURE — 3017F COLORECTAL CA SCREEN DOC REV: CPT | Performed by: INTERNAL MEDICINE

## 2024-03-11 PROCEDURE — G8428 CUR MEDS NOT DOCUMENT: HCPCS | Performed by: INTERNAL MEDICINE

## 2024-03-11 PROCEDURE — 99214 OFFICE O/P EST MOD 30 MIN: CPT | Performed by: INTERNAL MEDICINE

## 2024-03-11 PROCEDURE — G8417 CALC BMI ABV UP PARAM F/U: HCPCS | Performed by: INTERNAL MEDICINE

## 2024-03-11 PROCEDURE — 1123F ACP DISCUSS/DSCN MKR DOCD: CPT | Performed by: INTERNAL MEDICINE

## 2024-03-11 ASSESSMENT — ENCOUNTER SYMPTOMS
ABDOMINAL PAIN: 0
COUGH: 0
NAUSEA: 0
SHORTNESS OF BREATH: 0
VOMITING: 0
BLOATING: 0
RESPIRATORY NEGATIVE: 1
GASTROINTESTINAL NEGATIVE: 1

## 2024-03-11 NOTE — PROGRESS NOTES
Substance Use Topics    Alcohol use: Yes     Alcohol/week: 7.0 standard drinks of alcohol     Types: 7 Glasses of wine per week       Review of Systems   Constitutional: Negative.   Cardiovascular:  Negative for chest pain, dyspnea on exertion, irregular heartbeat and leg swelling.   Respiratory: Negative.  Negative for cough and shortness of breath.    Hematologic/Lymphatic: Negative for bleeding problem. Does not bruise/bleed easily.   Gastrointestinal: Negative.  Negative for bloating, abdominal pain, nausea and vomiting.       PHYSICAL EXAM:    /64   Pulse 72   Ht 1.626 m (5' 4\")   Wt 75.3 kg (166 lb)   BMI 28.49 kg/m²     Physical Exam  Vitals reviewed.   Constitutional:       General: She is not in acute distress.     Appearance: She is not ill-appearing or diaphoretic.   Cardiovascular:      Rate and Rhythm: Normal rate and regular rhythm.      Heart sounds: Murmur (faint systolic LLSB) heard.      No friction rub. No gallop.   Pulmonary:      Effort: Pulmonary effort is normal. No respiratory distress.      Breath sounds: Normal breath sounds. No stridor. No wheezing, rhonchi or rales.   Abdominal:      General: Abdomen is flat. There is no distension.   Musculoskeletal:         General: No swelling.      Right lower leg: No edema.      Left lower leg: No edema.   Skin:     General: Skin is warm and dry.   Neurological:      Mental Status: She is alert and oriented to person, place, and time. Mental status is at baseline.   Psychiatric:         Mood and Affect: Mood normal.         Judgment: Judgment normal.         Medical problems, medical history, and test results were reviewed with the patient today.     No results found for this or any previous visit (from the past 168 hour(s)).      Current Outpatient Medications:     rosuvastatin (CRESTOR) 5 MG tablet, Take 1 tablet by mouth daily, Disp: 90 tablet, Rfl: 0    apixaban (ELIQUIS) 5 MG TABS tablet, Take 1 tablet by mouth 2 times daily, Disp:

## 2024-03-12 ENCOUNTER — NURSE ONLY (OUTPATIENT)
Dept: INTERNAL MEDICINE CLINIC | Facility: CLINIC | Age: 74
End: 2024-03-12

## 2024-03-12 DIAGNOSIS — R79.89 ELEVATED LFTS: ICD-10-CM

## 2024-03-12 LAB
ALBUMIN SERPL-MCNC: 3.8 G/DL (ref 3.2–4.6)
ALBUMIN/GLOB SERPL: 1 (ref 0.4–1.6)
ALP SERPL-CCNC: 143 U/L (ref 50–136)
ALT SERPL-CCNC: 22 U/L (ref 12–65)
AST SERPL-CCNC: 29 U/L (ref 15–37)
BILIRUB DIRECT SERPL-MCNC: 0.2 MG/DL
BILIRUB SERPL-MCNC: 0.5 MG/DL (ref 0.2–1.1)
GLOBULIN SER CALC-MCNC: 3.7 G/DL (ref 2.8–4.5)
PROT SERPL-MCNC: 7.5 G/DL (ref 6.3–8.2)

## 2024-03-29 ENCOUNTER — PATIENT MESSAGE (OUTPATIENT)
Dept: INTERNAL MEDICINE CLINIC | Facility: CLINIC | Age: 74
End: 2024-03-29

## 2024-03-29 DIAGNOSIS — E11.22 TYPE 2 DIABETES MELLITUS WITH STAGE 4 CHRONIC KIDNEY DISEASE, WITH LONG-TERM CURRENT USE OF INSULIN (HCC): ICD-10-CM

## 2024-03-29 DIAGNOSIS — N18.4 TYPE 2 DIABETES MELLITUS WITH STAGE 4 CHRONIC KIDNEY DISEASE, WITH LONG-TERM CURRENT USE OF INSULIN (HCC): ICD-10-CM

## 2024-03-29 DIAGNOSIS — Z79.4 TYPE 2 DIABETES MELLITUS WITH STAGE 4 CHRONIC KIDNEY DISEASE, WITH LONG-TERM CURRENT USE OF INSULIN (HCC): ICD-10-CM

## 2024-03-29 RX ORDER — PEN NEEDLE, DIABETIC 32GX 5/32"
NEEDLE, DISPOSABLE MISCELLANEOUS
Qty: 300 EACH | Refills: 3 | Status: SHIPPED | OUTPATIENT
Start: 2024-03-29

## 2024-03-29 NOTE — TELEPHONE ENCOUNTER
From: Magalis Maddox  To: Dr. Jamison Riggs  Sent: 3/29/2024 12:17 AM EDT  Subject: Pen Needles for Tresiba     I need a new prescription for B-D Pen Needles 2nd Gen. Please send it to [facility] in [place]. Thank you

## 2024-04-12 ENCOUNTER — OFFICE VISIT (OUTPATIENT)
Dept: VASCULAR SURGERY | Age: 74
End: 2024-04-12
Payer: MEDICARE

## 2024-04-12 VITALS
HEART RATE: 61 BPM | HEIGHT: 64 IN | WEIGHT: 163 LBS | BODY MASS INDEX: 27.83 KG/M2 | SYSTOLIC BLOOD PRESSURE: 157 MMHG | DIASTOLIC BLOOD PRESSURE: 70 MMHG | OXYGEN SATURATION: 95 %

## 2024-04-12 DIAGNOSIS — I73.9 PVD (PERIPHERAL VASCULAR DISEASE) WITH CLAUDICATION (HCC): Primary | ICD-10-CM

## 2024-04-12 PROCEDURE — 1123F ACP DISCUSS/DSCN MKR DOCD: CPT | Performed by: SURGERY

## 2024-04-12 PROCEDURE — 3077F SYST BP >= 140 MM HG: CPT | Performed by: SURGERY

## 2024-04-12 PROCEDURE — G8417 CALC BMI ABV UP PARAM F/U: HCPCS | Performed by: SURGERY

## 2024-04-12 PROCEDURE — 99213 OFFICE O/P EST LOW 20 MIN: CPT | Performed by: SURGERY

## 2024-04-12 PROCEDURE — 3017F COLORECTAL CA SCREEN DOC REV: CPT | Performed by: SURGERY

## 2024-04-12 PROCEDURE — G8427 DOCREV CUR MEDS BY ELIG CLIN: HCPCS | Performed by: SURGERY

## 2024-04-12 PROCEDURE — 1090F PRES/ABSN URINE INCON ASSESS: CPT | Performed by: SURGERY

## 2024-04-12 PROCEDURE — 3078F DIAST BP <80 MM HG: CPT | Performed by: SURGERY

## 2024-04-12 PROCEDURE — 1036F TOBACCO NON-USER: CPT | Performed by: SURGERY

## 2024-04-12 PROCEDURE — G8399 PT W/DXA RESULTS DOCUMENT: HCPCS | Performed by: SURGERY

## 2024-04-12 NOTE — PROGRESS NOTES
[address] [address]  [phone] FAX: [phone]    Magalis Maddox  : 1950    Chief Complaint:     History of Present Illness:   Patient follows up today for status post left carotid endarterectomy several years back.  Patient denies any neurological symptoms.    CURRENT MEDICATIONS:  Current Outpatient Medications   Medication Sig Dispense Refill    apixaban (ELIQUIS) 5 MG TABS tablet Take 1 tablet by mouth 2 times daily 180 tablet 3    rosuvastatin (CRESTOR) 5 MG tablet Take 1 tablet by mouth daily 90 tablet 0    citalopram (CELEXA) 20 MG tablet Take 1 tablet by mouth daily 90 tablet 1    NIFEdipine (PROCARDIA XL) 90 MG extended release tablet Take 1 tablet by mouth daily 100 tablet 0    traZODone (DESYREL) 50 MG tablet Take 1 tablet by mouth nightly 90 tablet 0    TRESIBA FLEXTOUCH 100 UNIT/ML SOPN INJECT 14 UNITS INTO THE SKIN AT BEDTIME 15 mL 2    carvedilol (COREG) 6.25 MG tablet Take 1 tablet by mouth 2 times daily 200 tablet 3    isosorbide mononitrate (IMDUR) 60 MG extended release tablet Take 1 tablet by mouth daily 100 tablet 3    furosemide (LASIX) 40 MG tablet Take 1 tablet by mouth daily 100 tablet 3    aspirin EC 81 MG EC tablet Take 1 tablet by mouth daily 1 tablet 0    Cholecalciferol 50 MCG (2000 UT) TABS Take 1 tablet by mouth daily      ferrous sulfate (FE TABS 325) 325 (65 Fe) MG EC tablet Take 1 tablet by mouth 2 times daily 180 tablet 1    cyanocobalamin 1000 MCG tablet Take 5 tablets by mouth daily      magnesium oxide (MAG-OX) 400 MG tablet Take 1 tablet by mouth at bedtime      nitroGLYCERIN (NITROSTAT) 0.4 MG SL tablet Place 1 tablet under the tongue every 5 minutes as needed for Chest pain Call 911 if no relief after 3 doses      BD PEN NEEDLE MILVIA 2ND GEN 32G X 4 MM MISC Check blood sugar three times daily 300 each 3    Continuous Blood Gluc  (DEXCOM G6 ) MAURICE Test blood sugar 3 times daily 10 each 3

## 2024-05-09 ENCOUNTER — OFFICE VISIT (OUTPATIENT)
Dept: ORTHOPEDIC SURGERY | Age: 74
End: 2024-05-09
Payer: MEDICARE

## 2024-05-09 DIAGNOSIS — Z96.642 STATUS POST LEFT HIP REPLACEMENT: Primary | ICD-10-CM

## 2024-05-09 PROCEDURE — G8417 CALC BMI ABV UP PARAM F/U: HCPCS | Performed by: NURSE PRACTITIONER

## 2024-05-09 PROCEDURE — 1036F TOBACCO NON-USER: CPT | Performed by: ORTHOPAEDIC SURGERY

## 2024-05-09 PROCEDURE — 1090F PRES/ABSN URINE INCON ASSESS: CPT | Performed by: NURSE PRACTITIONER

## 2024-05-09 PROCEDURE — 1123F ACP DISCUSS/DSCN MKR DOCD: CPT | Performed by: NURSE PRACTITIONER

## 2024-05-09 PROCEDURE — G8428 CUR MEDS NOT DOCUMENT: HCPCS | Performed by: NURSE PRACTITIONER

## 2024-05-09 PROCEDURE — 99213 OFFICE O/P EST LOW 20 MIN: CPT | Performed by: NURSE PRACTITIONER

## 2024-05-09 PROCEDURE — G8399 PT W/DXA RESULTS DOCUMENT: HCPCS | Performed by: NURSE PRACTITIONER

## 2024-05-09 PROCEDURE — 3017F COLORECTAL CA SCREEN DOC REV: CPT | Performed by: ORTHOPAEDIC SURGERY

## 2024-05-09 NOTE — PROGRESS NOTES
Hyponatremia 01/12/2021    Hypoxemia 08/12/2020    VERONICA (iron deficiency anemia)     oral iron daily    Insomnia     Lactic acidosis 01/12/2021    Long QT interval 01/12/2021    Murmur     per cardio note (11/16/22)= (faint apical systolic murmur) heard; last Echo (2/16/21)    Personal history of colonic polyps 2013    hyperplastic    Platelet inhibition due to Plavix     no longer on Plavix    Pleural effusion, bilateral 08/11/2020    Rectocele 2013    Sepsis (HCC) 01/12/2021    Stercoral colitis 03/02/2022    Tobacco abuse 03/01/2012    former smoker    Tobacco use disorder     Unstable angina (HCC) 03/01/2012    ntg as needed.        Past Surgical History:  Past Surgical History:   Procedure Laterality Date    CAROTID ENDARTERECTOMY Left 12/12/2018    CATARACT REMOVAL Left 09/19/2016    Dr Sevilla, [facility]    CATARACT REMOVAL Right 11/07/2016    Dr Sevilla, [facility]    CERVICAL FUSION      neck w/plate    COLONOSCOPY  12/17/13    Brigitte--single transverse hyperplastic polyp--7-10 year recall    CORONARY STENT PLACEMENT      stent x3; last in 2012    FLEXIBLE SIGMOIDOSCOPY N/A 3/6/2022    SIGMOIDOSCOPY FLEXIBLE performed by Leland Lim MD at [facility] ENDOSCOPY    HEMORRHOID SURGERY      x2    ORTHOPEDIC SURGERY      left elbow    HORTENSIA AND BSO (CERVIX REMOVED)      TOTAL HIP ARTHROPLASTY Right 12/5/2022    RIGHT HIP TOTAL ARTHROPLASTY, Asuncion, PREVENA performed by Jesus Duran MD at [facility] OR    TOTAL HIP ARTHROPLASTY Left 1/17/2024    LEFT HIP TOTAL ARTHROPLASTY, Asuncion/ prevena, vanc/tobra performed by Jesus Duran MD at [facility] OR    WISDOM TOOTH EXTRACTION          Medications:     Prior to Admission medications    Medication Sig Start Date End Date Taking? Authorizing Provider   BD PEN NEEDLE MILVIA 2ND GEN 32G X 4 MM MISC Check blood sugar three times daily 3/29/24   Jamison Riggs MD   apixaban (ELIQUIS) 5 MG TABS tablet Take 1 tablet by mouth 2 times daily 3/11/24   Handy Brower

## 2024-05-13 ASSESSMENT — PATIENT HEALTH QUESTIONNAIRE - PHQ9
SUM OF ALL RESPONSES TO PHQ QUESTIONS 1-9: 2
1. LITTLE INTEREST OR PLEASURE IN DOING THINGS: NOT AT ALL
7. TROUBLE CONCENTRATING ON THINGS, SUCH AS READING THE NEWSPAPER OR WATCHING TELEVISION: NOT AT ALL
2. FEELING DOWN, DEPRESSED OR HOPELESS: NOT AT ALL
2. FEELING DOWN, DEPRESSED OR HOPELESS: NOT AT ALL
SUM OF ALL RESPONSES TO PHQ QUESTIONS 1-9: 2
9. THOUGHTS THAT YOU WOULD BE BETTER OFF DEAD, OR OF HURTING YOURSELF: NOT AT ALL
SUM OF ALL RESPONSES TO PHQ QUESTIONS 1-9: 2
SUM OF ALL RESPONSES TO PHQ9 QUESTIONS 1 & 2: 0
6. FEELING BAD ABOUT YOURSELF - OR THAT YOU ARE A FAILURE OR HAVE LET YOURSELF OR YOUR FAMILY DOWN: NOT AT ALL
6. FEELING BAD ABOUT YOURSELF - OR THAT YOU ARE A FAILURE OR HAVE LET YOURSELF OR YOUR FAMILY DOWN: NOT AT ALL
8. MOVING OR SPEAKING SO SLOWLY THAT OTHER PEOPLE COULD HAVE NOTICED. OR THE OPPOSITE - BEING SO FIDGETY OR RESTLESS THAT YOU HAVE BEEN MOVING AROUND A LOT MORE THAN USUAL: NOT AT ALL
3. TROUBLE FALLING OR STAYING ASLEEP: NOT AT ALL
4. FEELING TIRED OR HAVING LITTLE ENERGY: MORE THAN HALF THE DAYS
8. MOVING OR SPEAKING SO SLOWLY THAT OTHER PEOPLE COULD HAVE NOTICED. OR THE OPPOSITE, BEING SO FIGETY OR RESTLESS THAT YOU HAVE BEEN MOVING AROUND A LOT MORE THAN USUAL: NOT AT ALL
5. POOR APPETITE OR OVEREATING: NOT AT ALL
4. FEELING TIRED OR HAVING LITTLE ENERGY: MORE THAN HALF THE DAYS
9. THOUGHTS THAT YOU WOULD BE BETTER OFF DEAD, OR OF HURTING YOURSELF: NOT AT ALL
10. IF YOU CHECKED OFF ANY PROBLEMS, HOW DIFFICULT HAVE THESE PROBLEMS MADE IT FOR YOU TO DO YOUR WORK, TAKE CARE OF THINGS AT HOME, OR GET ALONG WITH OTHER PEOPLE: NOT DIFFICULT AT ALL
3. TROUBLE FALLING OR STAYING ASLEEP: NOT AT ALL
SUM OF ALL RESPONSES TO PHQ QUESTIONS 1-9: 2
10. IF YOU CHECKED OFF ANY PROBLEMS, HOW DIFFICULT HAVE THESE PROBLEMS MADE IT FOR YOU TO DO YOUR WORK, TAKE CARE OF THINGS AT HOME, OR GET ALONG WITH OTHER PEOPLE: NOT DIFFICULT AT ALL
5. POOR APPETITE OR OVEREATING: NOT AT ALL
SUM OF ALL RESPONSES TO PHQ QUESTIONS 1-9: 2
1. LITTLE INTEREST OR PLEASURE IN DOING THINGS: NOT AT ALL
7. TROUBLE CONCENTRATING ON THINGS, SUCH AS READING THE NEWSPAPER OR WATCHING TELEVISION: NOT AT ALL

## 2024-05-15 ENCOUNTER — OFFICE VISIT (OUTPATIENT)
Dept: INTERNAL MEDICINE CLINIC | Facility: CLINIC | Age: 74
End: 2024-05-15

## 2024-05-15 VITALS
OXYGEN SATURATION: 99 % | WEIGHT: 162 LBS | SYSTOLIC BLOOD PRESSURE: 138 MMHG | DIASTOLIC BLOOD PRESSURE: 57 MMHG | HEIGHT: 64 IN | BODY MASS INDEX: 27.66 KG/M2 | HEART RATE: 67 BPM | TEMPERATURE: 97.7 F

## 2024-05-15 DIAGNOSIS — I10 ESSENTIAL HYPERTENSION: ICD-10-CM

## 2024-05-15 DIAGNOSIS — E11.22 TYPE 2 DIABETES MELLITUS WITH STAGE 4 CHRONIC KIDNEY DISEASE, WITH LONG-TERM CURRENT USE OF INSULIN (HCC): ICD-10-CM

## 2024-05-15 DIAGNOSIS — Z79.4 TYPE 2 DIABETES MELLITUS WITH STAGE 4 CHRONIC KIDNEY DISEASE, WITH LONG-TERM CURRENT USE OF INSULIN (HCC): Primary | ICD-10-CM

## 2024-05-15 DIAGNOSIS — E11.22 TYPE 2 DIABETES MELLITUS WITH STAGE 4 CHRONIC KIDNEY DISEASE, WITH LONG-TERM CURRENT USE OF INSULIN (HCC): Primary | ICD-10-CM

## 2024-05-15 DIAGNOSIS — N18.4 STAGE 4 CHRONIC KIDNEY DISEASE (HCC): ICD-10-CM

## 2024-05-15 DIAGNOSIS — E78.2 MIXED HYPERLIPIDEMIA: ICD-10-CM

## 2024-05-15 DIAGNOSIS — I25.10 CORONARY ARTERY DISEASE INVOLVING NATIVE CORONARY ARTERY OF NATIVE HEART WITHOUT ANGINA PECTORIS: ICD-10-CM

## 2024-05-15 DIAGNOSIS — F32.5 MAJOR DEPRESSIVE DISORDER WITH SINGLE EPISODE, IN FULL REMISSION (HCC): ICD-10-CM

## 2024-05-15 DIAGNOSIS — N18.4 TYPE 2 DIABETES MELLITUS WITH STAGE 4 CHRONIC KIDNEY DISEASE, WITH LONG-TERM CURRENT USE OF INSULIN (HCC): Primary | ICD-10-CM

## 2024-05-15 DIAGNOSIS — N18.4 TYPE 2 DIABETES MELLITUS WITH STAGE 4 CHRONIC KIDNEY DISEASE, WITH LONG-TERM CURRENT USE OF INSULIN (HCC): ICD-10-CM

## 2024-05-15 DIAGNOSIS — Z79.4 TYPE 2 DIABETES MELLITUS WITH STAGE 4 CHRONIC KIDNEY DISEASE, WITH LONG-TERM CURRENT USE OF INSULIN (HCC): ICD-10-CM

## 2024-05-15 DIAGNOSIS — I48.0 PAROXYSMAL ATRIAL FIBRILLATION (HCC): ICD-10-CM

## 2024-05-15 DIAGNOSIS — Z87.891 PERSONAL HISTORY OF TOBACCO USE: ICD-10-CM

## 2024-05-15 DIAGNOSIS — F51.01 PRIMARY INSOMNIA: ICD-10-CM

## 2024-05-15 DIAGNOSIS — I50.42 CHRONIC COMBINED SYSTOLIC AND DIASTOLIC CONGESTIVE HEART FAILURE (HCC): ICD-10-CM

## 2024-05-15 LAB
ALBUMIN SERPL-MCNC: 3.9 G/DL (ref 3.2–4.6)
ALBUMIN/GLOB SERPL: 1.1 (ref 1–1.9)
ALP SERPL-CCNC: 125 U/L (ref 35–104)
ALT SERPL-CCNC: 22 U/L (ref 12–65)
ANION GAP SERPL CALC-SCNC: 12 MMOL/L (ref 9–18)
AST SERPL-CCNC: 30 U/L (ref 15–37)
BASOPHILS # BLD: 0.1 K/UL (ref 0–0.2)
BASOPHILS NFR BLD: 1 % (ref 0–2)
BILIRUB DIRECT SERPL-MCNC: <0.2 MG/DL (ref 0–0.4)
BILIRUB SERPL-MCNC: 0.3 MG/DL (ref 0–1.2)
BUN SERPL-MCNC: 19 MG/DL (ref 8–23)
CALCIUM SERPL-MCNC: 9.8 MG/DL (ref 8.8–10.2)
CHLORIDE SERPL-SCNC: 102 MMOL/L (ref 98–107)
CHOLEST SERPL-MCNC: 156 MG/DL (ref 0–200)
CO2 SERPL-SCNC: 25 MMOL/L (ref 20–28)
CREAT SERPL-MCNC: 1.58 MG/DL (ref 0.6–1.1)
DIFFERENTIAL METHOD BLD: ABNORMAL
EOSINOPHIL # BLD: 0.9 K/UL (ref 0–0.8)
EOSINOPHIL NFR BLD: 10 % (ref 0.5–7.8)
ERYTHROCYTE [DISTWIDTH] IN BLOOD BY AUTOMATED COUNT: 13.1 % (ref 11.9–14.6)
EST. AVERAGE GLUCOSE BLD GHB EST-MCNC: 172 MG/DL
GLOBULIN SER CALC-MCNC: 3.5 G/DL (ref 2.3–3.5)
GLUCOSE SERPL-MCNC: 234 MG/DL (ref 70–99)
HBA1C MFR BLD: 7.6 % (ref 0–5.6)
HCT VFR BLD AUTO: 39.9 % (ref 35.8–46.3)
HDLC SERPL-MCNC: 38 MG/DL (ref 40–60)
HDLC SERPL: 4.1 (ref 0–5)
HGB BLD-MCNC: 13.2 G/DL (ref 11.7–15.4)
IMM GRANULOCYTES # BLD AUTO: 0.1 K/UL (ref 0–0.5)
IMM GRANULOCYTES NFR BLD AUTO: 1 % (ref 0–5)
LDLC SERPL CALC-MCNC: 88 MG/DL (ref 0–100)
LYMPHOCYTES # BLD: 1.2 K/UL (ref 0.5–4.6)
LYMPHOCYTES NFR BLD: 13 % (ref 13–44)
MCH RBC QN AUTO: 31.5 PG (ref 26.1–32.9)
MCHC RBC AUTO-ENTMCNC: 33.1 G/DL (ref 31.4–35)
MCV RBC AUTO: 95.2 FL (ref 82–102)
MONOCYTES # BLD: 1 K/UL (ref 0.1–1.3)
MONOCYTES NFR BLD: 11 % (ref 4–12)
NEUTS SEG # BLD: 5.8 K/UL (ref 1.7–8.2)
NEUTS SEG NFR BLD: 64 % (ref 43–78)
NRBC # BLD: 0 K/UL (ref 0–0.2)
PLATELET # BLD AUTO: 278 K/UL (ref 150–450)
PMV BLD AUTO: 10.3 FL (ref 9.4–12.3)
POTASSIUM SERPL-SCNC: 3.8 MMOL/L (ref 3.5–5.1)
PROT SERPL-MCNC: 7.4 G/DL (ref 6.3–8.2)
RBC # BLD AUTO: 4.19 M/UL (ref 4.05–5.2)
SODIUM SERPL-SCNC: 140 MMOL/L (ref 136–145)
TRIGL SERPL-MCNC: 150 MG/DL (ref 0–150)
TSH, 3RD GENERATION: 1.5 UIU/ML (ref 0.27–4.2)
VIT B12 SERPL-MCNC: 931 PG/ML (ref 193–986)
VLDLC SERPL CALC-MCNC: 30 MG/DL (ref 6–23)
WBC # BLD AUTO: 9 K/UL (ref 4.3–11.1)

## 2024-05-15 RX ORDER — ROSUVASTATIN CALCIUM 5 MG/1
5 TABLET, COATED ORAL DAILY
Qty: 90 TABLET | Refills: 1 | Status: SHIPPED | OUTPATIENT
Start: 2024-05-15 | End: 2024-11-11

## 2024-05-15 RX ORDER — TRAZODONE HYDROCHLORIDE 50 MG/1
50 TABLET ORAL NIGHTLY
Qty: 90 TABLET | Refills: 1 | Status: SHIPPED | OUTPATIENT
Start: 2024-05-15

## 2024-05-15 RX ORDER — CITALOPRAM 20 MG/1
20 TABLET ORAL DAILY
Qty: 90 TABLET | Refills: 1 | Status: SHIPPED | OUTPATIENT
Start: 2024-05-15

## 2024-05-15 NOTE — PROGRESS NOTES
total radiation exposure.  The patient currently exhibits no signs or symptoms suggestive of lung cancer.  Discussed with patient the importance of compliance with yearly annual lung cancer screenings and willingness to undergo diagnosis and treatment if screening scan is positive.  In addition, the patient was counseled regarding the importance of remaining smoke free and/or total smoking cessation.    Also reviewed the following if the patient has Medicare that as of February 10, 2022, Medicare only covers LDCT screening in patients aged 50-77 with at least a 20 pack-year smoking history who currently smoke or have quit in the last 15 years

## 2024-05-15 NOTE — PATIENT INSTRUCTIONS
Please arrive 20 minutes prior to your scheduled time to see the doctor to allow sufficient time for check-in and rooming.      Please bring all your prescription bottles to each appointment.      I recommend all standard healthcare maintenance and cancer screenings (Colon cancer screening if due, Lung cancer screening if due, Mammogram and Bone Density if female and appropriate, etc) and standard immunizations (Flu, Pneumonia, Covid-19, RSV, Shingrix, Tetanus boosters, etc) as appropriate and due to lower your risk for potentially preventable morbidity/mortality from these diseases.     Check BP 1-2 times per week.  Call our office if BP runs above 140/80.     Recommend tobacco cessation if you use tobacco products.         Learning About Lung Cancer Screening  What is screening for lung cancer?     Lung cancer screening is a way to find some lung cancers early, before a person has any symptoms of the cancer.  Lung cancer screening may help those who have the highest risk for lung cancer--people age 50 and older who are or were heavy smokers. For most people, who aren't at increased risk, screening for lung cancer probably isn't helpful.  Screening won't prevent cancer. And it may not find all lung cancers. Lung cancer screening may lower the risk of dying from lung cancer in a small number of people.  How is it done?  Lung cancer screening is done with a low-dose CT (computed tomography) scan. A CT scan uses X-rays, or radiation, to make detailed pictures of your body. Experts recommend that screening be done in medical centers that focus on finding and treating lung cancer.  Who is screening recommended for?  Lung cancer screening is recommended for people age 50 and older who are or were heavy smokers. That means people with a smoking history of at least 20 pack years. A pack year is a way to measure how heavy a smoker you are or were.  To figure out your pack years, multiply how many packs a day on average

## 2024-05-21 ENCOUNTER — HOSPITAL ENCOUNTER (OUTPATIENT)
Dept: CT IMAGING | Age: 74
Discharge: HOME | End: 2024-05-23
Payer: MEDICARE

## 2024-05-21 ENCOUNTER — PATIENT MESSAGE (OUTPATIENT)
Age: 74
End: 2024-05-21

## 2024-05-21 DIAGNOSIS — Z79.4 TYPE 2 DIABETES MELLITUS WITH STAGE 4 CHRONIC KIDNEY DISEASE, WITH LONG-TERM CURRENT USE OF INSULIN (HCC): ICD-10-CM

## 2024-05-21 DIAGNOSIS — N18.4 TYPE 2 DIABETES MELLITUS WITH STAGE 4 CHRONIC KIDNEY DISEASE, WITH LONG-TERM CURRENT USE OF INSULIN (HCC): ICD-10-CM

## 2024-05-21 DIAGNOSIS — E11.22 TYPE 2 DIABETES MELLITUS WITH STAGE 4 CHRONIC KIDNEY DISEASE, WITH LONG-TERM CURRENT USE OF INSULIN (HCC): ICD-10-CM

## 2024-05-21 DIAGNOSIS — Z87.891 PERSONAL HISTORY OF TOBACCO USE: ICD-10-CM

## 2024-05-21 DIAGNOSIS — I31.39 PERICARDIAL EFFUSION: Primary | ICD-10-CM

## 2024-05-21 PROCEDURE — 71271 CT THORAX LUNG CANCER SCR C-: CPT

## 2024-05-22 RX ORDER — PROCHLORPERAZINE 25 MG/1
SUPPOSITORY RECTAL
Qty: 1 EACH | Refills: 5 | Status: SHIPPED | OUTPATIENT
Start: 2024-05-22

## 2024-05-23 NOTE — TELEPHONE ENCOUNTER
Please let the patient know I looked at her CT scan results.      There is a small amount of fluid around the heart called a pericardial effusion, this has been seen on her prior imaging studies.  The radiologist mentions coronary artery calcification, this is likely partially  true, but also there are stents which can look like calcifications in her coronary arteries from before.  Calcification around the mitral valve.    Please have her check an echocardiogram to check heart.  Thank you.

## 2024-05-29 ENCOUNTER — TELEPHONE (OUTPATIENT)
Age: 74
End: 2024-05-29

## 2024-05-29 NOTE — TELEPHONE ENCOUNTER
Pt has a order for echo that is to be performed in ancillary and is not anitha eif it can be done in the clinic? Pt would appreciate a call back

## 2024-06-26 DIAGNOSIS — I10 ESSENTIAL HYPERTENSION: ICD-10-CM

## 2024-06-26 RX ORDER — NIFEDIPINE 90 MG/1
90 TABLET, EXTENDED RELEASE ORAL DAILY
Qty: 100 TABLET | Refills: 0 | Status: SHIPPED | OUTPATIENT
Start: 2024-06-26

## 2024-07-26 ENCOUNTER — OFFICE VISIT (OUTPATIENT)
Dept: ORTHOPEDIC SURGERY | Age: 74
End: 2024-07-26

## 2024-07-26 DIAGNOSIS — M17.11 PRIMARY OSTEOARTHRITIS OF RIGHT KNEE: ICD-10-CM

## 2024-07-26 DIAGNOSIS — M25.561 RIGHT KNEE PAIN, UNSPECIFIED CHRONICITY: Primary | ICD-10-CM

## 2024-07-26 RX ORDER — METHYLPREDNISOLONE ACETATE 40 MG/ML
40 INJECTION, SUSPENSION INTRA-ARTICULAR; INTRALESIONAL; INTRAMUSCULAR; SOFT TISSUE ONCE
Status: COMPLETED | OUTPATIENT
Start: 2024-07-26 | End: 2024-07-26

## 2024-07-26 RX ADMIN — METHYLPREDNISOLONE ACETATE 40 MG: 40 INJECTION, SUSPENSION INTRA-ARTICULAR; INTRALESIONAL; INTRAMUSCULAR; SOFT TISSUE at 09:21

## 2024-07-26 NOTE — PROGRESS NOTES
injections, weight loss (if appropriate), and surgery. We discussed that given the degenerative nature of the joint that in most cases surgery is the definitive treatment for this condition. We did discuss some of the details of surgery along with some of the risks, benefits and alternatives. At this point the patient is a candidate for surgery however they would like to try to postpone surgery at this point in time if possible.  At this point the patient has failed the aforementioned treatment modalities and would like to proceed with Corticosteroid Injection. We discussed potential risks of steroid injection including but not limited to steroid flare with an associated increase in pain, fat necrosis, skin discoloration around the injection site, temporary increase in blood sugars in diabetic patients and possible facial flushing after injection.    TREATMENT:    Steroid Injection - Risks, benefits, and alternatives of corticosteroid injection were discussed. After any questions were address the patient wished to proceed with injection. A timeout was performed which included identifying the patient by name and date of birth, verifying correct procedure and correct site(s).  After prepping the injection site and right knee was injected with 1cc of 40mg Depomedrol/3cc marcaine. Patient tolerated the procedure well. Patient is instructed to ice the joint for next few days if they experience discomfort. The patient will followup as directed or as needed.    Signed By: Jesus Duran MD  July 26, 2024

## 2024-08-08 DIAGNOSIS — I25.10 CORONARY ARTERY DISEASE INVOLVING NATIVE CORONARY ARTERY OF NATIVE HEART WITHOUT ANGINA PECTORIS: ICD-10-CM

## 2024-08-08 DIAGNOSIS — E78.2 MIXED HYPERLIPIDEMIA: ICD-10-CM

## 2024-08-09 RX ORDER — ROSUVASTATIN CALCIUM 5 MG/1
5 TABLET, COATED ORAL DAILY
Qty: 90 TABLET | Refills: 0 | Status: SHIPPED | OUTPATIENT
Start: 2024-08-09 | End: 2025-02-05

## 2024-08-15 ENCOUNTER — OFFICE VISIT (OUTPATIENT)
Dept: INTERNAL MEDICINE CLINIC | Facility: CLINIC | Age: 74
End: 2024-08-15

## 2024-08-15 VITALS
DIASTOLIC BLOOD PRESSURE: 62 MMHG | SYSTOLIC BLOOD PRESSURE: 127 MMHG | HEART RATE: 56 BPM | BODY MASS INDEX: 26.6 KG/M2 | TEMPERATURE: 97.7 F | OXYGEN SATURATION: 97 % | WEIGHT: 155.8 LBS | HEIGHT: 64 IN

## 2024-08-15 DIAGNOSIS — I25.10 CORONARY ARTERY DISEASE INVOLVING NATIVE CORONARY ARTERY OF NATIVE HEART WITHOUT ANGINA PECTORIS: ICD-10-CM

## 2024-08-15 DIAGNOSIS — Z79.4 TYPE 2 DIABETES MELLITUS WITH STAGE 4 CHRONIC KIDNEY DISEASE, WITH LONG-TERM CURRENT USE OF INSULIN (HCC): ICD-10-CM

## 2024-08-15 DIAGNOSIS — E11.22 TYPE 2 DIABETES MELLITUS WITH STAGE 4 CHRONIC KIDNEY DISEASE, WITH LONG-TERM CURRENT USE OF INSULIN (HCC): ICD-10-CM

## 2024-08-15 DIAGNOSIS — I50.42 CHRONIC COMBINED SYSTOLIC AND DIASTOLIC CONGESTIVE HEART FAILURE (HCC): ICD-10-CM

## 2024-08-15 DIAGNOSIS — N18.4 TYPE 2 DIABETES MELLITUS WITH STAGE 4 CHRONIC KIDNEY DISEASE, WITH LONG-TERM CURRENT USE OF INSULIN (HCC): ICD-10-CM

## 2024-08-15 DIAGNOSIS — I48.0 PAROXYSMAL ATRIAL FIBRILLATION (HCC): ICD-10-CM

## 2024-08-15 DIAGNOSIS — I73.9 PVD (PERIPHERAL VASCULAR DISEASE) WITH CLAUDICATION (HCC): ICD-10-CM

## 2024-08-15 DIAGNOSIS — N18.4 STAGE 4 CHRONIC KIDNEY DISEASE (HCC): ICD-10-CM

## 2024-08-15 DIAGNOSIS — F32.5 MAJOR DEPRESSIVE DISORDER WITH SINGLE EPISODE, IN FULL REMISSION (HCC): ICD-10-CM

## 2024-08-15 DIAGNOSIS — E78.2 MIXED HYPERLIPIDEMIA: ICD-10-CM

## 2024-08-15 DIAGNOSIS — I10 ESSENTIAL HYPERTENSION: ICD-10-CM

## 2024-08-15 DIAGNOSIS — I25.10 CORONARY ARTERY DISEASE INVOLVING NATIVE CORONARY ARTERY OF NATIVE HEART WITHOUT ANGINA PECTORIS: Primary | ICD-10-CM

## 2024-08-15 PROBLEM — R79.89 ELEVATED LFTS: Status: RESOLVED | Noted: 2024-01-21 | Resolved: 2024-08-15

## 2024-08-15 PROBLEM — I95.1 ORTHOSTATIC HYPOTENSION: Status: RESOLVED | Noted: 2024-01-19 | Resolved: 2024-08-15

## 2024-08-15 PROBLEM — M16.12 PRIMARY OSTEOARTHRITIS OF LEFT HIP: Status: RESOLVED | Noted: 2023-11-15 | Resolved: 2024-08-15

## 2024-08-15 PROBLEM — K76.9 LIVER DISEASE, CHRONIC, WITH CIRRHOSIS (HCC): Status: RESOLVED | Noted: 2024-02-08 | Resolved: 2024-08-15

## 2024-08-15 PROBLEM — M16.12 OSTEOARTHRITIS OF LEFT HIP, UNSPECIFIED OSTEOARTHRITIS TYPE: Status: RESOLVED | Noted: 2024-01-17 | Resolved: 2024-08-15

## 2024-08-15 PROBLEM — K74.60 LIVER DISEASE, CHRONIC, WITH CIRRHOSIS (HCC): Status: RESOLVED | Noted: 2024-02-08 | Resolved: 2024-08-15

## 2024-08-15 LAB
ALBUMIN SERPL-MCNC: 3.8 G/DL (ref 3.2–4.6)
ALBUMIN/GLOB SERPL: 1 (ref 1–1.9)
ALP SERPL-CCNC: 109 U/L (ref 35–104)
ALT SERPL-CCNC: 39 U/L (ref 12–65)
ANION GAP SERPL CALC-SCNC: 12 MMOL/L (ref 9–18)
AST SERPL-CCNC: 44 U/L (ref 15–37)
BASOPHILS # BLD: 0 K/UL (ref 0–0.2)
BASOPHILS NFR BLD: 1 % (ref 0–2)
BILIRUB DIRECT SERPL-MCNC: <0.2 MG/DL (ref 0–0.4)
BILIRUB SERPL-MCNC: 0.4 MG/DL (ref 0–1.2)
BUN SERPL-MCNC: 19 MG/DL (ref 8–23)
CALCIUM SERPL-MCNC: 9.3 MG/DL (ref 8.8–10.2)
CHLORIDE SERPL-SCNC: 103 MMOL/L (ref 98–107)
CHOLEST SERPL-MCNC: 138 MG/DL (ref 0–200)
CO2 SERPL-SCNC: 25 MMOL/L (ref 20–28)
CREAT SERPL-MCNC: 1.52 MG/DL (ref 0.6–1.1)
DIFFERENTIAL METHOD BLD: ABNORMAL
EOSINOPHIL # BLD: 0.4 K/UL (ref 0–0.8)
EOSINOPHIL NFR BLD: 6 % (ref 0.5–7.8)
ERYTHROCYTE [DISTWIDTH] IN BLOOD BY AUTOMATED COUNT: 12.5 % (ref 11.9–14.6)
EST. AVERAGE GLUCOSE BLD GHB EST-MCNC: 156 MG/DL
GLOBULIN SER CALC-MCNC: 3.6 G/DL (ref 2.3–3.5)
GLUCOSE SERPL-MCNC: 169 MG/DL (ref 70–99)
HBA1C MFR BLD: 7.1 % (ref 0–5.6)
HCT VFR BLD AUTO: 42.6 % (ref 35.8–46.3)
HDLC SERPL-MCNC: 40 MG/DL (ref 40–60)
HDLC SERPL: 3.5 (ref 0–5)
HGB BLD-MCNC: 13.8 G/DL (ref 11.7–15.4)
IMM GRANULOCYTES # BLD AUTO: 0 K/UL (ref 0–0.5)
IMM GRANULOCYTES NFR BLD AUTO: 0 % (ref 0–5)
LDLC SERPL CALC-MCNC: 73 MG/DL (ref 0–100)
LYMPHOCYTES # BLD: 1.1 K/UL (ref 0.5–4.6)
LYMPHOCYTES NFR BLD: 16 % (ref 13–44)
MCH RBC QN AUTO: 32.3 PG (ref 26.1–32.9)
MCHC RBC AUTO-ENTMCNC: 32.4 G/DL (ref 31.4–35)
MCV RBC AUTO: 99.8 FL (ref 82–102)
MONOCYTES # BLD: 0.9 K/UL (ref 0.1–1.3)
MONOCYTES NFR BLD: 13 % (ref 4–12)
NEUTS SEG # BLD: 4.5 K/UL (ref 1.7–8.2)
NEUTS SEG NFR BLD: 64 % (ref 43–78)
NRBC # BLD: 0 K/UL (ref 0–0.2)
PLATELET # BLD AUTO: 242 K/UL (ref 150–450)
PMV BLD AUTO: 10.4 FL (ref 9.4–12.3)
POTASSIUM SERPL-SCNC: 4 MMOL/L (ref 3.5–5.1)
PROT SERPL-MCNC: 7.4 G/DL (ref 6.3–8.2)
RBC # BLD AUTO: 4.27 M/UL (ref 4.05–5.2)
SODIUM SERPL-SCNC: 140 MMOL/L (ref 136–145)
TRIGL SERPL-MCNC: 127 MG/DL (ref 0–150)
VLDLC SERPL CALC-MCNC: 25 MG/DL (ref 6–23)
WBC # BLD AUTO: 6.9 K/UL (ref 4.3–11.1)

## 2024-08-15 RX ORDER — ROSUVASTATIN CALCIUM 5 MG/1
5 TABLET, COATED ORAL DAILY
Qty: 180 TABLET | Refills: 1 | Status: SHIPPED | OUTPATIENT
Start: 2024-08-15 | End: 2024-08-15 | Stop reason: SDUPTHER

## 2024-08-15 RX ORDER — NIFEDIPINE 90 MG/1
90 TABLET, EXTENDED RELEASE ORAL DAILY
Qty: 90 TABLET | Refills: 1 | Status: SHIPPED | OUTPATIENT
Start: 2024-08-15

## 2024-08-15 RX ORDER — ROSUVASTATIN CALCIUM 10 MG/1
10 TABLET, COATED ORAL DAILY
Qty: 90 TABLET | Refills: 1 | Status: SHIPPED | OUTPATIENT
Start: 2024-08-15

## 2024-08-15 RX ORDER — CITALOPRAM 20 MG/1
20 TABLET ORAL DAILY
Qty: 90 TABLET | Refills: 1 | Status: SHIPPED | OUTPATIENT
Start: 2024-08-15

## 2024-08-15 RX ORDER — INSULIN DEGLUDEC 100 U/ML
10 INJECTION, SOLUTION SUBCUTANEOUS NIGHTLY
Qty: 15 ML | Refills: 0 | Status: SHIPPED | OUTPATIENT
Start: 2024-08-15

## 2024-08-15 RX ORDER — PROCHLORPERAZINE 25 MG/1
SUPPOSITORY RECTAL
Qty: 3 EACH | Refills: 5 | Status: SHIPPED | OUTPATIENT
Start: 2024-08-15

## 2024-08-15 ASSESSMENT — PATIENT HEALTH QUESTIONNAIRE - PHQ9
SUM OF ALL RESPONSES TO PHQ9 QUESTIONS 1 & 2: 0
SUM OF ALL RESPONSES TO PHQ QUESTIONS 1-9: 0
9. THOUGHTS THAT YOU WOULD BE BETTER OFF DEAD, OR OF HURTING YOURSELF: NOT AT ALL
1. LITTLE INTEREST OR PLEASURE IN DOING THINGS: NOT AT ALL
SUM OF ALL RESPONSES TO PHQ QUESTIONS 1-9: 0
2. FEELING DOWN, DEPRESSED OR HOPELESS: NOT AT ALL
4. FEELING TIRED OR HAVING LITTLE ENERGY: NOT AT ALL
10. IF YOU CHECKED OFF ANY PROBLEMS, HOW DIFFICULT HAVE THESE PROBLEMS MADE IT FOR YOU TO DO YOUR WORK, TAKE CARE OF THINGS AT HOME, OR GET ALONG WITH OTHER PEOPLE: NOT DIFFICULT AT ALL
7. TROUBLE CONCENTRATING ON THINGS, SUCH AS READING THE NEWSPAPER OR WATCHING TELEVISION: NOT AT ALL
5. POOR APPETITE OR OVEREATING: NOT AT ALL
3. TROUBLE FALLING OR STAYING ASLEEP: NOT AT ALL
SUM OF ALL RESPONSES TO PHQ QUESTIONS 1-9: 0
8. MOVING OR SPEAKING SO SLOWLY THAT OTHER PEOPLE COULD HAVE NOTICED. OR THE OPPOSITE, BEING SO FIGETY OR RESTLESS THAT YOU HAVE BEEN MOVING AROUND A LOT MORE THAN USUAL: NOT AT ALL
6. FEELING BAD ABOUT YOURSELF - OR THAT YOU ARE A FAILURE OR HAVE LET YOURSELF OR YOUR FAMILY DOWN: NOT AT ALL
SUM OF ALL RESPONSES TO PHQ QUESTIONS 1-9: 0

## 2024-08-15 NOTE — PROGRESS NOTES
Recommend staying UTD on Covid vaccinations/boosters.   F/u: 3 Mos w/ non-fasting labs.     Magails was seen today for diabetes.    Diagnoses and all orders for this visit:    Coronary artery disease involving native coronary artery of native heart without angina pectoris  -     Discontinue: rosuvastatin (CRESTOR) 5 MG tablet; Take 1 tablet by mouth daily 2 tablets daily  -     Basic Metabolic Panel; Future  -     CBC with Auto Differential; Future  -     Hepatic Function Panel; Future  -     rosuvastatin (CRESTOR) 10 MG tablet; Take 1 tablet by mouth daily  -     Lipid Panel; Future  -     Hemoglobin A1C; Future    Type 2 diabetes mellitus with stage 4 chronic kidney disease, with long-term current use of insulin ([facility])  -     Continuous Glucose Sensor (DEXCOM G6 SENSOR) MISC; Test blood sugar 3 times daily.  -     Basic Metabolic Panel; Future  -     CBC with Auto Differential; Future  -     Hepatic Function Panel; Future  -     Insulin Degludec (TRESIBA FLEXTOUCH) 100 UNIT/ML SOPN; Inject 10 Units into the skin at bedtime  -     Lipid Panel; Future  -     Hemoglobin A1C; Future    Essential hypertension  -     NIFEdipine (PROCARDIA XL) 90 MG extended release tablet; Take 1 tablet by mouth daily  -     Basic Metabolic Panel; Future  -     CBC with Auto Differential; Future  -     Hepatic Function Panel; Future  -     Lipid Panel; Future  -     Hemoglobin A1C; Future    Mixed hyperlipidemia  -     Discontinue: rosuvastatin (CRESTOR) 5 MG tablet; Take 1 tablet by mouth daily 2 tablets daily  -     Basic Metabolic Panel; Future  -     CBC with Auto Differential; Future  -     Hepatic Function Panel; Future  -     rosuvastatin (CRESTOR) 10 MG tablet; Take 1 tablet by mouth daily  -     Lipid Panel; Future  -     Hemoglobin A1C; Future    Stage 4 chronic kidney disease (HCC)  -     Basic Metabolic Panel; Future  -     CBC with Auto Differential; Future  -     Hepatic Function Panel; Future  -     Lipid Panel; Future  -

## 2024-08-15 NOTE — ACP (ADVANCE CARE PLANNING)
Advance Care Planning   The patient has the following advanced directives on file:  Advance Directives       Power of  Living Will ACP-Advance Directive ACP-Power of     Not on File Not on File Filed Filed            The patient has appointed the following active healthcare agents:    Secondary Decision Maker: Rochelle Novoa - Child - [phone]    The Patient has the following current code status:    Code Status: Prior    Visit Documentation:  I discussed Advance Care Planning with Magalis Maddox today which included the patient's choices for care and treatment in the case of a health event that adversely affects decision-making abilities. She stated the Advance Care Directives on file are current. We discussed her current values, goals and care preferences at the end of life.  Magalis Maddox has no questions at this time and has agreed to keep me up-to-date should anything change.     Jeffery Vinson  8/15/2024

## 2024-09-05 ENCOUNTER — PATIENT MESSAGE (OUTPATIENT)
Age: 74
End: 2024-09-05

## 2024-09-05 DIAGNOSIS — I48.0 PAROXYSMAL ATRIAL FIBRILLATION (HCC): ICD-10-CM

## 2024-09-26 DIAGNOSIS — R52 PAIN: Primary | ICD-10-CM

## 2024-09-26 RX ORDER — TRAMADOL HYDROCHLORIDE 50 MG/1
50 TABLET ORAL EVERY 4 HOURS PRN
Qty: 42 TABLET | Refills: 0 | Status: SHIPPED | OUTPATIENT
Start: 2024-09-26 | End: 2024-10-03

## 2024-10-10 ENCOUNTER — OFFICE VISIT (OUTPATIENT)
Dept: ORTHOPEDIC SURGERY | Age: 74
End: 2024-10-10

## 2024-10-10 DIAGNOSIS — M17.11 PRIMARY OSTEOARTHRITIS OF RIGHT KNEE: Primary | ICD-10-CM

## 2024-10-10 RX ORDER — METHYLPREDNISOLONE ACETATE 40 MG/ML
40 INJECTION, SUSPENSION INTRA-ARTICULAR; INTRALESIONAL; INTRAMUSCULAR; SOFT TISSUE ONCE
Qty: 1 ML | Refills: 0
Start: 2024-10-10 | End: 2024-10-10

## 2024-10-10 NOTE — PROGRESS NOTES
Patient ID:  Magalis Maddox  [id number]  74 y.o.  1950    Today: October 10, 2024          Chief Complaint: Right Knee pain    HPI:       Magalis Maddox is a 74 y.o. female seen for evaluation and treatment of their knee pain. Patient reports a longstanding history of pain involving the knee. The patient complains of knee pain with activities, reports pain as mostly occurring along the joint lines, reports stiffness of the knee with prolonged inactivity, and swelling/pain at the end of the day and after increased physical activity. The patient's knee pain cause moderate to severe limitations in the activities of rising from bed, putting on socks/shoes, rising from a sitting position, bending towards the floor and kneeling, twisting and pivoting on the limb and squatting. In addition, at times the patient reports extreme difficulty with these activities. Generally, symptoms improve with sitting/rest. The pain affects the patient’s activities of daily living and quality of life. Patient reports progressive pain and instability in the knee. The pain has been present for an extended period of time. Pain ranges from approximately 4-8 in a cyclical fashion with periods of acute exacerbation.    Prior procedures on the knee include none.    Patient has attempted prior conservative treatment including medications and activity modification.    Treatment to date has included oral medications inclusive of over-the-counter medications (NSAIDS and/or Tylenol) +/-prescription medications, activity modification, weight loss (if applicable), and injections which include corticosteroids and/or viscosupplimentation.    Past Medical History:  Past Medical History:   Diagnosis Date    Acute on chronic combined systolic and diastolic congestive heart failure (HCC) 02/17/2021    Acute renal failure superimposed on stage 3b chronic kidney disease (HCC) 01/12/2021    Age-related osteoporosis without current

## 2024-10-22 ENCOUNTER — PATIENT MESSAGE (OUTPATIENT)
Dept: ORTHOPEDIC SURGERY | Age: 74
End: 2024-10-22

## 2024-10-22 DIAGNOSIS — R52 PAIN: Primary | ICD-10-CM

## 2024-10-23 RX ORDER — TRAMADOL HYDROCHLORIDE 50 MG/1
50 TABLET ORAL EVERY 4 HOURS PRN
Qty: 42 TABLET | Refills: 0 | Status: SHIPPED | OUTPATIENT
Start: 2024-10-23 | End: 2024-10-30

## 2024-10-30 DIAGNOSIS — F51.01 PRIMARY INSOMNIA: ICD-10-CM

## 2024-10-30 RX ORDER — TRAZODONE HYDROCHLORIDE 50 MG/1
50 TABLET, FILM COATED ORAL NIGHTLY
Qty: 90 TABLET | Refills: 3 | OUTPATIENT
Start: 2024-10-30

## 2024-11-01 ENCOUNTER — OFFICE VISIT (OUTPATIENT)
Dept: VASCULAR SURGERY | Age: 74
End: 2024-11-01
Payer: MEDICARE

## 2024-11-01 VITALS
DIASTOLIC BLOOD PRESSURE: 77 MMHG | OXYGEN SATURATION: 96 % | HEART RATE: 60 BPM | WEIGHT: 153.7 LBS | SYSTOLIC BLOOD PRESSURE: 139 MMHG | BODY MASS INDEX: 26.24 KG/M2 | HEIGHT: 64 IN

## 2024-11-01 DIAGNOSIS — I73.9 PVD (PERIPHERAL VASCULAR DISEASE) WITH CLAUDICATION (HCC): Primary | ICD-10-CM

## 2024-11-01 PROCEDURE — 99213 OFFICE O/P EST LOW 20 MIN: CPT | Performed by: SURGERY

## 2024-11-01 PROCEDURE — G8427 DOCREV CUR MEDS BY ELIG CLIN: HCPCS | Performed by: SURGERY

## 2024-11-01 PROCEDURE — 3078F DIAST BP <80 MM HG: CPT | Performed by: SURGERY

## 2024-11-01 PROCEDURE — 1123F ACP DISCUSS/DSCN MKR DOCD: CPT | Performed by: SURGERY

## 2024-11-01 PROCEDURE — 1090F PRES/ABSN URINE INCON ASSESS: CPT | Performed by: SURGERY

## 2024-11-01 PROCEDURE — 1036F TOBACCO NON-USER: CPT | Performed by: SURGERY

## 2024-11-01 PROCEDURE — 1159F MED LIST DOCD IN RCRD: CPT | Performed by: SURGERY

## 2024-11-01 PROCEDURE — G8417 CALC BMI ABV UP PARAM F/U: HCPCS | Performed by: SURGERY

## 2024-11-01 PROCEDURE — G8484 FLU IMMUNIZE NO ADMIN: HCPCS | Performed by: SURGERY

## 2024-11-01 PROCEDURE — G8399 PT W/DXA RESULTS DOCUMENT: HCPCS | Performed by: SURGERY

## 2024-11-01 PROCEDURE — 3075F SYST BP GE 130 - 139MM HG: CPT | Performed by: SURGERY

## 2024-11-01 NOTE — PROGRESS NOTES
[address] [address]  [phone] FAX: [phone]    Magalis Maddox  : 1950    Chief Complaint:     History of Present Illness:   Patient follows up today for status post left carotid endarterectomy.  Patient denies any neurological symptoms.    CURRENT MEDICATIONS:  Current Outpatient Medications   Medication Sig Dispense Refill    apixaban (ELIQUIS) 5 MG TABS tablet Take 1 tablet by mouth 2 times daily 180 tablet 3    citalopram (CELEXA) 20 MG tablet Take 1 tablet by mouth daily 90 tablet 1    NIFEdipine (PROCARDIA XL) 90 MG extended release tablet Take 1 tablet by mouth daily 90 tablet 1    Continuous Glucose Sensor (DEXCOM G6 SENSOR) MISC Test blood sugar 3 times daily. 3 each 5    Insulin Degludec (TRESIBA FLEXTOUCH) 100 UNIT/ML SOPN Inject 10 Units into the skin at bedtime 15 mL 0    rosuvastatin (CRESTOR) 10 MG tablet Take 1 tablet by mouth daily 90 tablet 1    Continuous Glucose Transmitter (DEXCOM G6 TRANSMITTER) MISC Test blood sugar 3 times daily 1 each 5    traZODone (DESYREL) 50 MG tablet Take 1 tablet by mouth nightly 90 tablet 1    BD PEN NEEDLE MILVIA 2ND GEN 32G X 4 MM MISC Check blood sugar three times daily 300 each 3    Continuous Blood Gluc  (DEXCOM G6 ) MAURICE Test blood sugar 3 times daily 10 each 3    carvedilol (COREG) 6.25 MG tablet Take 1 tablet by mouth 2 times daily 200 tablet 3    isosorbide mononitrate (IMDUR) 60 MG extended release tablet Take 1 tablet by mouth daily 100 tablet 3    furosemide (LASIX) 40 MG tablet Take 1 tablet by mouth daily 100 tablet 3    aspirin EC 81 MG EC tablet Take 1 tablet by mouth daily 1 tablet 0    Cholecalciferol 50 MCG (2000 UT) TABS Take 1 tablet by mouth daily      ferrous sulfate (FE TABS 325) 325 (65 Fe) MG EC tablet Take 1 tablet by mouth 2 times daily 180 tablet 1    cyanocobalamin 1000 MCG tablet Take 5 tablets by mouth daily      magnesium oxide (MAG-OX) 400 MG tablet Take 1

## 2024-11-15 SDOH — ECONOMIC STABILITY: FOOD INSECURITY: WITHIN THE PAST 12 MONTHS, YOU WORRIED THAT YOUR FOOD WOULD RUN OUT BEFORE YOU GOT MONEY TO BUY MORE.: NEVER TRUE

## 2024-11-15 SDOH — ECONOMIC STABILITY: FOOD INSECURITY: WITHIN THE PAST 12 MONTHS, THE FOOD YOU BOUGHT JUST DIDN'T LAST AND YOU DIDN'T HAVE MONEY TO GET MORE.: NEVER TRUE

## 2024-11-15 SDOH — ECONOMIC STABILITY: INCOME INSECURITY: HOW HARD IS IT FOR YOU TO PAY FOR THE VERY BASICS LIKE FOOD, HOUSING, MEDICAL CARE, AND HEATING?: NOT HARD AT ALL

## 2024-11-18 ENCOUNTER — OFFICE VISIT (OUTPATIENT)
Dept: INTERNAL MEDICINE CLINIC | Facility: CLINIC | Age: 74
End: 2024-11-18

## 2024-11-18 VITALS
HEIGHT: 64 IN | BODY MASS INDEX: 26.12 KG/M2 | SYSTOLIC BLOOD PRESSURE: 104 MMHG | TEMPERATURE: 97.1 F | WEIGHT: 153 LBS | HEART RATE: 56 BPM | OXYGEN SATURATION: 99 % | DIASTOLIC BLOOD PRESSURE: 52 MMHG

## 2024-11-18 DIAGNOSIS — I50.42 CHRONIC COMBINED SYSTOLIC AND DIASTOLIC CONGESTIVE HEART FAILURE (HCC): ICD-10-CM

## 2024-11-18 DIAGNOSIS — I48.0 PAROXYSMAL ATRIAL FIBRILLATION (HCC): ICD-10-CM

## 2024-11-18 DIAGNOSIS — N18.4 STAGE 4 CHRONIC KIDNEY DISEASE (HCC): ICD-10-CM

## 2024-11-18 DIAGNOSIS — D64.9 CHRONIC ANEMIA: ICD-10-CM

## 2024-11-18 DIAGNOSIS — M21.961 ACQUIRED DEFORMITY OF RIGHT KNEE: ICD-10-CM

## 2024-11-18 DIAGNOSIS — E78.2 MIXED HYPERLIPIDEMIA: ICD-10-CM

## 2024-11-18 DIAGNOSIS — Z79.4 TYPE 2 DIABETES MELLITUS WITH STAGE 4 CHRONIC KIDNEY DISEASE, WITH LONG-TERM CURRENT USE OF INSULIN (HCC): ICD-10-CM

## 2024-11-18 DIAGNOSIS — E11.22 TYPE 2 DIABETES MELLITUS WITH STAGE 4 CHRONIC KIDNEY DISEASE, WITH LONG-TERM CURRENT USE OF INSULIN (HCC): ICD-10-CM

## 2024-11-18 DIAGNOSIS — I10 ESSENTIAL HYPERTENSION: ICD-10-CM

## 2024-11-18 DIAGNOSIS — I73.9 PVD (PERIPHERAL VASCULAR DISEASE) WITH CLAUDICATION (HCC): ICD-10-CM

## 2024-11-18 DIAGNOSIS — F51.01 PRIMARY INSOMNIA: ICD-10-CM

## 2024-11-18 DIAGNOSIS — I25.10 CORONARY ARTERY DISEASE INVOLVING NATIVE CORONARY ARTERY OF NATIVE HEART WITHOUT ANGINA PECTORIS: Primary | ICD-10-CM

## 2024-11-18 DIAGNOSIS — M17.11 PRIMARY OSTEOARTHRITIS OF RIGHT KNEE: Primary | ICD-10-CM

## 2024-11-18 DIAGNOSIS — N18.4 TYPE 2 DIABETES MELLITUS WITH STAGE 4 CHRONIC KIDNEY DISEASE, WITH LONG-TERM CURRENT USE OF INSULIN (HCC): ICD-10-CM

## 2024-11-18 DIAGNOSIS — F32.5 MAJOR DEPRESSIVE DISORDER WITH SINGLE EPISODE, IN FULL REMISSION (HCC): ICD-10-CM

## 2024-11-18 LAB
ALBUMIN SERPL-MCNC: 3.6 G/DL (ref 3.2–4.6)
ALBUMIN/GLOB SERPL: 1 (ref 1–1.9)
ALP SERPL-CCNC: 126 U/L (ref 35–104)
ALT SERPL-CCNC: 25 U/L (ref 8–45)
ANION GAP SERPL CALC-SCNC: 12 MMOL/L (ref 7–16)
AST SERPL-CCNC: 39 U/L (ref 15–37)
BASOPHILS # BLD: 0.1 K/UL (ref 0–0.2)
BASOPHILS NFR BLD: 1 % (ref 0–2)
BILIRUB DIRECT SERPL-MCNC: <0.2 MG/DL (ref 0–0.4)
BILIRUB SERPL-MCNC: 0.4 MG/DL (ref 0–1.2)
BUN SERPL-MCNC: 24 MG/DL (ref 8–23)
CALCIUM SERPL-MCNC: 9.2 MG/DL (ref 8.8–10.2)
CHLORIDE SERPL-SCNC: 101 MMOL/L (ref 98–107)
CO2 SERPL-SCNC: 26 MMOL/L (ref 20–29)
CREAT SERPL-MCNC: 1.67 MG/DL (ref 0.6–1.1)
DIFFERENTIAL METHOD BLD: ABNORMAL
EOSINOPHIL # BLD: 0.6 K/UL (ref 0–0.8)
EOSINOPHIL NFR BLD: 7 % (ref 0.5–7.8)
ERYTHROCYTE [DISTWIDTH] IN BLOOD BY AUTOMATED COUNT: 12.1 % (ref 11.9–14.6)
EST. AVERAGE GLUCOSE BLD GHB EST-MCNC: 145 MG/DL
GLOBULIN SER CALC-MCNC: 3.7 G/DL (ref 2.3–3.5)
GLUCOSE SERPL-MCNC: 163 MG/DL (ref 70–99)
HBA1C MFR BLD: 6.7 % (ref 0–5.6)
HCT VFR BLD AUTO: 42.1 % (ref 35.8–46.3)
HGB BLD-MCNC: 14 G/DL (ref 11.7–15.4)
IMM GRANULOCYTES # BLD AUTO: 0 K/UL (ref 0–0.5)
IMM GRANULOCYTES NFR BLD AUTO: 0 % (ref 0–5)
LYMPHOCYTES # BLD: 1.3 K/UL (ref 0.5–4.6)
LYMPHOCYTES NFR BLD: 15 % (ref 13–44)
MCH RBC QN AUTO: 32.4 PG (ref 26.1–32.9)
MCHC RBC AUTO-ENTMCNC: 33.3 G/DL (ref 31.4–35)
MCV RBC AUTO: 97.5 FL (ref 82–102)
MONOCYTES # BLD: 1.1 K/UL (ref 0.1–1.3)
MONOCYTES NFR BLD: 13 % (ref 4–12)
NEUTS SEG # BLD: 5.3 K/UL (ref 1.7–8.2)
NEUTS SEG NFR BLD: 64 % (ref 43–78)
NRBC # BLD: 0 K/UL (ref 0–0.2)
PLATELET # BLD AUTO: 263 K/UL (ref 150–450)
PMV BLD AUTO: 10 FL (ref 9.4–12.3)
POTASSIUM SERPL-SCNC: 3.7 MMOL/L (ref 3.5–5.1)
PROT SERPL-MCNC: 7.3 G/DL (ref 6.3–8.2)
RBC # BLD AUTO: 4.32 M/UL (ref 4.05–5.2)
SODIUM SERPL-SCNC: 139 MMOL/L (ref 136–145)
WBC # BLD AUTO: 8.3 K/UL (ref 4.3–11.1)

## 2024-11-18 RX ORDER — INSULIN DEGLUDEC 100 U/ML
10 INJECTION, SOLUTION SUBCUTANEOUS NIGHTLY
Qty: 15 ML | Refills: 0 | Status: SHIPPED | OUTPATIENT
Start: 2024-11-18

## 2024-11-18 RX ORDER — PROCHLORPERAZINE 25 MG/1
SUPPOSITORY RECTAL
Qty: 3 EACH | Refills: 5 | Status: SHIPPED | OUTPATIENT
Start: 2024-11-18

## 2024-11-18 RX ORDER — CITALOPRAM HYDROBROMIDE 20 MG/1
20 TABLET ORAL DAILY
Qty: 90 TABLET | Refills: 1 | Status: SHIPPED | OUTPATIENT
Start: 2024-11-18

## 2024-11-18 RX ORDER — TRAZODONE HYDROCHLORIDE 50 MG/1
50 TABLET, FILM COATED ORAL NIGHTLY
Qty: 90 TABLET | Refills: 1 | Status: SHIPPED | OUTPATIENT
Start: 2024-11-18

## 2024-11-18 RX ORDER — NIFEDIPINE 90 MG/1
90 TABLET, EXTENDED RELEASE ORAL DAILY
Qty: 90 TABLET | Refills: 1 | Status: SHIPPED | OUTPATIENT
Start: 2024-11-18 | End: 2024-11-21 | Stop reason: SDUPTHER

## 2024-11-18 RX ORDER — FERROUS SULFATE 325(65) MG
325 TABLET, DELAYED RELEASE (ENTERIC COATED) ORAL 2 TIMES DAILY
Qty: 180 TABLET | Refills: 1 | Status: SHIPPED | OUTPATIENT
Start: 2024-11-18

## 2024-11-18 RX ORDER — PEN NEEDLE, DIABETIC 32GX 5/32"
NEEDLE, DISPOSABLE MISCELLANEOUS
Qty: 300 EACH | Refills: 3 | Status: SHIPPED | OUTPATIENT
Start: 2024-11-18

## 2024-11-18 RX ORDER — ROSUVASTATIN CALCIUM 10 MG/1
10 TABLET, COATED ORAL DAILY
Qty: 90 TABLET | Refills: 1 | Status: SHIPPED | OUTPATIENT
Start: 2024-11-18

## 2024-11-18 ASSESSMENT — PATIENT HEALTH QUESTIONNAIRE - PHQ9
SUM OF ALL RESPONSES TO PHQ QUESTIONS 1-9: 0
1. LITTLE INTEREST OR PLEASURE IN DOING THINGS: NOT AT ALL
9. THOUGHTS THAT YOU WOULD BE BETTER OFF DEAD, OR OF HURTING YOURSELF: NOT AT ALL
3. TROUBLE FALLING OR STAYING ASLEEP: NOT AT ALL
SUM OF ALL RESPONSES TO PHQ QUESTIONS 1-9: 0
SUM OF ALL RESPONSES TO PHQ9 QUESTIONS 1 & 2: 0
2. FEELING DOWN, DEPRESSED OR HOPELESS: NOT AT ALL
8. MOVING OR SPEAKING SO SLOWLY THAT OTHER PEOPLE COULD HAVE NOTICED. OR THE OPPOSITE, BEING SO FIGETY OR RESTLESS THAT YOU HAVE BEEN MOVING AROUND A LOT MORE THAN USUAL: NOT AT ALL
6. FEELING BAD ABOUT YOURSELF - OR THAT YOU ARE A FAILURE OR HAVE LET YOURSELF OR YOUR FAMILY DOWN: NOT AT ALL
SUM OF ALL RESPONSES TO PHQ QUESTIONS 1-9: 0
SUM OF ALL RESPONSES TO PHQ QUESTIONS 1-9: 0
4. FEELING TIRED OR HAVING LITTLE ENERGY: NOT AT ALL
10. IF YOU CHECKED OFF ANY PROBLEMS, HOW DIFFICULT HAVE THESE PROBLEMS MADE IT FOR YOU TO DO YOUR WORK, TAKE CARE OF THINGS AT HOME, OR GET ALONG WITH OTHER PEOPLE: NOT DIFFICULT AT ALL
7. TROUBLE CONCENTRATING ON THINGS, SUCH AS READING THE NEWSPAPER OR WATCHING TELEVISION: NOT AT ALL
5. POOR APPETITE OR OVEREATING: NOT AT ALL

## 2024-11-18 NOTE — PROGRESS NOTES
Start date: 1992     Quit date: 2012     Years since quittin.0    Smokeless tobacco: Never    Tobacco comments:     Quit smoking: quit  . has stopped prior also   Vaping Use    Vaping status: Never Used   Substance Use Topics    Alcohol use: Yes     Alcohol/week: 7.0 standard drinks of alcohol     Types: 7 Glasses of wine per week     Comment: occa.    Drug use: No     Family History   Problem Relation Age of Onset    Multinodular goiter Mother     Heart Attack Mother 72    Osteoporosis Mother     Pancreatic Cancer Father     Heart Attack Father 72        MI    Ulcerative Colitis Brother     Thyroid Cancer Brother     Breast Cancer Neg Hx     Colon Cancer Neg Hx       Review of Systems   Psychiatric/Behavioral:  Negative for suicidal ideas.      Physical Exam  Vitals and nursing note reviewed.   Constitutional:       General: She is not in acute distress.     Appearance: Normal appearance. She is not ill-appearing, toxic-appearing or diaphoretic.   HENT:      Head: Normocephalic and atraumatic.   Cardiovascular:      Rate and Rhythm: Normal rate and regular rhythm.      Heart sounds: Normal heart sounds. No murmur heard.     No friction rub. No gallop.   Pulmonary:      Effort: Pulmonary effort is normal. No respiratory distress.      Breath sounds: Normal breath sounds. No stridor. No wheezing, rhonchi or rales.   Abdominal:      General: Abdomen is flat. Bowel sounds are normal.      Palpations: Abdomen is soft.   Musculoskeletal:      Right lower leg: No edema.      Left lower leg: No edema.   Skin:     General: Skin is warm and dry.      Coloration: Skin is not jaundiced or pale.      Findings: No erythema.   Neurological:      General: No focal deficit present.      Mental Status: She is alert and oriented to person, place, and time. Mental status is at baseline.   Psychiatric:         Mood and Affect: Mood normal.         Behavior: Behavior normal.         Thought Content: Thought

## 2024-11-18 NOTE — ACP (ADVANCE CARE PLANNING)
Advance Care Planning   The patient has the following advanced directives on file:  Advance Directives       Power of  Living Will ACP-Advance Directive ACP-Power of     Not on File Not on File Filed Filed            The patient has appointed the following active healthcare agents:    Secondary Decision Maker: Rochelle Novoa - Child - [phone]    The Patient has the following current code status:    Code Status: Prior    Visit Documentation:  I discussed Advance Care Planning with Magalis Liz Maddox today which included the importance of making their choices for care and treatment in the case of a health event that adversely affects their decision-making abilities. She has not completed the Advance Care Directives. She has an active health care agent at this time.  Magalis Liz Maddox was encouraged to complete the declaration forms and provide a signed copy of her medical records.  I advised patient we would continue this discussion at future visits.     Jeffery Vinson  11/18/2024

## 2024-11-19 ENCOUNTER — TELEPHONE (OUTPATIENT)
Age: 74
End: 2024-11-19

## 2024-11-19 NOTE — TELEPHONE ENCOUNTER
Patient has not been seen in over 6 months.  Please have her scheduled for follow-up appointment at least 2 weeks prior to OR date. Thank you.

## 2024-11-19 NOTE — TELEPHONE ENCOUNTER
Cardiac Clearance        Physician or Practice Requesting:[facility]   : [place]   Contact Phone Number: [phone]  Fax Number: [phone]  Date of Surgery/Procedure: 02/26/25  Type of Surgery or Procedure: right TKA  Type of Anesthesia: Spinal   Type of Clearance Requested: risk assessment and any medication hold   Medication to Hold:Eliquis   Days to Hold: 3 day hold

## 2024-11-21 DIAGNOSIS — I10 ESSENTIAL HYPERTENSION: ICD-10-CM

## 2024-11-21 RX ORDER — NIFEDIPINE 90 MG/1
90 TABLET, EXTENDED RELEASE ORAL DAILY
Qty: 90 TABLET | Refills: 1 | Status: SHIPPED | OUTPATIENT
Start: 2024-11-21

## 2024-11-24 NOTE — CARE COORDINATION
[facility] AMB   3/11/2024  1:00 PM Handy Brower DO [facility] AMB   4/12/2024 11:00 AM [facility]TRASOUND 1 [facility]   4/12/2024 11:30 AM Rob Bhatia MD [facility] AMB     External follow up appointment(s): yissel    Care Transition Nurse reviewed discharge instructions, medical action plan, and red flags with patient and discussed any barriers to care and/or understanding of plan of care after discharge. Discussed appropriate site of care based on symptoms and resources available to patient including: PCP  Specialist  When to call 911  CTN . The patient agrees to contact the PCP office for questions related to their healthcare.     Advance Care Planning:   reviewed and current.     Patients top risk factors for readmission: medical condition-CKD, DM2, HF  Interventions to address risk factors: Assessment and support for treatment adherence and medication management-reviewe most recent glucose readings as patient was > 400 prior to starting her Tresiba back 3 days prior. Patient reports improvement in glucose readings and stabilizing.    Offered patient enrollment in the Remote Patient Monitoring (RPM) program for in-home monitoring: Patient declined.     Care Transitions Subsequent and Final Call    Subsequent and Final Calls  Do you have any ongoing symptoms?: No  Do you have any questions related to your medications?: No  Do you currently have any active services?: Yes  Are you currently active with any services?: Home Health  Do you have any needs or concerns that I can assist you with?: No  Identified Barriers: None  Care Transitions Interventions  Other Interventions:             Care Transition Nurse provided contact information for future needs. Plan for follow-up call in 10-14 days based on severity of symptoms and risk factors.  Plan for next call: symptom management-assess for new or worsening s/s and address any acute needs that may arise.     Yon Jimenez RN    
ambulatory

## 2024-11-25 DIAGNOSIS — Z79.4 TYPE 2 DIABETES MELLITUS WITH STAGE 4 CHRONIC KIDNEY DISEASE, WITH LONG-TERM CURRENT USE OF INSULIN (HCC): ICD-10-CM

## 2024-11-25 DIAGNOSIS — N18.4 TYPE 2 DIABETES MELLITUS WITH STAGE 4 CHRONIC KIDNEY DISEASE, WITH LONG-TERM CURRENT USE OF INSULIN (HCC): ICD-10-CM

## 2024-11-25 DIAGNOSIS — E11.22 TYPE 2 DIABETES MELLITUS WITH STAGE 4 CHRONIC KIDNEY DISEASE, WITH LONG-TERM CURRENT USE OF INSULIN (HCC): ICD-10-CM

## 2024-11-25 RX ORDER — PEN NEEDLE, DIABETIC 32GX 5/32"
NEEDLE, DISPOSABLE MISCELLANEOUS
Qty: 300 EACH | Refills: 3 | Status: SHIPPED | OUTPATIENT
Start: 2024-11-25

## 2024-12-03 DIAGNOSIS — M25.561 RIGHT KNEE PAIN, UNSPECIFIED CHRONICITY: Primary | ICD-10-CM

## 2024-12-03 RX ORDER — TRAMADOL HYDROCHLORIDE 50 MG/1
50 TABLET ORAL EVERY 4 HOURS PRN
Qty: 42 TABLET | Refills: 0 | Status: SHIPPED | OUTPATIENT
Start: 2024-12-03 | End: 2024-12-10

## 2025-01-14 DIAGNOSIS — M25.561 RIGHT KNEE PAIN, UNSPECIFIED CHRONICITY: ICD-10-CM

## 2025-01-15 RX ORDER — TRAMADOL HYDROCHLORIDE 50 MG/1
TABLET ORAL
Qty: 42 TABLET | OUTPATIENT
Start: 2025-01-15

## 2025-01-17 ENCOUNTER — OFFICE VISIT (OUTPATIENT)
Dept: ORTHOPEDIC SURGERY | Age: 75
End: 2025-01-17

## 2025-01-17 DIAGNOSIS — Z96.642 STATUS POST LEFT HIP REPLACEMENT: Primary | ICD-10-CM

## 2025-01-17 DIAGNOSIS — M17.11 PRIMARY OSTEOARTHRITIS OF RIGHT KNEE: ICD-10-CM

## 2025-01-17 RX ORDER — METHYLPREDNISOLONE ACETATE 40 MG/ML
40 INJECTION, SUSPENSION INTRA-ARTICULAR; INTRALESIONAL; INTRAMUSCULAR; SOFT TISSUE ONCE
Status: COMPLETED | OUTPATIENT
Start: 2025-01-17 | End: 2025-01-17

## 2025-01-17 RX ADMIN — METHYLPREDNISOLONE ACETATE 40 MG: 40 INJECTION, SUSPENSION INTRA-ARTICULAR; INTRALESIONAL; INTRAMUSCULAR; SOFT TISSUE at 11:22

## 2025-01-17 NOTE — PROGRESS NOTES
Patient ID:  Magalis Maddox  [id number]  74 y.o.  1950    Today: January 17, 2025       CHIEF COMPLAINT:  Follow-up of left total hip replacement    HISTORY:  The patient presents today for 1 year follow-up after total hip replacement.  The patient is doing great and can perform all desired and required activities.    Past Medical History:  Past Medical History:   Diagnosis Date    Age-related osteoporosis without current pathological fracture 09/15/2016    Declined Tx which I recommended to lower risk for morbidity/mortality.       CAD (coronary artery disease) 03/01/2012    MI, 3 stents    Carotid artery stenosis 12/12/2018    Childhood asthma     CKD (chronic kidney disease) stage 4, GFR 15-29 ml/min ([facility])     COVID-19 virus infection 01/12/2021    Depression 03/01/2012    Diastolic dysfunction     DM2 (diabetes mellitus, type 2) ([facility]) 05/21/2015    Type 2, injectable. FBS , s/s hypo 70's    Elevated LFTs 01/12/2021    Essential hypertension 05/21/2015    on med for control    GERD (gastroesophageal reflux disease)     no meds    HCAP (healthcare-associated pneumonia) 01/18/2021    History of MI (myocardial infarction) 11/2012    Hypercholesterolemia     Hypertension 03/01/2012    Hypoalbuminemia 02/18/2021    Hyponatremia 01/12/2021    Hypoxemia 08/12/2020    VERONICA (iron deficiency anemia)     oral iron daily    Insomnia     Long QT interval 01/12/2021    Murmur     per cardio note (11/16/22)= (faint apical systolic murmur) heard; last Echo (2/16/21)    Personal history of colonic polyps 2013    hyperplastic    Platelet inhibition due to Plavix     no longer on Plavix    Pleural effusion, bilateral 08/11/2020    Rectocele 2013    Sepsis ([facility]) 01/12/2021    Stercoral colitis 03/02/2022    Unstable angina ([facility]) 03/01/2012    ntg as needed.        Past Surgical History:  Past Surgical History:   Procedure Laterality Date    CAROTID ENDARTERECTOMY Left 12/12/2018    CATARACT REMOVAL Left

## 2025-01-17 NOTE — PATIENT INSTRUCTIONS
Learning About Joint Injections  What are joint injections?     Joint injections are shots into a joint, such as the knee or shoulder. They are used to put in medicines, such as pain relievers and steroid medicines. Steroids can help reduce inflammation. A steroid shot can sometimes help with short-term pain relief when other treatments haven't worked.  How are they done?  First, the area over the joint will be cleaned. Your doctor may then use a tiny needle to numb the skin in the area where you will get the joint injection.  If a tiny needle is used to numb the area, your doctor will use another needle to inject the medicine. Your doctor may use a pain reliever, a steroid, or both. You may feel some pressure or discomfort.  Your doctor may put ice on the area before you go home.  What can you expect after a joint injection?  You will probably go home soon after your shot. You may have numbness around the joint for a few hours.  If your shot included both a pain reliever and a steroid, then the pain will probably go away right away. But it might come back after a few hours. This might happen if the pain reliever wears off and the steroid hasn't started to work yet. Steroids don't always work. But when they do, the pain relief can last for several days to a few months or longer.  Your doctor may tell you to use ice on the area. You can also use ice if the pain comes back. Put ice or a cold pack on your joint for 10 to 20 minutes at a time. Put a thin cloth between the ice and your skin.  Follow your doctor's instructions carefully.  Follow-up care is a key part of your treatment and safety. Be sure to make and go to all appointments, and call your doctor if you are having problems. It's also a good idea to know your test results and keep a list of the medicines you take.  Current as of: July 31, 2024  Content Version: 14.3  © 2024 [employer].   Care instructions adapted under license by [facility]

## 2025-01-21 ENCOUNTER — OFFICE VISIT (OUTPATIENT)
Age: 75
End: 2025-01-21
Payer: MEDICARE

## 2025-01-21 VITALS
HEART RATE: 55 BPM | HEIGHT: 64 IN | BODY MASS INDEX: 25.27 KG/M2 | WEIGHT: 148 LBS | SYSTOLIC BLOOD PRESSURE: 126 MMHG | DIASTOLIC BLOOD PRESSURE: 60 MMHG

## 2025-01-21 DIAGNOSIS — Z98.61 S/P PTCA (PERCUTANEOUS TRANSLUMINAL CORONARY ANGIOPLASTY): ICD-10-CM

## 2025-01-21 DIAGNOSIS — R07.2 SUBSTERNAL PRECORDIAL CHEST PAIN: ICD-10-CM

## 2025-01-21 DIAGNOSIS — I65.29 CAROTID ATHEROSCLEROSIS, UNSPECIFIED LATERALITY: ICD-10-CM

## 2025-01-21 DIAGNOSIS — I51.89 DIASTOLIC DYSFUNCTION: ICD-10-CM

## 2025-01-21 DIAGNOSIS — I25.10 CORONARY ARTERY DISEASE INVOLVING NATIVE CORONARY ARTERY OF NATIVE HEART WITHOUT ANGINA PECTORIS: ICD-10-CM

## 2025-01-21 DIAGNOSIS — I20.9 ANGINA PECTORIS (HCC): ICD-10-CM

## 2025-01-21 DIAGNOSIS — Z79.01 LONG TERM CURRENT USE OF ANTICOAGULANT: ICD-10-CM

## 2025-01-21 DIAGNOSIS — I48.0 PAROXYSMAL ATRIAL FIBRILLATION (HCC): Primary | ICD-10-CM

## 2025-01-21 DIAGNOSIS — E78.2 MIXED HYPERLIPIDEMIA: ICD-10-CM

## 2025-01-21 DIAGNOSIS — I10 ESSENTIAL HYPERTENSION: ICD-10-CM

## 2025-01-21 PROCEDURE — 1090F PRES/ABSN URINE INCON ASSESS: CPT | Performed by: INTERNAL MEDICINE

## 2025-01-21 PROCEDURE — 1126F AMNT PAIN NOTED NONE PRSNT: CPT | Performed by: INTERNAL MEDICINE

## 2025-01-21 PROCEDURE — 3074F SYST BP LT 130 MM HG: CPT | Performed by: INTERNAL MEDICINE

## 2025-01-21 PROCEDURE — G8399 PT W/DXA RESULTS DOCUMENT: HCPCS | Performed by: INTERNAL MEDICINE

## 2025-01-21 PROCEDURE — 1123F ACP DISCUSS/DSCN MKR DOCD: CPT | Performed by: INTERNAL MEDICINE

## 2025-01-21 PROCEDURE — G8417 CALC BMI ABV UP PARAM F/U: HCPCS | Performed by: INTERNAL MEDICINE

## 2025-01-21 PROCEDURE — 3078F DIAST BP <80 MM HG: CPT | Performed by: INTERNAL MEDICINE

## 2025-01-21 PROCEDURE — 93000 ELECTROCARDIOGRAM COMPLETE: CPT | Performed by: INTERNAL MEDICINE

## 2025-01-21 PROCEDURE — 1036F TOBACCO NON-USER: CPT | Performed by: INTERNAL MEDICINE

## 2025-01-21 PROCEDURE — G2211 COMPLEX E/M VISIT ADD ON: HCPCS | Performed by: INTERNAL MEDICINE

## 2025-01-21 PROCEDURE — 99214 OFFICE O/P EST MOD 30 MIN: CPT | Performed by: INTERNAL MEDICINE

## 2025-01-21 PROCEDURE — G8428 CUR MEDS NOT DOCUMENT: HCPCS | Performed by: INTERNAL MEDICINE

## 2025-01-21 RX ORDER — ISOSORBIDE MONONITRATE 60 MG/1
60 TABLET, EXTENDED RELEASE ORAL DAILY
Qty: 100 TABLET | Refills: 3 | Status: SHIPPED | OUTPATIENT
Start: 2025-01-21

## 2025-01-21 RX ORDER — CARVEDILOL 6.25 MG/1
6.25 TABLET ORAL 2 TIMES DAILY
Qty: 200 TABLET | Refills: 3 | Status: SHIPPED | OUTPATIENT
Start: 2025-01-21

## 2025-01-21 RX ORDER — FUROSEMIDE 40 MG/1
40 TABLET ORAL DAILY
Qty: 100 TABLET | Refills: 3 | Status: SHIPPED | OUTPATIENT
Start: 2025-01-21

## 2025-01-21 ASSESSMENT — ENCOUNTER SYMPTOMS
SHORTNESS OF BREATH: 0
RESPIRATORY NEGATIVE: 1
GASTROINTESTINAL NEGATIVE: 1

## 2025-01-21 NOTE — PROGRESS NOTES
precordial chest pain        Relevant Orders    Nuclear stress test with myocardial perfusion            Instructed patient go to ER or call 911/EMS should symptoms recur or worsen.     Patient has been instructed and agrees to call our office with any issues or other concerns related to their cardiac condition(s) and/or complaint(s).    Return in about 6 weeks (around 3/4/2025) for After testing completed..    Handy Brower,   1/21/2025 2:01 PM

## 2025-01-23 DIAGNOSIS — M17.11 PRIMARY OSTEOARTHRITIS OF RIGHT KNEE: Primary | ICD-10-CM

## 2025-01-23 RX ORDER — TRAMADOL HYDROCHLORIDE 50 MG/1
50 TABLET ORAL EVERY 4 HOURS PRN
Qty: 30 TABLET | Refills: 0 | Status: SHIPPED | OUTPATIENT
Start: 2025-01-23 | End: 2025-01-30

## 2025-02-05 ENCOUNTER — OFFICE VISIT (OUTPATIENT)
Dept: ENT CLINIC | Age: 75
End: 2025-02-05
Payer: MEDICARE

## 2025-02-05 VITALS
WEIGHT: 148 LBS | DIASTOLIC BLOOD PRESSURE: 76 MMHG | SYSTOLIC BLOOD PRESSURE: 134 MMHG | BODY MASS INDEX: 25.27 KG/M2 | HEIGHT: 64 IN

## 2025-02-05 DIAGNOSIS — H92.12 OTORRHEA OF LEFT EAR: ICD-10-CM

## 2025-02-05 DIAGNOSIS — S09.91XA TRAUMA OF EAR CANAL, INITIAL ENCOUNTER: Primary | ICD-10-CM

## 2025-02-05 PROCEDURE — 69210 REMOVE IMPACTED EAR WAX UNI: CPT | Performed by: PHYSICIAN ASSISTANT

## 2025-02-05 PROCEDURE — 1160F RVW MEDS BY RX/DR IN RCRD: CPT | Performed by: PHYSICIAN ASSISTANT

## 2025-02-05 PROCEDURE — 99203 OFFICE O/P NEW LOW 30 MIN: CPT | Performed by: PHYSICIAN ASSISTANT

## 2025-02-05 PROCEDURE — G8417 CALC BMI ABV UP PARAM F/U: HCPCS | Performed by: PHYSICIAN ASSISTANT

## 2025-02-05 PROCEDURE — 1036F TOBACCO NON-USER: CPT | Performed by: PHYSICIAN ASSISTANT

## 2025-02-05 PROCEDURE — 3075F SYST BP GE 130 - 139MM HG: CPT | Performed by: PHYSICIAN ASSISTANT

## 2025-02-05 PROCEDURE — 1090F PRES/ABSN URINE INCON ASSESS: CPT | Performed by: PHYSICIAN ASSISTANT

## 2025-02-05 PROCEDURE — 1159F MED LIST DOCD IN RCRD: CPT | Performed by: PHYSICIAN ASSISTANT

## 2025-02-05 PROCEDURE — 3078F DIAST BP <80 MM HG: CPT | Performed by: PHYSICIAN ASSISTANT

## 2025-02-05 PROCEDURE — 1123F ACP DISCUSS/DSCN MKR DOCD: CPT | Performed by: PHYSICIAN ASSISTANT

## 2025-02-05 PROCEDURE — G8399 PT W/DXA RESULTS DOCUMENT: HCPCS | Performed by: PHYSICIAN ASSISTANT

## 2025-02-05 PROCEDURE — G8427 DOCREV CUR MEDS BY ELIG CLIN: HCPCS | Performed by: PHYSICIAN ASSISTANT

## 2025-02-05 PROCEDURE — 1126F AMNT PAIN NOTED NONE PRSNT: CPT | Performed by: PHYSICIAN ASSISTANT

## 2025-02-05 ASSESSMENT — ENCOUNTER SYMPTOMS
EYES NEGATIVE: 1
RESPIRATORY NEGATIVE: 1
GASTROINTESTINAL NEGATIVE: 1
ALLERGIC/IMMUNOLOGIC NEGATIVE: 1

## 2025-02-05 NOTE — PROGRESS NOTES
History of Present Illness  The patient is a 74-year-old female who presents with a left ear injury via Q-tip. Initially, she had bleeding, which has now dried. Her hearing is muffled. She was placed on Ocuflox and is unsure if she will be able to administer the drops herself.    She reports an incident approximately 4 days ago where she inadvertently caused trauma to her left ear with a Q-tip. The injury resulted in bleeding that persisted from 11:00 PM until the following day. She is uncertain whether she perforated her eardrum or merely abraded it. Currently, she is not experiencing any pain in the affected ear. This is her first occurrence of such an incident. She has been prescribed eardrops by the urgent care but expresses difficulty in self-administration due to uncertainty about the correct dosage. She also mentions a lifelong history of tinnitus. Additionally, she is on Eliquis and expresses concern about potential continued bleeding from the ear.    MEDICATIONS  Current: Eliquis, Afrin.        Chief Complaint   Patient presents with    New Patient    Ear Problem     Injury to left ear by q-tip.  Had initial bleeding.  States that the blood is dried in her ear.  The hearing is muffled.  Denies pain.  She was placed on ofloxacin drops, she is unsure if she will be able to do the drops herself.  She lives at home by herself.         Patient Active Problem List   Diagnosis    Stage 4 chronic kidney disease (HCC)    Essential hypertension    Chronic combined systolic and diastolic congestive heart failure (HCC)    Primary insomnia    Mixed hyperlipidemia    Coronary artery disease involving native coronary artery of native heart without angina pectoris    Major depressive disorder with single episode, in full remission (HCC)    Type 2 diabetes mellitus with stage 4 chronic kidney disease, with long-term current use of insulin (HCC)    Osteoarthritis of right hip, unspecified osteoarthritis type    Paroxysmal

## 2025-02-15 SDOH — HEALTH STABILITY: PHYSICAL HEALTH: ON AVERAGE, HOW MANY DAYS PER WEEK DO YOU ENGAGE IN MODERATE TO STRENUOUS EXERCISE (LIKE A BRISK WALK)?: 7 DAYS

## 2025-02-15 SDOH — HEALTH STABILITY: PHYSICAL HEALTH: ON AVERAGE, HOW MANY MINUTES DO YOU ENGAGE IN EXERCISE AT THIS LEVEL?: 30 MIN

## 2025-02-15 ASSESSMENT — PATIENT HEALTH QUESTIONNAIRE - PHQ9
SUM OF ALL RESPONSES TO PHQ9 QUESTIONS 1 & 2: 0
2. FEELING DOWN, DEPRESSED OR HOPELESS: NOT AT ALL
1. LITTLE INTEREST OR PLEASURE IN DOING THINGS: NOT AT ALL
SUM OF ALL RESPONSES TO PHQ QUESTIONS 1-9: 0

## 2025-02-15 ASSESSMENT — LIFESTYLE VARIABLES
HOW MANY STANDARD DRINKS CONTAINING ALCOHOL DO YOU HAVE ON A TYPICAL DAY: 1
HOW OFTEN DO YOU HAVE A DRINK CONTAINING ALCOHOL: MONTHLY OR LESS
HOW OFTEN DO YOU HAVE A DRINK CONTAINING ALCOHOL: 2
HOW MANY STANDARD DRINKS CONTAINING ALCOHOL DO YOU HAVE ON A TYPICAL DAY: 1 OR 2
HOW OFTEN DO YOU HAVE SIX OR MORE DRINKS ON ONE OCCASION: 1

## 2025-02-17 SDOH — ECONOMIC STABILITY: FOOD INSECURITY: WITHIN THE PAST 12 MONTHS, THE FOOD YOU BOUGHT JUST DIDN'T LAST AND YOU DIDN'T HAVE MONEY TO GET MORE.: NEVER TRUE

## 2025-02-17 SDOH — ECONOMIC STABILITY: FOOD INSECURITY: WITHIN THE PAST 12 MONTHS, YOU WORRIED THAT YOUR FOOD WOULD RUN OUT BEFORE YOU GOT MONEY TO BUY MORE.: NEVER TRUE

## 2025-02-17 SDOH — ECONOMIC STABILITY: INCOME INSECURITY: IN THE LAST 12 MONTHS, WAS THERE A TIME WHEN YOU WERE NOT ABLE TO PAY THE MORTGAGE OR RENT ON TIME?: NO

## 2025-02-17 SDOH — ECONOMIC STABILITY: TRANSPORTATION INSECURITY
IN THE PAST 12 MONTHS, HAS THE LACK OF TRANSPORTATION KEPT YOU FROM MEDICAL APPOINTMENTS OR FROM GETTING MEDICATIONS?: NO

## 2025-02-17 ASSESSMENT — PATIENT HEALTH QUESTIONNAIRE - PHQ9
8. MOVING OR SPEAKING SO SLOWLY THAT OTHER PEOPLE COULD HAVE NOTICED. OR THE OPPOSITE, BEING SO FIGETY OR RESTLESS THAT YOU HAVE BEEN MOVING AROUND A LOT MORE THAN USUAL: NOT AT ALL
6. FEELING BAD ABOUT YOURSELF - OR THAT YOU ARE A FAILURE OR HAVE LET YOURSELF OR YOUR FAMILY DOWN: NOT AT ALL
2. FEELING DOWN, DEPRESSED OR HOPELESS: NOT AT ALL
4. FEELING TIRED OR HAVING LITTLE ENERGY: NOT AT ALL
SUM OF ALL RESPONSES TO PHQ QUESTIONS 1-9: 0
9. THOUGHTS THAT YOU WOULD BE BETTER OFF DEAD, OR OF HURTING YOURSELF: NOT AT ALL
SUM OF ALL RESPONSES TO PHQ QUESTIONS 1-9: 0
SUM OF ALL RESPONSES TO PHQ QUESTIONS 1-9: 0
10. IF YOU CHECKED OFF ANY PROBLEMS, HOW DIFFICULT HAVE THESE PROBLEMS MADE IT FOR YOU TO DO YOUR WORK, TAKE CARE OF THINGS AT HOME, OR GET ALONG WITH OTHER PEOPLE: NOT DIFFICULT AT ALL
9. THOUGHTS THAT YOU WOULD BE BETTER OFF DEAD, OR OF HURTING YOURSELF: NOT AT ALL
SUM OF ALL RESPONSES TO PHQ QUESTIONS 1-9: 0
SUM OF ALL RESPONSES TO PHQ QUESTIONS 1-9: 0
3. TROUBLE FALLING OR STAYING ASLEEP: NOT AT ALL
8. MOVING OR SPEAKING SO SLOWLY THAT OTHER PEOPLE COULD HAVE NOTICED. OR THE OPPOSITE - BEING SO FIDGETY OR RESTLESS THAT YOU HAVE BEEN MOVING AROUND A LOT MORE THAN USUAL: NOT AT ALL
SUM OF ALL RESPONSES TO PHQ9 QUESTIONS 1 & 2: 0
6. FEELING BAD ABOUT YOURSELF - OR THAT YOU ARE A FAILURE OR HAVE LET YOURSELF OR YOUR FAMILY DOWN: NOT AT ALL
1. LITTLE INTEREST OR PLEASURE IN DOING THINGS: NOT AT ALL
7. TROUBLE CONCENTRATING ON THINGS, SUCH AS READING THE NEWSPAPER OR WATCHING TELEVISION: NOT AT ALL
5. POOR APPETITE OR OVEREATING: NOT AT ALL
5. POOR APPETITE OR OVEREATING: NOT AT ALL
2. FEELING DOWN, DEPRESSED OR HOPELESS: NOT AT ALL
1. LITTLE INTEREST OR PLEASURE IN DOING THINGS: NOT AT ALL
10. IF YOU CHECKED OFF ANY PROBLEMS, HOW DIFFICULT HAVE THESE PROBLEMS MADE IT FOR YOU TO DO YOUR WORK, TAKE CARE OF THINGS AT HOME, OR GET ALONG WITH OTHER PEOPLE: NOT DIFFICULT AT ALL
3. TROUBLE FALLING OR STAYING ASLEEP: NOT AT ALL
4. FEELING TIRED OR HAVING LITTLE ENERGY: NOT AT ALL
7. TROUBLE CONCENTRATING ON THINGS, SUCH AS READING THE NEWSPAPER OR WATCHING TELEVISION: NOT AT ALL

## 2025-02-18 ENCOUNTER — OFFICE VISIT (OUTPATIENT)
Dept: INTERNAL MEDICINE CLINIC | Facility: CLINIC | Age: 75
End: 2025-02-18

## 2025-02-18 VITALS
HEART RATE: 53 BPM | OXYGEN SATURATION: 99 % | DIASTOLIC BLOOD PRESSURE: 73 MMHG | WEIGHT: 147 LBS | BODY MASS INDEX: 25.1 KG/M2 | TEMPERATURE: 97.2 F | HEIGHT: 64 IN | RESPIRATION RATE: 16 BRPM | SYSTOLIC BLOOD PRESSURE: 124 MMHG

## 2025-02-18 DIAGNOSIS — F51.01 PRIMARY INSOMNIA: ICD-10-CM

## 2025-02-18 DIAGNOSIS — I10 ESSENTIAL HYPERTENSION: ICD-10-CM

## 2025-02-18 DIAGNOSIS — I50.42 CHRONIC COMBINED SYSTOLIC AND DIASTOLIC CONGESTIVE HEART FAILURE (HCC): ICD-10-CM

## 2025-02-18 DIAGNOSIS — E11.22 TYPE 2 DIABETES MELLITUS WITH STAGE 4 CHRONIC KIDNEY DISEASE, WITH LONG-TERM CURRENT USE OF INSULIN (HCC): ICD-10-CM

## 2025-02-18 DIAGNOSIS — I73.9 PVD (PERIPHERAL VASCULAR DISEASE) WITH CLAUDICATION: ICD-10-CM

## 2025-02-18 DIAGNOSIS — Z79.4 TYPE 2 DIABETES MELLITUS WITH STAGE 4 CHRONIC KIDNEY DISEASE, WITH LONG-TERM CURRENT USE OF INSULIN (HCC): ICD-10-CM

## 2025-02-18 DIAGNOSIS — N18.4 TYPE 2 DIABETES MELLITUS WITH STAGE 4 CHRONIC KIDNEY DISEASE, WITH LONG-TERM CURRENT USE OF INSULIN (HCC): ICD-10-CM

## 2025-02-18 DIAGNOSIS — I48.0 PAROXYSMAL ATRIAL FIBRILLATION (HCC): ICD-10-CM

## 2025-02-18 DIAGNOSIS — I25.118 CORONARY ARTERY DISEASE OF NATIVE ARTERY OF NATIVE HEART WITH STABLE ANGINA PECTORIS: ICD-10-CM

## 2025-02-18 DIAGNOSIS — F32.5 MAJOR DEPRESSIVE DISORDER WITH SINGLE EPISODE, IN FULL REMISSION: ICD-10-CM

## 2025-02-18 DIAGNOSIS — Z00.00 MEDICARE ANNUAL WELLNESS VISIT, SUBSEQUENT: Primary | ICD-10-CM

## 2025-02-18 DIAGNOSIS — N18.4 STAGE 4 CHRONIC KIDNEY DISEASE (HCC): ICD-10-CM

## 2025-02-18 DIAGNOSIS — E78.2 MIXED HYPERLIPIDEMIA: ICD-10-CM

## 2025-02-18 LAB
CREAT UR-MCNC: 136 MG/DL (ref 28–217)
MICROALBUMIN UR-MCNC: 201 MG/DL (ref 0–20)
MICROALBUMIN/CREAT UR-RTO: 1478 MG/G (ref 0–30)

## 2025-02-18 RX ORDER — NIFEDIPINE 90 MG/1
90 TABLET, EXTENDED RELEASE ORAL DAILY
Qty: 100 TABLET | Refills: 1 | Status: SHIPPED | OUTPATIENT
Start: 2025-02-18

## 2025-02-18 RX ORDER — CITALOPRAM HYDROBROMIDE 20 MG/1
20 TABLET ORAL DAILY
Qty: 100 TABLET | Refills: 1 | Status: SHIPPED | OUTPATIENT
Start: 2025-02-18

## 2025-02-18 RX ORDER — TRAZODONE HYDROCHLORIDE 50 MG/1
50 TABLET ORAL NIGHTLY
Qty: 100 TABLET | Refills: 1 | Status: SHIPPED | OUTPATIENT
Start: 2025-02-18

## 2025-02-18 RX ORDER — ROSUVASTATIN CALCIUM 10 MG/1
10 TABLET, COATED ORAL DAILY
Qty: 100 TABLET | Refills: 1 | Status: SHIPPED | OUTPATIENT
Start: 2025-02-18

## 2025-02-18 NOTE — PROGRESS NOTES
Jamison Riggs M.D.  [place] [address]  Phone: [phone] Fax: [phone]    Diabetes  She presents for her follow-up diabetic visit. She has type 2 diabetes mellitus.     Magalis Maddox is a 74 y.o. White (non-) female.     Current Outpatient Medications   Medication Sig Dispense Refill    carvedilol (COREG) 6.25 MG tablet Take 1 tablet by mouth 2 times daily 200 tablet 3    furosemide (LASIX) 40 MG tablet Take 1 tablet by mouth daily 100 tablet 3    isosorbide mononitrate (IMDUR) 60 MG extended release tablet Take 1 tablet by mouth daily 100 tablet 3    BD PEN NEEDLE MILVIA 2ND GEN 32G X 4 MM MISC Check blood sugar three times daily 300 each 3    NIFEdipine (PROCARDIA XL) 90 MG extended release tablet Take 1 tablet by mouth daily 90 tablet 1    citalopram (CELEXA) 20 MG tablet Take 1 tablet by mouth daily 90 tablet 1    Continuous Glucose Sensor (DEXCOM G6 SENSOR) MISC Test blood sugar 3 times daily. 3 each 5    ferrous sulfate (FE TABS 325) 325 (65 Fe) MG EC tablet Take 1 tablet by mouth 2 times daily 180 tablet 1    Insulin Degludec (TRESIBA FLEXTOUCH) 100 UNIT/ML SOPN Inject 10 Units into the skin at bedtime 15 mL 0    rosuvastatin (CRESTOR) 10 MG tablet Take 1 tablet by mouth daily 90 tablet 1    traZODone (DESYREL) 50 MG tablet Take 1 tablet by mouth nightly 90 tablet 1    apixaban (ELIQUIS) 5 MG TABS tablet Take 1 tablet by mouth 2 times daily 180 tablet 3    Continuous Glucose Transmitter (DEXCOM G6 TRANSMITTER) MISC Test blood sugar 3 times daily 1 each 5    Continuous Blood Gluc  (DEXCOM G6 ) MAURICE Test blood sugar 3 times daily 10 each 3    aspirin EC 81 MG EC tablet Take 1 tablet by mouth daily 1 tablet 0    Cholecalciferol 50 MCG (2000 UT) TABS Take 1 tablet by mouth daily      cyanocobalamin 1000 MCG tablet Take 5 tablets by mouth daily      magnesium oxide (MAG-OX) 400 MG tablet Take 1 tablet by

## 2025-02-18 NOTE — PATIENT INSTRUCTIONS
aloud from a wall chart across the room.  You read aloud from a small card that you hold in your hand.  Refraction   You look into a special device.  The device puts lenses of different strengths in front of each eye to see how strong your glasses or contact lenses need to be.  Visual field tests   Your doctor may have you look through special machines.  Or your doctor may simply have you stare straight ahead while they move a finger into and out of your field of vision.  Color vision test   You look at pieces of printed test patterns in various colors. You say what number or symbol you see.  Your doctor may have you trace the number or symbol using a pointer.  How do these tests feel?  There is very little chance of having a problem from this test. If dilating drops are used for a vision test, they may make the eyes sting and cause a medicine taste in the mouth.  Follow-up care is a key part of your treatment and safety. Be sure to make and go to all appointments, and call your doctor if you are having problems. It's also a good idea to know your test results and keep a list of the medicines you take.  Where can you learn more?  Go to [email or web address] and enter G551 to learn more about \"Learning About Vision Tests.\"  Current as of: July 31, 2024  Content Version: 14.3  © 2024 [employer].   Care instructions adapted under license by [employer]. If you have questions about a medical condition or this instruction, always ask your healthcare professional. [employer] disclaims any warranty or liability for your use of this information.         A Healthy Heart: Care Instructions  Overview     Coronary artery disease, also called heart disease, occurs when a substance called plaque builds up in the vessels that supply oxygen-rich blood to your heart muscle. This can narrow the blood vessels and reduce blood flow. A heart attack happens when blood flow is completely blocked. A

## 2025-02-24 ENCOUNTER — PATIENT MESSAGE (OUTPATIENT)
Dept: INTERNAL MEDICINE CLINIC | Facility: CLINIC | Age: 75
End: 2025-02-24

## 2025-02-24 DIAGNOSIS — E11.22 TYPE 2 DIABETES MELLITUS WITH STAGE 4 CHRONIC KIDNEY DISEASE, WITH LONG-TERM CURRENT USE OF INSULIN (HCC): Primary | ICD-10-CM

## 2025-02-24 DIAGNOSIS — Z79.4 TYPE 2 DIABETES MELLITUS WITH STAGE 4 CHRONIC KIDNEY DISEASE, WITH LONG-TERM CURRENT USE OF INSULIN (HCC): Primary | ICD-10-CM

## 2025-02-24 DIAGNOSIS — N18.4 TYPE 2 DIABETES MELLITUS WITH STAGE 4 CHRONIC KIDNEY DISEASE, WITH LONG-TERM CURRENT USE OF INSULIN (HCC): Primary | ICD-10-CM

## 2025-02-24 RX ORDER — ACYCLOVIR 400 MG/1
TABLET ORAL
Qty: 3 EACH | Refills: 3 | Status: SHIPPED | OUTPATIENT
Start: 2025-02-24

## 2025-02-24 RX ORDER — ACYCLOVIR 400 MG/1
TABLET ORAL
Qty: 1 EACH | Refills: 0 | Status: SHIPPED | OUTPATIENT
Start: 2025-02-24

## 2025-03-03 ENCOUNTER — OFFICE VISIT (OUTPATIENT)
Age: 75
End: 2025-03-03
Payer: MEDICARE

## 2025-03-03 VITALS
SYSTOLIC BLOOD PRESSURE: 138 MMHG | BODY MASS INDEX: 25.78 KG/M2 | WEIGHT: 151 LBS | HEART RATE: 60 BPM | HEIGHT: 64 IN | DIASTOLIC BLOOD PRESSURE: 60 MMHG

## 2025-03-03 DIAGNOSIS — I48.0 PAROXYSMAL ATRIAL FIBRILLATION (HCC): Chronic | ICD-10-CM

## 2025-03-03 DIAGNOSIS — I10 PRIMARY HYPERTENSION: Chronic | ICD-10-CM

## 2025-03-03 DIAGNOSIS — E78.2 MIXED HYPERLIPIDEMIA: Chronic | ICD-10-CM

## 2025-03-03 DIAGNOSIS — I65.23 BILATERAL CAROTID ARTERY STENOSIS: Chronic | ICD-10-CM

## 2025-03-03 DIAGNOSIS — I25.10 CORONARY ARTERY DISEASE INVOLVING NATIVE CORONARY ARTERY OF NATIVE HEART WITHOUT ANGINA PECTORIS: Primary | Chronic | ICD-10-CM

## 2025-03-03 PROCEDURE — 3078F DIAST BP <80 MM HG: CPT | Performed by: INTERNAL MEDICINE

## 2025-03-03 PROCEDURE — 1090F PRES/ABSN URINE INCON ASSESS: CPT | Performed by: INTERNAL MEDICINE

## 2025-03-03 PROCEDURE — 1159F MED LIST DOCD IN RCRD: CPT | Performed by: INTERNAL MEDICINE

## 2025-03-03 PROCEDURE — 1160F RVW MEDS BY RX/DR IN RCRD: CPT | Performed by: INTERNAL MEDICINE

## 2025-03-03 PROCEDURE — 1123F ACP DISCUSS/DSCN MKR DOCD: CPT | Performed by: INTERNAL MEDICINE

## 2025-03-03 PROCEDURE — 3075F SYST BP GE 130 - 139MM HG: CPT | Performed by: INTERNAL MEDICINE

## 2025-03-03 PROCEDURE — 1126F AMNT PAIN NOTED NONE PRSNT: CPT | Performed by: INTERNAL MEDICINE

## 2025-03-03 PROCEDURE — G8399 PT W/DXA RESULTS DOCUMENT: HCPCS | Performed by: INTERNAL MEDICINE

## 2025-03-03 PROCEDURE — 1036F TOBACCO NON-USER: CPT | Performed by: INTERNAL MEDICINE

## 2025-03-03 PROCEDURE — G8427 DOCREV CUR MEDS BY ELIG CLIN: HCPCS | Performed by: INTERNAL MEDICINE

## 2025-03-03 PROCEDURE — 99214 OFFICE O/P EST MOD 30 MIN: CPT | Performed by: INTERNAL MEDICINE

## 2025-03-03 PROCEDURE — G8417 CALC BMI ABV UP PARAM F/U: HCPCS | Performed by: INTERNAL MEDICINE

## 2025-03-03 ASSESSMENT — ENCOUNTER SYMPTOMS: SHORTNESS OF BREATH: 0

## 2025-03-03 NOTE — PROGRESS NOTES
[facility] CARDIOLOGY  [address] [place] [address], [address]  [place] [address]  PHONE: [phone]      25    NAME:  Magalis Maddox  : 1950  MRN: [id number]         SUBJECTIVE:   Magalis Maddox is a 74 y.o. female seen for a follow up visit regarding the following:     Chief Complaint   Patient presents with    Atrial Fibrillation            HPI:  Follow up  Atrial Fibrillation   .      74 y.o. female with PMH CAD s/p PCI LAD, paroxysmal atrial fibrillation, chronic HFpEF, HTN, HLD, T2DM, carotid stenosis s/p L CEA presenting for follow up evaluation. Last saw Dr. Brower  at which time a stress test was ordered for chest pain. Patient denies chest pain, dyspnea or palpitations at this time. She reports easy bruising from Eliquis and would like to get off it (and get a Watchman) or reduce the dose. No other acute complaints.        Cardiac Medications       Calcium Channel Blockers       NIFEdipine (PROCARDIA XL) 90 MG extended release tablet Take 1 tablet by mouth daily       Nitrates       isosorbide mononitrate (IMDUR) 60 MG extended release tablet Take 1 tablet by mouth daily     nitroGLYCERIN (NITROSTAT) 0.4 MG SL tablet Place 1 tablet under the tongue every 5 minutes as needed for Chest pain Call 911 if no relief after 3 doses       Loop Diuretics       furosemide (LASIX) 40 MG tablet Take 1 tablet by mouth daily       HMG CoA Reductase Inhibitors       rosuvastatin (CRESTOR) 10 MG tablet Take 1 tablet by mouth daily       Alpha-Beta Blockers       carvedilol (COREG) 6.25 MG tablet Take 1 tablet by mouth 2 times daily       Salicylates       aspirin EC 81 MG EC tablet Take 1 tablet by mouth daily       Direct Factor Xa Inhibitors       apixaban (ELIQUIS) 5 MG TABS tablet Take 1 tablet by mouth 2 times daily          Diabetic Medications       Insulin       Insulin Degludec (TRESIBA FLEXTOUCH) 100 UNIT/ML SOPN Inject 10 Units into the skin at bedtime                Past

## 2025-03-05 ENCOUNTER — TELEPHONE (OUTPATIENT)
Dept: CARDIAC CATH/INVASIVE PROCEDURES | Age: 75
End: 2025-03-05

## 2025-03-05 NOTE — TELEPHONE ENCOUNTER
Magalis Maddox was contacted to discuss LAAO.  Educational information/booklet emailed to the patient regarding stroke prevention options for patients with Atrial Fibrillation. [facility] CardioSmart tool provided for review as the shared decision making process continues between patient and physicians. The patient has a Watchman consult with Dr. Didier Guadarrama on 4/02/2025 at 9:30 am.  Watchman coordinator contact (María Elena Chou, [phone]) information provided.

## 2025-03-23 ENCOUNTER — PATIENT MESSAGE (OUTPATIENT)
Dept: INTERNAL MEDICINE CLINIC | Facility: CLINIC | Age: 75
End: 2025-03-23

## 2025-03-23 DIAGNOSIS — E11.22 TYPE 2 DIABETES MELLITUS WITH STAGE 4 CHRONIC KIDNEY DISEASE, WITH LONG-TERM CURRENT USE OF INSULIN (HCC): ICD-10-CM

## 2025-03-23 DIAGNOSIS — Z79.4 TYPE 2 DIABETES MELLITUS WITH STAGE 4 CHRONIC KIDNEY DISEASE, WITH LONG-TERM CURRENT USE OF INSULIN (HCC): ICD-10-CM

## 2025-03-23 DIAGNOSIS — N18.4 TYPE 2 DIABETES MELLITUS WITH STAGE 4 CHRONIC KIDNEY DISEASE, WITH LONG-TERM CURRENT USE OF INSULIN (HCC): ICD-10-CM

## 2025-03-24 ENCOUNTER — HOSPITAL ENCOUNTER (EMERGENCY)
Age: 75
Discharge: HOME | End: 2025-03-24
Attending: EMERGENCY MEDICINE | Admitting: EMERGENCY MEDICINE
Payer: MEDICARE

## 2025-03-24 VITALS
RESPIRATION RATE: 16 BRPM | HEART RATE: 95 BPM | HEIGHT: 64 IN | BODY MASS INDEX: 25.1 KG/M2 | OXYGEN SATURATION: 94 % | WEIGHT: 147 LBS | DIASTOLIC BLOOD PRESSURE: 54 MMHG | SYSTOLIC BLOOD PRESSURE: 133 MMHG | TEMPERATURE: 97.7 F

## 2025-03-24 DIAGNOSIS — M79.672 LEFT FOOT PAIN: Primary | ICD-10-CM

## 2025-03-24 PROCEDURE — 99282 EMERGENCY DEPT VISIT SF MDM: CPT

## 2025-03-24 RX ORDER — ACYCLOVIR 400 MG/1
TABLET ORAL
Qty: 9 EACH | Refills: 3 | Status: SHIPPED | OUTPATIENT
Start: 2025-03-24

## 2025-03-24 ASSESSMENT — LIFESTYLE VARIABLES
HOW OFTEN DO YOU HAVE A DRINK CONTAINING ALCOHOL: NEVER
HOW MANY STANDARD DRINKS CONTAINING ALCOHOL DO YOU HAVE ON A TYPICAL DAY: PATIENT DOES NOT DRINK

## 2025-03-24 ASSESSMENT — PAIN - FUNCTIONAL ASSESSMENT: PAIN_FUNCTIONAL_ASSESSMENT: 0-10

## 2025-03-24 ASSESSMENT — PAIN DESCRIPTION - LOCATION: LOCATION: FOOT

## 2025-03-24 ASSESSMENT — PAIN DESCRIPTION - DESCRIPTORS: DESCRIPTORS: BURNING

## 2025-03-24 ASSESSMENT — PAIN DESCRIPTION - ORIENTATION: ORIENTATION: LEFT

## 2025-03-24 ASSESSMENT — PAIN SCALES - GENERAL: PAINLEVEL_OUTOF10: 8

## 2025-03-24 ASSESSMENT — PAIN DESCRIPTION - PAIN TYPE: TYPE: ACUTE PAIN

## 2025-03-24 NOTE — ED TRIAGE NOTES
Patient was seen at [facility] today and xray done of  left foot. Per patient no break was seen but was sent for evaluation of a hematoma on foot.

## 2025-03-25 ENCOUNTER — CARE COORDINATION (OUTPATIENT)
Dept: CARE COORDINATION | Facility: CLINIC | Age: 75
End: 2025-03-25

## 2025-03-25 NOTE — CARE COORDINATION
Ambulatory Care Coordination Note     3/25/2025 10:29 AM     ACM outreach attempt by this ACM today to offer care management services. ACM was unable to reach the patient by telephone today;   left voice message requesting a return phone call to this ACM.     ACM: Krystyna Mansfield RN     Care Summary Note:     PCP/Specialist follow up:   Future Appointments         Provider Specialty Dept Phone    4/2/2025 9:30 AM Didier Guadarrama MD Cardiology [phone]    5/8/2025 1:20 PM Jesus Duran MD Orthopedic Surgery [phone]    5/19/2025 2:20 PM Jamison Riggs MD Internal Medicine [phone]    9/3/2025 1:00 PM Kiet Mackey DO Cardiology [phone]    11/4/2025 10:30 AM [facility] 2 Vascular Surgery [phone]    11/4/2025 11:00 AM Rob Bhatia MD Vascular Surgery [phone]            Follow Up:   Plan for next ACM outreach in approximately 1-2 days  to complete:  - outreach attempt to offer care management services.

## 2025-03-25 NOTE — DISCHARGE INSTRUCTIONS
As discussed please hold your dose of Eliquis for the next 3 days.  Please continue to ice the foot for at least 20 minutes 3-4 times a day.  Please elevate the leg higher than the level of your heart.  Please follow-up with your primary care provider for recheck of the foot.  Continue to use the postop boot as needed.  If your symptoms change or worsen please return to the emergency department.

## 2025-03-25 NOTE — ED PROVIDER NOTES
Emergency Department Provider Note       PCP: Jamison Riggs MD   Age: 74 y.o.   Sex: female     DISPOSITION Decision To Discharge 03/24/2025 08:16:14 PM    ICD-10-CM    1. Left foot pain  M79.672           Medical Decision Making     Patient is a 74-year female with past medical history of atrial fibrillation anticoagulated on Eliquis presenting to the emergency department for injury to the left foot.  Patient was evaluated at urgent care today, x-ray reveals no fracture or acute dislocation.  She was sent to emergency department for concern of compartment syndrome.  Physical exam is reassuring, neurovascular exam is intact.  Patient denies symptoms of numbness, tingling or pain out of portion.  Skin is soft to palpation.  Dorsalis pedis pulse 2+ with Doppler.  Attending, Dr. Rodriguez, also examined patient, agreed that there was no sign of compartment syndrome.  Given that patient has already had x-ray revealing no acute fracture or dislocation, will not repeat.  Patient states that her pain has been controlled with over-the-counter medication.  Will provide patient with postop shoe to improve ambulation.  She confirms improvement with ambulation with shoe provided.  Discussed supportive treatment with patient, she was agreeable to treatment plan.  Strict return precautions provided, she verbalized understanding.     1 acute, uncomplicated illness or injury.  Shared medical decision making was utilized in creating the patients health plan today.  I independently ordered and reviewed each unique test.                         History     HPI  Patient is a 74-year-old female with a past medical history of atrial fibrillation anticoagulated on Eliquis presenting to the emergency department with injury to the left foot.  Patient notes 2 days ago she hit her foot on a table, since then she has had pain with ambulation as well as swelling.  She was evaluated today urgent care, x-ray performed of the left foot, no

## 2025-03-25 NOTE — ED NOTES
Patient mobility status  with mild difficulty.     I have reviewed discharge instructions with the patient and family.  The patient and family verbalized understanding.    Patient left ED via Discharge Method: ambulatory to Home with  family .    Opportunity for questions and clarification provided.     Patient given 0 scripts.

## 2025-03-31 ENCOUNTER — CARE COORDINATION (OUTPATIENT)
Dept: CARE COORDINATION | Facility: CLINIC | Age: 75
End: 2025-03-31

## 2025-03-31 NOTE — CARE COORDINATION
Ambulatory Care Coordination Note     3/31/2025 4:53 PM     ACM outreach attempt by this ACM today to offer care management services. ACM was unable to reach the patient by telephone today;   left voice message requesting a return phone call to this ACM.     ACM: Krystyna Mansfield RN     Care Summary Note: 2nd attempt to reach pt    PCP/Specialist follow up:   Future Appointments         Provider Specialty Dept Phone    4/2/2025 9:30 AM Didier Guadarrama MD Cardiology [phone]    5/8/2025 1:20 PM Jesus Duran MD Orthopedic Surgery [phone]    5/19/2025 2:20 PM Jamison Riggs MD Internal Medicine [phone]    9/3/2025 1:00 PM Kiet Mackey DO Cardiology [phone]    11/4/2025 10:30 AM [facility] Vascular Surgery [phone]    11/4/2025 11:00 AM Rob Bhatia MD Vascular Surgery [phone]            Follow Up:   Plan for next ACM outreach in approximately 1 week to complete:  - outreach attempt to offer care management services.

## 2025-04-01 ENCOUNTER — TELEPHONE (OUTPATIENT)
Dept: CARDIAC CATH/INVASIVE PROCEDURES | Age: 75
End: 2025-04-01

## 2025-04-01 ASSESSMENT — ENCOUNTER SYMPTOMS: SHORTNESS OF BREATH: 0

## 2025-04-01 NOTE — TELEPHONE ENCOUNTER
Patient was phoned as a reminder that she is scheduled for a Watchman consult with Dr. Guadarrama tomorrow morning on 4/2/2025 at 09:30 am.  LVM with Nurse Navigator contact (María Elena Chou, KARLY [phone]) information.

## 2025-04-01 NOTE — PROGRESS NOTES
[facility]  [address] [place] [address], [address]  [place] [address]  PHONE: [phone]      Magalis Maddox  1950    SUBJECTIVE:   Magalis Maddox is a 74 y.o. female seen for a consultation visit regarding the following:     Chief Complaint   Patient presents with    Consultation     Lexa            HPI:  Consultation is requested by [unfilled] for evaluation of Consultation (Lexa)   .    Patient is a 74-year-old female who presents today to discuss left atrial appendage occlusion.  She has a known history of paroxysmal atrial fibrillation, chronic diastolic heart failure, coronary artery disease, type 2 diabetes mellitus and carotid artery disease.  Her atrial fibrillation was diagnosed based on a previous monitor placed in 2023.  She is currently on carvedilol and Eliquis therapy.      Patient has had a significant difficulty tolerating anticoagulation.  She notes excessive bruising and has had multiple small injuries with significant hematoma.  Recently had gait instability and had better right foot.  Had a significant hematoma to a point her physician was worried about a heart syndrome.  She is also has several nosebleeds which take 4 to 5 hours to stop as well as a recent injury to her ear which bled for \"3 days\".  She does walk with a cane and has noted more gait instability.  She has not had any falls but has had injuries you due to gait instability such as her foot which is described below.      Atrial Fibrillation CHADSVASC2 Score Stroke Risk:   74 y.o. > 75 - 2    female Female - 1   CHF HX: Yes - 1   HTN HX: Yes - 1   Stroke/TIA/Thromboembolism No - 0   Vascular Disease HX: Yes - 1   Diabetes Mellitus Yes - 1   CHADSVASC 2 Score 7      Annual Stroke Risk 11.2% - moderate- high       HAS-BLED Score     HTN Hx [x] 1 Point   Renal Disease  [] 1 Point   Liver Disease [] 1 Point   Stroke Hx [] 1 Point   Prior Major Bleeding or Predisposition [] 1 Point   Labile INR [] 1

## 2025-04-02 ENCOUNTER — OFFICE VISIT (OUTPATIENT)
Age: 75
End: 2025-04-02
Payer: MEDICARE

## 2025-04-02 VITALS
DIASTOLIC BLOOD PRESSURE: 62 MMHG | SYSTOLIC BLOOD PRESSURE: 130 MMHG | HEART RATE: 66 BPM | HEIGHT: 64 IN | WEIGHT: 149.2 LBS | BODY MASS INDEX: 25.47 KG/M2

## 2025-04-02 DIAGNOSIS — I50.32 CHRONIC DIASTOLIC CONGESTIVE HEART FAILURE (HCC): ICD-10-CM

## 2025-04-02 DIAGNOSIS — I48.0 PAROXYSMAL ATRIAL FIBRILLATION (HCC): Primary | ICD-10-CM

## 2025-04-02 DIAGNOSIS — I25.10 CORONARY ARTERY DISEASE INVOLVING NATIVE CORONARY ARTERY OF NATIVE HEART WITHOUT ANGINA PECTORIS: ICD-10-CM

## 2025-04-02 PROCEDURE — 3078F DIAST BP <80 MM HG: CPT | Performed by: INTERNAL MEDICINE

## 2025-04-02 PROCEDURE — 1090F PRES/ABSN URINE INCON ASSESS: CPT | Performed by: INTERNAL MEDICINE

## 2025-04-02 PROCEDURE — 1036F TOBACCO NON-USER: CPT | Performed by: INTERNAL MEDICINE

## 2025-04-02 PROCEDURE — G8417 CALC BMI ABV UP PARAM F/U: HCPCS | Performed by: INTERNAL MEDICINE

## 2025-04-02 PROCEDURE — 1159F MED LIST DOCD IN RCRD: CPT | Performed by: INTERNAL MEDICINE

## 2025-04-02 PROCEDURE — 99214 OFFICE O/P EST MOD 30 MIN: CPT | Performed by: INTERNAL MEDICINE

## 2025-04-02 PROCEDURE — G8427 DOCREV CUR MEDS BY ELIG CLIN: HCPCS | Performed by: INTERNAL MEDICINE

## 2025-04-02 PROCEDURE — 1123F ACP DISCUSS/DSCN MKR DOCD: CPT | Performed by: INTERNAL MEDICINE

## 2025-04-02 PROCEDURE — G8399 PT W/DXA RESULTS DOCUMENT: HCPCS | Performed by: INTERNAL MEDICINE

## 2025-04-02 PROCEDURE — 3075F SYST BP GE 130 - 139MM HG: CPT | Performed by: INTERNAL MEDICINE

## 2025-04-02 ASSESSMENT — ENCOUNTER SYMPTOMS
COLOR CHANGE: 0
LEFT EYE: 0
DIARRHEA: 0

## 2025-04-17 NOTE — PROGRESS NOTES
Magalis Maddox has been referred to the [facility] for evaluation for Left Atrial Appendage Closure with a FDA-approved Watchman device for management of stroke risk resulting from non-valvular atrial fibrillation.    Based on this patient's history, it has been determined that this patient is a poor candidate for long-term oral anticoagulation, however may be tolerant of short term treatment as needed for watchman implantation.     Specifically regarding anticoagulation, the patient has demonstrated: Anti-coagulation History: History of Bleeding (e.g. Intra cerebral, subdural, GI, retro-peritoneal)  and Documented poor compliance or intolerance with oral anti-coagulation therapy. Patient has experienced significant difficulty tolerating anticoagulation.  She reports worsening gait instability, recurrent nosebleeds, excessive bruising, and multiple small injuries with significant hematomas.        The patient and I have discussed their unique stroke and bleeding risk both on and off oral-anticoagulation, and the rationale for this referral. Their individual [id number] stroke risk score, based on past history is indicated below**.  Select all that apply based on patient history:    Atrial Fibrillation CHADSVASC2 Score Stroke Risk:_  74 y.o. > 75 - 2    female Female - 1   CHF HX: Yes - 1   HTN HX: Yes - 1   Stroke/TIA/Thromboembolism No - 0   Vascular Disease HX: Yes - 1   Diabetes Mellitus Yes - 1   CHADSVASC 2 Score 7      Annual Stroke Risk 11.2% - moderate- high               Vascular disease: Prior MI, Carotid Artery Disease, PCI, and CAD    HAS-BLED Score     HTN Hx [x] 1 Point   Renal Disease  [] 1 Point   Liver Disease [] 1 Point   Stroke Hx [] 1 Point   Prior Major Bleeding or Predisposition [x] 1 Point   Labile INR [] 1 Point   Age > 65 Years Current: 74 y.o.   [x] 1 Point   Rx Usage Predisposing to Bleeding [] 1 Point   Alcohol Use (> or = 8 Drinks/wk) [] 1

## 2025-04-17 NOTE — PROGRESS NOTES
Patient pre-assessment complete for LAAO scheduled for 2025, arrival time 0900. Patient verified using . Patient instructed to bring all home medications in labeled bottles on the day of procedure. NPO status reinforced. Patient will take 80% (8 units) of Tresiba the evening prior to procedure.  Patient's last dose of Eliquis will be on 2025. She will take aspirin 81 mg, carvedilol, citalopram, isosorbide, and nifedipine with a small sip of water, the morning of her procedure. Patient instructed to HOLD all other medications and supplements. Patient verbalizes understanding of all instructions & denies any questions at this time. Patient has watchman contact ([phone]) information for questions.

## 2025-04-18 ENCOUNTER — PREP FOR PROCEDURE (OUTPATIENT)
Age: 75
End: 2025-04-18

## 2025-04-18 DIAGNOSIS — I48.0 PAROXYSMAL ATRIAL FIBRILLATION (HCC): Primary | ICD-10-CM

## 2025-04-21 ENCOUNTER — HOSPITAL ENCOUNTER (OUTPATIENT)
Dept: LAB | Age: 75
Setting detail: SPECIMEN
Discharge: HOME | DRG: 274 | End: 2025-04-23
Payer: MEDICARE

## 2025-04-21 ENCOUNTER — HOSPITAL ENCOUNTER (OUTPATIENT)
Dept: LAB | Age: 75
Discharge: HOME | End: 2025-04-24
Payer: MEDICARE

## 2025-04-21 DIAGNOSIS — I48.0 PAROXYSMAL ATRIAL FIBRILLATION (HCC): ICD-10-CM

## 2025-04-21 LAB
ALBUMIN SERPL-MCNC: 3.7 G/DL (ref 3.2–4.6)
ANION GAP SERPL CALC-SCNC: 12 MMOL/L (ref 7–16)
BASOPHILS # BLD: 0.05 K/UL (ref 0–0.2)
BASOPHILS NFR BLD: 0.6 % (ref 0–2)
BUN SERPL-MCNC: 22 MG/DL (ref 8–23)
CALCIUM SERPL-MCNC: 9.3 MG/DL (ref 8.8–10.2)
CHLORIDE SERPL-SCNC: 103 MMOL/L (ref 98–107)
CO2 SERPL-SCNC: 26 MMOL/L (ref 20–29)
CREAT SERPL-MCNC: 1.42 MG/DL (ref 0.6–1.1)
DIFFERENTIAL METHOD BLD: ABNORMAL
EOSINOPHIL # BLD: 0.7 K/UL (ref 0–0.8)
EOSINOPHIL NFR BLD: 8.3 % (ref 0.5–7.8)
ERYTHROCYTE [DISTWIDTH] IN BLOOD BY AUTOMATED COUNT: 12.9 % (ref 11.9–14.6)
GLUCOSE SERPL-MCNC: 151 MG/DL (ref 70–99)
HCT VFR BLD AUTO: 42.3 % (ref 35.8–46.3)
HGB BLD-MCNC: 13.9 G/DL (ref 11.7–15.4)
IMM GRANULOCYTES # BLD AUTO: 0.03 K/UL (ref 0–0.5)
IMM GRANULOCYTES NFR BLD AUTO: 0.4 % (ref 0–5)
INR PPP: 1.4
LYMPHOCYTES # BLD: 1.09 K/UL (ref 0.5–4.6)
LYMPHOCYTES NFR BLD: 12.9 % (ref 13–44)
MCH RBC QN AUTO: 31.6 PG (ref 26.1–32.9)
MCHC RBC AUTO-ENTMCNC: 32.9 G/DL (ref 31.4–35)
MCV RBC AUTO: 96.1 FL (ref 82–102)
MONOCYTES # BLD: 0.99 K/UL (ref 0.1–1.3)
MONOCYTES NFR BLD: 11.7 % (ref 4–12)
NEUTS SEG # BLD: 5.6 K/UL (ref 1.7–8.2)
NEUTS SEG NFR BLD: 66.1 % (ref 43–78)
NRBC # BLD: 0 K/UL (ref 0–0.2)
PLATELET # BLD AUTO: 283 K/UL (ref 150–450)
PMV BLD AUTO: 9.4 FL (ref 9.4–12.3)
POTASSIUM SERPL-SCNC: 3.8 MMOL/L (ref 3.5–5.1)
PROTHROMBIN TIME: 17.1 SEC (ref 11.3–14.9)
RBC # BLD AUTO: 4.4 M/UL (ref 4.05–5.2)
SODIUM SERPL-SCNC: 141 MMOL/L (ref 136–145)
WBC # BLD AUTO: 8.5 K/UL (ref 4.3–11.1)

## 2025-04-21 PROCEDURE — 86850 RBC ANTIBODY SCREEN: CPT

## 2025-04-21 PROCEDURE — 86901 BLOOD TYPING SEROLOGIC RH(D): CPT

## 2025-04-21 PROCEDURE — 85025 COMPLETE CBC W/AUTO DIFF WBC: CPT

## 2025-04-21 PROCEDURE — 86900 BLOOD TYPING SEROLOGIC ABO: CPT

## 2025-04-21 PROCEDURE — 80048 BASIC METABOLIC PNL TOTAL CA: CPT

## 2025-04-21 PROCEDURE — 82040 ASSAY OF SERUM ALBUMIN: CPT

## 2025-04-21 PROCEDURE — 85610 PROTHROMBIN TIME: CPT

## 2025-04-21 PROCEDURE — 36415 COLL VENOUS BLD VENIPUNCTURE: CPT

## 2025-04-22 LAB
ABO + RH BLD: NORMAL
BLOOD GROUP ANTIBODIES SERPL: NORMAL
SPECIMEN EXP DATE BLD: NORMAL

## 2025-04-23 ENCOUNTER — ANESTHESIA EVENT (OUTPATIENT)
Dept: CARDIAC CATH/INVASIVE PROCEDURES | Age: 75
DRG: 274 | End: 2025-04-23
Payer: MEDICARE

## 2025-04-23 ENCOUNTER — ANESTHESIA (OUTPATIENT)
Dept: CARDIAC CATH/INVASIVE PROCEDURES | Age: 75
DRG: 274 | End: 2025-04-23
Payer: MEDICARE

## 2025-04-23 ENCOUNTER — APPOINTMENT (OUTPATIENT)
Dept: CARDIAC CATH/INVASIVE PROCEDURES | Age: 75
DRG: 274 | End: 2025-04-23
Attending: INTERNAL MEDICINE
Payer: MEDICARE

## 2025-04-23 ENCOUNTER — HOSPITAL ENCOUNTER (INPATIENT)
Age: 75
LOS: 1 days | Discharge: HOME | DRG: 274 | End: 2025-04-23
Attending: INTERNAL MEDICINE | Admitting: INTERNAL MEDICINE
Payer: MEDICARE

## 2025-04-23 VITALS
TEMPERATURE: 97.8 F | BODY MASS INDEX: 25.1 KG/M2 | DIASTOLIC BLOOD PRESSURE: 58 MMHG | OXYGEN SATURATION: 89 % | SYSTOLIC BLOOD PRESSURE: 139 MMHG | HEART RATE: 71 BPM | HEIGHT: 64 IN | RESPIRATION RATE: 15 BRPM | WEIGHT: 147 LBS

## 2025-04-23 DIAGNOSIS — I48.0 PAF (PAROXYSMAL ATRIAL FIBRILLATION) (HCC): Primary | ICD-10-CM

## 2025-04-23 LAB
ACT BLD: 297 SECS (ref 70–128)
ECHO BSA: 1.74 M2
EKG ATRIAL RATE: 49 BPM
EKG DIAGNOSIS: NORMAL
EKG P AXIS: 48 DEGREES
EKG P-R INTERVAL: 162 MS
EKG Q-T INTERVAL: 484 MS
EKG QRS DURATION: 88 MS
EKG QTC CALCULATION (BAZETT): 437 MS
EKG R AXIS: -59 DEGREES
EKG T AXIS: 66 DEGREES
EKG VENTRICULAR RATE: 49 BPM
GLUCOSE BLD STRIP.AUTO-MCNC: 110 MG/DL (ref 65–100)
GLUCOSE BLD STRIP.AUTO-MCNC: 122 MG/DL (ref 65–100)
MAGNESIUM SERPL-MCNC: 2.1 MG/DL (ref 1.8–2.4)
SERVICE CMNT-IMP: ABNORMAL
SERVICE CMNT-IMP: ABNORMAL

## 2025-04-23 PROCEDURE — 85347 COAGULATION TIME ACTIVATED: CPT

## 2025-04-23 PROCEDURE — 02L73DK OCCLUSION OF LEFT ATRIAL APPENDAGE WITH INTRALUMINAL DEVICE, PERCUTANEOUS APPROACH: ICD-10-PCS | Performed by: INTERNAL MEDICINE

## 2025-04-23 PROCEDURE — C1894 INTRO/SHEATH, NON-LASER: HCPCS | Performed by: INTERNAL MEDICINE

## 2025-04-23 PROCEDURE — 93662 INTRACARDIAC ECG (ICE): CPT | Performed by: INTERNAL MEDICINE

## 2025-04-23 PROCEDURE — 3700000000 HC ANESTHESIA ATTENDED CARE: Performed by: INTERNAL MEDICINE

## 2025-04-23 PROCEDURE — 83735 ASSAY OF MAGNESIUM: CPT

## 2025-04-23 PROCEDURE — C1759 CATH, INTRA ECHOCARDIOGRAPHY: HCPCS | Performed by: INTERNAL MEDICINE

## 2025-04-23 PROCEDURE — 33340 PERQ CLSR TCAT L ATR APNDGE: CPT | Performed by: INTERNAL MEDICINE

## 2025-04-23 PROCEDURE — 6360000002 HC RX W HCPCS: Performed by: ANESTHESIOLOGY

## 2025-04-23 PROCEDURE — 7100000001 HC PACU RECOVERY - ADDTL 15 MIN: Performed by: INTERNAL MEDICINE

## 2025-04-23 PROCEDURE — 2580000003 HC RX 258: Performed by: NURSE ANESTHETIST, CERTIFIED REGISTERED

## 2025-04-23 PROCEDURE — 6360000002 HC RX W HCPCS: Performed by: NURSE ANESTHETIST, CERTIFIED REGISTERED

## 2025-04-23 PROCEDURE — 3700000001 HC ADD 15 MINUTES (ANESTHESIA): Performed by: INTERNAL MEDICINE

## 2025-04-23 PROCEDURE — 7100000000 HC PACU RECOVERY - FIRST 15 MIN: Performed by: INTERNAL MEDICINE

## 2025-04-23 PROCEDURE — C1769 GUIDE WIRE: HCPCS | Performed by: INTERNAL MEDICINE

## 2025-04-23 PROCEDURE — 6360000002 HC RX W HCPCS: Performed by: INTERNAL MEDICINE

## 2025-04-23 PROCEDURE — 93005 ELECTROCARDIOGRAM TRACING: CPT | Performed by: INTERNAL MEDICINE

## 2025-04-23 PROCEDURE — 82962 GLUCOSE BLOOD TEST: CPT

## 2025-04-23 PROCEDURE — C1889 IMPLANT/INSERT DEVICE, NOC: HCPCS | Performed by: INTERNAL MEDICINE

## 2025-04-23 PROCEDURE — 1100000000 HC RM PRIVATE

## 2025-04-23 PROCEDURE — C1760 CLOSURE DEV, VASC: HCPCS | Performed by: INTERNAL MEDICINE

## 2025-04-23 PROCEDURE — 93325 DOPPLER ECHO COLOR FLOW MAPG: CPT

## 2025-04-23 PROCEDURE — 6370000000 HC RX 637 (ALT 250 FOR IP): Performed by: ANESTHESIOLOGY

## 2025-04-23 PROCEDURE — B24BZZ4 ULTRASONOGRAPHY OF HEART WITH AORTA, TRANSESOPHAGEAL: ICD-10-PCS | Performed by: INTERNAL MEDICINE

## 2025-04-23 PROCEDURE — 2500000003 HC RX 250 WO HCPCS: Performed by: NURSE ANESTHETIST, CERTIFIED REGISTERED

## 2025-04-23 PROCEDURE — 2580000003 HC RX 258: Performed by: INTERNAL MEDICINE

## 2025-04-23 PROCEDURE — 2709999900 HC NON-CHARGEABLE SUPPLY: Performed by: INTERNAL MEDICINE

## 2025-04-23 PROCEDURE — 93010 ELECTROCARDIOGRAM REPORT: CPT | Performed by: INTERNAL MEDICINE

## 2025-04-23 DEVICE — LEFT ATRIAL APPENDAGE CLOSURE DEVICE WITH DELIVERY SYSTEM
Type: IMPLANTABLE DEVICE | Status: FUNCTIONAL
Brand: WATCHMAN FLX™ PRO

## 2025-04-23 RX ORDER — DEXAMETHASONE SODIUM PHOSPHATE 10 MG/ML
INJECTION, SOLUTION INTRA-ARTICULAR; INTRALESIONAL; INTRAMUSCULAR; INTRAVENOUS; SOFT TISSUE
Status: DISCONTINUED | OUTPATIENT
Start: 2025-04-23 | End: 2025-04-23 | Stop reason: SDUPTHER

## 2025-04-23 RX ORDER — HEPARIN SODIUM 1000 [USP'U]/ML
INJECTION, SOLUTION INTRAVENOUS; SUBCUTANEOUS
Status: DISCONTINUED | OUTPATIENT
Start: 2025-04-23 | End: 2025-04-23 | Stop reason: SDUPTHER

## 2025-04-23 RX ORDER — ROCURONIUM BROMIDE 10 MG/ML
INJECTION, SOLUTION INTRAVENOUS
Status: DISCONTINUED | OUTPATIENT
Start: 2025-04-23 | End: 2025-04-23 | Stop reason: SDUPTHER

## 2025-04-23 RX ORDER — LIDOCAINE HYDROCHLORIDE 20 MG/ML
INJECTION, SOLUTION EPIDURAL; INFILTRATION; INTRACAUDAL; PERINEURAL
Status: DISCONTINUED | OUTPATIENT
Start: 2025-04-23 | End: 2025-04-23 | Stop reason: SDUPTHER

## 2025-04-23 RX ORDER — FENTANYL CITRATE 50 UG/ML
50 INJECTION, SOLUTION INTRAMUSCULAR; INTRAVENOUS PRN
Refills: 0 | Status: CANCELLED | OUTPATIENT
Start: 2025-04-23

## 2025-04-23 RX ORDER — FENTANYL CITRATE 50 UG/ML
100 INJECTION, SOLUTION INTRAMUSCULAR; INTRAVENOUS PRN
Refills: 0 | Status: CANCELLED | OUTPATIENT
Start: 2025-04-23

## 2025-04-23 RX ORDER — ONDANSETRON 2 MG/ML
4 INJECTION INTRAMUSCULAR; INTRAVENOUS
Status: DISCONTINUED | OUTPATIENT
Start: 2025-04-23 | End: 2025-04-23 | Stop reason: HOSPADM

## 2025-04-23 RX ORDER — PROPOFOL 10 MG/ML
INJECTION, EMULSION INTRAVENOUS
Status: DISCONTINUED | OUTPATIENT
Start: 2025-04-23 | End: 2025-04-23 | Stop reason: SDUPTHER

## 2025-04-23 RX ORDER — PROTAMINE SULFATE 10 MG/ML
INJECTION, SOLUTION INTRAVENOUS
Status: DISCONTINUED | OUTPATIENT
Start: 2025-04-23 | End: 2025-04-23 | Stop reason: SDUPTHER

## 2025-04-23 RX ORDER — OXYCODONE HYDROCHLORIDE 5 MG/1
5 TABLET ORAL
Status: COMPLETED | OUTPATIENT
Start: 2025-04-23 | End: 2025-04-23

## 2025-04-23 RX ORDER — SODIUM CHLORIDE 9 MG/ML
INJECTION, SOLUTION INTRAVENOUS CONTINUOUS
Status: ACTIVE | OUTPATIENT
Start: 2025-04-23 | End: 2025-04-23

## 2025-04-23 RX ORDER — LIDOCAINE HYDROCHLORIDE 10 MG/ML
1 INJECTION, SOLUTION INFILTRATION; PERINEURAL
Status: CANCELLED | OUTPATIENT
Start: 2025-04-23

## 2025-04-23 RX ORDER — SODIUM CHLORIDE, SODIUM LACTATE, POTASSIUM CHLORIDE, CALCIUM CHLORIDE 600; 310; 30; 20 MG/100ML; MG/100ML; MG/100ML; MG/100ML
INJECTION, SOLUTION INTRAVENOUS CONTINUOUS
Status: DISCONTINUED | OUTPATIENT
Start: 2025-04-23 | End: 2025-04-23 | Stop reason: HOSPADM

## 2025-04-23 RX ORDER — SODIUM CHLORIDE, SODIUM LACTATE, POTASSIUM CHLORIDE, CALCIUM CHLORIDE 600; 310; 30; 20 MG/100ML; MG/100ML; MG/100ML; MG/100ML
INJECTION, SOLUTION INTRAVENOUS
Status: DISCONTINUED | OUTPATIENT
Start: 2025-04-23 | End: 2025-04-23 | Stop reason: SDUPTHER

## 2025-04-23 RX ORDER — NALOXONE HYDROCHLORIDE 0.4 MG/ML
INJECTION, SOLUTION INTRAMUSCULAR; INTRAVENOUS; SUBCUTANEOUS PRN
Status: DISCONTINUED | OUTPATIENT
Start: 2025-04-23 | End: 2025-04-23 | Stop reason: HOSPADM

## 2025-04-23 RX ORDER — MIDAZOLAM HYDROCHLORIDE 2 MG/2ML
2 INJECTION, SOLUTION INTRAMUSCULAR; INTRAVENOUS
Status: CANCELLED | OUTPATIENT
Start: 2025-04-23

## 2025-04-23 RX ORDER — ONDANSETRON 2 MG/ML
INJECTION INTRAMUSCULAR; INTRAVENOUS
Status: DISCONTINUED | OUTPATIENT
Start: 2025-04-23 | End: 2025-04-23 | Stop reason: SDUPTHER

## 2025-04-23 RX ORDER — SODIUM CHLORIDE, SODIUM LACTATE, POTASSIUM CHLORIDE, CALCIUM CHLORIDE 600; 310; 30; 20 MG/100ML; MG/100ML; MG/100ML; MG/100ML
INJECTION, SOLUTION INTRAVENOUS CONTINUOUS
Status: CANCELLED | OUTPATIENT
Start: 2025-04-23

## 2025-04-23 RX ORDER — CLINDAMYCIN PHOSPHATE 900 MG/50ML
900 INJECTION, SOLUTION INTRAVENOUS ONCE
Status: COMPLETED | OUTPATIENT
Start: 2025-04-23 | End: 2025-04-23

## 2025-04-23 RX ADMIN — HYDROMORPHONE HYDROCHLORIDE 0.5 MG: 1 INJECTION, SOLUTION INTRAMUSCULAR; INTRAVENOUS; SUBCUTANEOUS at 14:52

## 2025-04-23 RX ADMIN — SODIUM CHLORIDE: 9 INJECTION, SOLUTION INTRAVENOUS at 08:23

## 2025-04-23 RX ADMIN — OXYCODONE 5 MG: 5 TABLET ORAL at 13:28

## 2025-04-23 RX ADMIN — CLINDAMYCIN PHOSPHATE 900 MG: 900 INJECTION, SOLUTION INTRAVENOUS at 12:17

## 2025-04-23 RX ADMIN — LIDOCAINE HYDROCHLORIDE 100 MG: 20 INJECTION, SOLUTION EPIDURAL; INFILTRATION; INTRACAUDAL; PERINEURAL at 12:04

## 2025-04-23 RX ADMIN — SUGAMMADEX 200 MG: 100 INJECTION, SOLUTION INTRAVENOUS at 12:54

## 2025-04-23 RX ADMIN — HEPARIN SODIUM 4000 UNITS: 1000 INJECTION INTRAVENOUS; SUBCUTANEOUS at 12:31

## 2025-04-23 RX ADMIN — ROCURONIUM BROMIDE 50 MG: 10 INJECTION, SOLUTION INTRAVENOUS at 12:04

## 2025-04-23 RX ADMIN — PROTAMINE SULFATE 30 MG: 10 INJECTION, SOLUTION INTRAVENOUS at 12:50

## 2025-04-23 RX ADMIN — DEXAMETHASONE SODIUM PHOSPHATE 10 MG: 10 INJECTION INTRAMUSCULAR; INTRAVENOUS at 12:24

## 2025-04-23 RX ADMIN — PROPOFOL 200 MG: 10 INJECTION, EMULSION INTRAVENOUS at 12:04

## 2025-04-23 RX ADMIN — ONDANSETRON 4 MG: 2 INJECTION INTRAMUSCULAR; INTRAVENOUS at 12:24

## 2025-04-23 RX ADMIN — HEPARIN SODIUM 4000 UNITS: 1000 INJECTION INTRAVENOUS; SUBCUTANEOUS at 12:27

## 2025-04-23 RX ADMIN — SODIUM CHLORIDE, SODIUM LACTATE, POTASSIUM CHLORIDE, AND CALCIUM CHLORIDE: 600; 310; 30; 20 INJECTION, SOLUTION INTRAVENOUS at 11:51

## 2025-04-23 ASSESSMENT — PAIN DESCRIPTION - LOCATION
LOCATION: GROIN
LOCATION: INCISION

## 2025-04-23 ASSESSMENT — PAIN - FUNCTIONAL ASSESSMENT
PAIN_FUNCTIONAL_ASSESSMENT: NONE - DENIES PAIN
PAIN_FUNCTIONAL_ASSESSMENT: 0-10
PAIN_FUNCTIONAL_ASSESSMENT: 0-10

## 2025-04-23 ASSESSMENT — PAIN SCALES - GENERAL
PAINLEVEL_OUTOF10: 6
PAINLEVEL_OUTOF10: 5

## 2025-04-23 ASSESSMENT — PAIN DESCRIPTION - DESCRIPTORS
DESCRIPTORS: ACHING
DESCRIPTORS: DISCOMFORT;ACHING

## 2025-04-23 ASSESSMENT — PAIN DESCRIPTION - ORIENTATION
ORIENTATION: RIGHT
ORIENTATION: RIGHT

## 2025-04-23 NOTE — DISCHARGE SUMMARY
[facility] Cardiology Discharge Summary     Patient ID:  Magalis Maddox  [id number]  74 y.o.  1950    Admit date: 4/23/2025    Discharge date:  04/23/2025    Admitting Physician: Didier Guadarrama MD     Discharge Physician: Donavon Parker, ÓSCAR - [facility]/Dr. Guadarrama    Admission Diagnoses: PAF (paroxysmal atrial fibrillation) (HCC) [I48.0]  Paroxysmal A-fib (HCC) [I48.0]    Discharge Diagnoses:   Patient Active Problem List    Diagnosis    Paroxysmal A-fib (HCC)    PVD (peripheral vascular disease) with claudication    Hypokalemia    Paroxysmal atrial fibrillation (HCC)    Long term current use of anticoagulant    Chronic diastolic congestive heart failure (HCC)    Mixed hyperlipidemia    Bilateral carotid artery stenosis    Type 2 diabetes mellitus with stage 4 chronic kidney disease, with long-term current use of insulin (HCC)    Stage 4 chronic kidney disease (HCC)    Primary hypertension    Primary insomnia    Major depressive disorder with single episode, in full remission    Coronary artery disease involving native coronary artery of native heart without angina pectoris       Cardiology Procedures this admission:  LOS, Implantation of Watchman device, Echocardiogram  Consults: none    Hospital Course: Patient was seen at the office of [facility] by Dr. Guadarrama in follow up. The patient was felt to be an appropriate candidate for Watchman device. The patient presented for procedure. LOS was performed directly prior to procedure that was negative for TRUDY thrombus. The patient underwent successful implantation of a 24 mm FLX Watchman device.     The patient tolerated the procedure well and was monitored closely.   At the time of discharge patient was up feeling well without any complaints of chest pain or shortness of breath. Patient's labs were stable. Patient was seen and examined by Dr. Guadarrama and determined stable and ready for discharge. Patient was instructed on the

## 2025-04-23 NOTE — PROGRESS NOTES
Pt ambulated to restroom with no issues. R groin site remains C/D/I. Will continue to monitor patient before discharging home.

## 2025-04-23 NOTE — PROGRESS NOTES
Discharge instructions reviewed with patient and granddaughter. NO new prescriptions given at time of discharge.  Opportunity for questions provided. Patient and granddaughter voiced understanding of all discharge instructions.

## 2025-04-23 NOTE — H&P
Stercoral colitis 2022    Unstable angina (HCC) 2012     ntg as needed.          Past Surgical History         Past Surgical History:   Procedure Laterality Date    CAROTID ENDARTERECTOMY Left 2018    CATARACT REMOVAL Left 2016     Dr Sevilla, [facility]    CATARACT REMOVAL Right 2016     Dr Sevilla, [facility]    CERVICAL FUSION         neck w/plate    COLONOSCOPY   13     Brigitte--single transverse hyperplastic polyp--7-10 year recall    CORONARY STENT PLACEMENT         stent x3; last in     FLEXIBLE SIGMOIDOSCOPY N/A 3/6/2022     SIGMOIDOSCOPY FLEXIBLE performed by Leland Lim MD at [facility] ENDOSCOPY    HEMORRHOID SURGERY         x2    HYSTERECTOMY, TOTAL ABDOMINAL (CERVIX REMOVED)        ORTHOPEDIC SURGERY         left elbow    HORTENSIA AND BSO (CERVIX REMOVED)        TOTAL HIP ARTHROPLASTY Right 2022     RIGHT HIP TOTAL ARTHROPLASTY, Malone, PREVENA performed by Jesus Duran MD at [facility] MAIN OR    TOTAL HIP ARTHROPLASTY Left 2024     LEFT HIP TOTAL ARTHROPLASTY, Malone/ prevena, vanc/tobra performed by Jesus Duran MD at [facility] MAIN OR    WISDOM TOOTH EXTRACTION             Family History         Family History   Problem Relation Age of Onset    Multinodular goiter Mother      Heart Attack Mother 72    Osteoporosis Mother      Heart Disease Mother      Pancreatic Cancer Father      Heart Attack Father 72    Diabetes Father      Cancer Father      Breast Cancer Sister      Heart Attack Sister      Cancer Sister           Breast Cancer    Ulcerative Colitis Brother      Thyroid Cancer Brother           Papillary.    Colon Cancer Neg Hx           Social History            Tobacco Use    Smoking status: Former       Current packs/day: 0.00       Average packs/day: 1 pack/day for 20.0 years (20.0 ttl pk-yrs)       Types: Cigarettes       Start date: 1992       Quit date: 2012       Years since quittin.4    Smokeless tobacco: Never

## 2025-04-23 NOTE — PROGRESS NOTES
Patient received to CPRU room # 14  Ambulatory from [facility]. Patient scheduled for LAAO today with Dr Guadarrama. Procedure reviewed & questions answered, voiced good understanding consent obtained & placed on chart. All medications and medical history reviewed. Will prep patient per orders. Patient & family updated on plan of care.      The patient has a fraility score of 3-MANAGING WELL, based on ambulation.

## 2025-04-23 NOTE — PROGRESS NOTES
Report received from PACU RN. Procedural finding communicated. Intra procedural medication administration reviewed. Progression of care discussed.    Patient received into CPRU room 5, Post MALOU w/ Dr Guadarrama.    Access site without bleeding or swelling. None noted    Patient instructed to limit movement of R upper extremity.    Routine post procedural vital signs & site assessment initiated.

## 2025-04-23 NOTE — ANESTHESIA POSTPROCEDURE EVALUATION
Department of Anesthesiology  Postprocedure Note    Patient: Magalis Maddox  MRN: [id number]  YOB: 1950  Date of evaluation: 4/23/2025    Procedure Summary       Date: 04/23/25 Room / Location: [facility] 3 ALL EVENTS / [facility] CARDIAC CATH LAB    Anesthesia Start: 1145 Anesthesia Stop: 1310    Procedure: Watchman ervin closure device Diagnosis:       PAF (paroxysmal atrial fibrillation) (HCC)      (Paroxysmal atrial fibrillation)    Providers: Didier Guadarrama MD Responsible Provider: Marcelino Bourne Jr., MD    Anesthesia Type: general ASA Status: 3            Anesthesia Type: No value filed.    Theron Phase I: Theron Score: 9    Theron Phase II:      Anesthesia Post Evaluation    Patient location during evaluation: PACU  Patient participation: complete - patient participated  Level of consciousness: awake  Pain score: 0  Airway patency: patent  Nausea & Vomiting: no nausea and no vomiting  Cardiovascular status: blood pressure returned to baseline and hemodynamically stable  Respiratory status: acceptable, spontaneous ventilation and nonlabored ventilation  Hydration status: euvolemic  Multimodal analgesia pain management approach  Pain management: adequate    There were no known notable events for this encounter.

## 2025-04-23 NOTE — ANESTHESIA PRE PROCEDURE
Department of Anesthesiology  Preprocedure Note       Name:  Magalis Maddox   Age:  74 y.o.  :  1950                                          MRN:  [id number]         Date:  2025      Surgeon: Surgeon(s):  Didier Guadarrama MD    Procedure: Procedure(s):  Watchman ervin closure device    Medications prior to admission:   Prior to Admission medications    Medication Sig Start Date End Date Taking? Authorizing Provider   traZODone (DESYREL) 50 MG tablet Take 1 tablet by mouth nightly 25  Yes Jamison Riggs MD   rosuvastatin (CRESTOR) 10 MG tablet Take 1 tablet by mouth daily 25  Yes Jamison Riggs MD   citalopram (CELEXA) 20 MG tablet Take 1 tablet by mouth daily 25  Yes Jamison Riggs MD   NIFEdipine (PROCARDIA XL) 90 MG extended release tablet Take 1 tablet by mouth daily 25  Yes Jamison Riggs MD   carvedilol (COREG) 6.25 MG tablet Take 1 tablet by mouth 2 times daily 25  Yes Handy Brower DO   furosemide (LASIX) 40 MG tablet Take 1 tablet by mouth daily 25  Yes Handy Brower DO   isosorbide mononitrate (IMDUR) 60 MG extended release tablet Take 1 tablet by mouth daily 25  Yes Handy Brower DO   ferrous sulfate (FE TABS 325) 325 (65 Fe) MG EC tablet Take 1 tablet by mouth 2 times daily 24  Yes Jamison Riggs MD   Insulin Degludec (TRESIBA FLEXTOUCH) 100 UNIT/ML SOPN Inject 10 Units into the skin at bedtime  Patient taking differently: Inject 8 Units into the skin at bedtime 24  Yes Jamison Riggs MD   apixaban (ELIQUIS) 5 MG TABS tablet Take 1 tablet by mouth 2 times daily 9/10/24  Yes Handy Brower DO   aspirin EC 81 MG EC tablet Take 1 tablet by mouth daily 23  Yes Jamison Riggs MD   Cholecalciferol 50 MCG (2000 UT) TABS Take 1 tablet by mouth daily   Yes Deon Rankin MD   Cyanocobalamin 5000 MCG TBDP Take 5,000 mcg by mouth daily   Yes Automatic Reconciliation, Ar   magnesium oxide (MAG-OX) 400 MG tablet Take 1 tablet

## 2025-04-24 ENCOUNTER — TELEPHONE (OUTPATIENT)
Dept: CARDIAC CATH/INVASIVE PROCEDURES | Age: 75
End: 2025-04-24

## 2025-04-24 ENCOUNTER — CARE COORDINATION (OUTPATIENT)
Dept: CARE COORDINATION | Facility: CLINIC | Age: 75
End: 2025-04-24

## 2025-04-24 NOTE — CARE COORDINATION
Care Transitions Note    Initial Call - Call within 2 business days of discharge: Yes    Attempted to reach patient for transitions of care follow up. Unable to reach patient.    Outreach Attempts:   HIPAA compliant voicemail left for patient.     Patient: Magalis Maddox    Patient : 1950   MRN: [id number]    Reason for Admission: Atrial Fib  Discharge Date: 25  RURS: Readmission Risk Score: 11.8    Last Discharge Facility       Date Complaint Diagnosis Description Type Department Provider    25  PAF (paroxysmal atrial fibrillation) (HCC) Admission (Discharged) [facility] Didier Guadarrama MD            Was this an external facility discharge? No    Follow Up Appointment:   Patient has hospital follow up appointment scheduled greater than 14 days after discharge; 2025.    Future Appointments         Provider Specialty Dept Phone    2025 1:20 PM Jesus Duran MD Orthopedic Surgery [phone]    2025 2:20 PM Jamison Riggs MD Internal Medicine [phone]    2025 11:00 AM Didire Guadarrama MD Cardiology [phone]    9/3/2025 1:00 PM Kiet Mackey DO Cardiology [phone]    2025 10:30 AM [facility] Vascular Surgery [phone]    2025 11:00 AM Rob Bhatia MD Vascular Surgery [phone]            Plan for follow-up on next business day.      Ivelisse Molina LPN

## 2025-04-24 NOTE — TELEPHONE ENCOUNTER
Post Watchman SDD follow up call.     Magalis Maddox is 24 hours post Watchman device placement and SDD on 4/23/2025.  She is doing well w/o complaints.  Patient denies pain, swelling, and bleeding to the right groin puncture sites.  She denies CP and SOB. Patient resumed Eliquis this am as prescribed per Dr. Guadarrama. Ms. Maddox is aware of upcoming follow up appointments.  She has Watchman coordinator contact (María Elena Chou, [phone]) information for questions or concerns.

## 2025-04-25 ENCOUNTER — CARE COORDINATION (OUTPATIENT)
Dept: CARE COORDINATION | Facility: CLINIC | Age: 75
End: 2025-04-25

## 2025-04-25 LAB
ECHO BSA: 1.74 M2
ECHO LV EF PHYSICIAN: 55 %

## 2025-04-25 NOTE — CARE COORDINATION
Care Transitions Note    Initial Call - Call within 2 business days of discharge: Yes    2nd Attempted to reach patient for  initial transitions of care follow up call. Unable to reach patient.Sent RICK letter via my chart . Will re-open if phone call is returned.    Outreach Attempts:   HIPAA compliant voicemail left for patient.     Patient: Magalis Maddox    Patient : 1950   MRN: [id number]    Reason for Admission: Atrial Fib  Discharge Date: 25  RURS: Readmission Risk Score: 11.8    Last Discharge Facility       Date Complaint Diagnosis Description Type Department Provider    25  PAF (paroxysmal atrial fibrillation) (HCC) Admission (Discharged) [facility] Didier Guadarrama MD            Was this an external facility discharge? No    Follow Up Appointment:   Patient has hospital follow up appointment scheduled greater than 14 days after discharge; 2025.    Future Appointments         Provider Specialty Dept Phone    2025 1:20 PM Jesus Duran MD Orthopedic Surgery [phone]    2025 2:20 PM Jamison Riggs MD Internal Medicine [phone]    2025 11:00 AM Didier Guadarrama MD Cardiology [phone]    9/3/2025 1:00 PM Kiet Mackey DO Cardiology [phone]    2025 10:30 AM [facility] Vascular Surgery [phone]    2025 11:00 AM Rob Bhatia MD Vascular Surgery [phone]            No further follow-up call indicated     Ivelisse Molina LPN

## 2025-05-08 ENCOUNTER — OFFICE VISIT (OUTPATIENT)
Dept: ORTHOPEDIC SURGERY | Age: 75
End: 2025-05-08
Payer: MEDICARE

## 2025-05-08 DIAGNOSIS — M17.11 PRIMARY OSTEOARTHRITIS OF RIGHT KNEE: Primary | ICD-10-CM

## 2025-05-08 PROCEDURE — G8399 PT W/DXA RESULTS DOCUMENT: HCPCS | Performed by: ORTHOPAEDIC SURGERY

## 2025-05-08 PROCEDURE — G8428 CUR MEDS NOT DOCUMENT: HCPCS | Performed by: ORTHOPAEDIC SURGERY

## 2025-05-08 PROCEDURE — 1036F TOBACCO NON-USER: CPT | Performed by: ORTHOPAEDIC SURGERY

## 2025-05-08 PROCEDURE — G8417 CALC BMI ABV UP PARAM F/U: HCPCS | Performed by: ORTHOPAEDIC SURGERY

## 2025-05-08 PROCEDURE — 1123F ACP DISCUSS/DSCN MKR DOCD: CPT | Performed by: ORTHOPAEDIC SURGERY

## 2025-05-08 PROCEDURE — 99214 OFFICE O/P EST MOD 30 MIN: CPT | Performed by: ORTHOPAEDIC SURGERY

## 2025-05-08 PROCEDURE — 1090F PRES/ABSN URINE INCON ASSESS: CPT | Performed by: ORTHOPAEDIC SURGERY

## 2025-05-08 PROCEDURE — 1111F DSCHRG MED/CURRENT MED MERGE: CPT | Performed by: ORTHOPAEDIC SURGERY

## 2025-05-08 NOTE — PROGRESS NOTES
Patient ID:  Magalis Maddox  [id number]  74 y.o.  1950    Today: May 8, 2025          Chief Complaint:  Right Knee pain    HPI:       Magalis Maddox is a 74 y.o. female seen for evaluation and treatment of followup of right knee osteoarthritis. The patient reports pain along the joint lines, reports stiffness of the knee with prolonged inactivity, and swelling/pain at the end of the day and after increased physical activity. Generally, symptoms improve with sitting/rest. The pain affects the patient’s activities of daily living and quality of life. Patient reports progressive pain and instability in the knee. The pain has been ongoing for an extended period of time. Pain ranges from approximately 4-8 in a cyclical fashion with periods of acute exacerbation.     Prior procedures on the knee include none.    Patient has been attempted prior conservative treatment including Over-the-Counter medications including NSAID and/or Tylenol, Activity Modifications, Prescription NSAID, Corticosteroid Injection, and Viscosupplimentation. They have had success with prior treatments. Prior Viscosupplimentation has provided great success although not complete relief.    Past Medical History:  Past Medical History:   Diagnosis Date    Arthritis     CAD (coronary artery disease) 03/01/2012    MI, 3 stents    Carotid artery stenosis 12/12/2018    CKD (chronic kidney disease) stage 4, GFR 15-29 ml/min ([facility])     COVID-19 virus infection 01/12/2021    Depression 03/01/2012    Diastolic dysfunction     DM2 (diabetes mellitus, type 2) ([facility]) 05/21/2015    Type 2, injectable. FBS , s/s hypo 70's    Elevated LFTs 01/12/2021    Essential hypertension 05/21/2015    on med for control    GERD (gastroesophageal reflux disease)     no meds    History of MI (myocardial infarction) 11/2012    Hypercholesterolemia     Hypertension 03/01/2012    VERONICA (iron deficiency anemia)     oral iron daily    Insomnia     Long QT

## 2025-05-14 DIAGNOSIS — Z79.4 TYPE 2 DIABETES MELLITUS WITH STAGE 4 CHRONIC KIDNEY DISEASE, WITH LONG-TERM CURRENT USE OF INSULIN (HCC): ICD-10-CM

## 2025-05-14 DIAGNOSIS — E11.22 TYPE 2 DIABETES MELLITUS WITH STAGE 4 CHRONIC KIDNEY DISEASE, WITH LONG-TERM CURRENT USE OF INSULIN (HCC): ICD-10-CM

## 2025-05-14 DIAGNOSIS — N18.4 TYPE 2 DIABETES MELLITUS WITH STAGE 4 CHRONIC KIDNEY DISEASE, WITH LONG-TERM CURRENT USE OF INSULIN (HCC): ICD-10-CM

## 2025-05-14 RX ORDER — INSULIN DEGLUDEC 100 U/ML
INJECTION, SOLUTION SUBCUTANEOUS
Qty: 15 ML | Refills: 2 | OUTPATIENT
Start: 2025-05-14

## 2025-05-14 ASSESSMENT — PATIENT HEALTH QUESTIONNAIRE - PHQ9
2. FEELING DOWN, DEPRESSED OR HOPELESS: NOT AT ALL
SUM OF ALL RESPONSES TO PHQ QUESTIONS 1-9: 0
SUM OF ALL RESPONSES TO PHQ9 QUESTIONS 1 & 2: 0
1. LITTLE INTEREST OR PLEASURE IN DOING THINGS: NOT AT ALL
2. FEELING DOWN, DEPRESSED OR HOPELESS: NOT AT ALL
SUM OF ALL RESPONSES TO PHQ QUESTIONS 1-9: 0
SUM OF ALL RESPONSES TO PHQ QUESTIONS 1-9: 0
1. LITTLE INTEREST OR PLEASURE IN DOING THINGS: NOT AT ALL
SUM OF ALL RESPONSES TO PHQ QUESTIONS 1-9: 0

## 2025-05-19 ENCOUNTER — OFFICE VISIT (OUTPATIENT)
Dept: INTERNAL MEDICINE CLINIC | Facility: CLINIC | Age: 75
End: 2025-05-19
Payer: MEDICARE

## 2025-05-19 VITALS
RESPIRATION RATE: 16 BRPM | TEMPERATURE: 96.6 F | BODY MASS INDEX: 25.1 KG/M2 | HEIGHT: 64 IN | SYSTOLIC BLOOD PRESSURE: 123 MMHG | DIASTOLIC BLOOD PRESSURE: 55 MMHG | HEART RATE: 54 BPM | OXYGEN SATURATION: 98 % | WEIGHT: 147 LBS

## 2025-05-19 DIAGNOSIS — Z79.4 TYPE 2 DIABETES MELLITUS WITH STAGE 4 CHRONIC KIDNEY DISEASE, WITH LONG-TERM CURRENT USE OF INSULIN (HCC): ICD-10-CM

## 2025-05-19 DIAGNOSIS — Z79.899 ENCOUNTER FOR LONG-TERM (CURRENT) USE OF MEDICATIONS: ICD-10-CM

## 2025-05-19 DIAGNOSIS — N18.4 TYPE 2 DIABETES MELLITUS WITH STAGE 4 CHRONIC KIDNEY DISEASE, WITH LONG-TERM CURRENT USE OF INSULIN (HCC): ICD-10-CM

## 2025-05-19 DIAGNOSIS — I50.32 CHRONIC DIASTOLIC CONGESTIVE HEART FAILURE (HCC): ICD-10-CM

## 2025-05-19 DIAGNOSIS — F32.5 MAJOR DEPRESSIVE DISORDER WITH SINGLE EPISODE, IN FULL REMISSION: ICD-10-CM

## 2025-05-19 DIAGNOSIS — I48.0 PAROXYSMAL A-FIB (HCC): ICD-10-CM

## 2025-05-19 DIAGNOSIS — E11.22 TYPE 2 DIABETES MELLITUS WITH STAGE 4 CHRONIC KIDNEY DISEASE, WITH LONG-TERM CURRENT USE OF INSULIN (HCC): ICD-10-CM

## 2025-05-19 DIAGNOSIS — Z87.891 PERSONAL HISTORY OF TOBACCO USE: ICD-10-CM

## 2025-05-19 DIAGNOSIS — I10 ESSENTIAL HYPERTENSION: ICD-10-CM

## 2025-05-19 DIAGNOSIS — I25.10 CORONARY ARTERY DISEASE INVOLVING NATIVE CORONARY ARTERY OF NATIVE HEART WITHOUT ANGINA PECTORIS: Primary | ICD-10-CM

## 2025-05-19 DIAGNOSIS — I48.0 PAROXYSMAL ATRIAL FIBRILLATION (HCC): ICD-10-CM

## 2025-05-19 DIAGNOSIS — N18.4 STAGE 4 CHRONIC KIDNEY DISEASE (HCC): ICD-10-CM

## 2025-05-19 DIAGNOSIS — E78.2 MIXED HYPERLIPIDEMIA: ICD-10-CM

## 2025-05-19 DIAGNOSIS — I10 PRIMARY HYPERTENSION: ICD-10-CM

## 2025-05-19 DIAGNOSIS — I25.10 CORONARY ARTERY DISEASE INVOLVING NATIVE CORONARY ARTERY OF NATIVE HEART WITHOUT ANGINA PECTORIS: ICD-10-CM

## 2025-05-19 PROBLEM — M21.961 ACQUIRED DEFORMITY OF RIGHT KNEE: Status: RESOLVED | Noted: 2024-11-18 | Resolved: 2025-05-19

## 2025-05-19 PROBLEM — M16.11 OSTEOARTHRITIS OF RIGHT HIP, UNSPECIFIED OSTEOARTHRITIS TYPE: Status: RESOLVED | Noted: 2022-12-05 | Resolved: 2025-05-19

## 2025-05-19 LAB
ALBUMIN SERPL-MCNC: 3.3 G/DL (ref 3.2–4.6)
ALBUMIN/GLOB SERPL: 0.8 (ref 1–1.9)
ALP SERPL-CCNC: 145 U/L (ref 35–104)
ALT SERPL-CCNC: 25 U/L (ref 8–45)
AST SERPL-CCNC: 35 U/L (ref 15–37)
BILIRUB DIRECT SERPL-MCNC: 0.2 MG/DL (ref 0–0.3)
BILIRUB SERPL-MCNC: 0.5 MG/DL (ref 0–1.2)
CHOLEST SERPL-MCNC: 120 MG/DL (ref 0–200)
EST. AVERAGE GLUCOSE BLD GHB EST-MCNC: 134 MG/DL
GLOBULIN SER CALC-MCNC: 4 G/DL (ref 2.3–3.5)
HBA1C MFR BLD: 6.3 % (ref 0–5.6)
HDLC SERPL-MCNC: 36 MG/DL (ref 40–60)
HDLC SERPL: 3.4 (ref 0–5)
LDLC SERPL CALC-MCNC: 67 MG/DL (ref 0–100)
PROT SERPL-MCNC: 7.3 G/DL (ref 6.3–8.2)
TRIGL SERPL-MCNC: 89 MG/DL (ref 0–150)
TSH, 3RD GENERATION: 3.48 UIU/ML (ref 0.27–4.2)
VIT B12 SERPL-MCNC: 3371 PG/ML (ref 193–986)
VLDLC SERPL CALC-MCNC: 18 MG/DL (ref 6–23)

## 2025-05-19 PROCEDURE — G8399 PT W/DXA RESULTS DOCUMENT: HCPCS | Performed by: INTERNAL MEDICINE

## 2025-05-19 PROCEDURE — 3078F DIAST BP <80 MM HG: CPT | Performed by: INTERNAL MEDICINE

## 2025-05-19 PROCEDURE — 1111F DSCHRG MED/CURRENT MED MERGE: CPT | Performed by: INTERNAL MEDICINE

## 2025-05-19 PROCEDURE — G8427 DOCREV CUR MEDS BY ELIG CLIN: HCPCS | Performed by: INTERNAL MEDICINE

## 2025-05-19 PROCEDURE — 1159F MED LIST DOCD IN RCRD: CPT | Performed by: INTERNAL MEDICINE

## 2025-05-19 PROCEDURE — G2211 COMPLEX E/M VISIT ADD ON: HCPCS | Performed by: INTERNAL MEDICINE

## 2025-05-19 PROCEDURE — 1123F ACP DISCUSS/DSCN MKR DOCD: CPT | Performed by: INTERNAL MEDICINE

## 2025-05-19 PROCEDURE — 1160F RVW MEDS BY RX/DR IN RCRD: CPT | Performed by: INTERNAL MEDICINE

## 2025-05-19 PROCEDURE — 1036F TOBACCO NON-USER: CPT | Performed by: INTERNAL MEDICINE

## 2025-05-19 PROCEDURE — G0296 VISIT TO DETERM LDCT ELIG: HCPCS | Performed by: INTERNAL MEDICINE

## 2025-05-19 PROCEDURE — G8417 CALC BMI ABV UP PARAM F/U: HCPCS | Performed by: INTERNAL MEDICINE

## 2025-05-19 PROCEDURE — 1090F PRES/ABSN URINE INCON ASSESS: CPT | Performed by: INTERNAL MEDICINE

## 2025-05-19 PROCEDURE — 3074F SYST BP LT 130 MM HG: CPT | Performed by: INTERNAL MEDICINE

## 2025-05-19 PROCEDURE — 99214 OFFICE O/P EST MOD 30 MIN: CPT | Performed by: INTERNAL MEDICINE

## 2025-05-19 RX ORDER — NIFEDIPINE 90 MG/1
90 TABLET, EXTENDED RELEASE ORAL DAILY
Qty: 100 TABLET | Refills: 0 | Status: SHIPPED | OUTPATIENT
Start: 2025-05-19

## 2025-05-19 RX ORDER — INSULIN DEGLUDEC 100 U/ML
10 INJECTION, SOLUTION SUBCUTANEOUS NIGHTLY
Qty: 15 ML | Refills: 0 | Status: CANCELLED | OUTPATIENT
Start: 2025-05-19

## 2025-05-19 RX ORDER — INSULIN DEGLUDEC 100 U/ML
4 INJECTION, SOLUTION SUBCUTANEOUS NIGHTLY
Qty: 2 ADJUSTABLE DOSE PRE-FILLED PEN SYRINGE | Refills: 0 | Status: SHIPPED | OUTPATIENT
Start: 2025-05-19

## 2025-05-19 RX ORDER — CITALOPRAM HYDROBROMIDE 20 MG/1
20 TABLET ORAL DAILY
Qty: 100 TABLET | Refills: 0 | Status: SHIPPED | OUTPATIENT
Start: 2025-05-19

## 2025-05-19 ASSESSMENT — ENCOUNTER SYMPTOMS
SHORTNESS OF BREATH: 0
BLOOD IN STOOL: 0

## 2025-05-19 NOTE — PROGRESS NOTES
Jamison Riggs M.D.  [place] [address]  Phone: [phone] Fax: [phone]    Diabetes  She presents for her follow-up diabetic visit. She has type 2 diabetes mellitus. Pertinent negatives for diabetes include no chest pain.     Magalis Maddox is a 74 y.o. White (non-) female.     Current Outpatient Medications   Medication Sig Dispense Refill    Continuous Glucose Sensor (DEXCOM G7 SENSOR) MISC Use to check BS daily and change sensor every 10 days. 9 each 3    Continuous Glucose  (DEXCOM G7 ) MAURICE Use to check BS daily 1 each 0    traZODone (DESYREL) 50 MG tablet Take 1 tablet by mouth nightly 100 tablet 1    rosuvastatin (CRESTOR) 10 MG tablet Take 1 tablet by mouth daily 100 tablet 1    citalopram (CELEXA) 20 MG tablet Take 1 tablet by mouth daily 100 tablet 1    NIFEdipine (PROCARDIA XL) 90 MG extended release tablet Take 1 tablet by mouth daily 100 tablet 1    carvedilol (COREG) 6.25 MG tablet Take 1 tablet by mouth 2 times daily 200 tablet 3    furosemide (LASIX) 40 MG tablet Take 1 tablet by mouth daily 100 tablet 3    isosorbide mononitrate (IMDUR) 60 MG extended release tablet Take 1 tablet by mouth daily 100 tablet 3    BD PEN NEEDLE MILVIA 2ND GEN 32G X 4 MM MISC Check blood sugar three times daily 300 each 3    ferrous sulfate (FE TABS 325) 325 (65 Fe) MG EC tablet Take 1 tablet by mouth 2 times daily 180 tablet 1    Insulin Degludec (TRESIBA FLEXTOUCH) 100 UNIT/ML SOPN Inject 10 Units into the skin at bedtime (Patient taking differently: Inject 8 Units into the skin at bedtime) 15 mL 0    apixaban (ELIQUIS) 5 MG TABS tablet Take 1 tablet by mouth 2 times daily 180 tablet 3    aspirin EC 81 MG EC tablet Take 1 tablet by mouth daily 1 tablet 0    Cholecalciferol 50 MCG (2000 UT) TABS Take 1 tablet by mouth daily      Cyanocobalamin 5000 MCG TBDP Take 5,000 mcg by mouth daily      magnesium oxide (MAG-OX)

## 2025-05-20 ENCOUNTER — RESULTS FOLLOW-UP (OUTPATIENT)
Dept: INTERNAL MEDICINE CLINIC | Facility: CLINIC | Age: 75
End: 2025-05-20

## 2025-05-20 DIAGNOSIS — R91.1 LUNG NODULE: Primary | ICD-10-CM

## 2025-05-21 ENCOUNTER — OFFICE VISIT (OUTPATIENT)
Dept: ORTHOPEDIC SURGERY | Age: 75
End: 2025-05-21
Payer: MEDICARE

## 2025-05-21 DIAGNOSIS — M17.11 PRIMARY OSTEOARTHRITIS OF RIGHT KNEE: Primary | ICD-10-CM

## 2025-05-21 PROCEDURE — 20610 DRAIN/INJ JOINT/BURSA W/O US: CPT | Performed by: NURSE PRACTITIONER

## 2025-05-21 PROCEDURE — 1111F DSCHRG MED/CURRENT MED MERGE: CPT | Performed by: NURSE PRACTITIONER

## 2025-05-21 PROCEDURE — G8428 CUR MEDS NOT DOCUMENT: HCPCS | Performed by: NURSE PRACTITIONER

## 2025-05-21 PROCEDURE — 1036F TOBACCO NON-USER: CPT | Performed by: NURSE PRACTITIONER

## 2025-05-21 PROCEDURE — 1123F ACP DISCUSS/DSCN MKR DOCD: CPT | Performed by: NURSE PRACTITIONER

## 2025-05-21 PROCEDURE — G8399 PT W/DXA RESULTS DOCUMENT: HCPCS | Performed by: NURSE PRACTITIONER

## 2025-05-21 PROCEDURE — 1090F PRES/ABSN URINE INCON ASSESS: CPT | Performed by: NURSE PRACTITIONER

## 2025-05-21 PROCEDURE — 99214 OFFICE O/P EST MOD 30 MIN: CPT | Performed by: NURSE PRACTITIONER

## 2025-05-21 PROCEDURE — G8417 CALC BMI ABV UP PARAM F/U: HCPCS | Performed by: NURSE PRACTITIONER

## 2025-05-21 RX ORDER — METHYLPREDNISOLONE ACETATE 40 MG/ML
40 INJECTION, SUSPENSION INTRA-ARTICULAR; INTRALESIONAL; INTRAMUSCULAR; SOFT TISSUE ONCE
Status: COMPLETED | OUTPATIENT
Start: 2025-05-21 | End: 2025-05-21

## 2025-05-21 RX ADMIN — METHYLPREDNISOLONE ACETATE 40 MG: 40 INJECTION, SUSPENSION INTRA-ARTICULAR; INTRALESIONAL; INTRAMUSCULAR; SOFT TISSUE at 10:45

## 2025-05-21 NOTE — PATIENT INSTRUCTIONS
Learning About Joint Injections  What are joint injections?     Joint injections are shots into a joint, such as the knee or shoulder. They are used to put in medicines, such as pain relievers and steroid medicines. Steroids can help reduce inflammation. A steroid shot can sometimes help with short-term pain relief when other treatments haven't worked.  How are they done?  First, the area over the joint will be cleaned. Your doctor may then use a tiny needle to numb the skin in the area where you will get the joint injection.  If a tiny needle is used to numb the area, your doctor will use another needle to inject the medicine. Your doctor may use a pain reliever, a steroid, or both. You may feel some pressure or discomfort.  Your doctor may put ice on the area before you go home.  What can you expect after a joint injection?  You will probably go home soon after your shot. You may have numbness around the joint for a few hours.  If your shot included both a pain reliever and a steroid, then the pain will probably go away right away. But it might come back after a few hours. This might happen if the pain reliever wears off and the steroid hasn't started to work yet. Steroids don't always work. But when they do, the pain relief can last for several days to a few months or longer.  Your doctor may tell you to use ice on the area. You can also use ice if the pain comes back. Put ice or a cold pack on your joint for 10 to 20 minutes at a time. Put a thin cloth between the ice and your skin.  Follow your doctor's instructions carefully.  Follow-up care is a key part of your treatment and safety. Be sure to make and go to all appointments, and call your doctor if you are having problems. It's also a good idea to know your test results and keep a list of the medicines you take.  Current as of: July 31, 2024  Content Version: 14.4  © 2024-2025 [employer].   Care instructions adapted under license by [facility]

## 2025-05-21 NOTE — PROGRESS NOTES
Patient ID:  Magalis Maddox  [id number]  74 y.o.  1950    Today: May 21, 2025          Chief Complaint:  Right Knee pain    HPI:       Magalis Maddox is a 74 y.o. female seen for evaluation and treatment of right knee pain. Patient reports a longstanding history of pain involving the knee. The patient complains of knee pain with activities, reports pain as mostly occurring along the joint lines, reports stiffness of the knee with prolonged inactivity, and swelling/pain at the end of the day and after increased physical activity. Generally, symptoms improve with sitting/rest. The pain affects the patient’s activities of daily living and quality of life. Patient reports progressive pain and instability in the knee. The pain has been present for an extended period of time. Pain ranges from approximately 4-8 in a cyclical fashion with periods of acute exacerbation.   Injury history: no.   Patient has attempted prior conservative treatment including medications and activity modification +/- physical therapy. ALso, the patient has had a prior steroid injection which provided short term pain relief    Past Medical History:  Past Medical History:   Diagnosis Date    Arthritis     CAD (coronary artery disease) 03/01/2012    MI, 3 stents    Carotid artery stenosis 12/12/2018    CKD (chronic kidney disease) stage 4, GFR 15-29 ml/min ([facility])     COVID-19 virus infection 01/12/2021    Depression 03/01/2012    Diastolic dysfunction     DM2 (diabetes mellitus, type 2) ([facility]) 05/21/2015    Type 2, injectable. FBS , s/s hypo 70's    Elevated LFTs 01/12/2021    Essential hypertension 05/21/2015    on med for control    GERD (gastroesophageal reflux disease)     no meds    History of MI (myocardial infarction) 11/2012    Hypercholesterolemia     Hypertension 03/01/2012    VERONICA (iron deficiency anemia)     oral iron daily    Insomnia     Long QT interval 01/12/2021    Personal history of colonic polyps 2013

## 2025-05-23 ENCOUNTER — PATIENT MESSAGE (OUTPATIENT)
Dept: INTERNAL MEDICINE CLINIC | Facility: CLINIC | Age: 75
End: 2025-05-23

## 2025-05-23 DIAGNOSIS — Z79.4 TYPE 2 DIABETES MELLITUS WITH STAGE 4 CHRONIC KIDNEY DISEASE, WITH LONG-TERM CURRENT USE OF INSULIN (HCC): Primary | ICD-10-CM

## 2025-05-23 DIAGNOSIS — N18.4 TYPE 2 DIABETES MELLITUS WITH STAGE 4 CHRONIC KIDNEY DISEASE, WITH LONG-TERM CURRENT USE OF INSULIN (HCC): Primary | ICD-10-CM

## 2025-05-23 DIAGNOSIS — E11.22 TYPE 2 DIABETES MELLITUS WITH STAGE 4 CHRONIC KIDNEY DISEASE, WITH LONG-TERM CURRENT USE OF INSULIN (HCC): Primary | ICD-10-CM

## 2025-05-27 ENCOUNTER — TELEPHONE (OUTPATIENT)
Age: 75
End: 2025-05-27

## 2025-05-27 NOTE — TELEPHONE ENCOUNTER
Attempted to reach patient, left message to call me back//ibanab    Every once in a while I cough up mucus with bright red blood in it. It's not much and it's not often. What should I do?     Thanks  Magalis Maddox

## 2025-05-27 NOTE — TELEPHONE ENCOUNTER
Patient called stating that she has been coughing with bright red blood about a 1/2 inch in mucous 2-3 times a day for about 2 weeks. Also has seen bright red blood in her stool about 1/2 inch. Has seen blood on her pillowcase several times. Had watchman 4-23-25. On Eliquis 5 mg  and Aspirin 81 mg since watchman.    No

## 2025-05-27 NOTE — TELEPHONE ENCOUNTER
Stop aspirin.  Continue Eliquis   Patient called with Dr Guadarrama's response. Patient voiced understanding and thanked me//lizzette

## 2025-05-28 ENCOUNTER — OFFICE VISIT (OUTPATIENT)
Dept: ORTHOPEDIC SURGERY | Age: 75
End: 2025-05-28

## 2025-05-28 DIAGNOSIS — M17.11 PRIMARY OSTEOARTHRITIS OF RIGHT KNEE: Primary | ICD-10-CM

## 2025-05-28 NOTE — PROGRESS NOTES
Diagnosis: Right Knee Arthritis    Patient present today for the 2nd viscosupplimentation injection in the right knee. Prior injection was tolerated well.    Risks, benefits, and alteratives were discussed with patient prior to injection. A timeout was performed which included identifying the patient by name and date of birth, verifying correct procedure and correct site(s).      Under sterile technique, the right knee was injected with Synvisc.  The patient tolerated the procedure well.    Patient is advised to ice the knee over the next week or so.  Continue taking prior oral medications. The patient can follow-up as directed for the final injection in the series.        BIJAN ZUNIGA, APRN - CNP

## 2025-05-28 NOTE — PROGRESS NOTES
Patient pre-assessment complete for Magalis Javier scheduled for LOS, arrival time 0830. Patient verified using . Patient instructed to bring a list of all home medications on the day of procedure. NPO status reinforced.  Patient instructed to HOLD lasix, Jardiance, take 1/2 dose of insulin tonight. Instructed they can take all other medications excluding vitamins & supplements. Patient verbalizes understanding of all instructions & denies any questions at this time.

## 2025-05-29 ENCOUNTER — HOSPITAL ENCOUNTER (OUTPATIENT)
Dept: CARDIAC CATH/INVASIVE PROCEDURES | Age: 75
Discharge: HOME | End: 2025-05-29
Attending: INTERNAL MEDICINE | Admitting: INTERNAL MEDICINE
Payer: MEDICARE

## 2025-05-29 VITALS
RESPIRATION RATE: 17 BRPM | BODY MASS INDEX: 25.1 KG/M2 | TEMPERATURE: 97.9 F | WEIGHT: 147 LBS | DIASTOLIC BLOOD PRESSURE: 54 MMHG | HEIGHT: 64 IN | SYSTOLIC BLOOD PRESSURE: 128 MMHG | HEART RATE: 51 BPM | OXYGEN SATURATION: 93 %

## 2025-05-29 DIAGNOSIS — I48.0 PAROXYSMAL A-FIB (HCC): Primary | ICD-10-CM

## 2025-05-29 LAB
ECHO BSA: 1.74 M2
EKG ATRIAL RATE: 54 BPM
EKG DIAGNOSIS: NORMAL
EKG P AXIS: 63 DEGREES
EKG P-R INTERVAL: 134 MS
EKG Q-T INTERVAL: 494 MS
EKG QRS DURATION: 104 MS
EKG QTC CALCULATION (BAZETT): 468 MS
EKG R AXIS: 55 DEGREES
EKG T AXIS: 72 DEGREES
EKG VENTRICULAR RATE: 54 BPM
GLUCOSE BLD STRIP.AUTO-MCNC: 115 MG/DL (ref 65–100)
SERVICE CMNT-IMP: ABNORMAL

## 2025-05-29 PROCEDURE — 93319 3D ECHO IMG CGEN CAR ANOMAL: CPT | Performed by: INTERNAL MEDICINE

## 2025-05-29 PROCEDURE — 6360000002 HC RX W HCPCS: Performed by: INTERNAL MEDICINE

## 2025-05-29 PROCEDURE — 93312 ECHO TRANSESOPHAGEAL: CPT | Performed by: INTERNAL MEDICINE

## 2025-05-29 PROCEDURE — 99152 MOD SED SAME PHYS/QHP 5/>YRS: CPT | Performed by: INTERNAL MEDICINE

## 2025-05-29 PROCEDURE — 93010 ELECTROCARDIOGRAM REPORT: CPT | Performed by: INTERNAL MEDICINE

## 2025-05-29 PROCEDURE — 99152 MOD SED SAME PHYS/QHP 5/>YRS: CPT

## 2025-05-29 PROCEDURE — 93319 3D ECHO IMG CGEN CAR ANOMAL: CPT

## 2025-05-29 PROCEDURE — 6370000000 HC RX 637 (ALT 250 FOR IP): Performed by: INTERNAL MEDICINE

## 2025-05-29 PROCEDURE — 93320 DOPPLER ECHO COMPLETE: CPT | Performed by: INTERNAL MEDICINE

## 2025-05-29 PROCEDURE — 93005 ELECTROCARDIOGRAM TRACING: CPT | Performed by: INTERNAL MEDICINE

## 2025-05-29 PROCEDURE — 82962 GLUCOSE BLOOD TEST: CPT

## 2025-05-29 RX ORDER — LIDOCAINE HYDROCHLORIDE 20 MG/ML
SOLUTION OROPHARYNGEAL PRN
Status: COMPLETED | OUTPATIENT
Start: 2025-05-29 | End: 2025-05-29

## 2025-05-29 RX ORDER — MIDAZOLAM HYDROCHLORIDE 1 MG/ML
INJECTION, SOLUTION INTRAMUSCULAR; INTRAVENOUS PRN
Status: COMPLETED | OUTPATIENT
Start: 2025-05-29 | End: 2025-05-29

## 2025-05-29 RX ORDER — CLOPIDOGREL BISULFATE 75 MG/1
75 TABLET ORAL DAILY
Qty: 90 TABLET | Refills: 1 | Status: SHIPPED | OUTPATIENT
Start: 2025-05-29

## 2025-05-29 RX ORDER — ASPIRIN 81 MG/1
81 TABLET ORAL DAILY
Qty: 90 TABLET | Refills: 1 | Status: SHIPPED | OUTPATIENT
Start: 2025-05-29

## 2025-05-29 RX ORDER — FENTANYL CITRATE 50 UG/ML
INJECTION, SOLUTION INTRAMUSCULAR; INTRAVENOUS PRN
Status: COMPLETED | OUTPATIENT
Start: 2025-05-29 | End: 2025-05-29

## 2025-05-29 RX ADMIN — LIDOCAINE HYDROCHLORIDE 15 ML: 20 SOLUTION ORAL at 09:27

## 2025-05-29 RX ADMIN — FENTANYL CITRATE 50 MCG: 50 INJECTION, SOLUTION INTRAMUSCULAR; INTRAVENOUS at 09:41

## 2025-05-29 RX ADMIN — MIDAZOLAM 1 MG: 1 INJECTION INTRAMUSCULAR; INTRAVENOUS at 09:43

## 2025-05-29 RX ADMIN — MIDAZOLAM 2 MG: 1 INJECTION INTRAMUSCULAR; INTRAVENOUS at 09:41

## 2025-05-29 NOTE — H&P
[facility] Cardiology History & Physical      Date of  Admission: 5/29/2025  8:26 AM     CC: Post watchman LOS    HPI:  Magalis Maddox is a 74 y.o. female     74-year-old female who presents today for LOS post left atrial appendage occlusion.  She has a known history of paroxysmal atrial fibrillation, chronic diastolic heart failure, coronary artery disease, type 2 diabetes mellitus and carotid artery disease.  Her atrial fibrillation was diagnosed based on a previous monitor placed in 2023.      Patient has had a significant difficulty tolerating anticoagulation.  She notes excessive bruising and has had multiple small injuries with significant hematoma.  Recently had gait instability and had better right foot.  Had a significant hematoma to a point her physician was worried about a heart syndrome.  She is also has several nosebleeds which take 4 to 5 hours to stop as well as a recent injury to her ear which bled for \"3 days\".  She does walk with a cane and has noted more gait instability.  She has not had any falls but has had injuries you due to gait instability such as her foot which is described below.    No new complications post Watchman device.        Past Medical History:   Diagnosis Date    Arthritis     CAD (coronary artery disease) 03/01/2012    MI, 3 stents    Carotid artery stenosis 12/12/2018    CKD (chronic kidney disease) stage 4, GFR 15-29 ml/min ([facility])     COVID-19 virus infection 01/12/2021    Depression 03/01/2012    Diastolic dysfunction     DM2 (diabetes mellitus, type 2) ([facility]) 05/21/2015    Type 2, injectable. FBS , s/s hypo 70's    Elevated LFTs 01/12/2021    Essential hypertension 05/21/2015    on med for control    GERD (gastroesophageal reflux disease)     no meds    History of MI (myocardial infarction) 11/2012    Hypercholesterolemia     Hypertension 03/01/2012    VERONICA (iron deficiency anemia)     oral iron daily    Insomnia     Long QT interval 01/12/2021    Personal

## 2025-05-29 NOTE — PROGRESS NOTES
Patient received to [facility] room # 16  Ambulatory from [facility]. Patient scheduled for LOS today with Dr Da Silva. Procedure reviewed & questions answered, voiced good understanding consent obtained & placed on chart. All medications and medical history reviewed. Will prep patient per orders. Patient & family updated on plan of care.      The patient has a fraility score of 3-MANAGING WELL, based on ambulation.

## 2025-05-29 NOTE — NURSE NAVIGATOR
Successful post Watchman LOS this am.  No thrombus or significant periprosthetic device leak noted.  Patient meets criteria to stop Eliquis. Eliquis discontinued.  ASA 81 mg and plavix 75 mg daily ordered per Dr. Guadarrama/Lexa protocol.  Patient may resume ASA 81 mg today.  Do not start plavix 75 mg until you have been off Eliquis for 4 days.      Upcoming plavix stop day will be 10/23/2025.  Patient has Watchman coordinator contact (María Elena Chou [phone]) information for questions or concerns.

## 2025-05-29 NOTE — DISCHARGE INSTRUCTIONS
PLAVIX STOP DATE 10/23/2025.    Do Not start plavix until you have been off Eliquis for 4 days.     Do not eat or drink until 11:27 am.      ---------------------------------------------------------------------------------------------------------------------------------------------------------------------------------------     Transesophageal Echocardiogram: What to Expect at Home  Your Recovery  A transesophageal echocardiogram is a test to help your doctor look at the inside of your heart. A small device called a transducer directs sound waves toward your heart. The sound waves make a picture of the heart's valves and chambers.  Before the test, your throat was sprayed with medicine to numb it. Your throat may be sore for a few days.  You may have had a sedative to help you relax. You may be unsteady after having sedation. It can take a few hours for the medicine's effects to wear off. Common side effects include nausea, vomiting, and feeling sleepy or tired.  This care sheet gives you a general idea about how long it will take for you to recover. But each person recovers at a different pace. Follow the steps below to feel better as quickly as possible.  How can you care for yourself at home?  Activity    If a sedative was used, your doctor will tell you when it is safe for you to do your normal activities.     For your safety, do not drive or operate any machinery that could be dangerous. Wait until the medicine wears off and you can think clearly and react easily.   Diet    Do not eat or drink until the numbness in your throat wears off.     When the numbness is gone, you can eat your normal diet.     Throat lozenges and warm saltwater gargles can help relieve throat soreness. Throat lozenges can be used by people age 4 or older. And most people can gargle at age 8 and older.     Do not drink alcohol for 24 hours.   Follow-up care is a key part of your treatment and safety. Be sure to make and go to all  appointments, and call your doctor if you are having problems. It's also a good idea to know your test results and keep a list of the medicines you take.  When should you call for help?   Call 911 anytime you think you may need emergency care. For example, call if:    Your stools are maroon or very bloody.     You vomit blood or what looks like coffee grounds.   Call your doctor now or seek immediate medical care if:    You have pain in your chest, belly, or back.     You have new or worse trouble swallowing.     You have trouble breathing.   Watch closely for changes in your health, and be sure to contact your doctor if you have any problems.  Current as of: July 31, 2024  Content Version: 14.4  © 2024-2025 [employer].   Care instructions adapted under license by [employer] If you have questions about a medical condition or this instruction, always ask your healthcare professional. [employer] disclaims any warranty or liability for your use of this information.

## 2025-05-29 NOTE — PROGRESS NOTES
Discharge instructions reviewed with patient including post LOS care. INT removed with cath intact.

## 2025-05-29 NOTE — PROGRESS NOTES
LOS by Dr Da Silva  II ASA II Mallampati  3mg versed  50mcg fentanyl  Viscous Solution given at 0927  Pt tolerated well.

## 2025-06-06 ENCOUNTER — OFFICE VISIT (OUTPATIENT)
Dept: ORTHOPEDIC SURGERY | Age: 75
End: 2025-06-06

## 2025-06-06 DIAGNOSIS — M17.11 PRIMARY OSTEOARTHRITIS OF RIGHT KNEE: Primary | ICD-10-CM

## 2025-06-06 NOTE — PROGRESS NOTES
Diagnosis: Right Knee arthritis    Patient present today for the 3rd viscosupplimentation injection in the right knee. Prior injection was tolerated well.    Risks, benefits, and alteratives were discussed with patient prior to injection.  A timeout was performed which included identifying the patient by name and date of birth, verifying correct procedure and correct site(s).     Under sterile technique, the right knee was injected with Synvisc (2ml).  The patient tolerated the procedure well.    Patient is advised to ice the knee over the next week or so.  Continue taking prior oral medications. The patient can follow-up as directed for the final injection in the series.          BIJAN ZUNIGA, APRN - CNP

## 2025-06-09 ENCOUNTER — OFFICE VISIT (OUTPATIENT)
Age: 75
End: 2025-06-09
Payer: MEDICARE

## 2025-06-09 VITALS
BODY MASS INDEX: 24.75 KG/M2 | HEIGHT: 64 IN | WEIGHT: 145 LBS | SYSTOLIC BLOOD PRESSURE: 138 MMHG | HEART RATE: 56 BPM | DIASTOLIC BLOOD PRESSURE: 50 MMHG

## 2025-06-09 DIAGNOSIS — I48.0 PAROXYSMAL A-FIB (HCC): Primary | ICD-10-CM

## 2025-06-09 DIAGNOSIS — Z95.818 PRESENCE OF WATCHMAN LEFT ATRIAL APPENDAGE CLOSURE DEVICE: ICD-10-CM

## 2025-06-09 DIAGNOSIS — I10 PRIMARY HYPERTENSION: ICD-10-CM

## 2025-06-09 PROCEDURE — 1036F TOBACCO NON-USER: CPT | Performed by: INTERNAL MEDICINE

## 2025-06-09 PROCEDURE — G8420 CALC BMI NORM PARAMETERS: HCPCS | Performed by: INTERNAL MEDICINE

## 2025-06-09 PROCEDURE — 1159F MED LIST DOCD IN RCRD: CPT | Performed by: INTERNAL MEDICINE

## 2025-06-09 PROCEDURE — 3078F DIAST BP <80 MM HG: CPT | Performed by: INTERNAL MEDICINE

## 2025-06-09 PROCEDURE — G8427 DOCREV CUR MEDS BY ELIG CLIN: HCPCS | Performed by: INTERNAL MEDICINE

## 2025-06-09 PROCEDURE — 1123F ACP DISCUSS/DSCN MKR DOCD: CPT | Performed by: INTERNAL MEDICINE

## 2025-06-09 PROCEDURE — 99213 OFFICE O/P EST LOW 20 MIN: CPT | Performed by: INTERNAL MEDICINE

## 2025-06-09 PROCEDURE — 3075F SYST BP GE 130 - 139MM HG: CPT | Performed by: INTERNAL MEDICINE

## 2025-06-09 PROCEDURE — 1090F PRES/ABSN URINE INCON ASSESS: CPT | Performed by: INTERNAL MEDICINE

## 2025-06-09 PROCEDURE — G8399 PT W/DXA RESULTS DOCUMENT: HCPCS | Performed by: INTERNAL MEDICINE

## 2025-06-09 NOTE — PROGRESS NOTES
[address], [address]  [place] [address]  PHONE: [phone]    Magalis Maddox  1950      SUBJECTIVE:   Magalis Maddox is a 74 y.o. female seen for a follow up visit regarding the following:     Chief Complaint   Patient presents with    Follow-Up from [facility]       HPI:    Patient presents for follow-up after undergoing left atrial appendage occlusion.  She has paroxysmal atrial fibrillation and has had significant difficulty tolerating anticoagulation due to recurrent hematomas, epistaxis and gait instability.  She underwent successful left atrial appendage occlusion as outlined below:    Left atrial appendage occlusion (4/23/25):  24 mm Watchman FLX Pro device.      She did well with the procedure and ultimately was discharged home the following day.  She subsequently underwent LOS by Dr. Da Silva on May 29 which showed her device in good position with trivial and not clinically significant peridevice flow of 1 mm.    Based on the reassuring results of her LOS, her anticoagulation has been discontinued.  She is currently on aspirin and Plavix therapy.  She presents today for follow-up.  She has done well since her procedure.  She denies any recent palpitation or tachycardia.  No neurologic events consistent with TIA or stroke.        Primary Cardiologist : Dr. Narendra velazquez 9/3/25.       Past Medical History, Past Surgical History, Family history, Social History, and Medications were all reviewed with the patient today and updated as necessary.           Current Outpatient Medications:     clopidogrel (PLAVIX) 75 MG tablet, Take 1 tablet by mouth daily, Disp: 90 tablet, Rfl: 1    aspirin 81 MG EC tablet, Take 1 tablet by mouth daily, Disp: 90 tablet, Rfl: 1    empagliflozin (JARDIANCE) 10 MG tablet, Take 1 tablet by mouth daily, Disp: 100 tablet, Rfl: 0    citalopram (CELEXA) 20 MG tablet, Take 1 tablet by mouth daily, Disp: 100 tablet, Rfl: 0    English

## 2025-06-25 ENCOUNTER — HOSPITAL ENCOUNTER (OUTPATIENT)
Dept: CT IMAGING | Age: 75
Discharge: HOME | End: 2025-06-28
Attending: INTERNAL MEDICINE
Payer: MEDICARE

## 2025-06-25 DIAGNOSIS — Z87.891 PERSONAL HISTORY OF TOBACCO USE: ICD-10-CM

## 2025-06-25 PROCEDURE — 71271 CT THORAX LUNG CANCER SCR C-: CPT

## 2025-08-19 ENCOUNTER — OFFICE VISIT (OUTPATIENT)
Dept: INTERNAL MEDICINE CLINIC | Facility: CLINIC | Age: 75
End: 2025-08-19
Payer: MEDICARE

## 2025-08-19 VITALS
BODY MASS INDEX: 23.9 KG/M2 | HEART RATE: 55 BPM | TEMPERATURE: 97.3 F | WEIGHT: 140 LBS | OXYGEN SATURATION: 98 % | SYSTOLIC BLOOD PRESSURE: 124 MMHG | RESPIRATION RATE: 16 BRPM | HEIGHT: 64 IN | DIASTOLIC BLOOD PRESSURE: 60 MMHG

## 2025-08-19 DIAGNOSIS — I50.32 CHRONIC DIASTOLIC CONGESTIVE HEART FAILURE (HCC): ICD-10-CM

## 2025-08-19 DIAGNOSIS — I73.9 PVD (PERIPHERAL VASCULAR DISEASE) WITH CLAUDICATION: ICD-10-CM

## 2025-08-19 DIAGNOSIS — I48.0 PAROXYSMAL ATRIAL FIBRILLATION (HCC): ICD-10-CM

## 2025-08-19 DIAGNOSIS — F32.5 MAJOR DEPRESSIVE DISORDER WITH SINGLE EPISODE, IN FULL REMISSION: ICD-10-CM

## 2025-08-19 DIAGNOSIS — I25.118 CORONARY ARTERY DISEASE OF NATIVE ARTERY OF NATIVE HEART WITH STABLE ANGINA PECTORIS: ICD-10-CM

## 2025-08-19 DIAGNOSIS — Z79.4 TYPE 2 DIABETES MELLITUS WITH STAGE 4 CHRONIC KIDNEY DISEASE, WITH LONG-TERM CURRENT USE OF INSULIN (HCC): ICD-10-CM

## 2025-08-19 DIAGNOSIS — I25.10 CORONARY ARTERY DISEASE INVOLVING NATIVE CORONARY ARTERY OF NATIVE HEART WITHOUT ANGINA PECTORIS: Primary | ICD-10-CM

## 2025-08-19 DIAGNOSIS — E78.2 MIXED HYPERLIPIDEMIA: ICD-10-CM

## 2025-08-19 DIAGNOSIS — N18.4 STAGE 4 CHRONIC KIDNEY DISEASE (HCC): ICD-10-CM

## 2025-08-19 DIAGNOSIS — N18.4 TYPE 2 DIABETES MELLITUS WITH STAGE 4 CHRONIC KIDNEY DISEASE, WITH LONG-TERM CURRENT USE OF INSULIN (HCC): ICD-10-CM

## 2025-08-19 DIAGNOSIS — E11.22 TYPE 2 DIABETES MELLITUS WITH STAGE 4 CHRONIC KIDNEY DISEASE, WITH LONG-TERM CURRENT USE OF INSULIN (HCC): ICD-10-CM

## 2025-08-19 DIAGNOSIS — I10 PRIMARY HYPERTENSION: ICD-10-CM

## 2025-08-19 DIAGNOSIS — I10 ESSENTIAL HYPERTENSION: ICD-10-CM

## 2025-08-19 PROCEDURE — G8399 PT W/DXA RESULTS DOCUMENT: HCPCS | Performed by: INTERNAL MEDICINE

## 2025-08-19 PROCEDURE — 3078F DIAST BP <80 MM HG: CPT | Performed by: INTERNAL MEDICINE

## 2025-08-19 PROCEDURE — G8427 DOCREV CUR MEDS BY ELIG CLIN: HCPCS | Performed by: INTERNAL MEDICINE

## 2025-08-19 PROCEDURE — 3044F HG A1C LEVEL LT 7.0%: CPT | Performed by: INTERNAL MEDICINE

## 2025-08-19 PROCEDURE — 3074F SYST BP LT 130 MM HG: CPT | Performed by: INTERNAL MEDICINE

## 2025-08-19 PROCEDURE — 1090F PRES/ABSN URINE INCON ASSESS: CPT | Performed by: INTERNAL MEDICINE

## 2025-08-19 PROCEDURE — G2211 COMPLEX E/M VISIT ADD ON: HCPCS | Performed by: INTERNAL MEDICINE

## 2025-08-19 PROCEDURE — G8420 CALC BMI NORM PARAMETERS: HCPCS | Performed by: INTERNAL MEDICINE

## 2025-08-19 PROCEDURE — 1036F TOBACCO NON-USER: CPT | Performed by: INTERNAL MEDICINE

## 2025-08-19 PROCEDURE — 99214 OFFICE O/P EST MOD 30 MIN: CPT | Performed by: INTERNAL MEDICINE

## 2025-08-19 PROCEDURE — 1160F RVW MEDS BY RX/DR IN RCRD: CPT | Performed by: INTERNAL MEDICINE

## 2025-08-19 PROCEDURE — 1159F MED LIST DOCD IN RCRD: CPT | Performed by: INTERNAL MEDICINE

## 2025-08-19 PROCEDURE — 1123F ACP DISCUSS/DSCN MKR DOCD: CPT | Performed by: INTERNAL MEDICINE

## 2025-08-19 RX ORDER — INSULIN DEGLUDEC 100 U/ML
4 INJECTION, SOLUTION SUBCUTANEOUS NIGHTLY
Qty: 2 ADJUSTABLE DOSE PRE-FILLED PEN SYRINGE | Refills: 0 | Status: SHIPPED | OUTPATIENT
Start: 2025-08-19

## 2025-08-19 RX ORDER — NIFEDIPINE 90 MG/1
90 TABLET, EXTENDED RELEASE ORAL DAILY
Qty: 100 TABLET | Refills: 0 | Status: SHIPPED | OUTPATIENT
Start: 2025-08-19

## 2025-08-19 RX ORDER — CITALOPRAM HYDROBROMIDE 20 MG/1
20 TABLET ORAL DAILY
Qty: 100 TABLET | Refills: 0 | Status: SHIPPED | OUTPATIENT
Start: 2025-08-19

## 2025-08-19 RX ORDER — ROSUVASTATIN CALCIUM 10 MG/1
10 TABLET, COATED ORAL DAILY
Qty: 100 TABLET | Refills: 0 | Status: SHIPPED | OUTPATIENT
Start: 2025-08-19

## 2025-08-19 RX ORDER — ACYCLOVIR 400 MG/1
TABLET ORAL
Qty: 1 EACH | Refills: 0 | Status: SHIPPED | OUTPATIENT
Start: 2025-08-19

## 2025-08-19 ASSESSMENT — ENCOUNTER SYMPTOMS
BLOOD IN STOOL: 0
SHORTNESS OF BREATH: 0

## (undated) DEVICE — DRAPE,TOP,102X53,STERILE: Brand: MEDLINE

## (undated) DEVICE — CHECK-FLO PERFORMER INTRODUCER: Brand: PERFORMER

## (undated) DEVICE — CAROTID ARTERY SHUNT KIT,RADIOPAQUE LINE, STRAIGHT: Brand: ARGYLE

## (undated) DEVICE — SUTURE PROL SZ 6-0 L30IN NONABSORBABLE BLU L9.3MM BV-1 3/8 M8709

## (undated) DEVICE — STERILE SYNTHETIC POLYISOPRENE POWDER-FREE SURGICAL GLOVES WITH HYDROGEL COATING, SMOOTH FINISH, STRAIGHT FINGER: Brand: PROTEXIS

## (undated) DEVICE — SUTURE PERMAHAND SZ 3-0 L18IN NONABSORBABLE BLK SILK BRAID A184H

## (undated) DEVICE — 450 ML BOTTLE OF 0.05% CHLORHEXIDINE GLUCONATE IN 99.95% STERILE WATER FOR IRRIGATION, USP AND APPLICATOR.: Brand: IRRISEPT ANTIMICROBIAL WOUND LAVAGE

## (undated) DEVICE — UNIVERSAL DRAPES: Brand: MEDLINE INDUSTRIES, INC.

## (undated) DEVICE — 2000CC GUARDIAN II: Brand: GUARDIAN

## (undated) DEVICE — INTENDED FOR TISSUE SEPARATION, AND OTHER PROCEDURES THAT REQUIRE A SHARP SURGICAL BLADE TO PUNCTURE OR CUT.: Brand: BARD-PARKER ® STAINLESS STEEL BLADES

## (undated) DEVICE — STERILE PVP: Brand: MEDLINE INDUSTRIES, INC.

## (undated) DEVICE — APPLIER CLP L9.375IN APER 2.1MM CLS L3.8MM 20 SM TI CLP

## (undated) DEVICE — SOLUTION IV 250ML 0.9% SOD CHL PH 5 INJ USP VIAFLX PLAS

## (undated) DEVICE — TOWEL SURG W16XL26IN BLU NONFENESTRATED DLX ST 2 PER PK

## (undated) DEVICE — HOOD: Brand: FLYTE

## (undated) DEVICE — CANNULA NSL ORAL AD FOR CAPNOFLEX CO2 O2 AIRLFE

## (undated) DEVICE — PVC URETHRAL CATHETER: Brand: DOVER

## (undated) DEVICE — DRAPE TWL SURG 16X26IN BLU ORB04] ALLCARE INC]

## (undated) DEVICE — SOLUTION IRRIG 1000ML 0.9% SOD CHL USP POUR PLAS BTL

## (undated) DEVICE — GOWN,REINFORCED,POLY,AURORA,XXLARGE,STR: Brand: MEDLINE

## (undated) DEVICE — SUTURE ABSORBABLE MONOFILAMENT 1 CTX 36 CM 48 MM VIO PDS +

## (undated) DEVICE — COVER,MAYO STAND,XL,STERILE: Brand: MEDLINE

## (undated) DEVICE — SYR 50ML SLIP TIP NSAF LF STRL --

## (undated) DEVICE — CARDINAL HEALTH FLEXIBLE LIGHT HANDLE COVER: Brand: CARDINAL HEALTH

## (undated) DEVICE — SUTURE PERMAHAND SZ 2-0 L12X18IN NONABSORBABLE BLK SILK A185H

## (undated) DEVICE — APPLICATOR BNDG 1MM ADH PREMIERPRO EXOFIN

## (undated) DEVICE — SUTURE PROL SZ 6-0 L24IN NONABSORBABLE BLU BV-1 L9.3MM 3/8 M8805

## (undated) DEVICE — INTENDED TO BE USED TO OCCLUDE, RETRACT AND IDENTIFY ARTERIES, VEINS, TENDONS AND NERVES IN SURGICAL PROCEDURES: Brand: STERION®  VESSEL LOOP

## (undated) DEVICE — CONNECTOR TBNG OD5-7MM O2 END DISP

## (undated) DEVICE — PIN FIX TROCAR PT 1 END 9/64X9 IN 1 PT STYL SMOOTH PLN STRL
Type: IMPLANTABLE DEVICE | Site: HIP | Status: NON-FUNCTIONAL
Removed: 2022-12-05

## (undated) DEVICE — SOLUTION IV 1000ML 0.9% SOD CHL

## (undated) DEVICE — SYR 5ML 1/5 GRAD LL NSAF LF --

## (undated) DEVICE — SOLUTION IRRIG 1000ML STRL H2O USP PLAS POUR BTL

## (undated) DEVICE — TAPE UMB 1/8X30IN MP COT WHT --

## (undated) DEVICE — CLOTH PREP W7.5XL7.5IN 2% CHG 96 PER PK

## (undated) DEVICE — KENDALL RADIOLUCENT FOAM MONITORING ELECTRODE RECTANGULAR SHAPE: Brand: KENDALL

## (undated) DEVICE — REM POLYHESIVE ADULT PATIENT RETURN ELECTRODE: Brand: VALLEYLAB

## (undated) DEVICE — STERILE (15.2 TAPERED TO 7.6 X 183CM) POLYETHYLENE ACCORDION-FOLDED COVER FOR USE WITH SIEMENS ACUNAV ULTRASOUND CATHETER FAMILY CONNECTOR: Brand: SWIFTLINK TRANSDUCER COVER

## (undated) DEVICE — SOLUTION IRRIG 3000ML 0.9% SOD CHL USP UROMATIC PLAS CONT

## (undated) DEVICE — GARMENT,MEDLINE,DVT,INT,CALF,MED, GEN2: Brand: MEDLINE

## (undated) DEVICE — SUTURE N ABSRB BRAIDED 2-0 MO-6 39 IN 26 MM 1/2 CIR BLU BLK 3910900023

## (undated) DEVICE — Device

## (undated) DEVICE — SUTURE FIBERWIRE SZ 2 W/ TAPERED NEEDLE BLUE L38IN NONABSORB BLU L26.5MM 1/2 CIRCLE AR7200

## (undated) DEVICE — POWDER SURG CELLERATE RX 1 GM HYDROL COLLEGEN

## (undated) DEVICE — SUTURE MCRYL SZ 2-0 L27IN ABSRB UD CP-1 1 L36MM 1/2 CIR REV Y266H

## (undated) DEVICE — YANKAUER,BULB TIP,W/O VENT,RIGID,STERILE: Brand: MEDLINE

## (undated) DEVICE — 3M™ TEGADERM™ TRANSPARENT FILM DRESSING FRAME STYLE, 1626W, 4 IN X 4-3/4 IN (10 CM X 12 CM), 50/CT 4CT/CASE: Brand: 3M™ TEGADERM™

## (undated) DEVICE — NDL PRT INJ NSAF BLNT 18GX1.5 --

## (undated) DEVICE — SUTURE VCRL SZ 2-0 L36IN ABSRB UD L36MM CT-1 1/2 CIR J945H

## (undated) DEVICE — YANKAUER,FLEXIBLE HANDLE,REGLR CAPACITY: Brand: MEDLINE INDUSTRIES, INC.

## (undated) DEVICE — CATHETER ULTRASOUND NAVIGATIONAL 10 FRX90 CM VIVID I ACUNAV

## (undated) DEVICE — GUIDE NDL ST W/ 14 X 147CM TELESCOPICALLY FLD CIV FLX CVR

## (undated) DEVICE — PERCLOSE™ PROSTYLE™ SUTURE-MEDIATED CLOSURE AND REPAIR SYSTEM: Brand: PERCLOSE™ PROSTYLE™

## (undated) DEVICE — SUTURE STRATAFIX SPRL SZ 1 L14IN ABSRB VLT L48CM CTX 1/2 SXPD2B405

## (undated) DEVICE — GOWN,REINFORCED,POLY,SIRUS,XLNG/XXLG: Brand: MEDLINE

## (undated) DEVICE — PREVENA INCISION MANAGEMENT SYSTEM- PEEL & PLACE DRESSING: Brand: PREVENA™ PEEL & PLACE™

## (undated) DEVICE — SYR 10ML LUER LOK 1/5ML GRAD --

## (undated) DEVICE — BIPOLAR SEALER 23-112-1 AQM 6.0: Brand: AQUAMANTYS ®

## (undated) DEVICE — PIN FIX TROCAR PT 1 END 9/64X9 IN 1 PT STYL SMOOTH PLN STRL
Type: IMPLANTABLE DEVICE | Site: HIP | Status: NON-FUNCTIONAL
Removed: 2024-01-17

## (undated) DEVICE — GLOVE SURG SZ 65 THK91MIL LTX FREE SYN POLYISOPRENE

## (undated) DEVICE — BASIC: Brand: MEDLINE INDUSTRIES, INC.

## (undated) DEVICE — SUTURE PERMAHAND SZ 3-0 L18IN NONABSORBABLE BLK L26MM SH C013D

## (undated) DEVICE — ACCESS SHEATH WITH DILATOR: Brand: WATCHMAN FXD CURVE™ ACCESS SYSTEM

## (undated) DEVICE — 1 X VERSACROSS CONNECT TRANSSEPTAL DILATOR (INCLUDING 1 X J-TIP MECHANICAL GUIDEWIRE); 1 X VERSACROSS RF WIRE (INCLUDING 1 X CONNECTOR CABLE (SINGLE USE)); 1 X DISPERSIVE ELECTRODE: Brand: VERSACROSS CONNECT LAAC ACCESS SOLUTION

## (undated) DEVICE — KENDALL SCD EXPRESS SLEEVES, KNEE LENGTH, MEDIUM: Brand: KENDALL SCD

## (undated) DEVICE — GEL US 20GM NONIRRITATING OVERWRAPPED FILE PCH TRNSMIT

## (undated) DEVICE — GLOVE SURG SZ 65 L12IN FNGR THK79MIL GRN LTX FREE

## (undated) DEVICE — SINGLE BASIN: Brand: CARDINAL HEALTH

## (undated) DEVICE — (D)PREP SKN CHLRAPRP APPL 26ML -- CONVERT TO ITEM 371833

## (undated) DEVICE — SUTURE VCRL SZ 3-0 L27IN ABSRB UD L26MM SH 1/2 CIR J416H

## (undated) DEVICE — SUTURE MCRYL SZ 4-0 L27IN ABSRB UD L19MM PS-2 1/2 CIR PRIM Y426H

## (undated) DEVICE — SYR 3ML LL TIP 1/10ML GRAD --

## (undated) DEVICE — TOTAL HIP DR WATSON: Brand: MEDLINE INDUSTRIES, INC.

## (undated) DEVICE — 3M™ IOBAN™ 2 ANTIMICROBIAL INCISE DRAPE 6650EZ: Brand: IOBAN™ 2

## (undated) DEVICE — 1010 S-DRAPE TOWEL DRAPE 10/BX: Brand: STERI-DRAPE™

## (undated) DEVICE — DRAPE,U/SHT,SPLIT,FILM,60X84,STERILE: Brand: MEDLINE

## (undated) DEVICE — GLOVE SURG SZ 8 L12IN FNGR THK79MIL GRN LTX FREE

## (undated) DEVICE — MAGNETIC DRAPE: Brand: DEVON

## (undated) DEVICE — SUTURE NONABSORBABLE MONOFILAMENT 5-0 C-1 1X24 IN PROLENE 8725H

## (undated) DEVICE — STANDARD HYPODERMIC NEEDLE,POLYPROPYLENE HUB: Brand: MONOJECT

## (undated) DEVICE — DISH PTRI STRL --

## (undated) DEVICE — GLOVE ORANGE PI 8   MSG9080

## (undated) DEVICE — AGENT HEMSTAT W4XL4IN OXIDIZED REGENERATED CELOS ABSRB SFT

## (undated) DEVICE — PINNACLE INTRODUCER SHEATH: Brand: PINNACLE

## (undated) DEVICE — (D)ADHESIVE TISS HI VISC 1ML -- DISC USE ITEM 346585

## (undated) DEVICE — SUTURE STRATAFIX SZ 3-0 L30CM NONABSORBABLE UD L19MM FS-2 SXMP2B408

## (undated) DEVICE — CATHETER DIAG 6FR L110CM PIG CRV SZ 6 SIDE H DBL BRAID WIRE

## (undated) DEVICE — SPONGE LAPAROTOMY W18XL18IN WHITE STRUNG RADIOPAQUE STERILE

## (undated) DEVICE — SUTURE FIBERWIRE SZ 2 W/ TAPERED NEEDLE BLUE L38IN NONABSORB BLU L26.5MM 1/2 CIRCLE

## (undated) DEVICE — SUT PROL 6-0 30IN C1 DA BLU --

## (undated) DEVICE — SUTURE STRATAFIX SPRL MCRYL + SZ 2-0 L18IN ABSRB UD CT-1 SXMP1B413